# Patient Record
Sex: MALE | Race: WHITE | Employment: OTHER | URBAN - METROPOLITAN AREA
[De-identification: names, ages, dates, MRNs, and addresses within clinical notes are randomized per-mention and may not be internally consistent; named-entity substitution may affect disease eponyms.]

---

## 2017-01-26 ENCOUNTER — OFFICE VISIT (OUTPATIENT)
Dept: UROLOGY | Age: 77
End: 2017-01-26

## 2017-01-26 VITALS
SYSTOLIC BLOOD PRESSURE: 140 MMHG | DIASTOLIC BLOOD PRESSURE: 86 MMHG | WEIGHT: 230 LBS | HEIGHT: 72 IN | BODY MASS INDEX: 31.15 KG/M2 | HEART RATE: 67 BPM

## 2017-01-26 DIAGNOSIS — R97.20 ELEVATED PSA: Primary | ICD-10-CM

## 2017-01-26 PROCEDURE — 4040F PNEUMOC VAC/ADMIN/RCVD: CPT | Performed by: UROLOGY

## 2017-01-26 PROCEDURE — 1123F ACP DISCUSS/DSCN MKR DOCD: CPT | Performed by: UROLOGY

## 2017-01-26 PROCEDURE — 1036F TOBACCO NON-USER: CPT | Performed by: UROLOGY

## 2017-01-26 PROCEDURE — G8427 DOCREV CUR MEDS BY ELIG CLIN: HCPCS | Performed by: UROLOGY

## 2017-01-26 PROCEDURE — 99213 OFFICE O/P EST LOW 20 MIN: CPT | Performed by: UROLOGY

## 2017-01-26 PROCEDURE — G8484 FLU IMMUNIZE NO ADMIN: HCPCS | Performed by: UROLOGY

## 2017-01-26 PROCEDURE — 3017F COLORECTAL CA SCREEN DOC REV: CPT | Performed by: UROLOGY

## 2017-01-26 PROCEDURE — G8419 CALC BMI OUT NRM PARAM NOF/U: HCPCS | Performed by: UROLOGY

## 2017-04-02 DIAGNOSIS — C73 PAPILLARY THYROID CARCINOMA (HCC): ICD-10-CM

## 2017-04-03 RX ORDER — LEVOTHYROXINE SODIUM 137 UG/1
TABLET ORAL
Qty: 90 TABLET | Refills: 1 | Status: ON HOLD | OUTPATIENT
Start: 2017-04-03 | End: 2017-12-04 | Stop reason: HOSPADM

## 2017-06-09 ENCOUNTER — OFFICE VISIT (OUTPATIENT)
Dept: SURGERY | Age: 77
End: 2017-06-09

## 2017-06-09 VITALS
DIASTOLIC BLOOD PRESSURE: 76 MMHG | SYSTOLIC BLOOD PRESSURE: 138 MMHG | WEIGHT: 229 LBS | BODY MASS INDEX: 30.35 KG/M2 | TEMPERATURE: 98.1 F | HEIGHT: 73 IN

## 2017-06-09 DIAGNOSIS — L08.9 UNSPECIFIED LOCAL INFECTION OF SKIN AND SUBCUTANEOUS TISSUE: ICD-10-CM

## 2017-06-09 DIAGNOSIS — L72.9 SKIN CYST: Primary | ICD-10-CM

## 2017-06-09 PROCEDURE — 3017F COLORECTAL CA SCREEN DOC REV: CPT | Performed by: SURGERY

## 2017-06-09 PROCEDURE — G8417 CALC BMI ABV UP PARAM F/U: HCPCS | Performed by: SURGERY

## 2017-06-09 PROCEDURE — 1123F ACP DISCUSS/DSCN MKR DOCD: CPT | Performed by: SURGERY

## 2017-06-09 PROCEDURE — 4040F PNEUMOC VAC/ADMIN/RCVD: CPT | Performed by: SURGERY

## 2017-06-09 PROCEDURE — 99202 OFFICE O/P NEW SF 15 MIN: CPT | Performed by: SURGERY

## 2017-06-09 PROCEDURE — G8427 DOCREV CUR MEDS BY ELIG CLIN: HCPCS | Performed by: SURGERY

## 2017-06-09 PROCEDURE — 1036F TOBACCO NON-USER: CPT | Performed by: SURGERY

## 2017-06-09 RX ORDER — LORAZEPAM 0.5 MG/1
0.5 TABLET ORAL
Status: ON HOLD | COMMUNITY
End: 2017-12-04 | Stop reason: HOSPADM

## 2017-06-09 RX ORDER — ACETAMINOPHEN 160 MG
TABLET,DISINTEGRATING ORAL
Status: ON HOLD | COMMUNITY
End: 2017-11-21 | Stop reason: ALTCHOICE

## 2017-06-09 RX ORDER — SULFAMETHOXAZOLE AND TRIMETHOPRIM 800; 160 MG/1; MG/1
1 TABLET ORAL 2 TIMES DAILY
Qty: 28 TABLET | Refills: 0 | Status: SHIPPED | OUTPATIENT
Start: 2017-06-09 | End: 2017-06-23

## 2017-06-09 ASSESSMENT — ENCOUNTER SYMPTOMS
SHORTNESS OF BREATH: 0
BLOOD IN STOOL: 0
CHEST TIGHTNESS: 0
RESPIRATORY NEGATIVE: 1
RHINORRHEA: 0
ALLERGIC/IMMUNOLOGIC NEGATIVE: 1
EYES NEGATIVE: 1
COLOR CHANGE: 0
CONSTIPATION: 0
GASTROINTESTINAL NEGATIVE: 1
ABDOMINAL DISTENTION: 0
ABDOMINAL PAIN: 0

## 2017-06-15 ENCOUNTER — PROCEDURE VISIT (OUTPATIENT)
Dept: SURGERY | Age: 77
End: 2017-06-15

## 2017-06-15 VITALS
TEMPERATURE: 97.9 F | SYSTOLIC BLOOD PRESSURE: 130 MMHG | DIASTOLIC BLOOD PRESSURE: 82 MMHG | HEIGHT: 72 IN | BODY MASS INDEX: 30.75 KG/M2 | WEIGHT: 227 LBS

## 2017-06-15 DIAGNOSIS — L08.9 UNSPECIFIED LOCAL INFECTION OF SKIN AND SUBCUTANEOUS TISSUE: ICD-10-CM

## 2017-06-15 DIAGNOSIS — L72.9 SKIN CYST: Primary | ICD-10-CM

## 2017-06-15 PROCEDURE — 11402 EXC TR-EXT B9+MARG 1.1-2 CM: CPT | Performed by: SURGERY

## 2017-06-15 PROCEDURE — 99999 PR OFFICE/OUTPT VISIT,PROCEDURE ONLY: CPT | Performed by: SURGERY

## 2017-06-15 PROCEDURE — 12032 INTMD RPR S/A/T/EXT 2.6-7.5: CPT | Performed by: SURGERY

## 2017-06-15 PROCEDURE — 1036F TOBACCO NON-USER: CPT | Performed by: SURGERY

## 2017-06-29 ENCOUNTER — OFFICE VISIT (OUTPATIENT)
Dept: SURGERY | Age: 77
End: 2017-06-29

## 2017-06-29 VITALS
HEIGHT: 72 IN | WEIGHT: 232 LBS | BODY MASS INDEX: 31.42 KG/M2 | SYSTOLIC BLOOD PRESSURE: 110 MMHG | DIASTOLIC BLOOD PRESSURE: 80 MMHG | TEMPERATURE: 98.3 F

## 2017-06-29 DIAGNOSIS — L72.9 SKIN CYST: Primary | ICD-10-CM

## 2017-06-29 DIAGNOSIS — L08.9 UNSPECIFIED LOCAL INFECTION OF SKIN AND SUBCUTANEOUS TISSUE: ICD-10-CM

## 2017-06-29 PROCEDURE — G8417 CALC BMI ABV UP PARAM F/U: HCPCS | Performed by: SURGERY

## 2017-06-29 PROCEDURE — 1036F TOBACCO NON-USER: CPT | Performed by: SURGERY

## 2017-06-29 PROCEDURE — 1123F ACP DISCUSS/DSCN MKR DOCD: CPT | Performed by: SURGERY

## 2017-06-29 PROCEDURE — 3017F COLORECTAL CA SCREEN DOC REV: CPT | Performed by: SURGERY

## 2017-06-29 PROCEDURE — 4040F PNEUMOC VAC/ADMIN/RCVD: CPT | Performed by: SURGERY

## 2017-06-29 PROCEDURE — G8427 DOCREV CUR MEDS BY ELIG CLIN: HCPCS | Performed by: SURGERY

## 2017-06-29 PROCEDURE — 99212 OFFICE O/P EST SF 10 MIN: CPT | Performed by: SURGERY

## 2017-09-14 DIAGNOSIS — C73 PAPILLARY THYROID CARCINOMA (HCC): ICD-10-CM

## 2017-09-14 RX ORDER — LEVOTHYROXINE SODIUM 137 UG/1
TABLET ORAL
Qty: 90 TABLET | OUTPATIENT
Start: 2017-09-14

## 2017-09-29 ENCOUNTER — TELEPHONE (OUTPATIENT)
Dept: UROLOGY | Age: 77
End: 2017-09-29

## 2017-09-29 ENCOUNTER — NURSE ONLY (OUTPATIENT)
Dept: UROLOGY | Age: 77
End: 2017-09-29

## 2017-09-29 DIAGNOSIS — R30.0 BURNING WITH URINATION: Primary | ICD-10-CM

## 2017-09-29 DIAGNOSIS — R30.0 BURNING WITH URINATION: ICD-10-CM

## 2017-09-29 LAB
BILIRUBIN, POC: NORMAL
BLOOD URINE, POC: NORMAL
CLARITY, POC: CLEAR
COLOR, POC: YELLOW
GLUCOSE URINE, POC: NORMAL
KETONES, POC: NORMAL
LEUKOCYTE EST, POC: NORMAL
NITRITE, POC: NORMAL
PH, POC: 7
PROTEIN, POC: NORMAL
SPECIFIC GRAVITY, POC: 1.02
UROBILINOGEN, POC: 0.2

## 2017-09-29 PROCEDURE — 81003 URINALYSIS AUTO W/O SCOPE: CPT | Performed by: UROLOGY

## 2017-09-29 NOTE — TELEPHONE ENCOUNTER
Urine drop off for severe burning, hematuria [facility] UA showed:  Color, UA 09/29/2017 12:38 PM Unknown      yellow     Clarity, UA 09/29/2017 12:38 PM Unknown     clear     Glucose, UA POC 09/29/2017 12:38 PM Unknown     neg     Bilirubin, UA 09/29/2017 12:38 PM Unknown     neg     Ketones, UA 09/29/2017 12:38 PM Unknown     neg     Spec Grav, UA 09/29/2017 12:38 PM Unknown     1.020     Blood, UA POC 09/29/2017 12:38 PM Unknown     trace-intact     pH, UA 09/29/2017 12:38 PM Unknown     7.0     Protein, UA POC 09/29/2017 12:38 PM Unknown     30mg/dL     Urobilinogen, UA 09/29/2017 12:38 PM Unknown     0.2     Leukocytes, UA 09/29/2017 12:38 PM Unknown     neg     Nitrite, UA 09/29/2017 12:38 PM Unknown     neg     Culture sent as advised

## 2017-10-01 LAB — URINE CULTURE, ROUTINE: NORMAL

## 2017-11-10 ENCOUNTER — HOSPITAL ENCOUNTER (OUTPATIENT)
Age: 77
Discharge: HOME | End: 2017-11-10
Payer: MEDICARE

## 2017-11-10 ENCOUNTER — HOSPITAL ENCOUNTER (OUTPATIENT)
Dept: GENERAL RADIOLOGY | Age: 77
Discharge: HOME | End: 2017-11-10
Payer: MEDICARE

## 2017-11-10 ENCOUNTER — HOSPITAL ENCOUNTER (INPATIENT)
Age: 77
LOS: 11 days | Discharge: NURSING, REHAB OR LONG-TERM CARE | DRG: 163 | End: 2017-11-21
Attending: INTERNAL MEDICINE | Admitting: INTERNAL MEDICINE
Payer: MEDICARE

## 2017-11-10 DIAGNOSIS — J18.9 PNEUMONITIS: ICD-10-CM

## 2017-11-10 PROBLEM — T17.908A ASPIRATION INTO RESPIRATORY TRACT: Status: ACTIVE | Noted: 2017-11-10

## 2017-11-10 PROCEDURE — 6360000002 HC RX W HCPCS: Performed by: NURSE PRACTITIONER

## 2017-11-10 PROCEDURE — 6370000000 HC RX 637 (ALT 250 FOR IP): Performed by: PHYSICIAN ASSISTANT

## 2017-11-10 PROCEDURE — 2580000003 HC RX 258: Performed by: NURSE PRACTITIONER

## 2017-11-10 PROCEDURE — 94640 AIRWAY INHALATION TREATMENT: CPT

## 2017-11-10 PROCEDURE — 71020 XR CHEST STANDARD TWO VW: CPT

## 2017-11-10 PROCEDURE — 6370000000 HC RX 637 (ALT 250 FOR IP): Performed by: INTERNAL MEDICINE

## 2017-11-10 PROCEDURE — 1210000000 HC MED SURG R&B

## 2017-11-10 RX ORDER — FINASTERIDE 5 MG/1
5 TABLET, FILM COATED ORAL DAILY
Status: DISCONTINUED | OUTPATIENT
Start: 2017-11-10 | End: 2017-11-14

## 2017-11-10 RX ORDER — SODIUM CHLORIDE 0.9 % (FLUSH) 0.9 %
10 SYRINGE (ML) INJECTION EVERY 12 HOURS SCHEDULED
Status: DISCONTINUED | OUTPATIENT
Start: 2017-11-10 | End: 2017-11-14

## 2017-11-10 RX ORDER — MIRTAZAPINE 15 MG/1
15 TABLET, FILM COATED ORAL NIGHTLY
Status: ON HOLD | COMMUNITY
End: 2017-12-04 | Stop reason: HOSPADM

## 2017-11-10 RX ORDER — ALBUTEROL SULFATE 90 UG/1
2 AEROSOL, METERED RESPIRATORY (INHALATION) EVERY 4 HOURS PRN
Status: DISCONTINUED | OUTPATIENT
Start: 2017-11-10 | End: 2017-11-13

## 2017-11-10 RX ORDER — TRAZODONE HYDROCHLORIDE 150 MG/1
150 TABLET ORAL NIGHTLY
Status: ON HOLD | COMMUNITY
End: 2017-12-04 | Stop reason: HOSPADM

## 2017-11-10 RX ORDER — ONDANSETRON 2 MG/ML
4 INJECTION INTRAMUSCULAR; INTRAVENOUS EVERY 6 HOURS PRN
Status: DISCONTINUED | OUTPATIENT
Start: 2017-11-10 | End: 2017-11-14

## 2017-11-10 RX ORDER — IPRATROPIUM BROMIDE AND ALBUTEROL SULFATE 2.5; .5 MG/3ML; MG/3ML
1 SOLUTION RESPIRATORY (INHALATION) 3 TIMES DAILY
Status: DISCONTINUED | OUTPATIENT
Start: 2017-11-10 | End: 2017-11-13

## 2017-11-10 RX ORDER — FAMOTIDINE 20 MG/1
10 TABLET, FILM COATED ORAL DAILY
Status: DISCONTINUED | OUTPATIENT
Start: 2017-11-10 | End: 2017-11-14

## 2017-11-10 RX ORDER — MIRTAZAPINE 15 MG/1
15 TABLET, FILM COATED ORAL NIGHTLY
Status: DISCONTINUED | OUTPATIENT
Start: 2017-11-10 | End: 2017-11-14

## 2017-11-10 RX ORDER — BUDESONIDE AND FORMOTEROL FUMARATE DIHYDRATE 160; 4.5 UG/1; UG/1
2 AEROSOL RESPIRATORY (INHALATION) 2 TIMES DAILY
Status: DISCONTINUED | OUTPATIENT
Start: 2017-11-10 | End: 2017-11-10 | Stop reason: CLARIF

## 2017-11-10 RX ORDER — SODIUM CHLORIDE 0.9 % (FLUSH) 0.9 %
10 SYRINGE (ML) INJECTION PRN
Status: DISCONTINUED | OUTPATIENT
Start: 2017-11-10 | End: 2017-11-14

## 2017-11-10 RX ORDER — LEVOTHYROXINE SODIUM 0.12 MG/1
125 TABLET ORAL DAILY
Status: DISCONTINUED | OUTPATIENT
Start: 2017-11-11 | End: 2017-11-14

## 2017-11-10 RX ORDER — IPRATROPIUM BROMIDE AND ALBUTEROL SULFATE 2.5; .5 MG/3ML; MG/3ML
1 SOLUTION RESPIRATORY (INHALATION) EVERY 4 HOURS PRN
Status: DISCONTINUED | OUTPATIENT
Start: 2017-11-10 | End: 2017-11-10

## 2017-11-10 RX ORDER — IBUPROFEN 400 MG/1
400 TABLET ORAL EVERY 6 HOURS PRN
Status: DISCONTINUED | OUTPATIENT
Start: 2017-11-10 | End: 2017-11-10

## 2017-11-10 RX ORDER — ALBUTEROL SULFATE 2.5 MG/3ML
2.5 SOLUTION RESPIRATORY (INHALATION)
Status: DISCONTINUED | OUTPATIENT
Start: 2017-11-10 | End: 2017-11-13

## 2017-11-10 RX ORDER — TAMSULOSIN HYDROCHLORIDE 0.4 MG/1
0.4 CAPSULE ORAL DAILY
Status: DISCONTINUED | OUTPATIENT
Start: 2017-11-10 | End: 2017-11-14

## 2017-11-10 RX ORDER — LORAZEPAM 0.5 MG/1
0.5 TABLET ORAL EVERY 6 HOURS PRN
Status: DISCONTINUED | OUTPATIENT
Start: 2017-11-10 | End: 2017-11-14

## 2017-11-10 RX ORDER — ACETAMINOPHEN 325 MG/1
650 TABLET ORAL EVERY 4 HOURS PRN
Status: DISCONTINUED | OUTPATIENT
Start: 2017-11-10 | End: 2017-11-14

## 2017-11-10 RX ORDER — ACETAMINOPHEN 80 MG
TABLET,CHEWABLE ORAL ONCE
Status: DISCONTINUED | OUTPATIENT
Start: 2017-11-10 | End: 2017-11-14

## 2017-11-10 RX ORDER — ASPIRIN 81 MG/1
81 TABLET, CHEWABLE ORAL DAILY
Status: DISCONTINUED | OUTPATIENT
Start: 2017-11-10 | End: 2017-11-14

## 2017-11-10 RX ORDER — TRAZODONE HYDROCHLORIDE 50 MG/1
50 TABLET ORAL NIGHTLY
Status: DISCONTINUED | OUTPATIENT
Start: 2017-11-10 | End: 2017-11-10 | Stop reason: ALTCHOICE

## 2017-11-10 RX ORDER — ALBUTEROL SULFATE 2.5 MG/3ML
2.5 SOLUTION RESPIRATORY (INHALATION) EVERY 6 HOURS PRN
Status: DISCONTINUED | OUTPATIENT
Start: 2017-11-10 | End: 2017-11-10

## 2017-11-10 RX ORDER — PRAVASTATIN SODIUM 20 MG
20 TABLET ORAL NIGHTLY
Status: DISCONTINUED | OUTPATIENT
Start: 2017-11-10 | End: 2017-11-14

## 2017-11-10 RX ADMIN — Medication 2 PUFF: at 21:13

## 2017-11-10 RX ADMIN — TRAZODONE HYDROCHLORIDE 150 MG: 100 TABLET ORAL at 21:23

## 2017-11-10 RX ADMIN — AMPICILLIN SODIUM AND SULBACTAM SODIUM 1.5 G: 1; .5 INJECTION, POWDER, FOR SOLUTION INTRAMUSCULAR; INTRAVENOUS at 20:31

## 2017-11-10 RX ADMIN — MIRTAZAPINE 15 MG: 15 TABLET, FILM COATED ORAL at 21:23

## 2017-11-10 RX ADMIN — SODIUM CHLORIDE, PRESERVATIVE FREE 10 ML: 5 INJECTION INTRAVENOUS at 20:32

## 2017-11-10 RX ADMIN — PRAVASTATIN SODIUM 20 MG: 20 TABLET ORAL at 21:23

## 2017-11-10 ASSESSMENT — PAIN SCALES - GENERAL: PAINLEVEL_OUTOF10: 0

## 2017-11-10 NOTE — H&P
HFA) 108 (90 BASE) MCG/ACT inhaler Inhale 2 puffs into the lungs every 4 hours as needed for Wheezing   Yes Historical Provider, MD   pravastatin (PRAVACHOL) 20 MG tablet Take 1 tablet by mouth daily. 4/27/15  Yes Ishmael Laguna MD   budesonide-formoterol (SYMBICORT) 160-4.5 MCG/ACT AERO Inhale 2 puffs into the lungs 2 times daily. Yes Historical Provider, MD   finasteride (PROSCAR) 5 MG tablet Take 5 mg by mouth daily. Yes Historical Provider, MD   aspirin 81 MG tablet Take 81 mg by mouth daily. Yes Historical Provider, MD   tamsulosin (FLOMAX) 0.4 MG capsule Take 0.4 mg by mouth daily. Yes Historical Provider, MD   LORazepam (ATIVAN) 0.5 MG tablet Take 0.5 mg by mouth    Historical Provider, MD   Cholecalciferol (VITAMIN D3) 2000 UNITS CAPS Take by mouth    Historical Provider, MD   traZODone (DESYREL) 50 MG tablet Take 50 mg by mouth nightly  12/18/15   Historical Provider, MD       Allergies:  Review of patient's allergies indicates no known allergies. Social History:      The patient currently lives @ home    TOBACCO:   reports that he has quit smoking. His smoking use included Cigarettes. He started smoking about 17 years ago. He does not have any smokeless tobacco history on file. ETOH:   reports that he does not drink alcohol. Family History:      Reviewed in detail and negative for DM, CAD, Cancer, CVA. Positive as follows:        Problem Relation Age of Onset    Other Mother 80     Old Age    Stroke Father 45       REVIEW OF SYSTEMS:   Pertinent positives as noted in the HPI. All other systems reviewed and negative. PHYSICAL EXAM:    /76   Pulse 80   Temp 97.5 °F (36.4 °C) (Oral)   Resp 18   SpO2 97%     General appearance:  No apparent distress, appears stated age and cooperative. HEENT:  Normal cephalic, atraumatic without obvious deformity. Pupils equal, round, and reactive to light. Extra ocular muscles intact. Conjunctivae/corneas clear.   Neck: Supple, with full Prophylaxis:SCD    Code Status: Full Code        Dispo -inpatient        Jarvis Opitz, APRN    Thank you Jenae Sanford MD for the opportunity to be involved in this patient's care. If you have any questions or concerns please feel free to contact me.

## 2017-11-10 NOTE — FLOWSHEET NOTE
Direct admit per James. Room 292. Son at bedside. Lungs diminished in right base, wheezing. O2 sat 97% RA. Frequent cough.

## 2017-11-11 ENCOUNTER — APPOINTMENT (OUTPATIENT)
Dept: GENERAL RADIOLOGY | Age: 77
DRG: 163 | End: 2017-11-11
Attending: INTERNAL MEDICINE
Payer: MEDICARE

## 2017-11-11 ENCOUNTER — ANESTHESIA EVENT (OUTPATIENT)
Dept: OPERATING ROOM | Age: 77
DRG: 163 | End: 2017-11-11
Payer: MEDICARE

## 2017-11-11 ENCOUNTER — ANESTHESIA (OUTPATIENT)
Dept: OPERATING ROOM | Age: 77
DRG: 163 | End: 2017-11-11
Payer: MEDICARE

## 2017-11-11 ENCOUNTER — APPOINTMENT (OUTPATIENT)
Dept: CT IMAGING | Age: 77
DRG: 163 | End: 2017-11-11
Attending: INTERNAL MEDICINE
Payer: MEDICARE

## 2017-11-11 VITALS — OXYGEN SATURATION: 99 % | SYSTOLIC BLOOD PRESSURE: 115 MMHG | DIASTOLIC BLOOD PRESSURE: 75 MMHG

## 2017-11-11 LAB
ANION GAP SERPL CALCULATED.3IONS-SCNC: 13 MEQ/L (ref 7–13)
BASOPHILS ABSOLUTE: 0 K/UL (ref 0–0.2)
BASOPHILS RELATIVE PERCENT: 0.4 %
BUN BLDV-MCNC: 18 MG/DL (ref 8–23)
CALCIUM SERPL-MCNC: 9 MG/DL (ref 8.6–10.2)
CHLORIDE BLD-SCNC: 101 MEQ/L (ref 98–107)
CO2: 27 MEQ/L (ref 22–29)
CREAT SERPL-MCNC: 0.91 MG/DL (ref 0.7–1.2)
EOSINOPHILS ABSOLUTE: 0.3 K/UL (ref 0–0.7)
EOSINOPHILS RELATIVE PERCENT: 4.6 %
GFR AFRICAN AMERICAN: >60
GFR NON-AFRICAN AMERICAN: >60
GLUCOSE BLD-MCNC: 90 MG/DL (ref 74–109)
GLUCOSE BLD-MCNC: 98 MG/DL (ref 60–115)
HCT VFR BLD CALC: 47 % (ref 42–52)
HEMOGLOBIN: 15.8 G/DL (ref 14–18)
LACTIC ACID: 1.2 MMOL/L (ref 0.5–2.2)
LYMPHOCYTES ABSOLUTE: 1.3 K/UL (ref 1–4.8)
LYMPHOCYTES RELATIVE PERCENT: 20.1 %
MCH RBC QN AUTO: 31.9 PG (ref 27–31.3)
MCHC RBC AUTO-ENTMCNC: 33.6 % (ref 33–37)
MCV RBC AUTO: 95 FL (ref 80–100)
MONOCYTES ABSOLUTE: 0.5 K/UL (ref 0.2–0.8)
MONOCYTES RELATIVE PERCENT: 7.7 %
NEUTROPHILS ABSOLUTE: 4.4 K/UL (ref 1.4–6.5)
NEUTROPHILS RELATIVE PERCENT: 67.2 %
PDW BLD-RTO: 14.6 % (ref 11.5–14.5)
PERFORMED ON: NORMAL
PLATELET # BLD: 103 K/UL (ref 130–400)
POTASSIUM SERPL-SCNC: 4.3 MEQ/L (ref 3.5–5.1)
RBC # BLD: 4.95 M/UL (ref 4.7–6.1)
SODIUM BLD-SCNC: 141 MEQ/L (ref 132–144)
WBC # BLD: 6.6 K/UL (ref 4.8–10.8)

## 2017-11-11 PROCEDURE — 85025 COMPLETE CBC W/AUTO DIFF WBC: CPT

## 2017-11-11 PROCEDURE — 31635 BRONCHOSCOPY W/FB REMOVAL: CPT | Performed by: INTERNAL MEDICINE

## 2017-11-11 PROCEDURE — 36415 COLL VENOUS BLD VENIPUNCTURE: CPT

## 2017-11-11 PROCEDURE — 99222 1ST HOSP IP/OBS MODERATE 55: CPT | Performed by: INTERNAL MEDICINE

## 2017-11-11 PROCEDURE — 71260 CT THORAX DX C+: CPT

## 2017-11-11 PROCEDURE — 94640 AIRWAY INHALATION TREATMENT: CPT

## 2017-11-11 PROCEDURE — 7100000001 HC PACU RECOVERY - ADDTL 15 MIN: Performed by: INTERNAL MEDICINE

## 2017-11-11 PROCEDURE — 2580000003 HC RX 258: Performed by: ANESTHESIOLOGY

## 2017-11-11 PROCEDURE — 83605 ASSAY OF LACTIC ACID: CPT

## 2017-11-11 PROCEDURE — 6370000000 HC RX 637 (ALT 250 FOR IP): Performed by: INTERNAL MEDICINE

## 2017-11-11 PROCEDURE — 3700000001 HC ADD 15 MINUTES (ANESTHESIA): Performed by: INTERNAL MEDICINE

## 2017-11-11 PROCEDURE — 71010 XR CHEST PORTABLE: CPT

## 2017-11-11 PROCEDURE — 1210000000 HC MED SURG R&B

## 2017-11-11 PROCEDURE — 94760 N-INVAS EAR/PLS OXIMETRY 1: CPT

## 2017-11-11 PROCEDURE — A6252 ABSORPT DRG >16 <=48 W/O BDR: HCPCS | Performed by: INTERNAL MEDICINE

## 2017-11-11 PROCEDURE — 6360000004 HC RX CONTRAST MEDICATION: Performed by: INTERNAL MEDICINE

## 2017-11-11 PROCEDURE — 6360000002 HC RX W HCPCS: Performed by: ANESTHESIOLOGY

## 2017-11-11 PROCEDURE — 6360000002 HC RX W HCPCS: Performed by: NURSE PRACTITIONER

## 2017-11-11 PROCEDURE — 0W9930Z DRAINAGE OF RIGHT PLEURAL CAVITY WITH DRAINAGE DEVICE, PERCUTANEOUS APPROACH: ICD-10-PCS | Performed by: THORACIC SURGERY (CARDIOTHORACIC VASCULAR SURGERY)

## 2017-11-11 PROCEDURE — 3600000003 HC SURGERY LEVEL 3 BASE: Performed by: INTERNAL MEDICINE

## 2017-11-11 PROCEDURE — 3700000000 HC ANESTHESIA ATTENDED CARE: Performed by: INTERNAL MEDICINE

## 2017-11-11 PROCEDURE — 87040 BLOOD CULTURE FOR BACTERIA: CPT

## 2017-11-11 PROCEDURE — 3600000013 HC SURGERY LEVEL 3 ADDTL 15MIN: Performed by: INTERNAL MEDICINE

## 2017-11-11 PROCEDURE — 80048 BASIC METABOLIC PNL TOTAL CA: CPT

## 2017-11-11 PROCEDURE — 2580000003 HC RX 258: Performed by: INTERNAL MEDICINE

## 2017-11-11 PROCEDURE — 6370000000 HC RX 637 (ALT 250 FOR IP): Performed by: NURSE PRACTITIONER

## 2017-11-11 PROCEDURE — 2500000003 HC RX 250 WO HCPCS: Performed by: INTERNAL MEDICINE

## 2017-11-11 PROCEDURE — 0BC60ZZ EXTIRPATION OF MATTER FROM RIGHT LOWER LOBE BRONCHUS, OPEN APPROACH: ICD-10-PCS | Performed by: THORACIC SURGERY (CARDIOTHORACIC VASCULAR SURGERY)

## 2017-11-11 PROCEDURE — 6360000002 HC RX W HCPCS: Performed by: INTERNAL MEDICINE

## 2017-11-11 PROCEDURE — 2580000003 HC RX 258: Performed by: NURSE PRACTITIONER

## 2017-11-11 PROCEDURE — 7100000000 HC PACU RECOVERY - FIRST 15 MIN: Performed by: INTERNAL MEDICINE

## 2017-11-11 PROCEDURE — 6370000000 HC RX 637 (ALT 250 FOR IP): Performed by: PHYSICIAN ASSISTANT

## 2017-11-11 RX ORDER — ONDANSETRON 2 MG/ML
INJECTION INTRAMUSCULAR; INTRAVENOUS PRN
Status: DISCONTINUED | OUTPATIENT
Start: 2017-11-11 | End: 2017-11-11 | Stop reason: SDUPTHER

## 2017-11-11 RX ORDER — DIPHENHYDRAMINE HYDROCHLORIDE 50 MG/ML
12.5 INJECTION INTRAMUSCULAR; INTRAVENOUS
Status: DISCONTINUED | OUTPATIENT
Start: 2017-11-11 | End: 2017-11-11 | Stop reason: HOSPADM

## 2017-11-11 RX ORDER — HYDROCODONE BITARTRATE AND ACETAMINOPHEN 5; 325 MG/1; MG/1
2 TABLET ORAL PRN
Status: DISCONTINUED | OUTPATIENT
Start: 2017-11-11 | End: 2017-11-11 | Stop reason: HOSPADM

## 2017-11-11 RX ORDER — MAGNESIUM HYDROXIDE 1200 MG/15ML
LIQUID ORAL CONTINUOUS PRN
Status: DISCONTINUED | OUTPATIENT
Start: 2017-11-11 | End: 2017-11-11 | Stop reason: HOSPADM

## 2017-11-11 RX ORDER — FENTANYL CITRATE 50 UG/ML
50 INJECTION, SOLUTION INTRAMUSCULAR; INTRAVENOUS EVERY 10 MIN PRN
Status: DISCONTINUED | OUTPATIENT
Start: 2017-11-11 | End: 2017-11-11 | Stop reason: HOSPADM

## 2017-11-11 RX ORDER — ONDANSETRON 2 MG/ML
4 INJECTION INTRAMUSCULAR; INTRAVENOUS
Status: DISCONTINUED | OUTPATIENT
Start: 2017-11-11 | End: 2017-11-11 | Stop reason: HOSPADM

## 2017-11-11 RX ORDER — SODIUM CHLORIDE, SODIUM LACTATE, POTASSIUM CHLORIDE, CALCIUM CHLORIDE 600; 310; 30; 20 MG/100ML; MG/100ML; MG/100ML; MG/100ML
INJECTION, SOLUTION INTRAVENOUS CONTINUOUS PRN
Status: DISCONTINUED | OUTPATIENT
Start: 2017-11-11 | End: 2017-11-11 | Stop reason: SDUPTHER

## 2017-11-11 RX ORDER — METOCLOPRAMIDE HYDROCHLORIDE 5 MG/ML
10 INJECTION INTRAMUSCULAR; INTRAVENOUS
Status: DISCONTINUED | OUTPATIENT
Start: 2017-11-11 | End: 2017-11-11 | Stop reason: HOSPADM

## 2017-11-11 RX ORDER — LIDOCAINE HYDROCHLORIDE 20 MG/ML
INJECTION, SOLUTION EPIDURAL; INFILTRATION; INTRACAUDAL; PERINEURAL PRN
Status: DISCONTINUED | OUTPATIENT
Start: 2017-11-11 | End: 2017-11-11 | Stop reason: HOSPADM

## 2017-11-11 RX ORDER — MEPERIDINE HYDROCHLORIDE 25 MG/ML
12.5 INJECTION INTRAMUSCULAR; INTRAVENOUS; SUBCUTANEOUS EVERY 5 MIN PRN
Status: DISCONTINUED | OUTPATIENT
Start: 2017-11-11 | End: 2017-11-11 | Stop reason: HOSPADM

## 2017-11-11 RX ORDER — HYDROCODONE BITARTRATE AND ACETAMINOPHEN 5; 325 MG/1; MG/1
1 TABLET ORAL PRN
Status: DISCONTINUED | OUTPATIENT
Start: 2017-11-11 | End: 2017-11-11 | Stop reason: HOSPADM

## 2017-11-11 RX ORDER — PROPOFOL 10 MG/ML
INJECTION, EMULSION INTRAVENOUS PRN
Status: DISCONTINUED | OUTPATIENT
Start: 2017-11-11 | End: 2017-11-11 | Stop reason: SDUPTHER

## 2017-11-11 RX ORDER — DEXAMETHASONE SODIUM PHOSPHATE 10 MG/ML
INJECTION INTRAMUSCULAR; INTRAVENOUS PRN
Status: DISCONTINUED | OUTPATIENT
Start: 2017-11-11 | End: 2017-11-11 | Stop reason: SDUPTHER

## 2017-11-11 RX ORDER — SUCCINYLCHOLINE CHLORIDE 20 MG/ML
INJECTION INTRAMUSCULAR; INTRAVENOUS PRN
Status: DISCONTINUED | OUTPATIENT
Start: 2017-11-11 | End: 2017-11-11 | Stop reason: SDUPTHER

## 2017-11-11 RX ADMIN — AMPICILLIN SODIUM AND SULBACTAM SODIUM 1.5 G: 1; .5 INJECTION, POWDER, FOR SOLUTION INTRAMUSCULAR; INTRAVENOUS at 01:51

## 2017-11-11 RX ADMIN — AMPICILLIN SODIUM AND SULBACTAM SODIUM 1.5 G: 1; .5 INJECTION, POWDER, FOR SOLUTION INTRAMUSCULAR; INTRAVENOUS at 08:03

## 2017-11-11 RX ADMIN — ONDANSETRON 4 MG: 2 INJECTION INTRAMUSCULAR; INTRAVENOUS at 10:31

## 2017-11-11 RX ADMIN — IOPAMIDOL 75 ML: 755 INJECTION, SOLUTION INTRAVENOUS at 11:57

## 2017-11-11 RX ADMIN — TAMSULOSIN HYDROCHLORIDE 0.4 MG: 0.4 CAPSULE ORAL at 12:41

## 2017-11-11 RX ADMIN — AMPICILLIN SODIUM AND SULBACTAM SODIUM 1.5 G: 1; .5 INJECTION, POWDER, FOR SOLUTION INTRAMUSCULAR; INTRAVENOUS at 12:39

## 2017-11-11 RX ADMIN — TAZOBACTAM SODIUM AND PIPERACILLIN SODIUM 3.38 G: 375; 3 INJECTION, SOLUTION INTRAVENOUS at 23:55

## 2017-11-11 RX ADMIN — TAZOBACTAM SODIUM AND PIPERACILLIN SODIUM 3.38 G: 375; 3 INJECTION, SOLUTION INTRAVENOUS at 13:20

## 2017-11-11 RX ADMIN — MIRTAZAPINE 15 MG: 15 TABLET, FILM COATED ORAL at 20:32

## 2017-11-11 RX ADMIN — FINASTERIDE 5 MG: 5 TABLET, FILM COATED ORAL at 12:42

## 2017-11-11 RX ADMIN — PRAVASTATIN SODIUM 20 MG: 20 TABLET ORAL at 20:32

## 2017-11-11 RX ADMIN — SUCCINYLCHOLINE CHLORIDE 40 MG: 20 INJECTION, SOLUTION INTRAMUSCULAR; INTRAVENOUS at 10:01

## 2017-11-11 RX ADMIN — SUCCINYLCHOLINE CHLORIDE 100 MG: 20 INJECTION, SOLUTION INTRAMUSCULAR; INTRAVENOUS at 09:45

## 2017-11-11 RX ADMIN — PROPOFOL 40 MG: 10 INJECTION, EMULSION INTRAVENOUS at 09:26

## 2017-11-11 RX ADMIN — PROPOFOL 40 MG: 10 INJECTION, EMULSION INTRAVENOUS at 09:38

## 2017-11-11 RX ADMIN — AMPICILLIN SODIUM AND SULBACTAM SODIUM 1.5 G: 1; .5 INJECTION, POWDER, FOR SOLUTION INTRAMUSCULAR; INTRAVENOUS at 19:15

## 2017-11-11 RX ADMIN — PROPOFOL 40 MG: 10 INJECTION, EMULSION INTRAVENOUS at 09:23

## 2017-11-11 RX ADMIN — TRAZODONE HYDROCHLORIDE 150 MG: 100 TABLET ORAL at 20:32

## 2017-11-11 RX ADMIN — PROPOFOL 20 MG: 10 INJECTION, EMULSION INTRAVENOUS at 09:30

## 2017-11-11 RX ADMIN — SODIUM CHLORIDE, PRESERVATIVE FREE 10 ML: 5 INJECTION INTRAVENOUS at 20:32

## 2017-11-11 RX ADMIN — SUCCINYLCHOLINE CHLORIDE 40 MG: 20 INJECTION, SOLUTION INTRAMUSCULAR; INTRAVENOUS at 10:28

## 2017-11-11 RX ADMIN — Medication 2 PUFF: at 08:51

## 2017-11-11 RX ADMIN — ASPIRIN 81 MG 81 MG: 81 TABLET ORAL at 12:42

## 2017-11-11 RX ADMIN — PROPOFOL 60 MG: 10 INJECTION, EMULSION INTRAVENOUS at 09:45

## 2017-11-11 RX ADMIN — PROPOFOL 20 MG: 10 INJECTION, EMULSION INTRAVENOUS at 09:34

## 2017-11-11 RX ADMIN — ACETAMINOPHEN 650 MG: 325 TABLET ORAL at 20:47

## 2017-11-11 RX ADMIN — DEXAMETHASONE SODIUM PHOSPHATE 10 MG: 10 INJECTION INTRAMUSCULAR; INTRAVENOUS at 10:14

## 2017-11-11 RX ADMIN — SUCCINYLCHOLINE CHLORIDE 40 MG: 20 INJECTION, SOLUTION INTRAMUSCULAR; INTRAVENOUS at 09:49

## 2017-11-11 RX ADMIN — LEVOTHYROXINE SODIUM 125 MCG: 125 TABLET ORAL at 12:41

## 2017-11-11 RX ADMIN — TAZOBACTAM SODIUM AND PIPERACILLIN SODIUM 3.38 G: 375; 3 INJECTION, SOLUTION INTRAVENOUS at 18:34

## 2017-11-11 RX ADMIN — SODIUM CHLORIDE, PRESERVATIVE FREE 10 ML: 5 INJECTION INTRAVENOUS at 08:03

## 2017-11-11 RX ADMIN — SODIUM CHLORIDE, POTASSIUM CHLORIDE, SODIUM LACTATE AND CALCIUM CHLORIDE: 600; 310; 30; 20 INJECTION, SOLUTION INTRAVENOUS at 09:20

## 2017-11-11 RX ADMIN — FAMOTIDINE 10 MG: 20 TABLET, FILM COATED ORAL at 12:42

## 2017-11-11 RX ADMIN — SUCCINYLCHOLINE CHLORIDE 20 MG: 20 INJECTION, SOLUTION INTRAMUSCULAR; INTRAVENOUS at 09:54

## 2017-11-11 ASSESSMENT — PAIN SCALES - GENERAL
PAINLEVEL_OUTOF10: 4
PAINLEVEL_OUTOF10: 0

## 2017-11-11 ASSESSMENT — COPD QUESTIONNAIRES: CAT_SEVERITY: MODERATE

## 2017-11-11 NOTE — CONSULTS
mcg Oral Daily    mirtazapine  15 mg Oral Nightly    tamsulosin  0.4 mg Oral Daily    famotidine  10 mg Oral Daily    pravastatin  20 mg Oral Nightly    traZODone  150 mg Oral Nightly    pill splitter   Does not apply Once    mometasone-formoterol  2 puff Inhalation BID       PRN Meds:sodium chloride flush, acetaminophen, magnesium hydroxide, ondansetron, albuterol, albuterol sulfate HFA, LORazepam        REVIEW OF SYSTEMS:  As in history of present illness  Other 14 point review of system is negative. PHYSICAL EXAM:    Vitals:  /78   Pulse 64   Temp 97.2 °F (36.2 °C)   Resp 18   SpO2 98%   General: he is  Alert, awake . comfortable in bed, No distress. Head: Atraumatic , Normocephalic   Eyes: PERRL. No sclera icterus. No conjunctival injection. No discharge   ENT: No nasal  discharge. Pharynx clear. Neck:  Trachea midline. No thyromegaly, no JVD, No cervical adenopathy. Chest : Bilaterally symmetrical ,Normal effort,  No accessory muscle use  Lung :localized right lower wheezes   Heart[de-identified] Normal  rate. Regular rhythm. No mumur ,  Rub or gallop  ABD: Non-tender. Non-distended. No masses. No organmegaly. Normal bowel sounds. No hernia. EXT: No Pitting both leg , No Cyanosis No clubbing  Neuro: no focal weakness  Skin: Warm and dry. No erythema rash on exposed extremities. Data Review  No results for input(s): WBC, HGB, HCT, PLT in the last 72 hours. No results for input(s): NA, K, CL, CO2, BUN, CREATININE, GLUCOSE in the last 72 hours. Invalid input(s): CA    MV Settings: ABGs: No results for input(s): PHART, ZOK4EAO, PO2ART, UDB2SUQ, BEART, L1QQLSBJ, ZZA2BAA in the last 72 hours. O2 Device: None (Room air)  No results found for: [address]    Radiology  Xr Chest Standard (2 Vw)    Result Date: 11/10/2017  CLINICAL HISTORY: Pneumonitis COMPARISON: December 3, 2015 CHEST, TWO VIEWS FINDINGS: Cardiac and mediastinal silhouettes are normal in size and contour.  Projecting over the right hilar region is a 1 cm radiopaque density. The right costophrenic angle is slightly blunted. No focal areas of consolidations are noted. No pneumothorax is seen. The osseous structures are grossly unremarkable. IMPRESSION A radiopaque density is now seen in the region of the right lower lobe bronchus. Assessment, plan:   Aspiration of foreign body.     Will proceed with Brtonchoscopy        Electronically signed by Zahra Samuels MD, FCCP on 11/11/2017 at 9:17 AM

## 2017-11-11 NOTE — PROGRESS NOTES
[facility] EMERGENCY [facility] AT [place] Respiratory Therapy Evaluation   Current Order: PRN duoneb     Home Regimen: prn      Ordering Physician: Joesph Panda  Re-evaluation Date: 11/13    Diagnosis:Bronchoscopy foreign object     Patient Status: Stable / Unstable + Physician notified    The following MDI Criteria must be met in order to convert aerosol to MDI with spacer. If unable to meet, MDI will be converted to aerosol:  []  Patient able to demonstrate the ability to use MDI effectively  []  Patient alert and cooperative  []  Patient able to take deep breath with 5-10 second hold  []  Medication(s) available in this delivery method   []  Peak flow greater than or equal to 200 ml/min            Current Order Substituted To  (same drug, same frequency)   Aerosol to MDI [] Albuterol Sulfate 0.083% unit dose by aerosol Albuterol Sulfate MDI 2 puffs by inhalation with spacer    [] Levalbuterol 1.25 mg unit dose by aerosol Levalbuterol MDI 2 puffs by inhalation with spacer    [] Levalbuterol 0.63 mg unit dose by aerosol Levalbuterol MDI 2 puffs by inhalation with spacer    [] Ipratropium Bromide 0.02% unit dose by aerosol Ipratropium Bromide MDI 2 puffs by inhalation with spacer    [] Duoneb (Ipratropium + Albuterol) unit dose by aerosol Ipratropium MDI + Albuterol MDI 2 puffs by inhalation w/spacer   MDI to Aerosol [] Albuterol Sulfate MDI Albuterol Sulfate 0.083% unit dose by aerosol    [] Levalbuterol MDI 2 puffs by inhalation Levalbuterol 1.25 mg unit dose by aerosol    [] Ipratropium Bromide MDI by inhalation Ipratropium Bromide 0.02% unit dose by aerosol    [] Combivent (Ipratropium + Albuterol) MDI by inhalation Duoneb (Ipratropium + Albuterol) unit dose by aerosol   Treatment Assessment [Frequency/Schedule]:  Change frequency to: ____________TID duoneb______________________________________per Protocol, P&T, MEC      Points 0 1 2 3 4   Pulmonary Status  Non-Smoker  []   Smoking history   < 20 pack years  []   Smoking history  ?  20 pack years  []   Pulmonary Disorder  (acute or chronic)  [x]   Severe or Chronic w/ Exacerbation  []     Surgical Status No [x]   Surgeries     General []   Surgery Lower []   Abdominal Thoracic or []   Upper Abdominal Thoracic with  PulmonaryDisorder  []     Chest X-ray Clear/Not  Ordered     []  Chronic Changes  Results Pending  [x]  Infiltrates, atelectasis, pleural effusion, or edema  []  Infiltrates in more than one lobe []  Infiltrate + Atelectasis, &/or pleural effusion  []    Respiratory Pattern Regular,  RR = 12-20 [x]  Increased,  RR = 21-25 []  STEWART, irregular,  or RR = 26-30 []  Decreased FEV1  or RR = 31-35 []  Severe SOB, use  of accessory muscles, or RR ? 35  []    Mental Status Alert, oriented,  Cooperative [x]  Confused but Follows commands []  Lethargic or unable to follow commands []  Obtunded  []  Comatose  []    Breath Sounds Clear to  auscultation  []  Decreased unilaterally or  in bases only []  Decreased  bilaterally  []  Crackles or intermittent wheezes [x]  Wheezes []    Cough Strong, Spontan., & nonproductive [x]  Strong,  spontaneous, &  productive []  Weak,  Nonproductive []  Weak, productive or  with wheezes []  No spontaneous  cough or may require suctioning []    Level of Activity Ambulatory [x]  Ambulatory w/ Assist  []  Non-ambulatory []  Paraplegic []  Quadriplegic []    Total    Score:__6_____     Triage Score:__4______      Tri       Triage:     1. (>20) Freq: Q3    2. (16-20) Freq: Q4   3. (11-15) Freq: QID & Albuterol Q2 PRN    4. (6-10) Freq: TID & Albuterol Q2 PRN    5. (0-5) Freq Q4prn

## 2017-11-11 NOTE — PROGRESS NOTES
Patient ID:  Rafaela Nolan  [id number]  77 y.o.  1940  BOVIE PAD SITE CLEAR AND INTACT PRE AND POST OP. TAKEN TO PACU,   ATTACHED TO MONITOR AND REPORT GIVEN TO RN.   VSS DRSG DRY AND INTACT  FOREIGN BODY UNABLE TO BE RETRIEVED DUE TO POOR VISUALIZATION        Electronically signed by Katharina Schultz RN on 11/11/2017 at 10:41 AM

## 2017-11-11 NOTE — ANESTHESIA PRE PROCEDURE
Department of Anesthesiology  Preprocedure Note       Name:  Suzan May   Age:  77 y.o.  :  1940                                          MRN:  [id number]         Date:  2017      Surgeon: Lakshmi Almaraz):  Vani Oliveira MD    Procedure: Procedure(s):  BRONCHOSCOPY FIBEROPTIC    Medications prior to admission:   Prior to Admission medications    Medication Sig Start Date End Date Taking? Authorizing Provider   mirtazapine (REMERON) 15 MG tablet Take 15 mg by mouth nightly   Yes Historical Provider, MD   traZODone (DESYREL) 150 MG tablet Take 150 mg by mouth nightly   Yes Historical Provider, MD   Famotidine (PEPCID AC PO) Take by mouth   Yes Historical Provider, MD   levothyroxine (SYNTHROID) 137 MCG tablet Take 1 tablet by mouth  daily 4/3/17  Yes Vinnie Leonardo MD   albuterol (PROVENTIL HFA;VENTOLIN HFA) 108 (90 BASE) MCG/ACT inhaler Inhale 2 puffs into the lungs every 4 hours as needed for Wheezing   Yes Historical Provider, MD   pravastatin (PRAVACHOL) 20 MG tablet Take 1 tablet by mouth daily. 4/27/15  Yes Cedrick Amezcua MD   budesonide-formoterol (SYMBICORT) 160-4.5 MCG/ACT AERO Inhale 2 puffs into the lungs 2 times daily. Yes Historical Provider, MD   finasteride (PROSCAR) 5 MG tablet Take 5 mg by mouth daily. Yes Historical Provider, MD   aspirin 81 MG tablet Take 81 mg by mouth daily. Yes Historical Provider, MD   tamsulosin (FLOMAX) 0.4 MG capsule Take 0.4 mg by mouth daily.      Yes Historical Provider, MD   LORazepam (ATIVAN) 0.5 MG tablet Take 0.5 mg by mouth    Historical Provider, MD   Cholecalciferol (VITAMIN D3) 2000 UNITS CAPS Take by mouth    Historical Provider, MD   traZODone (DESYREL) 50 MG tablet Take 50 mg by mouth nightly  12/18/15   Historical Provider, MD       Current medications:    Current Facility-Administered Medications   Medication Dose Route Frequency Provider Last Rate Last Dose    sodium chloride flush 0.9 % injection 10 mL  10 mL Intravenous 2 times per day 7395       Allergies:  No Known Allergies    Problem List:    Patient Active Problem List   Diagnosis Code    COPD (chronic obstructive pulmonary disease) ([facility] J44.9    HTN (hypertension) I10    Hyperlipidemia E78.5    GERD (gastroesophageal reflux disease) K21.9    Hypothyroidism E03.9    Type 2 diabetes mellitus ([facility] E11.9    BPH (benign prostatic hyperplasia) N40.0    Anxiety F41.9    Insomnia G47.00    Acute sinusitis J01.90    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    Elevated PSA R97.20    Osteoarthritis of spine with radiculopathy, 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    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    Skin cyst L72.9    Local infection of skin and subcutaneous tissue L08.9    Aspiration into respiratory tract T17.908A       Past Medical History:        Diagnosis Date    Acute sinusitis     Anxiety     BPH (benign prostatic hyperplasia)     COPD (chronic obstructive pulmonary disease) ([facility]) 12/5/2013    Elevated CK 12/30/2013    GERD (gastroesophageal reflux disease) 12/11/2014    History of asthma 1/13/2014    HTN (hypertension) 1/13/2014    Hyperlipidemia     Hypothyroidism     Insomnia     Lower extremity weakness 1/24/2014    On home oxygen therapy     2L at night    Positive D dimer 12/5/2013    Sleep apnea 1/24/2014    Type 2 diabetes mellitus ([facility]     Vitamin D deficiency        Past Surgical History:        Procedure Laterality Date    KNEE SURGERY Left 1970    THYROIDECTOMY  2006       Social History:    Social History   Substance Use Topics    Smoking status: Former Smoker     Types: Cigarettes     Start date: 6/1/2000    Smokeless tobacco: Not on file      Comment: quit 2000    Alcohol use No                                Counseling given: Not Answered      Vital Signs (Current):   Vitals:    11/10/17 2041 11/11/17 0200 11/11/17 0809 11/11/17 0854   BP:   126/78    Pulse:   66 64   Resp: 18 16 16 18   Temp:   97.2 °F (36.2 °C)    TempSrc: Oral Oral oxygen   Cardiovascular:  Exercise tolerance: good (>4 METS),   (+) hypertension:,         Rhythm: regular             Beta Blocker:  Not on Beta Blocker         Neuro/Psych:   (+) neuromuscular disease:,             GI/Hepatic/Renal:   (+) GERD: well controlled,           Endo/Other: negative ROS   (+) Type II DM, , hypothyroidism::.                 Abdominal:           Vascular:                                      Anesthesia Plan      MAC     ASA 3       Induction: intravenous. MIPS: Prophylactic antiemetics administered. Anesthetic plan and risks discussed with patient. Use of blood products discussed with patient whom consented to blood products.    Plan discussed with surgical team.                  Kathi Blood MD   11/11/2017

## 2017-11-11 NOTE — FLOWSHEET NOTE
Assessment compete. Patient denies chest pain, shortness of breath, nausea, and vomiting. Patient alert and oriented times 4 and follows commands. Lung sounds diminished on left and with expiratory wheezes on the right. Patient with nonproductive cough at times. Bowel sounds times 4 quad and patient denies pain upon light abdominal palpation. Skin intact. Patient denies any needs at this time. Call light in reach.

## 2017-11-11 NOTE — PROGRESS NOTES
Hospitalist Progress Note      PCP: Jennifer Morillo MD    Date of Admission: 11/10/2017    Subjective: no complaints    Medications:  Reviewed    Infusion Medications    sodium chloride       Scheduled Medications    piperacillin-tazobactam  3.375 g Intravenous Q6H    sodium chloride flush  10 mL Intravenous 2 times per day    ampicillin-sulbactam  1.5 g Intravenous Q6H    ipratropium-albuterol  1 ampule Inhalation TID    aspirin  81 mg Oral Daily    finasteride  5 mg Oral Daily    levothyroxine  125 mcg Oral Daily    mirtazapine  15 mg Oral Nightly    tamsulosin  0.4 mg Oral Daily    famotidine  10 mg Oral Daily    pravastatin  20 mg Oral Nightly    traZODone  150 mg Oral Nightly    pill splitter   Does not apply Once    mometasone-formoterol  2 puff Inhalation BID     PRN Meds: sodium chloride, lidocaine, lidocaine PF, fentanNYL, HYDROmorphone, HYDROcodone 5 mg - acetaminophen **OR** HYDROcodone 5 mg - acetaminophen, diphenhydrAMINE, ondansetron, metoclopramide, meperidine, iopamidol, sodium chloride flush, acetaminophen, magnesium hydroxide, ondansetron, albuterol, albuterol sulfate HFA, LORazepam      Intake/Output Summary (Last 24 hours) at 17 1157  Last data filed at 17 1035   Gross per 24 hour   Intake              650 ml   Output                0 ml   Net              650 ml       Exam:  BP (!) 149/88   Pulse 68   Temp 97.9 °F (36.6 °C)   Resp (!) 7   SpO2 96%     Average, Min, and Max for last 24 hours Vitals:  TEMPERATURE:  Temp  Av.4 °F (36.3 °C)  Min: 97 °F (36.1 °C)  Max: 97.9 °F (36.6 °C)    RESPIRATIONS RANGE: Resp  Av.1  Min: 0  Max: 38    PULSE RANGE: Pulse  Av.5  Min: 64  Max: 80    BLOOD PRESSURE RANGE:  Systolic (44MLU), AXP:374 , Min:76 , EUE:164   ; Diastolic (77JFX), GPQ:70, Min:48, Max:103      PULSE OXIMETRY RANGE: SpO2  Av.7 %  Min: 76 %  Max: 100 %    No intake/output data recorded.     General appearance: No apparent distress, appears stated age and cooperative. HEENT: Pupils equal, round, and reactive to light. Conjunctivae/corneas clear. Neck: Supple, with full range of motion. No jugular venous distention. Trachea midline. Respiratory:  Normal respiratory effort. Clear to auscultation, bilaterally without Rales/Wheezes/Rhonchi. Cardiovascular: Regular rate and rhythm with normal S1/S2 without murmurs, rubs or gallops. Abdomen: Soft, non-tender, non-distended with normal bowel sounds. Musculoskeletal: No clubbing, cyanosis or edema bilaterally. Full range of motion without deformity. Skin: Skin color, texture, turgor normal.  No rashes or lesions.   Neurologic:  grossly non-focal.  Ext no c/c/e    Labs:     Recent Results (from the past 24 hour(s))   Basic metabolic panel    Collection Time: 11/11/17  8:39 AM   Result Value Ref Range    Sodium 141 132 - 144 mEq/L    Potassium 4.3 3.5 - 5.1 mEq/L    Chloride 101 98 - 107 mEq/L    CO2 27 22 - 29 mEq/L    Anion Gap 13 7 - 13 mEq/L    Glucose 90 74 - 109 mg/dL    BUN 18 8 - 23 mg/dL    CREATININE 0.91 0.70 - 1.20 mg/dL    GFR Non-African American >60.0 >60    GFR  >60.0 >60    Calcium 9.0 8.6 - 10.2 mg/dL   CBC auto differential    Collection Time: 11/11/17  8:39 AM   Result Value Ref Range    WBC 6.6 4.8 - 10.8 K/uL    RBC 4.95 4.70 - 6.10 M/uL    Hemoglobin 15.8 14.0 - 18.0 g/dL    Hematocrit 47.0 42.0 - 52.0 %    MCV 95.0 80.0 - 100.0 fL    MCH 31.9 (H) 27.0 - 31.3 pg    MCHC 33.6 33.0 - 37.0 %    RDW 14.6 (H) 11.5 - 14.5 %    Platelets 683 (L) 602 - 400 K/uL    Neutrophils % 67.2 %    Lymphocytes % 20.1 %    Monocytes % 7.7 %    Eosinophils % 4.6 %    Basophils % 0.4 %    Neutrophils # 4.4 1.4 - 6.5 K/uL    Lymphocytes # 1.3 1.0 - 4.8 K/uL    Monocytes # 0.5 0.2 - 0.8 K/uL    Eosinophils # 0.3 0.0 - 0.7 K/uL    Basophils # 0.0 0.0 - 0.2 K/uL   Lactic Acid, Plasma    Collection Time: 11/11/17  8:39 AM   Result Value Ref Range    Lactic Acid 1.2 0.5 - 2.2 mmol/L   POCT Glucose    Collection Time: 11/11/17 10:51 AM   Result Value Ref Range    POC Glucose 98 60 - 115 mg/dl    Performed on ACCU-CHEK            Assessment/Plan:      1. Aspiration into respiratory tract : s/p bronchoscopy during which the patient's filling was unable to be retrieved from the posterior basilar segment of his right bronchus. The patient will likely warrant a surgical intervention per thoracic surgery. 2. Chronic stable COPD: bronchodilators and oxygen  as needed, keep  oxygen saturation above 92%   3. Hypothyroidism-continue Synthroid  4.  Cad-aspirin and prvastatin          Active Hospital Problems    Diagnosis Date Noted    Aspiration into respiratory tract [T17.908A] 11/10/2017         DVT Prophylaxis: via lovenox if pt remains hospitalized  Diet:    Code Status: Full Code        Electronically signed by Christ Sharma MD on 11/11/2017 at 11:57 AM

## 2017-11-12 PROCEDURE — 6360000002 HC RX W HCPCS: Performed by: INTERNAL MEDICINE

## 2017-11-12 PROCEDURE — 2580000003 HC RX 258: Performed by: NURSE PRACTITIONER

## 2017-11-12 PROCEDURE — 6360000002 HC RX W HCPCS: Performed by: NURSE PRACTITIONER

## 2017-11-12 PROCEDURE — 6370000000 HC RX 637 (ALT 250 FOR IP): Performed by: INTERNAL MEDICINE

## 2017-11-12 PROCEDURE — 6370000000 HC RX 637 (ALT 250 FOR IP): Performed by: NURSE PRACTITIONER

## 2017-11-12 PROCEDURE — 6370000000 HC RX 637 (ALT 250 FOR IP): Performed by: PHYSICIAN ASSISTANT

## 2017-11-12 PROCEDURE — 1210000000 HC MED SURG R&B

## 2017-11-12 PROCEDURE — 94640 AIRWAY INHALATION TREATMENT: CPT

## 2017-11-12 PROCEDURE — 99232 SBSQ HOSP IP/OBS MODERATE 35: CPT | Performed by: INTERNAL MEDICINE

## 2017-11-12 RX ORDER — SODIUM CHLORIDE 9 MG/ML
INJECTION, SOLUTION INTRAVENOUS
Status: DISCONTINUED
Start: 2017-11-12 | End: 2017-11-12 | Stop reason: WASHOUT

## 2017-11-12 RX ADMIN — TAZOBACTAM SODIUM AND PIPERACILLIN SODIUM 3.38 G: 375; 3 INJECTION, SOLUTION INTRAVENOUS at 18:24

## 2017-11-12 RX ADMIN — PRAVASTATIN SODIUM 20 MG: 20 TABLET ORAL at 20:35

## 2017-11-12 RX ADMIN — AMPICILLIN SODIUM AND SULBACTAM SODIUM 1.5 G: 1; .5 INJECTION, POWDER, FOR SOLUTION INTRAMUSCULAR; INTRAVENOUS at 15:41

## 2017-11-12 RX ADMIN — LEVOTHYROXINE SODIUM 125 MCG: 125 TABLET ORAL at 06:15

## 2017-11-12 RX ADMIN — AMPICILLIN SODIUM AND SULBACTAM SODIUM 1.5 G: 1; .5 INJECTION, POWDER, FOR SOLUTION INTRAMUSCULAR; INTRAVENOUS at 20:35

## 2017-11-12 RX ADMIN — ACETAMINOPHEN 650 MG: 325 TABLET ORAL at 10:43

## 2017-11-12 RX ADMIN — SODIUM CHLORIDE, PRESERVATIVE FREE 10 ML: 5 INJECTION INTRAVENOUS at 20:35

## 2017-11-12 RX ADMIN — AMPICILLIN SODIUM AND SULBACTAM SODIUM 1.5 G: 1; .5 INJECTION, POWDER, FOR SOLUTION INTRAMUSCULAR; INTRAVENOUS at 09:47

## 2017-11-12 RX ADMIN — FINASTERIDE 5 MG: 5 TABLET, FILM COATED ORAL at 09:47

## 2017-11-12 RX ADMIN — TAZOBACTAM SODIUM AND PIPERACILLIN SODIUM 3.38 G: 375; 3 INJECTION, SOLUTION INTRAVENOUS at 13:37

## 2017-11-12 RX ADMIN — ASPIRIN 81 MG 81 MG: 81 TABLET ORAL at 09:48

## 2017-11-12 RX ADMIN — TRAZODONE HYDROCHLORIDE 150 MG: 100 TABLET ORAL at 20:35

## 2017-11-12 RX ADMIN — Medication 2 PUFF: at 20:50

## 2017-11-12 RX ADMIN — SODIUM CHLORIDE, PRESERVATIVE FREE 10 ML: 5 INJECTION INTRAVENOUS at 09:47

## 2017-11-12 RX ADMIN — ACETAMINOPHEN 650 MG: 325 TABLET ORAL at 15:17

## 2017-11-12 RX ADMIN — ACETAMINOPHEN 650 MG: 325 TABLET ORAL at 21:53

## 2017-11-12 RX ADMIN — MIRTAZAPINE 15 MG: 15 TABLET, FILM COATED ORAL at 20:35

## 2017-11-12 RX ADMIN — TAMSULOSIN HYDROCHLORIDE 0.4 MG: 0.4 CAPSULE ORAL at 09:47

## 2017-11-12 RX ADMIN — AMPICILLIN SODIUM AND SULBACTAM SODIUM 1.5 G: 1; .5 INJECTION, POWDER, FOR SOLUTION INTRAMUSCULAR; INTRAVENOUS at 01:51

## 2017-11-12 RX ADMIN — ALBUTEROL SULFATE 2.5 MG: 2.5 SOLUTION RESPIRATORY (INHALATION) at 22:39

## 2017-11-12 RX ADMIN — Medication 2 PUFF: at 08:32

## 2017-11-12 RX ADMIN — FAMOTIDINE 10 MG: 20 TABLET, FILM COATED ORAL at 09:48

## 2017-11-12 RX ADMIN — TAZOBACTAM SODIUM AND PIPERACILLIN SODIUM 3.38 G: 375; 3 INJECTION, SOLUTION INTRAVENOUS at 06:15

## 2017-11-12 ASSESSMENT — PAIN SCALES - GENERAL
PAINLEVEL_OUTOF10: 0
PAINLEVEL_OUTOF10: 4
PAINLEVEL_OUTOF10: 4
PAINLEVEL_OUTOF10: 2

## 2017-11-12 NOTE — PROGRESS NOTES
Hospitalist Progress Note      PCP: Lisandro Pat MD    Date of Admission: 11/10/2017    Subjective: no complaints    Medications:  Reviewed    Infusion Medications      Scheduled Medications    piperacillin-tazobactam  3.375 g Intravenous Q6H    sodium chloride flush  10 mL Intravenous 2 times per day    ampicillin-sulbactam  1.5 g Intravenous Q6H    ipratropium-albuterol  1 ampule Inhalation TID    aspirin  81 mg Oral Daily    finasteride  5 mg Oral Daily    levothyroxine  125 mcg Oral Daily    mirtazapine  15 mg Oral Nightly    tamsulosin  0.4 mg Oral Daily    famotidine  10 mg Oral Daily    pravastatin  20 mg Oral Nightly    traZODone  150 mg Oral Nightly    pill splitter   Does not apply Once    mometasone-formoterol  2 puff Inhalation BID     PRN Meds: sodium chloride flush, acetaminophen, magnesium hydroxide, ondansetron, albuterol, albuterol sulfate HFA, LORazepam      Intake/Output Summary (Last 24 hours) at 17 1039  Last data filed at 17 1914   Gross per 24 hour   Intake              150 ml   Output                0 ml   Net              150 ml       Exam:  BP (!) 147/93   Pulse 78   Temp 97.9 °F (36.6 °C)   Resp 16   SpO2 96%     Average, Min, and Max for last 24 hours Vitals:  TEMPERATURE:  Temp  Av.5 °F (36.4 °C)  Min: 97 °F (36.1 °C)  Max: 97.9 °F (36.6 °C)    RESPIRATIONS RANGE: Resp  Av.4  Min: 7  Max: 17    PULSE RANGE: Pulse  Av.5  Min: 67  Max: 78    BLOOD PRESSURE RANGE:  Systolic (16ZNK), QNT:436 , Min:135 , SKM:265   ; Diastolic (63UOT), CCB:67, Min:84, Max:103      PULSE OXIMETRY RANGE: SpO2  Av.6 %  Min: 89 %  Max: 98 %    I/O last 3 completed shifts: In: 800 [I.V.:600; IV Piggyback:200]  Out: -     General appearance: No apparent distress, appears stated age and cooperative. HEENT: Pupils equal, round, and reactive to light. Conjunctivae/corneas clear. Neck: Supple, with full range of motion. No jugular venous distention.  Trachea midline. Respiratory:  Normal respiratory effort. Clear to auscultation, bilaterally without Rales/Wheezes/Rhonchi. Cardiovascular: Regular rate and rhythm with normal S1/S2 without murmurs, rubs or gallops. Abdomen: Soft, non-tender, non-distended with normal bowel sounds. Musculoskeletal: No clubbing, cyanosis or edema bilaterally. Full range of motion without deformity. Skin: Skin color, texture, turgor normal.  No rashes or lesions. Neurologic:  grossly non-focal.  Ext no c/c/e    Labs:     Recent Results (from the past 24 hour(s))   POCT Glucose    Collection Time: 11/11/17 10:51 AM   Result Value Ref Range    POC Glucose 98 60 - 115 mg/dl    Performed on ACCU-CHEK            Assessment/Plan:      1. Aspiration into respiratory tract : s/p bronchoscopy during which the patient's filling was unable to be retrieved from the posterior basilar segment of his right bronchus. Continue Zosyn and Unasyn per pulmonary medicine recommendations. Cardiothoracic surgery consultant. Appreciate pulmonary medicine input  2. Chronic stable COPD: bronchodilators and oxygen  as needed, keep  oxygen saturation above 92%   3. Hypothyroidism-continue Synthroid  4.  Cad-aspirin and prvastatin          Active Hospital Problems    Diagnosis Date Noted    Aspiration into respiratory tract [T17.908A] 11/10/2017         DVT Prophylaxis: via lovenox if pt remains hospitalized  Diet: DIET GENERAL;  Code Status: Full Code        Electronically signed by Cherelle Carvajal MD on 11/12/2017 at 10:39 AM

## 2017-11-12 NOTE — PROGRESS NOTES
INPATIENT PROGRESS NOTES    PATIENT NAME: Ania Pedro  MRN: [id number]  SERVICE DATE:  November 12, 2017   SERVICE TIME:  2:59 PM      PRIMARY SERVICE: Pulmonary Disease    CHIEF COMPLAINTS: Cough and wheezing    INTERVAL HPI: Patient seen and examined at bedside, Interval Notes, orders reviewed. Nursing notes noted  Patient is doing better with less cough and less wheezing today. He still triggers cough and wheezing with deep breathing when asked to do that. He has had no significant fevers. He remains on empiric antibiotic therapy. OBJECTIVE    There is no height or weight on file to calculate BMI. PHYSICAL EXAM:  Vitals:  BP (!) 147/93   Pulse 78   Temp 97.9 °F (36.6 °C)   Resp 16   SpO2 96%   General: Patient is  Alert, awake . comfortable in bed, No distress. Head: Atraumatic , Normocephalic   Eyes: PERRL. No sclera icterus. No conjunctival injection. No discharge   ENT: No nasal  discharge. Pharynx clear. Neck:  Trachea midline. No thyromegaly, no JVD, No cervical adenopathy. Chest : Bilaterally symmetrical ,Normal effort,  No accessory muscle use  Lung : Localized wheezing in the right lung posteriorly mainly in the lower half  Heart[de-identified] Normal  rate. Regular rhythm. No mumur ,  Rub or gallop  ABD: Non-tender. Non-distended. No masses. No organmegaly. Normal bowel sounds. No hernia. EXT: No Pitting both leg , No Cyanosis No clubbing  Neuro: no focal weakness  Skin: Warm and dry. No erythema rash on exposed extremities. DATA:   Recent Labs      11/11/17   0839   WBC  6.6   HGB  15.8   HCT  47.0   MCV  95.0   PLT  103*     Recent Labs      11/11/17   0839   NA  141   K  4.3   CL  101   CO2  27   BUN  18   CREATININE  0.91   GLUCOSE  90   CALCIUM  9.0   LABGLOM  >60.0   GFRAA  >60.0       MV Settings:          No results for input(s): PHART, GKZ0AXB, PO2ART, NFX2DQU, BEART, X6QZLTGQ in the last 72 hours.     O2 Device: None (Room air)  O2 Flow Rate (L/min): 2 L/min    DIET GENERAL; MEDICATIONS during current hospitalization:    Continuous Infusions:     Scheduled Meds:   piperacillin-tazobactam  3.375 g Intravenous Q6H    sodium chloride flush  10 mL Intravenous 2 times per day    ampicillin-sulbactam  1.5 g Intravenous Q6H    ipratropium-albuterol  1 ampule Inhalation TID    aspirin  81 mg Oral Daily    finasteride  5 mg Oral Daily    levothyroxine  125 mcg Oral Daily    mirtazapine  15 mg Oral Nightly    tamsulosin  0.4 mg Oral Daily    famotidine  10 mg Oral Daily    pravastatin  20 mg Oral Nightly    traZODone  150 mg Oral Nightly    pill splitter   Does not apply Once    mometasone-formoterol  2 puff Inhalation BID       PRN Meds:sodium chloride flush, acetaminophen, magnesium hydroxide, ondansetron, albuterol, albuterol sulfate HFA, LORazepam    Radiology  Xr Chest Standard (2 Vw)    Result Date: 11/10/2017  CLINICAL HISTORY: Pneumonitis COMPARISON: December 3, 2015 CHEST, TWO VIEWS FINDINGS: Cardiac and mediastinal silhouettes are normal in size and contour. Projecting over the right hilar region is a 1 cm radiopaque density. The right costophrenic angle is slightly blunted. No focal areas of consolidations are noted. No pneumothorax is seen. The osseous structures are grossly unremarkable. IMPRESSION A radiopaque density is now seen in the region of the right lower lobe bronchus. Ct Chest W Contrast    Result Date: 11/11/2017  EXAMINATION:  CT CHEST W CONTRAST CLINICAL HISTORY:  ACUTE RESP ILLNESS, >37YEARS OLD  status post bronchoscopy to retrieve aspirated dental amalgam, unsuccessful. COMPARISONS:  12/2/2013 TECHNIQUE:  Spiral scans with 75 mL Isovue-370 IV. Multiplanar 2-D reconstructions. Axial 3-D 10 x 3 mm MIP reconstructions were performed. All CT scans at this facility use dose modulation, iterative reconstruction, and/or weight based dosing when appropriate to reduce radiation dose to as low as reasonably achievable.  FINDINGS:  The known aspirated filling is identified within the distal segmental bronchus of the right lower lobe posterior basilar segment. Filling measures approximately 9 mm. There are mild emphysematous changes. There is mild scattered peripheral reticularity without definite honeycombing. There are no consolidations or effusions. There is right upper lung perifissural nodularity consistent with small intrapulmonary lymph nodes. There is a left upper lobe 4 mm nodule (series 2 image 21), unchanged from prior examination of 12/2/2013, and therefore benign. There are no suspicious lung nodules. There is mild bilateral lower lobe cylindrical bronchiectasis. Cardiac size and pulmonary vascularity are normal. There is mild mediastinal lipomatosis. There is mild thickening or trace fluid in the anterior pericardium, decreased compared to prior examination 12/2/2013. There are coronary artery calcifications. There are aortic calcifications. There is no evidence of aneurysm or dissection. There are borderline in size lymph nodes in the mediastinum anterior to the left mainstem bronchus and in the right hilum, unchanged from prior examination of 12/2/2013. There is no thoracic adenopathy. The esophagus is unremarkable. There is spondylosis. There are no acute or suspicious bone lesions. Scans of the subdiaphragmatic regions demonstrate a 6 mm oval metallic density in the gastric fundus, presumably representing a swallowed filling. There are stable small cysts in the liver. There are partially visualized renal cysts. ASPIRATED FILLING IN RIGHT LOWER LOBE POSTERIOR BASILAR SEGMENTAL BRONCHUS. MILD EMPHYSEMA AND LOWER LOBE CYLINDRICAL BRONCHIECTASIS. ADDITIONAL FINDINGS AS ABOVE. Xr Chest Portable    Result Date: 11/12/2017  EXAMINATION: XR CHEST PORTABLE, 1 VIEW: DATE AND TIME: 11/11/2017 at 11:30 AM . CLINICAL HISTORY: SHORTNESS OF BREATH. POST BRONCHOSCOPY.   COMPARISONS: 11/11/2017 FINDINGS: The heart, mediastinum and pulmonary vasculature are within normal limits. Lungs show scattered areas of increased reticular markings consistent with areas of parenchymal scarring. Bones unremarkable. NO ACTIVE LUNG DISEASE. Xr Chest Portable    Result Date: 11/12/2017  EXAMINATION: XR CHEST, PORTABLE: DATE AND TIME: 11/11/2017 at 9:45 AM. CLINICAL HISTORY: SWALLOWED TOOTH. POST BRONCHOSCOPY. COMPARISONS: None. FINDINGS: Portable films acquired at bedside dated during bronchoscopy with attempted retrieval of a swallowed tooth. Please refer to operative report for further details. Bronchoscopy tube seen passing into a right lower lobe bronchus. Polygonal shaped density in the right lower lung zone consistent with an ingested tooth. BRONCHOSCOPY RETRIEVAL PROCEDURE. PLEASE REFER TO OPERATIVE REPORT. IMPRESSION AND SUGGESTION:  1. Aspiration of foreign object in the posterior basilar segment of the right lower lobe wedged unable to retrieve endoscopically  2. Secondary lower respiratory tract infection due to aspirated foreign object  3.  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

## 2017-11-12 NOTE — CARE COORDINATION
I MET WITH PATIENT TO DISCUSS D/C NEEDS. HE IS FROM HOME WITH HIS WIFE AND PLANS TO RETURN. HE IS INDEPENDENT IN HIS CARE.  Electronically signed by Suraj Buchanan RN on 11/12/2017 at 2:54 PM

## 2017-11-13 ENCOUNTER — APPOINTMENT (OUTPATIENT)
Dept: GENERAL RADIOLOGY | Age: 77
DRG: 163 | End: 2017-11-13
Attending: INTERNAL MEDICINE
Payer: MEDICARE

## 2017-11-13 ENCOUNTER — ANESTHESIA EVENT (OUTPATIENT)
Dept: OPERATING ROOM | Age: 77
DRG: 163 | End: 2017-11-13
Payer: MEDICARE

## 2017-11-13 LAB
ANION GAP SERPL CALCULATED.3IONS-SCNC: 11 MEQ/L (ref 7–13)
BASOPHILS ABSOLUTE: 0.1 K/UL (ref 0–0.2)
BASOPHILS RELATIVE PERCENT: 1 %
BUN BLDV-MCNC: 19 MG/DL (ref 8–23)
CALCIUM SERPL-MCNC: 8.5 MG/DL (ref 8.6–10.2)
CHLORIDE BLD-SCNC: 103 MEQ/L (ref 98–107)
CO2: 28 MEQ/L (ref 22–29)
CREAT SERPL-MCNC: 1.17 MG/DL (ref 0.7–1.2)
EOSINOPHILS ABSOLUTE: 0.1 K/UL (ref 0–0.7)
EOSINOPHILS RELATIVE PERCENT: 2.1 %
GFR AFRICAN AMERICAN: >60
GFR NON-AFRICAN AMERICAN: >60
GLUCOSE BLD-MCNC: 122 MG/DL (ref 74–109)
HCT VFR BLD CALC: 42.4 % (ref 42–52)
HEMOGLOBIN: 14 G/DL (ref 14–18)
LYMPHOCYTES ABSOLUTE: 1.4 K/UL (ref 1–4.8)
LYMPHOCYTES RELATIVE PERCENT: 24 %
MCH RBC QN AUTO: 31.3 PG (ref 27–31.3)
MCHC RBC AUTO-ENTMCNC: 33.1 % (ref 33–37)
MCV RBC AUTO: 94.6 FL (ref 80–100)
MONOCYTES ABSOLUTE: 0.4 K/UL (ref 0.2–0.8)
MONOCYTES RELATIVE PERCENT: 5.9 %
NEUTROPHILS ABSOLUTE: 4 K/UL (ref 1.4–6.5)
NEUTROPHILS RELATIVE PERCENT: 67 %
PDW BLD-RTO: 14.8 % (ref 11.5–14.5)
PLATELET # BLD: 105 K/UL (ref 130–400)
POTASSIUM SERPL-SCNC: 4.2 MEQ/L (ref 3.5–5.1)
RBC # BLD: 4.48 M/UL (ref 4.7–6.1)
SODIUM BLD-SCNC: 142 MEQ/L (ref 132–144)
WBC # BLD: 6 K/UL (ref 4.8–10.8)

## 2017-11-13 PROCEDURE — 6360000002 HC RX W HCPCS: Performed by: INTERNAL MEDICINE

## 2017-11-13 PROCEDURE — 94640 AIRWAY INHALATION TREATMENT: CPT

## 2017-11-13 PROCEDURE — 2580000003 HC RX 258: Performed by: NURSE PRACTITIONER

## 2017-11-13 PROCEDURE — G8988 SELF CARE GOAL STATUS: HCPCS

## 2017-11-13 PROCEDURE — G8978 MOBILITY CURRENT STATUS: HCPCS

## 2017-11-13 PROCEDURE — G8989 SELF CARE D/C STATUS: HCPCS

## 2017-11-13 PROCEDURE — 99232 SBSQ HOSP IP/OBS MODERATE 35: CPT | Performed by: INTERNAL MEDICINE

## 2017-11-13 PROCEDURE — 6360000002 HC RX W HCPCS: Performed by: NURSE PRACTITIONER

## 2017-11-13 PROCEDURE — 97165 OT EVAL LOW COMPLEX 30 MIN: CPT

## 2017-11-13 PROCEDURE — G8987 SELF CARE CURRENT STATUS: HCPCS

## 2017-11-13 PROCEDURE — 6370000000 HC RX 637 (ALT 250 FOR IP): Performed by: INTERNAL MEDICINE

## 2017-11-13 PROCEDURE — 97161 PT EVAL LOW COMPLEX 20 MIN: CPT

## 2017-11-13 PROCEDURE — 2700000000 HC OXYGEN THERAPY PER DAY

## 2017-11-13 PROCEDURE — 85025 COMPLETE CBC W/AUTO DIFF WBC: CPT

## 2017-11-13 PROCEDURE — G8980 MOBILITY D/C STATUS: HCPCS

## 2017-11-13 PROCEDURE — G8979 MOBILITY GOAL STATUS: HCPCS

## 2017-11-13 PROCEDURE — 94664 DEMO&/EVAL PT USE INHALER: CPT

## 2017-11-13 PROCEDURE — 1210000000 HC MED SURG R&B

## 2017-11-13 PROCEDURE — 71020 XR CHEST STANDARD TWO VW: CPT

## 2017-11-13 PROCEDURE — 80048 BASIC METABOLIC PNL TOTAL CA: CPT

## 2017-11-13 PROCEDURE — 6370000000 HC RX 637 (ALT 250 FOR IP): Performed by: PHYSICIAN ASSISTANT

## 2017-11-13 PROCEDURE — 36415 COLL VENOUS BLD VENIPUNCTURE: CPT

## 2017-11-13 RX ORDER — DEXTROSE MONOHYDRATE 25 G/50ML
12.5 INJECTION, SOLUTION INTRAVENOUS PRN
Status: DISCONTINUED | OUTPATIENT
Start: 2017-11-13 | End: 2017-11-14

## 2017-11-13 RX ORDER — IPRATROPIUM BROMIDE AND ALBUTEROL SULFATE 2.5; .5 MG/3ML; MG/3ML
1 SOLUTION RESPIRATORY (INHALATION) EVERY 4 HOURS PRN
Status: DISCONTINUED | OUTPATIENT
Start: 2017-11-13 | End: 2017-11-14

## 2017-11-13 RX ORDER — NICOTINE POLACRILEX 4 MG
15 LOZENGE BUCCAL PRN
Status: DISCONTINUED | OUTPATIENT
Start: 2017-11-13 | End: 2017-11-14

## 2017-11-13 RX ORDER — HYDRALAZINE HYDROCHLORIDE 20 MG/ML
10 INJECTION INTRAMUSCULAR; INTRAVENOUS EVERY 6 HOURS PRN
Status: DISCONTINUED | OUTPATIENT
Start: 2017-11-13 | End: 2017-11-14

## 2017-11-13 RX ORDER — DEXTROSE MONOHYDRATE 50 MG/ML
100 INJECTION, SOLUTION INTRAVENOUS PRN
Status: DISCONTINUED | OUTPATIENT
Start: 2017-11-13 | End: 2017-11-14

## 2017-11-13 RX ADMIN — TRAZODONE HYDROCHLORIDE 150 MG: 100 TABLET ORAL at 21:51

## 2017-11-13 RX ADMIN — SODIUM CHLORIDE, PRESERVATIVE FREE 10 ML: 5 INJECTION INTRAVENOUS at 21:51

## 2017-11-13 RX ADMIN — AMPICILLIN SODIUM AND SULBACTAM SODIUM 1.5 G: 1; .5 INJECTION, POWDER, FOR SOLUTION INTRAMUSCULAR; INTRAVENOUS at 08:53

## 2017-11-13 RX ADMIN — TAZOBACTAM SODIUM AND PIPERACILLIN SODIUM 3.38 G: 375; 3 INJECTION, SOLUTION INTRAVENOUS at 06:44

## 2017-11-13 RX ADMIN — FINASTERIDE 5 MG: 5 TABLET, FILM COATED ORAL at 08:52

## 2017-11-13 RX ADMIN — LEVOTHYROXINE SODIUM 125 MCG: 125 TABLET ORAL at 06:44

## 2017-11-13 RX ADMIN — Medication 2 PUFF: at 09:22

## 2017-11-13 RX ADMIN — MIRTAZAPINE 15 MG: 15 TABLET, FILM COATED ORAL at 21:51

## 2017-11-13 RX ADMIN — TAZOBACTAM SODIUM AND PIPERACILLIN SODIUM 3.38 G: 375; 3 INJECTION, SOLUTION INTRAVENOUS at 14:50

## 2017-11-13 RX ADMIN — AMPICILLIN SODIUM AND SULBACTAM SODIUM 1.5 G: 1; .5 INJECTION, POWDER, FOR SOLUTION INTRAMUSCULAR; INTRAVENOUS at 03:22

## 2017-11-13 RX ADMIN — TAZOBACTAM SODIUM AND PIPERACILLIN SODIUM 3.38 G: 375; 3 INJECTION, SOLUTION INTRAVENOUS at 21:51

## 2017-11-13 RX ADMIN — PRAVASTATIN SODIUM 20 MG: 20 TABLET ORAL at 21:55

## 2017-11-13 RX ADMIN — Medication 2 PUFF: at 20:40

## 2017-11-13 RX ADMIN — TAMSULOSIN HYDROCHLORIDE 0.4 MG: 0.4 CAPSULE ORAL at 08:52

## 2017-11-13 RX ADMIN — FAMOTIDINE 10 MG: 20 TABLET, FILM COATED ORAL at 08:52

## 2017-11-13 RX ADMIN — ASPIRIN 81 MG 81 MG: 81 TABLET ORAL at 14:50

## 2017-11-13 RX ADMIN — TAZOBACTAM SODIUM AND PIPERACILLIN SODIUM 3.38 G: 375; 3 INJECTION, SOLUTION INTRAVENOUS at 00:48

## 2017-11-13 ASSESSMENT — ENCOUNTER SYMPTOMS
EYES NEGATIVE: 1
COUGH: 1
WHEEZING: 1
ALLERGIC/IMMUNOLOGIC NEGATIVE: 1
GASTROINTESTINAL NEGATIVE: 1

## 2017-11-13 NOTE — PROGRESS NOTES
[facility] OCCUPATIONAL THERAPY EVALUATION - ACUTE     Date: 2017  Patient Name: Chandana Sanders        MRN: [id number]  Account: [de-identified]   : 1940  (77 y.o.)  [facility]    Chart Review:  Diagnosis:  There were no encounter diagnoses. Past Medical History:   Diagnosis Date    Acute sinusitis     Anxiety     BPH (benign prostatic hyperplasia)     COPD (chronic obstructive pulmonary disease) (HCC) 2013    Elevated CK 2013    GERD (gastroesophageal reflux disease) 2014    History of asthma 2014    HTN (hypertension) 2014    Hyperlipidemia     Hypothyroidism     Insomnia     Lower extremity weakness 2014    On home oxygen therapy     2L at night    Positive D dimer 2013    Sleep apnea 2014    Type 2 diabetes mellitus (HCC)     Vitamin D deficiency      Past Surgical History:   Procedure Laterality Date    KNEE SURGERY Left     THYROIDECTOMY       Precautions:  Falls       Evaluation and Pt. rights have been reviewed: [x]Yes   [] No   If no why not:   Falls safety interventions in place  [x]Yes   [] No    Comments:     Subjective: \"I feel okay\"    Prior living arrangement:      Support contact: Lives alone, spouse passed away this March, has family nearby    Pt lives: [x] Alone   [] With spouse   [] Other   Comment:    Home: [x] Single level   []  Two level   []  Split level     []  Apartment:     Entrance:  Stairs: 1 Hand rails 0,    Inside: Stairs: 0 Hand rails: 0  Bathroom: [] Bath tub   [x] Tub/Shower combo   []  Shower stall    Location: 1st floor    DME: [x] W/W   [x] Cane   [] Rollator   []  W/C   [] Grab Bars   [] Shower Chair   [] [facility]  [] Dressing  AE  [] Other:      Previous Functional Status:  Independent, pt. states \"I'm going to [employer], I'm just worried lying in this bed will take away what I have. \"    Pain:   Start of session: 2-3/10  Description: Ache  Location: \"my legs from those exercises I tried

## 2017-11-13 NOTE — CONSULTS
[address] [address]                       [address], [address] [facility]                                   CONSULTATION    PATIENT NAME: Fred Martinez                    :        1940  MED REC NO:   [id number]                            ROOM:       W292  ACCOUNT NO:   [de-identified]                           ADMIT DATE: 11/10/2017  PROVIDER:     Claudeen Drilling, MD    CONSULT DATE:  2017    HISTORY OF PRESENT ILLNESS:  A 77year-old gentleman seen per the request  of Dr. Becky Hernandez who alerted me to this consultation yesterday, 2017. The patient was admitted to the hospital on Friday night and yesterday  underwent an unsuccessful foreign object retrieval of the right lower  bronchus by Dr. Becky Hernandez. He had inadvertently aspirated a partial tooth  filling on Friday morning and was seen at his family [de-identified] office in  [place], Dr. Shanika Gaines. Subsequently, went to the ER in [facility] where an x-ray  was performed and then found his way to [facility]  yesterday morning. Dr. Becky Hernandez was unable to retrieve the object which now  by gravity and by cough reflex has incorporated itself into the deepest  small airway of the right lower lobe posterior segment. I have reviewed  the x-rays and the CT scan and I have discussed this with Dr. Becky Hernandez and  obviously, because of our inability to retrieve this with standard  bronchoscopic manipulation and instrumentation, we do not have the ability  to ultimately extract this from an endobronchial aspect because of the  location within the patient of this object and the size and ability of our  instrumentation to remedy that.   The option and the only other source we  have is to perform an open thoracotomy vis-a-vis bronchotomy type  extraction or possible just small wedge resection of the involved lung area  to eliminate extra dissection and forgo parenchymal lung destruction and  for that purpose, he is being seen on

## 2017-11-13 NOTE — PLAN OF CARE
Problem: IP BALANCE  Goal: LTG - Patient will maintain balance to allow for safe/functional mobility  Outcome: Met This Shift  Pt. demonstrated no difficulty with ambulating in his room for self-care tasks.

## 2017-11-13 NOTE — PROGRESS NOTES
MEDICATIONS during current hospitalization:    Continuous Infusions:     Scheduled Meds:   piperacillin-tazobactam  3.375 g Intravenous Q6H    sodium chloride flush  10 mL Intravenous 2 times per day    ampicillin-sulbactam  1.5 g Intravenous Q6H    aspirin  81 mg Oral Daily    finasteride  5 mg Oral Daily    levothyroxine  125 mcg Oral Daily    mirtazapine  15 mg Oral Nightly    tamsulosin  0.4 mg Oral Daily    famotidine  10 mg Oral Daily    pravastatin  20 mg Oral Nightly    traZODone  150 mg Oral Nightly    pill splitter   Does not apply Once    mometasone-formoterol  2 puff Inhalation BID       PRN Meds:ipratropium-albuterol, hydrALAZINE, sodium chloride flush, acetaminophen, magnesium hydroxide, ondansetron, LORazepam    Radiology  Xr Chest Standard (2 Vw)    Result Date: 11/10/2017  CLINICAL HISTORY: Pneumonitis COMPARISON: December 3, 2015 CHEST, TWO VIEWS FINDINGS: Cardiac and mediastinal silhouettes are normal in size and contour. Projecting over the right hilar region is a 1 cm radiopaque density. The right costophrenic angle is slightly blunted. No focal areas of consolidations are noted. No pneumothorax is seen. The osseous structures are grossly unremarkable. IMPRESSION A radiopaque density is now seen in the region of the right lower lobe bronchus. Ct Chest W Contrast    Result Date: 11/11/2017  EXAMINATION:  CT CHEST W CONTRAST CLINICAL HISTORY:  ACUTE RESP ILLNESS, >37YEARS OLD  status post bronchoscopy to retrieve aspirated dental amalgam, unsuccessful. COMPARISONS:  12/2/2013 TECHNIQUE:  Spiral scans with 75 mL Isovue-370 IV. Multiplanar 2-D reconstructions. Axial 3-D 10 x 3 mm MIP reconstructions were performed. All CT scans at this facility use dose modulation, iterative reconstruction, and/or weight based dosing when appropriate to reduce radiation dose to as low as reasonably achievable.  FINDINGS:  The known aspirated filling is identified within the distal segmental areas of increased reticular markings consistent with areas of parenchymal scarring. Bones unremarkable. NO ACTIVE LUNG DISEASE. Xr Chest Portable    Result Date: 11/12/2017  EXAMINATION: XR CHEST, PORTABLE: DATE AND TIME: 11/11/2017 at 9:45 AM. CLINICAL HISTORY: SWALLOWED TOOTH. POST BRONCHOSCOPY. COMPARISONS: None. FINDINGS: Portable films acquired at bedside dated during bronchoscopy with attempted retrieval of a swallowed tooth. Please refer to operative report for further details. Bronchoscopy tube seen passing into a right lower lobe bronchus. Polygonal shaped density in the right lower lung zone consistent with an ingested tooth. BRONCHOSCOPY RETRIEVAL PROCEDURE. PLEASE REFER TO OPERATIVE REPORT. IMPRESSION AND SUGGESTION:  1. Aspiration of foreign object in the posterior basilar segment of the right lower lobe wedged unable to retrieve endoscopically  2. Secondary lower respiratory tract infection due to aspirated foreign object  3. 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 Myra Negrete, going for bronchoscopy and if unable retreive foreign object then going to have thoracotomy.       Electronically signed by Trae Connolly MD, FCCP on 11/13/2017 at 3:32 PM

## 2017-11-13 NOTE — PROGRESS NOTES
Subjectively, no complaints. HR is rosana in the high 40s upon auscultation, but patient was asymptomatic of distress. LS are clear. No tracheal deviation. Symmetrical chest rising observed. No respiratory distress observed; oxygen saturation normal.     Patient updated on surgical plans for tomorrow. Will cont to monitor.

## 2017-11-13 NOTE — PROGRESS NOTES
[facility] Respiratory Therapy Evaluation   Current Order:  Duoneb TID      Home Regimen: Albuterol MDI PRN      Ordering Physician: Travon Harris  Re-evaluation Date:  N/A     Diagnosis: Aspirated foreign object      Patient Status: Stable / Unstable + Physician notified    The following MDI Criteria must be met in order to convert aerosol to MDI with spacer.  If unable to meet, MDI will be converted to aerosol:  []  Patient able to demonstrate the ability to use MDI effectively  []  Patient alert and cooperative  []  Patient able to take deep breath with 5-10 second hold  []  Medication(s) available in this delivery method   []  Peak flow greater than or equal to 200 ml/min            Current Order Substituted To  (same drug, same frequency)   Aerosol to MDI [] Albuterol Sulfate 0.083% unit dose by aerosol Albuterol Sulfate MDI 2 puffs by inhalation with spacer    [] Levalbuterol 1.25 mg unit dose by aerosol Levalbuterol MDI 2 puffs by inhalation with spacer    [] Levalbuterol 0.63 mg unit dose by aerosol Levalbuterol MDI 2 puffs by inhalation with spacer    [] Ipratropium Bromide 0.02% unit dose by aerosol Ipratropium Bromide MDI 2 puffs by inhalation with spacer    [] Duoneb (Ipratropium + Albuterol) unit dose by aerosol Ipratropium MDI + Albuterol MDI 2 puffs by inhalation w/spacer   MDI to Aerosol [] Albuterol Sulfate MDI Albuterol Sulfate 0.083% unit dose by aerosol    [] Levalbuterol MDI 2 puffs by inhalation Levalbuterol 1.25 mg unit dose by aerosol    [] Ipratropium Bromide MDI by inhalation Ipratropium Bromide 0.02% unit dose by aerosol    [] Combivent (Ipratropium + Albuterol) MDI by inhalation Duoneb (Ipratropium + Albuterol) unit dose by aerosol   Treatment Assessment [Frequency/Schedule]:  Change frequency to: ___Duoneb Q4 PRN_______________________________________________per Protocol, P&T, MEC      Points 0 1 2 3 4   Pulmonary Status  Non-Smoker  []   Smoking history   < 20 pack years  []   Smoking

## 2017-11-13 NOTE — ANESTHESIA PRE PROCEDURE
Department of Anesthesiology  Preprocedure Note       Name:  Chandana Host   Age:  77 y.o.  :  1940                                          MRN:  [id number]         Date:  2017      Surgeon: Pierre Bolanos):  Thelma Kirkland MD    Procedure: Procedure(s):  RIGHT THORACOTOMY WEDGE RESECTION FOR FOREIGN BODY AND FOB DOUBLE LUMEN (1ST CASE)    Medications prior to admission:   Prior to Admission medications    Medication Sig Start Date End Date Taking? Authorizing Provider   mirtazapine (REMERON) 15 MG tablet Take 15 mg by mouth nightly   Yes Historical Provider, MD   traZODone (DESYREL) 150 MG tablet Take 150 mg by mouth nightly   Yes Historical Provider, MD   Famotidine (PEPCID AC PO) Take by mouth   Yes Historical Provider, MD   levothyroxine (SYNTHROID) 137 MCG tablet Take 1 tablet by mouth  daily 4/3/17  Yes Clarisse Lowry MD   albuterol (PROVENTIL HFA;VENTOLIN HFA) 108 (90 BASE) MCG/ACT inhaler Inhale 2 puffs into the lungs every 4 hours as needed for Wheezing   Yes Historical Provider, MD   pravastatin (PRAVACHOL) 20 MG tablet Take 1 tablet by mouth daily. 4/27/15  Yes Ishmael Laguna MD   budesonide-formoterol (SYMBICORT) 160-4.5 MCG/ACT AERO Inhale 2 puffs into the lungs 2 times daily. Yes Historical Provider, MD   finasteride (PROSCAR) 5 MG tablet Take 5 mg by mouth daily. Yes Historical Provider, MD   aspirin 81 MG tablet Take 81 mg by mouth daily. Yes Historical Provider, MD   tamsulosin (FLOMAX) 0.4 MG capsule Take 0.4 mg by mouth daily.      Yes Historical Provider, MD   LORazepam (ATIVAN) 0.5 MG tablet Take 0.5 mg by mouth    Historical Provider, MD   Cholecalciferol (VITAMIN D3) 2000 UNITS CAPS Take by mouth    Historical Provider, MD   traZODone (DESYREL) 50 MG tablet Take 50 mg by mouth nightly  12/18/15   Historical Provider, MD       Current medications:    Current Facility-Administered Medications   Medication Dose Route Frequency Provider Last Rate Last Dose    lactated ringers Irvin, DO   5 mg at 11/13/17 7549    levothyroxine (SYNTHROID) tablet 125 mcg  125 mcg Oral Daily Lillian Ahuja DO   Stopped at 11/14/17 0602    mirtazapine (REMERON) tablet 15 mg  15 mg Oral Nightly Lillian Ahuja DO   15 mg at 11/13/17 2151    tamsulosin (FLOMAX) capsule 0.4 mg  0.4 mg Oral Daily Lillian Ahuja DO   0.4 mg at 11/13/17 3283    famotidine (PEPCID) tablet 10 mg  10 mg Oral Daily Herbie Belcher [place]   10 mg at 11/13/17 2062    LORazepam (ATIVAN) tablet 0.5 mg  0.5 mg Oral Q6H PRN [facility]        pravastatin (PRAVACHOL) tablet 20 mg  20 mg Oral Nightly Herbie Belcher [place]   20 mg at 11/13/17 2155    traZODone (DESYREL) tablet 150 mg  150 mg Oral Nightly Herbie Harvey [place]   150 mg at 11/13/17 2151    pill splitter   Does not apply Once INGRID [place]        mometasone-formoterol [facility]) 200-5 MCG/ACT inhaler 2 puff  2 puff Inhalation BID Lillian Ahuja DO   2 puff at 11/13/17 2040       Allergies:  No Known Allergies    Problem List:    Patient Active Problem List   Diagnosis Code    [facility] J44.9    HTN (hypertension) I10    Hyperlipidemia E78.5    GERD (gastroesophageal reflux disease) K21.9    Hypothyroidism E03.9    Type 2 diabetes mellitus [facility] E11.9    BPH (benign prostatic hyperplasia) N40.0    Anxiety F41.9    Insomnia G47.00    Acute sinusitis J01.90    Papillary thyroid carcinoma (HCC) C73    Elevated PSA R97.20    Osteoarthritis of spine with radiculopathy, 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    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    Skin cyst L72.9    Local infection of skin and subcutaneous tissue L08.9    Aspiration into respiratory tract T17.908A       Past Medical History:        Diagnosis Date    Acute sinusitis     Anxiety     BPH (benign prostatic hyperplasia)     COPD (chronic obstructive pulmonary disease) [facility] 12/5/2013    Elevated CK 12/30/2013    Lab Results   Component Value Date     11/14/2017    K 4.3 11/14/2017     11/14/2017    CO2 25 11/14/2017    BUN 21 11/14/2017    CREATININE 1.06 11/14/2017    GFRAA >60.0 11/14/2017    LABGLOM >60.0 11/14/2017    GLUCOSE 103 11/14/2017    GLUCOSE 94 02/20/2012    PROT 6.7 12/03/2015    CALCIUM 8.3 11/14/2017    BILITOT 0.4 12/03/2015    ALKPHOS 55 12/03/2015    AST 41 12/03/2015    ALT 29 12/03/2015       POC Tests:   Recent Labs      11/11/17   1051   POCGLU  98       Coags:   Lab Results   Component Value Date    PROTIME 10.6 12/03/2015    INR 1.0 12/03/2015    APTT 30.6 12/03/2015       HCG (If Applicable): No results found for: PREGTESTUR, PREGSERUM, HCG, HCGQUANT     ABGs: No results found for: PHART, PO2ART, NNW9CVF, ZCY8AZK, BEART, T7NDFQFY     Type & Screen (If Applicable):  No results found for: LABABO, [address]    Anesthesia Evaluation  Patient summary reviewed and Nursing notes reviewed no history of anesthetic complications:   Airway: Mallampati: II  TM distance: >3 FB   Neck ROM: full  Mouth opening: > = 3 FB Dental: normal exam         Pulmonary:normal exam  breath sounds clear to auscultation  (+) COPD:  sleep apnea:                          ROS comment:   CONCLUSION: ENDOBRONCHIAL TOOTHLIKE STRUCTURE PROJECTING OVER THE AIRWAY OF THE POSTERIOR BASAL SEGMENT OF THE RIGHT LOWER LOBE.          Cardiovascular:  Exercise tolerance: good (>4 METS),   (+) hypertension:, hyperlipidemia      ECG reviewed  Rhythm: regular  Rate: normal           Beta Blocker:  Not on Beta Blocker         Neuro/Psych:   (+) neuromuscular disease:,             GI/Hepatic/Renal:   (+) GERD:,           Endo/Other:    (+) Type II DM, , hypothyroidism::., .    (-) patient has not had pre-anesthesia visit        Pt had no PAT visit       Abdominal:           Vascular:                                        Anesthesia Plan      general     ASA 3     (Double lumen ETT)  Induction: intravenous.   arterial line  MIPS: Postoperative opioids intended, Prophylactic antiemetics administered and One-lung ventilation. Anesthetic plan and risks discussed with patient. Plan discussed with CRNA.     Attending anesthesiologist reviewed and agrees with James Christopher DO   11/14/2017

## 2017-11-13 NOTE — PROGRESS NOTES
Education: PT POC; DC recs; Role of PT in the hosp    DISCHARGE RECOMMENDATIONS:  Discharge Recommendations: Home independently  No Skilled PT: Safe to return home    Assessment: No inpatient PT needs identified. Pt independent in basic mobility. REQUIRES PT FOLLOW UP: Yes      PLAN OF CARE:  Plan  Times per week: eval only  Safety Devices  Type of devices: All fall risk precautions in place    G-Code:  PT G-Codes  Functional Assessment Tool Used: Clinical Judgement  Functional Limitation: Mobility: Walking and moving around  Mobility: Walking and Moving Around Current Status (): At least 1 percent but less than 20 percent impaired, limited or restricted  Mobility: Walking and Moving Around Goal Status (): At least 1 percent but less than 20 percent impaired, limited or restricted  Mobility: Walking and Moving Around Discharge Status ():  At least 1 percent but less than 20 percent impaired, limited or restricted    Goals:  Short term goals  Short term goal 1: NA    Therapy Time:   Individual   Time In 1545   Time Out 1600   Minutes [address], [address] [facility], 11/13/17 at 4:34 PM

## 2017-11-13 NOTE — PROGRESS NOTES
Hospitalist Progress Note      PCP: Adelina Nelson MD    Date of Admission: 11/10/2017    Chief Complaint:    Persistent cough    Subjective:  10 point ROS negative     Medications:  Reviewed    Infusion Medications    Scheduled Medications    piperacillin-tazobactam  3.375 g Intravenous Q6H    sodium chloride flush  10 mL Intravenous 2 times per day    aspirin  81 mg Oral Daily    finasteride  5 mg Oral Daily    levothyroxine  125 mcg Oral Daily    mirtazapine  15 mg Oral Nightly    tamsulosin  0.4 mg Oral Daily    famotidine  10 mg Oral Daily    pravastatin  20 mg Oral Nightly    traZODone  150 mg Oral Nightly    pill splitter   Does not apply Once    mometasone-formoterol  2 puff Inhalation BID     PRN Meds: ipratropium-albuterol, hydrALAZINE, sodium chloride flush, acetaminophen, magnesium hydroxide, ondansetron, LORazepam      Intake/Output Summary (Last 24 hours) at 11/13/17 1700  Last data filed at 11/13/17 1134   Gross per 24 hour   Intake             1960 ml   Output                0 ml   Net             1960 ml     Exam:    BP (!) 166/93   Pulse 57   Temp 97.2 °F (36.2 °C)   Resp 18   SpO2 98%     General appearance: No apparent distress, appears stated age and cooperative. HEENT: Pupils equal, round, and reactive to light. Conjunctivae/corneas clear. Neck: Supple, with full range of motion. No jugular venous distention. Trachea midline. Respiratory:  Normal respiratory effort. Clear to auscultation, bilaterally without Rales/Wheezes/Rhonchi. Cardiovascular: Regular rate and rhythm with normal S1/S2 without murmurs, rubs or gallops. Abdomen: Soft, non-tender, non-distended with normal bowel sounds. Musculoskeletal: No clubbing, cyanosis or edema bilaterally. Skin: Skin color, texture, turgor normal.  No rashes or lesions.   Neuro: Non Focal. Intact and symmetric  Psychiatric: Alert and oriented, thought content appropriate, normal insight  Capillary Refill: Brisk,< 3 seconds Peripheral Pulses: +2 palpable, equal bilaterally       Labs:   Recent Labs      11/11/17   0839  11/13/17   0947   WBC  6.6  6.0   HGB  15.8  14.0   HCT  47.0  42.4   PLT  103*  105*     Recent Labs      11/11/17   0839  11/13/17   0947   NA  141  142   K  4.3  4.2   CL  101  103   CO2  27  28   BUN  18  19   CREATININE  0.91  1.17   CALCIUM  9.0  8.5*     No results for input(s): AST, ALT, BILIDIR, BILITOT, ALKPHOS in the last 72 hours. No results for input(s): INR in the last 72 hours. No results for input(s): Saumya Brazen in the last 72 hours. Urinalysis:    Lab Results   Component Value Date    NITRU Negative 12/03/2015    WBCUA 6-10 12/03/2015    BACTERIA Few 12/03/2015    RBCUA None seen 12/03/2015    BLOODU trace-intact 09/29/2017    BLOODU Negative 12/03/2015    SPECGRAV 1.020 09/29/2017    SPECGRAV 1.019 12/03/2015    GLUCOSEU neg 09/29/2017    GLUCOSEU Negative 12/03/2015     Radiology:  XR CHEST STANDARD (2 VW)   Final Result      XR Chest Portable   Final Result   NO ACTIVE LUNG DISEASE. XR Chest Portable   Final Result   BRONCHOSCOPY RETRIEVAL PROCEDURE. PLEASE REFER TO OPERATIVE REPORT. CT Chest W Contrast   Final Result   ASPIRATED FILLING IN RIGHT LOWER LOBE POSTERIOR BASILAR SEGMENTAL BRONCHUS. MILD EMPHYSEMA AND LOWER LOBE CYLINDRICAL BRONCHIECTASIS. ADDITIONAL FINDINGS AS ABOVE.            Assessment/Plan:    76 y/o M who presented with a persistent cough; found to have a foreign object in his posterior basilar segment of the RLL         #Foreign object in posterior basilar segment of the RLL     - Pt aspirated a filling; continue Zosyn for aspiration PNA per pulmonary     - Will be removed by cardiothoracic surgery    #COPD     - Management per pulmonary    #Hypothyroidism     - Continue synthroid    #DMII     SSI       Active Hospital Problems    Diagnosis Date Noted    Aspiration into respiratory tract [T17.908A] 11/10/2017     Additional work up or/and

## 2017-11-13 NOTE — CONSULTS
Consults                                                                         [facility] HEART CARDIOLOGY CONSULTATION NOTE    PATIENT NAME: Rafaela Nolan  PATIENT MRN: [id number]  SERVICE DATE:  11/13/2017  SERVICE TIME: 11:25 AM    SHARED VISIT CNP: iYng Fay NP   PRIMARY CARE PHYSICIAN: Jessica Palomino MD  CONSULTING CARDIOLOGIST : Dr. Carolyn Hernandes: none  ================================================================    REASON FOR CONSULTATION:    Pre operative cardiac evaluation    IMPRESSIONS:  1. Foreign body aspiration  2. Multiple cardiac risk factors of age, gender, HLD and prior tobacco with no prior history of ASCVD, no current anginal symptoms, no prior or current HF, no arrhythmias. Acceptable risk from CV standpoint to proceed. 3. COPD  4. HTN, BP intermittent elevation, no chronic antihypertensive therapy prior to admisssion    RECOMMENDATIONS:  1. Obtain pre and post operative EKG, CK and troponin  2. Add prn hydralazine    CARDIAC HISTORY:  1. Normal perfusion scan 11/2013  2. Normal LV systolic function by perfusion scan 11/2013  3. HLD    NON CARDIAC HISTORY:  1. COPD  2. Remote tobacco  3. BPH  4. Thyroid Ca s/p remote thyroidectomy  5. Type 2 DM    HPI:   Rafaela Nolan is a 77 y.o. male with no prior history of ASCVD, arrhythmia, or CHF who was admitted following aspiration of a partial filling. Attempted to have retrieval via bronchoscopy, which was unsuccessful. Thoracic surgery consulted, with plan to perform repeat bronchoscopy and thoracotomy with wedge resection 11/14. Cardiology asked to see for preoperative clearance. Patient presently is in no distress, no reports of anginal symptoms, in NSR.     PROBLEM LIST:  Patient Active Problem List   Diagnosis    [facility] obstructive pulmonary disease) [facility]    HTN (hypertension)    Hyperlipidemia    GERD (gastroesophageal reflux disease)    Hypothyroidism    Type 2 diabetes mellitus ([facility]    BPH (benign prostatic hyperplasia)    Anxiety    Insomnia    Acute sinusitis    Papillary thyroid carcinoma (HCC)    Elevated PSA    Osteoarthritis of spine with radiculopathy, lumbar region    Foraminal stenosis of lumbosacral region    Skin cyst    Local infection of skin and subcutaneous tissue    Aspiration into respiratory tract       PAST MEDICAL HISTORY:    Past Medical History:   Diagnosis Date    Acute sinusitis     Anxiety     BPH (benign prostatic hyperplasia)     COPD (chronic obstructive pulmonary disease) (HCC) 12/5/2013    Elevated CK 12/30/2013    GERD (gastroesophageal reflux disease) 12/11/2014    History of asthma 1/13/2014    HTN (hypertension) 1/13/2014    Hyperlipidemia     Hypothyroidism     Insomnia     Lower extremity weakness 1/24/2014    On home oxygen therapy     2L at night    Positive D dimer 12/5/2013    Sleep apnea 1/24/2014    Type 2 diabetes mellitus (HCC)     Vitamin D deficiency        PAST SURGICAL HISTORY:  Past Surgical History:   Procedure Laterality Date    KNEE SURGERY Left 1970    THYROIDECTOMY  2006       MEDICATIONS:  Current Facility-Administered Medications   Medication Dose Route Frequency Provider Last Rate Last Dose    ipratropium-albuterol (DUONEB) nebulizer solution 1 ampule  1 ampule Inhalation Q4H PRN Zachary Warner MD        piperacillin-tazobactam (ZOSYN) 3.375 g in dextrose 50 mL IVPB (premix)  3.375 g Intravenous Q6H Zachary Warner MD   Stopped at 11/13/17 0716    sodium chloride flush 0.9 % injection 10 mL  10 mL Intravenous 2 times per day Buel Right, APRN   10 mL at 11/12/17 2035    sodium chloride flush 0.9 % injection 10 mL  10 mL Intravenous PRN Buel Right, APRN        acetaminophen (TYLENOL) tablet 650 mg  650 mg Oral Q4H PRN Buel Right, APRN   650 mg at 11/12/17 2153    magnesium hydroxide (MILK OF MAGNESIA) 400 MG/5ML suspension 30 mL  30 mL Oral Daily PRN Buel Right, APRN        ondansetron (Deborha Barley) injection 4 mg  4 mg Intravenous Q6H PRN Kandee Rank, APRN        ampicillin-sulbactam (UNASYN) 1.5 g IVPB minibag  1.5 g Intravenous Q6H Kandee Rank, APRN   Stopped at 11/13/17 1010    aspirin chewable tablet 81 mg  81 mg Oral Daily Franky Sic, DO   81 mg at 11/12/17 7471    finasteride (PROSCAR) tablet 5 mg  5 mg Oral Daily Franky Sic, DO   5 mg at 11/13/17 0711    levothyroxine (SYNTHROID) tablet 125 mcg  125 mcg Oral Daily Franky Sic, DO   125 mcg at 11/13/17 9331    mirtazapine (REMERON) tablet 15 mg  15 mg Oral Nightly Franky Sic, DO   15 mg at 11/12/17 2035    tamsulosin (FLOMAX) capsule 0.4 mg  0.4 mg Oral Daily Franky Sic, DO   0.4 mg at 11/13/17 9988    famotidine (PEPCID) tablet 10 mg  10 mg Oral Daily Jesus Alberto [place]   10 mg at 11/13/17 5390    LORazepam (ATIVAN) tablet 0.5 mg  0.5 mg Oral Q6H PRN [facility] [place], [place]        pravastatin (PRAVACHOL) tablet 20 mg  20 mg Oral Nightly Jesus Alberto Novoa [place], [place]   20 mg at 11/12/17 2035    traZODone (DESYREL) tablet 150 mg  150 mg Oral Nightly [facility]   150 mg at 11/12/17 2035    pill splitter   Does not apply Once INGRID Daigle        mometasone-formoterol [facility]) 200-5 MCG/ACT inhaler 2 puff  2 puff Inhalation BID Franky Sic, DO   2 puff at 11/13/17 7536         ALLERGIES AND DRUG INTOLERANCES: No Known Allergies    FAMILY HISTORY:    Family History   Problem Relation Age of Onset    Other Mother 80     Old Age    Stroke Father 45       SOCIAL HISTORY:    Social History     Social History    Marital status:      Spouse name: N/A    Number of children: 2    Years of education: N/A     Occupational History    retired      Social History Main Topics    Smoking status: Former Smoker     Types: Cigarettes     Start date: 6/1/2000    Smokeless tobacco: Not on file      Comment: quit 2000    Alcohol use No    Drug use: No    Sexual activity: Not on file 1720 ml   Output                0 ml   Net             1720 ml     CBC:   Recent Labs      11/11/17   0839  11/13/17   0947   WBC  6.6  6.0   HGB  15.8  14.0   HCT  47.0  42.4   PLT  103*  105*     BMP:  Recent Labs      11/11/17   0839  11/13/17   0947   NA  141  142   K  4.3  4.2   CL  101  103   CO2  27  28   BUN  18  19   CREATININE  0.91  1.17   LABGLOM  >60.0  >60.0     ABGs: No results found for: PH, PO2, PCO2  INR: No results for input(s): INR in the last 72 hours. PRO-BNP:   Lab Results   Component Value Date    PROBNP 111 12/03/2015    PROBNP 72 04/21/2015      TSH:   Lab Results   Component Value Date    TSH 1.620 06/19/2017      Cardiac Injury Profile: No results for input(s): CKTOTAL, CKMB, TROPONINI in the last 72 hours. Lipid Profile: Lab Results   Component Value Date    TRIG 275 12/03/2015    HDL 54 12/03/2015    LDLCALC 153 12/03/2015    CHOL 262 12/03/2015      Hemoglobin A1C: No components found for: HGBA1C       ================================================================      Thank you for your consideration for this consultation. This consultation was completed after chart review and bedside examination done by myself in conjunction with Dr. Daugherty Breeding: Ying Fay RN, MSN, CNP      ++++++++++=      Attending Supervising Physician's Attestation Statement    I performed a face to face diagnostic evaluation including history and physical on the patient. I have discussed the management with Norma Yanes RN   I have personally and independently reviewed labs and diagnostic testing. Rafaela Nolan examined at bedside in in no apparent distress and alert. Focused exam reveals:     Cardiac: Heart sounds are normal.  Regular rate and rhythm without murmur, gallop or rub. Lungs:  clear to auscultation bilaterally- no wheezes, rales or rhonchi, normal air movement, no respiratory distress    Extremities:   negative     Impression/ Plan:         I

## 2017-11-14 ENCOUNTER — APPOINTMENT (OUTPATIENT)
Dept: GENERAL RADIOLOGY | Age: 77
DRG: 163 | End: 2017-11-14
Attending: INTERNAL MEDICINE
Payer: MEDICARE

## 2017-11-14 ENCOUNTER — ANESTHESIA (OUTPATIENT)
Dept: OPERATING ROOM | Age: 77
DRG: 163 | End: 2017-11-14
Payer: MEDICARE

## 2017-11-14 VITALS — RESPIRATION RATE: 14 BRPM | OXYGEN SATURATION: 98 % | TEMPERATURE: 96.1 F

## 2017-11-14 LAB
ABO/RH: NORMAL
ANION GAP SERPL CALCULATED.3IONS-SCNC: 13 MEQ/L (ref 7–13)
ANTIBODY SCREEN: NORMAL
BASE EXCESS ARTERIAL: -2 (ref -3–3)
BASE EXCESS ARTERIAL: 1 (ref -3–3)
BASOPHILS ABSOLUTE: 0.1 K/UL (ref 0–0.2)
BASOPHILS RELATIVE PERCENT: 0.8 %
BUN BLDV-MCNC: 21 MG/DL (ref 8–23)
CALCIUM SERPL-MCNC: 8.3 MG/DL (ref 8.6–10.2)
CHLORIDE BLD-SCNC: 104 MEQ/L (ref 98–107)
CO2: 25 MEQ/L (ref 22–29)
CREAT SERPL-MCNC: 1.06 MG/DL (ref 0.7–1.2)
EOSINOPHILS ABSOLUTE: 0.3 K/UL (ref 0–0.7)
EOSINOPHILS RELATIVE PERCENT: 4.4 %
GFR AFRICAN AMERICAN: >60
GFR NON-AFRICAN AMERICAN: >60
GLUCOSE BLD-MCNC: 103 MG/DL (ref 74–109)
GLUCOSE BLD-MCNC: 128 MG/DL (ref 60–115)
GLUCOSE BLD-MCNC: 144 MG/DL (ref 60–115)
GLUCOSE BLD-MCNC: 144 MG/DL (ref 60–115)
GLUCOSE BLD-MCNC: 94 MG/DL (ref 60–115)
HCO3 ARTERIAL: 23.7 MMOL/L (ref 21–29)
HCO3 ARTERIAL: 26.5 MMOL/L (ref 21–29)
HCT VFR BLD CALC: 42.6 % (ref 42–52)
HEMOGLOBIN: 14.1 G/DL (ref 14–18)
LACTATE: 1.68 MMOL/L (ref 0.4–2)
LACTATE: 2.5 MMOL/L (ref 0.4–2)
LYMPHOCYTES ABSOLUTE: 1.6 K/UL (ref 1–4.8)
LYMPHOCYTES RELATIVE PERCENT: 23.2 %
MCH RBC QN AUTO: 31.6 PG (ref 27–31.3)
MCHC RBC AUTO-ENTMCNC: 33.1 % (ref 33–37)
MCV RBC AUTO: 95.4 FL (ref 80–100)
MONOCYTES ABSOLUTE: 0.5 K/UL (ref 0.2–0.8)
MONOCYTES RELATIVE PERCENT: 7.8 %
NEUTROPHILS ABSOLUTE: 4.3 K/UL (ref 1.4–6.5)
NEUTROPHILS RELATIVE PERCENT: 63.8 %
O2 SAT, ARTERIAL: 100 % (ref 93–100)
O2 SAT, ARTERIAL: 98 % (ref 93–100)
PCO2 ARTERIAL: 41 MM HG (ref 35–45)
PCO2 ARTERIAL: 49 MM HG (ref 35–45)
PDW BLD-RTO: 14.6 % (ref 11.5–14.5)
PERFORMED ON: ABNORMAL
PERFORMED ON: NORMAL
PH ARTERIAL: 7.34 (ref 7.35–7.45)
PH ARTERIAL: 7.37 (ref 7.35–7.45)
PLATELET # BLD: 108 K/UL (ref 130–400)
PO2 ARTERIAL: 395 MM HG (ref 75–108)
PO2 ARTERIAL: 99 MM HG (ref 75–108)
POC FIO2: 100
POC FIO2: 40
POC SAMPLE TYPE: ABNORMAL
POC SAMPLE TYPE: ABNORMAL
POTASSIUM SERPL-SCNC: 4.3 MEQ/L (ref 3.5–5.1)
RBC # BLD: 4.46 M/UL (ref 4.7–6.1)
SODIUM BLD-SCNC: 142 MEQ/L (ref 132–144)
TCO2 ARTERIAL: 25 (ref 22–29)
TCO2 ARTERIAL: 28 (ref 22–29)
WBC # BLD: 6.7 K/UL (ref 4.8–10.8)

## 2017-11-14 PROCEDURE — 86901 BLOOD TYPING SEROLOGIC RH(D): CPT

## 2017-11-14 PROCEDURE — 2720000003 HC MISC SUTURE/STAPLES/RELOADS/ETC: Performed by: THORACIC SURGERY (CARDIOTHORACIC VASCULAR SURGERY)

## 2017-11-14 PROCEDURE — 36415 COLL VENOUS BLD VENIPUNCTURE: CPT

## 2017-11-14 PROCEDURE — 93010 ELECTROCARDIOGRAM REPORT: CPT | Performed by: INTERNAL MEDICINE

## 2017-11-14 PROCEDURE — 94002 VENT MGMT INPAT INIT DAY: CPT

## 2017-11-14 PROCEDURE — 6370000000 HC RX 637 (ALT 250 FOR IP): Performed by: INTERNAL MEDICINE

## 2017-11-14 PROCEDURE — 2580000003 HC RX 258

## 2017-11-14 PROCEDURE — 3700000001 HC ADD 15 MINUTES (ANESTHESIA): Performed by: THORACIC SURGERY (CARDIOTHORACIC VASCULAR SURGERY)

## 2017-11-14 PROCEDURE — 86900 BLOOD TYPING SEROLOGIC ABO: CPT

## 2017-11-14 PROCEDURE — 71010 XR CHEST PORTABLE: CPT

## 2017-11-14 PROCEDURE — 3209999900 FLUORO FOR SURGICAL PROCEDURES

## 2017-11-14 PROCEDURE — 2580000003 HC RX 258: Performed by: NURSE ANESTHETIST, CERTIFIED REGISTERED

## 2017-11-14 PROCEDURE — 2500000003 HC RX 250 WO HCPCS: Performed by: INTERNAL MEDICINE

## 2017-11-14 PROCEDURE — 82803 BLOOD GASES ANY COMBINATION: CPT

## 2017-11-14 PROCEDURE — 6360000002 HC RX W HCPCS: Performed by: INTERNAL MEDICINE

## 2017-11-14 PROCEDURE — 3600000005 HC SURGERY LEVEL 5 BASE: Performed by: THORACIC SURGERY (CARDIOTHORACIC VASCULAR SURGERY)

## 2017-11-14 PROCEDURE — 36600 WITHDRAWAL OF ARTERIAL BLOOD: CPT

## 2017-11-14 PROCEDURE — 0BJ08ZZ INSPECTION OF TRACHEOBRONCHIAL TREE, VIA NATURAL OR ARTIFICIAL OPENING ENDOSCOPIC: ICD-10-PCS | Performed by: INTERNAL MEDICINE

## 2017-11-14 PROCEDURE — 2000000000 HC ICU R&B

## 2017-11-14 PROCEDURE — 2500000003 HC RX 250 WO HCPCS: Performed by: NURSE ANESTHETIST, CERTIFIED REGISTERED

## 2017-11-14 PROCEDURE — 94640 AIRWAY INHALATION TREATMENT: CPT

## 2017-11-14 PROCEDURE — 86850 RBC ANTIBODY SCREEN: CPT

## 2017-11-14 PROCEDURE — 83605 ASSAY OF LACTIC ACID: CPT

## 2017-11-14 PROCEDURE — 3700000000 HC ANESTHESIA ATTENDED CARE: Performed by: THORACIC SURGERY (CARDIOTHORACIC VASCULAR SURGERY)

## 2017-11-14 PROCEDURE — 2580000003 HC RX 258: Performed by: STUDENT IN AN ORGANIZED HEALTH CARE EDUCATION/TRAINING PROGRAM

## 2017-11-14 PROCEDURE — 6370000000 HC RX 637 (ALT 250 FOR IP): Performed by: THORACIC SURGERY (CARDIOTHORACIC VASCULAR SURGERY)

## 2017-11-14 PROCEDURE — 6360000002 HC RX W HCPCS: Performed by: NURSE ANESTHETIST, CERTIFIED REGISTERED

## 2017-11-14 PROCEDURE — 2720000010 HC SURG SUPPLY STERILE: Performed by: THORACIC SURGERY (CARDIOTHORACIC VASCULAR SURGERY)

## 2017-11-14 PROCEDURE — 6360000002 HC RX W HCPCS: Performed by: STUDENT IN AN ORGANIZED HEALTH CARE EDUCATION/TRAINING PROGRAM

## 2017-11-14 PROCEDURE — 2580000003 HC RX 258: Performed by: THORACIC SURGERY (CARDIOTHORACIC VASCULAR SURGERY)

## 2017-11-14 PROCEDURE — 93005 ELECTROCARDIOGRAM TRACING: CPT

## 2017-11-14 PROCEDURE — 7100000001 HC PACU RECOVERY - ADDTL 15 MIN

## 2017-11-14 PROCEDURE — 99233 SBSQ HOSP IP/OBS HIGH 50: CPT | Performed by: INTERNAL MEDICINE

## 2017-11-14 PROCEDURE — 82948 REAGENT STRIP/BLOOD GLUCOSE: CPT

## 2017-11-14 PROCEDURE — 2700000000 HC OXYGEN THERAPY PER DAY

## 2017-11-14 PROCEDURE — 6360000002 HC RX W HCPCS: Performed by: NURSE PRACTITIONER

## 2017-11-14 PROCEDURE — 85025 COMPLETE CBC W/AUTO DIFF WBC: CPT

## 2017-11-14 PROCEDURE — 3600000015 HC SURGERY LEVEL 5 ADDTL 15MIN: Performed by: THORACIC SURGERY (CARDIOTHORACIC VASCULAR SURGERY)

## 2017-11-14 PROCEDURE — C2615 SEALANT, PULMONARY, LIQUID: HCPCS | Performed by: THORACIC SURGERY (CARDIOTHORACIC VASCULAR SURGERY)

## 2017-11-14 PROCEDURE — 88300 SURGICAL PATH GROSS: CPT

## 2017-11-14 PROCEDURE — 88307 TISSUE EXAM BY PATHOLOGIST: CPT

## 2017-11-14 PROCEDURE — A6252 ABSORPT DRG >16 <=48 W/O BDR: HCPCS | Performed by: THORACIC SURGERY (CARDIOTHORACIC VASCULAR SURGERY)

## 2017-11-14 PROCEDURE — 80048 BASIC METABOLIC PNL TOTAL CA: CPT

## 2017-11-14 PROCEDURE — 6360000002 HC RX W HCPCS

## 2017-11-14 PROCEDURE — 7100000000 HC PACU RECOVERY - FIRST 15 MIN

## 2017-11-14 PROCEDURE — 86920 COMPATIBILITY TEST SPIN: CPT

## 2017-11-14 DEVICE — SEALANT TISS GLUE 4ML PLEUR AIR LEAK PROGEL: Type: IMPLANTABLE DEVICE | Site: LUNG | Status: FUNCTIONAL

## 2017-11-14 RX ORDER — SODIUM CHLORIDE, SODIUM LACTATE, POTASSIUM CHLORIDE, CALCIUM CHLORIDE 600; 310; 30; 20 MG/100ML; MG/100ML; MG/100ML; MG/100ML
INJECTION, SOLUTION INTRAVENOUS CONTINUOUS PRN
Status: DISCONTINUED | OUTPATIENT
Start: 2017-11-14 | End: 2017-11-14 | Stop reason: SDUPTHER

## 2017-11-14 RX ORDER — FENTANYL CITRATE 50 UG/ML
INJECTION, SOLUTION INTRAMUSCULAR; INTRAVENOUS PRN
Status: DISCONTINUED | OUTPATIENT
Start: 2017-11-14 | End: 2017-11-14 | Stop reason: SDUPTHER

## 2017-11-14 RX ORDER — DEXTROSE MONOHYDRATE 50 MG/ML
100 INJECTION, SOLUTION INTRAVENOUS PRN
Status: DISCONTINUED | OUTPATIENT
Start: 2017-11-14 | End: 2017-11-21 | Stop reason: HOSPADM

## 2017-11-14 RX ORDER — SODIUM CHLORIDE 0.9 % (FLUSH) 0.9 %
10 SYRINGE (ML) INJECTION EVERY 12 HOURS SCHEDULED
Status: DISCONTINUED | OUTPATIENT
Start: 2017-11-14 | End: 2017-11-14 | Stop reason: HOSPADM

## 2017-11-14 RX ORDER — NICOTINE POLACRILEX 4 MG
15 LOZENGE BUCCAL PRN
Status: DISCONTINUED | OUTPATIENT
Start: 2017-11-14 | End: 2017-11-21 | Stop reason: HOSPADM

## 2017-11-14 RX ORDER — LIDOCAINE HYDROCHLORIDE 20 MG/ML
INJECTION, SOLUTION INFILTRATION; PERINEURAL PRN
Status: DISCONTINUED | OUTPATIENT
Start: 2017-11-14 | End: 2017-11-14 | Stop reason: SDUPTHER

## 2017-11-14 RX ORDER — METOPROLOL TARTRATE 5 MG/5ML
5 INJECTION INTRAVENOUS EVERY 6 HOURS
Status: DISCONTINUED | OUTPATIENT
Start: 2017-11-14 | End: 2017-11-15

## 2017-11-14 RX ORDER — SODIUM CHLORIDE 0.9 % (FLUSH) 0.9 %
10 SYRINGE (ML) INJECTION PRN
Status: DISCONTINUED | OUTPATIENT
Start: 2017-11-14 | End: 2017-11-14 | Stop reason: HOSPADM

## 2017-11-14 RX ORDER — DOCUSATE SODIUM 100 MG/1
100 CAPSULE, LIQUID FILLED ORAL 2 TIMES DAILY
Status: DISCONTINUED | OUTPATIENT
Start: 2017-11-14 | End: 2017-11-21 | Stop reason: HOSPADM

## 2017-11-14 RX ORDER — ONDANSETRON 2 MG/ML
4 INJECTION INTRAMUSCULAR; INTRAVENOUS EVERY 6 HOURS PRN
Status: DISCONTINUED | OUTPATIENT
Start: 2017-11-14 | End: 2017-11-21 | Stop reason: HOSPADM

## 2017-11-14 RX ORDER — HYDROCODONE BITARTRATE AND ACETAMINOPHEN 5; 325 MG/1; MG/1
2 TABLET ORAL PRN
Status: DISCONTINUED | OUTPATIENT
Start: 2017-11-14 | End: 2017-11-14 | Stop reason: HOSPADM

## 2017-11-14 RX ORDER — ONDANSETRON 2 MG/ML
INJECTION INTRAMUSCULAR; INTRAVENOUS PRN
Status: DISCONTINUED | OUTPATIENT
Start: 2017-11-14 | End: 2017-11-14 | Stop reason: SDUPTHER

## 2017-11-14 RX ORDER — DEXAMETHASONE SODIUM PHOSPHATE 10 MG/ML
INJECTION INTRAMUSCULAR; INTRAVENOUS PRN
Status: DISCONTINUED | OUTPATIENT
Start: 2017-11-14 | End: 2017-11-14 | Stop reason: SDUPTHER

## 2017-11-14 RX ORDER — SODIUM CHLORIDE 9 MG/ML
INJECTION, SOLUTION INTRAVENOUS
Status: COMPLETED
Start: 2017-11-14 | End: 2017-11-14

## 2017-11-14 RX ORDER — LEVOTHYROXINE SODIUM 0.12 MG/1
125 TABLET ORAL DAILY
Status: DISCONTINUED | OUTPATIENT
Start: 2017-11-14 | End: 2017-11-21 | Stop reason: HOSPADM

## 2017-11-14 RX ORDER — LIDOCAINE HYDROCHLORIDE 10 MG/ML
1 INJECTION, SOLUTION EPIDURAL; INFILTRATION; INTRACAUDAL; PERINEURAL
Status: DISCONTINUED | OUTPATIENT
Start: 2017-11-14 | End: 2017-11-14 | Stop reason: HOSPADM

## 2017-11-14 RX ORDER — HYDRALAZINE HYDROCHLORIDE 20 MG/ML
10 INJECTION INTRAMUSCULAR; INTRAVENOUS EVERY 6 HOURS PRN
Status: DISCONTINUED | OUTPATIENT
Start: 2017-11-14 | End: 2017-11-14

## 2017-11-14 RX ORDER — HYDROCODONE BITARTRATE AND ACETAMINOPHEN 5; 325 MG/1; MG/1
1 TABLET ORAL PRN
Status: DISCONTINUED | OUTPATIENT
Start: 2017-11-14 | End: 2017-11-14 | Stop reason: HOSPADM

## 2017-11-14 RX ORDER — SODIUM CHLORIDE 9 MG/ML
INJECTION, SOLUTION INTRAVENOUS
Status: COMPLETED
Start: 2017-11-14 | End: 2017-11-15

## 2017-11-14 RX ORDER — PIPERACILLIN SODIUM, TAZOBACTAM SODIUM 3; .375 G/15ML; G/15ML
INJECTION, POWDER, LYOPHILIZED, FOR SOLUTION INTRAVENOUS PRN
Status: DISCONTINUED | OUTPATIENT
Start: 2017-11-14 | End: 2017-11-14 | Stop reason: SDUPTHER

## 2017-11-14 RX ORDER — DIPHENHYDRAMINE HYDROCHLORIDE 50 MG/ML
12.5 INJECTION INTRAMUSCULAR; INTRAVENOUS
Status: DISCONTINUED | OUTPATIENT
Start: 2017-11-14 | End: 2017-11-14 | Stop reason: HOSPADM

## 2017-11-14 RX ORDER — DEXTROSE AND SODIUM CHLORIDE 5; .45 G/100ML; G/100ML
INJECTION, SOLUTION INTRAVENOUS
Status: COMPLETED
Start: 2017-11-14 | End: 2017-11-14

## 2017-11-14 RX ORDER — PROPOFOL 10 MG/ML
INJECTION, EMULSION INTRAVENOUS PRN
Status: DISCONTINUED | OUTPATIENT
Start: 2017-11-14 | End: 2017-11-14 | Stop reason: SDUPTHER

## 2017-11-14 RX ORDER — ONDANSETRON 2 MG/ML
4 INJECTION INTRAMUSCULAR; INTRAVENOUS
Status: DISCONTINUED | OUTPATIENT
Start: 2017-11-14 | End: 2017-11-14 | Stop reason: HOSPADM

## 2017-11-14 RX ORDER — HYDRALAZINE HYDROCHLORIDE 20 MG/ML
10 INJECTION INTRAMUSCULAR; INTRAVENOUS
Status: DISCONTINUED | OUTPATIENT
Start: 2017-11-14 | End: 2017-11-21 | Stop reason: HOSPADM

## 2017-11-14 RX ORDER — SODIUM CHLORIDE, SODIUM LACTATE, POTASSIUM CHLORIDE, CALCIUM CHLORIDE 600; 310; 30; 20 MG/100ML; MG/100ML; MG/100ML; MG/100ML
INJECTION, SOLUTION INTRAVENOUS CONTINUOUS
Status: DISCONTINUED | OUTPATIENT
Start: 2017-11-14 | End: 2017-11-14

## 2017-11-14 RX ORDER — FENTANYL CITRATE 50 UG/ML
50 INJECTION, SOLUTION INTRAMUSCULAR; INTRAVENOUS EVERY 10 MIN PRN
Status: DISCONTINUED | OUTPATIENT
Start: 2017-11-14 | End: 2017-11-14 | Stop reason: HOSPADM

## 2017-11-14 RX ORDER — NALOXONE HYDROCHLORIDE 0.4 MG/ML
0.4 INJECTION, SOLUTION INTRAMUSCULAR; INTRAVENOUS; SUBCUTANEOUS PRN
Status: DISCONTINUED | OUTPATIENT
Start: 2017-11-14 | End: 2017-11-16

## 2017-11-14 RX ORDER — ROCURONIUM BROMIDE 10 MG/ML
INJECTION, SOLUTION INTRAVENOUS PRN
Status: DISCONTINUED | OUTPATIENT
Start: 2017-11-14 | End: 2017-11-14 | Stop reason: SDUPTHER

## 2017-11-14 RX ORDER — MEPERIDINE HYDROCHLORIDE 25 MG/ML
12.5 INJECTION INTRAMUSCULAR; INTRAVENOUS; SUBCUTANEOUS EVERY 5 MIN PRN
Status: DISCONTINUED | OUTPATIENT
Start: 2017-11-14 | End: 2017-11-14 | Stop reason: HOSPADM

## 2017-11-14 RX ORDER — MIDAZOLAM HYDROCHLORIDE 1 MG/ML
INJECTION INTRAMUSCULAR; INTRAVENOUS PRN
Status: DISCONTINUED | OUTPATIENT
Start: 2017-11-14 | End: 2017-11-14 | Stop reason: SDUPTHER

## 2017-11-14 RX ORDER — DEXTROSE MONOHYDRATE 25 G/50ML
12.5 INJECTION, SOLUTION INTRAVENOUS PRN
Status: DISCONTINUED | OUTPATIENT
Start: 2017-11-14 | End: 2017-11-21 | Stop reason: HOSPADM

## 2017-11-14 RX ORDER — IPRATROPIUM BROMIDE AND ALBUTEROL SULFATE 2.5; .5 MG/3ML; MG/3ML
1 SOLUTION RESPIRATORY (INHALATION)
Status: DISCONTINUED | OUTPATIENT
Start: 2017-11-14 | End: 2017-11-18

## 2017-11-14 RX ORDER — METOCLOPRAMIDE HYDROCHLORIDE 5 MG/ML
10 INJECTION INTRAMUSCULAR; INTRAVENOUS
Status: DISCONTINUED | OUTPATIENT
Start: 2017-11-14 | End: 2017-11-14 | Stop reason: HOSPADM

## 2017-11-14 RX ORDER — MAGNESIUM HYDROXIDE 1200 MG/15ML
LIQUID ORAL CONTINUOUS PRN
Status: DISCONTINUED | OUTPATIENT
Start: 2017-11-14 | End: 2017-11-14 | Stop reason: HOSPADM

## 2017-11-14 RX ORDER — GLYCOPYRROLATE 0.2 MG/ML
INJECTION INTRAMUSCULAR; INTRAVENOUS PRN
Status: DISCONTINUED | OUTPATIENT
Start: 2017-11-14 | End: 2017-11-14 | Stop reason: SDUPTHER

## 2017-11-14 RX ORDER — DEXTROSE AND SODIUM CHLORIDE 5; .45 G/100ML; G/100ML
INJECTION, SOLUTION INTRAVENOUS CONTINUOUS
Status: DISCONTINUED | OUTPATIENT
Start: 2017-11-14 | End: 2017-11-15

## 2017-11-14 RX ADMIN — DEXAMETHASONE SODIUM PHOSPHATE 12 MG: 10 INJECTION INTRAMUSCULAR; INTRAVENOUS at 08:25

## 2017-11-14 RX ADMIN — SODIUM CHLORIDE, POTASSIUM CHLORIDE, SODIUM LACTATE AND CALCIUM CHLORIDE: 600; 310; 30; 20 INJECTION, SOLUTION INTRAVENOUS at 07:35

## 2017-11-14 RX ADMIN — ROCURONIUM BROMIDE 20 MG: 10 INJECTION, SOLUTION INTRAVENOUS at 10:51

## 2017-11-14 RX ADMIN — DEXTROSE AND SODIUM CHLORIDE: 5; 450 INJECTION, SOLUTION INTRAVENOUS at 13:27

## 2017-11-14 RX ADMIN — ROCURONIUM BROMIDE 40 MG: 10 INJECTION, SOLUTION INTRAVENOUS at 08:21

## 2017-11-14 RX ADMIN — Medication 0.5 MG: at 14:28

## 2017-11-14 RX ADMIN — Medication 0.2 MG: at 08:59

## 2017-11-14 RX ADMIN — FENTANYL CITRATE 100 MCG: 50 INJECTION, SOLUTION INTRAMUSCULAR; INTRAVENOUS at 08:21

## 2017-11-14 RX ADMIN — PROPOFOL 150 MG: 10 INJECTION, EMULSION INTRAVENOUS at 08:21

## 2017-11-14 RX ADMIN — GLYCOPYRROLATE 0.2 MG: 0.2 INJECTION INTRAMUSCULAR; INTRAVENOUS at 08:15

## 2017-11-14 RX ADMIN — IPRATROPIUM BROMIDE AND ALBUTEROL SULFATE 1 AMPULE: 2.5; .5 SOLUTION RESPIRATORY (INHALATION) at 20:17

## 2017-11-14 RX ADMIN — SODIUM CHLORIDE, POTASSIUM CHLORIDE, SODIUM LACTATE AND CALCIUM CHLORIDE: 600; 310; 30; 20 INJECTION, SOLUTION INTRAVENOUS at 09:55

## 2017-11-14 RX ADMIN — HYDROMORPHONE HYDROCHLORIDE 1 MG: 1 INJECTION, SOLUTION INTRAMUSCULAR; INTRAVENOUS; SUBCUTANEOUS at 11:01

## 2017-11-14 RX ADMIN — ONDANSETRON 4 MG: 2 INJECTION INTRAMUSCULAR; INTRAVENOUS at 12:32

## 2017-11-14 RX ADMIN — Medication 0.1 MG: at 08:29

## 2017-11-14 RX ADMIN — Medication: at 14:46

## 2017-11-14 RX ADMIN — PIPERACILLIN SODIUM,TAZOBACTAM SODIUM 3.38 G: 3; .375 INJECTION, POWDER, FOR SOLUTION INTRAVENOUS at 09:00

## 2017-11-14 RX ADMIN — ROCURONIUM BROMIDE 20 MG: 10 INJECTION, SOLUTION INTRAVENOUS at 09:51

## 2017-11-14 RX ADMIN — MIDAZOLAM HYDROCHLORIDE 2 MG: 1 INJECTION, SOLUTION INTRAMUSCULAR; INTRAVENOUS at 08:15

## 2017-11-14 RX ADMIN — METOPROLOL TARTRATE 5 MG: 5 INJECTION INTRAVENOUS at 19:01

## 2017-11-14 RX ADMIN — TAZOBACTAM SODIUM AND PIPERACILLIN SODIUM 3.38 G: 375; 3 INJECTION, SOLUTION INTRAVENOUS at 02:55

## 2017-11-14 RX ADMIN — Medication 0.2 MG: at 08:50

## 2017-11-14 RX ADMIN — ROCURONIUM BROMIDE 10 MG: 10 INJECTION, SOLUTION INTRAVENOUS at 11:40

## 2017-11-14 RX ADMIN — Medication 0.1 MG: at 08:30

## 2017-11-14 RX ADMIN — HYDROMORPHONE HYDROCHLORIDE 0.5 MG: 1 INJECTION, SOLUTION INTRAMUSCULAR; INTRAVENOUS; SUBCUTANEOUS at 14:28

## 2017-11-14 RX ADMIN — ROCURONIUM BROMIDE 10 MG: 10 INJECTION, SOLUTION INTRAVENOUS at 10:21

## 2017-11-14 RX ADMIN — ROCURONIUM BROMIDE 30 MG: 10 INJECTION, SOLUTION INTRAVENOUS at 09:20

## 2017-11-14 RX ADMIN — SODIUM CHLORIDE, POTASSIUM CHLORIDE, SODIUM LACTATE AND CALCIUM CHLORIDE: 600; 310; 30; 20 INJECTION, SOLUTION INTRAVENOUS at 07:30

## 2017-11-14 RX ADMIN — HYDRALAZINE HYDROCHLORIDE 10 MG: 20 INJECTION, SOLUTION INTRAMUSCULAR; INTRAVENOUS at 15:25

## 2017-11-14 RX ADMIN — DEXTROSE AND SODIUM CHLORIDE: 5; 450 INJECTION, SOLUTION INTRAVENOUS at 21:31

## 2017-11-14 RX ADMIN — LIDOCAINE HYDROCHLORIDE 80 MG: 20 INJECTION, SOLUTION INFILTRATION; PERINEURAL at 08:21

## 2017-11-14 RX ADMIN — SODIUM CHLORIDE 250 ML: 9 INJECTION, SOLUTION INTRAVENOUS at 14:29

## 2017-11-14 RX ADMIN — FENTANYL CITRATE 150 MCG: 50 INJECTION, SOLUTION INTRAMUSCULAR; INTRAVENOUS at 09:27

## 2017-11-14 RX ADMIN — HYDROMORPHONE HYDROCHLORIDE 1 MG: 1 INJECTION, SOLUTION INTRAMUSCULAR; INTRAVENOUS; SUBCUTANEOUS at 12:27

## 2017-11-14 ASSESSMENT — PULMONARY FUNCTION TESTS
PIF_VALUE: 20
PIF_VALUE: 33
PIF_VALUE: 30
PIF_VALUE: 32
PIF_VALUE: 31
PIF_VALUE: 21
PIF_VALUE: 30
PIF_VALUE: 24
PIF_VALUE: 22
PIF_VALUE: 19
PIF_VALUE: 32
PIF_VALUE: 20
PIF_VALUE: 15
PIF_VALUE: 20
PIF_VALUE: 18
PIF_VALUE: 22
PIF_VALUE: 29
PIF_VALUE: 19
PIF_VALUE: 30
PIF_VALUE: 30
PIF_VALUE: 23
PIF_VALUE: 35
PIF_VALUE: 20
PIF_VALUE: 24
PIF_VALUE: 32
PIF_VALUE: 36
PIF_VALUE: 31
PIF_VALUE: 32
PIF_VALUE: 1
PIF_VALUE: 35
PIF_VALUE: 21
PIF_VALUE: 32
PIF_VALUE: 21
PIF_VALUE: 29
PIF_VALUE: 31
PIF_VALUE: 31
PIF_VALUE: 30
PIF_VALUE: 31
PIF_VALUE: 21
PIF_VALUE: 35
PIF_VALUE: 30
PIF_VALUE: 33
PIF_VALUE: 27
PIF_VALUE: 14
PIF_VALUE: 21
PIF_VALUE: 35
PIF_VALUE: 19
PIF_VALUE: 30
PIF_VALUE: 33
PIF_VALUE: 21
PIF_VALUE: 15
PIF_VALUE: 31
PIF_VALUE: 33
PIF_VALUE: 2
PIF_VALUE: 31
PIF_VALUE: 34
PIF_VALUE: 33
PIF_VALUE: 20
PIF_VALUE: 30
PIF_VALUE: 31
PIF_VALUE: 21
PIF_VALUE: 30
PIF_VALUE: 30
PIF_VALUE: 34
PIF_VALUE: 29
PIF_VALUE: 34
PIF_VALUE: 35
PIF_VALUE: 30
PIF_VALUE: 34
PIF_VALUE: 21
PIF_VALUE: 18
PIF_VALUE: 24
PIF_VALUE: 30
PIF_VALUE: 17
PIF_VALUE: 35
PIF_VALUE: 19
PIF_VALUE: 23
PIF_VALUE: 19
PIF_VALUE: 32
PIF_VALUE: 15
PIF_VALUE: 25
PIF_VALUE: 30
PIF_VALUE: 33
PIF_VALUE: 33
PIF_VALUE: 19
PIF_VALUE: 28
PIF_VALUE: 0
PIF_VALUE: 28
PIF_VALUE: 15
PIF_VALUE: 20
PIF_VALUE: 20
PIF_VALUE: 19
PIF_VALUE: 35
PIF_VALUE: 29
PIF_VALUE: 30
PIF_VALUE: 15
PIF_VALUE: 28
PIF_VALUE: 15
PIF_VALUE: 36
PIF_VALUE: 28
PIF_VALUE: 18
PIF_VALUE: 33
PIF_VALUE: 11
PIF_VALUE: 31
PIF_VALUE: 18
PIF_VALUE: 31
PIF_VALUE: 30
PIF_VALUE: 34
PIF_VALUE: 31
PIF_VALUE: 4
PIF_VALUE: 20
PIF_VALUE: 20
PIF_VALUE: 24
PIF_VALUE: 32
PIF_VALUE: 30
PIF_VALUE: 22
PIF_VALUE: 29
PIF_VALUE: 23
PIF_VALUE: 30
PIF_VALUE: 22
PIF_VALUE: 30
PIF_VALUE: 21
PIF_VALUE: 30
PIF_VALUE: 31
PIF_VALUE: 22
PIF_VALUE: 35
PIF_VALUE: 32
PIF_VALUE: 1
PIF_VALUE: 31
PIF_VALUE: 12
PIF_VALUE: 17
PIF_VALUE: 12
PIF_VALUE: 30
PIF_VALUE: 33
PIF_VALUE: 28
PIF_VALUE: 31
PIF_VALUE: 31
PIF_VALUE: 22
PIF_VALUE: 24
PIF_VALUE: 20
PIF_VALUE: 17
PIF_VALUE: 35
PIF_VALUE: 29
PIF_VALUE: 34
PIF_VALUE: 30
PIF_VALUE: 12
PIF_VALUE: 12
PIF_VALUE: 32
PIF_VALUE: 33
PIF_VALUE: 31
PIF_VALUE: 28
PIF_VALUE: 24
PIF_VALUE: 32
PIF_VALUE: 29
PIF_VALUE: 21
PIF_VALUE: 31
PIF_VALUE: 21
PIF_VALUE: 31
PIF_VALUE: 30
PIF_VALUE: 27
PIF_VALUE: 28
PIF_VALUE: 14
PIF_VALUE: 33
PIF_VALUE: 15
PIF_VALUE: 25
PIF_VALUE: 34
PIF_VALUE: 39
PIF_VALUE: 21
PIF_VALUE: 30
PIF_VALUE: 14
PIF_VALUE: 30
PIF_VALUE: 15
PIF_VALUE: 18
PIF_VALUE: 30
PIF_VALUE: 22
PIF_VALUE: 3
PIF_VALUE: 23
PIF_VALUE: 0
PIF_VALUE: 22
PIF_VALUE: 37
PIF_VALUE: 19
PIF_VALUE: 36
PIF_VALUE: 17
PIF_VALUE: 32
PIF_VALUE: 12
PIF_VALUE: 24
PIF_VALUE: 23
PIF_VALUE: 15
PIF_VALUE: 35
PIF_VALUE: 21
PIF_VALUE: 23
PIF_VALUE: 32
PIF_VALUE: 34
PIF_VALUE: 21
PIF_VALUE: 33
PIF_VALUE: 32
PIF_VALUE: 28
PIF_VALUE: 31
PIF_VALUE: 30
PIF_VALUE: 0
PIF_VALUE: 32
PIF_VALUE: 30
PIF_VALUE: 31
PIF_VALUE: 33
PIF_VALUE: 31
PIF_VALUE: 29
PIF_VALUE: 19
PIF_VALUE: 14
PIF_VALUE: 33
PIF_VALUE: 32
PIF_VALUE: 31
PIF_VALUE: 30
PIF_VALUE: 28
PIF_VALUE: 20
PIF_VALUE: 29
PIF_VALUE: 30
PIF_VALUE: 35
PIF_VALUE: 22
PIF_VALUE: 23
PIF_VALUE: 21
PIF_VALUE: 28
PIF_VALUE: 24
PIF_VALUE: 1
PIF_VALUE: 42
PIF_VALUE: 0
PIF_VALUE: 6
PIF_VALUE: 19
PIF_VALUE: 21
PIF_VALUE: 33
PIF_VALUE: 32
PIF_VALUE: 31
PIF_VALUE: 30
PIF_VALUE: 21
PIF_VALUE: 32
PIF_VALUE: 23
PIF_VALUE: 31
PIF_VALUE: 35
PIF_VALUE: 15
PIF_VALUE: 30
PIF_VALUE: 24
PIF_VALUE: 22
PIF_VALUE: 30
PIF_VALUE: 36
PIF_VALUE: 15
PIF_VALUE: 24
PIF_VALUE: 24
PIF_VALUE: 35
PIF_VALUE: 21
PIF_VALUE: 28
PIF_VALUE: 9
PIF_VALUE: 32
PIF_VALUE: 31
PIF_VALUE: 18
PIF_VALUE: 3
PIF_VALUE: 10
PIF_VALUE: 32
PIF_VALUE: 15
PIF_VALUE: 33
PIF_VALUE: 22
PIF_VALUE: 30
PIF_VALUE: 21
PIF_VALUE: 0
PIF_VALUE: 20
PIF_VALUE: 25
PIF_VALUE: 32
PIF_VALUE: 30
PIF_VALUE: 27
PIF_VALUE: 16
PIF_VALUE: 31
PIF_VALUE: 21
PIF_VALUE: 23
PIF_VALUE: 32
PIF_VALUE: 22
PIF_VALUE: 36
PIF_VALUE: 34
PIF_VALUE: 29
PIF_VALUE: 21
PIF_VALUE: 23
PIF_VALUE: 33
PIF_VALUE: 30
PIF_VALUE: 30
PIF_VALUE: 34
PIF_VALUE: 22
PIF_VALUE: 30
PIF_VALUE: 16
PIF_VALUE: 34
PIF_VALUE: 22
PIF_VALUE: 19
PIF_VALUE: 24
PIF_VALUE: 31
PIF_VALUE: 15
PIF_VALUE: 30
PIF_VALUE: 21
PIF_VALUE: 24
PIF_VALUE: 30
PIF_VALUE: 32
PIF_VALUE: 24
PIF_VALUE: 15
PIF_VALUE: 19
PIF_VALUE: 29
PIF_VALUE: 34
PIF_VALUE: 28
PIF_VALUE: 15
PIF_VALUE: 30
PIF_VALUE: 20
PIF_VALUE: 30
PIF_VALUE: 14
PIF_VALUE: 31
PIF_VALUE: 21
PIF_VALUE: 30
PIF_VALUE: 33
PIF_VALUE: 33

## 2017-11-14 ASSESSMENT — PAIN DESCRIPTION - ONSET: ONSET: GRADUAL

## 2017-11-14 ASSESSMENT — PAIN DESCRIPTION - LOCATION: LOCATION: CHEST

## 2017-11-14 ASSESSMENT — PAIN SCALES - GENERAL
PAINLEVEL_OUTOF10: 4
PAINLEVEL_OUTOF10: 4
PAINLEVEL_OUTOF10: 0
PAINLEVEL_OUTOF10: 0

## 2017-11-14 ASSESSMENT — PAIN DESCRIPTION - FREQUENCY: FREQUENCY: INTERMITTENT

## 2017-11-14 ASSESSMENT — PAIN DESCRIPTION - PROGRESSION
CLINICAL_PROGRESSION: NOT CHANGED

## 2017-11-14 ASSESSMENT — PAIN DESCRIPTION - PAIN TYPE: TYPE: SURGICAL PAIN

## 2017-11-14 ASSESSMENT — PAIN DESCRIPTION - DESCRIPTORS: DESCRIPTORS: SHARP

## 2017-11-14 ASSESSMENT — PAIN DESCRIPTION - ORIENTATION: ORIENTATION: RIGHT

## 2017-11-14 NOTE — PROGRESS NOTES
GFRAA  >60.0  >60.0       MV Settings:     Vent Mode: AC/VC+  Vt Ordered: 600 mL  Rate Set: 14 bmp  FiO2 : 100 %  PEEP/CPAP: 1  Peak Inspiratory Pressure: 14 cmH2O  Mean Airway Pressure: 4.6 cmH20  I:E Ratio: 1:3.00    Recent Labs      11/14/17   1420   PHART  7.344*   [id number]  49*   PO2ART  395*   [id number]  26.5   BEART  1   [id number]  100*       O2 Device: Nasal cannula  O2 Flow Rate (L/min): 2 L/min    Diet NPO Effective Now     MEDICATIONS during current hospitalization:    Continuous Infusions:   dextrose 5 % and 0.45 % NaCl 125 mL/hr at 11/14/17 1327    HYDROmorphone      dextrose         Scheduled Meds:   docusate sodium  100 mg Oral BID    levothyroxine  125 mcg Oral Daily    insulin lispro  0-6 Units Subcutaneous TID WC    insulin lispro  0-3 Units Subcutaneous Nightly    ipratropium-albuterol  1 ampule Inhalation Q4H WA       PRN Meds:ondansetron, naloxone, hydrALAZINE, glucose, dextrose, glucagon (rDNA), dextrose    Radiology  Xr Chest Standard (2 Vw)    Result Date: 11/10/2017  CLINICAL HISTORY: Pneumonitis COMPARISON: December 3, 2015 CHEST, TWO VIEWS FINDINGS: Cardiac and mediastinal silhouettes are normal in size and contour. Projecting over the right hilar region is a 1 cm radiopaque density. The right costophrenic angle is slightly blunted. No focal areas of consolidations are noted. No pneumothorax is seen. The osseous structures are grossly unremarkable. IMPRESSION A radiopaque density is now seen in the region of the right lower lobe bronchus. Ct Chest W Contrast    Result Date: 11/11/2017  EXAMINATION:  CT CHEST W CONTRAST CLINICAL HISTORY:  ACUTE RESP ILLNESS, >37YEARS OLD  status post bronchoscopy to retrieve aspirated dental amalgam, unsuccessful. COMPARISONS:  12/2/2013 TECHNIQUE:  Spiral scans with 75 mL Isovue-370 IV. Multiplanar 2-D reconstructions. Axial 3-D 10 x 3 mm MIP reconstructions were performed.  All CT scans at this facility use dose modulation, iterative

## 2017-11-14 NOTE — ANESTHESIA POSTPROCEDURE EVALUATION
Department of Anesthesiology  Postprocedure Note    Patient: Елена Palomino  MRN: [id number]  YOB: 1940  Date of evaluation: 11/14/2017  Time:  1:33 PM     Procedure Summary     Date:  11/14/17 Room / Location:  [place] [address] / [facility] [place] OR    Anesthesia Start:  0815 Anesthesia Stop:  0972    Procedure:  RIGHT THORACOTOMY WEDGE RESECTION FOR FOREIGN BODY AND FOB DOUBLE LUMEN (1ST CASE) (Right Chest) Diagnosis:  (INPATIENT)    Surgeon:  Reba Mcclelland MD Responsible Provider:  Willie Colon DO    Anesthesia Type:  general ASA Status:  3          Anesthesia Type: general    Ahmet Phase I: Ahmet Score: 9    Ahmet Phase II:      Last vitals: Reviewed and per EMR flowsheets.        Anesthesia Post Evaluation    Patient location during evaluation: ICU  Patient participation: waiting for patient participation  Level of consciousness: sedated and ventilated  Airway patency: patent  Nausea & Vomiting: no nausea and no vomiting  Complications: no  Cardiovascular status: blood pressure returned to baseline and hemodynamically stable  Respiratory status: acceptable  Hydration status: euvolemic

## 2017-11-14 NOTE — PROGRESS NOTES
Cardiology Progress Note      Patient:  Suzan May  YOB: 1940  MRN: [id number]   Acct: [de-identified]  Admit Date:  11/10/2017      SHARED VISIT CNP: Taran Pemberton NP  PRIMARY CARE PHYSICIAN: Sacha Harley MD  CARDIOLOGIST:Dr. Donna Keith   Pt personally examined and care discussed with NP and RN  Cv: RRR   L: coarse breath sounds   Ext: no edema  Assessment: CV stable post thoracotomy for aspirated object  Plan:  Supportive care with iv betablockers and hydralazine as needed  Check labs and ekg  See orders  Idolina Krabbe, MD        Impression/Plan:  1. Foreign body aspiration s/p RLL wedge resection. Obtain post op EKG and troponin. 2. COPD, postoperatively on vent, may tentatively be weaned later pending ABG results  3. Post operative HTN, will add IV hydralzaine     Chief Complaint/Active Symptoms: Intubated    Objective:   /75   Pulse 60   Temp 97.9 °F (36.6 °C) (Oral)   Resp 12   Ht 6' (1.829 m)   Wt 238 lb (108 kg)   SpO2 100%   BMI 32.28 kg/m²    Wt Readings from Last 3 Encounters:   11/14/17 238 lb (108 kg)   06/29/17 232 lb (105.2 kg)   06/15/17 227 lb (103 kg)       TELEMETRY: normal sinus                             Rhythm Strip: NSR      Physical Exam:  Physical Exam   Constitutional: He appears well-developed and well-nourished. No distress. HENT:   Head: Normocephalic. Eyes: Pupils are equal, round, and reactive to light. Neck: No JVD present. Cardiovascular: Normal rate, regular rhythm and normal heart sounds. Pulmonary/Chest: Breath sounds normal.   Ventilator assisted breathing  Rt pleural chest tube   Abdominal: Soft. He exhibits no distension. Hypoactive bowel sounds   Musculoskeletal: He exhibits no edema. Neurological:   Alert, JIMENEZ=   Skin: Skin is warm and dry.         Medications:    docusate sodium  100 mg Oral BID      dextrose 5 % and 0.45 % NaCl 125 mL/hr at 11/14/17 1327    HYDROmorphone         ondansetron 4 mg Q6H PRN naloxone 0.4 mg PRN       Diagnostics:    Xr Chest Standard (2 Vw)    Result Date: 2017  EXAMINATION: XR CHEST STANDARD TWO VW CLINICAL HISTORY:  Aspirated foreign object right lower lobe COMPARISONS: December 3, 2015. November 10, 2017. 2017. 2017. FINDINGS: Frontal and lateral views of the chest were obtained showing a persisting metallic tooth like opacity projecting of the posterior basal segment of the right lower lobe. It is located more peripherally now than it was on November 10, 2017. It has changed little since 2017. No signs of obstructive pneumonitis have developed. Remainder both lungs is unremarkable. The mediastinal silhouette is unremarkable. The calcified aorta is not dilated. The heart is not enlarged. The chest wall is unremarkable. CONCLUSION: ENDOBRONCHIAL TOOTHLIKE STRUCTURE PROJECTING OVER THE AIRWAY OF THE POSTERIOR BASAL SEGMENT OF THE RIGHT LOWER LOBE.        EK17 NSR no acute ischemia        Lab Data:    Cardiac Enzymes:  No results for input(s): CKTOTAL, CKMB, CKMBINDEX, TROPONINI in the last 72 hours.   ProBNP:   Lab Results   Component Value Date    PROBNP 111 2015       CBC:   Lab Results   Component Value Date    WBC 6.7 2017    RBC 4.46 2017    HGB 14.1 2017    HCT 42.6 2017     2017       BMP: @(bmp:3)@    CMP:  Lab Results   Component Value Date     2017    K 4.3 2017     2017    CO2 25 2017    BUN 21 2017    CREATININE 1.06 2017    GFRAA >60.0 2017    LABGLOM >60.0 2017    GLUCOSE 103 2017    GLUCOSE 94 2012    CALCIUM 8.3 2017       Hepatic Function Panel:  Lab Results   Component Value Date    ALKPHOS 55 2015    ALT 29 2015    AST 41 2015    PROT 6.7 2015    BILITOT 0.4 2015    LABALBU 4.0 2015       Magnesium:    Lab Results   Component Value Date    MG 2.1 2015

## 2017-11-14 NOTE — PROGRESS NOTES
Rec'd pt from OR mnitor leads applied a-line zero'd & leveled. Remains orally intubated with bilat breath sounds. Will monitor.

## 2017-11-14 NOTE — PROGRESS NOTES
Hospitalist Progress Note      PCP: Inge Guerra MD    Date of Admission: 11/10/2017    Chief Complaint:    Persistent cough    Subjective:  Patient is currently intubated; unable to complete full 10 point ROS     Medications:  Reviewed    Infusion Medications    dextrose 5 % and 0.45 % NaCl 125 mL/hr at 11/14/17 1327    HYDROmorphone       Scheduled Medications    docusate sodium  100 mg Oral BID     PRN Meds: ondansetron, naloxone, hydrALAZINE      Intake/Output Summary (Last 24 hours) at 11/14/17 1637  Last data filed at 11/14/17 1240   Gross per 24 hour   Intake             2000 ml   Output              325 ml   Net             1675 ml     Exam:    BP (!) 177/110   Pulse 82   Temp 97.9 °F (36.6 °C) (Oral)   Resp 13   Ht 6' (1.829 m)   Wt 238 lb (108 kg)   SpO2 100%   BMI 32.28 kg/m²     General appearance: No apparent distress, appears stated age and cooperative; intubated but awake and cooperative  HEENT: Pupils equal, round, and reactive to light. Conjunctivae/corneas clear. Neck: Supple, with full range of motion. No jugular venous distention. Trachea midline. Respiratory:  Normal respiratory effort. Clear to auscultation, bilaterally without Rales/Wheezes/Rhonchi. Cardiovascular: Regular rate and rhythm with normal S1/S2 without murmurs, rubs or gallops. Abdomen: Soft, non-tender, non-distended with normal bowel sounds. Musculoskeletal: No clubbing, cyanosis or edema bilaterally.     Neuro: Non Focal. Intact and symmetric; moving all extremities to command  Psychiatric: Alert and oriented, thought content appropriate, normal insight  Capillary Refill: Brisk,< 3 seconds   Peripheral Pulses: +2 palpable, equal bilaterally     Labs:   Recent Labs      11/13/17   0947  11/14/17   0323   WBC  6.0  6.7   HGB  14.0  14.1   HCT  42.4  42.6   PLT  105*  108*     Recent Labs      11/13/17   0947  11/14/17   0323   NA  142  142   K  4.2  4.3   CL  103  104   CO2  28  25   BUN  19  21   CREATININE  1.17 1.06   CALCIUM  8.5*  8.3*     No results for input(s): AST, ALT, BILIDIR, BILITOT, ALKPHOS in the last 72 hours. No results for input(s): INR in the last 72 hours. No results for input(s): Roslyn Su in the last 72 hours. Urinalysis:      Lab Results   Component Value Date    NITRU Negative 12/03/2015    WBCUA 6-10 12/03/2015    BACTERIA Few 12/03/2015    RBCUA None seen 12/03/2015    BLOODU trace-intact 09/29/2017    BLOODU Negative 12/03/2015    SPECGRAV 1.020 09/29/2017    SPECGRAV 1.019 12/03/2015    GLUCOSEU neg 09/29/2017    GLUCOSEU Negative 12/03/2015     Radiology:  [facility]         XR CHEST STANDARD (2 VW)   Final Result      XR Chest Portable   Final Result   NO ACTIVE LUNG DISEASE. XR Chest Portable   Final Result   BRONCHOSCOPY RETRIEVAL PROCEDURE. PLEASE REFER TO OPERATIVE REPORT. CT Chest W Contrast   Final Result   ASPIRATED FILLING IN RIGHT LOWER LOBE POSTERIOR BASILAR SEGMENTAL BRONCHUS. MILD EMPHYSEMA AND LOWER LOBE CYLINDRICAL BRONCHIECTASIS. ADDITIONAL FINDINGS AS ABOVE. XR Chest Portable    (Results Pending)     Assessment/Plan:    78 y/o M who presented with a persistent cough; found to have a foreign object in his posterior basilar segment of the RLL         #Foreign object in posterior basilar segment of the RLL     - Pt aspirated a filling     - Removed by cardiothoracic surgery today     - Immediate post op care by cardiothoracic surgery; I will resume his chronic home medications; most can be resumed tomorrow    #COPD     - Management per pulmonary; will resume breathing treatments    #Hypothyroidism     - Resume synthroid in the AM    #DMII     SSI       Active Hospital Problems    Diagnosis Date Noted    Aspiration into respiratory tract [T17.908A] 11/10/2017     Additional work up or/and treatment plan may be added today or then after based on clinical progression. I am managing a portion of pt care.  Some medical issues are handled by other specialists. Additional work up and treatment should be done in out pt setting by pt PCP and other out pt providers. In addition to examining and evaluating pt, I spent additional time explaining care, normal and abnormal findings, and treatment plan. All of pt questions were answered. Counseling, diet and education were  provided. Case will be discussed with nursing staff when appropriate. Family will be updated if and when appropriate.       Diet: Diet NPO Effective Now    Code Status: Full Code    PT/OT Eval     Electronically signed by Basilio Stephenson MD on 11/14/2017 at 4:37 PM

## 2017-11-15 LAB
ANION GAP SERPL CALCULATED.3IONS-SCNC: 12 MEQ/L (ref 7–13)
BLOOD BANK DISPENSE STATUS: NORMAL
BLOOD BANK DISPENSE STATUS: NORMAL
BLOOD BANK PRODUCT CODE: NORMAL
BLOOD BANK PRODUCT CODE: NORMAL
BPU ID: NORMAL
BPU ID: NORMAL
BUN BLDV-MCNC: 22 MG/DL (ref 8–23)
CALCIUM SERPL-MCNC: 7.5 MG/DL (ref 8.6–10.2)
CHLORIDE BLD-SCNC: 102 MEQ/L (ref 98–107)
CHOLESTEROL, TOTAL: 179 MG/DL (ref 0–199)
CK MB: 10.5 NG/ML (ref 0–6.7)
CO2: 24 MEQ/L (ref 22–29)
CREAT SERPL-MCNC: 0.94 MG/DL (ref 0.7–1.2)
CREATINE KINASE-MB INDEX: 1.1 % (ref 0–3.5)
DESCRIPTION BLOOD BANK: NORMAL
DESCRIPTION BLOOD BANK: NORMAL
EKG ATRIAL RATE: 61 BPM
EKG ATRIAL RATE: 76 BPM
EKG P AXIS: 18 DEGREES
EKG P AXIS: 34 DEGREES
EKG P-R INTERVAL: 160 MS
EKG P-R INTERVAL: 186 MS
EKG Q-T INTERVAL: 352 MS
EKG Q-T INTERVAL: 432 MS
EKG QRS DURATION: 106 MS
EKG QRS DURATION: 96 MS
EKG QTC CALCULATION (BAZETT): 396 MS
EKG QTC CALCULATION (BAZETT): 434 MS
EKG R AXIS: -17 DEGREES
EKG R AXIS: -29 DEGREES
EKG T AXIS: 43 DEGREES
EKG T AXIS: 55 DEGREES
EKG VENTRICULAR RATE: 61 BPM
EKG VENTRICULAR RATE: 76 BPM
GFR AFRICAN AMERICAN: >60
GFR NON-AFRICAN AMERICAN: >60
GLUCOSE BLD-MCNC: 102 MG/DL (ref 60–115)
GLUCOSE BLD-MCNC: 110 MG/DL (ref 60–115)
GLUCOSE BLD-MCNC: 110 MG/DL (ref 74–109)
GLUCOSE BLD-MCNC: 122 MG/DL (ref 60–115)
GLUCOSE BLD-MCNC: 82 MG/DL (ref 60–115)
HCT VFR BLD CALC: 39.6 % (ref 42–52)
HDLC SERPL-MCNC: 55 MG/DL (ref 40–59)
HEMOGLOBIN: 13.2 G/DL (ref 14–18)
LDL CHOLESTEROL CALCULATED: 95 MG/DL (ref 0–129)
MAGNESIUM: 2.1 MG/DL (ref 1.7–2.3)
MCH RBC QN AUTO: 31.6 PG (ref 27–31.3)
MCHC RBC AUTO-ENTMCNC: 33.3 % (ref 33–37)
MCV RBC AUTO: 94.8 FL (ref 80–100)
PDW BLD-RTO: 14.9 % (ref 11.5–14.5)
PERFORMED ON: ABNORMAL
PERFORMED ON: NORMAL
PLATELET # BLD: 113 K/UL (ref 130–400)
POTASSIUM SERPL-SCNC: 4.3 MEQ/L (ref 3.5–5.1)
RBC # BLD: 4.18 M/UL (ref 4.7–6.1)
SODIUM BLD-SCNC: 138 MEQ/L (ref 132–144)
TOTAL CK: 920 U/L (ref 0–190)
TRIGL SERPL-MCNC: 145 MG/DL (ref 0–200)
TROPONIN: <0.01 NG/ML (ref 0–0.01)
WBC # BLD: 8.3 K/UL (ref 4.8–10.8)

## 2017-11-15 PROCEDURE — 84484 ASSAY OF TROPONIN QUANT: CPT

## 2017-11-15 PROCEDURE — 36592 COLLECT BLOOD FROM PICC: CPT

## 2017-11-15 PROCEDURE — 6370000000 HC RX 637 (ALT 250 FOR IP): Performed by: THORACIC SURGERY (CARDIOTHORACIC VASCULAR SURGERY)

## 2017-11-15 PROCEDURE — 2700000000 HC OXYGEN THERAPY PER DAY

## 2017-11-15 PROCEDURE — 6370000000 HC RX 637 (ALT 250 FOR IP): Performed by: INTERNAL MEDICINE

## 2017-11-15 PROCEDURE — 2000000000 HC ICU R&B

## 2017-11-15 PROCEDURE — 6360000002 HC RX W HCPCS: Performed by: STUDENT IN AN ORGANIZED HEALTH CARE EDUCATION/TRAINING PROGRAM

## 2017-11-15 PROCEDURE — 82553 CREATINE MB FRACTION: CPT

## 2017-11-15 PROCEDURE — 99233 SBSQ HOSP IP/OBS HIGH 50: CPT | Performed by: INTERNAL MEDICINE

## 2017-11-15 PROCEDURE — 2500000003 HC RX 250 WO HCPCS: Performed by: INTERNAL MEDICINE

## 2017-11-15 PROCEDURE — 2580000003 HC RX 258

## 2017-11-15 PROCEDURE — 85027 COMPLETE CBC AUTOMATED: CPT

## 2017-11-15 PROCEDURE — 94640 AIRWAY INHALATION TREATMENT: CPT

## 2017-11-15 PROCEDURE — 80061 LIPID PANEL: CPT

## 2017-11-15 PROCEDURE — 80048 BASIC METABOLIC PNL TOTAL CA: CPT

## 2017-11-15 PROCEDURE — 83735 ASSAY OF MAGNESIUM: CPT

## 2017-11-15 PROCEDURE — 93005 ELECTROCARDIOGRAM TRACING: CPT

## 2017-11-15 PROCEDURE — 82550 ASSAY OF CK (CPK): CPT

## 2017-11-15 RX ORDER — TAMSULOSIN HYDROCHLORIDE 0.4 MG/1
0.4 CAPSULE ORAL DAILY
Status: DISCONTINUED | OUTPATIENT
Start: 2017-11-15 | End: 2017-11-21 | Stop reason: HOSPADM

## 2017-11-15 RX ORDER — MIRTAZAPINE 15 MG/1
15 TABLET, FILM COATED ORAL NIGHTLY
Status: DISCONTINUED | OUTPATIENT
Start: 2017-11-15 | End: 2017-11-15

## 2017-11-15 RX ORDER — SENNA AND DOCUSATE SODIUM 50; 8.6 MG/1; MG/1
2 TABLET, FILM COATED ORAL NIGHTLY
Status: DISCONTINUED | OUTPATIENT
Start: 2017-11-15 | End: 2017-11-21 | Stop reason: HOSPADM

## 2017-11-15 RX ORDER — LORAZEPAM 0.5 MG/1
0.5 TABLET ORAL EVERY 6 HOURS PRN
Status: DISPENSED | OUTPATIENT
Start: 2017-11-15 | End: 2017-11-19

## 2017-11-15 RX ORDER — POLYETHYLENE GLYCOL 3350 17 G/17G
17 POWDER, FOR SOLUTION ORAL DAILY
Status: DISCONTINUED | OUTPATIENT
Start: 2017-11-15 | End: 2017-11-21 | Stop reason: HOSPADM

## 2017-11-15 RX ORDER — MIRTAZAPINE 15 MG/1
15 TABLET, FILM COATED ORAL NIGHTLY
Status: COMPLETED | OUTPATIENT
Start: 2017-11-15 | End: 2017-11-18

## 2017-11-15 RX ORDER — SODIUM CHLORIDE 9 MG/ML
INJECTION, SOLUTION INTRAVENOUS
Status: COMPLETED
Start: 2017-11-15 | End: 2017-11-15

## 2017-11-15 RX ORDER — METOPROLOL SUCCINATE 25 MG/1
25 TABLET, EXTENDED RELEASE ORAL DAILY
Status: DISCONTINUED | OUTPATIENT
Start: 2017-11-15 | End: 2017-11-21 | Stop reason: HOSPADM

## 2017-11-15 RX ORDER — LORAZEPAM 0.5 MG/1
0.5 TABLET ORAL EVERY 6 HOURS PRN
Status: DISCONTINUED | OUTPATIENT
Start: 2017-11-15 | End: 2017-11-21 | Stop reason: HOSPADM

## 2017-11-15 RX ORDER — FINASTERIDE 5 MG/1
5 TABLET, FILM COATED ORAL DAILY
Status: DISCONTINUED | OUTPATIENT
Start: 2017-11-15 | End: 2017-11-21 | Stop reason: HOSPADM

## 2017-11-15 RX ADMIN — SENNOSIDES AND DOCUSATE SODIUM 2 TABLET: 8.6; 5 TABLET ORAL at 20:40

## 2017-11-15 RX ADMIN — METOPROLOL TARTRATE 5 MG: 5 INJECTION INTRAVENOUS at 00:35

## 2017-11-15 RX ADMIN — DOCUSATE SODIUM 100 MG: 100 CAPSULE, LIQUID FILLED ORAL at 09:03

## 2017-11-15 RX ADMIN — IPRATROPIUM BROMIDE AND ALBUTEROL SULFATE 1 AMPULE: 2.5; .5 SOLUTION RESPIRATORY (INHALATION) at 20:07

## 2017-11-15 RX ADMIN — LEVOTHYROXINE SODIUM 125 MCG: 125 TABLET ORAL at 06:05

## 2017-11-15 RX ADMIN — DOCUSATE SODIUM 100 MG: 100 CAPSULE, LIQUID FILLED ORAL at 20:40

## 2017-11-15 RX ADMIN — IPRATROPIUM BROMIDE AND ALBUTEROL SULFATE 1 AMPULE: 2.5; .5 SOLUTION RESPIRATORY (INHALATION) at 03:46

## 2017-11-15 RX ADMIN — FINASTERIDE 5 MG: 5 TABLET, FILM COATED ORAL at 17:23

## 2017-11-15 RX ADMIN — IPRATROPIUM BROMIDE AND ALBUTEROL SULFATE 1 AMPULE: 2.5; .5 SOLUTION RESPIRATORY (INHALATION) at 14:06

## 2017-11-15 RX ADMIN — Medication: at 17:38

## 2017-11-15 RX ADMIN — TRAZODONE HYDROCHLORIDE 150 MG: 100 TABLET ORAL at 20:40

## 2017-11-15 RX ADMIN — MIRTAZAPINE 15 MG: 15 TABLET, FILM COATED ORAL at 20:40

## 2017-11-15 RX ADMIN — SODIUM CHLORIDE: 9 INJECTION, SOLUTION INTRAVENOUS at 00:00

## 2017-11-15 RX ADMIN — METOPROLOL TARTRATE 5 MG: 5 INJECTION INTRAVENOUS at 06:06

## 2017-11-15 RX ADMIN — SODIUM CHLORIDE 500 ML: 9 INJECTION, SOLUTION INTRAVENOUS at 17:00

## 2017-11-15 RX ADMIN — IPRATROPIUM BROMIDE AND ALBUTEROL SULFATE 1 AMPULE: 2.5; .5 SOLUTION RESPIRATORY (INHALATION) at 07:49

## 2017-11-15 RX ADMIN — TAMSULOSIN HYDROCHLORIDE 0.4 MG: 0.4 CAPSULE ORAL at 17:23

## 2017-11-15 ASSESSMENT — PAIN DESCRIPTION - FREQUENCY
FREQUENCY: INTERMITTENT
FREQUENCY: INTERMITTENT

## 2017-11-15 ASSESSMENT — PAIN DESCRIPTION - ORIENTATION
ORIENTATION: RIGHT
ORIENTATION: RIGHT

## 2017-11-15 ASSESSMENT — PAIN SCALES - GENERAL
PAINLEVEL_OUTOF10: 3
PAINLEVEL_OUTOF10: 0
PAINLEVEL_OUTOF10: 2
PAINLEVEL_OUTOF10: 0
PAINLEVEL_OUTOF10: 0

## 2017-11-15 ASSESSMENT — PAIN DESCRIPTION - PROGRESSION
CLINICAL_PROGRESSION: NOT CHANGED
CLINICAL_PROGRESSION: NOT CHANGED

## 2017-11-15 ASSESSMENT — PAIN DESCRIPTION - DESCRIPTORS
DESCRIPTORS: SHARP
DESCRIPTORS: ACHING

## 2017-11-15 ASSESSMENT — PAIN DESCRIPTION - LOCATION
LOCATION: CHEST
LOCATION: CHEST

## 2017-11-15 ASSESSMENT — PAIN DESCRIPTION - PAIN TYPE
TYPE: SURGICAL PAIN
TYPE: SURGICAL PAIN

## 2017-11-15 ASSESSMENT — PAIN DESCRIPTION - ONSET: ONSET: GRADUAL

## 2017-11-15 NOTE — PROGRESS NOTES
appropriate, normal insight  Capillary Refill: Brisk,< 3 seconds   Peripheral Pulses: +2 palpable, equal bilaterally     Labs:   Recent Labs      11/13/17   0947  11/14/17   0323  11/15/17   0600   WBC  6.0  6.7  8.3   HGB  14.0  14.1  13.2*   HCT  42.4  42.6  39.6*   PLT  105*  108*  113*     Recent Labs      11/13/17   0947  11/14/17   0323  11/15/17   0600   NA  142  142  138   K  4.2  4.3  4.3   CL  103  104  102   CO2  28  25  24   BUN  19  21  22   CREATININE  1.17  1.06  0.94   CALCIUM  8.5*  8.3*  7.5*     No results for input(s): AST, ALT, BILIDIR, BILITOT, ALKPHOS in the last 72 hours. No results for input(s): INR in the last 72 hours. Recent Labs      11/15/17   0600   CKTOTAL  920*   TROPONINI  <0.010       Urinalysis:      Lab Results   Component Value Date    NITRU Negative 12/03/2015    WBCUA 6-10 12/03/2015    BACTERIA Few 12/03/2015    RBCUA None seen 12/03/2015    BLOODU trace-intact 09/29/2017    BLOODU Negative 12/03/2015    SPECGRAV 1.020 09/29/2017    SPECGRAV 1.019 12/03/2015    GLUCOSEU neg 09/29/2017    GLUCOSEU Negative 12/03/2015     Radiology:  [facility]   Final Result      XR Chest Portable   Final Result      XR CHEST STANDARD (2 VW)   Final Result      XR Chest Portable   Final Result   NO ACTIVE LUNG DISEASE. XR Chest Portable   Final Result   BRONCHOSCOPY RETRIEVAL PROCEDURE. PLEASE REFER TO OPERATIVE REPORT. CT Chest W Contrast   Final Result   ASPIRATED FILLING IN RIGHT LOWER LOBE POSTERIOR BASILAR SEGMENTAL BRONCHUS. MILD EMPHYSEMA AND LOWER LOBE CYLINDRICAL BRONCHIECTASIS. ADDITIONAL FINDINGS AS ABOVE.            Assessment/Plan:    76 y/o M who presented with a persistent cough; found to have a foreign object in his posterior basilar segment of the RLL         #Foreign object in posterior basilar segment of the RLL     - Pt aspirated a filling which was successfully removed by cardiothoracic surgery today     - Immediate post op care

## 2017-11-15 NOTE — PROGRESS NOTES
The known aspirated filling is identified within the distal segmental bronchus of the right lower lobe posterior basilar segment. Filling measures approximately 9 mm. There are mild emphysematous changes. There is mild scattered peripheral reticularity without definite honeycombing. There are no consolidations or effusions. There is right upper lung perifissural nodularity consistent with small intrapulmonary lymph nodes. There is a left upper lobe 4 mm nodule (series 2 image 21), unchanged from prior examination of 12/2/2013, and therefore benign. There are no suspicious lung nodules. There is mild bilateral lower lobe cylindrical bronchiectasis. Cardiac size and pulmonary vascularity are normal. There is mild mediastinal lipomatosis. There is mild thickening or trace fluid in the anterior pericardium, decreased compared to prior examination 12/2/2013. There are coronary artery calcifications. There are aortic calcifications. There is no evidence of aneurysm or dissection. There are borderline in size lymph nodes in the mediastinum anterior to the left mainstem bronchus and in the right hilum, unchanged from prior examination of 12/2/2013. There is no thoracic adenopathy. The esophagus is unremarkable. There is spondylosis. There are no acute or suspicious bone lesions. Scans of the subdiaphragmatic regions demonstrate a 6 mm oval metallic density in the gastric fundus, presumably representing a swallowed filling. There are stable small cysts in the liver. There are partially visualized renal cysts. ASPIRATED FILLING IN RIGHT LOWER LOBE POSTERIOR BASILAR SEGMENTAL BRONCHUS. MILD EMPHYSEMA AND LOWER LOBE CYLINDRICAL BRONCHIECTASIS. ADDITIONAL FINDINGS AS ABOVE. Xr Chest Portable    Result Date: 11/12/2017  EXAMINATION: XR CHEST PORTABLE, 1 VIEW: DATE AND TIME: 11/11/2017 at 11:30 AM . CLINICAL HISTORY: SHORTNESS OF BREATH. POST BRONCHOSCOPY.   COMPARISONS: 11/11/2017 FINDINGS: The heart, mediastinum and

## 2017-11-15 NOTE — PROGRESS NOTES
Collection Time: 11/15/17  6:00 AM   Result Value Ref Range    Total  (H) 0 - 190 U/L    CK-MB 10.5 (H) 0.0 - 6.7 ng/mL    CK-MB Index 1.1 0.0 - 3.5 %   Troponin    Collection Time: 11/15/17  6:00 AM   Result Value Ref Range    Troponin <0.010 0.000 - 0.010 ng/mL   Lipid Panel    Collection Time: 11/15/17  6:00 AM   Result Value Ref Range    Cholesterol, Total 179 0 - 199 mg/dL    Triglycerides 145 0 - 200 mg/dL    HDL 55 40 - 59 mg/dL    LDL Calculated 95 0 - 129 mg/dL   CBC    Collection Time: 11/15/17  6:00 AM   Result Value Ref Range    WBC 8.3 4.8 - 10.8 K/uL    RBC 4.18 (L) 4.70 - 6.10 M/uL    Hemoglobin 13.2 (L) 14.0 - 18.0 g/dL    Hematocrit 39.6 (L) 42.0 - 52.0 %    MCV 94.8 80.0 - 100.0 fL    MCH 31.6 (H) 27.0 - 31.3 pg    MCHC 33.3 33.0 - 37.0 %    RDW 14.9 (H) 11.5 - 14.5 %    Platelets 467 (L) 098 - 400 K/uL   POCT Glucose    Collection Time: 11/15/17  8:33 AM   Result Value Ref Range    POC Glucose 82 60 - 115 mg/dl    Performed on ACCU-CHEK    POCT Glucose    Collection Time: 11/15/17 11:24 AM   Result Value Ref Range    POC Glucose 102 60 - 115 mg/dl    Performed on ACCU-CHEK      Telemetry: Franklin Fraga MD ,[address] [place] [address] Cardiology

## 2017-11-15 NOTE — OP NOTE
[address]                       [address], [address] [facility]                                 OPERATIVE REPORT    PATIENT NAME: Dottie Cooks                    :        1940  MED REC NO:   [id number]                            ROOM:       [facility]  ACCOUNT NO:   [de-identified]                           ADMIT DATE: 11/10/2017  PROVIDER:     Yee Reddy MD      DATE OF PROCEDURE:  2017    PREOPERATIVE DIAGNOSIS:  Retained foreign body, right lower lobe bronchus. POSTOPERATIVE DIAGNOSIS:  Retained foreign body, right lower lobe bronchus. PROCEDURES PERFORMED:  1. Right thoracotomy with segmental resection, right lower lobe medial  basilar segment. 2.  Chest tube insertion. 3.  Fiberoptic bronchoscopy, diagnostic preop and therapeutic postop. SURGEON:  Dr. Maryan Villegas. ANESTHESIA:  General endotracheal.    INDICATIONS FOR PROCEDURE:  This is a 73-year-old gentleman, admitted to  the hospital 4 days ago, and had aspirated a dental foreign object which  was unsuccessfully retrieved by Dr. Betty Blanco over the weekend. Then because  of this issue with airway and embedding it down into the deep portion of  the lower segment of the airway, we prepared him for surgery to remove it  in an open procedure. The procedure was done without difficulty. The  method is outlined below. DESCRIPTION OF PROCEDURE:  With the patient properly identified and  prepared, we used the fiberoptic bronchoscope to look down through the  endobronchial catheter into the airway and found some irritation and thick  mucus in both right and left sides, but we were not able to visualize the  object as far down into the lower lobe as we could go. We used C-arm  fluoroscopy also to assist us in this attempt, but we were unable to see it  and certainly could not retrieve it.   Therefore, we positioned the patient  on his left side, prepped and draped, and then opened the right chest  through a lower-level thoracotomy incision. We controlled the edge  bleeding with electrocautery for hemostasis. We used a retractor for  exposure and then we anatomically dissected down into the segment of the  right lower lobe. The first segment we had removed and opened at surgery  was not demonstrating the object within it. Therefore, we went a little  higher into the takeoff of the lower lobe medial segment area and resected  the bronchus removing it and closed with a TA 34.8-mm stapler. The artery  and vein to the segment were also ligated as per usual and then, we worked  our way on the soft tissue substance of the lung dissecting it and then  dividing it with PeriStrip SUNITA staplers. As we were doing this, we  actually were able to, after the excision of the bronchus what appeared to  be the crown extruded from the lower portion of the bronchus as we removed  the specimen, sent that to the Laboratory for determination. The segment  that was resected along with the first segment were sent to the Lab also. We used ProGEL to spray right at the parenchymal surface of the lung for  hemostasis. We waited 2 minutes and then instilled the chest with warm  saline and reexpanded the lung to check for hemostasis which was for the  majority complete. We used a straight 28-Icelandic chest tube to drain the  thorax bringing it out through a separate stab incision below the  thoracotomy site anchoring it to the skin with standard silk suture. We  then reapproximated the ribs with 3 double-stranded heavy pericostal suture  closures and then anatomically, we closed the layers above this. Sponge  and instrument counts were correct x2. The estimated blood loss was  minimal approximating 200 mL.  The tube was connected to the atrium suction  and the patient was positioned again in supine manner and was extubated  from the double lumen tube and re-intubated with a single-lumen tube, and  we removed thick secretions both bloody and natural mucus secretions from  both right and left endobronchial trees in the therapeutic bronchoscopic  fashion. When this was completed, the patient was transferred directly  from OR #2 to bed #7 of CV ICU in a stable condition. We met in  consultation area with his sister, brother-in-law, son, daughter, and  family friend, and informed them of the surgery, the results, and answered  their questions.         Lynda Ruiz MD    D: 11/14/2017 13:45:47       T: 11/14/2017 14:06:28     [id number]  Job#: [id number]     Doc#: [id number]

## 2017-11-15 NOTE — PROGRESS NOTES
Progress Note    POD # 1    Patient is Elray Po     The chest x-ray: postop : no Px and no foreign body    Chest tubes: No air leak detected. 550 cc total output. Pain management: On PCA    Plan: Resume diet and all meds.     Electronically signed by Luisa Carrera MD on 11/15/2017 at 10:21 AM

## 2017-11-16 LAB
ANION GAP SERPL CALCULATED.3IONS-SCNC: 12 MEQ/L (ref 7–13)
BASOPHILS ABSOLUTE: 0 K/UL (ref 0–0.2)
BASOPHILS RELATIVE PERCENT: 0.4 %
BLOOD CULTURE, ROUTINE: NORMAL
BUN BLDV-MCNC: 18 MG/DL (ref 8–23)
CALCIUM SERPL-MCNC: 7.7 MG/DL (ref 8.6–10.2)
CHLORIDE BLD-SCNC: 101 MEQ/L (ref 98–107)
CO2: 25 MEQ/L (ref 22–29)
CREAT SERPL-MCNC: 0.85 MG/DL (ref 0.7–1.2)
CULTURE, BLOOD 2: NORMAL
EOSINOPHILS ABSOLUTE: 0.1 K/UL (ref 0–0.7)
EOSINOPHILS RELATIVE PERCENT: 0.5 %
GFR AFRICAN AMERICAN: >60
GFR NON-AFRICAN AMERICAN: >60
GLUCOSE BLD-MCNC: 106 MG/DL (ref 74–109)
GLUCOSE BLD-MCNC: 177 MG/DL (ref 60–115)
GLUCOSE BLD-MCNC: 220 MG/DL (ref 60–115)
HCT VFR BLD CALC: 40.7 % (ref 42–52)
HEMOGLOBIN: 13.5 G/DL (ref 14–18)
LYMPHOCYTES ABSOLUTE: 1.1 K/UL (ref 1–4.8)
LYMPHOCYTES RELATIVE PERCENT: 10.9 %
MCH RBC QN AUTO: 32 PG (ref 27–31.3)
MCHC RBC AUTO-ENTMCNC: 33.3 % (ref 33–37)
MCV RBC AUTO: 96.1 FL (ref 80–100)
MONOCYTES ABSOLUTE: 0.9 K/UL (ref 0.2–0.8)
MONOCYTES RELATIVE PERCENT: 8.7 %
NEUTROPHILS ABSOLUTE: 7.8 K/UL (ref 1.4–6.5)
NEUTROPHILS RELATIVE PERCENT: 79.5 %
PDW BLD-RTO: 14.7 % (ref 11.5–14.5)
PERFORMED ON: ABNORMAL
PERFORMED ON: ABNORMAL
PLATELET # BLD: 102 K/UL (ref 130–400)
POTASSIUM SERPL-SCNC: 4.4 MEQ/L (ref 3.5–5.1)
RBC # BLD: 4.23 M/UL (ref 4.7–6.1)
SODIUM BLD-SCNC: 138 MEQ/L (ref 132–144)
WBC # BLD: 9.8 K/UL (ref 4.8–10.8)

## 2017-11-16 PROCEDURE — 85025 COMPLETE CBC W/AUTO DIFF WBC: CPT

## 2017-11-16 PROCEDURE — 6370000000 HC RX 637 (ALT 250 FOR IP): Performed by: THORACIC SURGERY (CARDIOTHORACIC VASCULAR SURGERY)

## 2017-11-16 PROCEDURE — 6370000000 HC RX 637 (ALT 250 FOR IP): Performed by: INTERNAL MEDICINE

## 2017-11-16 PROCEDURE — 2000000000 HC ICU R&B

## 2017-11-16 PROCEDURE — 80048 BASIC METABOLIC PNL TOTAL CA: CPT

## 2017-11-16 PROCEDURE — 36415 COLL VENOUS BLD VENIPUNCTURE: CPT

## 2017-11-16 PROCEDURE — 94640 AIRWAY INHALATION TREATMENT: CPT

## 2017-11-16 PROCEDURE — 2700000000 HC OXYGEN THERAPY PER DAY

## 2017-11-16 RX ORDER — OXYCODONE AND ACETAMINOPHEN 10; 325 MG/1; MG/1
1 TABLET ORAL EVERY 4 HOURS PRN
Status: DISPENSED | OUTPATIENT
Start: 2017-11-16 | End: 2017-11-19

## 2017-11-16 RX ADMIN — IPRATROPIUM BROMIDE AND ALBUTEROL SULFATE 1 AMPULE: 2.5; .5 SOLUTION RESPIRATORY (INHALATION) at 15:54

## 2017-11-16 RX ADMIN — SENNOSIDES AND DOCUSATE SODIUM 2 TABLET: 8.6; 5 TABLET ORAL at 19:55

## 2017-11-16 RX ADMIN — TRAZODONE HYDROCHLORIDE 150 MG: 100 TABLET ORAL at 19:55

## 2017-11-16 RX ADMIN — METOPROLOL SUCCINATE 25 MG: 25 TABLET, FILM COATED, EXTENDED RELEASE ORAL at 09:32

## 2017-11-16 RX ADMIN — MIRTAZAPINE 15 MG: 15 TABLET, FILM COATED ORAL at 19:55

## 2017-11-16 RX ADMIN — DOCUSATE SODIUM 100 MG: 100 CAPSULE, LIQUID FILLED ORAL at 09:33

## 2017-11-16 RX ADMIN — INSULIN LISPRO 1 UNITS: 100 INJECTION, SOLUTION INTRAVENOUS; SUBCUTANEOUS at 17:09

## 2017-11-16 RX ADMIN — OXYCODONE AND ACETAMINOPHEN 1 TABLET: 10; 325 TABLET ORAL at 20:24

## 2017-11-16 RX ADMIN — IPRATROPIUM BROMIDE AND ALBUTEROL SULFATE 1 AMPULE: 2.5; .5 SOLUTION RESPIRATORY (INHALATION) at 20:45

## 2017-11-16 RX ADMIN — IPRATROPIUM BROMIDE AND ALBUTEROL SULFATE 1 AMPULE: 2.5; .5 SOLUTION RESPIRATORY (INHALATION) at 10:27

## 2017-11-16 RX ADMIN — LEVOTHYROXINE SODIUM 125 MCG: 125 TABLET ORAL at 06:13

## 2017-11-16 RX ADMIN — FINASTERIDE 5 MG: 5 TABLET, FILM COATED ORAL at 09:32

## 2017-11-16 RX ADMIN — IPRATROPIUM BROMIDE AND ALBUTEROL SULFATE 1 AMPULE: 2.5; .5 SOLUTION RESPIRATORY (INHALATION) at 05:28

## 2017-11-16 RX ADMIN — DOCUSATE SODIUM 100 MG: 100 CAPSULE, LIQUID FILLED ORAL at 19:55

## 2017-11-16 RX ADMIN — OXYCODONE AND ACETAMINOPHEN 1 TABLET: 10; 325 TABLET ORAL at 14:56

## 2017-11-16 RX ADMIN — POLYETHYLENE GLYCOL 3350 17 G: 17 POWDER, FOR SOLUTION ORAL at 09:32

## 2017-11-16 RX ADMIN — TAMSULOSIN HYDROCHLORIDE 0.4 MG: 0.4 CAPSULE ORAL at 09:33

## 2017-11-16 ASSESSMENT — PAIN SCALES - GENERAL
PAINLEVEL_OUTOF10: 5
PAINLEVEL_OUTOF10: 5
PAINLEVEL_OUTOF10: 0
PAINLEVEL_OUTOF10: 0
PAINLEVEL_OUTOF10: 2
PAINLEVEL_OUTOF10: 0
PAINLEVEL_OUTOF10: 5
PAINLEVEL_OUTOF10: 0

## 2017-11-16 ASSESSMENT — PAIN DESCRIPTION - ONSET
ONSET: OTHER (COMMENT)
ONSET: OTHER (COMMENT)

## 2017-11-16 ASSESSMENT — PAIN DESCRIPTION - PAIN TYPE
TYPE: SURGICAL PAIN
TYPE: SURGICAL PAIN

## 2017-11-16 ASSESSMENT — PAIN DESCRIPTION - DESCRIPTORS: DESCRIPTORS: DISCOMFORT

## 2017-11-16 ASSESSMENT — PAIN DESCRIPTION - LOCATION
LOCATION: CHEST
LOCATION: CHEST

## 2017-11-16 ASSESSMENT — PAIN DESCRIPTION - ORIENTATION
ORIENTATION: RIGHT
ORIENTATION: RIGHT

## 2017-11-16 ASSESSMENT — PAIN DESCRIPTION - PROGRESSION: CLINICAL_PROGRESSION: NOT CHANGED

## 2017-11-16 ASSESSMENT — PAIN DESCRIPTION - FREQUENCY: FREQUENCY: INTERMITTENT

## 2017-11-16 NOTE — CARE COORDINATION
Patient seen during rounds. Stable up in chair, POD #2. Still has chest tube to suction. No needs @ dsicharge.

## 2017-11-16 NOTE — CARE COORDINATION
LSW spoke with Pt and his girlfriend, still not sure of plan. [facility]. Pt needs PT/OT eval. Pt said he feels very anxious today. LSW to follow. .Electronically signed by ANYA Staples on 11/16/2017 at 12:06 PM

## 2017-11-16 NOTE — PROGRESS NOTES
PCA pump on PRN mode until d/c,d earlier @ 200 pm.Pt. Has ambulated x2 today approx. 100 ft. X 2 today, pt. Visibly sob with ambulation and tired during this. O2   On during this. Up in the chair for mostof the day.

## 2017-11-16 NOTE — PLAN OF CARE
Problem: Infection:  Goal: Will remain free from infection  Will remain free from infection   Outcome: Met This Shift      Problem: Safety:  Goal: Free from accidental physical injury  Free from accidental physical injury   Outcome: Met This Shift    Goal: Free from intentional harm  Free from intentional harm   Outcome: Met This Shift      Problem: Discharge Planning:  Goal: Patients continuum of care needs are met  Patients continuum of care needs are met   Outcome: Met This Shift      Problem: Respiratory:  Goal: Ability to maintain a clear airway will improve  Ability to maintain a clear airway will improve   Outcome: Met This Shift    Goal: Absence of aspiration  Absence of aspiration   Outcome: Met This Shift      Problem: Daily Care:  Goal: Daily care needs are met  Daily care needs are met   Outcome: Met This Shift      Problem: Pain:  Goal: Patient's pain/discomfort is manageable  Patient's pain/discomfort is manageable   Outcome: Met This Shift    Goal: Pain level will decrease  Pain level will decrease   Outcome: Met This Shift    Goal: Control of acute pain  Control of acute pain   Outcome: Met This Shift    Goal: Control of chronic pain  Control of chronic pain   Outcome: Met This Shift      Problem: Skin Integrity:  Goal: Skin integrity will stabilize  Skin integrity will stabilize   Outcome: Met This Shift

## 2017-11-16 NOTE — PROGRESS NOTES
Hospitalist Progress Note      PCP: Merrick Pimentel MD    Date of Admission: 11/10/2017    Chief Complaint:    Persistent cough    Subjective:  Patient is doing well; no complaints at this time other than some constipation; 10 point ROS negative otherwise    Medications:  Reviewed    Infusion Medications    HYDROmorphone      dextrose       Scheduled Medications    metoprolol succinate  25 mg Oral Daily    mirtazapine  15 mg Oral Nightly    traZODone  150 mg Oral Nightly    polyethylene glycol  17 g Oral Daily    sennosides-docusate sodium  2 tablet Oral Nightly    finasteride  5 mg Oral Daily    tamsulosin  0.4 mg Oral Daily    docusate sodium  100 mg Oral BID    levothyroxine  125 mcg Oral Daily    insulin lispro  0-6 Units Subcutaneous TID WC    insulin lispro  0-3 Units Subcutaneous Nightly    ipratropium-albuterol  1 ampule Inhalation Q4H WA     PRN Meds: LORazepam, LORazepam, ondansetron, naloxone, glucose, dextrose, glucagon (rDNA), dextrose, hydrALAZINE      Intake/Output Summary (Last 24 hours) at 11/16/17 1107  Last data filed at 11/16/17 0546   Gross per 24 hour   Intake          1851.55 ml   Output              935 ml   Net           916.55 ml     Exam:    /68   Pulse 92   Temp 97.9 °F (36.6 °C) (Oral)   Resp 16   Ht 6' (1.829 m)   Wt 238 lb (108 kg)   SpO2 96%   BMI 32.28 kg/m²     General appearance: No apparent distress, appears stated age and cooperative  HEENT: Pupils equal, round, and reactive to light. Conjunctivae/corneas clear. Neck: Supple, with full range of motion. No jugular venous distention. Trachea midline. Respiratory:  Normal respiratory effort. Clear to auscultation, bilaterally without Rales/Wheezes/Rhonchi; chest tube without erythema or exudate  Cardiovascular: Regular rate and rhythm with normal S1/S2 without murmurs, rubs or gallops. Abdomen: Soft, non-tender, non-distended with normal bowel sounds.   Musculoskeletal: No clubbing, cyanosis or edema bilaterally. Neuro: Non Focal. Intact and symmetric  Psychiatric: Alert and oriented, thought content appropriate, normal insight  Capillary Refill: Brisk,< 3 seconds   Peripheral Pulses: +2 palpable, equal bilaterally     Labs:   Recent Labs      11/14/17   0323  11/15/17   0600  11/16/17   0530   WBC  6.7  8.3  9.8   HGB  14.1  13.2*  13.5*   HCT  42.6  39.6*  40.7*   PLT  108*  113*  102*     Recent Labs      11/14/17   0323  11/15/17   0600  11/16/17   0530   NA  142  138  138   K  4.3  4.3  4.4   CL  104  102  101   CO2  25  24  25   BUN  21  22  18   CREATININE  1.06  0.94  0.85   CALCIUM  8.3*  7.5*  7.7*     No results for input(s): AST, ALT, BILIDIR, BILITOT, ALKPHOS in the last 72 hours. No results for input(s): INR in the last 72 hours. Recent Labs      11/15/17   0600   CKTOTAL  920*   TROPONINI  <0.010       Urinalysis:      Lab Results   Component Value Date    NITRU Negative 12/03/2015    WBCUA 6-10 12/03/2015    BACTERIA Few 12/03/2015    RBCUA None seen 12/03/2015    BLOODU trace-intact 09/29/2017    BLOODU Negative 12/03/2015    SPECGRAV 1.020 09/29/2017    SPECGRAV 1.019 12/03/2015    GLUCOSEU neg 09/29/2017    GLUCOSEU Negative 12/03/2015     Radiology:  [facility]   Final Result      XR Chest Portable   Final Result      XR CHEST STANDARD (2 VW)   Final Result      XR Chest Portable   Final Result   NO ACTIVE LUNG DISEASE. XR Chest Portable   Final Result   BRONCHOSCOPY RETRIEVAL PROCEDURE. PLEASE REFER TO OPERATIVE REPORT. CT Chest W Contrast   Final Result   ASPIRATED FILLING IN RIGHT LOWER LOBE POSTERIOR BASILAR SEGMENTAL BRONCHUS. MILD EMPHYSEMA AND LOWER LOBE CYLINDRICAL BRONCHIECTASIS. ADDITIONAL FINDINGS AS ABOVE.            Assessment/Plan:    78 y/o M who presented with a persistent cough; found to have a foreign object in his posterior basilar segment of the RLL         #Foreign object in posterior basilar segment of the RLL     - Pt aspirated a filling which was successfully removed by cardiothoracic surgery      - Post op care and Chest tube management by cardiothoracic surgery    #Anxiety     - Back on his home remeron and trazodone; states he feels better in regards to this    #COPD     - Management per pulmonary; will resume breathing treatments    #Hypothyroidism     - Continue synthroid     #DMII     SSI       Active Hospital Problems    Diagnosis Date Noted    Aspiration into respiratory tract [T17.908A] 11/10/2017     Additional work up or/and treatment plan may be added today or then after based on clinical progression. I am managing a portion of pt care. Some medical issues are handled by other specialists. Additional work up and treatment should be done in out pt setting by pt PCP and other out pt providers. In addition to examining and evaluating pt, I spent additional time explaining care, normal and abnormal findings, and treatment plan. All of pt questions were answered. Counseling, diet and education were  provided. Case will be discussed with nursing staff when appropriate. Family will be updated if and when appropriate.       Diet: DIET GENERAL;    Code Status: Full Code    PT/OT Eval; likely home once active issues have resolved    Electronically signed by Jose Luna MD on 11/16/2017 at 11:07 AM

## 2017-11-16 NOTE — PROGRESS NOTES
Patient seen on multidisciplinary rounds.   Issues discussed as RN is to f/u with primary MD     Patience Blandon  12:19 PM  11/16/17

## 2017-11-17 ENCOUNTER — APPOINTMENT (OUTPATIENT)
Dept: GENERAL RADIOLOGY | Age: 77
DRG: 163 | End: 2017-11-17
Attending: INTERNAL MEDICINE
Payer: MEDICARE

## 2017-11-17 LAB
ANION GAP SERPL CALCULATED.3IONS-SCNC: 12 MEQ/L (ref 7–13)
BASOPHILS ABSOLUTE: 0 K/UL (ref 0–0.2)
BASOPHILS RELATIVE PERCENT: 0.2 %
BUN BLDV-MCNC: 14 MG/DL (ref 8–23)
CALCIUM SERPL-MCNC: 7.8 MG/DL (ref 8.6–10.2)
CHLORIDE BLD-SCNC: 99 MEQ/L (ref 98–107)
CO2: 27 MEQ/L (ref 22–29)
CREAT SERPL-MCNC: 1.01 MG/DL (ref 0.7–1.2)
EOSINOPHILS ABSOLUTE: 0.3 K/UL (ref 0–0.7)
EOSINOPHILS RELATIVE PERCENT: 2.7 %
GFR AFRICAN AMERICAN: >60
GFR NON-AFRICAN AMERICAN: >60
GLUCOSE BLD-MCNC: 113 MG/DL (ref 74–109)
GLUCOSE BLD-MCNC: 121 MG/DL (ref 60–115)
GLUCOSE BLD-MCNC: 136 MG/DL (ref 60–115)
GLUCOSE BLD-MCNC: 141 MG/DL (ref 60–115)
HCT VFR BLD CALC: 38.1 % (ref 42–52)
HEMOGLOBIN: 12.9 G/DL (ref 14–18)
LYMPHOCYTES ABSOLUTE: 1.3 K/UL (ref 1–4.8)
LYMPHOCYTES RELATIVE PERCENT: 12.8 %
MCH RBC QN AUTO: 32.3 PG (ref 27–31.3)
MCHC RBC AUTO-ENTMCNC: 33.9 % (ref 33–37)
MCV RBC AUTO: 95.2 FL (ref 80–100)
MONOCYTES ABSOLUTE: 0.9 K/UL (ref 0.2–0.8)
MONOCYTES RELATIVE PERCENT: 8.9 %
NEUTROPHILS ABSOLUTE: 7.4 K/UL (ref 1.4–6.5)
NEUTROPHILS RELATIVE PERCENT: 75.4 %
PDW BLD-RTO: 14.5 % (ref 11.5–14.5)
PERFORMED ON: ABNORMAL
PLATELET # BLD: 102 K/UL (ref 130–400)
POTASSIUM SERPL-SCNC: 4.3 MEQ/L (ref 3.5–5.1)
RBC # BLD: 4 M/UL (ref 4.7–6.1)
SODIUM BLD-SCNC: 138 MEQ/L (ref 132–144)
WBC # BLD: 9.8 K/UL (ref 4.8–10.8)

## 2017-11-17 PROCEDURE — 80048 BASIC METABOLIC PNL TOTAL CA: CPT

## 2017-11-17 PROCEDURE — G8979 MOBILITY GOAL STATUS: HCPCS

## 2017-11-17 PROCEDURE — 94640 AIRWAY INHALATION TREATMENT: CPT

## 2017-11-17 PROCEDURE — 97162 PT EVAL MOD COMPLEX 30 MIN: CPT

## 2017-11-17 PROCEDURE — 6370000000 HC RX 637 (ALT 250 FOR IP): Performed by: INTERNAL MEDICINE

## 2017-11-17 PROCEDURE — 2700000000 HC OXYGEN THERAPY PER DAY

## 2017-11-17 PROCEDURE — G8987 SELF CARE CURRENT STATUS: HCPCS

## 2017-11-17 PROCEDURE — G8988 SELF CARE GOAL STATUS: HCPCS

## 2017-11-17 PROCEDURE — 99232 SBSQ HOSP IP/OBS MODERATE 35: CPT | Performed by: INTERNAL MEDICINE

## 2017-11-17 PROCEDURE — 36415 COLL VENOUS BLD VENIPUNCTURE: CPT

## 2017-11-17 PROCEDURE — 97167 OT EVAL HIGH COMPLEX 60 MIN: CPT

## 2017-11-17 PROCEDURE — 85025 COMPLETE CBC W/AUTO DIFF WBC: CPT

## 2017-11-17 PROCEDURE — 6370000000 HC RX 637 (ALT 250 FOR IP): Performed by: THORACIC SURGERY (CARDIOTHORACIC VASCULAR SURGERY)

## 2017-11-17 PROCEDURE — 2000000000 HC ICU R&B

## 2017-11-17 PROCEDURE — G8978 MOBILITY CURRENT STATUS: HCPCS

## 2017-11-17 PROCEDURE — 71020 XR CHEST STANDARD TWO VW: CPT

## 2017-11-17 RX ADMIN — OXYCODONE AND ACETAMINOPHEN 1 TABLET: 10; 325 TABLET ORAL at 17:46

## 2017-11-17 RX ADMIN — SENNOSIDES AND DOCUSATE SODIUM 2 TABLET: 8.6; 5 TABLET ORAL at 21:15

## 2017-11-17 RX ADMIN — IPRATROPIUM BROMIDE AND ALBUTEROL SULFATE 1 AMPULE: 2.5; .5 SOLUTION RESPIRATORY (INHALATION) at 20:42

## 2017-11-17 RX ADMIN — DOCUSATE SODIUM 100 MG: 100 CAPSULE, LIQUID FILLED ORAL at 11:54

## 2017-11-17 RX ADMIN — IPRATROPIUM BROMIDE AND ALBUTEROL SULFATE 1 AMPULE: 2.5; .5 SOLUTION RESPIRATORY (INHALATION) at 04:46

## 2017-11-17 RX ADMIN — TAMSULOSIN HYDROCHLORIDE 0.4 MG: 0.4 CAPSULE ORAL at 11:56

## 2017-11-17 RX ADMIN — IPRATROPIUM BROMIDE AND ALBUTEROL SULFATE 1 AMPULE: 2.5; .5 SOLUTION RESPIRATORY (INHALATION) at 15:57

## 2017-11-17 RX ADMIN — INSULIN LISPRO 1 UNITS: 100 INJECTION, SOLUTION INTRAVENOUS; SUBCUTANEOUS at 12:08

## 2017-11-17 RX ADMIN — LEVOTHYROXINE SODIUM 125 MCG: 125 TABLET ORAL at 04:58

## 2017-11-17 RX ADMIN — TRAZODONE HYDROCHLORIDE 150 MG: 100 TABLET ORAL at 21:15

## 2017-11-17 RX ADMIN — DOCUSATE SODIUM 100 MG: 100 CAPSULE, LIQUID FILLED ORAL at 21:15

## 2017-11-17 RX ADMIN — METOPROLOL SUCCINATE 25 MG: 25 TABLET, FILM COATED, EXTENDED RELEASE ORAL at 12:07

## 2017-11-17 RX ADMIN — POLYETHYLENE GLYCOL 3350 17 G: 17 POWDER, FOR SOLUTION ORAL at 12:07

## 2017-11-17 RX ADMIN — OXYCODONE AND ACETAMINOPHEN 1 TABLET: 10; 325 TABLET ORAL at 11:55

## 2017-11-17 RX ADMIN — OXYCODONE AND ACETAMINOPHEN 1 TABLET: 10; 325 TABLET ORAL at 22:49

## 2017-11-17 RX ADMIN — MIRTAZAPINE 15 MG: 15 TABLET, FILM COATED ORAL at 21:15

## 2017-11-17 RX ADMIN — FINASTERIDE 5 MG: 5 TABLET, FILM COATED ORAL at 11:56

## 2017-11-17 RX ADMIN — IPRATROPIUM BROMIDE AND ALBUTEROL SULFATE 1 AMPULE: 2.5; .5 SOLUTION RESPIRATORY (INHALATION) at 08:49

## 2017-11-17 RX ADMIN — IPRATROPIUM BROMIDE AND ALBUTEROL SULFATE 1 AMPULE: 2.5; .5 SOLUTION RESPIRATORY (INHALATION) at 11:27

## 2017-11-17 ASSESSMENT — PAIN SCALES - GENERAL
PAINLEVEL_OUTOF10: 0
PAINLEVEL_OUTOF10: 7
PAINLEVEL_OUTOF10: 0
PAINLEVEL_OUTOF10: 5
PAINLEVEL_OUTOF10: 0
PAINLEVEL_OUTOF10: 0
PAINLEVEL_OUTOF10: 5
PAINLEVEL_OUTOF10: 0
PAINLEVEL_OUTOF10: 0

## 2017-11-17 ASSESSMENT — PAIN DESCRIPTION - PAIN TYPE: TYPE: SURGICAL PAIN

## 2017-11-17 ASSESSMENT — PAIN DESCRIPTION - ORIENTATION: ORIENTATION: RIGHT

## 2017-11-17 ASSESSMENT — PAIN DESCRIPTION - LOCATION: LOCATION: CHEST

## 2017-11-17 NOTE — PROGRESS NOTES
Physical Therapy Med Surg Initial Assessment  Facility/Department: [facility] ICU  Room: [place] [address]       NAME: Josué Melendez  :  (69 y.o.)  MRN: [id number]  CODE STATUS: Prior    Date of Service: 2017    Patient Diagnosis(es): Aspiration pneumonia of right lower lobe, unspecified aspiration pneumonia type ([facility] [J69.0]   No chief complaint on file.     Patient Active Problem List    Diagnosis Date Noted    Aspiration into respiratory tract 11/10/2017    Skin cyst 2017    Local infection of skin and subcutaneous tissue 2017    Osteoarthritis of spine with radiculopathy, lumbar region 2016    Foraminal stenosis of lumbosacral region 2016    Elevated PSA 2016    Papillary thyroid carcinoma ([address] 2015    Acute sinusitis     Anxiety     Insomnia     Hypothyroidism     Type 2 diabetes mellitus (HCC)     BPH (benign prostatic hyperplasia)     GERD (gastroesophageal reflux disease) 2014    HTN (hypertension) 2014    Hyperlipidemia 2014    COPD (chronic obstructive pulmonary disease) ([address] 2013        Past Medical History:   Diagnosis Date    Acute sinusitis     Anxiety     BPH (benign prostatic hyperplasia)     COPD (chronic obstructive pulmonary disease) ([address] 2013    Elevated CK 2013    GERD (gastroesophageal reflux disease) 2014    History of asthma 2014    HTN (hypertension) 2014    Hyperlipidemia     Hypothyroidism     Insomnia     Lower extremity weakness 2014    On home oxygen therapy     2L at night    Positive D dimer 2013    Sleep apnea 2014    Type 2 diabetes mellitus ([facility]     Vitamin D deficiency      Past Surgical History:   Procedure Laterality Date    BRONCHOSCOPY N/A 2017    BRONCHOSCOPY FIBEROPTIC performed by Slim Muñoz MD at [address] [facility] Right 2017    RIGHT THORACOTOMY WEDGE RESECTION FOR FOREIGN BODY AND FOB DOUBLE LUMEN (1ST CASE) performed by Kiran Perdomo MD at [address] [place] [address]  2006       Chart Reviewed: Yes  Patient assessed for rehabilitation services?: Yes  General Comment  Comments: Pt agreeable to PT evaluation. Restrictions:  Restrictions/Precautions: Fall Risk  Body mass index is 32.28 kg/m². SUBJECTIVE: Subjective: \"I'm ok. \"     Post Treatment Pain Screening:   Pain Screening  Patient Currently in Pain: No  Pain Assessment  Pain Assessment: 0-10  Pain Level: 0    Prior Level of Function:  Social/Functional History  Lives With: Alone  Type of Home: House  Home Layout: One level  Home Access: Stairs to enter without rails  Entrance Stairs - Number of Steps: 1  Home Equipment: Rolling walker, 4 wheeled walker  ADL Assistance: Independent  Ambulation Assistance: Independent  Transfer Assistance: Independent    OBJECTIVE:   Vision/Hearing:  Vision: Within Functional Limits  Hearing: Within functional limits    Cognition:  Overall Orientation Status: Within Normal Limits  Follows Commands: Within Functional Limits    Observation/Palpation  Posture: Fair (rounded shoulders)  Observation: 2L of O2 via NC, chest tube    ROM:  RLE PROM: WFL  LLE PROM: WFL  RUE PROM: WFL  LUE PROM: WFL    Strength:  Strength Other  Other: Decreased bilateral LE and core strength based off functional mobility.     Neuro:  Balance  Sitting - Static: Good  Sitting - Dynamic: Fair  Standing - Static: Fair  Standing - Dynamic: Fair        Sensation  Overall Sensation Status: WNL    Bed mobility  Supine to Sit:  (NT due to patient sitting up in bedside chair)    Transfers  Sit to Stand: Contact guard assistance  Stand to sit: Contact guard assistance    Ambulation  Ambulation?: Yes  Ambulation 1  Surface: level tile  Device: Rollator  Other Apparatus: O2  Assistance: Contact guard assistance  Quality of Gait: decreased bilateral step length, unsteady, SOB  Distance: 50 ft     Activity Tolerance  Activity Tolerance: Patient limited by fatigue;Patient limited by endurance      ASSESSMENT:   Body structures, Functions, Activity limitations: Decreased functional mobility ; Decreased endurance;Decreased coordination;Decreased balance;Decreased strength;Decreased safe awareness  Decision Making: Medium Complexity  History: medium  Exam: medium  Clinical Presentation: evolving    Prognosis: Good  Patient Education: goals of PT    DISCHARGE RECOMMENDATIONS:  Discharge Recommendations: Continue to assess pending progress  No Skilled PT: Safe to return home    Assessment: Patient demonstrates decline in functional mobility since hospitalization. Further inpatient PT recommended to improve mobility, strength, balance and endurance prior to discharge home. REQUIRES PT FOLLOW UP: Yes      PLAN OF CARE:  Plan  Times per week: 3-6  Current Treatment Recommendations: Strengthening, Balance Training, Functional Mobility Training, Transfer Training, Endurance Training, Gait Training, Neuromuscular Re-education, Home Exercise Program, Safety Education & Training, Patient/Caregiver Education & Training, Equipment Evaluation, Education, & procurement, Pain Management  Safety Devices  Type of devices: All fall risk precautions in place    G-Code:  PT G-Codes  Functional Assessment Tool Used: clinical judgement  Functional Limitation: Mobility: Walking and moving around  Mobility: Walking and Moving Around Current Status (): At least 40 percent but less than 60 percent impaired, limited or restricted  Mobility: Walking and Moving Around Goal Status (): At least 1 percent but less than 20 percent impaired, limited or restricted    Goals:  Short term goals  Short term goal 1: Patient will be independent with bed mobility. Short term goal 2: Patient will be independent with transfers. Short term goal 3: Patient will be independent with 150ft of gait using LRD. Short term goal 4: Patient will demonstrate good balance using LRD.     Therapy

## 2017-11-17 NOTE — CARE COORDINATION
Chest tube still present. LSW discussed potential needs with patient yesterday. PT/OT order noted from 11/15 - patient not seen as of yet. Paged this morning for eval needs. C3 will discuss any needs with patient pending PT/OT evals.

## 2017-11-17 NOTE — PROGRESS NOTES
[facility] OCCUPATIONAL THERAPY EVALUATION - ACUTE   Room: IC07/IC07-01  Date: 2017  Patient Name: Ania Pedro        MRN: [id number]  Account: [de-identified]   : 1940  (69 y.o.)    Chart Review:  Diagnosis:  pneumothhorax with chest tube placement  Past Medical History:   Diagnosis Date    Acute sinusitis     Anxiety     BPH (benign prostatic hyperplasia)     COPD (chronic obstructive pulmonary disease) ([facility] 2013    Elevated CK 2013    GERD (gastroesophageal reflux disease) 2014    History of asthma 2014    HTN (hypertension) 2014    Hyperlipidemia     Hypothyroidism     Insomnia     Lower extremity weakness 2014    On home oxygen therapy     2L at night    Positive D dimer 2013    Sleep apnea 2014    Type 2 diabetes mellitus ([facility]     Vitamin D deficiency      Past Surgical History:   Procedure Laterality Date    BRONCHOSCOPY N/A 2017    BRONCHOSCOPY FIBEROPTIC performed by Reyes Arista, MD at [address] [facility] Right 2017    RIGHT THORACOTOMY WEDGE RESECTION FOR FOREIGN BODY AND FOB DOUBLE LUMEN (1ST CASE) performed by Jessica Santiago MD at [address] [place] [address]         Restrictions/Precautions: Fall Risk Chest tube drain, O2 per NC, cardiac monitor   Evaluation and Pt. rights have been reviewed: [x]Yes   [] No   If no why not:   Falls safety interventions in place  [x]Yes   [] No    Comments:     Subjective: \"My wife  after being in step down here I leary about it\"    Prior living arrangement:    Support contact: Dtr  Pt lives: [x] Alone   [] With spouse   [] Other   Comment:    Home: [x] Single level   []  Two level   []  Split level     []  Apartment:     Entrance:  Stairs: 1 Hand rails 0,    Inside: Stairs: 0 Hand rails: 0  Bathroom: [] Bath tub   [x] Tub/Shower combo   []  Shower stall    Location: main level    DME: [] W/W   [] Cane   [] Rollator   []  W/C   [] [facility] Discharge Status ():  At least 1 percent but less than 20 percent impaired, limited or restricted    Time in:  10:30  Time out:  11:00  Total minutes:  30  Timed treatment minutes:  30      Electronically signed by:    CASSIE Rodas  11/17/2017, 12:13 PM

## 2017-11-17 NOTE — PROGRESS NOTES
INPATIENT PROGRESS NOTES    PATIENT NAME: Best Medrano  MRN: [id number]  SERVICE DATE:  November 17, 2017   SERVICE TIME:  2:19 PM      PRIMARY SERVICE: Pulmonary Disease    INTERVAL HPI: Patient seen and examined at bedside, Interval Notes, orders reviewed. Nursing notes noted  Patient is feeling weak. ,Comfortable in bed. No chest pain or pleuritic pain. Chest tube in place and in place on right side. no fever or chills. No complaint of shortness of breath. No nausea vomiting diarrhea or abdominal pain. OBJECTIVE    Body mass index is 32.28 kg/m². PHYSICAL EXAM:  Vitals:  BP (!) 95/56   Pulse 84   Temp 98.7 °F (37.1 °C) (Oral)   Resp 14   Ht 6' (1.829 m)   Wt 238 lb (108 kg)   SpO2 93%   BMI 32.28 kg/m²   General:  Alert, awake . comfortable in bed, No distress. Head: Atraumatic , Normocephalic   Eyes: PERRL. No sclera icterus. No conjunctival injection. No discharge   ENT: No nasal  discharge. Pharynx clear. Neck:  Trachea midline. No thyromegaly, no JVD, No cervical adenopathy. Chest : Bilaterally symmetrical ,Normal effort,  No accessory muscle use, chest tube on right side  Lung : . Fair BS bilateral, decreased BS at bases. No Rales. No wheezing. No rhonchi. No dullness on percussion. Heart[de-identified] Normal  rate. Regular rhythm. No mumur ,  Rub or gallop  ABD: Non-tender. Non-distended. No masses. No organmegaly. Normal bowel sounds. No hernia.   EXT: No Pitting both leg , No Cyanosis No clubbing  Neuro: no focal weakness        DATA:   Recent Labs      11/16/17 0530 11/17/17 0516   WBC  9.8  9.8   HGB  13.5*  12.9*   HCT  40.7*  38.1*   MCV  96.1  95.2   PLT  102*  102*     Recent Labs      11/16/17 0530 11/17/17 0516   NA  138  138   K  4.4  4.3   CL  101  99   CO2  25  27   BUN  18  14   CREATININE  0.85  1.01   GLUCOSE  106  113*   CALCIUM  7.7*  7.8*   LABGLOM  >60.0  >60.0   GFRAA  >60.0  >60.0       Recent Labs      11/14/17   1904   PHART  7.372   [id number]  41   PO2ART  99* examination of 12/2/2013. There is no thoracic adenopathy. The esophagus is unremarkable. There is spondylosis. There are no acute or suspicious bone lesions. Scans of the subdiaphragmatic regions demonstrate a 6 mm oval metallic density in the gastric fundus, presumably representing a swallowed filling. There are stable small cysts in the liver. There are partially visualized renal cysts. ASPIRATED FILLING IN RIGHT LOWER LOBE POSTERIOR BASILAR SEGMENTAL BRONCHUS. MILD EMPHYSEMA AND LOWER LOBE CYLINDRICAL BRONCHIECTASIS. ADDITIONAL FINDINGS AS ABOVE. Xr Chest Portable    Result Date: 11/14/2017  EXAMINATION: XR CHEST PORTABLE CLINICAL HISTORY:  S/P RLL segment resection COMPARISONS: November 13, 2017 FINDINGS: Single AP portable view the chest obtained on November 14, 2017 at 1357 hours. The patient has undergone surgery since yesterday, with right lower lobe segmentectomy. The previously noted metallic foreign body has been removed. There are surgical staples at the operative site. There is no unremarkable chest tube. There is no pneumothorax. There is no large effusion. There is bibasilar atelectasis. The mediastinal silhouette is unremarkable. There is an endotracheal tube in place, with its tip near the orifice of the right mainstem bronchus. It should be pulled back 3 cm. The chest wall is unremarkable. CONCLUSION: NO PNEUMOTHORAX FOLLOWING LUNG SURGERY. THERE IS NO UNREMARKABLE CHEST TUBE. ENDOTRACHEAL TUBE IS LOW-LYING. Xr Chest Portable    Result Date: 11/12/2017  EXAMINATION: XR CHEST PORTABLE, 1 VIEW: DATE AND TIME: 11/11/2017 at 11:30 AM . CLINICAL HISTORY: SHORTNESS OF BREATH. POST BRONCHOSCOPY. COMPARISONS: 11/11/2017 FINDINGS: The heart, mediastinum and pulmonary vasculature are within normal limits. Lungs show scattered areas of increased reticular markings consistent with areas of parenchymal scarring. Bones unremarkable. NO ACTIVE LUNG DISEASE.      Xr Chest Portable    Result

## 2017-11-17 NOTE — PROGRESS NOTES
Hospitalist Progress Note      PCP: Raymundo Olvera MD    Date of Admission: 11/10/2017    Chief Complaint:    Persistent cough    Subjective:  Patient is doing well; 10 point ROS negative     Medications:  Reviewed    Infusion Medications    dextrose       Scheduled Medications    metoprolol succinate  25 mg Oral Daily    mirtazapine  15 mg Oral Nightly    traZODone  150 mg Oral Nightly    polyethylene glycol  17 g Oral Daily    sennosides-docusate sodium  2 tablet Oral Nightly    finasteride  5 mg Oral Daily    tamsulosin  0.4 mg Oral Daily    docusate sodium  100 mg Oral BID    levothyroxine  125 mcg Oral Daily    insulin lispro  0-6 Units Subcutaneous TID WC    insulin lispro  0-3 Units Subcutaneous Nightly    ipratropium-albuterol  1 ampule Inhalation Q4H WA     PRN Meds: oxyCODONE-acetaminophen, LORazepam, LORazepam, ondansetron, glucose, dextrose, glucagon (rDNA), dextrose, hydrALAZINE    Intake/Output Summary (Last 24 hours) at 11/17/17 1504  Last data filed at 11/17/17 0921   Gross per 24 hour   Intake             1380 ml   Output             1495 ml   Net             -115 ml     Exam:    BP (!) 95/56   Pulse 84   Temp 98.7 °F (37.1 °C) (Oral)   Resp 14   Ht 6' (1.829 m)   Wt 238 lb (108 kg)   SpO2 93%   BMI 32.28 kg/m²     General appearance: No apparent distress, appears stated age and cooperative  HEENT: Pupils equal, round, and reactive to light. Conjunctivae/corneas clear. Neck: Supple, with full range of motion. No jugular venous distention. Trachea midline. Respiratory:  Normal respiratory effort. Clear to auscultation, bilaterally without Rales/Wheezes/Rhonchi; chest tube without erythema or exudate  Cardiovascular: Regular rate and rhythm with normal S1/S2 without murmurs, rubs or gallops. Abdomen: Soft, non-tender, non-distended with normal bowel sounds. Musculoskeletal: No clubbing, cyanosis or edema bilaterally.     Neuro: Non Focal. Intact and symmetric  Psychiatric:

## 2017-11-17 NOTE — PLAN OF CARE
Problem: IP DRESSINGS LOWER EXTREMITIES  Goal: LTG - Patient will safely utilize adaptive techniques/equipment to dress lower body  Outcome: Ongoing  Max A

## 2017-11-17 NOTE — PROGRESS NOTES
Progress Note    POD # 3    Patient is Columba Vizcarra     The chest x-ray: I/E CXRs show no Px. Chest tubes: Removed. Pain management: Analgesics p.o.     Plan: Transfer either to MED /Surg or Rehab    Electronically signed by Jhon Dowling MD on 11/17/2017 at 3:37 PM

## 2017-11-18 ENCOUNTER — APPOINTMENT (OUTPATIENT)
Dept: ULTRASOUND IMAGING | Age: 77
DRG: 163 | End: 2017-11-18
Attending: INTERNAL MEDICINE
Payer: MEDICARE

## 2017-11-18 ENCOUNTER — APPOINTMENT (OUTPATIENT)
Dept: GENERAL RADIOLOGY | Age: 77
DRG: 163 | End: 2017-11-18
Attending: INTERNAL MEDICINE
Payer: MEDICARE

## 2017-11-18 LAB
BACTERIA: ABNORMAL /HPF
BASOPHILS ABSOLUTE: 0 K/UL (ref 0–0.2)
BASOPHILS RELATIVE PERCENT: 0.2 %
BILIRUBIN URINE: ABNORMAL
BLOOD, URINE: ABNORMAL
CLARITY: CLEAR
COLOR: ABNORMAL
EOSINOPHILS ABSOLUTE: 0.3 K/UL (ref 0–0.7)
EOSINOPHILS RELATIVE PERCENT: 2.9 %
GLUCOSE BLD-MCNC: 115 MG/DL (ref 60–115)
GLUCOSE BLD-MCNC: 120 MG/DL (ref 60–115)
GLUCOSE BLD-MCNC: 121 MG/DL (ref 60–115)
GLUCOSE BLD-MCNC: 132 MG/DL (ref 60–115)
GLUCOSE URINE: NEGATIVE MG/DL
HCT VFR BLD CALC: 41 % (ref 42–52)
HEMOGLOBIN: 13.7 G/DL (ref 14–18)
KETONES, URINE: NEGATIVE MG/DL
LEUKOCYTE ESTERASE, URINE: ABNORMAL
LYMPHOCYTES ABSOLUTE: 0.8 K/UL (ref 1–4.8)
LYMPHOCYTES RELATIVE PERCENT: 8.1 %
MCH RBC QN AUTO: 31.7 PG (ref 27–31.3)
MCHC RBC AUTO-ENTMCNC: 33.4 % (ref 33–37)
MCV RBC AUTO: 94.9 FL (ref 80–100)
MONOCYTES ABSOLUTE: 1 K/UL (ref 0.2–0.8)
MONOCYTES RELATIVE PERCENT: 9.2 %
NEUTROPHILS ABSOLUTE: 8.2 K/UL (ref 1.4–6.5)
NEUTROPHILS RELATIVE PERCENT: 79.6 %
NITRITE, URINE: NEGATIVE
PDW BLD-RTO: 14.5 % (ref 11.5–14.5)
PERFORMED ON: ABNORMAL
PERFORMED ON: NORMAL
PH UA: 6 (ref 5–9)
PLATELET # BLD: 121 K/UL (ref 130–400)
PROTEIN UA: >=300 MG/DL
RBC # BLD: 4.32 M/UL (ref 4.7–6.1)
RBC UA: ABNORMAL /HPF (ref 0–2)
SPECIFIC GRAVITY UA: 1.03 (ref 1–1.03)
URINE REFLEX TO CULTURE: YES
UROBILINOGEN, URINE: 1 E.U./DL
WBC # BLD: 10.3 K/UL (ref 4.8–10.8)
WBC UA: ABNORMAL /HPF (ref 0–5)

## 2017-11-18 PROCEDURE — 99232 SBSQ HOSP IP/OBS MODERATE 35: CPT | Performed by: INTERNAL MEDICINE

## 2017-11-18 PROCEDURE — 71020 XR CHEST STANDARD TWO VW: CPT

## 2017-11-18 PROCEDURE — 6370000000 HC RX 637 (ALT 250 FOR IP): Performed by: INTERNAL MEDICINE

## 2017-11-18 PROCEDURE — 6370000000 HC RX 637 (ALT 250 FOR IP): Performed by: THORACIC SURGERY (CARDIOTHORACIC VASCULAR SURGERY)

## 2017-11-18 PROCEDURE — 81001 URINALYSIS AUTO W/SCOPE: CPT

## 2017-11-18 PROCEDURE — 36415 COLL VENOUS BLD VENIPUNCTURE: CPT

## 2017-11-18 PROCEDURE — 1210000000 HC MED SURG R&B

## 2017-11-18 PROCEDURE — 85025 COMPLETE CBC W/AUTO DIFF WBC: CPT

## 2017-11-18 PROCEDURE — 2700000000 HC OXYGEN THERAPY PER DAY

## 2017-11-18 PROCEDURE — 76705 ECHO EXAM OF ABDOMEN: CPT

## 2017-11-18 PROCEDURE — 87205 SMEAR GRAM STAIN: CPT

## 2017-11-18 PROCEDURE — 87086 URINE CULTURE/COLONY COUNT: CPT

## 2017-11-18 PROCEDURE — 87070 CULTURE OTHR SPECIMN AEROBIC: CPT

## 2017-11-18 PROCEDURE — 94640 AIRWAY INHALATION TREATMENT: CPT

## 2017-11-18 RX ORDER — ALBUTEROL SULFATE 2.5 MG/3ML
2.5 SOLUTION RESPIRATORY (INHALATION) EVERY 6 HOURS PRN
Status: DISCONTINUED | OUTPATIENT
Start: 2017-11-18 | End: 2017-11-18

## 2017-11-18 RX ORDER — LACTULOSE 10 G/15ML
30 SOLUTION ORAL ONCE
Status: COMPLETED | OUTPATIENT
Start: 2017-11-18 | End: 2017-11-18

## 2017-11-18 RX ORDER — BISACODYL 10 MG
10 SUPPOSITORY, RECTAL RECTAL DAILY PRN
Status: DISCONTINUED | OUTPATIENT
Start: 2017-11-18 | End: 2017-11-21 | Stop reason: HOSPADM

## 2017-11-18 RX ORDER — IPRATROPIUM BROMIDE AND ALBUTEROL SULFATE 2.5; .5 MG/3ML; MG/3ML
1 SOLUTION RESPIRATORY (INHALATION) 3 TIMES DAILY
Status: DISCONTINUED | OUTPATIENT
Start: 2017-11-18 | End: 2017-11-21 | Stop reason: HOSPADM

## 2017-11-18 RX ORDER — ALBUTEROL SULFATE 2.5 MG/3ML
2.5 SOLUTION RESPIRATORY (INHALATION)
Status: DISCONTINUED | OUTPATIENT
Start: 2017-11-18 | End: 2017-11-21 | Stop reason: HOSPADM

## 2017-11-18 RX ADMIN — IPRATROPIUM BROMIDE AND ALBUTEROL SULFATE 1 AMPULE: .5; 3 SOLUTION RESPIRATORY (INHALATION) at 19:11

## 2017-11-18 RX ADMIN — LORAZEPAM 0.5 MG: 0.5 TABLET ORAL at 02:49

## 2017-11-18 RX ADMIN — FINASTERIDE 5 MG: 5 TABLET, FILM COATED ORAL at 08:11

## 2017-11-18 RX ADMIN — MIRTAZAPINE 15 MG: 15 TABLET, FILM COATED ORAL at 21:28

## 2017-11-18 RX ADMIN — LACTULOSE 30 G: 20 SOLUTION ORAL at 15:08

## 2017-11-18 RX ADMIN — MAGNESIUM HYDROXIDE 30 ML: 400 SUSPENSION ORAL at 10:50

## 2017-11-18 RX ADMIN — DOCUSATE SODIUM 100 MG: 100 CAPSULE, LIQUID FILLED ORAL at 08:11

## 2017-11-18 RX ADMIN — OXYCODONE AND ACETAMINOPHEN 1 TABLET: 10; 325 TABLET ORAL at 21:28

## 2017-11-18 RX ADMIN — SENNOSIDES AND DOCUSATE SODIUM 2 TABLET: 8.6; 5 TABLET ORAL at 21:27

## 2017-11-18 RX ADMIN — METOPROLOL SUCCINATE 25 MG: 25 TABLET, FILM COATED, EXTENDED RELEASE ORAL at 08:11

## 2017-11-18 RX ADMIN — IPRATROPIUM BROMIDE AND ALBUTEROL SULFATE 1 AMPULE: .5; 3 SOLUTION RESPIRATORY (INHALATION) at 07:51

## 2017-11-18 RX ADMIN — OXYCODONE AND ACETAMINOPHEN 1 TABLET: 10; 325 TABLET ORAL at 17:34

## 2017-11-18 RX ADMIN — IPRATROPIUM BROMIDE AND ALBUTEROL SULFATE 1 AMPULE: 2.5; .5 SOLUTION RESPIRATORY (INHALATION) at 02:36

## 2017-11-18 RX ADMIN — TRAZODONE HYDROCHLORIDE 150 MG: 100 TABLET ORAL at 21:27

## 2017-11-18 RX ADMIN — POLYETHYLENE GLYCOL 3350 17 G: 17 POWDER, FOR SOLUTION ORAL at 08:12

## 2017-11-18 RX ADMIN — DOCUSATE SODIUM 100 MG: 100 CAPSULE, LIQUID FILLED ORAL at 21:27

## 2017-11-18 RX ADMIN — LEVOTHYROXINE SODIUM 125 MCG: 125 TABLET ORAL at 06:19

## 2017-11-18 RX ADMIN — TAMSULOSIN HYDROCHLORIDE 0.4 MG: 0.4 CAPSULE ORAL at 08:11

## 2017-11-18 ASSESSMENT — PAIN SCALES - GENERAL
PAINLEVEL_OUTOF10: 4
PAINLEVEL_OUTOF10: 2
PAINLEVEL_OUTOF10: 8

## 2017-11-18 ASSESSMENT — PAIN DESCRIPTION - LOCATION: LOCATION: CHEST

## 2017-11-18 ASSESSMENT — PAIN DESCRIPTION - ORIENTATION: ORIENTATION: RIGHT

## 2017-11-18 ASSESSMENT — PAIN DESCRIPTION - PAIN TYPE: TYPE: ACUTE PAIN;SURGICAL PAIN

## 2017-11-18 NOTE — PROGRESS NOTES
GENERAL;     MEDICATIONS during current hospitalization:    Continuous Infusions:   dextrose         Scheduled Meds:   ipratropium-albuterol  1 ampule Inhalation TID    lactulose  30 g Oral Once    metoprolol succinate  25 mg Oral Daily    mirtazapine  15 mg Oral Nightly    traZODone  150 mg Oral Nightly    polyethylene glycol  17 g Oral Daily    sennosides-docusate sodium  2 tablet Oral Nightly    finasteride  5 mg Oral Daily    tamsulosin  0.4 mg Oral Daily    docusate sodium  100 mg Oral BID    levothyroxine  125 mcg Oral Daily    insulin lispro  0-6 Units Subcutaneous TID WC    insulin lispro  0-3 Units Subcutaneous Nightly       PRN Meds:bisacodyl, albuterol, magnesium hydroxide, oxyCODONE-acetaminophen, LORazepam, LORazepam, ondansetron, glucose, dextrose, glucagon (rDNA), dextrose, hydrALAZINE    Radiology  Xr Chest Standard (2 Vw)    Result Date: 11/17/2017  EXAMINATION: XR CHEST STANDARD TWO VW CLINICAL HISTORY:  Possible CT removal . Postop observation. Right lung surgery. COMPARISONS: November 14, 2017 FINDINGS: AP portable inspiration and expiration views the chest were obtained on November 17, 2017 at 0525 hours. The right-sided chest drainage tube remains unremarkable. No pneumothorax has developed. There is a small amount bibasilar atelectasis. Again noted are surgical staples at the site of right lower lobe segmental lung resection. The foreign body is no longer present. The mediastinal silhouette is normal. The chest wall is unremarkable. CONCLUSION: UNREMARKABLE POSTOPERATIVE FINDINGS. NO PNEUMOTHORAX. NO LARGE EFFUSION. SMALL ATELECTASIS. Xr Chest Standard (2 Vw)    Result Date: 11/10/2017  CLINICAL HISTORY: Pneumonitis COMPARISON: December 3, 2015 CHEST, TWO VIEWS FINDINGS: Cardiac and mediastinal silhouettes are normal in size and contour. Projecting over the right hilar region is a 1 cm radiopaque density. The right costophrenic angle is slightly blunted.  No focal areas of consolidations are noted. No pneumothorax is seen. The osseous structures are grossly unremarkable. IMPRESSION A radiopaque density is now seen in the region of the right lower lobe bronchus. Ct Chest W Contrast    Result Date: 11/11/2017  EXAMINATION:  CT CHEST W CONTRAST CLINICAL HISTORY:  ACUTE RESP ILLNESS, >37YEARS OLD  status post bronchoscopy to retrieve aspirated dental amalgam, unsuccessful. COMPARISONS:  12/2/2013 TECHNIQUE:  Spiral scans with 75 mL Isovue-370 IV. Multiplanar 2-D reconstructions. Axial 3-D 10 x 3 mm MIP reconstructions were performed. All CT scans at this facility use dose modulation, iterative reconstruction, and/or weight based dosing when appropriate to reduce radiation dose to as low as reasonably achievable. FINDINGS:  The known aspirated filling is identified within the distal segmental bronchus of the right lower lobe posterior basilar segment. Filling measures approximately 9 mm. There are mild emphysematous changes. There is mild scattered peripheral reticularity without definite honeycombing. There are no consolidations or effusions. There is right upper lung perifissural nodularity consistent with small intrapulmonary lymph nodes. There is a left upper lobe 4 mm nodule (series 2 image 21), unchanged from prior examination of 12/2/2013, and therefore benign. There are no suspicious lung nodules. There is mild bilateral lower lobe cylindrical bronchiectasis. Cardiac size and pulmonary vascularity are normal. There is mild mediastinal lipomatosis. There is mild thickening or trace fluid in the anterior pericardium, decreased compared to prior examination 12/2/2013. There are coronary artery calcifications. There are aortic calcifications. There is no evidence of aneurysm or dissection. There are borderline in size lymph nodes in the mediastinum anterior to the left mainstem bronchus and in the right hilum, unchanged from prior examination of 12/2/2013.  There is no thoracic

## 2017-11-18 NOTE — PROGRESS NOTES
Pt's temp back down to 99.0. Milk of mag given to help him have a BM, he is up in the chair now. He is concerned with new onset hematuria, will let Dr be aware of this. Chair alarm on, call light in reach.

## 2017-11-18 NOTE — PROGRESS NOTES
Hospitalist Progress Note      PCP: Chris Palma MD    Date of Admission: 11/10/2017    Chief Complaint:    Persistent cough    Subjective:  Patient is doing well when I saw him this morning; now reporting new onset gross hematuria. Did have a low grade fever by denied any symptoms at that time. 10 point ROS negative     Medications:  Reviewed    Infusion Medications    dextrose       Scheduled Medications    ipratropium-albuterol  1 ampule Inhalation TID    metoprolol succinate  25 mg Oral Daily    mirtazapine  15 mg Oral Nightly    traZODone  150 mg Oral Nightly    polyethylene glycol  17 g Oral Daily    sennosides-docusate sodium  2 tablet Oral Nightly    finasteride  5 mg Oral Daily    tamsulosin  0.4 mg Oral Daily    docusate sodium  100 mg Oral BID    levothyroxine  125 mcg Oral Daily    insulin lispro  0-6 Units Subcutaneous TID WC    insulin lispro  0-3 Units Subcutaneous Nightly     PRN Meds: bisacodyl, albuterol, magnesium hydroxide, oxyCODONE-acetaminophen, LORazepam, LORazepam, ondansetron, glucose, dextrose, glucagon (rDNA), dextrose, hydrALAZINE    Intake/Output Summary (Last 24 hours) at 11/18/17 1140  Last data filed at 11/18/17 0601   Gross per 24 hour   Intake              590 ml   Output              675 ml   Net              -85 ml     Exam:    /78   Pulse 102   Temp 100.9 °F (38.3 °C)   Resp 16   Ht 6' (1.829 m)   Wt 238 lb (108 kg)   SpO2 97%   BMI 32.28 kg/m²     General appearance: No apparent distress, appears stated age and cooperative  HEENT: Pupils equal, round, and reactive to light. Conjunctivae/corneas clear. Neck: Supple, with full range of motion. No jugular venous distention. Trachea midline. Respiratory:  Normal respiratory effort.  Clear to auscultation, bilaterally without Rales/Wheezes/Rhonchi; chest tube site now with stitches, wound site and chest tube site both look healthy   Cardiovascular: Regular rate and rhythm with normal S1/S2 without foreign object in his posterior basilar segment of the RLL         #Gross hematuria     - Will check a UA with reflex culture and consult urology; pt was on Zosyn for his second issue per pulmonary which would make a UTI unlikely    #Foreign object in posterior basilar segment of the RLL     - Pt aspirated a filling which was successfully removed by cardiothoracic surgery      - Chest tube is now out; patient is doing well    #Anxiety     - Back on his home remeron and trazodone    #COPD     - Management per pulmonary; will resume breathing treatments    #Hypothyroidism     - Continue synthroid     #DMII     SSI       Active Hospital Problems    Diagnosis Date Noted    Aspiration into respiratory tract [T17.908A] 11/10/2017     Additional work up or/and treatment plan may be added today or then after based on clinical progression. I am managing a portion of pt care. Some medical issues are handled by other specialists. Additional work up and treatment should be done in out pt setting by pt PCP and other out pt providers. In addition to examining and evaluating pt, I spent additional time explaining care, normal and abnormal findings, and treatment plan. All of pt questions were answered. Counseling, diet and education were  provided. Case will be discussed with nursing staff when appropriate. Family will be updated if and when appropriate. Diet: DIET GENERAL;    Code Status: Prior    PT/OT and rehab evals    Electronically signed by Ellen Vang MD on 11/18/2017 at 11:40 AM    Addendum:  Called to pts room in the afternoon for complaints of RUQ numbness. Pt appears to be in some respiratory distress, complaining of a fullness in his abdomen that feels like its pressing against his lungs. While there he had an expectorative cough with hemoptysis. Lung exam relatively benign; abdominal exam indeterminate for dullness in flanks, negative for fluid thrill.   Will order CXR, sputum culture, abdominal survey for ascites and RUQ; requested RN to inform pulmonary as well.

## 2017-11-18 NOTE — PLAN OF CARE
Problem: Pain:  Goal: Control of acute pain  Control of acute pain   Pain well controlled by oxycodone.

## 2017-11-18 NOTE — PROGRESS NOTES
Pt has not had a BM in one week, therefore his abd is distended. Pt called me saying he needs to see a hospitalist, I went in to assess him and see what was going on- he claims that the RUQ of his abd is \"numb\" and that he wants to see the DrDesirae- message sent out to Dr. Monica Peoples to make him aware. Pt has active bowel sounds, warm to touch, bruised from the procedure.

## 2017-11-18 NOTE — PROGRESS NOTES
Pt's temp at 100.9, Dr. Angi Alford notified, no new orders. Pt rates his pain 2/10, no pain meds needed at this time. He tells me he hasn't had a BM in one week, scheduled meds given him for this, offered prune juice, took him for a walk in the hallway. He used a walker, minimal assist, SOB with a short walk, 95% on 2 liters. Call light in reach.

## 2017-11-18 NOTE — PROGRESS NOTES
Patient brought up to room 477 from ICU by Floresita Peralta RN. Assumed care of patient at this time. Patient ambulated to bed with assistance and tolerated well. Vital signs stable. Patient oriented to room and call light. Bed is in lowest position with alarm on and call light within reach. Patient denies any needs. Patient is resting in bed with his eyes closed at this time. Will continue to monitor.  Electronically signed by Cain Aleman RN on 11/18/2017 at 1:05 AM

## 2017-11-18 NOTE — PROGRESS NOTES
[facility] EMERGENCY [facility] AT [place] Respiratory Therapy Evaluation   Current Order:  Lela Kohler w/a      Home Regimen:alb hfa q4hrs prn     Ordering Physician: brandyn  Re-evaluation Date:  11-21     Diagnosis: aspiration. ,      Patient Status: Stable / Unstable + Physician notified    The following MDI Criteria must be met in order to convert aerosol to MDI with spacer.  If unable to meet, MDI will be converted to aerosol:  []  Patient able to demonstrate the ability to use MDI effectively  []  Patient alert and cooperative  []  Patient able to take deep breath with 5-10 second hold  []  Medication(s) available in this delivery method   []  Peak flow greater than or equal to 200 ml/min            Current Order Substituted To  (same drug, same frequency)   Aerosol to MDI [] Albuterol Sulfate 0.083% unit dose by aerosol Albuterol Sulfate MDI 2 puffs by inhalation with spacer    [] Levalbuterol 1.25 mg unit dose by aerosol Levalbuterol MDI 2 puffs by inhalation with spacer    [] Levalbuterol 0.63 mg unit dose by aerosol Levalbuterol MDI 2 puffs by inhalation with spacer    [] Ipratropium Bromide 0.02% unit dose by aerosol Ipratropium Bromide MDI 2 puffs by inhalation with spacer    [] Duoneb (Ipratropium + Albuterol) unit dose by aerosol Ipratropium MDI + Albuterol MDI 2 puffs by inhalation w/spacer   MDI to Aerosol [] Albuterol Sulfate MDI Albuterol Sulfate 0.083% unit dose by aerosol    [] Levalbuterol MDI 2 puffs by inhalation Levalbuterol 1.25 mg unit dose by aerosol    [] Ipratropium Bromide MDI by inhalation Ipratropium Bromide 0.02% unit dose by aerosol    [] Combivent (Ipratropium + Albuterol) MDI by inhalation Duoneb (Ipratropium + Albuterol) unit dose by aerosol   Treatment Assessment [Frequency/Schedule]:  Change frequency to: ______tid & alb q2hrs prn____________________________________________per Protocol, P&T, MEC      Points 0 1 2 3 4   Pulmonary Status  Non-Smoker  []   Smoking history   < 20 pack years  []   Smoking history  ?  20 pack years  []   Pulmonary Disorder  (acute or chronic)  [x]   Severe or Chronic w/ Exacerbation  []     Surgical Status No [x]   Surgeries     General []   Surgery Lower []   Abdominal Thoracic or []   Upper Abdominal Thoracic with  PulmonaryDisorder  []     Chest X-ray Clear/Not  Ordered     []  Chronic Changes  Results Pending  []  Infiltrates, atelectasis, pleural effusion, or edema  [x]  Infiltrates in more than one lobe []  Infiltrate + Atelectasis, &/or pleural effusion  []    Respiratory Pattern Regular,  RR = 12-20 [x]  Increased,  RR = 21-25 []  STEWART, irregular,  or RR = 26-30 []  Decreased FEV1  or RR = 31-35 []  Severe SOB, use  of accessory muscles, or RR ? 35  []    Mental Status Alert, oriented,  Cooperative [x]  Confused but Follows commands []  Lethargic or unable to follow commands []  Obtunded  []  Comatose  []    Breath Sounds Clear to  auscultation  []  Decreased unilaterally or  in bases only []  Decreased  bilaterally  [x]  Crackles or intermittent wheezes []  Wheezes []    Cough Strong, Spontan., & nonproductive [x]  Strong,  spontaneous, &  productive []  Weak,  Nonproductive []  Weak, productive or  with wheezes []  No spontaneous  cough or may require suctioning []    Level of Activity Ambulatory [x]  Ambulatory w/ Assist  []  Non-ambulatory []  Paraplegic []  Quadriplegic []    Total    Score:____7___     Triage Score:_____4___      Tri       Triage:     1. (>20) Freq: Q3    2. (16-20) Freq: Q4   3. (11-15) Freq: QID & Albuterol Q2 PRN    4. (6-10) Freq: TID & Albuterol Q2 PRN    5. (0-5) Freq Q4prn

## 2017-11-19 ENCOUNTER — APPOINTMENT (OUTPATIENT)
Dept: GENERAL RADIOLOGY | Age: 77
DRG: 163 | End: 2017-11-19
Attending: INTERNAL MEDICINE
Payer: MEDICARE

## 2017-11-19 LAB
BASOPHILS ABSOLUTE: 0 K/UL (ref 0–0.2)
BASOPHILS RELATIVE PERCENT: 0.3 %
EOSINOPHILS ABSOLUTE: 0.4 K/UL (ref 0–0.7)
EOSINOPHILS RELATIVE PERCENT: 4.6 %
GLUCOSE BLD-MCNC: 125 MG/DL (ref 60–115)
GLUCOSE BLD-MCNC: 139 MG/DL (ref 60–115)
GLUCOSE BLD-MCNC: 158 MG/DL (ref 60–115)
GLUCOSE BLD-MCNC: 98 MG/DL (ref 60–115)
HCT VFR BLD CALC: 36.7 % (ref 42–52)
HEMOGLOBIN: 12.4 G/DL (ref 14–18)
LYMPHOCYTES ABSOLUTE: 1 K/UL (ref 1–4.8)
LYMPHOCYTES RELATIVE PERCENT: 11.9 %
MCH RBC QN AUTO: 32.1 PG (ref 27–31.3)
MCHC RBC AUTO-ENTMCNC: 33.8 % (ref 33–37)
MCV RBC AUTO: 94.8 FL (ref 80–100)
MONOCYTES ABSOLUTE: 0.9 K/UL (ref 0.2–0.8)
MONOCYTES RELATIVE PERCENT: 10.7 %
NEUTROPHILS ABSOLUTE: 5.8 K/UL (ref 1.4–6.5)
NEUTROPHILS RELATIVE PERCENT: 72.5 %
PDW BLD-RTO: 14.4 % (ref 11.5–14.5)
PERFORMED ON: ABNORMAL
PERFORMED ON: NORMAL
PLATELET # BLD: 123 K/UL (ref 130–400)
RBC # BLD: 3.87 M/UL (ref 4.7–6.1)
SLIDE REVIEW: ABNORMAL
WBC # BLD: 8 K/UL (ref 4.8–10.8)

## 2017-11-19 PROCEDURE — 6370000000 HC RX 637 (ALT 250 FOR IP): Performed by: INTERNAL MEDICINE

## 2017-11-19 PROCEDURE — 85025 COMPLETE CBC W/AUTO DIFF WBC: CPT

## 2017-11-19 PROCEDURE — 1210000000 HC MED SURG R&B

## 2017-11-19 PROCEDURE — 2700000000 HC OXYGEN THERAPY PER DAY

## 2017-11-19 PROCEDURE — 6370000000 HC RX 637 (ALT 250 FOR IP): Performed by: THORACIC SURGERY (CARDIOTHORACIC VASCULAR SURGERY)

## 2017-11-19 PROCEDURE — 36415 COLL VENOUS BLD VENIPUNCTURE: CPT

## 2017-11-19 PROCEDURE — 94640 AIRWAY INHALATION TREATMENT: CPT

## 2017-11-19 PROCEDURE — 6360000002 HC RX W HCPCS: Performed by: INTERNAL MEDICINE

## 2017-11-19 PROCEDURE — 71010 XR CHEST PORTABLE: CPT

## 2017-11-19 PROCEDURE — 99232 SBSQ HOSP IP/OBS MODERATE 35: CPT | Performed by: INTERNAL MEDICINE

## 2017-11-19 RX ORDER — MIRTAZAPINE 15 MG/1
15 TABLET, FILM COATED ORAL NIGHTLY
Status: DISCONTINUED | OUTPATIENT
Start: 2017-11-19 | End: 2017-11-21 | Stop reason: HOSPADM

## 2017-11-19 RX ORDER — OXYCODONE AND ACETAMINOPHEN 10; 325 MG/1; MG/1
1 TABLET ORAL ONCE
Status: COMPLETED | OUTPATIENT
Start: 2017-11-19 | End: 2017-11-19

## 2017-11-19 RX ADMIN — MIRTAZAPINE 15 MG: 15 TABLET, FILM COATED ORAL at 20:56

## 2017-11-19 RX ADMIN — TAMSULOSIN HYDROCHLORIDE 0.4 MG: 0.4 CAPSULE ORAL at 11:43

## 2017-11-19 RX ADMIN — IPRATROPIUM BROMIDE AND ALBUTEROL SULFATE 1 AMPULE: .5; 3 SOLUTION RESPIRATORY (INHALATION) at 19:26

## 2017-11-19 RX ADMIN — IPRATROPIUM BROMIDE AND ALBUTEROL SULFATE 1 AMPULE: .5; 3 SOLUTION RESPIRATORY (INHALATION) at 14:11

## 2017-11-19 RX ADMIN — OXYCODONE AND ACETAMINOPHEN 1 TABLET: 10; 325 TABLET ORAL at 18:18

## 2017-11-19 RX ADMIN — FINASTERIDE 5 MG: 5 TABLET, FILM COATED ORAL at 11:44

## 2017-11-19 RX ADMIN — DOCUSATE SODIUM 100 MG: 100 CAPSULE, LIQUID FILLED ORAL at 20:56

## 2017-11-19 RX ADMIN — DOCUSATE SODIUM 100 MG: 100 CAPSULE, LIQUID FILLED ORAL at 11:43

## 2017-11-19 RX ADMIN — TRAZODONE HYDROCHLORIDE 150 MG: 100 TABLET ORAL at 20:56

## 2017-11-19 RX ADMIN — METOPROLOL SUCCINATE 25 MG: 25 TABLET, FILM COATED, EXTENDED RELEASE ORAL at 11:43

## 2017-11-19 RX ADMIN — SENNOSIDES AND DOCUSATE SODIUM 2 TABLET: 8.6; 5 TABLET ORAL at 20:56

## 2017-11-19 RX ADMIN — ALBUTEROL SULFATE 2.5 MG: 2.5 SOLUTION RESPIRATORY (INHALATION) at 09:55

## 2017-11-19 RX ADMIN — LEVOTHYROXINE SODIUM 125 MCG: 125 TABLET ORAL at 07:24

## 2017-11-19 RX ADMIN — IPRATROPIUM BROMIDE AND ALBUTEROL SULFATE 1 AMPULE: .5; 3 SOLUTION RESPIRATORY (INHALATION) at 07:54

## 2017-11-19 RX ADMIN — POLYETHYLENE GLYCOL 3350 17 G: 17 POWDER, FOR SOLUTION ORAL at 11:45

## 2017-11-19 RX ADMIN — LORAZEPAM 0.5 MG: 0.5 TABLET ORAL at 21:07

## 2017-11-19 ASSESSMENT — PAIN SCALES - GENERAL
PAINLEVEL_OUTOF10: 7
PAINLEVEL_OUTOF10: 0
PAINLEVEL_OUTOF10: 0

## 2017-11-19 NOTE — PROGRESS NOTES
INPATIENT PROGRESS NOTES    PATIENT NAME: Kiersten Reynolds  MRN: [id number]  SERVICE DATE:  November 19, 2017   SERVICE TIME:  2:23 PM      PRIMARY SERVICE: Pulmonary Disease    INTERVAL HPI: Patient seen and examined at bedside, Interval Notes, orders reviewed. Nursing notes noted  Patient is not feeling good. Still does not have a bowel movement. No hematuria today . c/o SOB with exertion using 2 L O2 via nasal cannula.,  No chest pain or pleuritic pain. no fever or chills. No nausea vomiting diarrhea or abdominal pain. OBJECTIVE    Body mass index is 32.28 kg/m². PHYSICAL EXAM:  Vitals:  BP (!) 149/74   Pulse 78   Temp 98.6 °F (37 °C) (Oral)   Resp 19   Ht 6' (1.829 m)   Wt 238 lb (108 kg)   SpO2 95%   BMI 32.28 kg/m²   General:  Alert, awake . comfortable in bed, No distress. Head: Atraumatic , Normocephalic   Eyes: PERRL. No sclera icterus. No conjunctival injection. No discharge   ENT: No nasal  discharge. Pharynx clear. Neck:  Trachea midline. No thyromegaly, no JVD, No cervical adenopathy. Chest : Bilaterally symmetrical ,Normal effort,  No accessory muscle use  Lung : . Fair BS bilateral, decreased BS at bases. No Rales. No wheezing. No rhonchi. No dullness on percussion. Heart[de-identified] Normal  rate. Regular rhythm. No mumur ,  Rub or gallop  ABD: Non-tender. Non-distended. No masses. No organmegaly. Normal bowel sounds. No hernia. EXT: No Pitting both leg , No Cyanosis No clubbing  Neuro: no focal weakness        DATA:   Recent Labs      11/18/17   0616  11/19/17   0533   WBC  10.3  8.0   HGB  13.7*  12.4*   HCT  41.0*  36.7*   MCV  94.9  94.8   PLT  121*  123*     Recent Labs      11/17/17   0516   NA  138   K  4.3   CL  99   CO2  27   BUN  14   CREATININE  1.01   GLUCOSE  113*   CALCIUM  7.8*   LABGLOM  >60.0   GFRAA  >60.0       No results for input(s): PHART, WIK4SLK, PO2ART, VTD1XGW, BEART, C1DUKQGG in the last 72 hours.     O2 Device: Nasal cannula  O2 Flow Rate (L/min): 3 L/min    DIET COMPARISONS: None. FINDINGS: Portable films acquired at bedside dated during bronchoscopy with attempted retrieval of a swallowed tooth. Please refer to operative report for further details. Bronchoscopy tube seen passing into a right lower lobe bronchus. Polygonal shaped density in the right lower lung zone consistent with an ingested tooth. BRONCHOSCOPY RETRIEVAL PROCEDURE. PLEASE REFER TO OPERATIVE REPORT. Fluoro For Surgical Procedures    Result Date: 11/15/2017  NO FILMS NO DICTATION      IMPRESSION AND SUGGESTION:    1. Aspiration of foreign object in the posterior basilar segment of the right lower lobe s/p rt. Thoracotomy and segmental wedge resection. 2. Secondary lower respiratory tract infection due to aspirated foreign object  3. 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 Hematuria  5. constipation    patient is on 2 L O2 via nasal cannula. He has hematuria, urology consulted. Constipation as per attending . continue Nebulizer with duoneb as before. Chest x-ray shows a right pleural effusion. Atelectasis. He also has a chronically elevated elevated right diaphragm. Advise incentive spirometer.     signed by Micaela Cool MD, FCCP on 11/19/2017 at 2:23 PM

## 2017-11-19 NOTE — PROGRESS NOTES
Pt went for a walk without his 02, tolerated well. He now has 7/10 pain- no pain meds avail, msg out to Dr. Jamal Castano.

## 2017-11-19 NOTE — CONSULTS
Urology  Currently no evidence of gross hematuria  Will wait for urine culture results  No further recommendations at this time  Will follow with you.   Tosin Quesada MD

## 2017-11-19 NOTE — PROGRESS NOTES
Hospitalist Progress Note      PCP: Sacha Harley MD    Date of Admission: 11/10/2017    Chief Complaint:    Persistent cough    Subjective:  Patient is doing well when I saw him this morning; now reporting new onset gross hematuria. Did have a low grade fever by denied any symptoms at that time. 10 point ROS negative     Medications:  Reviewed    Infusion Medications    dextrose       Scheduled Medications    ipratropium-albuterol  1 ampule Inhalation TID    metoprolol succinate  25 mg Oral Daily    traZODone  150 mg Oral Nightly    polyethylene glycol  17 g Oral Daily    sennosides-docusate sodium  2 tablet Oral Nightly    finasteride  5 mg Oral Daily    tamsulosin  0.4 mg Oral Daily    docusate sodium  100 mg Oral BID    levothyroxine  125 mcg Oral Daily    insulin lispro  0-6 Units Subcutaneous TID WC    insulin lispro  0-3 Units Subcutaneous Nightly     PRN Meds: bisacodyl, albuterol, magnesium hydroxide, oxyCODONE-acetaminophen, LORazepam, LORazepam, ondansetron, glucose, dextrose, glucagon (rDNA), dextrose, hydrALAZINE    Intake/Output Summary (Last 24 hours) at 11/19/17 0990  Last data filed at 11/19/17 0725   Gross per 24 hour   Intake              100 ml   Output              500 ml   Net             -400 ml     Exam:    BP (!) 149/74   Pulse 78   Temp 98.6 °F (37 °C) (Oral)   Resp 19   Ht 6' (1.829 m)   Wt 238 lb (108 kg)   SpO2 96%   BMI 32.28 kg/m²     General appearance: No apparent distress, appears stated age and cooperative  HEENT: Pupils equal, round, and reactive to light. Conjunctivae/corneas clear. Neck: Supple, with full range of motion. No jugular venous distention. Trachea midline. Respiratory:  Normal respiratory effort. Some right lower lobe crackles; chest tube site and wound site both look healthy   Cardiovascular: Regular rate and rhythm with normal S1/S2 without murmurs, rubs or gallops. Abdomen: Soft, non-tender, non-distended with normal bowel sounds. Indeterminate for dullness in flanks  Musculoskeletal: No clubbing, cyanosis or edema bilaterally. Neuro: Non Focal. Intact and symmetric  Psychiatric: Alert and oriented, thought content appropriate, normal insight  Capillary Refill: Brisk,< 3 seconds   Peripheral Pulses: +2 palpable, equal bilaterally     Labs:   Recent Labs      11/17/17   0516  11/18/17   0616  11/19/17   0533   WBC  9.8  10.3  8.0   HGB  12.9*  13.7*  12.4*   HCT  38.1*  41.0*  36.7*   PLT  102*  121*  123*     Recent Labs      11/17/17   0516   NA  138   K  4.3   CL  99   CO2  27   BUN  14   CREATININE  1.01   CALCIUM  7.8*     No results for input(s): AST, ALT, BILIDIR, BILITOT, ALKPHOS in the last 72 hours. No results for input(s): INR in the last 72 hours. No results for input(s): Lodema  in the last 72 hours. Urinalysis:      Lab Results   Component Value Date    NITRU Negative 11/18/2017    WBCUA 0-2 11/18/2017    BACTERIA Moderate 11/18/2017    RBCUA 5-10 11/18/2017    BLOODU LARGE 11/18/2017    SPECGRAV 1.027 11/18/2017    GLUCOSEU Negative 11/18/2017     Radiology:  XR CHEST STANDARD (2 VW)   Final Result      Removal of chest tube. Interval increase right lower lung atelectasis. Possible right pleural effusion. Interval development small right pneumothorax. XR CHEST STANDARD (2 VW)   Final Result      Fluoro For Surgical Procedures   Final Result      XR Chest Portable   Final Result      XR CHEST STANDARD (2 VW)   Final Result      XR Chest Portable   Final Result   NO ACTIVE LUNG DISEASE. XR Chest Portable   Final Result   BRONCHOSCOPY RETRIEVAL PROCEDURE. PLEASE REFER TO OPERATIVE REPORT. CT Chest W Contrast   Final Result   ASPIRATED FILLING IN RIGHT LOWER LOBE POSTERIOR BASILAR SEGMENTAL BRONCHUS. MILD EMPHYSEMA AND LOWER LOBE CYLINDRICAL BRONCHIECTASIS.  ADDITIONAL FINDINGS AS ABOVE.         US ABDOMINAL LIMITED    (Results Pending)   XR Chest Portable    (Results Pending) appropriate. Family will be updated if and when appropriate.       Diet: DIET GENERAL;    Code Status: Prior    PT/OT and rehab evals    Electronically signed by Basilio Stephenson MD on 11/19/2017 at 9:35 AM

## 2017-11-19 NOTE — FLOWSHEET NOTE
SSE given per orders. Pt tolerated well. Pt had large results. Stool hard and soft; formed; and brown. BS present and active. Abd soft and firm in upper quads and distended. Pt denies pain at present time. Pt sitting up in chair. Call light in place and bedside table in reach.

## 2017-11-20 ENCOUNTER — APPOINTMENT (OUTPATIENT)
Dept: GENERAL RADIOLOGY | Age: 77
DRG: 163 | End: 2017-11-20
Attending: INTERNAL MEDICINE
Payer: MEDICARE

## 2017-11-20 LAB
ANION GAP SERPL CALCULATED.3IONS-SCNC: 11 MEQ/L (ref 7–13)
ANISOCYTOSIS: ABNORMAL
APTT: 29.7 SEC (ref 21.6–35.4)
ATYPICAL LYMPHOCYTE RELATIVE PERCENT: 6 %
BANDED NEUTROPHILS RELATIVE PERCENT: 1 %
BASOPHILS ABSOLUTE: 0 K/UL (ref 0–0.2)
BASOPHILS RELATIVE PERCENT: 0.3 %
BUN BLDV-MCNC: 16 MG/DL (ref 8–23)
CALCIUM SERPL-MCNC: 8.3 MG/DL (ref 8.6–10.2)
CHLORIDE BLD-SCNC: 97 MEQ/L (ref 98–107)
CO2: 27 MEQ/L (ref 22–29)
CREAT SERPL-MCNC: 1 MG/DL (ref 0.7–1.2)
EOSINOPHILS ABSOLUTE: 0.2 K/UL (ref 0–0.7)
EOSINOPHILS RELATIVE PERCENT: 2 %
GFR AFRICAN AMERICAN: >60
GFR NON-AFRICAN AMERICAN: >60
GLUCOSE BLD-MCNC: 102 MG/DL (ref 74–109)
GLUCOSE BLD-MCNC: 103 MG/DL (ref 60–115)
GLUCOSE BLD-MCNC: 127 MG/DL (ref 60–115)
GLUCOSE BLD-MCNC: 136 MG/DL (ref 60–115)
GLUCOSE BLD-MCNC: 165 MG/DL (ref 60–115)
HCT VFR BLD CALC: 36.5 % (ref 42–52)
HEMOGLOBIN: 12.1 G/DL (ref 14–18)
HYPOCHROMIA: 0
INR BLD: 1
LYMPHOCYTES ABSOLUTE: 0.9 K/UL (ref 1–4.8)
LYMPHOCYTES RELATIVE PERCENT: 4 %
MACROCYTES: 0
MCH RBC QN AUTO: 31.1 PG (ref 27–31.3)
MCHC RBC AUTO-ENTMCNC: 33.1 % (ref 33–37)
MCV RBC AUTO: 94 FL (ref 80–100)
METAMYELOCYTES RELATIVE PERCENT: 1 %
MICROCYTES: 0
MONOCYTES ABSOLUTE: 0.2 K/UL (ref 0.2–0.8)
MONOCYTES RELATIVE PERCENT: 1.9 %
MYELOCYTE PERCENT: 3 %
NEUTROPHILS ABSOLUTE: 7.4 K/UL (ref 1.4–6.5)
NEUTROPHILS RELATIVE PERCENT: 82 %
PDW BLD-RTO: 14.7 % (ref 11.5–14.5)
PERFORMED ON: ABNORMAL
PERFORMED ON: NORMAL
PLATELET # BLD: 153 K/UL (ref 130–400)
PLATELET SLIDE REVIEW: ADEQUATE
POIKILOCYTES: 0
POLYCHROMASIA: 0
POTASSIUM SERPL-SCNC: 4.3 MEQ/L (ref 3.5–5.1)
PROTHROMBIN TIME: 10.8 SEC (ref 8.1–13.7)
RBC # BLD: 3.88 M/UL (ref 4.7–6.1)
SLIDE REVIEW: ABNORMAL
SMUDGE CELLS: 1
SODIUM BLD-SCNC: 135 MEQ/L (ref 132–144)
URINE CULTURE, ROUTINE: NORMAL
WBC # BLD: 8.5 K/UL (ref 4.8–10.8)

## 2017-11-20 PROCEDURE — 71035 XR CHEST DECUBITUS LEFT: CPT

## 2017-11-20 PROCEDURE — 85025 COMPLETE CBC W/AUTO DIFF WBC: CPT

## 2017-11-20 PROCEDURE — 6370000000 HC RX 637 (ALT 250 FOR IP): Performed by: INTERNAL MEDICINE

## 2017-11-20 PROCEDURE — 94640 AIRWAY INHALATION TREATMENT: CPT

## 2017-11-20 PROCEDURE — 94668 MNPJ CHEST WALL SBSQ: CPT

## 2017-11-20 PROCEDURE — 97110 THERAPEUTIC EXERCISES: CPT

## 2017-11-20 PROCEDURE — 6360000002 HC RX W HCPCS: Performed by: INTERNAL MEDICINE

## 2017-11-20 PROCEDURE — 1210000000 HC MED SURG R&B

## 2017-11-20 PROCEDURE — 36415 COLL VENOUS BLD VENIPUNCTURE: CPT

## 2017-11-20 PROCEDURE — 6370000000 HC RX 637 (ALT 250 FOR IP): Performed by: THORACIC SURGERY (CARDIOTHORACIC VASCULAR SURGERY)

## 2017-11-20 PROCEDURE — 85730 THROMBOPLASTIN TIME PARTIAL: CPT

## 2017-11-20 PROCEDURE — 94667 MNPJ CHEST WALL 1ST: CPT

## 2017-11-20 PROCEDURE — 80048 BASIC METABOLIC PNL TOTAL CA: CPT

## 2017-11-20 PROCEDURE — 71035 XR CHEST DECUBITUS RIGHT: CPT

## 2017-11-20 PROCEDURE — 85610 PROTHROMBIN TIME: CPT

## 2017-11-20 PROCEDURE — 94760 N-INVAS EAR/PLS OXIMETRY 1: CPT

## 2017-11-20 PROCEDURE — 97116 GAIT TRAINING THERAPY: CPT

## 2017-11-20 PROCEDURE — 2700000000 HC OXYGEN THERAPY PER DAY

## 2017-11-20 RX ORDER — SODIUM CHLORIDE FOR INHALATION 0.9 %
3 VIAL, NEBULIZER (ML) INHALATION EVERY 4 HOURS
Status: DISCONTINUED | OUTPATIENT
Start: 2017-11-20 | End: 2017-11-21

## 2017-11-20 RX ORDER — OXYCODONE HYDROCHLORIDE AND ACETAMINOPHEN 5; 325 MG/1; MG/1
1 TABLET ORAL EVERY 6 HOURS PRN
Status: DISCONTINUED | OUTPATIENT
Start: 2017-11-20 | End: 2017-11-21 | Stop reason: HOSPADM

## 2017-11-20 RX ORDER — GUAIFENESIN 600 MG/1
600 TABLET, EXTENDED RELEASE ORAL 2 TIMES DAILY
Status: DISCONTINUED | OUTPATIENT
Start: 2017-11-20 | End: 2017-11-21 | Stop reason: HOSPADM

## 2017-11-20 RX ADMIN — DOCUSATE SODIUM 100 MG: 100 CAPSULE, LIQUID FILLED ORAL at 21:58

## 2017-11-20 RX ADMIN — TRAZODONE HYDROCHLORIDE 150 MG: 100 TABLET ORAL at 21:57

## 2017-11-20 RX ADMIN — TAMSULOSIN HYDROCHLORIDE 0.4 MG: 0.4 CAPSULE ORAL at 10:23

## 2017-11-20 RX ADMIN — LEVOTHYROXINE SODIUM 125 MCG: 125 TABLET ORAL at 05:47

## 2017-11-20 RX ADMIN — ALBUTEROL SULFATE 2.5 MG: 2.5 SOLUTION RESPIRATORY (INHALATION) at 22:05

## 2017-11-20 RX ADMIN — IPRATROPIUM BROMIDE AND ALBUTEROL SULFATE 1 AMPULE: .5; 3 SOLUTION RESPIRATORY (INHALATION) at 15:40

## 2017-11-20 RX ADMIN — DOCUSATE SODIUM 100 MG: 100 CAPSULE, LIQUID FILLED ORAL at 10:23

## 2017-11-20 RX ADMIN — GUAIFENESIN 600 MG: 600 TABLET, EXTENDED RELEASE ORAL at 12:43

## 2017-11-20 RX ADMIN — FINASTERIDE 5 MG: 5 TABLET, FILM COATED ORAL at 10:23

## 2017-11-20 RX ADMIN — GUAIFENESIN 600 MG: 600 TABLET, EXTENDED RELEASE ORAL at 21:57

## 2017-11-20 RX ADMIN — METOPROLOL SUCCINATE 25 MG: 25 TABLET, FILM COATED, EXTENDED RELEASE ORAL at 10:23

## 2017-11-20 RX ADMIN — OXYCODONE HYDROCHLORIDE AND ACETAMINOPHEN 1 TABLET: 5; 325 TABLET ORAL at 10:23

## 2017-11-20 RX ADMIN — OXYCODONE HYDROCHLORIDE AND ACETAMINOPHEN 1 TABLET: 5; 325 TABLET ORAL at 16:51

## 2017-11-20 RX ADMIN — MIRTAZAPINE 15 MG: 15 TABLET, FILM COATED ORAL at 21:59

## 2017-11-20 RX ADMIN — POLYETHYLENE GLYCOL 3350 17 G: 17 POWDER, FOR SOLUTION ORAL at 10:24

## 2017-11-20 RX ADMIN — ISODIUM CHLORIDE 3 ML: 0.03 SOLUTION RESPIRATORY (INHALATION) at 15:40

## 2017-11-20 RX ADMIN — IPRATROPIUM BROMIDE AND ALBUTEROL SULFATE 1 AMPULE: .5; 3 SOLUTION RESPIRATORY (INHALATION) at 06:07

## 2017-11-20 RX ADMIN — ISODIUM CHLORIDE 3 ML: 0.03 SOLUTION RESPIRATORY (INHALATION) at 19:19

## 2017-11-20 RX ADMIN — SENNOSIDES AND DOCUSATE SODIUM 2 TABLET: 8.6; 5 TABLET ORAL at 21:58

## 2017-11-20 RX ADMIN — IPRATROPIUM BROMIDE AND ALBUTEROL SULFATE 1 AMPULE: .5; 3 SOLUTION RESPIRATORY (INHALATION) at 19:19

## 2017-11-20 ASSESSMENT — PAIN SCALES - GENERAL
PAINLEVEL_OUTOF10: 2
PAINLEVEL_OUTOF10: 5
PAINLEVEL_OUTOF10: 5

## 2017-11-20 NOTE — PROGRESS NOTES
INPATIENT PROGRESS NOTES    PATIENT NAME: Declan Neal  MRN: [id number]  SERVICE DATE:  November 20, 2017   SERVICE TIME:  10:21 AM      PRIMARY SERVICE:  Pulmonary Medicine     INTERVAL HPI: Patient seen and examined at bedside, Interval Notes, orders reviewed. Nursing notes noted: Patient reports overall he is not feeling very well. He is status post thoracotomy with segmental wedge resection by Dr. Abdon Figueroa after he aspirated a foreign body. Patient reports the chest tube was removed 2 days ago. He saw reports some residual pain in this area from the chest tube. Patient is also being seen by urology due to some concern with hematuria. Patient is still having some shortness of breath and he is currently on 2 L of oxygen via nasal cannula. He denies any chest pain or pleuritic pain. He denies any fever, chills, nausea, vomiting, diarrhea and abdominal pain. Review of Systems  Please see HPI above. OBJECTIVE    Body mass index is 32.28 kg/m². PHYSICAL EXAM:  Vitals:  /77   Pulse 73   Temp 98.6 °F (37 °C)   Resp 18   Ht 6' (1.829 m)   Wt 238 lb (108 kg)   SpO2 95%   BMI 32.28 kg/m²   General: Patient is comfortable in bed, No distress. Head: Atraumatic , Normocephalic   Eyes: PERRL. No sclera icterus. No conjunctival injection. No discharge   ENT: No nasal  discharge. Pharynx clear. Neck:  Trachea midline. No thyromegaly, no JVD, No cervical adenopathy. Resp : Fair breath sounds bilaterally with decreased breath sounds at the bases. No rales no wheezing no rhonchi no dullness on percussion. Normal effort with no accessory muscle use. CV: Normal  rate. Regular rhythm. No mumur ,  Rub or gallop  ABD: Non-tender. Non-distended. No masses. No organmegaly. Normal bowel sounds. No hernia. EXT: No Pitting, No Cyanosis No clubbing  Neuro: no focal weakness  Skin: Warm and dry. No erythema rash on exposed extremities.     DATA:   Recent Labs      11/19/17   0533  11/20/17   0553   WBC  8.0 grossly unremarkable. IMPRESSION A radiopaque density is now seen in the region of the right lower lobe bronchus. Ct Chest W Contrast    Result Date: 11/11/2017  EXAMINATION:  CT CHEST W CONTRAST CLINICAL HISTORY:  ACUTE RESP ILLNESS, >37YEARS OLD  status post bronchoscopy to retrieve aspirated dental amalgam, unsuccessful. COMPARISONS:  12/2/2013 TECHNIQUE:  Spiral scans with 75 mL Isovue-370 IV. Multiplanar 2-D reconstructions. Axial 3-D 10 x 3 mm MIP reconstructions were performed. All CT scans at this facility use dose modulation, iterative reconstruction, and/or weight based dosing when appropriate to reduce radiation dose to as low as reasonably achievable. FINDINGS:  The known aspirated filling is identified within the distal segmental bronchus of the right lower lobe posterior basilar segment. Filling measures approximately 9 mm. There are mild emphysematous changes. There is mild scattered peripheral reticularity without definite honeycombing. There are no consolidations or effusions. There is right upper lung perifissural nodularity consistent with small intrapulmonary lymph nodes. There is a left upper lobe 4 mm nodule (series 2 image 21), unchanged from prior examination of 12/2/2013, and therefore benign. There are no suspicious lung nodules. There is mild bilateral lower lobe cylindrical bronchiectasis. Cardiac size and pulmonary vascularity are normal. There is mild mediastinal lipomatosis. There is mild thickening or trace fluid in the anterior pericardium, decreased compared to prior examination 12/2/2013. There are coronary artery calcifications. There are aortic calcifications. There is no evidence of aneurysm or dissection. There are borderline in size lymph nodes in the mediastinum anterior to the left mainstem bronchus and in the right hilum, unchanged from prior examination of 12/2/2013. There is no thoracic adenopathy. The esophagus is unremarkable. There is spondylosis.  There are no the right lung base. A possible trace right pneumothorax and trace right pleural effusion appear unchanged. Minimal probable atelectasis of the left lung base is again noted. There is no other significant changes identified. STABLE POSTOPERATIVE CHEST COMPARED TO 11/18/2017. Xr Chest Portable    Result Date: 11/14/2017  EXAMINATION: XR CHEST PORTABLE CLINICAL HISTORY:  S/P RLL segment resection COMPARISONS: November 13, 2017 FINDINGS: Single AP portable view the chest obtained on November 14, 2017 at 1357 hours. The patient has undergone surgery since yesterday, with right lower lobe segmentectomy. The previously noted metallic foreign body has been removed. There are surgical staples at the operative site. There is no unremarkable chest tube. There is no pneumothorax. There is no large effusion. There is bibasilar atelectasis. The mediastinal silhouette is unremarkable. There is an endotracheal tube in place, with its tip near the orifice of the right mainstem bronchus. It should be pulled back 3 cm. The chest wall is unremarkable. CONCLUSION: NO PNEUMOTHORAX FOLLOWING LUNG SURGERY. THERE IS NO UNREMARKABLE CHEST TUBE. ENDOTRACHEAL TUBE IS LOW-LYING. Xr Chest Portable    Result Date: 11/12/2017  EXAMINATION: XR CHEST PORTABLE, 1 VIEW: DATE AND TIME: 11/11/2017 at 11:30 AM . CLINICAL HISTORY: SHORTNESS OF BREATH. POST BRONCHOSCOPY. COMPARISONS: 11/11/2017 FINDINGS: The heart, mediastinum and pulmonary vasculature are within normal limits. Lungs show scattered areas of increased reticular markings consistent with areas of parenchymal scarring. Bones unremarkable. NO ACTIVE LUNG DISEASE. Xr Chest Portable    Result Date: 11/12/2017  EXAMINATION: XR CHEST, PORTABLE: DATE AND TIME: 11/11/2017 at 9:45 AM. CLINICAL HISTORY: SWALLOWED TOOTH. POST BRONCHOSCOPY. COMPARISONS: None. FINDINGS: Portable films acquired at bedside dated during bronchoscopy with attempted retrieval of a swallowed tooth.

## 2017-11-20 NOTE — PROGRESS NOTES
non-distended with normal bowel sounds. Musculoskeletal: No clubbing, cyanosis or edema bilaterally. Neuro: Non Focal. Intact and symmetric  Psychiatric: Alert and oriented, thought content appropriate, normal insight  Capillary Refill: Brisk,< 3 seconds   Peripheral Pulses: +2 palpable, equal bilaterally     Labs:   Recent Labs      11/18/17   0616  11/19/17   0533  11/20/17   0553   WBC  10.3  8.0  8.5   HGB  13.7*  12.4*  12.1*   HCT  41.0*  36.7*  36.5*   PLT  121*  123*  153     Recent Labs      11/20/17   0553   NA  135   K  4.3   CL  97*   CO2  27   BUN  16   CREATININE  1.00   CALCIUM  8.3*     No results for input(s): AST, ALT, BILIDIR, BILITOT, ALKPHOS in the last 72 hours. Recent Labs      11/20/17   1018   INR  1.0     No results for input(s): Deja Sexton in the last 72 hours. Urinalysis:      Lab Results   Component Value Date    NITRU Negative 11/18/2017    WBCUA 0-2 11/18/2017    BACTERIA Moderate 11/18/2017    RBCUA 5-10 11/18/2017    BLOODU LARGE 11/18/2017    SPECGRAV 1.027 11/18/2017    GLUCOSEU Negative 11/18/2017     Radiology:  XR Chest Decubitus Right   Final Result      US ABDOMINAL LIMITED   Final Result      XR Chest Portable   Final Result      STABLE POSTOPERATIVE CHEST COMPARED TO 11/18/2017. XR Chest Decubitus Left   Final Result      XR CHEST STANDARD (2 VW)   Final Result      Removal of chest tube. Interval increase right lower lung atelectasis. Possible right pleural effusion. Interval development small right pneumothorax. XR CHEST STANDARD (2 VW)   Final Result      Fluoro For Surgical Procedures   Final Result      XR Chest Portable   Final Result      XR CHEST STANDARD (2 VW)   Final Result      XR Chest Portable   Final Result   NO ACTIVE LUNG DISEASE. XR Chest Portable   Final Result   BRONCHOSCOPY RETRIEVAL PROCEDURE. PLEASE REFER TO OPERATIVE REPORT.             CT Chest W Contrast   Final Result   ASPIRATED FILLING IN RIGHT LOWER LOBE POSTERIOR BASILAR SEGMENTAL BRONCHUS. MILD EMPHYSEMA AND LOWER LOBE CYLINDRICAL BRONCHIECTASIS. ADDITIONAL FINDINGS AS ABOVE. Assessment/Plan:    76 y/o M who presented with a persistent cough; found to have a foreign object in his posterior basilar segment of the RLL           #Foreign object in posterior basilar segment of the RLL now with SOB, hemoptysis     - Pt aspirated a filling which was successfully removed by cardiothoracic surgery      - Pt doing better today but still has hemoptysis; seen by pulm who plan on adding acapella, mucinex and repeat CXR tomorrow    #Gross hematuria     - Seen by Dr. Dawn Ly; likely truama from [facility]; f/u in 4 weeks    #Constipation     - Enema relieved his constipation; continue aggressive regimen of colace, miralaax and senna for now    #Anxiety     - Back on his home remeron and trazodone    #COPD     - Continue to monitor; does not appear to be in an acute exacerbation    #Hypothyroidism     - Continue synthroid     #DMII     SSI       Active Hospital Problems    Diagnosis Date Noted    Aspiration into respiratory tract [T17.908A] 11/10/2017     Additional work up or/and treatment plan may be added today or then after based on clinical progression. I am managing a portion of pt care. Some medical issues are handled by other specialists. Additional work up and treatment should be done in out pt setting by pt PCP and other out pt providers. In addition to examining and evaluating pt, I spent additional time explaining care, normal and abnormal findings, and treatment plan. All of pt questions were answered. Counseling, diet and education were  provided. Case will be discussed with nursing staff when appropriate. Family will be updated if and when appropriate.       Diet: DIET GENERAL;    Code Status: Prior    PT/OT and rehab evals    Electronically signed by Meredith Nunez MD on 11/20/2017 at 1:05 PM

## 2017-11-20 NOTE — PROGRESS NOTES
Urine is clear  Hematuria secondary to catheter trauma  Okay to discharge  Follow-up in the office in 4 weeks  Dr Seymour Plain

## 2017-11-20 NOTE — PROGRESS NOTES
Physical Therapy  Facility/Department: [facility] O266/[id number]  Daily Progress Note    NAME: Michelle Riggins    :  (52 y.o.)  MRN: [id number]    Account: [de-identified]  Gender: male    Date of Service: 17    Patient Diagnosis(es):   Patient Active Problem List    Diagnosis Date Noted    Aspiration into respiratory tract 11/10/2017    Skin cyst 2017    Local infection of skin and subcutaneous tissue 2017    Osteoarthritis of spine with radiculopathy, lumbar region 2016    Foraminal stenosis of lumbosacral region 2016    Elevated PSA 2016    Papillary thyroid carcinoma ([facility] 2015    Acute sinusitis     Anxiety     Insomnia     Hypothyroidism     Type 2 diabetes mellitus ([facility]     BPH (benign prostatic hyperplasia)     GERD (gastroesophageal reflux disease) 2014    HTN (hypertension) 2014    Hyperlipidemia 2014    COPD (chronic obstructive pulmonary disease) ([facility] 2013       Precautions/Weight Bearing Restrictions: Full weight bearing,   Restrictions/Precautions: Fall Risk  Falls Safety Armband Present:  [x] Yes  [] No    SUBJECTIVE: Pt sitting up in recliner prior to tx session, agrreable to tx    Pain:   Initial Pain level: none \"just SOB\"   Location: NA   Description: N/A  Action:  [x]  Pt declined intervention  [] Nursing notified     [x] Pain acceptable level for treatment  [] Other:      Reassessment of Pain: none \"just SOB\"   Location: NA   Description: N/A  Action:  [x]  Pt declined intervention  [] Nursing notified     [x] Pain acceptable level for treatment  [] Other:    OBJECTIVE:     Bed Mobility:   NT - pt up in recliner before and after tx session    Transfers:   Sit to Stand: - Stand by Assist   Stand to Sit: - Stand by Assist     Gait/Ambulation:   Assistive Device: Rolling Walker  Assist Level: Contact Guard Assist  Distance: 50 feet x 2  Surface: linoleum/hardwood  Gait Deviations: crouched gait, 0 LOB, decreased speed and lucy, VC's for upright posture w/ fait follow through, VC' for improved breathing techniques w/ fair follow through, reciprocal, 0 LOB. Exercise:  Administered and reviewed seated and supine HEP. Seated:  AP x20  LAQ x20  Marches x20  Hip add w/ pillow 5''x20  Hip abd (side kicks) x20    Plan   Plan  Times per week: 3-6  Current Treatment Recommendations: Strengthening, Balance Training, Functional Mobility Training, Transfer Training, Endurance Training, Gait Training, Neuromuscular Re-education, Home Exercise Program, Safety Education & Training, Patient/Caregiver Education & Training, Equipment Evaluation, Education, & procurement, Pain Management  Safety Devices  Type of devices: All fall risk precautions in place    Goals  Short term goals  Short term goal 1: Patient will be independent with bed mobility. Short term goal 2: Patient will be independent with transfers. Short term goal 3: Patient will be independent with 150ft of gait using LRD. Short term goal 4: Patient will demonstrate good balance using LRD. Comments: Pt indep w/ HEP. Pt in recliner post tx session w/ call light in reach and alarm activated.     Therapy Time   Individual   Time In 1045   Time Out 1108   Minutes 23     Gait: 10  Transfers: 3  Therex: 10    Electronically signed by Gloria Dean PTA on 11/20/2017 at 11:06 AM

## 2017-11-20 NOTE — PROGRESS NOTES
Evening medications passed. Pt had some complaints of anxiety. Administered 0.5 ativan po. Pt is now resting in recliner watching tv. Cont to monitor.

## 2017-11-20 NOTE — CARE COORDINATION
Patient seen during morning rounds and discussed patient with Dr. Domenic Winn. Upon entering room for rounds patient had blown nose and had a large bright red clot in paper towel. Dr. Domenic Winn states he will discuss with Dr. Candelario Mendoza. Labs ordered, CXR noted by physician. Patient states he still \"feels like he's gonna die every day. \" Need PT/OT for follow-up treatment to assess. Patient would still like [facility].

## 2017-11-21 ENCOUNTER — APPOINTMENT (OUTPATIENT)
Dept: GENERAL RADIOLOGY | Age: 77
DRG: 163 | End: 2017-11-21
Attending: INTERNAL MEDICINE
Payer: MEDICARE

## 2017-11-21 ENCOUNTER — HOSPITAL ENCOUNTER (INPATIENT)
Age: 77
LOS: 14 days | Discharge: HOME HEALTH | DRG: 178 | End: 2017-12-05
Attending: PHYSICAL MEDICINE & REHABILITATION | Admitting: PHYSICAL MEDICINE & REHABILITATION
Payer: MEDICARE

## 2017-11-21 VITALS
WEIGHT: 238 LBS | SYSTOLIC BLOOD PRESSURE: 119 MMHG | BODY MASS INDEX: 32.23 KG/M2 | DIASTOLIC BLOOD PRESSURE: 56 MMHG | TEMPERATURE: 98.2 F | RESPIRATION RATE: 18 BRPM | OXYGEN SATURATION: 95 % | HEIGHT: 72 IN | HEART RATE: 80 BPM

## 2017-11-21 LAB
ANION GAP SERPL CALCULATED.3IONS-SCNC: 14 MEQ/L (ref 7–13)
BASOPHILS ABSOLUTE: 0 K/UL (ref 0–0.2)
BASOPHILS RELATIVE PERCENT: 0.2 %
BUN BLDV-MCNC: 16 MG/DL (ref 8–23)
CALCIUM SERPL-MCNC: 7.8 MG/DL (ref 8.6–10.2)
CHLORIDE BLD-SCNC: 97 MEQ/L (ref 98–107)
CO2: 26 MEQ/L (ref 22–29)
CREAT SERPL-MCNC: 0.9 MG/DL (ref 0.7–1.2)
CULTURE, RESPIRATORY: ABNORMAL
CULTURE, RESPIRATORY: ABNORMAL
EOSINOPHILS ABSOLUTE: 0.4 K/UL (ref 0–0.7)
EOSINOPHILS RELATIVE PERCENT: 4.3 %
GFR AFRICAN AMERICAN: >60
GFR NON-AFRICAN AMERICAN: >60
GLUCOSE BLD-MCNC: 100 MG/DL (ref 60–115)
GLUCOSE BLD-MCNC: 103 MG/DL (ref 60–115)
GLUCOSE BLD-MCNC: 106 MG/DL (ref 60–115)
GLUCOSE BLD-MCNC: 108 MG/DL (ref 74–109)
GLUCOSE BLD-MCNC: 119 MG/DL (ref 60–115)
GRAM STAIN RESULT: ABNORMAL
HCT VFR BLD CALC: 35.3 % (ref 42–52)
HEMOGLOBIN: 11.7 G/DL (ref 14–18)
LYMPHOCYTES ABSOLUTE: 0.8 K/UL (ref 1–4.8)
LYMPHOCYTES RELATIVE PERCENT: 9.5 %
MCH RBC QN AUTO: 31.2 PG (ref 27–31.3)
MCHC RBC AUTO-ENTMCNC: 33.1 % (ref 33–37)
MCV RBC AUTO: 94.2 FL (ref 80–100)
MONOCYTES ABSOLUTE: 0.7 K/UL (ref 0.2–0.8)
MONOCYTES RELATIVE PERCENT: 8.3 %
NEUTROPHILS ABSOLUTE: 6.6 K/UL (ref 1.4–6.5)
NEUTROPHILS RELATIVE PERCENT: 77.7 %
ORGANISM: ABNORMAL
PDW BLD-RTO: 14.6 % (ref 11.5–14.5)
PERFORMED ON: ABNORMAL
PERFORMED ON: NORMAL
PLATELET # BLD: 164 K/UL (ref 130–400)
POTASSIUM SERPL-SCNC: 4.5 MEQ/L (ref 3.5–5.1)
RBC # BLD: 3.74 M/UL (ref 4.7–6.1)
SODIUM BLD-SCNC: 137 MEQ/L (ref 132–144)
WBC # BLD: 8.5 K/UL (ref 4.8–10.8)

## 2017-11-21 PROCEDURE — 85025 COMPLETE CBC W/AUTO DIFF WBC: CPT

## 2017-11-21 PROCEDURE — 94664 DEMO&/EVAL PT USE INHALER: CPT

## 2017-11-21 PROCEDURE — 94640 AIRWAY INHALATION TREATMENT: CPT

## 2017-11-21 PROCEDURE — 99232 SBSQ HOSP IP/OBS MODERATE 35: CPT | Performed by: INTERNAL MEDICINE

## 2017-11-21 PROCEDURE — 6370000000 HC RX 637 (ALT 250 FOR IP): Performed by: INTERNAL MEDICINE

## 2017-11-21 PROCEDURE — 97535 SELF CARE MNGMENT TRAINING: CPT

## 2017-11-21 PROCEDURE — 80048 BASIC METABOLIC PNL TOTAL CA: CPT

## 2017-11-21 PROCEDURE — 1180000000 HC REHAB R&B

## 2017-11-21 PROCEDURE — 97116 GAIT TRAINING THERAPY: CPT

## 2017-11-21 PROCEDURE — 6370000000 HC RX 637 (ALT 250 FOR IP): Performed by: THORACIC SURGERY (CARDIOTHORACIC VASCULAR SURGERY)

## 2017-11-21 PROCEDURE — 94760 N-INVAS EAR/PLS OXIMETRY 1: CPT

## 2017-11-21 PROCEDURE — 71020 XR CHEST STANDARD TWO VW: CPT

## 2017-11-21 PROCEDURE — 2700000000 HC OXYGEN THERAPY PER DAY

## 2017-11-21 PROCEDURE — 36415 COLL VENOUS BLD VENIPUNCTURE: CPT

## 2017-11-21 RX ORDER — DOCUSATE SODIUM 100 MG/1
100 CAPSULE, LIQUID FILLED ORAL 2 TIMES DAILY
Status: CANCELLED | OUTPATIENT
Start: 2017-11-21

## 2017-11-21 RX ORDER — SODIUM CHLORIDE FOR INHALATION 0.9 %
3 VIAL, NEBULIZER (ML) INHALATION
Status: DISCONTINUED | OUTPATIENT
Start: 2017-11-21 | End: 2017-11-23

## 2017-11-21 RX ORDER — ONDANSETRON 2 MG/ML
4 INJECTION INTRAMUSCULAR; INTRAVENOUS EVERY 6 HOURS PRN
Status: CANCELLED | OUTPATIENT
Start: 2017-11-21

## 2017-11-21 RX ORDER — BISACODYL 10 MG
10 SUPPOSITORY, RECTAL RECTAL DAILY PRN
Status: CANCELLED | OUTPATIENT
Start: 2017-11-21

## 2017-11-21 RX ORDER — DEXTROSE MONOHYDRATE 25 G/50ML
12.5 INJECTION, SOLUTION INTRAVENOUS PRN
Status: CANCELLED | OUTPATIENT
Start: 2017-11-21

## 2017-11-21 RX ORDER — HYDRALAZINE HYDROCHLORIDE 20 MG/ML
10 INJECTION INTRAMUSCULAR; INTRAVENOUS
Status: DISCONTINUED | OUTPATIENT
Start: 2017-11-21 | End: 2017-12-05 | Stop reason: HOSPADM

## 2017-11-21 RX ORDER — DEXTROSE MONOHYDRATE 50 MG/ML
100 INJECTION, SOLUTION INTRAVENOUS PRN
Status: CANCELLED | OUTPATIENT
Start: 2017-11-21

## 2017-11-21 RX ORDER — IPRATROPIUM BROMIDE AND ALBUTEROL SULFATE 2.5; .5 MG/3ML; MG/3ML
1 SOLUTION RESPIRATORY (INHALATION) 3 TIMES DAILY
Status: DISCONTINUED | OUTPATIENT
Start: 2017-11-21 | End: 2017-12-05 | Stop reason: HOSPADM

## 2017-11-21 RX ORDER — SENNA AND DOCUSATE SODIUM 50; 8.6 MG/1; MG/1
2 TABLET, FILM COATED ORAL NIGHTLY
Status: CANCELLED | OUTPATIENT
Start: 2017-11-21

## 2017-11-21 RX ORDER — IPRATROPIUM BROMIDE AND ALBUTEROL SULFATE 2.5; .5 MG/3ML; MG/3ML
1 SOLUTION RESPIRATORY (INHALATION) 3 TIMES DAILY
Status: CANCELLED | OUTPATIENT
Start: 2017-11-21

## 2017-11-21 RX ORDER — OXYCODONE HYDROCHLORIDE AND ACETAMINOPHEN 5; 325 MG/1; MG/1
1 TABLET ORAL EVERY 6 HOURS PRN
Status: CANCELLED | OUTPATIENT
Start: 2017-11-21

## 2017-11-21 RX ORDER — SODIUM CHLORIDE FOR INHALATION 0.9 %
3 VIAL, NEBULIZER (ML) INHALATION
Status: DISCONTINUED | OUTPATIENT
Start: 2017-11-21 | End: 2017-11-21 | Stop reason: HOSPADM

## 2017-11-21 RX ORDER — DEXTROSE MONOHYDRATE 25 G/50ML
12.5 INJECTION, SOLUTION INTRAVENOUS PRN
Status: DISCONTINUED | OUTPATIENT
Start: 2017-11-21 | End: 2017-12-05 | Stop reason: HOSPADM

## 2017-11-21 RX ORDER — NICOTINE POLACRILEX 4 MG
15 LOZENGE BUCCAL PRN
Status: CANCELLED | OUTPATIENT
Start: 2017-11-21

## 2017-11-21 RX ORDER — LEVOTHYROXINE SODIUM 0.12 MG/1
125 TABLET ORAL DAILY
Status: CANCELLED | OUTPATIENT
Start: 2017-11-22

## 2017-11-21 RX ORDER — BISACODYL 10 MG
10 SUPPOSITORY, RECTAL RECTAL DAILY PRN
Status: DISCONTINUED | OUTPATIENT
Start: 2017-11-21 | End: 2017-12-05 | Stop reason: HOSPADM

## 2017-11-21 RX ORDER — TAMSULOSIN HYDROCHLORIDE 0.4 MG/1
0.4 CAPSULE ORAL DAILY
Status: DISCONTINUED | OUTPATIENT
Start: 2017-11-22 | End: 2017-11-22

## 2017-11-21 RX ORDER — ALBUTEROL SULFATE 2.5 MG/3ML
2.5 SOLUTION RESPIRATORY (INHALATION)
Status: CANCELLED | OUTPATIENT
Start: 2017-11-21

## 2017-11-21 RX ORDER — LORAZEPAM 0.5 MG/1
0.5 TABLET ORAL EVERY 6 HOURS PRN
Status: DISCONTINUED | OUTPATIENT
Start: 2017-11-21 | End: 2017-12-05 | Stop reason: HOSPADM

## 2017-11-21 RX ORDER — GUAIFENESIN 600 MG/1
600 TABLET, EXTENDED RELEASE ORAL 2 TIMES DAILY
Status: CANCELLED | OUTPATIENT
Start: 2017-11-21

## 2017-11-21 RX ORDER — METOPROLOL SUCCINATE 25 MG/1
25 TABLET, EXTENDED RELEASE ORAL DAILY
Status: DISCONTINUED | OUTPATIENT
Start: 2017-11-22 | End: 2017-12-05 | Stop reason: HOSPADM

## 2017-11-21 RX ORDER — MIRTAZAPINE 15 MG/1
15 TABLET, FILM COATED ORAL NIGHTLY
Status: DISCONTINUED | OUTPATIENT
Start: 2017-11-21 | End: 2017-12-05 | Stop reason: HOSPADM

## 2017-11-21 RX ORDER — TAMSULOSIN HYDROCHLORIDE 0.4 MG/1
0.4 CAPSULE ORAL DAILY
Status: CANCELLED | OUTPATIENT
Start: 2017-11-22

## 2017-11-21 RX ORDER — OXYCODONE HYDROCHLORIDE AND ACETAMINOPHEN 5; 325 MG/1; MG/1
1 TABLET ORAL EVERY 6 HOURS PRN
Status: DISCONTINUED | OUTPATIENT
Start: 2017-11-21 | End: 2017-12-05 | Stop reason: HOSPADM

## 2017-11-21 RX ORDER — DOCUSATE SODIUM 100 MG/1
100 CAPSULE, LIQUID FILLED ORAL 2 TIMES DAILY
Status: DISCONTINUED | OUTPATIENT
Start: 2017-11-21 | End: 2017-11-26

## 2017-11-21 RX ORDER — LORAZEPAM 0.5 MG/1
0.5 TABLET ORAL EVERY 6 HOURS PRN
Status: CANCELLED | OUTPATIENT
Start: 2017-11-21

## 2017-11-21 RX ORDER — TRAZODONE HYDROCHLORIDE 150 MG/1
150 TABLET ORAL NIGHTLY
Status: CANCELLED | OUTPATIENT
Start: 2017-11-21

## 2017-11-21 RX ORDER — POLYETHYLENE GLYCOL 3350 17 G/17G
17 POWDER, FOR SOLUTION ORAL DAILY
Status: CANCELLED | OUTPATIENT
Start: 2017-11-22

## 2017-11-21 RX ORDER — ONDANSETRON 2 MG/ML
4 INJECTION INTRAMUSCULAR; INTRAVENOUS EVERY 6 HOURS PRN
Status: DISCONTINUED | OUTPATIENT
Start: 2017-11-21 | End: 2017-12-05 | Stop reason: HOSPADM

## 2017-11-21 RX ORDER — MIRTAZAPINE 15 MG/1
15 TABLET, FILM COATED ORAL NIGHTLY
Status: CANCELLED | OUTPATIENT
Start: 2017-11-21

## 2017-11-21 RX ORDER — FINASTERIDE 5 MG/1
5 TABLET, FILM COATED ORAL DAILY
Status: DISCONTINUED | OUTPATIENT
Start: 2017-11-22 | End: 2017-12-05 | Stop reason: HOSPADM

## 2017-11-21 RX ORDER — GUAIFENESIN 600 MG/1
600 TABLET, EXTENDED RELEASE ORAL 2 TIMES DAILY
Status: DISCONTINUED | OUTPATIENT
Start: 2017-11-21 | End: 2017-12-05 | Stop reason: HOSPADM

## 2017-11-21 RX ORDER — POLYETHYLENE GLYCOL 3350 17 G/17G
17 POWDER, FOR SOLUTION ORAL DAILY
Status: DISCONTINUED | OUTPATIENT
Start: 2017-11-22 | End: 2017-11-24

## 2017-11-21 RX ORDER — FINASTERIDE 5 MG/1
5 TABLET, FILM COATED ORAL DAILY
Status: CANCELLED | OUTPATIENT
Start: 2017-11-22

## 2017-11-21 RX ORDER — NICOTINE POLACRILEX 4 MG
15 LOZENGE BUCCAL PRN
Status: DISCONTINUED | OUTPATIENT
Start: 2017-11-21 | End: 2017-11-22

## 2017-11-21 RX ORDER — SENNA AND DOCUSATE SODIUM 50; 8.6 MG/1; MG/1
2 TABLET, FILM COATED ORAL NIGHTLY
Status: DISCONTINUED | OUTPATIENT
Start: 2017-11-21 | End: 2017-12-05 | Stop reason: HOSPADM

## 2017-11-21 RX ORDER — HYDRALAZINE HYDROCHLORIDE 20 MG/ML
10 INJECTION INTRAMUSCULAR; INTRAVENOUS
Status: CANCELLED | OUTPATIENT
Start: 2017-11-21

## 2017-11-21 RX ORDER — TRAZODONE HYDROCHLORIDE 150 MG/1
150 TABLET ORAL NIGHTLY
Status: DISCONTINUED | OUTPATIENT
Start: 2017-11-21 | End: 2017-12-05 | Stop reason: HOSPADM

## 2017-11-21 RX ORDER — DEXTROSE MONOHYDRATE 50 MG/ML
100 INJECTION, SOLUTION INTRAVENOUS PRN
Status: DISCONTINUED | OUTPATIENT
Start: 2017-11-21 | End: 2017-12-05 | Stop reason: HOSPADM

## 2017-11-21 RX ORDER — SODIUM CHLORIDE FOR INHALATION 0.9 %
3 VIAL, NEBULIZER (ML) INHALATION
Status: CANCELLED | OUTPATIENT
Start: 2017-11-21

## 2017-11-21 RX ORDER — METOPROLOL SUCCINATE 25 MG/1
25 TABLET, EXTENDED RELEASE ORAL DAILY
Status: CANCELLED | OUTPATIENT
Start: 2017-11-22

## 2017-11-21 RX ORDER — ALBUTEROL SULFATE 2.5 MG/3ML
2.5 SOLUTION RESPIRATORY (INHALATION)
Status: DISCONTINUED | OUTPATIENT
Start: 2017-11-21 | End: 2017-12-05 | Stop reason: HOSPADM

## 2017-11-21 RX ADMIN — IPRATROPIUM BROMIDE AND ALBUTEROL SULFATE 1 AMPULE: .5; 3 SOLUTION RESPIRATORY (INHALATION) at 14:48

## 2017-11-21 RX ADMIN — IPRATROPIUM BROMIDE AND ALBUTEROL SULFATE 1 AMPULE: .5; 3 SOLUTION RESPIRATORY (INHALATION) at 19:30

## 2017-11-21 RX ADMIN — TAMSULOSIN HYDROCHLORIDE 0.4 MG: 0.4 CAPSULE ORAL at 08:14

## 2017-11-21 RX ADMIN — IPRATROPIUM BROMIDE AND ALBUTEROL SULFATE 1 AMPULE: .5; 3 SOLUTION RESPIRATORY (INHALATION) at 09:19

## 2017-11-21 RX ADMIN — GUAIFENESIN 600 MG: 600 TABLET, EXTENDED RELEASE ORAL at 08:14

## 2017-11-21 RX ADMIN — MAGESIUM CITRATE 296 ML: 1.75 LIQUID ORAL at 12:24

## 2017-11-21 RX ADMIN — DOCUSATE SODIUM 100 MG: 100 CAPSULE, LIQUID FILLED ORAL at 21:39

## 2017-11-21 RX ADMIN — SENNOSIDES AND DOCUSATE SODIUM 2 TABLET: 8.6; 5 TABLET ORAL at 21:39

## 2017-11-21 RX ADMIN — POLYETHYLENE GLYCOL 3350 17 G: 17 POWDER, FOR SOLUTION ORAL at 08:14

## 2017-11-21 RX ADMIN — FINASTERIDE 5 MG: 5 TABLET, FILM COATED ORAL at 08:14

## 2017-11-21 RX ADMIN — ISODIUM CHLORIDE 3 ML: 0.03 SOLUTION RESPIRATORY (INHALATION) at 19:30

## 2017-11-21 RX ADMIN — TRAZODONE HYDROCHLORIDE 150 MG: 150 TABLET ORAL at 21:39

## 2017-11-21 RX ADMIN — ISODIUM CHLORIDE 3 ML: 0.03 SOLUTION RESPIRATORY (INHALATION) at 12:06

## 2017-11-21 RX ADMIN — GUAIFENESIN 600 MG: 600 TABLET, EXTENDED RELEASE ORAL at 21:39

## 2017-11-21 RX ADMIN — MIRTAZAPINE 15 MG: 15 TABLET, FILM COATED ORAL at 21:39

## 2017-11-21 RX ADMIN — DOCUSATE SODIUM 100 MG: 100 CAPSULE, LIQUID FILLED ORAL at 08:14

## 2017-11-21 RX ADMIN — METOPROLOL SUCCINATE 25 MG: 25 TABLET, FILM COATED, EXTENDED RELEASE ORAL at 08:14

## 2017-11-21 RX ADMIN — LEVOTHYROXINE SODIUM 125 MCG: 125 TABLET ORAL at 05:50

## 2017-11-21 ASSESSMENT — PAIN SCALES - GENERAL: PAINLEVEL_OUTOF10: 2

## 2017-11-21 NOTE — PROGRESS NOTES
[facility] EMERGENCY [facility] AT [place] Respiratory Therapy Evaluation   Current Order:  Duoneb TID & Alb Q2PRN     Home Regimen: Q4PRN MDI      Ordering Physician: Syed Butterfield  Re-evaluation Date:  11/24     Diagnosis: Aspiration Pn      Patient Status: Stable     The following MDI Criteria must be met in order to convert aerosol to MDI with spacer. If unable to meet, MDI will be converted to aerosol:  []  Patient able to demonstrate the ability to use MDI effectively  []  Patient alert and cooperative  []  Patient able to take deep breath with 5-10 second hold  []  Medication(s) available in this delivery method   []  Peak flow greater than or equal to 200 ml/min            Current Order Substituted To  (same drug, same frequency)   Aerosol to MDI [] Albuterol Sulfate 0.083% unit dose by aerosol Albuterol Sulfate MDI 2 puffs by inhalation with spacer    [] Levalbuterol 1.25 mg unit dose by aerosol Levalbuterol MDI 2 puffs by inhalation with spacer    [] Levalbuterol 0.63 mg unit dose by aerosol Levalbuterol MDI 2 puffs by inhalation with spacer    [] Ipratropium Bromide 0.02% unit dose by aerosol Ipratropium Bromide MDI 2 puffs by inhalation with spacer    [] Duoneb (Ipratropium + Albuterol) unit dose by aerosol Ipratropium MDI + Albuterol MDI 2 puffs by inhalation w/spacer   MDI to Aerosol [] Albuterol Sulfate MDI Albuterol Sulfate 0.083% unit dose by aerosol    [] Levalbuterol MDI 2 puffs by inhalation Levalbuterol 1.25 mg unit dose by aerosol    [] Ipratropium Bromide MDI by inhalation Ipratropium Bromide 0.02% unit dose by aerosol    [] Combivent (Ipratropium + Albuterol) MDI by inhalation Duoneb (Ipratropium + Albuterol) unit dose by aerosol   Treatment Assessment [Frequency/Schedule]:  Change frequency to: ________no changes________________________________per Protocol, P&T, MEC      Points 0 1 2 3 4   Pulmonary Status  Non-Smoker  []   Smoking history   < 20 pack years  []   Smoking history  ?  20 pack years  []   Pulmonary Disorder  (acute or chronic)  [x]   Severe or Chronic w/ Exacerbation  []     Surgical Status No [x]   Surgeries     General []   Surgery Lower []   Abdominal Thoracic or []   Upper Abdominal Thoracic with  PulmonaryDisorder  []     Chest X-ray Clear/Not  Ordered     []  Chronic Changes  Results Pending  []  Infiltrates, atelectasis, pleural effusion, or edema  [x]  Infiltrates in more than one lobe []  Infiltrate + Atelectasis, &/or pleural effusion  []    Respiratory Pattern Regular,  RR = 12-20 [x]  Increased,  RR = 21-25 []  STEWART, irregular,  or RR = 26-30 []  Decreased FEV1  or RR = 31-35 []  Severe SOB, use  of accessory muscles, or RR ? 35  []    Mental Status Alert, oriented,  Cooperative [x]  Confused but Follows commands []  Lethargic or unable to follow commands []  Obtunded  []  Comatose  []    Breath Sounds Clear to  auscultation  []  Decreased unilaterally or  in bases only [x]  Decreased  bilaterally  []  Crackles or intermittent wheezes []  Wheezes []    Cough Strong, Spontan., & nonproductive [x]  Strong,  spontaneous, &  productive []  Weak,  Nonproductive []  Weak, productive or  with wheezes []  No spontaneous  cough or may require suctioning []    Level of Activity Ambulatory [x]  Ambulatory w/ Assist  []  Non-ambulatory []  Paraplegic []  Quadriplegic []    Total    Score:____6___     Triage Score:_____4___      Tri       Triage:     1. (>20) Freq: Q3    2. (16-20) Freq: Q4   3. (11-15) Freq: QID & Albuterol Q2 PRN    4. (6-10) Freq: TID & Albuterol Q2 PRN    5. (0-5) Freq Q4prn

## 2017-11-21 NOTE — PROGRESS NOTES
Hospitalist Progress Note      PCP: Moreen Galeazzi, MD    Date of Admission: 11/10/2017    Chief Complaint:    Persistent cough    Subjective:  Patient is doing well today; feels like his breathing is better. 12 point ROS negative otherwise. Medications:  Reviewed    Infusion Medications    dextrose       Scheduled Medications    sodium chloride nebulizer  3 mL Nebulization Q4H While awake    guaiFENesin  600 mg Oral BID    mirtazapine  15 mg Oral Nightly    ipratropium-albuterol  1 ampule Inhalation TID    metoprolol succinate  25 mg Oral Daily    traZODone  150 mg Oral Nightly    polyethylene glycol  17 g Oral Daily    sennosides-docusate sodium  2 tablet Oral Nightly    finasteride  5 mg Oral Daily    tamsulosin  0.4 mg Oral Daily    docusate sodium  100 mg Oral BID    levothyroxine  125 mcg Oral Daily    insulin lispro  0-6 Units Subcutaneous TID WC    insulin lispro  0-3 Units Subcutaneous Nightly     PRN Meds: oxyCODONE-acetaminophen, bisacodyl, albuterol, magnesium hydroxide, LORazepam, ondansetron, glucose, dextrose, glucagon (rDNA), dextrose, hydrALAZINE    Intake/Output Summary (Last 24 hours) at 11/21/17 1555  Last data filed at 11/21/17 1158   Gross per 24 hour   Intake              480 ml   Output              925 ml   Net             -445 ml     Exam:    BP (!) 119/56   Pulse 80   Temp 98.2 °F (36.8 °C) (Oral)   Resp 18   Ht 6' (1.829 m)   Wt 238 lb (108 kg)   SpO2 95%   BMI 32.28 kg/m²     General appearance: No apparent distress, appears stated age and cooperative  HEENT: Pupils equal, round, and reactive to light. Conjunctivae/corneas clear. Neck: Supple, with full range of motion. No jugular venous distention. Trachea midline. Respiratory:  Normal respiratory effort. Improved breath sounds; minimal crackles today; chest tube site and wound site both look healthy   Cardiovascular: Regular rate and rhythm with normal S1/S2 without murmurs, rubs or gallops.   Abdomen:

## 2017-11-21 NOTE — PROGRESS NOTES
INPATIENT PROGRESS NOTES    PATIENT NAME: Pema Brown  MRN: [id number]  SERVICE DATE:  November 21, 2017   SERVICE TIME:  10:34 AM      PRIMARY SERVICE:  Pulmonary Medicine     INTERVAL HPI: Patient seen and examined at bedside, Interval Notes, orders reviewed. Nursing notes noted: Patient reports he is feeling a little bit better today. He is going down for his chest x-ray shortly. He is still having some issues with a bloody nose likely from the irritation from constant oxygen therapy. Patient reports some mild blood streaked sputum production. He is status post thoracotomy for aspirated foreign body removal.  Patient denies any chest pain, fever, chills, pleuritic pain, diarrhea and abdominal pain. Patient is still having some exertional shortness of breath he is on 2 L of oxygen via nasal cannula. Review of Systems  Please see HPI above. OBJECTIVE    Body mass index is 32.28 kg/m². PHYSICAL EXAM:  Vitals:  /70   Pulse 81   Temp 98.1 °F (36.7 °C) (Oral)   Resp 18   Ht 6' (1.829 m)   Wt 238 lb (108 kg)   SpO2 96%   BMI 32.28 kg/m²   General: Patient is comfortable in bed, No distress. Head: Atraumatic , Normocephalic   Eyes: PERRL. No sclera icterus. No conjunctival injection. No discharge   ENT: No nasal  discharge. Pharynx clear. Neck:  Trachea midline. No thyromegaly, no JVD, No cervical adenopathy. Resp : Diminished breath sounds especially at the right base but otherwise clear to auscultation without wheeze rhonchi or rails. Normal effort with no accessory muscle use  CV: Normal  rate. Regular rhythm. No mumur ,  Rub or gallop  ABD: Non-tender. Non-distended. No masses. No organmegaly. Normal bowel sounds. No hernia. EXT: Trace bilateral lower extremity Pitting, No Cyanosis No clubbing  Neuro: no focal weakness  Skin: Warm and dry. No erythema rash on exposed extremities.         DATA:   Recent Labs      11/20/17   0553  11/21/17   0549   WBC  8.5  8.5   HGB  12.1*  11.7* costophrenic angle is slightly blunted. No focal areas of consolidations are noted. No pneumothorax is seen. The osseous structures are grossly unremarkable. IMPRESSION A radiopaque density is now seen in the region of the right lower lobe bronchus. Ct Chest W Contrast    Result Date: 11/11/2017  EXAMINATION:  CT CHEST W CONTRAST CLINICAL HISTORY:  ACUTE RESP ILLNESS, >37YEARS OLD  status post bronchoscopy to retrieve aspirated dental amalgam, unsuccessful. COMPARISONS:  12/2/2013 TECHNIQUE:  Spiral scans with 75 mL Isovue-370 IV. Multiplanar 2-D reconstructions. Axial 3-D 10 x 3 mm MIP reconstructions were performed. All CT scans at this facility use dose modulation, iterative reconstruction, and/or weight based dosing when appropriate to reduce radiation dose to as low as reasonably achievable. FINDINGS:  The known aspirated filling is identified within the distal segmental bronchus of the right lower lobe posterior basilar segment. Filling measures approximately 9 mm. There are mild emphysematous changes. There is mild scattered peripheral reticularity without definite honeycombing. There are no consolidations or effusions. There is right upper lung perifissural nodularity consistent with small intrapulmonary lymph nodes. There is a left upper lobe 4 mm nodule (series 2 image 21), unchanged from prior examination of 12/2/2013, and therefore benign. There are no suspicious lung nodules. There is mild bilateral lower lobe cylindrical bronchiectasis. Cardiac size and pulmonary vascularity are normal. There is mild mediastinal lipomatosis. There is mild thickening or trace fluid in the anterior pericardium, decreased compared to prior examination 12/2/2013. There are coronary artery calcifications. There are aortic calcifications. There is no evidence of aneurysm or dissection.  There are borderline in size lymph nodes in the mediastinum anterior to the left mainstem bronchus and in the right hilum, unchanged FINDINGS: Postoperative change from previous subtotal right pneumonectomy appears unchanged with mild probable scarring and/or atelectasis of the right lung base. A possible trace right pneumothorax and trace right pleural effusion appear unchanged. Minimal probable atelectasis of the left lung base is again noted. There is no other significant changes identified. STABLE POSTOPERATIVE CHEST COMPARED TO 11/18/2017. ASSESSMENT /Plan:  1. Aspiration of foreign object in the posterior basilar segment of the right lower lobe s/p rt. Thoracotomy and segmental wedge resection. 2. Secondary lower respiratory tract infection due to aspirated foreign object  3. 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 Hematuria    Patient is going down for repeat chest x-ray shortly.   Continue current treatment plan with pulmonary toileting, Mucinex, and DuoNeb    Electronically signed by Vanesa Santillan PA-C on 11/21/2017 at 10:34 AM

## 2017-11-21 NOTE — PROGRESS NOTES
Physical Therapy  Facility/Department: [facility] MED SURG [id number]  Daily Progress Note    NAME: Shena Lindo    :  (79 y.o.)  MRN: [id number]    Account: [de-identified]  Gender: male    Date of Service: 17    Patient Diagnosis(es):   Patient Active Problem List    Diagnosis Date Noted    Abnormality of gait and mobility w/debility secondary to aspiration pneumonia due to dental filling/crown, [facility] admit 17     Vitamin D deficiency     Asp[facility] pneumonia ([facility]     Debility     On home oxygen therapy     Aspiration into respiratory tract 11/10/2017    Skin cyst 2017    Local infection of skin and subcutaneous tissue 2017    Osteoarthritis of spine with radiculopathy, lumbar region 2016    Foraminal stenosis of lumbosacral region 2016    Elevated PSA 2016    Papillary thyroid carcinoma ([facility] 2015    Acute sinusitis     Anxiety     Insomnia     Hypothyroidism     Type 2 diabetes mellitus ([facility])     BPH (benign prostatic hyperplasia)     GERD (gastroesophageal reflux disease) 2014    HTN (hypertension) 2014    Hyperlipidemia 2014    COPD (chronic obstructive pulmonary disease) ([facility] 2013       Precautions/Weight Bearing Restrictions:   Restrictions/Precautions: Fall Risk  Falls Safety Armband Present:  [x] Yes  [] No    SUBJECTIVE: I'm going to rehab today.   Pain:   Initial Pain level: none     Reassessment of Pain: none     OBJECTIVE:     Bed Mobility:   Rolling: - Stand by Assist  Supine to Sit: -  Stand by Assist   Sit to Supine: -  Stand by Assist    vc's for technique   Quick paced   vc's for breathing technique    Transfers:   Sit to Stand: - Stand by Assist   Stand to Sit: - Stand by Assist   vc's for technique    Gait/Ambulation:   Assistive Device: Rolling Walker  Assist Level: Stand by Assist  Distance: 50'x2  Surface: linoleum/hardwood  Gait Deviations: Reciprocal gait, increased bracing on WW, steady lucy, increased SOB; vc's for breathing technique and for improved posture. SAO2 - 98% before amb and 86-93% after amb on RA. Donned nasal cannula to recover. Exercise:  Pt doing HEP in room Indep. Plan   Plan  Times per week: 3-6  Current Treatment Recommendations: Strengthening, Balance Training, Functional Mobility Training, Transfer Training, Endurance Training, Gait Training, Neuromuscular Re-education, Home Exercise Program, Safety Education & Training, Patient/Caregiver Education & Training, Equipment Evaluation, Education, & procurement, Pain Management  e    Goals  Short term goals  Short term goal 1: Patient will be independent with bed mobility. Short term goal 2: Patient will be independent with transfers. Short term goal 3: Patient will be independent with 150ft of gait using LRD. Short term goal 4: Patient will demonstrate good balance using LRD.      Comments: Pt up in chair with call button within reach and chair alarm activated      Therapy Time   Individual   Time In 1445   Time Out 1510   Minutes 25     Gait - 15 mins  Bm/trsf - 10  mins    Electronically signed by Artemio Malcolm PTA on 11/21/2017 at 3:16 PM

## 2017-11-21 NOTE — DISCHARGE SUMMARY
Hospital Medicine Discharge Summary    Josué Melendez  :    MRN:  [id number]    Admit date:  11/10/2017  Discharge date:  2017    Admitting Physician:  No admitting provider for patient encounter. Primary Care Physician:  Mellisa Davila MD      Discharge Diagnoses: Active Problems:    Aspiration into respiratory tract    No chief complaint on file. Hospital Course:   Josué Melendez is a 69 y.o. male that was admitted and treated at [facility] for the following medical issues: Active Problems:    Aspiration into respiratory tract    78 y/o M who presented with a persistent cough; found to have a foreign object in his posterior basilar segment of the RLL. The patient was found to have a filling that he had aspirated requiring removal by cardiothoracic surgery. After appropriate pulmonary management afterwards he was discharged to rehab in a stable condition. Exam on discharge:   BP (!) 119/56   Pulse 80   Temp 98.2 °F (36.8 °C) (Oral)   Resp 18   Ht 6' (1.829 m)   Wt 238 lb (108 kg)   SpO2 95%   BMI 32.28 kg/m²   General appearance: No apparent distress, appears stated age and cooperative  HEENT: Pupils equal, round, and reactive to light. Conjunctivae/corneas clear. Neck: Supple, with full range of motion. No jugular venous distention. Trachea midline. Respiratory:  Normal respiratory effort. Improved breath sounds; minimal crackles today; chest tube site and wound site both look healthy   Cardiovascular: Regular rate and rhythm with normal S1/S2 without murmurs, rubs or gallops. Abdomen: Soft, non-tender, non-distended with normal bowel sounds. Musculoskeletal: No clubbing, cyanosis or edema bilaterally.     Neuro: Non Focal. Intact and symmetric  Psychiatric: Alert and oriented, thought content appropriate, normal insight  Capillary Refill: Brisk,< 3 seconds   Peripheral Pulses: +2 palpable, equal bilaterally     Patient was seen by the following

## 2017-11-21 NOTE — PROGRESS NOTES
Pulmonary Disorder  (acute or chronic)  [x]   Severe or Chronic w/ Exacerbation  []     Surgical Status No []   Surgeries     General [x]   Surgery Lower []   Abdominal Thoracic or []   Upper Abdominal Thoracic with  PulmonaryDisorder  []     Chest X-ray Clear/Not  Ordered     []  Chronic Changes  Results Pending  []  Infiltrates, atelectasis, pleural effusion, or edema  [x]  Infiltrates in more than one lobe []  Infiltrate + Atelectasis, &/or pleural effusion  []    Respiratory Pattern Regular,  RR = 12-20 [x]  Increased,  RR = 21-25 []  STEWART, irregular,  or RR = 26-30 []  Decreased FEV1  or RR = 31-35 []  Severe SOB, use  of accessory muscles, or RR ? 35  []    Mental Status Alert, oriented,  Cooperative [x]  Confused but Follows commands []  Lethargic or unable to follow commands []  Obtunded  []  Comatose  []    Breath Sounds Clear to  auscultation  []  Decreased unilaterally or  in bases only [x]  Decreased  bilaterally  []  Crackles or intermittent wheezes []  Wheezes []    Cough Strong, Spontan., & nonproductive [x]  Strong,  spontaneous, &  productive []  Weak,  Nonproductive []  Weak, productive or  with wheezes []  No spontaneous  cough or may require suctioning []    Level of Activity Ambulatory [x]  Ambulatory w/ Assist  []  Non-ambulatory []  Paraplegic []  Quadriplegic []    Total    Score:__6_____     Triage Score:__4______      Tri       Triage:     1. (>20) Freq: Q3    2. (16-20) Freq: Q4   3. (11-15) Freq: QID & Albuterol Q2 PRN    4. (6-10) Freq: TID & Albuterol Q2 PRN    5. (0-5) Freq Q4prn

## 2017-11-22 ENCOUNTER — APPOINTMENT (OUTPATIENT)
Dept: GENERAL RADIOLOGY | Age: 77
DRG: 178 | End: 2017-11-22
Attending: PHYSICAL MEDICINE & REHABILITATION
Payer: MEDICARE

## 2017-11-22 LAB
ANION GAP SERPL CALCULATED.3IONS-SCNC: 11 MEQ/L (ref 7–13)
BASOPHILS ABSOLUTE: 0 K/UL (ref 0–0.2)
BASOPHILS RELATIVE PERCENT: 0.4 %
BUN BLDV-MCNC: 17 MG/DL (ref 8–23)
CALCIUM SERPL-MCNC: 7.9 MG/DL (ref 8.6–10.2)
CHLORIDE BLD-SCNC: 100 MEQ/L (ref 98–107)
CO2: 28 MEQ/L (ref 22–29)
CREAT SERPL-MCNC: 0.88 MG/DL (ref 0.7–1.2)
EOSINOPHILS ABSOLUTE: 0.4 K/UL (ref 0–0.7)
EOSINOPHILS RELATIVE PERCENT: 4 %
GFR AFRICAN AMERICAN: >60
GFR NON-AFRICAN AMERICAN: >60
GLUCOSE BLD-MCNC: 118 MG/DL (ref 60–115)
GLUCOSE BLD-MCNC: 98 MG/DL (ref 74–109)
HCT VFR BLD CALC: 35 % (ref 42–52)
HEMOGLOBIN: 11.7 G/DL (ref 14–18)
LYMPHOCYTES ABSOLUTE: 1 K/UL (ref 1–4.8)
LYMPHOCYTES RELATIVE PERCENT: 10.6 %
MCH RBC QN AUTO: 31.8 PG (ref 27–31.3)
MCHC RBC AUTO-ENTMCNC: 33.5 % (ref 33–37)
MCV RBC AUTO: 95 FL (ref 80–100)
MONOCYTES ABSOLUTE: 0.7 K/UL (ref 0.2–0.8)
MONOCYTES RELATIVE PERCENT: 7.8 %
NEUTROPHILS ABSOLUTE: 7.1 K/UL (ref 1.4–6.5)
NEUTROPHILS RELATIVE PERCENT: 77.2 %
PDW BLD-RTO: 15.1 % (ref 11.5–14.5)
PERFORMED ON: ABNORMAL
PLATELET # BLD: 187 K/UL (ref 130–400)
POTASSIUM SERPL-SCNC: 4.4 MEQ/L (ref 3.5–5.1)
RBC # BLD: 3.68 M/UL (ref 4.7–6.1)
SODIUM BLD-SCNC: 139 MEQ/L (ref 132–144)
WBC # BLD: 9.2 K/UL (ref 4.8–10.8)

## 2017-11-22 PROCEDURE — 97161 PT EVAL LOW COMPLEX 20 MIN: CPT

## 2017-11-22 PROCEDURE — 97530 THERAPEUTIC ACTIVITIES: CPT

## 2017-11-22 PROCEDURE — 97110 THERAPEUTIC EXERCISES: CPT | Performed by: INTERNAL MEDICINE

## 2017-11-22 PROCEDURE — 80048 BASIC METABOLIC PNL TOTAL CA: CPT

## 2017-11-22 PROCEDURE — 6360000002 HC RX W HCPCS: Performed by: INTERNAL MEDICINE

## 2017-11-22 PROCEDURE — 74230 X-RAY XM SWLNG FUNCJ C+: CPT

## 2017-11-22 PROCEDURE — 85025 COMPLETE CBC W/AUTO DIFF WBC: CPT

## 2017-11-22 PROCEDURE — 94760 N-INVAS EAR/PLS OXIMETRY 1: CPT

## 2017-11-22 PROCEDURE — 94640 AIRWAY INHALATION TREATMENT: CPT

## 2017-11-22 PROCEDURE — 6370000000 HC RX 637 (ALT 250 FOR IP): Performed by: INTERNAL MEDICINE

## 2017-11-22 PROCEDURE — 92611 MOTION FLUOROSCOPY/SWALLOW: CPT

## 2017-11-22 PROCEDURE — 99223 1ST HOSP IP/OBS HIGH 75: CPT | Performed by: PHYSICAL MEDICINE & REHABILITATION

## 2017-11-22 PROCEDURE — 36415 COLL VENOUS BLD VENIPUNCTURE: CPT

## 2017-11-22 PROCEDURE — 92523 SPEECH SOUND LANG COMPREHEN: CPT

## 2017-11-22 PROCEDURE — 97535 SELF CARE MNGMENT TRAINING: CPT

## 2017-11-22 PROCEDURE — 1180000000 HC REHAB R&B

## 2017-11-22 PROCEDURE — 2500000003 HC RX 250 WO HCPCS: Performed by: PHYSICAL MEDICINE & REHABILITATION

## 2017-11-22 PROCEDURE — 97166 OT EVAL MOD COMPLEX 45 MIN: CPT

## 2017-11-22 PROCEDURE — 96125 COGNITIVE TEST BY HC PRO: CPT

## 2017-11-22 PROCEDURE — 6370000000 HC RX 637 (ALT 250 FOR IP): Performed by: PHYSICAL MEDICINE & REHABILITATION

## 2017-11-22 PROCEDURE — 2700000000 HC OXYGEN THERAPY PER DAY

## 2017-11-22 PROCEDURE — 97116 GAIT TRAINING THERAPY: CPT | Performed by: INTERNAL MEDICINE

## 2017-11-22 RX ORDER — OXYMETAZOLINE HYDROCHLORIDE 0.05 G/100ML
2 SPRAY NASAL 2 TIMES DAILY
Status: DISCONTINUED | OUTPATIENT
Start: 2017-11-22 | End: 2017-12-05 | Stop reason: HOSPADM

## 2017-11-22 RX ORDER — TAMSULOSIN HYDROCHLORIDE 0.4 MG/1
0.4 CAPSULE ORAL NIGHTLY
Status: DISCONTINUED | OUTPATIENT
Start: 2017-11-22 | End: 2017-12-05 | Stop reason: HOSPADM

## 2017-11-22 RX ORDER — SODIUM PHOSPHATE, DIBASIC AND SODIUM PHOSPHATE, MONOBASIC 7; 19 G/133ML; G/133ML
1 ENEMA RECTAL
Status: DISPENSED | OUTPATIENT
Start: 2017-11-22 | End: 2017-11-22

## 2017-11-22 RX ORDER — LIDOCAINE 50 MG/G
3 PATCH TOPICAL DAILY
Status: DISCONTINUED | OUTPATIENT
Start: 2017-11-22 | End: 2017-12-05 | Stop reason: HOSPADM

## 2017-11-22 RX ORDER — ACETAMINOPHEN 325 MG/1
650 TABLET ORAL EVERY 4 HOURS PRN
Status: DISCONTINUED | OUTPATIENT
Start: 2017-11-22 | End: 2017-12-05 | Stop reason: HOSPADM

## 2017-11-22 RX ADMIN — OXYMETAZOLINE HYDROCHLORIDE 2 SPRAY: 5 SPRAY NASAL at 22:25

## 2017-11-22 RX ADMIN — MIRTAZAPINE 15 MG: 15 TABLET, FILM COATED ORAL at 22:22

## 2017-11-22 RX ADMIN — GUAIFENESIN 600 MG: 600 TABLET, EXTENDED RELEASE ORAL at 08:40

## 2017-11-22 RX ADMIN — TAMSULOSIN HYDROCHLORIDE 0.4 MG: 0.4 CAPSULE ORAL at 22:22

## 2017-11-22 RX ADMIN — LEVOTHYROXINE SODIUM 125 MCG: 25 TABLET ORAL at 06:48

## 2017-11-22 RX ADMIN — GUAIFENESIN 600 MG: 600 TABLET, EXTENDED RELEASE ORAL at 22:23

## 2017-11-22 RX ADMIN — IPRATROPIUM BROMIDE AND ALBUTEROL SULFATE 1 AMPULE: .5; 3 SOLUTION RESPIRATORY (INHALATION) at 05:17

## 2017-11-22 RX ADMIN — SENNOSIDES AND DOCUSATE SODIUM 2 TABLET: 8.6; 5 TABLET ORAL at 22:22

## 2017-11-22 RX ADMIN — ISODIUM CHLORIDE 3 ML: 0.03 SOLUTION RESPIRATORY (INHALATION) at 21:36

## 2017-11-22 RX ADMIN — POLYETHYLENE GLYCOL 3350 17 G: 17 POWDER, FOR SOLUTION ORAL at 08:39

## 2017-11-22 RX ADMIN — METOPROLOL SUCCINATE 25 MG: 25 TABLET, FILM COATED, EXTENDED RELEASE ORAL at 08:40

## 2017-11-22 RX ADMIN — ISODIUM CHLORIDE 3 ML: 0.03 SOLUTION RESPIRATORY (INHALATION) at 05:17

## 2017-11-22 RX ADMIN — BARIUM SULFATE 80 ML: 400 SUSPENSION ORAL at 13:36

## 2017-11-22 RX ADMIN — IPRATROPIUM BROMIDE AND ALBUTEROL SULFATE 1 AMPULE: .5; 3 SOLUTION RESPIRATORY (INHALATION) at 11:08

## 2017-11-22 RX ADMIN — DOCUSATE SODIUM 100 MG: 100 CAPSULE, LIQUID FILLED ORAL at 22:22

## 2017-11-22 RX ADMIN — ISODIUM CHLORIDE 3 ML: 0.03 SOLUTION RESPIRATORY (INHALATION) at 00:07

## 2017-11-22 RX ADMIN — TRAZODONE HYDROCHLORIDE 150 MG: 150 TABLET ORAL at 22:22

## 2017-11-22 RX ADMIN — IPRATROPIUM BROMIDE AND ALBUTEROL SULFATE 1 AMPULE: .5; 3 SOLUTION RESPIRATORY (INHALATION) at 17:09

## 2017-11-22 RX ADMIN — BARIUM SULFATE 50 ML: 0.81 POWDER, FOR SUSPENSION ORAL at 13:35

## 2017-11-22 RX ADMIN — DOCUSATE SODIUM 100 MG: 100 CAPSULE, LIQUID FILLED ORAL at 08:40

## 2017-11-22 RX ADMIN — FINASTERIDE 5 MG: 5 TABLET, FILM COATED ORAL at 08:40

## 2017-11-22 RX ADMIN — ALBUTEROL SULFATE 2.5 MG: 2.5 SOLUTION RESPIRATORY (INHALATION) at 00:11

## 2017-11-22 RX ADMIN — MAGNESIUM CITRATE 150 ML: 1.75 LIQUID ORAL at 11:13

## 2017-11-22 RX ADMIN — OXYCODONE HYDROCHLORIDE AND ACETAMINOPHEN 1 TABLET: 5; 325 TABLET ORAL at 08:40

## 2017-11-22 ASSESSMENT — ENCOUNTER SYMPTOMS
ABDOMINAL PAIN: 0
TROUBLE SWALLOWING: 0
CONSTIPATION: 0
COUGH: 0
COLOR CHANGE: 0
BLOOD IN STOOL: 0
CHOKING: 0
VOMITING: 0
SHORTNESS OF BREATH: 1
EYE PAIN: 0
FACIAL SWELLING: 0
EYE REDNESS: 0
CHEST TIGHTNESS: 0
PHOTOPHOBIA: 0
NAUSEA: 0
ABDOMINAL DISTENTION: 0
WHEEZING: 0
ANAL BLEEDING: 0

## 2017-11-22 ASSESSMENT — PAIN SCALES - GENERAL
PAINLEVEL_OUTOF10: 0
PAINLEVEL_OUTOF10: 6
PAINLEVEL_OUTOF10: 0
PAINLEVEL_OUTOF10: 0
PAINLEVEL_OUTOF10: 6
PAINLEVEL_OUTOF10: 0

## 2017-11-22 ASSESSMENT — PAIN DESCRIPTION - PAIN TYPE: TYPE: SURGICAL PAIN;ACUTE PAIN

## 2017-11-22 NOTE — PROGRESS NOTES
Physical Therapy  Physical Therapy Rehab Treatment Note  Facility/Department: [facility]  [id number]       NAME: Kiersten Reynolds  :  (46 y.o.)  MRN: [id number]  CODE STATUS: Full Code    Date of Service: 2017  Chart Reviewed: Yes  Patient assessed for rehabilitation services?: Yes  Additional Pertinent Hx: Per rehab admission form: 78yo [facility] c/o [facility]. Pt aspirated tooth filing RLL bronchus. Pt has COPD and uses nocturnal O2 2L NC at home. S/p R thorocotomy wiht wedge resection   Family / Caregiver Present: No  Diagnosis: Debility secondray to Aspiration Pneumonia R/T Dental Filling/War  General Comment  Comments: Pt on 3L O2. Exchanged WC for better fitting chair. Restrictions: Body mass index is 33.4 kg/m². SUBJECTIVE: Subjective: Pt reports that he wants to get stronger. Pain Screening  Patient Currently in Pain: No  Pre Treatment Pain Screening  Pain at present: 0  Scale Used: Numeric Score  Intervention List: Patient able to continue with treatment    Post Treatment Pain Screening:  Pain Assessment  Pain Assessment: 0-10  Pain Level: 0  Pain Intervention(s): Medication (see eMar)    OBJECTIVE:   Overall Orientation Status: Within Normal Limits  Follows Commands: Within Functional Limits          Transfers  Sit to Stand: Stand by assistance  Stand to sit: Stand by assistance    Ambulation  Ambulation?: Yes  More Ambulation?: Yes  Ambulation 1  Surface: carpet  Device: No Device  Other Apparatus: O2 (3L)  Assistance: Contact guard assistance  Quality of Gait: Increased lateral excursion with shortened step length. Pt with increased time to cover distance. Trunk and UEs held rigidly for balance compensation. No LOB noted, however pattern inefficient.  Lacks Lat WS  Distance: 20, 30  Comments: Increased time to complete; emphasis on improved endurance  Stairs/Curb  Stairs?: No      EXERCISES:  The below exercises were completed to facilitate strength, motor control   and muscular endurance and reduce the risk for falls. Standing:  Heel Raise: x20  March: x15        Seated RB (SPO2 95%)  Mini Squat: x15  Abduction: x15        Seated RB  (SPO2 94%)  HS Curl: x20      Activity Tolerance  Activity Tolerance: Patient Tolerated treatment well;Patient limited by endurance  Activity Tolerance: SPO2 fluctuated between 94-96% after activity; frequent RBs provided. ASSESSMENT/COMMENTS:  Body structures, Functions, Activity limitations: Decreased functional mobility ; Decreased endurance;Decreased balance;Decreased strength;Decreased ADL status  Assessment: Completed standing exercises; 2 activities then seated RB to recover O2 stats (fluctuated between 94-96% on 3L). Pt SOB. Pt reports preference of walking without AD   Safety:   Safety Devices  Type of devices:  All fall risk precautions in place        Therapy Time:   Individual   Time In 1030   Time Out 1100   Minutes 30     Minutes: 30      Transfer/Bed mobility training:      Gait training: 15      Neuro re education:     Therapeutic ex: [address], [facility], 11/22/17 at 1:42 PM

## 2017-11-22 NOTE — PROGRESS NOTES
Speech Language Pathology   ASSESSMENT OF SPEECH, LANGUAGE, & COGNITION    R262/R262-01  Pancho Norris  1940    [x] The admitting diagnosis and active problem list as listed below have been reviewed prior to the initiation of this evaluation.     Abnormality of gait and mobility [R26.9]  Patient Active Problem List   Diagnosis    COPD (chronic obstructive pulmonary disease) ([facility] [address] [facility]    HTN (hypertension)    Hyperlipidemia    GERD (gastroesophageal reflux disease)    Hypothyroidism    Type 2 diabetes mellitus (HCC)    BPH (benign prostatic hyperplasia)    Anxiety    Insomnia    Acute sinusitis    Papillary thyroid carcinoma (HCC)    Elevated PSA    Osteoarthritis of spine with radiculopathy, lumbar region    Foraminal stenosis of lumbosacral region    Skin cyst    Local infection of skin and subcutaneous tissue    Aspiration into respiratory tract    Abnormality of gait and mobility w/debility secondary to aspiration pneumonia due to dental filling/crown, [facility] admit 11/21/17    Vitamin D deficiency    Aspiration pneumonia ([facility] [address]    Debility    On home oxygen therapy       Speech/Language Evaluation:  Time in: 1541  Time out: 1550  Cognitive Evaluation:  Time in: 1650  Time out: 1605    OBJECTIVE ASSESSMENT:    Mental status:   [x] Alert           [x]Responsive            [x]Cooperative            [] Uncooperative                 []Aphasic                    []  Confused              []Impulsive              []Unresponsive            [x] Reading Glasses  [x] Right handed [] Left Handed       [x]Hearing Aides    Cognition:    Cog/Linguistic Skills  [facility] Impaired  Not Tested    Orientation: Person x        Place/Time/Date x Pt did not recall the updated name of [facility] but was able to recall [place] [facility] [place]           Attention Sustained x      Divided  x           Problem Solving Simple x      Complex  x       Sequencing  Extra time allotted to complete task to pt's final score, as he does not report any additional schooling beyond a high school education level. Results:  Visuospatial/Executive Functioning 4/5   Naming 3/3   Attention 6/6   Language 2/3   Abstraction 2/2   Delayed Recall 3/5   Orientation 5/6   Total 26/30         ORAL MECHANISM EXAMINATION  [x] Adequate lingual/labial strength  [] Generalized oral weakness  [] Left labiobuccal weakness   [] Right labiobuccal weakness  [] Left lingual deviation   [] Right lingual deviation  [] Oral apraxia    [] CNT  [] DNT      Parameters of Speech Production  Respiration:  [x]WFL []SOB []Inadequate for speech production  Articulation:  [x]WFL []Distortions []Anticipatory struggle []CNT  Resonance:  [x]WFL []Hypernasal  []Hyponasal  []Nasal emission []CNT  Quality:   [x]WFL []Hoarse []Harsh []Strained [] Breathiness []CNT  Pitch:    [x]WFL []High []Low []CNT  Intensity: [x]WFL []Loud []Quiet []CNT  Fluency:  [x]Intact []Dysfluent []CNT  Prosody [x]Intact []Monotone []Irregular fluctuation      SALIENT CLINICAL OBSERVATIONS  [x] Latent processing  [x] Latent responses  [] Inconsistent responses   [] Perseveration   [] Cueing necessary for accurate completion of task        DISCHARGE PLANNING:  Given current cognitive/linguistic status, SLP recommends:   []24 Hour Supervision   [] Close Supervision     [x]Intermittent Supervision   [] No Supervision    [x]  Patient stated goals: \"get stronger\"          Education was completed:  Speech Pathologist (SLP) completed education with the patient and/or family regarding identified cognitive linguistic deficits and subsequent need for speech pathology intervention. Discussed deficit areas to be targeted by formal intervention and established short/long term goals. Reviewed compensatory strategies to improve functional outcome (as appropriate). Encouraged patient and/or family to engage SLP in structured Q&A session relative to identified deficit areas.  Patient and/or family

## 2017-11-22 NOTE — PROGRESS NOTES
Speech Language Pathology    NAME:  Chandana Sanders  ROOM: [id number]/[id number]  :  1940  DATE: 2017      Speech Therapy attempted to see Chandana Sanders on this date at 11:35 am for a/an:    [] Bedside Swallow Evaluation   [x] Speech/Language Evaluation   [] Modified Barium Swallow   [] Treatment    Pt was unable to be seen due to patient:   [] Currently off unit   [] Refused   [] Too lethargic to participate    [] NPO for testing   [] On Bipap   [] Nursing Deferred:   [x] Other: Pt receiving breathing tx.       Electronically signed by ZEFERINO Barnes on 17 at 1:31 PM

## 2017-11-22 NOTE — PROGRESS NOTES
Dr. Jase Huynh informed of patient admission to rehab and his consult regarding suture removal. He stated keep the sutures in for now.

## 2017-11-22 NOTE — PLAN OF CARE
Problem: IP COMMUNICATION/DYSARTHRIA  Goal: LTG - patient will improve expressive language skills to allow for communication of wants and needs in daily activities  Outcome: Ongoing  SLE complete

## 2017-11-22 NOTE — PLAN OF CARE
Problem: IP SWALLOWING  Goal: LTG - patient will tolerate the least restrictive diet consistency to allow for safe consumption of daily meals  Outcome: Ongoing  MBS completed

## 2017-11-22 NOTE — PROGRESS NOTES
[x] Benefits of the patient's leisure and recreation pursuits being utilized as stress relievers    Recommendations to utilize Recreation Therapy services and its supported services and groups include:             [] Art Therapy group            [x] [facility]             []Creative arts opportunities            [x]Pet Therapy            [x]Leisure Education             [x] Individual Music Therapy session            [] Individual Art Therapy session            [x] Independent leisure/ recreational opportunities and activities for in room use            [x] Coping skills             [x]Stress management tools             []Recreation and leisure resources in the community            [] Education on local community resources to improve an active lifestyle. Comment(s): Worked with patient so he can learn to use his new iPhone to listen to his favorite music. Provided a handout with step by step directions on this process. ASSESSMENT  Patient tolerated todays session:      [x] Good          [] Fair          [] Poor      Prior Activity Level:  [] Inactive          [x] Moderately Active          [] Very Active  Leisure Awareness:    [] Good          [x] Fair          [] Poor        Lack of interest in the following:    []Patient refuses/declines Recreation Therapy services. Recreation Therapy  will continue to provide 5x/week orientation/leisure handout to patient. []Patient declines MUSIC THERAPY SERVICES services. []Patient declines ART THERAPY SERVICES services. []Patient declines Pet Therapy. []Independent leisure activities for in room use.     Barriers to Leisure Participation:  []Visual   [x][place]  []Cognition  []Motivation  []Financial  []Transportation  []Time Constraints  []Mobility  []Leisure Education  []Drug/Alcohol  []Pain                            []Social issues  []Attitude                            []Medically complex [x]Endurance   []Communication               [x]General Weakness      []Paralysis     Comment(s):    Plan    Prior to discharge from rehab program:    [x]COMPLETE []ONGOING   Patient will express interest in participating in a Pet Therapy program during his hospitalization. [x]COMPLETE []ONGOING Patient will indicate current and prior leisure/recreational interests and state those he will follow through with post discharge. [x]COMPLETE []ONGOING  Patient will be provided with an independent leisure activity/coping skills tool for in room use. [x]COMPLETE []ONGOING Patient encouraged to use his/her own personal independent leisure activity for in room use. He/she brought    [x]COMPLETE []ONGOING  Patient will be provided with a cognitive/orientation/puzzle handout 5x/week.   []COMPLETE []ONGOING  Patient will be provided with local community resources to maintain a healthy  active lifestyle after discharge.   []COMPLETE []ONGOING  Patient will be educated on adaptive leisure/recreation equipment.   []COMPLETE []ONGOING Patient will be educated on various alternative pain management techniques which  can include both techniques done independently and/or in group programs.    [x]COMPLETE []ONGOING Patient will express interest in participating in Music Therapy during his hospitalization.  []COMPLETE []ONGOING Patient will identify the recreation therapy supported groups he has interest in attending.  Gianfranco Mehta []ONGOING Patient will be    []COMPLETE []ONGOING Patient will     Patient provided with the following accessible and independently used recreation/leisure resources when in hospital room:   [x] 5 days week large print orientation/puzzle activity handout  [] Word searches, crossword puzzles  [] Creative art independent use kit  [] Journaling kit  [] Creative writing kit  [] Deck of Cards  [x] How to handout to utilize his iPhone to access the music he likes    EDUCATION   Was new education provided:  [x]

## 2017-11-22 NOTE — H&P
HISTORY & PHYSICAL       DATE OF ADMISSION:  11/21/2017    DATE OF SERVICE:  11/22/17    Subjective:    Lorie Marcelo, 69 y.o. male presents today with:     CHIEF COMPLAINT:       This is a 69year old who presented to [facility] with complaints of cough. He stated the cough started earlier in the day after he placed his dental bridge. X-ray showed radioplaque density in right lower lobe bronchus. The patient stated he was missing a tooth filling and he possibly aspirated it. The patient has COPD and uses nocturnal nasal cannula oxygen 2 liters at home. On 11/14/17 he underwent right thoracotomy wedge resection for foreign body (filling from tooth) and FOB double lumen. CXR on 11/14/17 showed no pneumothorax following lung surgery. Pathology report on 11/15/17 showed right lower lobe wedge atelectasis with severe bronchopneumonia, no malignancy; foreign body right lower lobe - dental filling/crown. EKG on 11/15/17 showed NSR, nonspecific T wave abnormality. CXR on 11/18/17 showed increased opacity right lower lobe with thickening of minor fissure, small pneumothorax at apex and along right chest wall, left lung clear. CXR on 11/20/17 showed stable chest compared to 11/18/17. Ultrasound of the abdomen on 11/20/17 showed no ascites. Repeat CXR on 11/21/17 showed no clearing at right lung base since 11/19/17. Dr. Danial Handy saw the patient his impression =- hematuria secondary to Crawford trauma, to follow. The patient has been found to have severe abnormality of gait and mobility with debility and impaired self care due to aspiration pneumonia due to dental filling/crown and is admitted to the acute inpatient rehab program.           Shortness of Breath   This is a recurrent problem. The current episode started in the past 7 days. The problem occurs constantly. The problem has been gradually improving.  Pertinent negatives include no abdominal pain, chest pain, ear pain, fever, headaches, leg swelling, neck pain, rash, vomiting or wheezing. The symptoms are aggravated by exercise. Risk factors include prolonged immobilization. He has tried rest, OTC inhalers and beta agonist inhalers for the symptoms. The treatment provided moderate relief. His past medical history is significant for CAD, chronic lung disease and COPD. He complains of severe constiptation. He has frequent bloody nose. The patient has stabilized medically and is able to participate at acute level rehab but is too medically complex for SNF due to numerous active complex medical issues including ongoing hemoptysis hematuria and recent foreign body causing respiratory failure and infection with need for daily medical monitoring         Imaging:    Imaging and other studies reviewed and discussed with patient and staff    Xr Chest Standard (2 Vw)  Result Date: 11/21/2017  EXAMINATION: CHEST X-RAY, AP AND LATERAL CLINICAL HISTORY: FOLLOW-UP ATELECTASIS AND/OR INFILTRATE IN RIGHT LUNG BASE COMPARISONS: 11/19/2017 FINDINGS: Once again, there are surgical clips superimposed on the right lung base. There is persistent atelectasis and/or infiltrate in the right lung base which appears unchanged since the 11/19/2017 chest x-ray. There is a small pleural effusion trapped in the right major and minor pulmonary fissures. Also, there are subsegmental atelectasis in the left CP angle. The heart is not enlarged. There is a tortuous aorta. There is degenerative bone spurring throughout the spine. NO SIGNIFICANT CLEARING OF THE RIGHT LUNG BASE SINCE THE 11/19/2017 CHEST X-RAY. Xr Chest Standard (2 Vw)  Result Date: 11/18/2017  Chest 2 views. HISTORY: New onset shortness of breath. Removal chest tube. FINDINGS: Comparison, November 17, 2017. Interval removal of right thoracostomy tube. Surgical clips right hilum. Increased opacity right lower lung with thickening of minor fissure. Small pneumothorax at apex, and along right chest wall. Left lung clear. Removal of chest tube. Interval increase right lower lung atelectasis. Possible right pleural effusion. Interval development small right pneumothorax. Xr Chest Standard (2 Vw)  Result Date: 11/17/2017  EXAMINATION: XR CHEST STANDARD TWO VW CLINICAL HISTORY:  Possible CT removal . Postop observation. Right lung surgery. COMPARISONS: November 14, 2017 FINDINGS: AP portable inspiration and expiration views the chest were obtained on November 17, 2017 at 0525 hours. The right-sided chest drainage tube remains unremarkable. No pneumothorax has developed. There is a small amount bibasilar atelectasis. Again noted are surgical staples at the site of right lower lobe segmental lung resection. The foreign body is no longer present. The mediastinal silhouette is normal. The chest wall is unremarkable. CONCLUSION: UNREMARKABLE POSTOPERATIVE FINDINGS. NO PNEUMOTHORAX. NO LARGE EFFUSION. SMALL ATELECTASIS. Xr Chest Standard (2 Vw)  Result Date: 11/13/2017  EXAMINATION: XR CHEST STANDARD TWO VW CLINICAL HISTORY:  Aspirated foreign object right lower lobe COMPARISONS: December 3, 2015. November 10, 2017. November 11, 2017. November 13, 2017. FINDINGS: Frontal and lateral views of the chest were obtained showing a persisting metallic tooth like opacity projecting of the posterior basal segment of the right lower lobe. It is located more peripherally now than it was on November 10, 2017. It has changed little since November 11, 2017. No signs of obstructive pneumonitis have developed. Remainder both lungs is unremarkable. The mediastinal silhouette is unremarkable. The calcified aorta is not dilated. The heart is not enlarged. The chest wall is unremarkable. CONCLUSION: ENDOBRONCHIAL TOOTHLIKE STRUCTURE PROJECTING OVER THE AIRWAY OF THE POSTERIOR BASAL SEGMENT OF THE RIGHT LOWER LOBE.     Xr Chest Standard (2 Vw)  Result Date: 11/10/2017  CLINICAL HISTORY: Pneumonitis COMPARISON: December 3, 2015 CHEST, TWO VIEWS FINDINGS: Cardiac and mediastinal silhouettes are normal in size and contour. Projecting over the right hilar region is a 1 cm radiopaque density. The right costophrenic angle is slightly blunted. No focal areas of consolidations are noted. No pneumothorax is seen. The osseous structures are grossly unremarkable. IMPRESSION A radiopaque density is now seen in the region of the right lower lobe bronchus. Ct Chest W Contrast  Result Date: 11/11/2017  EXAMINATION:  CT CHEST W CONTRAST CLINICAL HISTORY:  ACUTE RESP ILLNESS, >37YEARS OLD  status post bronchoscopy to retrieve aspirated dental amalgam, unsuccessful. COMPARISONS:  12/2/2013 TECHNIQUE:  Spiral scans with 75 mL Isovue-370 IV. Multiplanar 2-D reconstructions. Axial 3-D 10 x 3 mm MIP reconstructions were performed. All CT scans at this facility use dose modulation, iterative reconstruction, and/or weight based dosing when appropriate to reduce radiation dose to as low as reasonably achievable. FINDINGS:  The known aspirated filling is identified within the distal segmental bronchus of the right lower lobe posterior basilar segment. Filling measures approximately 9 mm. There are mild emphysematous changes. There is mild scattered peripheral reticularity without definite honeycombing. There are no consolidations or effusions. There is right upper lung perifissural nodularity consistent with small intrapulmonary lymph nodes. There is a left upper lobe 4 mm nodule (series 2 image 21), unchanged from prior examination of 12/2/2013, and therefore benign. There are no suspicious lung nodules. There is mild bilateral lower lobe cylindrical bronchiectasis. Cardiac size and pulmonary vascularity are normal. There is mild mediastinal lipomatosis. There is mild thickening or trace fluid in the anterior pericardium, decreased compared to prior examination 12/2/2013. There are coronary artery calcifications. There are aortic calcifications.  There is no evidence of aneurysm or 2017    MCHC 33.5 2017    RDW 15.1 2017     2017    MPV 10.7 2015     Lab Results   Component Value Date    VITD25 40.8 03/10/2015     Lab Results   Component Value Date    COLORU ORANGE 2017    NITRU Negative 2017    GLUCOSEU Negative 2017    KETUA Negative 2017    UROBILINOGEN 1.0 2017    BILIRUBINUR SMALL 2017    BILIRUBINUR neg 2017     Lab Results   Component Value Date    PROTIME 10.8 2017     Lab Results   Component Value Date    INR 1.0 2017         I discussed results with patient. The patient remains highly medically complex and continues to have severe problems with activities of daily living and mobility. The patient was assessed to be able to tolerate intensive rehabilitation and therefore was admitted to Rehabilitation to address these needs.             In depth analysis of complex functional data; the patient has been:    Current Rehabilitation Assessments:    Physical therapy: FIMS:  Bed Mobility:      Transfers:  ,  ,      FIMS: Bed, Chair, Wheel Chair: 2 - Requires 50-74% assistance to transfer  Walk: 2 - Maximal Assistance Requires up to Maximal Assistance AND requires assistance of one person to walk/operate wheelchair between  feet (Patient performs 25-49% of locomotion effort or goes between  feet)  Distance Walked: 8  Wheel Chair: 0 - Activity Not Assessed/Does Not Occur  Stairs: 0 - Activity Does not Occur ( 0 only for the admission assessment),  ,      Occupational therapy: FIMS:  Eatin - Feeds self with adaptive equipment/dentures and/or feeds self with modified diet and/or performs own tube feeding  Groomin - Requires setup/cues to do all tasks  Bathin - Did not occur (Pt only bathed UB seated at sink.)  Dressing-Upper: 0 - Did not occur (Pt wore hospital gown)  Dressing-Lower: 2 - Requires assist with 4-5 parts of dressing  Toiletin - Requires OR    KNEE SURGERY Left 1970    THORACOTOMY Right 11/14/2017    RIGHT THORACOTOMY WEDGE RESECTION FOR FOREIGN BODY AND FOB DOUBLE LUMEN (1ST CASE) performed by Patricia Jackson MD at [facility]  2006       Current Facility-Administered Medications   Medication Dose Route Frequency Provider Last Rate Last Dose    tamsulosin (FLOMAX) capsule 0.4 mg  0.4 mg Oral Nightly Catrachita Scullin, DO        acetaminophen (TYLENOL) tablet 650 mg  650 mg Oral Q4H PRN Lianet Sermon, DO        fleet rectal enema 1 enema  1 enema Rectal Once PRN Catrachita Scullin, DO        lidocaine (LIDODERM) 5 % 3 patch  3 patch Transdermal Daily Catrachita Scullin, DO        oxymetazoline (AFRIN) 0.05 % nasal spray 2 spray  2 spray Each Nare BID Catrachita Scullin, DO        sodium chloride (OCEAN, BABY AYR) 0.65 % nasal spray 1 spray  1 spray Each Nare Q2H PRN Catrachita Scullin, DO        magnesium citrate solution 150 mL  150 mL Oral Daily before lunch Catrachita Scjackin, DO        docusate sodium (COLACE) capsule 100 mg  100 mg Oral BID Rowan Rodriguez MD   100 mg at 11/22/17 0840    ondansetron (ZOFRAN) injection 4 mg  4 mg Intravenous Q6H PRN Rowan Rodriguez MD        dextrose 5 % solution  100 mL/hr Intravenous PRN Rowan Rodriguez MD        dextrose 50 % solution 12.5 g  12.5 g Intravenous PRN Rowan Rodriguez MD        glucagon (rDNA) injection 1 mg  1 mg Intramuscular PRN Rowan Rodriguez MD        albuterol (PROVENTIL) nebulizer solution 2.5 mg  2.5 mg Nebulization Q2H PRN Rowan Rodriguez MD   2.5 mg at 11/22/17 0011    bisacodyl (DULCOLAX) suppository 10 mg  10 mg Rectal Daily PRN Rowan Rodriguez MD        finasteride (PROSCAR) tablet 5 mg  5 mg Oral Daily Rowan Rodriguez MD   5 mg at 11/22/17 0840    guaiFENesin (MUCINEX) extended release tablet 600 mg  600 mg Oral BID Rowan Rodriguez MD   600 mg at 11/22/17 0840    hydrALAZINE (APRESOLINE) injection 10 mg  10 mg Intravenous Q2H PRN MD Amrita Garcia his children. He has a wheeled walker and a cane at home. He was not receiving community services prior to admission. It is not indicated that he has history of falls prior to admission. He was independent with mobility and self care prior to admission. His goal is to get stronger and return home. FAMILY HISTORY:  Does not pertain to chief complaint. Review of Systems   Constitutional: Positive for activity change and fatigue. Negative for appetite change, chills, fever and unexpected weight change. HENT: Negative for ear discharge, ear pain, facial swelling, hearing loss and trouble swallowing. Eyes: Negative for photophobia, pain and redness. Respiratory: Positive for shortness of breath. Negative for cough, choking, chest tightness and wheezing. Cardiovascular: Negative for chest pain, palpitations and leg swelling. Gastrointestinal: Negative for abdominal distention, abdominal pain, anal bleeding, blood in stool, constipation, nausea and vomiting. Endocrine: Positive for cold intolerance. Genitourinary: Negative for difficulty urinating, dysuria, flank pain, frequency and urgency. Musculoskeletal: Positive for arthralgias, gait problem and myalgias. Negative for neck pain and neck stiffness. Skin: Negative for color change, pallor, rash and wound. Neurological: Positive for weakness. Negative for dizziness, tremors, syncope, facial asymmetry, speech difficulty, light-headedness, numbness and headaches. Hematological: Negative for adenopathy. Does not bruise/bleed easily. Psychiatric/Behavioral: Positive for sleep disturbance. Negative for agitation, behavioral problems, confusion, decreased concentration, dysphoric mood, hallucinations, self-injury and suicidal ideas. The patient is not nervous/anxious and is not hyperactive. All other systems reviewed and are negative.          Objective  /75   Pulse 73   Temp 99 °F (37.2 °C) (Oral)   Resp 18   Ht 6' (1.829 m)   Wt 246 lb 4.1 oz (111.7 kg)   SpO2 94%   BMI 33.40 kg/m² *    Physical Exam   Constitutional: He is oriented to person, place, and time. Vital signs are normal. He appears well-developed and well-nourished. Non-toxic appearance. He does not have a sickly appearance. He does not appear ill. No distress. HENT:   Head: Normocephalic and atraumatic. Right Ear: Hearing normal.   Left Ear: Hearing normal.   Nose: Nose normal.   Mouth/Throat: Oropharynx is clear and moist and mucous membranes are normal. No oral lesions. Normal dentition. No oropharyngeal exudate. No lesions   Eyes: Conjunctivae and EOM are normal. Pupils are equal, round, and reactive to light. Right eye exhibits no chemosis, no discharge and no exudate. Left eye exhibits no chemosis, no discharge and no exudate. No scleral icterus. Neck: Normal range of motion. Neck supple. No JVD present. No neck rigidity. No tracheal deviation and no edema present. No thyromegaly present. Cardiovascular: Intact distal pulses. Exam reveals no decreased pulses. Pulmonary/Chest: Effort normal. No accessory muscle usage. No apnea, no tachypnea and no bradypnea. No respiratory distress. He has decreased breath sounds. He has no wheezes. He has no rales. He exhibits no tenderness. End expiratory wheezing noted. Complains of shortness of breath with exertion. oxygen remains on at 3 liters nasal canula. Abdominal: Soft. Bowel sounds are normal. He exhibits no distension and no mass. There is no tenderness. There is no rebound and no guarding. Musculoskeletal: He exhibits tenderness. He exhibits no edema.         Right shoulder: Normal.        Left shoulder: Normal.        Right elbow: Normal.       Left elbow: Normal.        Right wrist: Normal.        Left wrist: Normal.        Right hip: Normal.        Left hip: Normal.        Right knee: Normal.        Left knee: Normal.        Right ankle: Normal. Achilles tendon normal.        Left extension: 4/5  Left wrist extension: 4/5  Right interossei: 4/5  Left interossei: 4/5  Right abdominals: 4/5  Left abdominals: 4/5  Right iliopsoas: 4/5  Left iliopsoas: 4/5  Right quadriceps: 4/5  Left quadriceps: 4/5  Right hamstrin/5  Left hamstrin/5  Right glutei: 4/5  Left glutei: 4/5  Right anterior tibial: 4/5  Left anterior tibial: 4/5  Right posterior tibial: 4/5  Left posterior tibial: 4/5  Right peroneal: 4/5  Left peroneal: 4/5  Right gastroc: 4/5  Left gastroc: 4/5    Gait, Coordination, and Reflexes     Gait  Gait: shuffling    Coordination   Romberg: positive  Finger to nose coordination: abnormal  Heel to shin coordination: abnormal  Tandem walking coordination: abnormal    Tremor   Resting tremor: present  Intention tremor: present    Reflexes   Right brachioradialis: 1+  Left brachioradialis: 1+  Right biceps: 1+  Left biceps: 1+  Right triceps: 1+  Left triceps: 1+  Right patellar: 1+  Left patellar: 1+  Right achilles: 0  Left achilles: 0      After extensive review of the records and above physical exam, I have formulated the following diagnoses and plan:      DIAGNOSES:      1. The patient was admitted to the acute rehabilitation unit with the primary rehab diagnoses being severe abnormality of gait and mobility and impaired self care and ADL's due to aspiration pneumonia due to dental filling/crown. Compared to Pre-Admission Assessment, patients medical and functional status remain challengingly complex and patient continues to require intensive therapeutic intervention from multiple therapies, therefore, initiate acute intensive comprehensive inpatient rehabilitation program including PT/OT to improve balance, ambulation, ADLs, and to improve the P/AROM.   Functional and medical status reassessed regarding patients ability to participate in therapies and patient found to be able to participate in acute intensive comprehensive inpatient rehabilitation program.  Therapeutic modifications regarding activities in therapies, place, amount of time per day and intensity of therapy made daily. Enroll in acute course of therapy program to include 1 1/2 hours per day of PT 5 to 7 days per week and 1 1/2 hours per day of OT 5 to 7 days per week, and  ST 1/2 hour per day 3-5 days per week. The patient is stable medically and physically on previous exam.       This patient present with significant new onset decreased mobility and inability to perform activities of daily living skills independently and is at significant risk for prolonged disability  For this reason they have been admitted to [facility]. The patient's current functional and medical status are highly complex but the patient is able to participate in intensive rehabilitation. A comprehensive inpatient rehabilitation program is appropriate. The patient will undergo initial evaluation by the rehabilitation team and be discussed at regular treatment team meetings to assess progress, mobility, self care, mood and discharge issues. Physical therapy will be consulted for mobility and endurance issues and should be performed 1 to 2 times per day, 7 days per week for the length of stay. Occupational therapy will be consulted for activities of daily living and should be performed 1 to 2 times per day, 7 days per week for the length of stay. Their capacity to participate at an acute level, decision to be treated in the gym, room or on the unit, their activity goals for the day and the number of minutes of active participation will be reassessed and re-prescribed daily. Because this patient is medically complex, I will check a CBC, BMP, UA and orthostatic blood pressures. They will be reassessed daily regarding their ability to participate in an acute level rehabilitation program.  Recreational Therapy will be consulted for community re-entry and adjustment to disability.   Communication, cognitive and emotional issues will also be addressed during this rehabilitation stay by rehabilitation psychologist or speech therapist as appropriate. I reviewed the patient's old and current charts and discussed other rehabilitation options with the rehabilitation team including the rehab RN and the admission team as well as the patient. I feel that the patient's functional recovery would be best served at an acute inpatient rehabilitation program because the patient needs intense therapy three hours per day, direct RN supervision and daily monitoring by a physician for medical status. This cannot be sufficiently provided by home health care, a skilled nursing facility or in an outpatient setting. I further feel that the patient has the potential to improve functional abilities in an acute intensive rehabilitation program.    Old records were reviewed and summarized. 2.  Other diagnoses which complicate rehabilitation stay include:     Principal Problem:    Abnormality of gait and mobility w/debility secondary to aspiration pneumonia due to dental filling/crown, [facility] admit 11/21/17  Active Problems:  1. Aspiration pneumonia,  COPD (chronic obstructive pulmonary disease), on home oxygen therapy - Pulse oximeter checks, monitor vital signs, oxygen prn. Proventil, Duoneb,   2. HTN (hypertension),  Hyperlipidemia - continue blood pressure checks, adjust/add medications (Apresoline, Toprol). 3.   GERD (gastroesophageal reflux disease) - elevate head of bed. 4.   Hypothyroidism - Synthroid. 5.   Type 2 diabetes mellitus - Continue blood sugar checks, diet, adjust/add medications prn. Recent Labs      11/21/17   0744  11/21/17   1109  11/21/17   1637  11/21/17   2120  11/22/17   0629   POCGLU  106  100  103  119*  118*   6. BPH (benign prostatic hyperplasia) - Flomax-modified dosing to daily at bedtime to limit orthostatic, Proscar. Check postvoid residual-check stat UA on admission  7.

## 2017-11-22 NOTE — PROGRESS NOTES
chair. Pt completed 11 sit <> stands with mild SOB noted. Pt completed marching with BUE support on FWW for 60 seconds with significant fatigue noted and reported by pt. Assessment   Performance deficits / Impairments: Decreased functional mobility ; Decreased ADL status; Decreased endurance;Decreased high-level IADLs  Assessment: Pt continues to require verbal cues to initiate deep breathing techniques to decrease SOB and increase activity tolerance. REQUIRES OT FOLLOW UP: Yes  Activity Tolerance  Activity Tolerance: Patient Tolerated treatment well  Safety Devices  Safety Devices in place: Yes  Type of devices: All fall risk precautions in place        Plan   Plan  Times per week: 5-7x/week,  minutes per day  Plan weeks: 10-14 days  Current Treatment Recommendations: Strengthening, Endurance Training, Patient/Caregiver Education & Training, Self-Care / ADL, Home Management Training, Safety Education & Training  Plan Comment: Continue per OT POC    Goals  Patient Goals   Patient goals : \"Be able to do things without being winded. \" \"I want to get back into the community. \"       Therapy Time   Individual Concurrent Group Co-treatment   Time In 4484         Time Out 1622         Minutes Ricky Smith OT Electronically signed by Feliberto Stevens OT on 11/22/2017 at 4:29 PM

## 2017-11-22 NOTE — PROGRESS NOTES
Patient/Caregiver Education & Training, Self-Care / ADL, Home Management Training, Safety Education & Training  Plan Comment: Continue per OT POC    Goals  Patient Goals   Patient goals : \"Be able to do things without being winded. \" \"I want to get back into the community. \"       Therapy Time   Individual Concurrent Group Co-treatment   Time In 7595         Time Out 1400         Minutes [address], OT    Electronically signed by Mary Ann Alanis OT on 11/22/2017 at 2:06 PM

## 2017-11-22 NOTE — PROGRESS NOTES
Facility/Department: [facility]  Rehabilitation Initial Assessment: Physical Therapy  Room: R262/R262-01    NAME: Suzan May  :   MRN: [id number]    Date of Service: 2017    Rehab Diagnosis(es): Debility secondray to Aspiration Pneumonia R/T Dental Filling/[place]  Patient Active Problem List    Diagnosis Date Noted    Abnormality of gait and mobility w/debility secondary to aspiration pneumonia due to dental filling/crown, [facility] admit 17     Vitamin D deficiency     Aspiration pneumonia ([address]     Debility     On home oxygen therapy     Aspiration into respiratory tract 11/10/2017    Skin cyst 2017    Local infection of skin and subcutaneous tissue 2017    Osteoarthritis of spine with radiculopathy, lumbar region 2016    Foraminal stenosis of lumbosacral region 2016    Elevated PSA 2016    Papillary thyroid carcinoma ([address] 2015    Acute sinusitis     Anxiety     Insomnia     Hypothyroidism     Type 2 diabetes mellitus (HCC)     BPH (benign prostatic hyperplasia)     GERD (gastroesophageal reflux disease) 2014    HTN (hypertension) 2014    Hyperlipidemia 2014    COPD (chronic obstructive pulmonary disease) ([address] 2013       Past Medical History:   Diagnosis Date    Abnormality of gait and mobility     Acute sinusitis     Anxiety     Aspiration into respiratory tract 11/10/2017    Aspiration pneumonia (HCC)     BPH (benign prostatic hyperplasia)     COPD (chronic obstructive pulmonary disease) ([address] 2013    Debility     Elevated CK 2013    Foraminal stenosis of lumbosacral region 2016    GERD (gastroesophageal reflux disease) 2014    History of asthma 2014    HTN (hypertension) 2014    Hyperlipidemia     Hypothyroidism     Insomnia     Lower extremity weakness 2014    On home oxygen therapy     2L at night    Osteoarthritis of spine with radiculopathy, lumbar region 6/7/2016    Papillary thyroid carcinoma ([facility] 12/9/2015    Positive D dimer 12/5/2013    Sleep apnea 1/24/2014    Type 2 diabetes mellitus ([facility]     Vitamin D deficiency      Past Surgical History:   Procedure Laterality Date    BRONCHOSCOPY N/A 11/11/2017    BRONCHOSCOPY FIBEROPTIC performed by Della Garcia MD at [address] Right 11/14/2017    RIGHT THORACOTOMY WEDGE RESECTION FOR FOREIGN BODY AND FOB DOUBLE LUMEN (1ST CASE) performed by Thelma Kirkland MD at [address] [place] [address]  2006       Chart Reviewed: Yes  Patient assessed for rehabilitation services?: Yes  Additional Pertinent Hx: Per rehab admission form: 76yo male presented to [facility] with c/o couth. Pt aspirated tooth filing RLL bronchus. Pt has COPD and uses nocturnal O2 2L NC at home. S/p R thorocotomy wiht wedge resection 11/14  Family / Caregiver Present: No  Diagnosis: Debility secondray to Aspiration Pneumonia R/T Dental Filling/[place]  General Comment  Comments: Pt up in chair, finishing breakfast - agreeable to PT evaluation    Restrictions:  Restrictions/Precautions: Fall Risk  Body mass index is 33.4 kg/m². SUBJECTIVE: Subjective: \"I'm on the other side. \"    Pre Treatment Pain Screening  Comments / Details: denies    Post Treatment Pain Screening:  Pain Assessment  Pain Assessment:  (denies)    Prior Level of Function:  Social/Functional History  Lives With: Alone  Type of Home: House  Home Layout: One level  Home Access: Stairs to enter with rails  Entrance Stairs - Number of Steps: 1 *Pt states that he has no steps, however per previous PT records, pt has 1 step/threshold step into home*  Home Equipment: Rolling walker, 4 wheeled walker  ADL Assistance: Independent  Ambulation Assistance: Independent (no device)  Transfer Assistance: Independent  Active : Yes  Additional Comments: Pt reports having recently joined [employer]" group.      OBJECTIVE: Vision/Hearing:  Vision:  (denies double or blurred vision; visual field intact)  Vision Exceptions: Wears glasses for reading  Hearing Exceptions: Bilateral hearing aid    Cognition:  Overall Orientation Status: Within Normal Limits  Follows Commands: Within Functional Limits  Observation/Palpation  Observation: 2L O2 via NC satting at 96% at rest. No acute distress noted. Pt alert and pleasant. ROM:  RLE PROM: WFL  LLE PROM: WFL  RUE PROM: WFL  LUE PROM: WFL    Strength:  Strength Other  Other: Formal MMT deferred for safety concerns, however strength assessed during functional mobility as follows. Demonstrates generalized weakness throughout trunk and hips during bed mobility tasks. Quick onset muscle fatigue noted. Neuro:  Sensation  Overall Sensation Status:  (Denies numbness/tingling B LEs, however states that there is numbness near surgical incision site )     Balance  Comments: Formal balance assessement deferred at this time d/t pt's fatigue. Noted delyaed reactive righting responses to external perturbation. Compensates with rigid posturing and counterbalance with UEs. Unable to reach OBOS >6\" confidently. Motor Control  Comments: no tremors or tone abnormalities noted. reflexes NT    Bed mobility  Rolling to Left: Modified independent  Rolling to Right: Modified independent  Supine to Sit: Supervision  Sit to Supine: Supervision  Comment: Increased effort and time to complete tasks. (Bed flat/bedrails down). VC for minimizing valsalva with exertion - minimal carry over. Transfers  Sit to Stand: Stand by assistance  Stand to sit: Stand by assistance  Bed to Chair: Stand by assistance  Comment: Increased time to complete. Must utilize UEs to assist STS. WBOS with increased time required to stabilize once weight transferred to B LEs. Utilization of hands to guide sit>stand and reaches out for seat when approaching bed<>chair.      Ambulation 1  Surface: level tile  Device: No Device  Other Apparatus: O2  Assistance: Stand by assistance  Quality of Gait: Increased lateral excursion with shortened step length. Pt with increased time to cover distance. Trunk and UEs held rigidly for balance compensation. No LOB noted, however pattern inefficient. Distance: 20ft  Stairs/Curb  Stairs?: No (fatigued)    Activity Tolerance  Activity Tolerance: Patient limited by endurance  Activity Tolerance: SaO2 following bed mobility tasks = 94%; SaO2 following ambulatory tasks as above = 92%. No outward s/s of fatigue. Pt maintained on 2L O2 throughout assessment. Car transfers: Not tested  Walk 10 ft: Stand by Assist  Walk 50 ft with 2 turns: Not tested  Walk 150 ft in corridor: Not tested  Walking 10 ft on unlevel surface: Not tested  Picking up objects: Not tested  Wheelchair Mobility: No     ASSESSMENT:  Body structures, Functions, Activity limitations: Decreased functional mobility ; Decreased endurance;Decreased balance;Decreased strength;Decreased ADL status  Decision Making: Low Complexity  History: High  Exam: Low  Clinical Presentation: Med    Prognosis: Good  Patient Education: PT POC; DC recs; role of PT in the hosp  Barriers to Learning: None identified    CLINICAL IMPRESSION: Pt presenting s/p aspiration pneumonia treated with thoracotomy & wedge excision of foreign body which has negatively impacted his functional mobility status, increased his risk for falls, and limited his ability to complete ADLs safely. PTA, pt reported that he was an active individual indep in all MRADLs, living alone. Following complicated hospital stay and medical course, pt currently demonstrates impaired activity tolerance inadequate for safe mobility at [facility]. Initiated inpatient PT program in order to address activity tolerance, strength and mobility in order to faciliate safe DC at highest level of indep and restoration of physical function for improved QOL.  Pt is motivated and aware of his current deficits - anticipate DC at ambulatory level without AD. PLAN OF CARE:  Frequency: 1-2 treatment sessions per day, 5-7 days per week     Current Treatment Recommendations: Strengthening, Balance Training, Functional Mobility Training, Transfer Training, Endurance Training, Gait Training, Neuromuscular Re-education, Home Exercise Program, Safety Education & Training, Patient/Caregiver Education & Training, Equipment Evaluation, Education, & procurement, Pain Management, Stair training    Patient's Goal:  Get stronger. Get back to silver sneakers.      GOALS:  Short term goals  Short term goal 1: Pt to complete HEP with indep  Short term goal 2: Pt to complete functional outcomes measure when safe and appropriate  Long term goals  Long term goal 1: Pt to complete all bed mobility with indep  Long term goal 2: Pt to complete all transfers with indep  Long term goal 3: Pt to ambulate 150ft without AD indep  Long term goal 4: Pt to negotiate 12 steps with HR indep    ELOS:   Plan weeks: 1.5-2    Therapy Time:    Individual   Time In 1005   Time Out 1030   Minutes 25      delayed 5 min d/t breakfast       Joe Rocha, PT, 11/22/17 at 1:16 PM

## 2017-11-22 NOTE — PROGRESS NOTES
Occupational Therapy   Occupational Therapy Initial Assessment  Date: 2017   Patient Name: Katelin Andrade  MRN: [id number]     : 1940    Patient Diagnosis(es): There were no encounter diagnoses. Right thoracotomy wedge resection for foreign body     has a past medical history of Abnormality of gait and mobility; Acute sinusitis; Anxiety; Aspiration into respiratory tract; Aspiration pneumonia (HCC); BPH (benign prostatic hyperplasia); COPD (chronic obstructive pulmonary disease) ([facility] Debility; Elevated CK; Foraminal stenosis of lumbosacral region; GERD (gastroesophageal reflux disease); History of asthma; HTN (hypertension); Hyperlipidemia; Hypothyroidism; Insomnia; Lower extremity weakness; On home oxygen therapy; Osteoarthritis of spine with radiculopathy, lumbar region; Papillary thyroid carcinoma ([facility] Positive D dimer; Sleep apnea; Type 2 diabetes mellitus ([facility] and Vitamin D deficiency. has a past surgical history that includes knee surgery (Left, ); Thyroidectomy (); bronchoscopy (N/A, 2017); and thoracotomy (Right, 2017). Restrictions  Restrictions/Precautions  Restrictions/Precautions: Fall Risk  Required Braces or Orthoses?: No    Subjective  Pt seated in high-back chair at start and end of session. Call light in reach; all needs from OT met. Pt presented with frequent coughing with blood in sputum. Pt had one nose bleed during session. RN and physician informed.    General  Chart Reviewed: Yes  Patient assessed for rehabilitation services?: Yes  Family / Caregiver Present: No  Diagnosis: Right thoracotomy wedge resection for foreign body  Pain Assessment  Patient Currently in Pain: Yes  Pain Assessment: 0-10  Pain Level: 6  Pain Type: Surgical pain, Acute pain  Pain Location:  (R surgical site)  Pain Intervention(s): Medication (see eMar), Therapeutic presence  Response to Pain Intervention: Patient Satisfied Pt reported pain at 5/10 at end of session. Oxygen Therapy  SpO2: 94 %  Pulse Oximeter Device Mode: Intermittent  Pulse Oximeter Device Location: Right;Finger  O2 Device: Nasal cannula  O2 Flow Rate (L/min):  (2L)  Social/Functional History  Social/Functional History  Lives With: Alone - Pt's daughter able to assist PRN  Type of Home: House  Home Layout: One level  Home Access: Level entry  Bathroom Shower/Tub: Tub/Shower unit  Bathroom Toilet: Handicap height  Home Equipment: Rolling walker, 4 wheeled walker  Receives Help From: Family (Pt reported family able to assist with grocery shopping if needed upon discharge)  ADL Assistance: Independent  Homemaking Assistance: Independent (Independent in cooking, cleaning, driving)  Homemaking Responsibilities: Yes  Ambulation Assistance: Independent (No device)  Transfer Assistance: Independent (No device or DME)  Active : Yes  Mode of Transportation: [employer]  Occupation: Retired  Type of occupation:   Leisure & Hobbies: \"Working in my workshop\". Pt reports being very active PTA  IADL Comments: Pt reported, \"I was very active and independent. \"       Objective   Vision: Impaired  Vision Exceptions: Wears glasses for reading (Pt reported, \"I had cataracts removed. \" Pt reported no recent changes in his vision.)  Hearing: Exceptions to [facility]  Hearing Exceptions: Bilateral hearing aid    Orientation  Overall Orientation Status: Within Functional Limits  Observation/Palpation  Posture: Fair  Observation: 2L of O2 via NC, sutures present at chest tube incision site  Balance  Sitting Balance: Modified independent   Standing Balance: Stand by assistance (supported balance)  Standing Balance  Time: Pt tolerated standing for 3 minutes to brush teeth. Pt demonstrated significant fatigue after task completed.    Stand to sit: Supervision  Functional Mobility  Activity: To/from bathroom  Assist Level: Stand by assistance  Toilet Transfers  Toilet - Technique: Ambulating  Equipment Used: Grab bars (FWW)  Toilet Transfer: Stand by assistance  Toilet Transfers Comments: OT managed oxygen line for safety during transfer; pt requried cues for sequencing safe use of FWW  Tub Transfers  Tub Transfers: Not tested  Shower Transfers  Shower Transfers: Not tested   [facility] Transfers Comments: Pt agreeable to bath during future OT session; pt unable to bathe in shower this session d/t physician not seeing pt prior to OT eval per physician orders. ADL  Feeding: Modified independent  (Pt has partial denture)  Grooming: Supervision (Pt completed teeth brushing in standing; face washing and hair combing seated d/t significant fatigue)  UE Bathing: Setup (Seated; completed bathing at sink)  LE Bathing: Other (Comment) (Pt declined to complete LE Bathing d/t significant fatigue. Bathing unable to be completed in shower d/t pt not seeing physician prior to OT eval)  UE Dressing: Other (Comment) (Pt wore hospital gown; required min A to don. Pt's daughter to bring clothing this date)  LE Dressing: Maximum assistance (Pt doffed 1/2 socks. Pt attempted to put on 1 sock; able to don over toes but unable to pull up d/t fatigue)  Toileting: Supervision (Pt used grab bar/FWW to steady self during clothing management)  Additional Comments: Pt required frequent rest breaks throughout ADL task completion d/t significant fatigue. OT managed oxygen tubing throughout tasks.   Tone RUE  RUE Tone: Normotonic  Tone LUE  LUE Tone: Normotonic        Transfers  Stand to sit: Supervision     Cognition  Cognition Comment: Comprehension: 6, Expression: 6, Social interaction: 7, Problem Solvin, Memory: 5        Sensation  Overall Sensation Status: Impaired (Pt reported numbness at surigical incision site)        LUE AROM (degrees)  LUE AROM : WFL  Left Hand AROM (degrees)  Left Hand AROM: WFL  RUE AROM (degrees)  RUE AROM : WFL  Right Hand AROM (degrees)  Right Hand AROM: WFL  LUE Strength  Gross LUE Strength: WFL  LUE Strength Comment: 4/5 throughout   RUE Strength  Gross RUE Strength: WFL  RUE Strength Comment: 4/5 throughout                   Assessment   Performance deficits / Impairments: Decreased functional mobility ; Decreased ADL status; Decreased endurance;Decreased high-level IADLs  Assessment: Pt's independence and safety with ADLs, IADLs, and functional mobility/transfers limited by pt experiencing moderate SOB and significant fatigue after 2-3 minutes of mobility/ADL task completion in both sitting and standing. Prognosis: Good  Decision Making: Medium Complexity  History: multi comorb  Exam: 4 deficits  Assistance / Modification: Minimal assistance  Patient Education: Educated pt on role and purpose of OT, safety education, rehab program, deep breathing techniques, and use of pillow to brace chest when coughing. Pt verbalized/demo'd good understanding. Discharge Recommendations: Continue to assess pending progress (OT anticipates pt will be able to discharge home with [facility] therapy support)  REQUIRES OT FOLLOW UP: Yes  Activity Tolerance  Activity Tolerance: Patient limited by fatigue;Patient limited by pain  Safety Devices  Safety Devices in place: Yes  Type of devices: All fall risk precautions in place  OT Equipment Recommendations  Equipment Needed:  (To be determined)        Discharge Recommendations:  Continue to assess pending progress (OT anticipates pt will be able to discharge home with [facility] therapy support)     Plan   Plan  Times per week: 5-7x/week  Plan weeks: 10-14 days  Current Treatment Recommendations: Strengthening, Endurance Training, Patient/Caregiver Education & Training, Self-Care / ADL, Home Management Training, Safety Education & Training    Goals  Patient Goals   Patient goals : \"Be able to do things without being winded. \" \"I want to get back into the community. \"      [x]  Patient will complete self care as followed using the recommended adaptive equipment and/or adaptive techniques as instructed:  Feeding Mod

## 2017-11-22 NOTE — PROCEDURES
SPEECH/LANGUAGE PATHOLOGY  VIDEOFLUOROSCOPIC STUDY OF SWALLOWING (MBS)      PATIENT NAME:  Luis Juan      :      Room: [facility]/R262-01   TODAY'S DATE:  2017    Time in: 1320  Time out: 1330    [x]The admitting diagnosis and active problem list, as listed below have been reviewed prior to initiation of this evaluation.      ADMITTING DIAGNOSIS: Abnormality of gait and mobility [R26.9]     ACTIVE PROBLEM LIST:   Patient Active Problem List   Diagnosis    COPD (chronic obstructive pulmonary disease) (HCC)    HTN (hypertension)    Hyperlipidemia    GERD (gastroesophageal reflux disease)    Hypothyroidism    Type 2 diabetes mellitus (HCC)    BPH (benign prostatic hyperplasia)    Anxiety    Insomnia    Acute sinusitis    Papillary thyroid carcinoma (HCC)    Elevated PSA    Osteoarthritis of spine with radiculopathy, lumbar region    Foraminal stenosis of lumbosacral region    Skin cyst    Local infection of skin and subcutaneous tissue    Aspiration into respiratory tract    Abnormality of gait and mobility w/debility secondary to aspiration pneumonia due to dental filling/crown, [facility] admit 17    Vitamin D deficiency    Aspiration pneumonia ([facility]    Debility    On home oxygen therapy       No Known Allergies        SUMMARY OF EVALUATION  DYSPHAGIA DIAGNOSIS:  Functional oropharyngeal swallow      DIET RECOMMENDATIONS:  Regular with thin liquids         FEEDING RECOMMENDATIONS:   [] No assistance required   [] Occasional checks by nursing  [x] Set up required   [] Frequent checks by nursing  [] Full assistance required  [] Supervision for all PO intake      [] Double swallow    [] Chin tuck  [] Multiple swallow     [x] Feed in upright position   [] Small bites/sips   [] Effortful swallow   [] Alternate solids / liquids  [] Check for oral pocketing  [] No straw    [] Throat clear       [] Place food on left side  [] Place food on right side  [] Spoon sip liquids   [] [x]WFL  []  Exceptions        Oral Stage Impression: Functional oral stage of swallow. PHARYNGEAL STAGE:     [] WNL  [x]WFL  []  Exceptions    ONSET TIME:  [x] Onset time of the pharyngeal swallow was adequate    [] Delayed initiation of the pharyngeal swallow:    []mild []moderate []marked []severe []absent     [x] Swallow reflex was triggered at:   [x]tongue base [x]valleculae []pyriform sinus     PHARYNGEAL RESIDUALS      Vallecula/Pharyngeal Wall  [x]No significant residuals were noted in the vallecula    []Reduced tongue base retraction resulting in:    []residuals in vallecula []residual on posterior pharyngeal wall    Pyriform Sinuses  [x]No significant residuals were noted in the pyriform sinuses    [] Residuals in the pyriform sinuses were noted due to:    []pharyngeal weakness []cricopharyngeal dysfunction      LARYNGEAL PENETRATION/ASPIRATION  [x]Laryngeal penetration was not present during this evaluation    [x]Aspiration was not present during this evaluation            Aspiration Scale  Puree  Solid  Thin 1 Material does not enter the airway    2 Material enters the airway, remains above the vocal folds, and is ejected from the airway    3 Material enters the airway, remains above the vocal folds, and is not ejected from the airway    4 Material enters the airway, contacts the vocal folds, an is ejected from the airway    5 Material enters the airway, contacts the vocal folds, and is not ejected from the airway    6 Material enters the airway, passes below the vocal folds and is ejected into the larynx or out of the airway    7 Material enters the airway, passes below the vocal folds, and is not ejected from the trachea despite effort    8 Material enters the airway, passes below the vocal folds, and no effort is made to eject. Pharyngeal Stage Impression:  Functional pharyngeal stage of swallow.   Mild residue on base of tongue and pyriforms with thin; cleared

## 2017-11-23 LAB
ANION GAP SERPL CALCULATED.3IONS-SCNC: 13 MEQ/L (ref 7–13)
BUN BLDV-MCNC: 16 MG/DL (ref 8–23)
CALCIUM SERPL-MCNC: 7.9 MG/DL (ref 8.6–10.2)
CHLORIDE BLD-SCNC: 100 MEQ/L (ref 98–107)
CO2: 26 MEQ/L (ref 22–29)
CREAT SERPL-MCNC: 0.84 MG/DL (ref 0.7–1.2)
GFR AFRICAN AMERICAN: >60
GFR NON-AFRICAN AMERICAN: >60
GLUCOSE BLD-MCNC: 84 MG/DL (ref 74–109)
HCT VFR BLD CALC: 35.7 % (ref 42–52)
HEMOGLOBIN: 11.6 G/DL (ref 14–18)
MCH RBC QN AUTO: 31 PG (ref 27–31.3)
MCHC RBC AUTO-ENTMCNC: 32.6 % (ref 33–37)
MCV RBC AUTO: 94.9 FL (ref 80–100)
PDW BLD-RTO: 14.8 % (ref 11.5–14.5)
PLATELET # BLD: 186 K/UL (ref 130–400)
POTASSIUM SERPL-SCNC: 4.3 MEQ/L (ref 3.5–5.1)
RBC # BLD: 3.76 M/UL (ref 4.7–6.1)
SODIUM BLD-SCNC: 139 MEQ/L (ref 132–144)
WBC # BLD: 10.7 K/UL (ref 4.8–10.8)

## 2017-11-23 PROCEDURE — 99233 SBSQ HOSP IP/OBS HIGH 50: CPT | Performed by: PHYSICAL MEDICINE & REHABILITATION

## 2017-11-23 PROCEDURE — 94640 AIRWAY INHALATION TREATMENT: CPT

## 2017-11-23 PROCEDURE — 1180000000 HC REHAB R&B

## 2017-11-23 PROCEDURE — 94664 DEMO&/EVAL PT USE INHALER: CPT

## 2017-11-23 PROCEDURE — 6370000000 HC RX 637 (ALT 250 FOR IP): Performed by: INTERNAL MEDICINE

## 2017-11-23 PROCEDURE — 85027 COMPLETE CBC AUTOMATED: CPT

## 2017-11-23 PROCEDURE — 80048 BASIC METABOLIC PNL TOTAL CA: CPT

## 2017-11-23 PROCEDURE — 2700000000 HC OXYGEN THERAPY PER DAY

## 2017-11-23 PROCEDURE — 6370000000 HC RX 637 (ALT 250 FOR IP): Performed by: PHYSICAL MEDICINE & REHABILITATION

## 2017-11-23 PROCEDURE — 6360000002 HC RX W HCPCS: Performed by: INTERNAL MEDICINE

## 2017-11-23 PROCEDURE — 36415 COLL VENOUS BLD VENIPUNCTURE: CPT

## 2017-11-23 RX ORDER — SODIUM CHLORIDE FOR INHALATION 0.9 %
3 VIAL, NEBULIZER (ML) INHALATION PRN
Status: DISCONTINUED | OUTPATIENT
Start: 2017-11-23 | End: 2017-12-05 | Stop reason: HOSPADM

## 2017-11-23 RX ADMIN — DOCUSATE SODIUM 100 MG: 100 CAPSULE, LIQUID FILLED ORAL at 21:24

## 2017-11-23 RX ADMIN — MAGNESIUM CITRATE 150 ML: 1.75 LIQUID ORAL at 14:23

## 2017-11-23 RX ADMIN — OXYMETAZOLINE HYDROCHLORIDE 2 SPRAY: 5 SPRAY NASAL at 08:09

## 2017-11-23 RX ADMIN — TRAZODONE HYDROCHLORIDE 150 MG: 150 TABLET ORAL at 21:25

## 2017-11-23 RX ADMIN — GUAIFENESIN 600 MG: 600 TABLET, EXTENDED RELEASE ORAL at 08:03

## 2017-11-23 RX ADMIN — LEVOTHYROXINE SODIUM 125 MCG: 25 TABLET ORAL at 07:05

## 2017-11-23 RX ADMIN — IPRATROPIUM BROMIDE AND ALBUTEROL SULFATE 1 AMPULE: .5; 3 SOLUTION RESPIRATORY (INHALATION) at 10:42

## 2017-11-23 RX ADMIN — TAMSULOSIN HYDROCHLORIDE 0.4 MG: 0.4 CAPSULE ORAL at 21:25

## 2017-11-23 RX ADMIN — IPRATROPIUM BROMIDE AND ALBUTEROL SULFATE 1 AMPULE: .5; 3 SOLUTION RESPIRATORY (INHALATION) at 16:16

## 2017-11-23 RX ADMIN — ISODIUM CHLORIDE 3 ML: 0.03 SOLUTION RESPIRATORY (INHALATION) at 13:10

## 2017-11-23 RX ADMIN — GUAIFENESIN 600 MG: 600 TABLET, EXTENDED RELEASE ORAL at 21:24

## 2017-11-23 RX ADMIN — SENNOSIDES AND DOCUSATE SODIUM 2 TABLET: 8.6; 5 TABLET ORAL at 21:25

## 2017-11-23 RX ADMIN — METOPROLOL SUCCINATE 25 MG: 25 TABLET, FILM COATED, EXTENDED RELEASE ORAL at 08:03

## 2017-11-23 RX ADMIN — POLYETHYLENE GLYCOL 3350 17 G: 17 POWDER, FOR SOLUTION ORAL at 08:03

## 2017-11-23 RX ADMIN — FINASTERIDE 5 MG: 5 TABLET, FILM COATED ORAL at 08:08

## 2017-11-23 RX ADMIN — IPRATROPIUM BROMIDE AND ALBUTEROL SULFATE 1 AMPULE: .5; 3 SOLUTION RESPIRATORY (INHALATION) at 05:16

## 2017-11-23 RX ADMIN — ALBUTEROL SULFATE 2.5 MG: 2.5 SOLUTION RESPIRATORY (INHALATION) at 23:32

## 2017-11-23 RX ADMIN — DOCUSATE SODIUM 100 MG: 100 CAPSULE, LIQUID FILLED ORAL at 08:08

## 2017-11-23 RX ADMIN — MIRTAZAPINE 15 MG: 15 TABLET, FILM COATED ORAL at 21:24

## 2017-11-23 RX ADMIN — OXYMETAZOLINE HYDROCHLORIDE 2 SPRAY: 5 SPRAY NASAL at 21:25

## 2017-11-23 NOTE — PROGRESS NOTES
[facility] Respiratory Therapy Evaluation   Current Order:  Q4 [place] Sodium Chloride & Tid Duoneb     Home Regimen: none     Ordering Physician: Swapnil Loyola  Re-evaluation Date:11/27       Diagnosis: Gait Abnormality   Patient Status: Stable / Unstable + Physician notified    The following MDI Criteria must be met in order to convert aerosol to MDI with spacer.  If unable to meet, MDI will be converted to aerosol:  []  Patient able to demonstrate the ability to use MDI effectively  []  Patient alert and cooperative  []  Patient able to take deep breath with 5-10 second hold  []  Medication(s) available in this delivery method   []  Peak flow greater than or equal to 200 ml/min            Current Order Substituted To  (same drug, same frequency)   Aerosol to MDI [] Albuterol Sulfate 0.083% unit dose by aerosol Albuterol Sulfate MDI 2 puffs by inhalation with spacer    [] Levalbuterol 1.25 mg unit dose by aerosol Levalbuterol MDI 2 puffs by inhalation with spacer    [] Levalbuterol 0.63 mg unit dose by aerosol Levalbuterol MDI 2 puffs by inhalation with spacer    [] Ipratropium Bromide 0.02% unit dose by aerosol Ipratropium Bromide MDI 2 puffs by inhalation with spacer    [] Duoneb (Ipratropium + Albuterol) unit dose by aerosol Ipratropium MDI + Albuterol MDI 2 puffs by inhalation w/spacer   MDI to Aerosol [] Albuterol Sulfate MDI Albuterol Sulfate 0.083% unit dose by aerosol    [] Levalbuterol MDI 2 puffs by inhalation Levalbuterol 1.25 mg unit dose by aerosol    [] Ipratropium Bromide MDI by inhalation Ipratropium Bromide 0.02% unit dose by aerosol    [] Combivent (Ipratropium + Albuterol) MDI by inhalation Duoneb (Ipratropium + Albuterol) unit dose by aerosol   Treatment Assessment [Frequency/Schedule]:  Change frequency to: ________Tid Duoneb & Qprn Sodium Chloride__________________________________________per Protocol, P&T, MEC      Points 0 1 2 3 4   Pulmonary Status  Non-Smoker  []   Smoking history   < 20 pack years  []   Smoking history  ?  20 pack years  []   Pulmonary Disorder  (acute or chronic)  [x]   Severe or Chronic w/ Exacerbation  []     Surgical Status No []   Surgeries     General [x]   Surgery Lower []   Abdominal Thoracic or []   Upper Abdominal Thoracic with  PulmonaryDisorder  []     Chest X-ray Clear/Not  Ordered     []  Chronic Changes  Results Pending  []  Infiltrates, atelectasis, pleural effusion, or edema  [x]  Infiltrates in more than one lobe []  Infiltrate + Atelectasis, &/or pleural effusion  []    Respiratory Pattern Regular,  RR = 12-20 [x]  Increased,  RR = 21-25 []  STEWART, irregular,  or RR = 26-30 []  Decreased FEV1  or RR = 31-35 []  Severe SOB, use  of accessory muscles, or RR ? 35  []    Mental Status Alert, oriented,  Cooperative [x]  Confused but Follows commands []  Lethargic or unable to follow commands []  Obtunded  []  Comatose  []    Breath Sounds Clear to  auscultation  []  Decreased unilaterally or  in bases only [x]  Decreased  bilaterally  []  Crackles or intermittent wheezes []  Wheezes []    Cough Strong, Spontan., & nonproductive [x]  Strong,  spontaneous, &  productive []  Weak,  Nonproductive []  Weak, productive or  with wheezes []  No spontaneous  cough or may require suctioning []    Level of Activity Ambulatory []  Ambulatory w/ Assist  [x]  Non-ambulatory []  Paraplegic []  Quadriplegic []    Total    Score:__8_____     Triage Score:__4______      Tri       Triage:     1. (>20) Freq: Q3    2. (16-20) Freq: Q4   3. (11-15) Freq: QID & Albuterol Q2 PRN    4. (6-10) Freq: TID & Albuterol Q2 PRN    5. (0-5) Freq Q4prn

## 2017-11-23 NOTE — CONSULTS
Consult Note    Reason for Consult:  Management of general medical needs    Requesting Physician:  Terrence Oswald DO    HISTORY OF PRESENT ILLNESS:    The patient is a 69 y.o. male who presents to inpatient rehab after aspiration of tooth filling into posterior basilar segment of the RLL and developed SOB and hemoptysis. On 11/14/17 he underwent a right thoracotomy wedge resection to retrieve the tooth filling with CT Surgery. He was treated for aspiration pneumonia and discharged to rehab for abnormality of gait and mobility with decline in self care. Patient has a complaint today of SOB and is on continuous oxygen at 2 liters. He also states being extremely uncomfortable d/t feeling constipated and bloated. He has been having small bowel movements with daily aggressive bowel regimen. Patient denies CP/HA/fever/chills/N/V/D.       Past Medical History:   Diagnosis Date    Abnormality of gait and mobility     Acute sinusitis     Anxiety     Aspiration into respiratory tract 11/10/2017    Aspiration pneumonia (HCC)     BPH (benign prostatic hyperplasia)     COPD (chronic obstructive pulmonary disease) ([facility] 12/5/2013    Debility     Elevated CK 12/30/2013    Foraminal stenosis of lumbosacral region 6/7/2016    GERD (gastroesophageal reflux disease) 12/11/2014    History of asthma 1/13/2014    HTN (hypertension) 1/13/2014    Hyperlipidemia     Hypothyroidism     Insomnia     Lower extremity weakness 1/24/2014    On home oxygen therapy     2L at night    Osteoarthritis of spine with radiculopathy, lumbar region 6/7/2016    Papillary thyroid carcinoma ([address] 12/9/2015    Positive D dimer 12/5/2013    Sleep apnea 1/24/2014    Type 2 diabetes mellitus ([address]     Vitamin D deficiency        Past Surgical History:   Procedure Laterality Date    BRONCHOSCOPY N/A 11/11/2017    BRONCHOSCOPY FIBEROPTIC performed by Briana Tomas MD at [address] [facility] Right 11/14/2017    RIGHT THORACOTOMY WEDGE RESECTION FOR FOREIGN BODY AND FOB DOUBLE LUMEN (1ST CASE) performed by Royer Pagan MD at [address] [place] [address]  2006       Prior to Admission medications    Medication Sig Start Date End Date Taking? Authorizing Provider   mirtazapine (REMERON) 15 MG tablet Take 15 mg by mouth nightly   Yes Historical Provider, MD   finasteride (PROSCAR) 5 MG tablet Take 5 mg by mouth daily. Yes Historical Provider, MD   tamsulosin (FLOMAX) 0.4 MG capsule Take 0.4 mg by mouth daily. Yes Historical Provider, MD   traZODone (DESYREL) 150 MG tablet Take 150 mg by mouth nightly    Historical Provider, MD   LORazepam (ATIVAN) 0.5 MG tablet Take 0.5 mg by mouth    Historical Provider, MD   Famotidine (PEPCID AC PO) Take by mouth daily     Historical Provider, MD   levothyroxine (SYNTHROID) 137 MCG tablet Take 1 tablet by mouth  daily 4/3/17   Leigh Delarosa MD   pravastatin (PRAVACHOL) 20 MG tablet Take 1 tablet by mouth daily. 4/27/15   Sammie Whitehead MD   budesonide-formoterol (SYMBICORT) 160-4.5 MCG/ACT AERO Inhale 2 puffs into the lungs 2 times daily. Historical Provider, MD   aspirin 81 MG tablet Take 81 mg by mouth daily.     Historical Provider, MD       Scheduled Meds:   tamsulosin  0.4 mg Oral Nightly    lidocaine  3 patch Transdermal Daily    oxymetazoline  2 spray Each Nare BID    magnesium citrate  150 mL Oral Daily before lunch    docusate sodium  100 mg Oral BID    finasteride  5 mg Oral Daily    guaiFENesin  600 mg Oral BID    ipratropium-albuterol  1 ampule Inhalation TID    levothyroxine  125 mcg Oral Daily    metoprolol succinate  25 mg Oral Daily    mirtazapine  15 mg Oral Nightly    polyethylene glycol  17 g Oral Daily    sennosides-docusate sodium  2 tablet Oral Nightly    sodium chloride nebulizer  3 mL Nebulization Q4H While awake    traZODone  150 mg Oral Nightly     Continuous Infusions:   dextrose       PRN Meds:acetaminophen, sodium chloride, stated age  EYES:  Lids and lashes normal, pupils equal, round and reactive to light, extra ocular muscles intact, sclera clear, conjunctiva normal  NECK:  Supple, symmetrical, trachea midline, no adenopathy, thyroid symmetric, not enlarged and no tenderness, skin normal  LUNGS:  No increased work of breathing, good air exchange, clear to auscultation bilaterally, no crackles or wheezing  CARDIOVASCULAR:  Normal apical impulse, regular rate and rhythm, normal S1 and S2, no S3 or S4, and no murmur noted  ABDOMEN:  hyperactive bowel sounds, distended and tenderness noted diffusely  MUSCULOSKELETAL:  There is no redness, warmth, or swelling of the joints. Full range of motion noted. Motor strength is 5 out of 5 all extremities bilaterally. Tone is normal.  NEUROLOGIC:  Awake, alert, oriented to name, place and time. Cranial nerves II-XII are grossly intact. Motor is 5 out of 5 bilaterally. Cerebellar finger to nose, heel to shin intact. Sensory is intact. Babinski down going, Romberg negative, and gait is normal.  SKIN:  normal skin color, texture, turgor    Labs:  Recent Results (from the past 24 hour(s))   Basic metabolic panel    Collection Time: 11/23/17  5:07 AM   Result Value Ref Range    Sodium 139 132 - 144 mEq/L    Potassium 4.3 3.5 - 5.1 mEq/L    Chloride 100 98 - 107 mEq/L    CO2 26 22 - 29 mEq/L    Anion Gap 13 7 - 13 mEq/L    Glucose 84 74 - 109 mg/dL    BUN 16 8 - 23 mg/dL    CREATININE 0.84 0.70 - 1.20 mg/dL    GFR Non-African American >60.0 >60    GFR  >60.0 >60    Calcium 7.9 (L) 8.6 - 10.2 mg/dL   CBC    Collection Time: 11/23/17  5:07 AM   Result Value Ref Range    WBC 10.7 4.8 - 10.8 K/uL    RBC 3.76 (L) 4.70 - 6.10 M/uL    Hemoglobin 11.6 (L) 14.0 - 18.0 g/dL    Hematocrit 35.7 (L) 42.0 - 52.0 %    MCV 94.9 80.0 - 100.0 fL    MCH 31.0 27.0 - 31.3 pg    MCHC 32.6 (L) 33.0 - 37.0 %    RDW 14.8 (H) 11.5 - 14.5 %    Platelets 674 235 - 523 K/uL     Assessment/Plan:    1.  Foreign object in posterior basilar segment of the RLL now with SOB, hemoptysis- foreign removed by CT Surgery, continue mucinex, cough and deep breathing, respiratory treatments as needed. 2. Constipation- Metamucil started and to be taken daily. having daily bowel movements, continue aggressive bowel regimen of daily and as needed medications    3. Gross Hematuria resolved- sharp removed prior to rehab admission, per patient urinating clear yellow, follow up with Dr. Mandeep Bacon in 4 weeks. 4.COPD- continue to monitor, continue respiratory treatments as needed, continuous oxygen 2 liters, does not apear to be in acute exacerbation    5. Diabetes type 2- continue ac/hs and as needed POCT glucose.     6. Hypothyroidism - continue daily synthroid      Electronically signed by CINDY Khan on 11/23/17 at 12:34 PM

## 2017-11-23 NOTE — PROGRESS NOTES
Subjective: The patient complains of moderate to severe  acute  dyspnea on exertion, hemoptysis, nose bleeding partially relieved by  recent antibiotic treatments, PT, OT, high-dose oxygen therapy and exacerbated by  exertion and aspiration of foreign object. I am concerned about patients  risk of oral pharyngeal dysphagia though his recent modified barium swallow K him for regular diet and thin liquids he is also healing a right thoracotomy and wedge resection. His CO hemoptysis is improving with Afrin. ROS x10: The patient also complains of severely impaired mobility and activities of daily living. Otherwise no new problems with vision, hearing, nose, mouth, throat, dermal, cardiovascular, GI, , pulmonary, musculoskeletal, psychiatric or neurological. See Rehab H&P on Rehab chart dated . Vital signs:  BP (!) 113/57   Pulse 70   Temp 99 °F (37.2 °C) (Oral)   Resp 14   Ht 6' (1.829 m)   Wt 246 lb 4.1 oz (111.7 kg)   SpO2 96%   BMI 33.40 kg/m²   I/O:   PO/Intake:  fair PO intake, no problems observed or reported. Bowel/Bladder:  continent, no problems noted. General:  Patient is well developed, adequately nourished, non-obese and     well kempt. HEENT:    Hemoptysis, PERRLA, hearing intact to loud voice, external inspection of ear     and nose benign. Inspection of lips, tongue and gums benign  Musculoskeletal: No significant change in strength or tone. All joints stable. Inspection and palpation of digits and nails show no clubbing,       cyanosis or inflammatory conditions. Neuro/Psychiatric: Affect: flat but pleasant. Alert and oriented to person, place and     situation. No significant change in deep tendon reflexes or     sensation  Lungs:  Diminished CTA-B. Respiration effort is normal at rest.     Heart:   S1 = S2, RRR. No loud murmurs. Abdomen:  Soft, non-tender, no enlargement of liver or spleen.   Extremities:  No significant lower extremity edema or tenderness. Skin:   Intact right thoracotomy incision,  the small boil on the posterior aspect of his right thigh which was drained cultured and prescribed Betadine to dry sterile dressing  Rehabilitation:  Physical therapy: FIMS:  Bed Mobility:      Transfers: Sit to Stand: Stand by assistance  Stand to sit: Stand by assistance  Bed to Chair: Stand by assistance, Ambulation 1  Surface: level tile  Device: No Device  Other Apparatus: O2  Assistance: Stand by assistance  Quality of Gait: Increased lateral excursion with shortened step length. Pt with increased time to cover distance. Trunk and UEs held rigidly for balance compensation. No LOB noted, however pattern inefficient. Distance: 20ft  Comments: Increased time to complete,      FIMS: Bed, Chair, Wheel Chair: 5 - Requires setup/supervision/cues  Walk: 1 - Total Assistance Walks/operates wheelchair < 50 feet OR requires two or more people OR patient performs < 25% of locomotion effort  Distance Walked: 20  Wheel Chair: 0 - Activity Not Assessed/Does Not Occur  Stairs: 0 - Activity Does not Occur ( 0 only for the admission assessment),  , Assessment: Completed standing exercises; 2 activities then seated RB to recover O2 stats (fluctuated between 94-96% on 3L). Pt SOB. Pt reports preference of walking without AD     Occupational therapy: FIMS:  Eatin - Feeds self with setup/supervision/cues and/or requires only setup/supervision/cues to perform tube feedings  Groomin - Did not occur  Bathin - Did not occur  Dressing-Upper: 0 - Did not occur  Dressing-Lower: 0 - Did not occur  Toiletin - Requires setup/supervision/cues  Toilet Transfer: 5 - Requires setup/supervision/cues  Tub Transfer: 0 - Activity does not occur  Shower Transfer: 0 - Activity does not occur,  , Assessment: Pt continues to require verbal cues to initiate deep breathing techniques to decrease SOB and increase activity tolerance.      Speech therapy: FIMS: Comprehension: 4 - Patient understands basic needs 75-90%+ of the time  Expression: 4 - Expresses basic ideas/needs 75-90%+ of the time  Social Interaction: 5 - Patient is appropriate with supervision/cues  Problem Solvin - Patient solves simple/routine tasks 75-90%+   Memory: 4 - Patient remembers 75-90%+ of the time      Lab/X-ray studies reviewed, analyzed and discussed with patient and staff:   Recent Results (from the past 24 hour(s))   Basic metabolic panel    Collection Time: 17  5:07 AM   Result Value Ref Range    Sodium 139 132 - 144 mEq/L    Potassium 4.3 3.5 - 5.1 mEq/L    Chloride 100 98 - 107 mEq/L    CO2 26 22 - 29 mEq/L    Anion Gap 13 7 - 13 mEq/L    Glucose 84 74 - 109 mg/dL    BUN 16 8 - 23 mg/dL    CREATININE 0.84 0.70 - 1.20 mg/dL    GFR Non-African American >60.0 >60    GFR  >60.0 >60    Calcium 7.9 (L) 8.6 - 10.2 mg/dL   CBC    Collection Time: 17  5:07 AM   Result Value Ref Range    WBC 10.7 4.8 - 10.8 K/uL    RBC 3.76 (L) 4.70 - 6.10 M/uL    Hemoglobin 11.6 (L) 14.0 - 18.0 g/dL    Hematocrit 35.7 (L) 42.0 - 52.0 %    MCV 94.9 80.0 - 100.0 fL    MCH 31.0 27.0 - 31.3 pg    MCHC 32.6 (L) 33.0 - 37.0 %    RDW 14.8 (H) 11.5 - 14.5 %    Platelets 220 857 - 514 K/uL       Xr Chest Standard (2 Vw)    Result Date: 2017  EXAMINATION: CHEST X-RAY, AP AND LATERAL CLINICAL HISTORY: FOLLOW-UP ATELECTASIS AND/OR INFILTRATE IN RIGHT LUNG BASE COMPARISONS: 2017 FINDINGS: Once again, there are surgical clips superimposed on the right lung base. There is persistent atelectasis and/or infiltrate in the right lung base which appears unchanged since the 2017 chest x-ray. There is a small pleural effusion trapped in the right major and minor pulmonary fissures. Also, there are subsegmental atelectasis in the left CP angle. The heart is not enlarged. There is a tortuous aorta. There is degenerative bone spurring throughout the spine.      NO SIGNIFICANT CLEARING OF THE RIGHT LUNG BASE SINCE THE 11/19/2017 CHEST X-RAY. Xr Chest Standard (2 Vw)    Result Date: 11/18/2017  Chest 2 views. HISTORY: New onset shortness of breath. Removal chest tube. FINDINGS: Comparison, November 17, 2017. Interval removal of right thoracostomy tube. Surgical clips right hilum. Increased opacity right lower lung with thickening of minor fissure. Small pneumothorax at apex, and along right chest wall. Left lung clear. Removal of chest tube. Interval increase right lower lung atelectasis. Possible right pleural effusion. Interval development small right pneumothorax. Xr Chest Standard (2 Vw)    Result Date: 11/17/2017  EXAMINATION: XR CHEST STANDARD TWO VW CLINICAL HISTORY:  Possible CT removal . Postop observation. Right lung surgery. COMPARISONS: November 14, 2017 FINDINGS: AP portable inspiration and expiration views the chest were obtained on November 17, 2017 at 0525 hours. The right-sided chest drainage tube remains unremarkable. No pneumothorax has developed. There is a small amount bibasilar atelectasis. Again noted are surgical staples at the site of right lower lobe segmental lung resection. The foreign body is no longer present. The mediastinal silhouette is normal. The chest wall is unremarkable. CONCLUSION: UNREMARKABLE POSTOPERATIVE FINDINGS. NO PNEUMOTHORAX. NO LARGE EFFUSION. SMALL ATELECTASIS. Xr Chest Standard Result Date: 11/13/2017  EXAMINATION: XR CHEST STANDARD TWO VW CLINICAL HISTORY:  Aspirated foreign object right lower lobe COMPARISONS: December 3, 2015. November 10, 2017. November 11, 2017. November 13, 2017. FINDINGS: Frontal and lateral views of the chest were obtained showing a persisting metallic tooth like opacity projecting of the posterior basal segment of the right lower lobe. It is located more peripherally now than it was on November 10, 2017. It has changed little since November 11, 2017. No signs of obstructive pneumonitis have developed.  Remainder both lungs mainstem bronchus and in the right hilum, unchanged from prior examination of 12/2/2013. There is no thoracic adenopathy. The esophagus is unremarkable. There is spondylosis. There are no acute or suspicious bone lesions. Scans of the subdiaphragmatic regions demonstrate a 6 mm oval metallic density in the gastric fundus, presumably representing a swallowed filling. There are stable small cysts in the liver. There are partially visualized renal cysts. ASPIRATED FILLING IN RIGHT LOWER LOBE POSTERIOR BASILAR SEGMENTAL BRONCHUS. MILD EMPHYSEMA AND LOWER LOBE CYLINDRICAL BRONCHIECTASIS. ADDITIONAL FINDINGS AS ABOVE. Xr Chest Decubitus Right    Result Date: 11/20/2017  EXAMINATION: XR CHEST DECUBITUS RIGHT CLINICAL HISTORY:  Evaluate for pleural effusion COMPARISONS: November 19, 2017 portable chest x-ray FINDINGS: Bilateral decubitus views the chest were obtained. There is atelectasis at the right base, at the operative site. There is no evidence of mobile effusion on the right. There is no pneumothorax. There is no mobile effusion on the left. The chest  wall is unremarkable. CONCLUSION: ATELECTASIS RELATED TO RECENT SURGERY WITHOUT DEMONSTRABLE MOBILE EFFUSION. THERE IS NO PNEUMOTHORAX. Xr Chest Decubitus Left      Result Date: 11/20/2017  EXAMINATION: XR CHEST DECUBITUS LEFT CLINICAL HISTORY:  sob COMPARISONS: November 19, 2017 portable chest x-ray FINDINGS: Bilateral decubitus views of the chest were obtained. Again noted is atelectasis in the right base, the operative site. There is no evidence of mobile effusion. There is no pneumothorax. There is no mobile effusion on the left. The chest wall is unremarkable. CONCLUSION: ATELECTASIS RELATED TO RECENT SURGERY, WITHOUT APPRECIABLE MOBILE EFFUSION. THERE IS NO PNEUMOTHORAX. Xr Chest Result Date: 11/20/2017  XR CHEST PORTABLE : 11/19/2017 CLINICAL HISTORY:  monitor pneumothorax . COMPARISON: Two-view chest 11/18/2017.  TECHNIQUE: A portable hepatic cysts. The largest is approximately 1.5 cm. CONCLUSION: NO ASCITES. INCIDENTAL FINDINGS DESCRIBED IN THE BODY OF THE REPORT. Fl Modified Barium Swallow W Video Result Date: 11/22/2017  Modified barium swallow. HISTORY: Aspiration. FINDINGS: Imaging was performed with patient in lateral projection seating upright. Nonstandardized barium viscosities consisting of thin liquid, pudding, and cracker were administered sequentially to the patient by a member of the Department of speech pathology in attendance. Functional oral stage of swallowing demonstrated. Laryngeal penetration and aspiration was not present during the evaluation. Please see speech pathologist report for further information. No aspiration or penetration identified. 9 cine loops. One static image. 1.5 minutes fluoroscopy time. Previous extensive, complex labs, notes and diagnostics reviewed and analyzed. ALLERGIES:    Allergies as of 11/21/2017    (No Known Allergies)      (please also verify by checking [facility])     Complex Physical Medicine & Rehab Issues Assess & Plan:   1. Severe abnormality of gait and mobility and impaired self-care and ADL's secondary to progressive Debility secondary to aspiration pneumonia secondary to aspirating a dental female filling . Functional and medical status reassessed regarding patients ability to participate in therapies and patient found to be able to participate in acute intensive comprehensive inpatient rehabilitation program including PT/OT to improve balance, ambulation, ADLs, and to improve the P/AROM. Therapeutic modifications regarding activities in therapies, place, amount of time per day and intensity of therapy made daily. In bed therapies or bedside therapies prn.   2. Bowel severe constipation secondary to opiates and Bladder dysfunction:  frequent toileting, ambulate to bathroom with assistance, check post void residuals.   Check for C.difficile x1 if >2 loose stools in 24 (chronic obstructive pulmonary disease), on home oxygen therapy - Pulse oximeter checks, monitor vital signs, oxygen prn. Proventil, Duoneb,   2. HTN (hypertension),  Hyperlipidemia - continue blood pressure checks, adjust/add medications (Apresoline, Toprol). 3.   GERD (gastroesophageal reflux disease) - elevate head of bed. 4.   Hypothyroidism - Synthroid. 5.   Type 2 diabetes mellitus - Continue blood sugar checks, diet, adjust/add medications prn.             1. Recent Labs   1.   1.  11/21/17  2.  0744 1.  11/21/17  2.  1109 1.  11/21/17  2.  1637 1.  11/21/17  2.  2120 1.  11/22/17  2.  9591   1. POCGLU 1.  106 1.  100 1.  103 1.  119* 1.  118*   6. BPH (benign prostatic hyperplasia) - Flomax-modified dosing to daily at bedtime to limit orthostatic, Proscar. Check postvoid residual-check stat UA on admission  7. Anxiety and   Insomnia. - emotional support, adjust/add medications (Desyrel, Remeron, Ativan). 8.   Osteoarthritis of spine with radiculopathy, lumbar region,Foraminal stenosis of lumbosacral region.-Add vitamin D and Lidoderm as well as K pad Tylenol  9. Aspiration into respiratory tract - recheck modified barium swallow. Verbal results review patient okayed to be in regular incisions  10. Vitamin D deficiency - supplement and recheck as outpatient. 11. Severe constipation - I prescribed Mag Citrate. 12. Frequent bloody nose - I prescribed Afrin nasal spray and saline nasal spray. I have held the Lovenox   13. Boil draining of the right posterior thigh-culture and sensitivity percent after drainage on 11/23/17 and I prescribed Betadine in a crate mattress and dry sterile dressing  1.               Eriberto Cuadra D.O., PM&R     Attending    [address]

## 2017-11-24 LAB
ANION GAP SERPL CALCULATED.3IONS-SCNC: 10 MEQ/L (ref 7–13)
BUN BLDV-MCNC: 16 MG/DL (ref 8–23)
CALCIUM SERPL-MCNC: 8 MG/DL (ref 8.6–10.2)
CHLORIDE BLD-SCNC: 100 MEQ/L (ref 98–107)
CO2: 29 MEQ/L (ref 22–29)
CREAT SERPL-MCNC: 0.9 MG/DL (ref 0.7–1.2)
GFR AFRICAN AMERICAN: >60
GFR NON-AFRICAN AMERICAN: >60
GLUCOSE BLD-MCNC: 100 MG/DL (ref 74–109)
HCT VFR BLD CALC: 34.5 % (ref 42–52)
HEMOGLOBIN: 11.3 G/DL (ref 14–18)
MCH RBC QN AUTO: 30.9 PG (ref 27–31.3)
MCHC RBC AUTO-ENTMCNC: 32.7 % (ref 33–37)
MCV RBC AUTO: 94.3 FL (ref 80–100)
PDW BLD-RTO: 14.7 % (ref 11.5–14.5)
PLATELET # BLD: 198 K/UL (ref 130–400)
POTASSIUM SERPL-SCNC: 4.2 MEQ/L (ref 3.5–5.1)
PRO-BNP: 165 PG/ML
RBC # BLD: 3.66 M/UL (ref 4.7–6.1)
SODIUM BLD-SCNC: 139 MEQ/L (ref 132–144)
WBC # BLD: 8.6 K/UL (ref 4.8–10.8)

## 2017-11-24 PROCEDURE — 97110 THERAPEUTIC EXERCISES: CPT | Performed by: INTERNAL MEDICINE

## 2017-11-24 PROCEDURE — 97535 SELF CARE MNGMENT TRAINING: CPT

## 2017-11-24 PROCEDURE — 97530 THERAPEUTIC ACTIVITIES: CPT

## 2017-11-24 PROCEDURE — 97116 GAIT TRAINING THERAPY: CPT | Performed by: INTERNAL MEDICINE

## 2017-11-24 PROCEDURE — 36415 COLL VENOUS BLD VENIPUNCTURE: CPT

## 2017-11-24 PROCEDURE — 83880 ASSAY OF NATRIURETIC PEPTIDE: CPT

## 2017-11-24 PROCEDURE — 6370000000 HC RX 637 (ALT 250 FOR IP): Performed by: INTERNAL MEDICINE

## 2017-11-24 PROCEDURE — 80048 BASIC METABOLIC PNL TOTAL CA: CPT

## 2017-11-24 PROCEDURE — 94640 AIRWAY INHALATION TREATMENT: CPT

## 2017-11-24 PROCEDURE — 1180000000 HC REHAB R&B

## 2017-11-24 PROCEDURE — 85027 COMPLETE CBC AUTOMATED: CPT

## 2017-11-24 PROCEDURE — 97112 NEUROMUSCULAR REEDUCATION: CPT | Performed by: INTERNAL MEDICINE

## 2017-11-24 PROCEDURE — 2700000000 HC OXYGEN THERAPY PER DAY

## 2017-11-24 PROCEDURE — 97535 SELF CARE MNGMENT TRAINING: CPT | Performed by: INTERNAL MEDICINE

## 2017-11-24 PROCEDURE — 6370000000 HC RX 637 (ALT 250 FOR IP): Performed by: PHYSICAL MEDICINE & REHABILITATION

## 2017-11-24 RX ORDER — SULFAMETHOXAZOLE AND TRIMETHOPRIM 800; 160 MG/1; MG/1
1 TABLET ORAL EVERY 12 HOURS SCHEDULED
Status: DISCONTINUED | OUTPATIENT
Start: 2017-11-24 | End: 2017-11-30

## 2017-11-24 RX ORDER — POLYETHYLENE GLYCOL 3350 17 G/17G
17 POWDER, FOR SOLUTION ORAL DAILY PRN
Status: DISCONTINUED | OUTPATIENT
Start: 2017-11-24 | End: 2017-12-05 | Stop reason: HOSPADM

## 2017-11-24 RX ADMIN — TAMSULOSIN HYDROCHLORIDE 0.4 MG: 0.4 CAPSULE ORAL at 20:25

## 2017-11-24 RX ADMIN — OXYMETAZOLINE HYDROCHLORIDE 2 SPRAY: 5 SPRAY NASAL at 07:49

## 2017-11-24 RX ADMIN — IPRATROPIUM BROMIDE AND ALBUTEROL SULFATE 1 AMPULE: .5; 3 SOLUTION RESPIRATORY (INHALATION) at 17:54

## 2017-11-24 RX ADMIN — GUAIFENESIN 600 MG: 600 TABLET, EXTENDED RELEASE ORAL at 07:49

## 2017-11-24 RX ADMIN — OXYMETAZOLINE HYDROCHLORIDE 2 SPRAY: 5 SPRAY NASAL at 20:25

## 2017-11-24 RX ADMIN — DOCUSATE SODIUM 100 MG: 100 CAPSULE, LIQUID FILLED ORAL at 07:49

## 2017-11-24 RX ADMIN — SULFAMETHOXAZOLE AND TRIMETHOPRIM 1 TABLET: 800; 160 TABLET ORAL at 20:25

## 2017-11-24 RX ADMIN — FINASTERIDE 5 MG: 5 TABLET, FILM COATED ORAL at 07:51

## 2017-11-24 RX ADMIN — LEVOTHYROXINE SODIUM 125 MCG: 25 TABLET ORAL at 06:09

## 2017-11-24 RX ADMIN — TRAZODONE HYDROCHLORIDE 150 MG: 150 TABLET ORAL at 20:25

## 2017-11-24 RX ADMIN — GUAIFENESIN 600 MG: 600 TABLET, EXTENDED RELEASE ORAL at 20:25

## 2017-11-24 RX ADMIN — METOPROLOL SUCCINATE 25 MG: 25 TABLET, FILM COATED, EXTENDED RELEASE ORAL at 07:51

## 2017-11-24 RX ADMIN — IPRATROPIUM BROMIDE AND ALBUTEROL SULFATE 1 AMPULE: .5; 3 SOLUTION RESPIRATORY (INHALATION) at 05:27

## 2017-11-24 RX ADMIN — MIRTAZAPINE 15 MG: 15 TABLET, FILM COATED ORAL at 20:25

## 2017-11-24 ASSESSMENT — PAIN SCALES - GENERAL
PAINLEVEL_OUTOF10: 0

## 2017-11-24 NOTE — PROGRESS NOTES
Occupational Therapy  Facility/Department: [facility]  Daily Treatment Note  NAME: Chandana Sanders  :   MRN: [id number]    Date of Service: 2017    Patient Diagnosis(es): There were no encounter diagnoses. has a past medical history of Abnormality of gait and mobility; Acute sinusitis; Anxiety; Aspiration into respiratory tract; Aspiration pneumonia (HCC); BPH (benign prostatic hyperplasia); COPD (chronic obstructive pulmonary disease) ([facility] Debility; Elevated CK; Foraminal stenosis of lumbosacral region; GERD (gastroesophageal reflux disease); History of asthma; HTN (hypertension); Hyperlipidemia; Hypothyroidism; Insomnia; Lower extremity weakness; On home oxygen therapy; Osteoarthritis of spine with radiculopathy, lumbar region; Papillary thyroid carcinoma ([facility] Positive D dimer; Sleep apnea; Type 2 diabetes mellitus ([facility] and Vitamin D deficiency. has a past surgical history that includes knee surgery (Left, ); Thyroidectomy (); bronchoscopy (N/A, 2017); and thoracotomy (Right, 2017). Restrictions  Restrictions/Precautions  Restrictions/Precautions: Fall Risk  Required Braces or Orthoses?: No  Subjective   General  Family / Caregiver Present:  (Pt's friends present during therapy session)  Referring Practitioner: Dr. Lizzie Hui  Diagnosis: Right thoracotomy wedge resection for foreign body  Pre Treatment Pain Screening  Pain at present: 0  Pain Assessment  Patient Currently in Pain: No  Pain Level: 0    Objective     Pt. complete sit/stand transfers with supr. Pt. standing position pt. engaged in graded ue activity utilizing the rom arc x4 levels to increase his adl safety and independence x 10 min. with supr. Pt. folded laundry from standing position x 12 min. w/o lob with supr. W/C level pt. completed hand strengthening exercises with use of theraputty.         Assessment      Activity Tolerance  Activity Tolerance: Patient Tolerated treatment well  Safety

## 2017-11-24 NOTE — PROGRESS NOTES
Occupational Therapy  Facility/Department: [facility]  Daily Treatment Note  NAME: Florin Whatley  :   MRN: [id number]    Date of Service: 2017    Patient Diagnosis(es): There were no encounter diagnoses. has a past medical history of Abnormality of gait and mobility; Acute sinusitis; Anxiety; Aspiration into respiratory tract; Aspiration pneumonia (HCC); BPH (benign prostatic hyperplasia); COPD (chronic obstructive pulmonary disease) ([facility] Debility; Elevated CK; Foraminal stenosis of lumbosacral region; GERD (gastroesophageal reflux disease); History of asthma; HTN (hypertension); Hyperlipidemia; Hypothyroidism; Insomnia; Lower extremity weakness; On home oxygen therapy; Osteoarthritis of spine with radiculopathy, lumbar region; Papillary thyroid carcinoma ([facility]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 has a past surgical history that includes knee surgery (Left, ); Thyroidectomy (); bronchoscopy (N/A, 2017); and thoracotomy (Right, 2017). Restrictions  Restrictions/Precautions: Fall Risk, General Precautions    Subjective   General  Referring Practitioner: Dr. Matt Mahmood  Diagnosis: Right thoracotomy wedge resection for foreign body  Pre Treatment Pain Screening  Pain at present: 0  Pain Assessment  Patient Currently in Pain: No     Objective  Pt. completed AM ADL sink side as noted below. ADL  Feeding: Modified independent   Grooming: Independent  UE Bathing: Setup  LE Bathing: Minimal assistance  UE Dressing: Setup  LE Dressing:  Moderate assistance  Toileting: Stand by assistance        Balance  Sitting Balance: Independent  Standing Balance: Supervision  Standing Balance  Sit to stand: Supervision  Stand to sit: Supervision  Functional Mobility  Functional - Mobility Device: Rolling Walker  Activity: To/from bathroom  Assist Level: Stand by assistance  Toilet Transfers  Toilet - Technique: Ambulating  Equipment Used:

## 2017-11-24 NOTE — PROGRESS NOTES
now with SOB, hemoptysis- foreign removed by CT Surgery, continue mucinex, cough and deep breathing, respiratory treatments as needed. #Gross hematuria     - Resolved; Seen by Dr. Dawn Ly; likely truama from sharp; f/u in 4 weeks    #Constipation     - Now with diarrhea; started on metamucil yesterday    #Anxiety     - Continue his home remeron and trazodone    #COPD     - Continue to monitor; does not appear to be in an acute exacerbation    #Hypothyroidism     - Continue synthroid     #DMII     SSI       Active Hospital Problems    Diagnosis Date Noted    Abnormality of gait and mobility w/debility secondary to aspiration pneumonia due to dental filling/crown, [facility] admit 11/21/17 [R26.9]     Vitamin D deficiency [E55.9]     On home oxygen therapy [Z99.81]     [facility] [J69.0]     Aspiration into respiratory tract [T17.908A] 11/10/2017    Osteoarthritis of spine with radiculopathy, lumbar region [M47.26] 06/07/2016    Foraminal stenosis of lumbosacral region [M99.83] 06/07/2016    Insomnia [G47.00]     Anxiety [F41.9]     Type 2 diabetes mellitus ([facility] [E11.9]     Hypothyroidism [E03.9]     BPH (benign prostatic hyperplasia) [N40.0]     GERD (gastroesophageal reflux disease) [K21.9] 12/11/2014    Hyperlipidemia [E78.5] 01/13/2014    HTN (hypertension) [I10] 01/13/2014    COPD (chronic obstructive pulmonary disease) ([facility] [J44.9] 12/05/2013     Additional work up or/and treatment plan may be added today or then after based on clinical progression. I am managing a portion of pt care. Some medical issues are handled by other specialists. Additional work up and treatment should be done in out pt setting by pt PCP and other out pt providers. In addition to examining and evaluating pt, I spent additional time explaining care, normal and abnormal findings, and treatment plan. All of pt questions were answered. Counseling, diet and education were  provided.  Case will be discussed with nursing staff when appropriate. Family will be updated if and when appropriate.       Diet: DIET DYSPHAGIA II MECHANICALLY ALTERED; Carb Control: 4 carbs/meal (approximate 1800 kcals/day)    Code Status: Full Code    Rehab today    Electronically signed by Adarsh Fernandez MD on 11/24/2017 at 12:07 PM

## 2017-11-25 LAB
GLUCOSE BLD-MCNC: 102 MG/DL (ref 60–115)
PERFORMED ON: NORMAL

## 2017-11-25 PROCEDURE — 94640 AIRWAY INHALATION TREATMENT: CPT

## 2017-11-25 PROCEDURE — 97112 NEUROMUSCULAR REEDUCATION: CPT

## 2017-11-25 PROCEDURE — 6370000000 HC RX 637 (ALT 250 FOR IP): Performed by: INTERNAL MEDICINE

## 2017-11-25 PROCEDURE — 6370000000 HC RX 637 (ALT 250 FOR IP): Performed by: PHYSICAL MEDICINE & REHABILITATION

## 2017-11-25 PROCEDURE — 97116 GAIT TRAINING THERAPY: CPT

## 2017-11-25 PROCEDURE — 6370000000 HC RX 637 (ALT 250 FOR IP): Performed by: NURSE PRACTITIONER

## 2017-11-25 PROCEDURE — 94667 MNPJ CHEST WALL 1ST: CPT

## 2017-11-25 PROCEDURE — 1180000000 HC REHAB R&B

## 2017-11-25 PROCEDURE — 97535 SELF CARE MNGMENT TRAINING: CPT

## 2017-11-25 PROCEDURE — 97530 THERAPEUTIC ACTIVITIES: CPT

## 2017-11-25 PROCEDURE — 97110 THERAPEUTIC EXERCISES: CPT

## 2017-11-25 PROCEDURE — 99232 SBSQ HOSP IP/OBS MODERATE 35: CPT | Performed by: PHYSICAL MEDICINE & REHABILITATION

## 2017-11-25 PROCEDURE — 94668 MNPJ CHEST WALL SBSQ: CPT

## 2017-11-25 RX ORDER — ZOLPIDEM TARTRATE 5 MG/1
5 TABLET ORAL NIGHTLY
Status: DISCONTINUED | OUTPATIENT
Start: 2017-11-25 | End: 2017-12-05 | Stop reason: HOSPADM

## 2017-11-25 RX ADMIN — ZOLPIDEM TARTRATE 5 MG: 5 TABLET ORAL at 22:12

## 2017-11-25 RX ADMIN — PSYLLIUM HUSK 1 PACKET: 3.4 POWDER ORAL at 07:44

## 2017-11-25 RX ADMIN — OXYMETAZOLINE HYDROCHLORIDE 2 SPRAY: 5 SPRAY NASAL at 07:47

## 2017-11-25 RX ADMIN — SULFAMETHOXAZOLE AND TRIMETHOPRIM 1 TABLET: 800; 160 TABLET ORAL at 07:45

## 2017-11-25 RX ADMIN — SENNOSIDES AND DOCUSATE SODIUM 2 TABLET: 8.6; 5 TABLET ORAL at 20:10

## 2017-11-25 RX ADMIN — TAMSULOSIN HYDROCHLORIDE 0.4 MG: 0.4 CAPSULE ORAL at 20:10

## 2017-11-25 RX ADMIN — DOCUSATE SODIUM 100 MG: 100 CAPSULE, LIQUID FILLED ORAL at 20:10

## 2017-11-25 RX ADMIN — FINASTERIDE 5 MG: 5 TABLET, FILM COATED ORAL at 07:45

## 2017-11-25 RX ADMIN — LEVOTHYROXINE SODIUM 125 MCG: 25 TABLET ORAL at 06:25

## 2017-11-25 RX ADMIN — OXYMETAZOLINE HYDROCHLORIDE 2 SPRAY: 5 SPRAY NASAL at 20:15

## 2017-11-25 RX ADMIN — MIRTAZAPINE 15 MG: 15 TABLET, FILM COATED ORAL at 20:10

## 2017-11-25 RX ADMIN — TRAZODONE HYDROCHLORIDE 150 MG: 150 TABLET ORAL at 20:10

## 2017-11-25 RX ADMIN — IPRATROPIUM BROMIDE AND ALBUTEROL SULFATE 1 AMPULE: .5; 3 SOLUTION RESPIRATORY (INHALATION) at 05:34

## 2017-11-25 RX ADMIN — IPRATROPIUM BROMIDE AND ALBUTEROL SULFATE 1 AMPULE: .5; 3 SOLUTION RESPIRATORY (INHALATION) at 16:06

## 2017-11-25 RX ADMIN — GUAIFENESIN 600 MG: 600 TABLET, EXTENDED RELEASE ORAL at 07:44

## 2017-11-25 RX ADMIN — METOPROLOL SUCCINATE 25 MG: 25 TABLET, FILM COATED, EXTENDED RELEASE ORAL at 07:45

## 2017-11-25 RX ADMIN — SULFAMETHOXAZOLE AND TRIMETHOPRIM 1 TABLET: 800; 160 TABLET ORAL at 20:10

## 2017-11-25 RX ADMIN — GUAIFENESIN 600 MG: 600 TABLET, EXTENDED RELEASE ORAL at 20:10

## 2017-11-25 RX ADMIN — DOCUSATE SODIUM 100 MG: 100 CAPSULE, LIQUID FILLED ORAL at 07:45

## 2017-11-25 RX ADMIN — IPRATROPIUM BROMIDE AND ALBUTEROL SULFATE 1 AMPULE: .5; 3 SOLUTION RESPIRATORY (INHALATION) at 09:17

## 2017-11-25 ASSESSMENT — PAIN SCALES - GENERAL
PAINLEVEL_OUTOF10: 0

## 2017-11-25 NOTE — PROGRESS NOTES
Subjective: The patient complains of moderate to severe  acute  dyspnea on exertion, hemoptysis, nose bleeding partially relieved by  recent antibiotic treatments, PT, OT, high-dose oxygen therapy and exacerbated by  exertion and aspiration of foreign object. He is complaining of significant anxiety and frequent insomnia-have ordered Ambien the low dose schedule of 5 mg at night and an egg crate mattress. BNP was recently checked and was within normal limits    ROS x10: The patient also complains of severely impaired mobility and activities of daily living. Otherwise no new problems with vision, hearing, nose, mouth, throat, dermal, cardiovascular, GI, , pulmonary, musculoskeletal, psychiatric or neurological. See Rehab H&P on Rehab chart dated . Vital signs:  BP (!) 133/58   Pulse 72   Temp 98 °F (36.7 °C) (Oral)   Resp 18   Ht 6' (1.829 m)   Wt 246 lb 4.1 oz (111.7 kg)   SpO2 93%   BMI 33.40 kg/m²   I/O:   PO/Intake:  fair PO intake, no problems observed or reported. Bowel/Bladder:  continent, no problems noted. General:  Patient is well developed, adequately nourished, non-obese and     well kempt. HEENT:    Hemoptysis, PERRLA, hearing intact to loud voice, external inspection of ear     and nose benign. Inspection of lips, tongue and gums benign  Musculoskeletal: No significant change in strength or tone. All joints stable. Inspection and palpation of digits and nails show no clubbing,       cyanosis or inflammatory conditions. Neuro/Psychiatric: Affect: flat but pleasant. Alert and oriented to person, place and     situation. No significant change in deep tendon reflexes or     sensation  Lungs:  Diminished CTA-B. Respiration effort is normal at rest.     Heart:   S1 = S2, RRR. No loud murmurs. Abdomen:  Soft, non-tender, no enlargement of liver or spleen. Extremities:  No significant lower extremity edema or tenderness.   Skin:   Intact right thoracotomy viscosities consisting of thin liquid, pudding, and cracker were administered sequentially to the patient by a member of the Department of speech pathology in attendance. Functional oral stage of swallowing demonstrated. Laryngeal penetration and aspiration was not present during the evaluation. Please see speech pathologist report for further information. No aspiration or penetration identified. 9 cine loops. One static image. 1.5 minutes fluoroscopy time. Previous extensive, complex labs, notes and diagnostics reviewed and analyzed. ALLERGIES:    Allergies as of 11/21/2017    (No Known Allergies)      (please also verify by checking [facility])     Complex Physical Medicine & Rehab Issues Assess & Plan:   1. Severe abnormality of gait and mobility and impaired self-care and ADL's secondary to progressive Debility secondary to aspiration pneumonia secondary to aspirating a dental female filling . Functional and medical status reassessed regarding patients ability to participate in therapies and patient found to be able to participate in acute intensive comprehensive inpatient rehabilitation program including PT/OT to improve balance, ambulation, ADLs, and to improve the P/AROM. Therapeutic modifications regarding activities in therapies, place, amount of time per day and intensity of therapy made daily. In bed therapies or bedside therapies prn.   2. Bowel severe constipation secondary to opiates and Bladder dysfunction:  frequent toileting, ambulate to bathroom with assistance, check post void residuals. Check for C.difficile x1 if >2 loose stools in 24 hours, continue bowel & bladder program.  Monitor bowel and bladder function. Lactinex 2 PO every AC.   MOM prn, Brown Bomb prn, Glycerin suppository prn, enema prn.  3. Severe postop right thoracotomy incision pain generalized OA pain: reassess pain every shift and prior to and after each therapy session, give prn Tylenol and Percocet, modalities prn of bed. 4.   Hypothyroidism - Synthroid. 5.   Type 2 diabetes mellitus - Continue blood sugar checks, diet, adjust/add medications prn.             1. Recent Labs   1.   1.  11/21/17  2.  0744 1.  11/21/17  2.  1109 1.  11/21/17  2.  1637 1.  11/21/17  2.  2120 1.  11/22/17  2.  0619   1. POCGLU 1.  106 1.  100 1.  103 1.  119* 1.  118*   6. BPH (benign prostatic hyperplasia) - Flomax-modified dosing to daily at bedtime to limit orthostatic, Proscar. Check postvoid residual-check stat UA on admission  7. Anxiety and   Insomnia. - emotional support, adjust/add medications (Desyrel, Remeron, Ativan). 8.   Osteoarthritis of spine with radiculopathy, lumbar region,Foraminal stenosis of lumbosacral region.-Add vitamin D and Lidoderm as well as K pad Tylenol  9. Aspiration into respiratory tract - recheck modified barium swallow. Verbal results review patient okayed to be in regular incisions  10. Vitamin D deficiency - supplement and recheck as outpatient. 11. Severe constipation - I prescribed Mag Citrate. 12. Frequent bloody nose - I prescribed Afrin nasal spray and saline nasal spray. I have held the Lovenox   13.  Boil draining of the right posterior thigh-culture and sensitivity percent after drainage on 11/23/17 and I prescribed Betadine in a crate mattress and dry sterile dressing               Karin Galloway D.O., PM&R     Attending    [address]

## 2017-11-25 NOTE — PROGRESS NOTES
Facility/Department: [facility]  Rehabilitation Daily Treatment Note  NAME: Suzan May  : 1940  MRN: [id number]    Date of Service: 2017      Restrictions:  Restrictions/Precautions  Restrictions/Precautions: Fall Risk, General Precautions  Required Braces or Orthoses?: No  Position Activity Restriction  Other position/activity restrictions: no straws    Subjective:  Pain:   Start of tx pain 0/10  Location:   Description:   Response:     End of tx pain 0/10  Location:   Description:   Response:       Orientation  Orientation  Overall Orientation Status: Within Normal Limits    Objective     Bed Mobility  Scooting: Modified independent    Transfers  Sit to Stand: Modified independent  Stand to sit: Supervision    Ambulation  Ambulation?: Yes  Ambulation 1  Surface: level tile;carpet  Device: No Device  Other Apparatus: O2  Assistance: Contact guard assistance  Quality of Gait: NBOS, occasional lateral step for minor LOB, decreased step length, SOB after amb (SPO2 97%)  Distance: 100'  Stairs/Curb  Stairs?: Yes  Stairs  # Steps : 8  Stairs Height: 6\"  Rails: Bilateral  Comment: non-reciprocal    Neuromuscular Education  PNF: 4\" step ups x10 B F/L    Balance  Posture: Good  Sitting - Static: Good  Sitting - Dynamic: Good  Standing - Static: Fair;+  Standing - Dynamic: Fair  Comments: decreased stability with LLE in stance. Exercises  Hip Flexion: seated x 20  Knee Long Arc Quad: seated x 20  Ankle Pumps: seated x 20  Physio/Swiss Ball: seated lumbar ext/scap retraction against ball 10s x 10  Other exercises  Other exercises?: Yes  Other exercises 1: sink ex x 10-20 reps  Other exercises 2: scap retraction 5s x 10        Comments/Assessment   Improved gait tolerance this date although slightly SOB after 100' amb. No supine ex due to SOB in that position. Challenged with 4\" step ups; frequent rest breaks required             Safety/Judgment:            Safety Devices  Type of devices:  All fall risk

## 2017-11-25 NOTE — PROGRESS NOTES
Mobility  Functional - Mobility Device: Rolling Walker  Activity: To/from bathroom; Retrieve items;Transport items  Assist Level: Supervision  Toilet Transfers  Toilet - Technique: Ambulating  Equipment Used: Grab bars  Toilet Transfer: Modified Independent  Toilet Transfers Comments: w/w  Tub Transfers  Tub Transfers: Not tested  Shower Transfers  Shower - Transfer From: Darshana Cam - Transfer Type: To and From  Shower - Transfer To: Shower seat with back  Shower Transfers: Supervision     Transfers  Sit to stand: Modified independent  Stand to sit: Modified independent   Assessment      Activity Tolerance  Activity Tolerance: Patient Tolerated treatment well  Safety Devices  Safety Devices in place: Yes  Type of devices: All fall risk precautions in place          Plan   Plan  Times per week: 5-7x/week,  minutes per day  Plan weeks: 10-14 days  Current Treatment Recommendations: Strengthening, Endurance Training, Patient/Caregiver Education & Training, Self-Care / ADL, Home Management Training, Safety Education & Training  Plan Comment: Continue per OT POC  Goals  Patient Goals   Patient goals : \"Be able to do things without being winded. \" \"I want to get back into the community. \"       Therapy Time   Individual Concurrent Group Co-treatment   Time In 0830         Time Out 0915         Minutes 45                         -15 min. d/t late breakfast                 CHERIE Guardado    Electronically signed by CHERIE Guardado on 11/25/2017 at 10:20 AM

## 2017-11-25 NOTE — PROGRESS NOTES
Therapeutic ex:15          Therapy Time   Individual Concurrent Group Co-treatment   Time In 1430         Time Out 1500         Minutes 30         Timed Code Treatment Minutes: 30 Minutes       [employer], PTA

## 2017-11-26 LAB
GLUCOSE BLD-MCNC: 98 MG/DL (ref 60–115)
PERFORMED ON: NORMAL

## 2017-11-26 PROCEDURE — 97116 GAIT TRAINING THERAPY: CPT

## 2017-11-26 PROCEDURE — 97116 GAIT TRAINING THERAPY: CPT | Performed by: INTERNAL MEDICINE

## 2017-11-26 PROCEDURE — 6370000000 HC RX 637 (ALT 250 FOR IP): Performed by: INTERNAL MEDICINE

## 2017-11-26 PROCEDURE — 2700000000 HC OXYGEN THERAPY PER DAY

## 2017-11-26 PROCEDURE — 6370000000 HC RX 637 (ALT 250 FOR IP): Performed by: PHYSICAL MEDICINE & REHABILITATION

## 2017-11-26 PROCEDURE — 97112 NEUROMUSCULAR REEDUCATION: CPT | Performed by: INTERNAL MEDICINE

## 2017-11-26 PROCEDURE — 99232 SBSQ HOSP IP/OBS MODERATE 35: CPT | Performed by: PHYSICAL MEDICINE & REHABILITATION

## 2017-11-26 PROCEDURE — 97530 THERAPEUTIC ACTIVITIES: CPT

## 2017-11-26 PROCEDURE — 1180000000 HC REHAB R&B

## 2017-11-26 PROCEDURE — 97110 THERAPEUTIC EXERCISES: CPT

## 2017-11-26 PROCEDURE — 6370000000 HC RX 637 (ALT 250 FOR IP): Performed by: NURSE PRACTITIONER

## 2017-11-26 PROCEDURE — 97535 SELF CARE MNGMENT TRAINING: CPT

## 2017-11-26 PROCEDURE — 97150 GROUP THERAPEUTIC PROCEDURES: CPT

## 2017-11-26 PROCEDURE — 94640 AIRWAY INHALATION TREATMENT: CPT

## 2017-11-26 RX ORDER — DOCUSATE SODIUM 100 MG/1
100 CAPSULE, LIQUID FILLED ORAL EVERY OTHER DAY
Status: DISCONTINUED | OUTPATIENT
Start: 2017-11-27 | End: 2017-12-05 | Stop reason: HOSPADM

## 2017-11-26 RX ADMIN — FINASTERIDE 5 MG: 5 TABLET, FILM COATED ORAL at 08:06

## 2017-11-26 RX ADMIN — TAMSULOSIN HYDROCHLORIDE 0.4 MG: 0.4 CAPSULE ORAL at 22:11

## 2017-11-26 RX ADMIN — GUAIFENESIN 600 MG: 600 TABLET, EXTENDED RELEASE ORAL at 08:06

## 2017-11-26 RX ADMIN — OXYMETAZOLINE HYDROCHLORIDE 2 SPRAY: 5 SPRAY NASAL at 08:06

## 2017-11-26 RX ADMIN — GUAIFENESIN 600 MG: 600 TABLET, EXTENDED RELEASE ORAL at 22:11

## 2017-11-26 RX ADMIN — PSYLLIUM HUSK 1 PACKET: 3.4 POWDER ORAL at 08:06

## 2017-11-26 RX ADMIN — LORAZEPAM 0.5 MG: 0.5 TABLET ORAL at 23:48

## 2017-11-26 RX ADMIN — SULFAMETHOXAZOLE AND TRIMETHOPRIM 1 TABLET: 800; 160 TABLET ORAL at 08:06

## 2017-11-26 RX ADMIN — LEVOTHYROXINE SODIUM 125 MCG: 25 TABLET ORAL at 06:00

## 2017-11-26 RX ADMIN — OXYMETAZOLINE HYDROCHLORIDE 2 SPRAY: 5 SPRAY NASAL at 22:12

## 2017-11-26 RX ADMIN — IPRATROPIUM BROMIDE AND ALBUTEROL SULFATE 1 AMPULE: .5; 3 SOLUTION RESPIRATORY (INHALATION) at 16:26

## 2017-11-26 RX ADMIN — TRAZODONE HYDROCHLORIDE 150 MG: 150 TABLET ORAL at 22:11

## 2017-11-26 RX ADMIN — SULFAMETHOXAZOLE AND TRIMETHOPRIM 1 TABLET: 800; 160 TABLET ORAL at 22:11

## 2017-11-26 RX ADMIN — ZOLPIDEM TARTRATE 5 MG: 5 TABLET ORAL at 22:11

## 2017-11-26 RX ADMIN — MIRTAZAPINE 15 MG: 15 TABLET, FILM COATED ORAL at 22:11

## 2017-11-26 RX ADMIN — IPRATROPIUM BROMIDE AND ALBUTEROL SULFATE 1 AMPULE: .5; 3 SOLUTION RESPIRATORY (INHALATION) at 05:41

## 2017-11-26 RX ADMIN — SENNOSIDES AND DOCUSATE SODIUM 2 TABLET: 8.6; 5 TABLET ORAL at 22:11

## 2017-11-26 RX ADMIN — IPRATROPIUM BROMIDE AND ALBUTEROL SULFATE 1 AMPULE: .5; 3 SOLUTION RESPIRATORY (INHALATION) at 10:39

## 2017-11-26 RX ADMIN — METOPROLOL SUCCINATE 25 MG: 25 TABLET, FILM COATED, EXTENDED RELEASE ORAL at 08:06

## 2017-11-26 ASSESSMENT — PAIN SCALES - GENERAL
PAINLEVEL_OUTOF10: 0

## 2017-11-26 NOTE — PROGRESS NOTES
mirtazapine  15 mg Oral Nightly    sennosides-docusate sodium  2 tablet Oral Nightly    traZODone  150 mg Oral Nightly     PRN Meds: polyethylene glycol, sodium chloride nebulizer, acetaminophen, sodium chloride, ondansetron, dextrose, dextrose, glucagon (rDNA), albuterol, bisacodyl, hydrALAZINE, LORazepam, magnesium hydroxide, oxyCODONE-acetaminophen    No intake or output data in the 24 hours ending 11/26/17 1354    Exam:    /72   Pulse 73   Temp 99 °F (37.2 °C)   Resp 17   Ht 6' (1.829 m)   Wt 246 lb 4.1 oz (111.7 kg)   SpO2 94%   BMI 33.40 kg/m²     General appearance: No apparent distress, appears stated age and cooperative. HEENT: Pupils equal, round, and reactive to light. Conjunctivae/corneas clear. Neck: Supple, with full range of motion. No jugular venous distention. Trachea midline. Respiratory:  Normal respiratory effort. Clear to auscultation  Cardiovascular: Regular rate and rhythm with normal S1/S2   Abdomen: Soft, non-tender, non-distended with normal bowel sounds. Musculoskeletal: No clubbing, cyanosis , +1 edema BLE  Skin: slight erythema to posterior right thigh, no drainage/swelling noted. Neuro: Non Focal. Symetrical motor and tone. Nl Comprehension, Alert,awake and oriented. NL CN. Symetrical tone and reflexes. Psychiatric: Alert and oriented, thought content appropriate, normal insight  Capillary Refill: Brisk,< 3 seconds   Peripheral Pulses: +2 palpable, equal bilaterally       Labs:   Recent Labs      11/24/17   0515   WBC  8.6   HGB  11.3*   HCT  34.5*   PLT  198     Recent Labs      11/24/17   0515   NA  139   K  4.2   CL  100   CO2  29   BUN  16   CREATININE  0.90   CALCIUM  8.0*     No results for input(s): AST, ALT, BILIDIR, BILITOT, ALKPHOS in the last 72 hours. No results for input(s): INR in the last 72 hours. No results for input(s): Subhash Pate in the last 72 hours.     Urinalysis:      Lab Results   Component Value Date    NITRU Negative 11/18/2017 WBCUA 0-2 11/18/2017    BACTERIA Moderate 11/18/2017    RBCUA 5-10 11/18/2017    BLOODU LARGE 11/18/2017    SPECGRAV 1.027 11/18/2017    GLUCOSEU Negative 11/18/2017       Radiology:  FL Modified Barium Swallow W Video   Final Result      No aspiration or penetration identified. 9 cine loops. One static image. 1.5 minutes fluoroscopy time. Assessment/Plan:    Active Hospital Problems    Diagnosis Date Noted    Abnormality of gait and mobility w/debility secondary to aspiration pneumonia due to dental filling/crown, [facility] admit 11/21/17 [R26.9]     Vitamin D deficiency [E55.9]     On home oxygen therapy [Z99.81]     Aspiration pneumonia ([facility] [J69.0]     Aspiration into respiratory tract [T17.908A] 11/10/2017    Osteoarthritis of spine with radiculopathy, lumbar region [M47.26] 06/07/2016    Foraminal stenosis of lumbosacral region [M99.83] 06/07/2016    Insomnia [G47.00]     Anxiety [F41.9]     Type 2 diabetes mellitus (HCC) [E11.9]     Hypothyroidism [E03.9]     BPH (benign prostatic hyperplasia) [N40.0]     GERD (gastroesophageal reflux disease) [K21.9] 12/11/2014    Hyperlipidemia [E78.5] 01/13/2014    HTN (hypertension) [I10] 01/13/2014    COPD (chronic obstructive pulmonary disease) ([facility] [J44.9] 12/05/2013     1. Abnormality of gait/mobility secondary to aspiration pneumonia- continue with rehab treatment  2. Orthopnea with lower extremity edema- BNP was 165, no lasix given. 3. Right posterior thigh cellulitis- improving, continue antibiotics  4. FB in posterior basilar segment of the RLL- continue respiratory treatments, encourage cough and deep breathing, continue mucinex  5. Constipation- continue aggressive bowel regimen, pt states better since starting Metamucil   6. COPD- continuous oxygen, respiratory treatments as needed, encourage cough and deep breathing.   7. Diabetes type 2- continue POCT glucose with SSI coverage    Additional work up or/and treatment plan may be added today or then after based on clinical progression. I am managing a portion of pt care. Some medical issues are handled by other specialists. Additional work up and treatment should be done in out pt setting by pt PCP and other out pt providers. In addition to examining and evaluating pt, I spent additional time explaining care, normal and abnormal findings, and treatment plan. All of pt questions were answered. Counseling, diet and education were  provided. Case will be discussed with nursing staff when appropriate. Family will be updated if and when appropriate.       Diet: DIET DYSPHAGIA II MECHANICALLY ALTERED; Carb Control: 4 carbs/meal (approximate 1800 kcals/day)    Code Status: Full Code    PT/OT Juliana           Electronically signed by CINDY Khan on 11/26/2017 at 1:54 PM

## 2017-11-26 NOTE — PROGRESS NOTES
POC      Goals  Patient Goals   Patient goals : \"Be able to do things without being winded. \" \"I want to get back into the community. \"       Therapy Time   Individual Concurrent Group Co-treatment   Time In 8654   4818     Time Out 1551   8711     Minutes Nissa Vital 22, PERALES/L  Electronically signed by CHERIE Arreola on 11/26/17 at 4:22 PM  Electronically signed by CHERIE Arreola on 11/26/17 at 4:26 PM

## 2017-11-26 NOTE — PROGRESS NOTES
fatigue  Activity Tolerance: Improved today vs previous although pt reported increased fatigue. ASSESSMENT/COMMENTS:  Body structures, Functions, Activity limitations: Decreased functional mobility ; Decreased endurance;Decreased balance;Decreased strength;Decreased ADL status; Decreased ROM  Assessment: Pt asleep up in chair upon arrival to room; Difficulty awaking. Improved gait tolerance, strength and endurance noted while managing SPO2 >93% on 2L. Safety:   Safety Devices  Type of devices:  All fall risk precautions in place       Therapy Time:   Individual   Time In 1300   Time Out 1330   Minutes 30     Minutes:  30      Transfer/Bed mobility training:      Gait trainin      Neuro re education: 8     Therapeutic ex: [address], [facility], 17 at 4:54 PM

## 2017-11-26 NOTE — PROGRESS NOTES
Occupational Therapy  Facility/Department: [facility]  Daily Treatment Note  NAME: Clary Nurse  :   MRN: [id number]    Date of Service: 2017    Patient Diagnosis(es): There were no encounter diagnoses. has a past medical history of Abnormality of gait and mobility; Acute sinusitis; Anxiety; Aspiration into respiratory tract; Aspiration pneumonia (HCC); BPH (benign prostatic hyperplasia); COPD (chronic obstructive pulmonary disease) ([facility] [address] Debility; Elevated CK; Foraminal stenosis of lumbosacral region; GERD (gastroesophageal reflux disease); History of asthma; HTN (hypertension); Hyperlipidemia; Hypothyroidism; Insomnia; Lower extremity weakness; On home oxygen therapy; Osteoarthritis of spine with radiculopathy, lumbar region; Papillary thyroid carcinoma ([facility] [address] Positive D dimer; Sleep apnea; Type 2 diabetes mellitus ([facility] [address] and Vitamin D deficiency. has a past surgical history that includes knee surgery (Left, ); Thyroidectomy (); bronchoscopy (N/A, 2017); and thoracotomy (Right, 2017). Restrictions  Restrictions/Precautions  Restrictions/Precautions: Fall Risk, General Precautions  Required Braces or Orthoses?: No  Position Activity Restriction  Other position/activity restrictions: no straws     Subjective   General  Chart Reviewed: Yes  Patient assessed for rehabilitation services?: Yes  Family / Caregiver Present:  (Pt's friends present during therapy session)  Referring Practitioner: Dr. Dennis Rodriguez  Diagnosis: Right thoracotomy wedge resection for foreign body  Subjective  Subjective: Patient seen following physical therapy PT seen in group for 30 minutes of OT session. Objective  Completed card sorting and sequencing activity. 1 VC to scan for extra card. Nuts bolts x 12 alternating hands to place and remove to increased fine motor skills. Pt then seen alone. Sit to stand SBA. Alligator clip standing 4:27 Minutes, completed w/o rest break.  CGA sit to stand, pt with slight LOB but self corrected. Open and closed pill containers 2 min. CGA sit to stand, pt with slight LOB but self corrected again. Peg board x 70 pegs stand 6 min before rest break then stood, SBA, to complete 2 min. Assessment Pt with 2 episodes of LOB while sit to stand required CGA ,but pt self corrected. Pt denied dizziness or lightheadedness. Pt progressing toward goals. Discharge Recommendations:  Continue to assess pending progress (OT anticipates pt will be able to discharge home with [place] [id number] therapy support)     Plan   Plan  Times per week: 5-7x/week,  minutes per day  Plan weeks: 10-14 days  Current Treatment Recommendations: Strengthening, Endurance Training, Patient/Caregiver Education & Training, Self-Care / ADL, Home Management Training, Safety Education & Training  Plan Comment: Continue per OT POC    Goals  Patient Goals   Patient goals : \"Be able to do things without being winded. \" \"I want to get back into the community. \"       Therapy Time   Individual Concurrent Group Co-treatment   Time In 1130   1100     Time Out 1200   1130     Minutes 30   30           Electronically signed by Norris Homans, OTR/L on 11/26/2017 at 12:21 PM  Norris Homans, OTR/F[place]ON

## 2017-11-26 NOTE — PROGRESS NOTES
continues to require verbal cues to initiate deep breathing techniques to decrease SOB and increase activity tolerance. Speech therapy: FIMS: Comprehension: 6 - Complex ideas 90% or device (hearing aid/glasses)  Expression: 6 - Device used to express complex ideas/needs  Social Interaction: 7 - Patient has appropriate behavior/relations 100% of the time  Problem Solvin - Patient able to solve simple/routine tasks  Memory: 5 - Patient requires prompting with stress/unfamiliar situations      Lab/X-ray studies reviewed, analyzed and discussed with patient and staff:   Recent Results (from the past 24 hour(s))   POCT Glucose    Collection Time: 17  6:11 AM   Result Value Ref Range    POC Glucose 98 60 - 115 mg/dl    Performed on ACCU-CHEK        Xr Chest Standard (2 Vw)  Result Date: 2017  EXAMINATION:      NO SIGNIFICANT CLEARING OF THE RIGHT LUNG BASE SINCE THE 2017 CHEST X-RAY. Xr Chest Standard (2 Vw)        Result Date: 2017  Chest 2 views. HISTORY: New onset shortness of breath. Removal chest tube. FINDINGS: Comparison, 2017. Interval removal of right thoracostomy tube. Surgical clips right hilum. Increased opacity right lower lung with thickening of minor fissure. Small pneumothorax at apex, and along right chest wall. Left lung clear. Removal of chest tube. Interval increase right lower lung atelectasis. Possible right pleural effusion. Interval development small right pneumothorax. Ct Chest W Contrast         Result Date: 2017  EXAMINATION:  CT CHEST W CONTRAST CLINICAL HISTORY:  ACUTE RESP ILLNESS, >37YEARS OLD  status post bronchoscopy to retrieve aspirated dental amalgam, unsuccessful. COMPARISONS:  2013 TECHNIQUE:  Spiral scans with 75 mL Isovue-370 IV. Multiplanar 2-D reconstructions. Axial 3-D 10 x 3 mm MIP reconstructions were performed.  All CT scans at this facility use dose modulation, iterative reconstruction, and/or weight of 11/21/2017    (No Known Allergies)      (please also verify by checking [facility])     Complex Physical Medicine & Rehab Issues Assess & Plan:   1. Severe abnormality of gait and mobility and impaired self-care and ADL's secondary to progressive Debility secondary to aspiration pneumonia secondary to aspirating a dental female filling . Functional and medical status reassessed regarding patients ability to participate in therapies and patient found to be able to participate in acute intensive comprehensive inpatient rehabilitation program including PT/OT to improve balance, ambulation, ADLs, and to improve the P/AROM. Therapeutic modifications regarding activities in therapies, place, amount of time per day and intensity of therapy made daily. In bed therapies or bedside therapies prn.   2. Bowel severe constipation secondary to opiates and Bladder dysfunction:  frequent toileting, ambulate to bathroom with assistance, check post void residuals. Check for C.difficile x1 if >2 loose stools in 24 hours, continue bowel & bladder program.  Monitor bowel and bladder function. Lactinex 2 PO every AC. MOM prn, Brown Bomb prn, Glycerin suppository prn, enema prn.  3. Severe postop right thoracotomy incision pain generalized OA pain: reassess pain every shift and prior to and after each therapy session, give prn Tylenol and Percocet, modalities prn in therapy, Lidoderm, K-pad prn.   4. Skin healing thoracotomy incision as well as boil draining of the right posterior thigh and breakdown risk:  continue pressure relief program.  Daily skin exams and reports from nursing. Patient was instructed not to use the urinal unless he washes his hands thoroughly and we will do dry sterile dressing after Betadine to the boil to the make sure it does not feed to his thoracotomy incision  5. Severe Fatigue due to nutritional and hydration deficiency:  continue to monitor I&Os, calorie counts prn, dietary consult prn.    Continue adjust/add medications (Desyrel, Remeron, Ativan). 8.   Osteoarthritis of spine with radiculopathy, lumbar region,Foraminal stenosis of lumbosacral region.-Add vitamin D and Lidoderm as well as K pad Tylenol  9. Aspiration into respiratory tract - recheck modified barium swallow. Verbal results review patient okayed to be in regular incisions  10. Vitamin D deficiency - supplement and recheck as outpatient. 11. Severe constipation - I prescribed Mag Citrate. 12. Frequent bloody nose - I prescribed Afrin nasal spray and saline nasal spray. I have held the Lovenox   13.  Boil draining of the right posterior thigh-culture and sensitivity percent after drainage on 11/23/17 and I prescribed Betadine in a crate mattress and dry sterile dressing               Que Hui D.O., PM&R     Attending    [facility] Maria Isabel Bautista

## 2017-11-27 PROCEDURE — 1180000000 HC REHAB R&B

## 2017-11-27 PROCEDURE — 97116 GAIT TRAINING THERAPY: CPT | Performed by: INTERNAL MEDICINE

## 2017-11-27 PROCEDURE — 97530 THERAPEUTIC ACTIVITIES: CPT

## 2017-11-27 PROCEDURE — 6370000000 HC RX 637 (ALT 250 FOR IP): Performed by: PHYSICAL MEDICINE & REHABILITATION

## 2017-11-27 PROCEDURE — 6370000000 HC RX 637 (ALT 250 FOR IP): Performed by: INTERNAL MEDICINE

## 2017-11-27 PROCEDURE — 6370000000 HC RX 637 (ALT 250 FOR IP): Performed by: NURSE PRACTITIONER

## 2017-11-27 PROCEDURE — 99233 SBSQ HOSP IP/OBS HIGH 50: CPT | Performed by: PHYSICAL MEDICINE & REHABILITATION

## 2017-11-27 PROCEDURE — 94640 AIRWAY INHALATION TREATMENT: CPT

## 2017-11-27 PROCEDURE — 94664 DEMO&/EVAL PT USE INHALER: CPT

## 2017-11-27 PROCEDURE — 6360000002 HC RX W HCPCS: Performed by: INTERNAL MEDICINE

## 2017-11-27 PROCEDURE — 2700000000 HC OXYGEN THERAPY PER DAY

## 2017-11-27 PROCEDURE — 97112 NEUROMUSCULAR REEDUCATION: CPT | Performed by: INTERNAL MEDICINE

## 2017-11-27 PROCEDURE — 97532 HC OT DEVELOP COGNITIVE SKILLS 15MIN: CPT

## 2017-11-27 PROCEDURE — 94760 N-INVAS EAR/PLS OXIMETRY 1: CPT

## 2017-11-27 RX ADMIN — MIRTAZAPINE 15 MG: 15 TABLET, FILM COATED ORAL at 21:03

## 2017-11-27 RX ADMIN — OXYMETAZOLINE HYDROCHLORIDE 2 SPRAY: 5 SPRAY NASAL at 21:06

## 2017-11-27 RX ADMIN — IPRATROPIUM BROMIDE AND ALBUTEROL SULFATE 1 AMPULE: .5; 3 SOLUTION RESPIRATORY (INHALATION) at 16:28

## 2017-11-27 RX ADMIN — FINASTERIDE 5 MG: 5 TABLET, FILM COATED ORAL at 09:46

## 2017-11-27 RX ADMIN — SULFAMETHOXAZOLE AND TRIMETHOPRIM 1 TABLET: 800; 160 TABLET ORAL at 09:45

## 2017-11-27 RX ADMIN — GUAIFENESIN 600 MG: 600 TABLET, EXTENDED RELEASE ORAL at 21:03

## 2017-11-27 RX ADMIN — ZOLPIDEM TARTRATE 5 MG: 5 TABLET ORAL at 21:03

## 2017-11-27 RX ADMIN — OXYMETAZOLINE HYDROCHLORIDE 2 SPRAY: 5 SPRAY NASAL at 09:47

## 2017-11-27 RX ADMIN — DOCUSATE SODIUM 100 MG: 100 CAPSULE, LIQUID FILLED ORAL at 09:46

## 2017-11-27 RX ADMIN — GUAIFENESIN 600 MG: 600 TABLET, EXTENDED RELEASE ORAL at 09:46

## 2017-11-27 RX ADMIN — ALBUTEROL SULFATE 2.5 MG: 2.5 SOLUTION RESPIRATORY (INHALATION) at 00:37

## 2017-11-27 RX ADMIN — METOPROLOL SUCCINATE 25 MG: 25 TABLET, FILM COATED, EXTENDED RELEASE ORAL at 09:46

## 2017-11-27 RX ADMIN — LEVOTHYROXINE SODIUM 125 MCG: 25 TABLET ORAL at 06:13

## 2017-11-27 RX ADMIN — SULFAMETHOXAZOLE AND TRIMETHOPRIM 1 TABLET: 800; 160 TABLET ORAL at 21:03

## 2017-11-27 RX ADMIN — IPRATROPIUM BROMIDE AND ALBUTEROL SULFATE 1 AMPULE: .5; 3 SOLUTION RESPIRATORY (INHALATION) at 05:33

## 2017-11-27 RX ADMIN — PSYLLIUM HUSK 1 PACKET: 3.4 POWDER ORAL at 09:46

## 2017-11-27 RX ADMIN — SENNOSIDES AND DOCUSATE SODIUM 2 TABLET: 8.6; 5 TABLET ORAL at 21:03

## 2017-11-27 RX ADMIN — TRAZODONE HYDROCHLORIDE 150 MG: 150 TABLET ORAL at 21:03

## 2017-11-27 RX ADMIN — TAMSULOSIN HYDROCHLORIDE 0.4 MG: 0.4 CAPSULE ORAL at 21:03

## 2017-11-27 ASSESSMENT — PAIN SCALES - GENERAL
PAINLEVEL_OUTOF10: 0

## 2017-11-27 NOTE — PROGRESS NOTES
Physical Therapy  Physical Therapy Rehab Treatment Note  Facility/Department: [facility]  [facility]       NAME: Tyron Hendrickson  :  (70 y.o.)  MRN: [id number]  CODE STATUS: Full Code    Date of Service: 2017  Chart Reviewed: Yes  Family / Caregiver Present: Yes  General Comment  Comments: Pt on 2L O2    Restrictions:  Restrictions/Precautions: Fall Risk  Body mass index is 33.4 kg/m². SUBJECTIVE: Subjective: No new complaints; pt reports that he does not sleep very well at times; may get a sleeping aid after speaking to physician  Response To Previous Treatment: Patient with no complaints from previous session. Pain Screening  Patient Currently in Pain: No  Pre Treatment Pain Screening  Pain at present: 0  Scale Used: Numeric Score    Post Treatment Pain Screening:   Pain Assessment  Pain Assessment: 0-10  Pain Level: 0  Patient's Stated Pain Goal: No pain    OBJECTIVE:        Neuromuscular Re-Education/Balance:  the following techniques and tasks were performed in therapy session to facilitate normalized motor control, balance reactions and movement patterns       Bed mobility  Bridging: Modified independent   Rolling to Left: Modified independent  Rolling to Right: Modified independent  Supine to Sit: Modified independent  Sit to Supine: Modified independent  Scooting: Modified independent    Transfers  Sit to Stand: Supervision  Stand to sit: Supervision  Bed to Chair: Supervision  Stand Pivot Transfers: Supervision  Comment: improved safety awareness today    Ambulation  Ambulation?: Yes  Ambulation 1  Device: No Device  Other Apparatus: O2  Assistance: Contact guard assistance  Quality of Gait: Ataxic, inconsistent foot placement & SL (Harshad), NBOS, occasional Lat step strategy, Decreased Lat Displacement and rigid BUEs. Distance: 80 feet x1, 120 feet x1  Comments: Increased time to complete.  SPO2 93%(120'), 94% (80') on 2L O2, HR: 82-84 BPM   Stairs  # Steps: 4  Stairs Height: 6\"  Rails: Bilateral  Comment: non-reciprocal; VCs for BUE advancement to maintain COG>KIMMY. Neuromuscular re-education - the following techniques and tasks were performed in therapy session to facilitate normalized motor control, balance reactions and movement patterns          PNF:   · MRE BLE's: SUPINE            NDT:  ·  Sit to stand  ·  Motor control        Weight shift:  · Ant/Post  · Lateral:   · Rotation         Balance:  · Reaching  · Dynamic  · Static       *Completed REYES (increased time to complete), Side stepping to L/R, and supine MRE to BLEs. Outcomes Measures:  Reyes Balance Score: 45      Balance  Posture: Good  Sitting - Static: Good  Sitting - Dynamic: Good  Standing - Static: Good  Standing - Dynamic: Fair  Comments: decreased stability with LLE in stance and NBOS      Activity Tolerance  Activity Tolerance: Patient Tolerated treatment well;Patient limited by fatigue  Activity Tolerance: Improved; distances limited by destination. ASSESSMENT/COMMENTS:  Body structures, Functions, Activity limitations: Decreased functional mobility ; Decreased endurance;Decreased balance;Decreased strength;Decreased ADL status; Decreased ROM  Assessment: Increased SOB noted after gait and stairs but SPO2 maintained >93% (2L). Frequent RBs provided between activities. Safety:   Safety Devices  Type of devices:  All fall risk precautions in place        Therapy Time:   Individual   Time In 1000   Time Out 1100   Minutes 60     Minutes: 60      Transfer/Bed mobility trainin      Gait trainin      Neuro re education: 29     Therapeutic ex:      MAGDA ESCALONA PTA, 17 at 11:42 AM

## 2017-11-27 NOTE — PROGRESS NOTES
treatment well  Activity Tolerance: Patient on 2L O2 during treatment. Safety Devices  Safety Devices in place: Yes  Type of devices: All fall risk precautions in place       Discharge Recommendations:  Continue to assess pending progress     Plan   Plan  Times per week: 5-7x/week,  minutes per day  Plan weeks: 10-14 days  Current Treatment Recommendations: Strengthening, Endurance Training, Patient/Caregiver Education & Training, Self-Care / ADL, Home Management Training, Safety Education & Training  Plan Comment: Continue per OT POC  G-Code     OutComes Score                                             Goals  Patient Goals   Patient goals : \"Be able to do things without being winded. \" \"I want to get back into the community. \"       Therapy Time   Individual Concurrent Group Co-treatment   Time In 1130         Time Out 1200         Minutes 30               Electronically signed by Jp Olmedo on 11/27/2017 at 2:24 PM  CHERIE Suazo

## 2017-11-27 NOTE — PROGRESS NOTES
Severe or Chronic w/ Exacerbation  []     Surgical Status No [x]   Surgeries     General []   Surgery Lower []   Abdominal Thoracic or []   Upper Abdominal Thoracic with  PulmonaryDisorder  []     Chest X-ray Clear/Not  Ordered     [x]  Chronic Changes  Results Pending  []  Infiltrates, atelectasis, pleural effusion, or edema  []  Infiltrates in more than one lobe []  Infiltrate + Atelectasis, &/or pleural effusion  []    Respiratory Pattern Regular,  RR = 12-20 [x]  Increased,  RR = 21-25 []  STEWART, irregular,  or RR = 26-30 []  Decreased FEV1  or RR = 31-35 []  Severe SOB, use  of accessory muscles, or RR ? 35  []    Mental Status Alert, oriented,  Cooperative [x]  Confused but Follows commands []  Lethargic or unable to follow commands []  Obtunded  []  Comatose  []    Breath Sounds Clear to  auscultation  []  Decreased unilaterally or  in bases only []  Decreased  bilaterally  [x]  Crackles or intermittent wheezes []  Wheezes []    Cough Strong, Spontan., & nonproductive [x]  Strong,  spontaneous, &  productive []  Weak,  Nonproductive []  Weak, productive or  with wheezes []  No spontaneous  cough or may require suctioning []    Level of Activity Ambulatory []  Ambulatory w/ Assist  [x]  Non-ambulatory []  Paraplegic []  Quadriplegic []    Total    Score:__6_____     Triage Score:____4____      Tri       Triage:     1. (>20) Freq: Q3    2. (16-20) Freq: Q4   3. (11-15) Freq: QID & Albuterol Q2 PRN    4. (6-10) Freq: TID & Albuterol Q2 PRN    5. (0-5) Freq Q4prn

## 2017-11-27 NOTE — PROGRESS NOTES
Occupational Therapy    Facility/Department: [facility]  Daily Treatment Note  NAME: Jo Lange  :   MRN: [id number]    Date of Service: 2017    Patient Diagnosis(es): There were no encounter diagnoses. has a past medical history of Abnormality of gait and mobility; Acute sinusitis; Anxiety; Aspiration into respiratory tract; Aspiration pneumonia (HCC); BPH (benign prostatic hyperplasia); COPD (chronic obstructive pulmonary disease) ([facility] [address] Debility; Elevated CK; Foraminal stenosis of lumbosacral region; GERD (gastroesophageal reflux disease); History of asthma; HTN (hypertension); Hyperlipidemia; Hypothyroidism; Insomnia; Lower extremity weakness; On home oxygen therapy; Osteoarthritis of spine with radiculopathy, lumbar region; Papillary thyroid carcinoma ([facility] [address] Positive D dimer; Sleep apnea; Type 2 diabetes mellitus ([facility] [address] and Vitamin D deficiency. has a past surgical history that includes knee surgery (Left, ); Thyroidectomy (); bronchoscopy (N/A, 2017); and thoracotomy (Right, 2017). Restrictions  Restrictions/Precautions  Restrictions/Precautions: Fall Risk, General Precautions  Other position/activity restrictions: no straws  Subjective   General  Referring Practitioner: Dr. Karley Hurd  Diagnosis: Right thoracotomy wedge resection for foreign body  Pre Treatment Pain Screening  Pain at present: 0  Pain Assessment  Patient Currently in Pain: No  Vital Signs  Patient Currently in Pain: No     Objective     Pt. utilized the arm bike to increase his ADL endurance x 15 min. with x 1 rest break. Pt. completed sit/stand transfers with supr. Pt. demonstrated good standing balance during UE reaching/placing/crossing midline activity x 8 min. duration. W/C level pt. completed theraputty hand strengthening exercises.     Assessment      Discharge Recommendations: Continue to assess pending progress  Activity Tolerance  Activity Tolerance: Patient Tolerated treatment well  Activity Tolerance: Patient on 2L O2 during treatment. Safety Devices  Safety Devices in place: Yes  Type of devices: All fall risk precautions in place        Plan   Plan  Times per week: 5-7x/week,  minutes per day  Plan weeks: 10-14 days  Current Treatment Recommendations: Strengthening, Endurance Training, Patient/Caregiver Education & Training, Self-Care / ADL, Home Management Training, Safety Education & Training  Plan Comment: Continue per OT POC  Goals  Patient Goals   Patient goals : \"Be able to do things without being winded. \" \"I want to get back into the community. \"       Therapy Time   Individual Concurrent Group Co-treatment   Time In 1330         Time Out 1400         Minutes [address] [place] CHERIE Wallace    Electronically signed by CHERIE Johnston on 11/27/2017 at 4:39 PM

## 2017-11-27 NOTE — PROGRESS NOTES
increased coordination for ADLs. Pt. was able to place/remove 50 golf tees and marbles from board with min difficulty and increased time. Pt. had more difficulty with L hand than R hand. Pt. also placed/removed 5/5 grades of graded clothespins on various thickness dowels without cues, with min difficulty with L hand. Pt. tolerated well. Pt. also was able to use B hands with G- endurance to place/remove wooden puzzle pieces on puzzle douglas, rotating pieces with no difficulty. Pt. engaged well throughout OT tx, with good attention to task. Assessment      Discharge Recommendations: Continue to assess pending progress  Activity Tolerance  Activity Tolerance: Patient Tolerated treatment well  Activity Tolerance: Patient on 2L O2 during treatment. Safety Devices  Safety Devices in place: Yes  Type of devices: All fall risk precautions in place       Discharge Recommendations:  Continue to assess pending progress     Plan   Plan  Times per week: 5-7x/week,  minutes per day  Plan weeks: 10-14 days  Current Treatment Recommendations: Strengthening, Endurance Training, Patient/Caregiver Education & Training, Self-Care / ADL, Home Management Training, Safety Education & Training  Plan Comment: Continue per OT POC  G-Code  OutComes Score  Goals  Patient Goals   Patient goals : \"Be able to do things without being winded. \" \"I want to get back into the community. \"       Therapy Time   Individual Concurrent Group Co-treatment   Time In 1100         Time Out 1130         Minutes STEVE Calabrese/L Electronically signed by STEVE Brown/L on 11/27/2017 at 1:48 PM

## 2017-11-27 NOTE — PROGRESS NOTES
Transfer: 0 - Activity does not occur  Shower Transfer: 5 - Supervision, set-up, cues,  , Assessment: Pt continues to require verbal cues to initiate deep breathing techniques to decrease SOB and increase activity tolerance. Speech therapy: FIMS: Comprehension: 6 - Complex ideas 90% or device (hearing aid/glasses)  Expression: 7 - Patient expresses complex ideas/needs  Social Interaction: 7 - Patient has appropriate behavior/relations 100% of the time  Problem Solvin - Patient able to solve simple/routine tasks  Memory: 6 - Patient requires device to recall (e.g. memory book)      Lab/X-ray studies reviewed, analyzed and discussed with patient and staff:   No results found for this or any previous visit (from the past 24 hour(s)). Xr Chest Standard (2 Vw)  Result Date: 2017  EXAMINATION:      NO SIGNIFICANT CLEARING OF THE RIGHT LUNG BASE SINCE THE 2017 CHEST X-RAY. Xr Chest Standard (2 Vw)        Result Date: 2017  Chest 2 views. HISTORY: New onset shortness of breath. Removal chest tube. FINDINGS: Comparison, 2017. Interval removal of right thoracostomy tube. Surgical clips right hilum. Increased opacity right lower lung with thickening of minor fissure. Small pneumothorax at apex, and along right chest wall. Left lung clear. Removal of chest tube. Interval increase right lower lung atelectasis. Possible right pleural effusion. Interval development small right pneumothorax. Ct Chest W Contrast         Result Date: 2017  EXAMINATION:  CT CHEST W CONTRAST CLINICAL HISTORY:  ACUTE RESP ILLNESS, >37YEARS OLD  status post bronchoscopy to retrieve aspirated dental amalgam, unsuccessful. COMPARISONS:  2013 TECHNIQUE:  Spiral scans with 75 mL Isovue-370 IV. Multiplanar 2-D reconstructions. Axial 3-D 10 x 3 mm MIP reconstructions were performed.  All CT scans at this facility use dose modulation, iterative reconstruction, and/or weight based dosing when (No Known Allergies)      (please also verify by checking MAR)    Today I evaluated this patient for periodic reassessment of medical and functional status. The patient was discussed in detail at the treatment team meeting focusing on current medical issues, progress in therapies, social issues, psychological issues, barriers to progress and strategies to address these barriers, and discharge planning. See the hand written addendum to rehab progress note. The patient continues to be high risk for future disability and their medical and rehabilitation prognosis continue to be good and therefore, we will continue the patient's rehabilitation course as planned. The patient's tentative discharge date was set. Patient and family education was discussed. The patient was made aware of the team discussion regarding their progress. Complex Physical Medicine & Rehab Issues Assess & Plan:   1. Severe abnormality of gait and mobility and impaired self-care and ADL's secondary to progressive Debility secondary to aspiration pneumonia secondary to aspirating a dental female filling . Functional and medical status reassessed regarding patients ability to participate in therapies and patient found to be able to participate in acute intensive comprehensive inpatient rehabilitation program including PT/OT to improve balance, ambulation, ADLs, and to improve the P/AROM. Therapeutic modifications regarding activities in therapies, place, amount of time per day and intensity of therapy made daily. In bed therapies or bedside therapies prn.   2. Bowel severe constipation secondary to opiates and Bladder dysfunction:  frequent toileting, ambulate to bathroom with assistance, check post void residuals. Check for C.difficile x1 if >2 loose stools in 24 hours, continue bowel & bladder program.  Monitor bowel and bladder function. Lactinex 2 PO every AC. MOM prn, Brown Bomb prn, Glycerin suppository prn, enema prn. Severe constipation -  every other day Colace Mag Citrate. 3. Severe postop right thoracotomy incision pain generalized OA pain: reassess pain every shift and prior to and after each therapy session, give prn Tylenol and Percocet, modalities prn in therapy, Lidoderm, K-pad prn.   4. Skin healing thoracotomy incision as well as boil draining of the right posterior thigh and breakdown risk:  continue pressure relief program.  Daily skin exams and reports from nursing. Patient was instructed not to use the urinal unless he washes his hands thoroughly and we will do dry sterile dressing after Betadine to the boil to the make sure it does not feed to his thoracotomy incision  5. Severe Fatigue due to nutritional and hydration deficiency:   Vitamin D deficiency - supplement and recheck as outpatient. continue to monitor I&Os, calorie counts prn, dietary consult prn. Continue vitamin B vitamin D and CoQ10-lactose intolerance advised lactose free diet  6. Acute episodic insomnia with situational adjustment disorder:   Add scheduled Ambien, monitor for day time sedation -continue egg crate mattress  7. Falls risk elevated:  patient to use call light to get nursing assistance to get up, bed and chair alarm. 8. Elevated DVT risk: progressive activities in PT, continue prophylaxis SATURNINO hose, elevation and  Lovenox which is now on hold because of ongoing hemoptysis . 9. Complex discharge planning:  Discharge 12/1/17 home with home health care. Patient and family education is in progress. The patient is to follow-up with their family physician after discharge. We will continue to work on endurance and symptom control    Complex Active General Medical Issues that complicate care Assess & Plan:    1. Continued hemoptysis status post Aspiration pneumonia,  COPD (chronic obstructive pulmonary disease), on home oxygen therapy - Pulse oximeter checks, monitor vital signs, oxygen prn. Proventil, Duoneb,   2.    HTN (hypertension),  Hyperlipidemia - continue blood pressure checks, adjust/add medications (Apresoline, Toprol). 3.   GERD (gastroesophageal reflux disease) - elevate head of bed. 4.   Hypothyroidism - Synthroid. 5.   Type 2 diabetes mellitus - Continue blood sugar checks, diet, adjust/add medications prn.             1. Recent Labs   1.   1.  11/21/17  2.  0744 1.  11/21/17  2.  1109 1.  11/21/17  2.  1637 1.  11/21/17  2.  2120 1.  11/22/17  2.  6436   1. POCGLU 1.  106 1.  100 1.  103 1.  119* 1.  118*   6. BPH (benign prostatic hyperplasia) - Flomax- bedtime to limit orthostatic, Proscar. Check postvoid residual   7. Anxiety and   Insomnia. - emotional support, adjust/add medications (Desyrel, Remeron, Ativan). 8.   Aspiration into respiratory tract - recheck modified barium swallow-okay'd patient for thin liquid. Verbal results review patient okayed to be in regular incisions  9. Frequent bloody nose - I prescribed Afrin nasal spray and saline nasal spray. I have held the Lovenox   10. Boil draining of the right posterior thigh-culture and sensitivity percent after drainage on 11/23/17 and continue Betadine in a egg crate mattress and dry sterile dressing.                Ree Concepcion D.O., PM&R     Attending    [address]

## 2017-11-27 NOTE — PLAN OF CARE
Problem: Mobility - Impaired:  Intervention: Assess assistive devices  Per plan of care. Intervention: Promote activities of daily living  Per plan of care. Intervention: Provide ambulation aids  Per plan of care. Intervention: Manage a safe environment  Per plan of care. Intervention: Appropriate assistance to ensure safe transfer  Per plan of care. Goal: Mobility will improve  Mobility will improve  Outcome: Ongoing  Per plan of care. Problem: Discharge Planning:  Intervention: Assess knowledge level of healthcare  Per plan of care. Intervention: Assess readiness for discharge  Per plan of care. Goal: Discharged to appropriate level of care  Discharged to appropriate level of care  Outcome: Ongoing  Per plan of care.

## 2017-11-27 NOTE — PROGRESS NOTES
Physical Therapy  Physical Therapy Rehab Treatment Note  [facility]  [id number]       NAME: Ania Pedro  :  (54 y.o.)  MRN: [id number]  CODE STATUS: Full Code    Date of Service: 2017  Chart Reviewed: Yes  Family / Caregiver Present: Yes  General Comment  Comments: Pt on 2L O2    Restrictions:  Restrictions/Precautions: Fall Risk  Body mass index is 33.4 kg/m². SUBJECTIVE: Subjective: No new complaints; pt reports that he does not sleep very well at times; may get a sleeping aid after speaking to physician  Response To Previous Treatment: Patient with no complaints from previous session. Pain Screening  Patient Currently in Pain: No  Pre Treatment Pain Screening  Pain at present: 0  Scale Used: Numeric Score    Post Treatment Pain Screening:  Pain Assessment  Pain Assessment: 0-10  Pain Level: 0  Patient's Stated Pain Goal: No pain    OBJECTIVE:        Transfers  Sit to Stand: Supervision  Stand to sit: Supervision  Bed to Chair: Supervision  Stand Pivot Transfers: Supervision  Comment: improved safety awareness today    Ambulation  Ambulation?: Yes  Ambulation 1  Surface: level tile;carpet  Device: No Device  Other Apparatus: O2  Assistance: Contact guard assistance  Quality of Gait: Ataxic, inconsistent foot placement & SL (Harshad), NBOS, occasional Lat step strategy, Decreased Lat Displacement and rigid BUEs. Distance: 100 feet x2  Comments: Increased time to complete. SPO2 93-94%. L Knee Varus noted in stance   Stairs  # Steps : 4  Stairs Height: 6\"  Rails: Bilateral  Comment: non-reciprocal; VCs for BUE advancement to maintain COG>KIMMY.      Neuromuscular Re-Education/Balance:    the following techniques and tasks were performed in therapy session to facilitate normalized motor control, balance reactions and movement patterns     Balance  Posture: Good  Sitting - Static: Good  Sitting - Dynamic: Good  Standing - Static: Good  Standing - Dynamic: Fair  Comments: decreased stability with LLE in stance and NBOS    Neuromuscular Education  PNF: Alt stepping for improved balance in SLS after Ant WS. x15 ea (95% SPO2 on 2L)  NDT Treatment: Gait ;Standing  Neuromuscular Comments: Decreased stability in L stance      Activity Tolerance  Activity Tolerance: Patient Tolerated treatment well;Patient limited by endurance  Activity Tolerance: SOB after all activities. ASSESSMENT/COMMENTS:  Body structures, Functions, Activity limitations: Decreased functional mobility ; Decreased endurance;Decreased balance;Decreased strength;Decreased ADL status; Decreased ROM  Assessment: Increased SOB noted after gait and stairs but SPO2 maintained >93% (2L). Safety:    Safety Devices  Type of devices:  All fall risk precautions in place        Therapy Time:   Individual   Time In 1300   Time Out 1330   Minutes 30     Minutes: 30      Transfer/Bed mobility training:      Gait trainin      Neuro re education: 10     Therapeutic ex:      MAGDA ESCALONA PTA, 17 at 4:55 PM

## 2017-11-27 NOTE — PROGRESS NOTES
still present   Cardiovascular: Regular rate and rhythm with normal S1/S2 without murmurs, rubs or gallops. Abdomen: Soft, non-tender, non-distended with normal bowel sounds. Musculoskeletal: No clubbing, cyanosis; +1 pitting edema bilaterally   Neuro: Non Focal. Intact and symmetric  Psychiatric: Alert and oriented, thought content appropriate, normal insight  Capillary Refill: Brisk,< 3 seconds   Peripheral Pulses: +2 palpable, equal bilaterally   Skin:  Dusky discoloration surrounding a swelling on posterior of patients right thigh; non tender, no fluctuation    Labs:   No results for input(s): WBC, HGB, HCT, PLT in the last 72 hours. No results for input(s): NA, K, CL, CO2, BUN, CREATININE, CALCIUM, PHOS in the last 72 hours. Invalid input(s): MAGNES  No results for input(s): AST, ALT, BILIDIR, BILITOT, ALKPHOS in the last 72 hours. No results for input(s): INR in the last 72 hours. No results for input(s): Kolleen Mazzoni in the last 72 hours. Urinalysis:      Lab Results   Component Value Date    NITRU Negative 11/18/2017    WBCUA 0-2 11/18/2017    BACTERIA Moderate 11/18/2017    RBCUA 5-10 11/18/2017    BLOODU LARGE 11/18/2017    SPECGRAV 1.027 11/18/2017    GLUCOSEU Negative 11/18/2017     Radiology:  FL Modified Barium Swallow W Video   Final Result      No aspiration or penetration identified. 9 cine loops. One static image. 1.5 minutes fluoroscopy time.         Assessment/Plan:    78 y/o M who presented with a persistent cough; found to have a foreign object in his posterior basilar segment of the RLL        #Right posterior thigh purulent cellulitis     - No longer draining; responding well to PO bactrim; today is day 4; will aim for 7 days    #Foreign object in posterior basilar segment of the RLL now with SOB, hemoptysis     - Foreign object in posterior basilar segment of the RLL now with SOB, hemoptysis- foreign removed by CT Surgery, continue mucinex, cough and deep breathing, respiratory treatments as needed. #Gross hematuria     - Resolved; Seen by Dr. Ashley Wood; likely truama from [place]; f/u in 3 weeks as outpateint    #Constipation     - Now with diarrhea; started on metamucil yesterday    #Anxiety     - Continue his home remeron and trazodone    #COPD     - Continue to monitor; does not appear to be in an acute exacerbation    #Hypothyroidism     - Continue synthroid     #DMII     SSI       Active Hospital Problems    Diagnosis Date Noted    Abnormality of gait and mobility w/debility secondary to aspiration pneumonia due to dental filling/crown, [facility] admit 11/21/17 [R26.9]     Vitamin D deficiency [E55.9]     On home oxygen therapy [Z99.81]     [facility] [J69.0]     Aspiration into respiratory tract [T17.908A] 11/10/2017    Osteoarthritis of spine with radiculopathy, lumbar region [M47.26] 06/07/2016    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 I am managing a portion of pt care. Some medical issues are handled by other specialists. Additional work up and treatment should be done in out pt setting by pt PCP and other out pt providers. In addition to examining and evaluating pt, I spent additional time explaining care, normal and abnormal findings, and treatment plan. All of pt questions were answered. Counseling, diet and education were  provided. Case will be discussed with nursing staff when appropriate.  Family will be updated if and

## 2017-11-28 PROCEDURE — 6370000000 HC RX 637 (ALT 250 FOR IP): Performed by: PHYSICAL MEDICINE & REHABILITATION

## 2017-11-28 PROCEDURE — 99232 SBSQ HOSP IP/OBS MODERATE 35: CPT | Performed by: PHYSICAL MEDICINE & REHABILITATION

## 2017-11-28 PROCEDURE — 97530 THERAPEUTIC ACTIVITIES: CPT

## 2017-11-28 PROCEDURE — 6370000000 HC RX 637 (ALT 250 FOR IP): Performed by: NURSE PRACTITIONER

## 2017-11-28 PROCEDURE — 1180000000 HC REHAB R&B

## 2017-11-28 PROCEDURE — 94640 AIRWAY INHALATION TREATMENT: CPT

## 2017-11-28 PROCEDURE — 97116 GAIT TRAINING THERAPY: CPT | Performed by: INTERNAL MEDICINE

## 2017-11-28 PROCEDURE — 6370000000 HC RX 637 (ALT 250 FOR IP): Performed by: INTERNAL MEDICINE

## 2017-11-28 PROCEDURE — 97535 SELF CARE MNGMENT TRAINING: CPT

## 2017-11-28 PROCEDURE — 97532 HC COGNITIVE THERAPY 15 MIN: CPT

## 2017-11-28 PROCEDURE — 2700000000 HC OXYGEN THERAPY PER DAY

## 2017-11-28 PROCEDURE — 97112 NEUROMUSCULAR REEDUCATION: CPT | Performed by: INTERNAL MEDICINE

## 2017-11-28 RX ADMIN — OXYMETAZOLINE HYDROCHLORIDE 2 SPRAY: 5 SPRAY NASAL at 09:39

## 2017-11-28 RX ADMIN — GUAIFENESIN 600 MG: 600 TABLET, EXTENDED RELEASE ORAL at 09:37

## 2017-11-28 RX ADMIN — ZOLPIDEM TARTRATE 5 MG: 5 TABLET ORAL at 22:44

## 2017-11-28 RX ADMIN — LEVOTHYROXINE SODIUM 125 MCG: 25 TABLET ORAL at 06:25

## 2017-11-28 RX ADMIN — SULFAMETHOXAZOLE AND TRIMETHOPRIM 1 TABLET: 800; 160 TABLET ORAL at 09:37

## 2017-11-28 RX ADMIN — OXYMETAZOLINE HYDROCHLORIDE 2 SPRAY: 5 SPRAY NASAL at 22:48

## 2017-11-28 RX ADMIN — METOPROLOL SUCCINATE 25 MG: 25 TABLET, FILM COATED, EXTENDED RELEASE ORAL at 09:39

## 2017-11-28 RX ADMIN — MIRTAZAPINE 15 MG: 15 TABLET, FILM COATED ORAL at 22:44

## 2017-11-28 RX ADMIN — PSYLLIUM HUSK 1 PACKET: 3.4 POWDER ORAL at 09:37

## 2017-11-28 RX ADMIN — TAMSULOSIN HYDROCHLORIDE 0.4 MG: 0.4 CAPSULE ORAL at 22:45

## 2017-11-28 RX ADMIN — IPRATROPIUM BROMIDE AND ALBUTEROL SULFATE 1 AMPULE: .5; 3 SOLUTION RESPIRATORY (INHALATION) at 04:47

## 2017-11-28 RX ADMIN — IPRATROPIUM BROMIDE AND ALBUTEROL SULFATE 1 AMPULE: .5; 3 SOLUTION RESPIRATORY (INHALATION) at 16:20

## 2017-11-28 RX ADMIN — FINASTERIDE 5 MG: 5 TABLET, FILM COATED ORAL at 09:37

## 2017-11-28 RX ADMIN — GUAIFENESIN 600 MG: 600 TABLET, EXTENDED RELEASE ORAL at 22:44

## 2017-11-28 RX ADMIN — SULFAMETHOXAZOLE AND TRIMETHOPRIM 1 TABLET: 800; 160 TABLET ORAL at 22:44

## 2017-11-28 RX ADMIN — TRAZODONE HYDROCHLORIDE 150 MG: 150 TABLET ORAL at 22:45

## 2017-11-28 RX ADMIN — SENNOSIDES AND DOCUSATE SODIUM 2 TABLET: 8.6; 5 TABLET ORAL at 22:44

## 2017-11-28 ASSESSMENT — PAIN SCALES - GENERAL
PAINLEVEL_OUTOF10: 0

## 2017-11-28 ASSESSMENT — PAIN DESCRIPTION - DESCRIPTORS: DESCRIPTORS: NAGGING

## 2017-11-28 ASSESSMENT — PAIN DESCRIPTION - FREQUENCY: FREQUENCY: CONTINUOUS

## 2017-11-28 ASSESSMENT — PAIN DESCRIPTION - PAIN TYPE: TYPE: ACUTE PAIN

## 2017-11-28 ASSESSMENT — PAIN DESCRIPTION - LOCATION: LOCATION: ABDOMEN

## 2017-11-28 NOTE — PROGRESS NOTES
2 mins unsupported, unsupported stand with purturbances for 30 sec (eyes closed). NDT Treatment: Gait ;Standing  Neuromuscular Comments: No LOB with all standing activities except retro gait; Pt able to recover with Min A and VCs for decreased SL & increased KIMMY. Activity Tolerance  Activity Tolerance: Patient Tolerated treatment well;Patient limited by endurance  Activity Tolerance: Pt SOB but exhibited improved activity tolerance. ASSESSMENT/COMMENTS:  Body structures, Functions, Activity limitations: Decreased functional mobility ; Decreased endurance;Decreased balance;Decreased strength;Decreased ADL status; Decreased ROM  Assessment: Increased SOB noted after gait and stairs but SPO2 maintained 93% (2L). Dynamic bal continues to exhibit deficits in SLS. Safety:   Safety Devices  Type of devices:  All fall risk precautions in place        Therapy Time:   Individual   Time In 1300   Time Out 1330   Minutes 30     Minutes: 30      Transfer/Bed mobility trainin      Gait training: 15      Neuro re education: 10     Therapeutic ex:      MAGDA ESCALONA PTA, 17 at 5:34 PM

## 2017-11-28 NOTE — PROGRESS NOTES
Hospitalist Progress Note      PCP: Christine Estevez MD    Date of Admission: 11/21/2017    Chief Complaint:    Persistent cough    Subjective:  Patient complaining of waking up SOB everynight; states last night he woke up at 4am SOB and could not go back to sleep. Per RN this is a nightly event. 12 point ROS negative otherwise. Medications:  Reviewed    Infusion Medications    dextrose       Scheduled Medications    docusate sodium  100 mg Oral Every Other Day    zolpidem  5 mg Oral Nightly    sulfamethoxazole-trimethoprim  1 tablet Oral 2 times per day    psyllium  1 packet Oral Daily    tamsulosin  0.4 mg Oral Nightly    lidocaine  3 patch Transdermal Daily    oxymetazoline  2 spray Each Nare BID    magnesium citrate  150 mL Oral Daily before lunch    finasteride  5 mg Oral Daily    guaiFENesin  600 mg Oral BID    ipratropium-albuterol  1 ampule Inhalation TID    levothyroxine  125 mcg Oral Daily    metoprolol succinate  25 mg Oral Daily    mirtazapine  15 mg Oral Nightly    sennosides-docusate sodium  2 tablet Oral Nightly    traZODone  150 mg Oral Nightly     PRN Meds: polyethylene glycol, sodium chloride nebulizer, acetaminophen, sodium chloride, ondansetron, dextrose, dextrose, glucagon (rDNA), albuterol, bisacodyl, hydrALAZINE, LORazepam, magnesium hydroxide, oxyCODONE-acetaminophen  No intake or output data in the 24 hours ending 11/28/17 1431  Exam:    BP (!) 160/75   Pulse 65   Temp 97 °F (36.1 °C)   Resp 20   Ht 6' (1.829 m)   Wt 246 lb 4.1 oz (111.7 kg)   SpO2 94%   BMI 33.40 kg/m²     General appearance: No apparent distress, appears stated age and cooperative  HEENT: Pupils equal, round, and reactive to light. Conjunctivae/corneas clear. Neck: Supple, with full range of motion. No jugular venous distention. Trachea midline. Respiratory:  Normal respiratory effort.  Improved breath sounds; minimal crackles still present   Cardiovascular: Regular rate and rhythm with normal S1/S2 without murmurs, rubs or gallops. Abdomen: Soft, non-tender, non-distended with normal bowel sounds. Musculoskeletal: No clubbing, cyanosis; +1 pitting edema bilaterally   Neuro: Non Focal. Intact and symmetric  Psychiatric: Alert and oriented, thought content appropriate, normal insight  Capillary Refill: Brisk,< 3 seconds   Peripheral Pulses: +2 palpable, equal bilaterally   Skin:  Not examined today    Labs:   No results for input(s): WBC, HGB, HCT, PLT in the last 72 hours. No results for input(s): NA, K, CL, CO2, BUN, CREATININE, CALCIUM, PHOS in the last 72 hours. Invalid input(s): MAGNES  No results for input(s): AST, ALT, BILIDIR, BILITOT, ALKPHOS in the last 72 hours. No results for input(s): INR in the last 72 hours. No results for input(s): Lady Dyan in the last 72 hours. Urinalysis:      Lab Results   Component Value Date    NITRU Negative 11/18/2017    WBCUA 0-2 11/18/2017    BACTERIA Moderate 11/18/2017    RBCUA 5-10 11/18/2017    BLOODU LARGE 11/18/2017    SPECGRAV 1.027 11/18/2017    GLUCOSEU Negative 11/18/2017     Radiology:  FL Modified Barium Swallow W Video   Final Result      No aspiration or penetration identified. 9 cine loops. One static image. 1.5 minutes fluoroscopy time. Assessment/Plan:    76 y/o M who presented with a persistent cough; found to have a foreign object in his posterior basilar segment of the RLL        #Nocturnal Dyspnea     - Possible undiagnosed sleep apnea?   Denies waking up choking, no witnessed snoring or apneic episodes     - Discussed with RN; they will touch base with pulmonary to see if theres anything further we can do    #Right posterior thigh purulent cellulitis     - No longer draining; responding well to PO bactrim; today is day 5; will aim for 7 days    #Foreign object in posterior basilar segment of the RLL now with SOB, hemoptysis     - Foreign object in posterior basilar segment of the RLL now with SOB, hemoptysis- foreign removed by CT Surgery, continue mucinex, cough and deep breathing, respiratory treatments as needed. #Gross hematuria     - Resolved; Seen by Dr. Lalo Eden; likely truama from [place]; f/u in 3 weeks as outpateint    #Constipation     - Now with diarrhea; started on metamucil yesterday    #Anxiety     - Continue his home remeron and trazodone    #COPD     - Continue to monitor; does not appear to be in an acute exacerbation    #Hypothyroidism     - Continue synthroid     #DMII     SSI       Active Hospital Problems    Diagnosis Date Noted    Abnormality of gait and mobility w/debility secondary to aspiration pneumonia due to dental filling/crown, 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 I am managing a portion of pt care. Some medical issues are handled by other specialists. Additional work up and treatment should be done in out pt setting by pt PCP and other out pt providers. In addition to examining and evaluating pt, I spent additional time explaining care, normal and abnormal findings, and treatment plan. All of pt questions were answered. Counseling, diet and education were  provided.  Case will be discussed with nursing staff when appropriate. Family will be updated if and when appropriate.       Diet: DIET GENERAL; Carb Control: 4 carbs/meal (approximate 1800 kcals/day)    Code Status: Full Code    Electronically signed by Milad Ordaz MD on 11/28/2017 at 2:31 PM

## 2017-11-28 NOTE — HOME CARE
Met with patient regarding home care skilled services , freedom of choice given agrees to [facility] to follow. Has family that will be checking on him and assisting, patient was independent prior to this hospitalization. Has oxygen from [facility] [employer] that he only wore at night may now need continously.

## 2017-11-28 NOTE — PROGRESS NOTES
Occupational Therapy    Facility/Department: [facility]  Daily Treatment Note  NAME: Emely Friedman  :   MRN: [id number]    Date of Service: 2017    Patient Diagnosis(es): There were no encounter diagnoses. 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 has a past surgical history that includes knee surgery (Left, ); Thyroidectomy (); bronchoscopy (N/A, 2017); and thoracotomy (Right, 2017). Restrictions  Restrictions/Precautions: Fall Risk, General Precautions  Other position/activity restrictions: no straws  2 liters O2  Subjective   General  Family / Caregiver Present: No  Referring Practitioner: Dr. Willie Ferguson  Diagnosis: Right thoracotomy wedge resection for foreign body  Pre Treatment Pain Screening  Pain at present: 0   Pain Assessment  Patient Currently in Pain: No  Pain Level: 0    Objective    Pt. was instructed on, completed and provided with UE HEP. Pt. completed exercises with 2# weight demonstrating understanding and increased tolerance. Pt. completed sit/stand transfers safely under supr. Pt. engaged in B/UE activity from standing position to increase his standing safety/balance and independence. Pt. maintained his balance during construction of pvc pipe designs with standing tolerances from 5.5 minutes to 8 minutes.    Assessment   Activity Tolerance:

## 2017-11-28 NOTE — PROGRESS NOTES
will utilize compensatory strategies as trained for memory and word finding deficits in 4/5 given opportunities during structured tasks with SLP in order to promote independence in the presence of cognitive deficits. Pt independently referenced his therapy schedule for daily events. Pt stated that he lost the directions for accessing music through [employer] on his phone. ST will contact rec therapy to obtain this list again. Pt was unable to recall the directions from the list independently. Pt did not recall the initial speech therapy evaluation. Treatment/Activity Tolerance:     [x]  Patient tolerated treatment well []  Patient limited by fatique    []  Patient limited by pain  []  Patient limited by other medical complications   []  Other:     Plan: [x]  Continue per plan of care []  Alter current plan (see comments)    []  Plan of care initiated []  Hold pending MD visit []  Discharge    Pain:  [x] Pt demonstrated no s/s of pain during this visit. none  N/A      Patient/ Caregiver Education:  [x] Patient/Caregiver Educated on session and progression towards goals. [] Patient/Caregiver stated verbal understanding of directions.    [x] Reinforcement needed;  [x] patient   [] family    Safety Devices:  [] Call light within reach  [x] Chair alarm activated  [] Bed alarm activated  [] Other:    Comprehension          Expression   []7 - Independent   []7 - Indpendent   []6 - Modified Independent  []6 - Modified Independent   [x]5 - Supervision   []5 - Supervision   []4 - Min Assist   [x]4 - Min Assist   []3 - Mod Assist   []3 - Mod Assist   []2 - Max Assist   []2 - Max Assist   []1 - Dependent   []1 - Dependent    Problem Solving        Memory   []7 - Independent   []7 - Independent   []6 - Modified Independent  []6 - Modified Independent   []5 - Supervision   []5 - Supervision   [x]4 - Min Assist   [x]4 - Min Assist   []3 - Mod Assist   []3 - Mod Assist   []2 - Max Assist   []2 - Max Assist   []1 - Dependent   [] 1 -Dependent    Margarita BALES, CCC-SLP 11/28/17

## 2017-11-28 NOTE — PROGRESS NOTES
[facility]   Acute  Rehabilitation  MUSIC THERAPY      Date:  11/28/2017        Patient Name: Sarah Beth Cox       MRN: [id number]        YOB: 1940 (78 y.o.)       Gender: male  Diagnosis: Right thoracotomy wedge resection for foreign body  Referring Practitioner: Dr. Dev Santiago    RESTRICTIONS/PRECAUTIONS:  Restrictions/Precautions: Fall Risk, General Precautions  Vision:  (denies double or blurred vision; visual field intact)  Hearing: Exceptions to [facility]  Hearing Exceptions: Bilateral hearing aid    Time of Session: 2:50pm - 3:30pm     PAIN RATING BEFORE MUSIC THERAPY SESSION:   [x] No  [] Yes        Location: n/a    Pain Rating: n/a    Comment(s): n/a    ANXIETY RATING BEFORE MUSIC THERAPY SESSION:  [x] No  [] Yes         Anxiety Rating: n/a    Comment(s): n/a    SUBJECTIVE: \"Oh I'm not too shabby today. \"     OBJECTIVE:        [x] To Improve Mood     [x] To Increase Social Well-Being  [] To Increase Focus   [x] To Increase Emotional Well-Being  [] To Increase Eye Contact    [] To Increase Spiritual Well-Being   [] To Decrease Anxiety   [x] To Increase Relaxation   [] To Decrease Pain    [] To Increase Communication  [] To Increase Movement to Music       MUSIC INTERVENTION PROVIDED:     [x] Live Music on Voice  [x] Recorded Music   [x] Live Music on Guitar  [x] Discussion Related to Music   [] Live Music on Q-chord  [x] Discussion Related to Pt Experience   [] Live Music on Percussion      PARTICIPATION LEVEL OF PATIENT:     [x] Active with discussion   [] Passive with discussion   [x] Active with singing    [] Passive with singing   [] Active with instrument playing  [] Passive with instrument playing   [x] Actively listening to music   [] Passively listening to music.          OUTCOMES OBSERVED:      [x] Improved Mood   [x] Increased Social Well-Being  [] Increased Focus   [x] Increased Emotional Well-Being  [] Increased Eye Contact    [] Increased Spiritual Well-Being   [] Decreased

## 2017-11-28 NOTE — HOME CARE
Transfer To Home Care    R262/R262-01  Patient Name: Columba Vizcarra         Address: [address] [address]       . MRN: [id number]                          : 1940  (78 y.o.)       Phone:     [phone] (home)   Gender: male   Demographics Verified:  [x]Yes   []No                                                       SSN:        1. Continued hemoptysis status post Aspiration pneumonia,  COPD (chronic obstructive pulmonary disease), on home oxygen therapy - Pulse oximeter checks, monitor vital signs, oxygen prn.  Proventil, Duoneb,   2.   HTN (hypertension),  Hyperlipidemia - continue blood pressure checks, adjust/add medications (Apresoline, Toprol). 3.   GERD (gastroesophageal reflux disease) - elevate head of bed. 4.   Hypothyroidism - Synthroid. 5.   Type 2 diabetes mellitus - Continue blood sugar checks, diet, adjust/add medications prn.                  No results for input(s): POCGLU in the last 72 hours.    6.    BPH (benign prostatic hyperplasia) - Flomax- bedtime to limit orthostatic, Proscar.  Check postvoid residual.  7.    Anxiety and   Insomnia. - emotional support, adjust/add medications (Desyrel, Remeron, Ativan). 8.    Aspiration into respiratory tract - recheck modified barium swallow-okay'd patient for thin liquid.  Verbal results review patient okayed to be in regular incisions. 9.  Frequent bloody nose - I prescribed Afrin nasal spray and saline nasal spray.  I have held the Lovenox. 10.  Boil draining of the right posterior thigh-culture and sensitivity percent after drainage on 17 and continue Betadine, egg crate mattress and dry sterile dressing.   11.  Right upper abdominal pain due to gas - I prescribed Lidoderm.        Code Status: Resuscitation/Code Status Note on Columba Vizcarra  The code status was made Full Code     High Risk For Readmission:  []Yes   [x]No             [x]  F2F Completed   Ordering Physician: (Dr. Eri Fontenot)  PCP: Raymundo Olvera MD   Anticipated Discharge Date: (12/1)    Olive Brar 20 Coordinatior     []Yes   [x]No  Signed up for Palliative Care Services   []Yes   [x]No  (Ask for Palliative Care)     []  Flu Vaccine     Date:    ()   []  PNEUMONIA   Date:    ()                               HOME CARE SERVICES       [x]   Skilled Nursing       [x]  Med/Surg       [] Mental Health   [x]   Physical Therapy    [x]  Home Safety Eval    []  Fast track  DME NEEDS:   ()   [x]   Occupational Therapy     []  Cognition and Memory  DME NEEDS:   ()   []  Speech therapy        []  Swallow Eval & Treat    []  Cognition   [x]  Home Care Aide  Medications that need SN teaching: (instruct in medication regime, changes, compliance)         [x] Community Resource Linkage       [] Supportive Counseling : ()   [] Short/Long Term Placement           []  Application F/U:  [] Medicaid  [] Passport      OXYGEN                                 [x] Home O2     Liters per NC  (previously wore at hs will have to be evaluated for need continously)   [] Intermittent/Continuous   [] Bipap            [] CPap   [] Ventilator     [] Nebulizer  May need nebulizer did not have at home    Supplier/Contact # :    (Medical services)        [address] / Marce Arenas / Laya Akers / Justine Jolly / Arnaldo Gillespie / Brendan Constantino  Right chest wall incision open to air, (glued and sutures)    Right posterior thigh cellulitis improving decreased drainage  Since on bactrim  LAB WORK                                    Hgb A1c (5.9)                       Date: (8/8/16)     OUTPATIENT TESTING                                   [] Coumadin Clinic         [] Other Tests ()                                   Home Care Coordinator: RN Signature Electronically signed by Carmen Nichole RN on 11/28/17 at 3:15 PM   11/28/2017

## 2017-11-28 NOTE — PROGRESS NOTES
Occupational Therapy  Facility/Department: [facility]  Daily Treatment Note  NAME: Daniel Wallace  :   MRN: [id number]    Date of Service: 2017    Patient Diagnosis(es): There were no encounter diagnoses. 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 has a past surgical history that includes knee surgery (Left, ); Thyroidectomy (); bronchoscopy (N/A, 2017); and thoracotomy (Right, 2017). Restrictions  Restrictions/Precautions  Restrictions/Precautions: Fall Risk, General Precautions  Other position/activity restrictions: no straws  2 liters O2  Subjective   General  Referring Practitioner: Dr. Nick Pedraza  Diagnosis: Right thoracotomy wedge resection for foreign body  Pre Treatment Pain Screening  Pain at present: 3  Scale Used: Numeric Score  Intervention List: Patient able to continue with treatment  Comments / Details: Pt. had a BM during AM ADL relieving stomach discomfort. Pain Assessment  Patient Currently in Pain: No  Pain Level: 0  Pain Type: Acute pain  Pain Location: Abdomen  Pain Descriptors: Nagging  Pain Frequency: Continuous    Overall Orientation Status: Within Functional Limits  Objective  Pt. completed AM ADL including shower as noted below. This date pt. did not gather his clothing d/t toileting needs.   ADL  Feeding: Independent  Grooming:

## 2017-11-29 ENCOUNTER — APPOINTMENT (OUTPATIENT)
Dept: GENERAL RADIOLOGY | Age: 77
DRG: 178 | End: 2017-11-29
Attending: PHYSICAL MEDICINE & REHABILITATION
Payer: MEDICARE

## 2017-11-29 PROCEDURE — 6370000000 HC RX 637 (ALT 250 FOR IP): Performed by: INTERNAL MEDICINE

## 2017-11-29 PROCEDURE — 6370000000 HC RX 637 (ALT 250 FOR IP): Performed by: PHYSICAL MEDICINE & REHABILITATION

## 2017-11-29 PROCEDURE — 94664 DEMO&/EVAL PT USE INHALER: CPT

## 2017-11-29 PROCEDURE — 97532 HC COGNITIVE THERAPY 15 MIN: CPT

## 2017-11-29 PROCEDURE — 97530 THERAPEUTIC ACTIVITIES: CPT

## 2017-11-29 PROCEDURE — 94640 AIRWAY INHALATION TREATMENT: CPT

## 2017-11-29 PROCEDURE — 97535 SELF CARE MNGMENT TRAINING: CPT | Performed by: INTERNAL MEDICINE

## 2017-11-29 PROCEDURE — 71020 XR CHEST STANDARD TWO VW: CPT

## 2017-11-29 PROCEDURE — 1180000000 HC REHAB R&B

## 2017-11-29 PROCEDURE — 97116 GAIT TRAINING THERAPY: CPT | Performed by: INTERNAL MEDICINE

## 2017-11-29 PROCEDURE — 99232 SBSQ HOSP IP/OBS MODERATE 35: CPT | Performed by: PHYSICAL MEDICINE & REHABILITATION

## 2017-11-29 PROCEDURE — 99222 1ST HOSP IP/OBS MODERATE 55: CPT | Performed by: INTERNAL MEDICINE

## 2017-11-29 RX ADMIN — TAMSULOSIN HYDROCHLORIDE 0.4 MG: 0.4 CAPSULE ORAL at 23:48

## 2017-11-29 RX ADMIN — IPRATROPIUM BROMIDE AND ALBUTEROL SULFATE 1 AMPULE: .5; 3 SOLUTION RESPIRATORY (INHALATION) at 12:53

## 2017-11-29 RX ADMIN — GUAIFENESIN 600 MG: 600 TABLET, EXTENDED RELEASE ORAL at 09:51

## 2017-11-29 RX ADMIN — SULFAMETHOXAZOLE AND TRIMETHOPRIM 1 TABLET: 800; 160 TABLET ORAL at 09:47

## 2017-11-29 RX ADMIN — ISODIUM CHLORIDE 3 ML: 0.03 SOLUTION RESPIRATORY (INHALATION) at 00:35

## 2017-11-29 RX ADMIN — FINASTERIDE 5 MG: 5 TABLET, FILM COATED ORAL at 09:47

## 2017-11-29 RX ADMIN — IPRATROPIUM BROMIDE AND ALBUTEROL SULFATE 1 AMPULE: .5; 3 SOLUTION RESPIRATORY (INHALATION) at 05:27

## 2017-11-29 RX ADMIN — ZOLPIDEM TARTRATE 5 MG: 5 TABLET ORAL at 23:46

## 2017-11-29 RX ADMIN — LEVOTHYROXINE SODIUM 125 MCG: 25 TABLET ORAL at 06:42

## 2017-11-29 RX ADMIN — GUAIFENESIN 600 MG: 600 TABLET, EXTENDED RELEASE ORAL at 23:45

## 2017-11-29 RX ADMIN — IPRATROPIUM BROMIDE AND ALBUTEROL SULFATE 1 AMPULE: .5; 3 SOLUTION RESPIRATORY (INHALATION) at 18:08

## 2017-11-29 RX ADMIN — SULFAMETHOXAZOLE AND TRIMETHOPRIM 1 TABLET: 800; 160 TABLET ORAL at 23:46

## 2017-11-29 RX ADMIN — MIRTAZAPINE 15 MG: 15 TABLET, FILM COATED ORAL at 23:46

## 2017-11-29 RX ADMIN — OXYMETAZOLINE HYDROCHLORIDE 2 SPRAY: 5 SPRAY NASAL at 23:48

## 2017-11-29 RX ADMIN — DOCUSATE SODIUM 100 MG: 100 CAPSULE, LIQUID FILLED ORAL at 09:46

## 2017-11-29 RX ADMIN — METOPROLOL SUCCINATE 25 MG: 25 TABLET, FILM COATED, EXTENDED RELEASE ORAL at 09:46

## 2017-11-29 RX ADMIN — SENNOSIDES AND DOCUSATE SODIUM 2 TABLET: 8.6; 5 TABLET ORAL at 23:46

## 2017-11-29 RX ADMIN — TRAZODONE HYDROCHLORIDE 150 MG: 150 TABLET ORAL at 23:46

## 2017-11-29 ASSESSMENT — PAIN SCALES - GENERAL
PAINLEVEL_OUTOF10: 0
PAINLEVEL_OUTOF10: 0

## 2017-11-29 NOTE — CONSULTS
angle. The heart is not enlarged. There is a tortuous aorta. There is degenerative bone spurring throughout the spine. NO SIGNIFICANT CLEARING OF THE RIGHT LUNG BASE SINCE THE 11/19/2017 CHEST X-RAY. Assessment and  plan:     1. Aspiration of foreign object in the posterior basilar segment of the right lower lobe s/p rt. Thoracotomy and segmental wedge resection. 2. Secondary lower respiratory tract infection due to aspirated foreign object  3. 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 Hypoxemia on 2 L O2.  5. GEORGES unable to tolerate CPAP therapy    Patient is having exertional shortness of breath as well as waking up with short of breath. On 2 L at rest saturation 98%. Check chest x-ray PA and lateral for further evaluation. He has a history of sleep apnea but unable to tolerate CPAP therapy in the past and was using O2 at home. For his underlying COPD continue nebulizer with DuoNeb 3 times a day. Mucinex 600 mg twice a day.   Continue present treatment plan, will follow          Electronically signed by Yeimi Louise MD, FCCP on 11/29/2017 at 12:26 PM

## 2017-11-29 NOTE — PROGRESS NOTES
hemoptysis- foreign removed by CT Surgery, continue mucinex, cough and deep breathing, respiratory treatments as needed. #Gross hematuria     - Resolved; Seen by Dr. Miguel Laird; likely truama from sharp; f/u in 3 weeks as outpateint    #Constipation     - Now with diarrhea; started on metamucil yesterday    #Anxiety     - Continue his home remeron and trazodone    #COPD     - Continue to monitor; does not appear to be in an acute exacerbation    #Hypothyroidism     - Continue synthroid     #DMII     SSI       Active Hospital Problems    Diagnosis Date Noted    Hypoxemia [R09.02]     Abnormality of gait and mobility w/debility secondary to aspiration pneumonia due to dental filling/crown, [facility] admit 11/21/17 [R26.9]     Vitamin D deficiency [E55.9]     On home oxygen therapy [Z99.81]     Aspiration pneumonia ([facility] [J69.0]     Aspiration into respiratory tract [T17.908A] 11/10/2017    Osteoarthritis of spine with radiculopathy, lumbar region [M47.26] 06/07/2016    Foraminal stenosis of lumbosacral region [M99.83] 06/07/2016    Insomnia [G47.00]     Anxiety [F41.9]     Type 2 diabetes mellitus ([facility] [E11.9]     Hypothyroidism [E03.9]     BPH (benign prostatic hyperplasia) [N40.0]     GERD (gastroesophageal reflux disease) [K21.9] 12/11/2014    Hyperlipidemia [E78.5] 01/13/2014    HTN (hypertension) [I10] 01/13/2014    COPD (chronic obstructive pulmonary disease) ([facility] [J44.9] 12/05/2013     Additional work up or/and treatment plan may be added today or then after based on clinical progression. I am managing a portion of pt care. Some medical issues are handled by other specialists. Additional work up and treatment should be done in out pt setting by pt PCP and other out pt providers. In addition to examining and evaluating pt, I spent additional time explaining care, normal and abnormal findings, and treatment plan. All of pt questions were answered.  Counseling, diet and education were provided. Case will be discussed with nursing staff when appropriate. Family will be updated if and when appropriate.       Diet: DIET GENERAL; Carb Control: 4 carbs/meal (approximate 1800 kcals/day)    Code Status: Full Code    Electronically signed by Kathleen Paulino MD on 11/29/2017 at 5:06 PM

## 2017-11-29 NOTE — PROGRESS NOTES
Physical Therapy  Physical Therapy Rehab Treatment Note  Facility/Department: [facility]  [facility]       NAME: Shena Lindo  :  (33 y.o.)  MRN: [id number]  CODE STATUS: Full Code    Date of Service: 2017  Chart Reviewed: Yes  Family / Caregiver Present: Yes  General Comment  Comments: Pt on 2L O2. Pt occasionally has productive cough. SPO2 95% on 2L O2; HR: 72 BPM upon arrival.    Restrictions:  Restrictions/Precautions: Fall Risk  Body mass index is 33.4 kg/m². SUBJECTIVE: Subjective: Pt reports that he is feeling more confident about returning home. Response To Previous Treatment: Patient with no complaints from previous session. Pain Screening  Patient Currently in Pain: No  Pre Treatment Pain Screening  Pain at present: 0  Scale Used: Numeric Score  Intervention List: Patient able to continue with treatment  Pre Treatment Pain Screening  Pain at present: 0  Scale Used: Numeric Score    Post Treatment Pain Screening:   Pain Assessment  Pain Assessment: 0-10  Pain Level: 0  Patient's Stated Pain Goal: No pain    OBJECTIVE:        Bed mobility  Rolling to Left: Modified independent  Rolling to Right: Modified independent  Supine to Sit: Modified independent  Sit to Supine: Modified independent  Scooting: Modified independent    Transfers  Sit to Stand: Supervision  Stand to sit: Supervision  Bed to Chair: Supervision  Stand Pivot Transfers: Supervision  Comment: Increased assistance for stand>sit after prolonged standing/gait (CGA) d/t SOB decreased endurance    Ambulation  Ambulation?: Yes  Ambulation 1  Surface: level tile;carpet;ramp  Device: No Device  Other Apparatus: O2  Assistance: Contact guard assistance  Quality of Gait: Ataxic, inconsistent foot placement & SL (Harshad), NBOS, occasional Lat step strategy, Decreased Lat Displacement and rigid BUEs. Distance: 175 feet  Comments: SPO2 90%. L Knee Varus noted in stance. 3X Standing RBs required.    Stairs/Curb  Stairs?: Yes Stairs  # Steps : 8  Stairs Height: 6\"  Rails: Bilateral  Comment: non-reciprocal; improved posture today    Activity Tolerance  Activity Tolerance: Patient limited by endurance; Patient limited by fatigue  Activity Tolerance: decreased tolerance this AM; extended RB provided today after gait to recover. Pt reports limited by congestion. ASSESSMENT/COMMENTS:  Body structures, Functions, Activity limitations: Decreased functional mobility ; Decreased endurance;Decreased balance;Decreased strength;Decreased ADL status; Decreased ROM  Assessment: notified RN and OT (60 min OT scheduled next) re: pt O2 stats to 89% on 2L after stairs (8) and 15' walk to [facility]. Pt scheduled to D/C to home 17. Safety:   Safety Devices  Type of devices:  All fall risk precautions in place        Therapy Time:   Individual   Time In 1030   Time Out 1100   Minutes 30     Minutes: 30      Transfer/Bed mobility training: 10      Gait trainin      Neuro re education:     Therapeutic ex:      MAGDA ESCALONA PTA, 17 at 12:59 PM

## 2017-11-29 NOTE — PROGRESS NOTES
Physical Therapy  Physical Therapy Rehab Treatment Note  Facility/Department: [facility]  Room: Z169/E168-06       NAME: Florin Whatley  :  (18 y.o.)  MRN: [id number]  CODE STATUS: Full Code    Date of Service: 2017  Chart Reviewed: Yes  Family / Caregiver Present: Yes  General Comment  Comments: Pt SOB after transfer to toilet. Pt friend (x2) present for Tx; Friend is Physician Asst.     Restrictions:  Restrictions/Precautions: Fall Risk  Body mass index is 33.4 kg/m². SUBJECTIVE: Subjective: Pt reports that he feels congested. Requested assistance to toilet for BM upon arrival to pt room. Response To Previous Treatment: Patient with no complaints from previous session. ;Patient reporting fatigue but able to participate. Pain Screening  Patient Currently in Pain: No  Pre Treatment Pain Screening  Pain at present: 0  Scale Used: Numeric Score  Intervention List: Patient able to continue with treatment  Pre Treatment Pain Screening  Pain at present: 0  Scale Used: Numeric Score    Post Treatment Pain Screening:   Pain Assessment  Pain Assessment: 0-10  Pain Level: 0  Patient's Stated Pain Goal: No pain    OBJECTIVE:           Transfers  Sit to Stand: Supervision  Stand to sit: Supervision  Bed to Chair: Supervision  Stand Pivot Transfers: Supervision  Comment: Increased effort; Increased assistance after gait >50 feet d/t SOB    Ambulation  Ambulation?: Yes  Ambulation 1  Surface: level tile;carpet  Device: No Device  Other Apparatus: O2  Assistance: Contact guard assistance  Quality of Gait: Ataxic, inconsistent foot placement & SL (Harshad), NBOS, occasional Lat step strategy, Decreased Lat Displacement and rigid BUEs. Distance: 90  Comments: SPO2 90%. L Knee Varus noted in stance. multiple Standing RBs required. Ambulation 2  Surface - 2: carpet;level tile  Device 2: Rollator  Assistance 2: Contact guard assistance  Quality of Gait 2: Ataxic, NBOS, Decreased Lat WS and rigid BUEs.   Distance:

## 2017-11-29 NOTE — PROGRESS NOTES
[facility]  Speech Language/Pathology  Rehab Daily Note                  Ania Pedro  11/29/2017    Rehab Dx/Hx: Abnormality of gait and mobility [R26.9]    Precautions: falls and aspirations    ST Dx: [] Aphasia  [] Dysarthria  [] Apraxia   [x] Oropharyngeal dysphagia              [x] Cognitive Impairment  [] Other:     Date of Admit: 11/21/2017  Room #: S427/C628-57    Time in: 0930  Time out: 1000    Subjective:  Behavior: [x] Alert  [x] Cooperative  [x]  Pleasant  [] Confused  [] Agitated                                          [] Uncooperative  [] Distractible [] Motivated  [] Self-Limiting [] Anxious          [] Other:    Endurance:  [x] Adequate for participation in SLP sessions  [] Reduced overall              [] Lethargic  [] Other:              Interventions used this date:  [] Speech Treatment       [] Expressive Language  [] Receptive Language   [] Dysphagia Treatment   [x] Cognitive Skill Sunil  [] Oral Motor  [] Voice Treatment       []  AAC     [] ESTIM  [] Speech production       [] Therapeutic Meal Monitor               [] Instruction in compensatory strategies       Objective/Assessment:  Financial management- Check writing task. Pt was able to accurately fill in checks with: 100% acc independently  Pt was able to alternate attention with 100% acc independently    Upon SLP entering room, pt was independently listening to his [employer] music on his smart phone. He had independently recalled how to access the music through his phone. Pt recalled this SLP's name and details from previous session.   Medication management:        Treatment/Activity Tolerance:     [x]  Patient tolerated treatment well []  Patient limited by fatique    []  Patient limited by pain  []  Patient limited by other medical complications   []  Other:     Plan: [x]  Continue per plan of care []  Alter current plan (see comments)    []  Plan of care initiated []  Hold pending MD visit []

## 2017-11-30 PROCEDURE — 1180000000 HC REHAB R&B

## 2017-11-30 PROCEDURE — 2700000000 HC OXYGEN THERAPY PER DAY

## 2017-11-30 PROCEDURE — 97112 NEUROMUSCULAR REEDUCATION: CPT

## 2017-11-30 PROCEDURE — 97110 THERAPEUTIC EXERCISES: CPT

## 2017-11-30 PROCEDURE — 97116 GAIT TRAINING THERAPY: CPT

## 2017-11-30 PROCEDURE — 6360000002 HC RX W HCPCS: Performed by: INTERNAL MEDICINE

## 2017-11-30 PROCEDURE — 6370000000 HC RX 637 (ALT 250 FOR IP): Performed by: INTERNAL MEDICINE

## 2017-11-30 PROCEDURE — 6370000000 HC RX 637 (ALT 250 FOR IP): Performed by: PHYSICAL MEDICINE & REHABILITATION

## 2017-11-30 PROCEDURE — 97530 THERAPEUTIC ACTIVITIES: CPT

## 2017-11-30 PROCEDURE — 6370000000 HC RX 637 (ALT 250 FOR IP): Performed by: NURSE PRACTITIONER

## 2017-11-30 PROCEDURE — 94640 AIRWAY INHALATION TREATMENT: CPT

## 2017-11-30 PROCEDURE — 97532 HC OT DEVELOP COGNITIVE SKILLS 15MIN: CPT

## 2017-11-30 PROCEDURE — 97535 SELF CARE MNGMENT TRAINING: CPT

## 2017-11-30 PROCEDURE — 97532 HC COGNITIVE THERAPY 15 MIN: CPT

## 2017-11-30 RX ADMIN — PSYLLIUM HUSK 1 PACKET: 3.4 POWDER ORAL at 08:02

## 2017-11-30 RX ADMIN — ALBUTEROL SULFATE 2.5 MG: 2.5 SOLUTION RESPIRATORY (INHALATION) at 01:24

## 2017-11-30 RX ADMIN — TAMSULOSIN HYDROCHLORIDE 0.4 MG: 0.4 CAPSULE ORAL at 22:13

## 2017-11-30 RX ADMIN — METOPROLOL SUCCINATE 25 MG: 25 TABLET, FILM COATED, EXTENDED RELEASE ORAL at 08:02

## 2017-11-30 RX ADMIN — Medication 2 PUFF: at 22:29

## 2017-11-30 RX ADMIN — IPRATROPIUM BROMIDE AND ALBUTEROL SULFATE 1 AMPULE: .5; 3 SOLUTION RESPIRATORY (INHALATION) at 11:19

## 2017-11-30 RX ADMIN — LEVOTHYROXINE SODIUM 125 MCG: 25 TABLET ORAL at 06:13

## 2017-11-30 RX ADMIN — IPRATROPIUM BROMIDE AND ALBUTEROL SULFATE 1 AMPULE: .5; 3 SOLUTION RESPIRATORY (INHALATION) at 16:18

## 2017-11-30 RX ADMIN — ZOLPIDEM TARTRATE 5 MG: 5 TABLET ORAL at 22:14

## 2017-11-30 RX ADMIN — OXYMETAZOLINE HYDROCHLORIDE 2 SPRAY: 5 SPRAY NASAL at 08:04

## 2017-11-30 RX ADMIN — MIRTAZAPINE 15 MG: 15 TABLET, FILM COATED ORAL at 22:13

## 2017-11-30 RX ADMIN — IPRATROPIUM BROMIDE AND ALBUTEROL SULFATE 1 AMPULE: .5; 3 SOLUTION RESPIRATORY (INHALATION) at 06:10

## 2017-11-30 RX ADMIN — GUAIFENESIN 600 MG: 600 TABLET, EXTENDED RELEASE ORAL at 08:02

## 2017-11-30 RX ADMIN — TRAZODONE HYDROCHLORIDE 150 MG: 150 TABLET ORAL at 22:13

## 2017-11-30 RX ADMIN — SENNOSIDES AND DOCUSATE SODIUM 2 TABLET: 8.6; 5 TABLET ORAL at 22:14

## 2017-11-30 RX ADMIN — SULFAMETHOXAZOLE AND TRIMETHOPRIM 1 TABLET: 800; 160 TABLET ORAL at 08:02

## 2017-11-30 RX ADMIN — OXYMETAZOLINE HYDROCHLORIDE 2 SPRAY: 5 SPRAY NASAL at 22:17

## 2017-11-30 RX ADMIN — FINASTERIDE 5 MG: 5 TABLET, FILM COATED ORAL at 08:02

## 2017-11-30 RX ADMIN — GUAIFENESIN 600 MG: 600 TABLET, EXTENDED RELEASE ORAL at 22:13

## 2017-11-30 ASSESSMENT — PAIN SCALES - GENERAL
PAINLEVEL_OUTOF10: 0

## 2017-11-30 NOTE — PROGRESS NOTES
[facility] AT [place] Respiratory Therapy Evaluation   Current Order:  DUONEB TID AND ALBUTEROL Q2PRN      Home Regimen: NONE      Ordering Physician: Nico Chris Dr  Re-evaluation Date:  12/3     Diagnosis: GAIT AB/REHAB      Patient Status: Stable / Unstable + Physician notified    The following MDI Criteria must be met in order to convert aerosol to MDI with spacer. If unable to meet, MDI will be converted to aerosol:  []  Patient able to demonstrate the ability to use MDI effectively  []  Patient alert and cooperative  []  Patient able to take deep breath with 5-10 second hold  []  Medication(s) available in this delivery method   []  Peak flow greater than or equal to 200 ml/min            Current Order Substituted To  (same drug, same frequency)   Aerosol to MDI [] Albuterol Sulfate 0.083% unit dose by aerosol Albuterol Sulfate MDI 2 puffs by inhalation with spacer    [] Levalbuterol 1.25 mg unit dose by aerosol Levalbuterol MDI 2 puffs by inhalation with spacer    [] Levalbuterol 0.63 mg unit dose by aerosol Levalbuterol MDI 2 puffs by inhalation with spacer    [] Ipratropium Bromide 0.02% unit dose by aerosol Ipratropium Bromide MDI 2 puffs by inhalation with spacer    [] Duoneb (Ipratropium + Albuterol) unit dose by aerosol Ipratropium MDI + Albuterol MDI 2 puffs by inhalation w/spacer   MDI to Aerosol [] Albuterol Sulfate MDI Albuterol Sulfate 0.083% unit dose by aerosol    [] Levalbuterol MDI 2 puffs by inhalation Levalbuterol 1.25 mg unit dose by aerosol    [] Ipratropium Bromide MDI by inhalation Ipratropium Bromide 0.02% unit dose by aerosol    [] Combivent (Ipratropium + Albuterol) MDI by inhalation Duoneb (Ipratropium + Albuterol) unit dose by aerosol   Treatment Assessment [Frequency/Schedule]:  Change frequency to: ___DUONEB TID AND ALBUTEROL Q2PRN____per Protocol, P&T, MEC      Points 0 1 2 3 4   Pulmonary Status  Non-Smoker  []   Smoking history   < 20 pack years  []   Smoking history  ?  20 pack years  []

## 2017-11-30 NOTE — PROGRESS NOTES
Physical Therapy      Facility/Department: [facility]  Rehabilitation Daily Treatment Note    NAME: Kasia Beaulieu    :  (69 y.o.)  MRN: [id number]    Account: [de-identified]  Gender: male    Date of Service: 17      SUBJECTIVE:     Start of tx pain 0/10  Location:   Description:   Response:     End of tx pain 0/10  Location:   Description:   Response:     Falls Safety Armband Present: [x] Yes  [] No      The below exercises were completed to facilitate strength, motor control   and muscular endurance. Seated:   AP: x20   LAQ: x20   Hip Flex: x20   Abduction: x20   Adduction: x20    The below exercises were completed to facilitate strength, motor control   and muscular endurance.     Supine:   QS: x20   SAQ: x20   Glut set: x20   Abduction: x20   Heel slides: x20   AP: x20      Safety/Judgment:     Safety Devices  Type of devices: Chair alarm in place;Gait belt    Minutes:      Transfer/Bed mobility training:      Gait training:      Neuro re education:      Therapeutic ex: 30      Therapy Time:    Individual   Time In 1300   Time Out 1330   Minutes 30

## 2017-11-30 NOTE — PROGRESS NOTES
Minutes:30      Transfer/Bed mobility trainin mins      Gait training: 10 mins      Neuro re education: 15 mins     Therapeutic ex:      Eboni Zeng PTA, 17 at 12:33 PM

## 2017-11-30 NOTE — PROGRESS NOTES
Physical Therapy Rehab Treatment Note  Facility/Department: [facility]  Room: T003/P078-01       NAME: Chasity Saunders  :  (78 y.o.)  MRN: [id number]  CODE STATUS: Full Code    Date of Service: 2017       Restrictions:  Restrictions/Precautions: Fall Risk, General Precautions  Position Activity Restriction  Other position/activity restrictions: no straws  2 liters O2  Body mass index is 33.4 kg/m². SUBJECTIVE: Subjective: I keep coughing stuff up. I'm going to have a good talk with my doctor. Pain Screening  Patient Currently in Pain: No  Pre Treatment Pain Screening  Pain at present: 0  Intervention List: Patient able to continue with treatment    Post Treatment Pain Screenin  Pain Assessment  Pain Assessment: 0-10  Pain Level: 0    OBJECTIVE:   Overall Orientation Status: Within Normal Limits           Neuromuscular Education  Neuromuscular Comments: Blue foam standing arms in different positions and head/trunk rotation; Blue foam static standing w/o UE support 3 mins; STS x 5 1st attempt 14 sec, 2nd attempt 11 sec.       Bed mobility - NT d/t not the focus of this session    Transfers  Sit to Stand: Supervision  Stand to sit: Supervision  Bed to Chair: Supervision  Stand Pivot Transfers: Supervision  Comment: Steady with or w/o [employer]    Ambulation  Ambulation?: Yes  More Ambulation?: Yes  Ambulation 1  Surface: level tile;carpet  Device: Rolling Walker  Other Apparatus: O2  Assistance: Stand by assistance  Quality of Gait: Reciprocal, slow lucy, increased SOB; vc's for breathing technique  Distance: 100'x2  Comments: SAO2 - 97% before amb and 90% after amb; vc's for breathing technique during gait; several minutes to recover  Ambulation 2  Surface - 2: carpet  Device 2: No device  Other Apparatus 2: O2  Assistance 2: Contact guard assistance  Quality of Gait 2: reciprocal gait, 0 LOB but guarded, downward gaze; vc's to increase environment scanning  Distance: 25'x2        Exercises  Hip Extension/Leg Presses: Standing x 10  Hip Abduction: Standing ABD x 10  Other exercises  Other exercises 1: Mini Squats x10     ASSESSMENT/COMMENTS:       Safety: Good  Safety Devices  Type of devices:  All fall risk precautions in place          Therapy Time:   Individual   Time In 1330   Time Out 1400   Minutes 30     Minutes:30      Transfer/Bed mobility trainin      Gait training: 15 mins      Neuro re education: 10 mins     Therapeutic ex:      Gee Allison PTA, 17 at 5:20 PM

## 2017-11-30 NOTE — PROGRESS NOTES
[facility]  Speech Language/Pathology  Rehab Daily Note                  Marquise Bellamy  11/30/2017    Rehab Dx/Hx: Abnormality of gait and mobility [R26.9]    Precautions: falls    ST Dx: [] Aphasia  [] Dysarthria  [] Apraxia   [] Oropharyngeal dysphagia              [x] Cognitive Impairment  [] Other:     Date of Admit: 11/21/2017  Room #: B264/M264-87    Time in: 0935    Time out: 1000    Subjective:  Behavior: [x] Alert  [x] Cooperative  []  Pleasant  [] Confused  [] Agitated                                          [] Uncooperative  [] Distractible [] Motivated  [] Self-Limiting [] Anxious          [] Other:    Endurance:  [x] Adequate for participation in SLP sessions  [] Reduced overall              [] Lethargic  [] Other:              Interventions used this date:  [] Speech Treatment       [] Expressive Language  [] Receptive Language   [] Dysphagia Treatment   [x] Cognitive Skill Sunil  [] Oral Motor  [] Voice Treatment       []  AAC     [] ESTIM  [] Speech production       [] Therapeutic Meal Monitor               [] Instruction in compensatory strategies       Objective/Assessment:  Pt recalled 4 pictures with min cues to use memory strategy, after 5 minute delay 4/4x; after 15 minute delay 4/4x. Named 5 items in abstract category with extra time needed, 60%x without assist.  Answered temporal orientation questions 5/6x. Performed medication management task independently using pill box for 4 medications with no errors. Treatment/Activity Tolerance:     [x]  Patient tolerated treatment well []  Patient limited by fatique    []  Patient limited by pain  []  Patient limited by other medical complications   []  Other:     Plan: [x]  Continue per plan of care []  Alter current plan (see comments)    []  Plan of care initiated []  Hold pending MD visit []  Discharge    Pain:  [x] Pt demonstrated no s/s of pain during this visit.       Patient/ Caregiver Education:  [x]

## 2017-11-30 NOTE — PROGRESS NOTES
pitting edema bilaterally   Neuro: Non Focal. Intact and symmetric  Psychiatric: Alert and oriented, thought content appropriate, normal insight  Capillary Refill: Brisk,< 3 seconds   Peripheral Pulses: +2 palpable, equal bilaterally   Skin:  Not examined today    Labs:   No results for input(s): WBC, HGB, HCT, PLT in the last 72 hours. No results for input(s): NA, K, CL, CO2, BUN, CREATININE, CALCIUM, PHOS in the last 72 hours. Invalid input(s): MAGNES  No results for input(s): AST, ALT, BILIDIR, BILITOT, ALKPHOS in the last 72 hours. No results for input(s): INR in the last 72 hours. No results for input(s): Robby P[place] in the last 72 hours. Urinalysis:      Lab Results   Component Value Date    NITRU Negative 11/18/2017    WBCUA 0-2 11/18/2017    BACTERIA Moderate 11/18/2017    RBCUA 5-10 11/18/2017    BLOODU LARGE 11/18/2017    SPECGRAV 1.027 11/18/2017    GLUCOSEU Negative 11/18/2017     Radiology:  XR CHEST STANDARD (2 VW)   Final Result      FL Modified Barium Swallow W Video   Final Result      No aspiration or penetration identified. 9 cine loops. One static image. 1.5 minutes fluoroscopy time. Assessment/Plan:    78 y/o M who presented with a persistent cough; found to have a foreign object in his posterior basilar segment of the RLL          #STEWART     - CXR ordered by pulmonary; likely due to atelectasis; I encouraged him to use his incentive spirometer    #Nocturnal Dyspnea 2/2 GEORGES     - Pt has a diagnosis of GEORGES; non complaint with CPAP/BIPAP due to not being able to tolerate it    #Right posterior thigh purulent cellulitis     - Stop bactrim today    #Foreign object in posterior basilar segment of the RLL now with SOB, hemoptysis     - Foreign object in posterior basilar segment of the RLL now with SOB, hemoptysis- foreign removed by CT Surgery, continue mucinex, cough and deep breathing, respiratory treatments as needed.     #Gross hematuria     - Resolved; Seen by Dr. De Leon Bachelor; likely truama from shrap; f/u in 3 weeks as outpateint    #Constipation     - Doing well; 2 BM in 3 days    #Anxiety     - Continue his home remeron and trazodone    #COPD     - Continue to monitor; does not appear to be in an acute exacerbation    #Hypothyroidism     - Continue synthroid     #DMII     SSI       Active Hospital Problems    Diagnosis Date Noted    Hypoxemia [R09.02]     Abnormality of gait and mobility w/debility secondary to aspiration pneumonia due to dental filling/crown, [facility] Rehab admit 11/21/17 [R26.9]     Vitamin D deficiency [E55.9]     On home oxygen therapy [Z99.81]     Aspiration pneumonia ([facility] [address] [J69.0]     Aspiration into respiratory tract [T17.908A] 11/10/2017    Osteoarthritis of spine with radiculopathy, lumbar region [M47.26] 06/07/2016    Foraminal stenosis of lumbosacral region [M99.83] 06/07/2016    Insomnia [G47.00]     Anxiety [F41.9]     Type 2 diabetes mellitus ([facility] [address] [E11.9]     Hypothyroidism [E03.9]     BPH (benign prostatic hyperplasia) [N40.0]     GERD (gastroesophageal reflux disease) [K21.9] 12/11/2014    Hyperlipidemia [E78.5] 01/13/2014    HTN (hypertension) [I10] 01/13/2014    COPD (chronic obstructive pulmonary disease) ([facility] [address] [J44.9] 12/05/2013     Additional work up or/and treatment plan may be added today or then after based on clinical progression. I am managing a portion of pt care. Some medical issues are handled by other specialists. Additional work up and treatment should be done in out pt setting by pt PCP and other out pt providers. In addition to examining and evaluating pt, I spent additional time explaining care, normal and abnormal findings, and treatment plan. All of pt questions were answered. Counseling, diet and education were  provided. Case will be discussed with nursing staff when appropriate. Family will be updated if and when appropriate.       Diet: DIET GENERAL; Carb Control: 4 carbs/meal (approximate 1800 kcals/day)    Code Status: Full Code    Electronically signed by Levester Bloch, MD on 11/30/2017 at 6:04 PM

## 2017-12-01 PROCEDURE — 6370000000 HC RX 637 (ALT 250 FOR IP): Performed by: INTERNAL MEDICINE

## 2017-12-01 PROCEDURE — 97530 THERAPEUTIC ACTIVITIES: CPT

## 2017-12-01 PROCEDURE — 1180000000 HC REHAB R&B

## 2017-12-01 PROCEDURE — 97532 HC OT DEVELOP COGNITIVE SKILLS 15MIN: CPT

## 2017-12-01 PROCEDURE — 2700000000 HC OXYGEN THERAPY PER DAY

## 2017-12-01 PROCEDURE — 97110 THERAPEUTIC EXERCISES: CPT

## 2017-12-01 PROCEDURE — 99232 SBSQ HOSP IP/OBS MODERATE 35: CPT | Performed by: INTERNAL MEDICINE

## 2017-12-01 PROCEDURE — 97116 GAIT TRAINING THERAPY: CPT | Performed by: INTERNAL MEDICINE

## 2017-12-01 PROCEDURE — 94760 N-INVAS EAR/PLS OXIMETRY 1: CPT

## 2017-12-01 PROCEDURE — 6370000000 HC RX 637 (ALT 250 FOR IP): Performed by: PHYSICAL MEDICINE & REHABILITATION

## 2017-12-01 PROCEDURE — 6370000000 HC RX 637 (ALT 250 FOR IP): Performed by: NURSE PRACTITIONER

## 2017-12-01 PROCEDURE — 94640 AIRWAY INHALATION TREATMENT: CPT

## 2017-12-01 PROCEDURE — 97532 HC COGNITIVE THERAPY 15 MIN: CPT

## 2017-12-01 RX ADMIN — ZOLPIDEM TARTRATE 5 MG: 5 TABLET ORAL at 21:53

## 2017-12-01 RX ADMIN — Medication 2 PUFF: at 05:55

## 2017-12-01 RX ADMIN — IPRATROPIUM BROMIDE AND ALBUTEROL SULFATE 1 AMPULE: .5; 3 SOLUTION RESPIRATORY (INHALATION) at 05:55

## 2017-12-01 RX ADMIN — OXYMETAZOLINE HYDROCHLORIDE 2 SPRAY: 5 SPRAY NASAL at 08:38

## 2017-12-01 RX ADMIN — GUAIFENESIN 600 MG: 600 TABLET, EXTENDED RELEASE ORAL at 21:50

## 2017-12-01 RX ADMIN — TAMSULOSIN HYDROCHLORIDE 0.4 MG: 0.4 CAPSULE ORAL at 21:52

## 2017-12-01 RX ADMIN — GUAIFENESIN 600 MG: 600 TABLET, EXTENDED RELEASE ORAL at 08:36

## 2017-12-01 RX ADMIN — FINASTERIDE 5 MG: 5 TABLET, FILM COATED ORAL at 08:36

## 2017-12-01 RX ADMIN — METOPROLOL SUCCINATE 25 MG: 25 TABLET, FILM COATED, EXTENDED RELEASE ORAL at 08:36

## 2017-12-01 RX ADMIN — MAGNESIUM CITRATE 150 ML: 1.75 LIQUID ORAL at 12:00

## 2017-12-01 RX ADMIN — SENNOSIDES AND DOCUSATE SODIUM 2 TABLET: 8.6; 5 TABLET ORAL at 21:52

## 2017-12-01 RX ADMIN — PSYLLIUM HUSK 1 PACKET: 3.4 POWDER ORAL at 08:36

## 2017-12-01 RX ADMIN — IPRATROPIUM BROMIDE AND ALBUTEROL SULFATE 1 AMPULE: .5; 3 SOLUTION RESPIRATORY (INHALATION) at 16:01

## 2017-12-01 RX ADMIN — TRAZODONE HYDROCHLORIDE 150 MG: 150 TABLET ORAL at 21:53

## 2017-12-01 RX ADMIN — Medication 2 PUFF: at 16:01

## 2017-12-01 RX ADMIN — DOCUSATE SODIUM 100 MG: 100 CAPSULE, LIQUID FILLED ORAL at 08:36

## 2017-12-01 RX ADMIN — MIRTAZAPINE 15 MG: 15 TABLET, FILM COATED ORAL at 21:51

## 2017-12-01 RX ADMIN — LEVOTHYROXINE SODIUM 125 MCG: 25 TABLET ORAL at 06:15

## 2017-12-01 RX ADMIN — OXYMETAZOLINE HYDROCHLORIDE 2 SPRAY: 5 SPRAY NASAL at 21:52

## 2017-12-01 ASSESSMENT — PAIN SCALES - GENERAL
PAINLEVEL_OUTOF10: 0

## 2017-12-01 NOTE — PROGRESS NOTES
clip box was placed on 4 in stool during activity. Following activity, pt able to place each foot on 4 inch stool and tie shoes I'ly. Upper Extremity Function  UE Strengthing: Pt donned B 2 # wrist wts, able to place 30 golf tees on wooden pegboard 1 by 1 and place 1 michelle on each jessica 1 at a time. Pt then removed 50 golf tees/pennies from pegboard from 1 up to 5 at a time. RB's as needed         Assessment      Activity Tolerance  Activity Tolerance: Patient Tolerated treatment well  Activity Tolerance: Patient on 2L O2 during treatment. Safety Devices  Safety Devices in place: Yes  Type of devices: All fall risk precautions in place       Discharge Recommendations:  Continue to assess pending progress     Plan   Plan  Times per week: 5-7x/week,  minutes per day  Plan weeks: 10-14 days  Current Treatment Recommendations: Strengthening, Endurance Training, Patient/Caregiver Education & Training, Self-Care / ADL, Home Management Training, Safety Education & Training  Plan Comment: Continue per OT POC  G-Code     OutComes Score                                             Goals  Patient Goals   Patient goals : \"Be able to do things without being winded. \" \"I want to get back into the community. \"       Therapy Time   Individual Concurrent Group Co-treatment   Time In 1400         Time Out 1430         Minutes 30               Electronically signed by Tati Newton on 12/1/2017 at 2:56 PM  CHERIE Bundy

## 2017-12-01 NOTE — PLAN OF CARE
Problem: Mobility - Impaired:  Intervention: Assess assistive devices  Continue plan of care. Intervention: Promote activities of daily living  Continue plan of care. Intervention: Provide ambulation aids  Continue plan of care. Intervention: Manage a safe environment  Continue plan of care. Intervention: Appropriate assistance to ensure safe transfer  Continue plan of care. Goal: Mobility will improve  Mobility will improve   Outcome: Ongoing  Continue plan of care.

## 2017-12-01 NOTE — PROGRESS NOTES
Subjective: The patient complains of moderate to severe acute  dyspnea on exertion, hemoptysis, nose bleeding partially relieved by  recent antibiotic treatments, PT, OT, high-dose oxygen therapy and exacerbated by  exertion and aspiration of foreign object. He complains of STEWART in a.m.. He complains of severe GI distress relieved with BM. He is doing well bu complains of right upper abdominal pain due to gas, I prescribed Lidoderm. He also complains of rash from SATURNINO hose, discontinue SATURNINO hose. ROS x10: The patient also complains of severely impaired mobility and activities of daily living. Otherwise no new problems with vision, hearing, nose, mouth, throat, dermal, cardiovascular, GI, , pulmonary, musculoskeletal, psychiatric or neurological. See Rehab H&P on Rehab chart dated 11/22/17. Vital signs:  /72   Pulse 64   Temp 98 °F (36.7 °C)   Resp 18   Ht 6' (1.829 m)   Wt 246 lb 4.1 oz (111.7 kg)   SpO2 96%   BMI 33.40 kg/m²   I/O:   PO/Intake:  fair PO intake, no problems observed or reported. Bowel/Bladder:  continent, no problems noted. General:  Patient is well developed, adequately nourished, non-obese and     well kempt. HEENT:    Hemoptysis, PERRLA, hearing intact to loud voice, external inspection of ear     and nose benign. Inspection of lips, tongue and gums benign  Musculoskeletal: No significant change in strength or tone. All joints stable. Inspection and palpation of digits and nails show no clubbing,       cyanosis or inflammatory conditions. Neuro/Psychiatric: Affect: flat but pleasant. Alert and oriented to person, place and     situation. No significant change in deep tendon reflexes or     sensation  Lungs:  Diminished CTA-B. Respiration effort is normal at rest.   STEWART in a.m. Heart:   S1 = S2, RRR. No loud murmurs. Abdomen:  Distended with gas Soft, non-tender, no enlargement of liver or spleen.   Extremities:  No significant CYLINDRICAL BRONCHIECTASIS. ADDITIONAL FINDINGS AS ABOVE. Xr Chest Decubitus Right     Result Date: 11/20/2017  CONCLUSION: ATELECTASIS RELATED TO RECENT SURGERY WITHOUT DEMONSTRABLE MOBILE EFFUSION. THERE IS NO PNEUMOTHORAX. Xr Chest Decubitus Left      Result Date: 11/20/2017  CONCLUSION: ATELECTASIS RELATED TO RECENT SURGERY, WITHOUT APPRECIABLE MOBILE EFFUSION. THERE IS NO PNEUMOTHORAX. Fl Modified Barium Swallow W Video Result Date: 11/22/2017  No aspiration or penetration identified. 9 cine loops. One static image. 1.5 minutes fluoroscopy time. Previous extensive, complex labs, notes and diagnostics reviewed and analyzed. ALLERGIES:    Allergies as of 11/21/2017    (No Known Allergies)      (please also verify by checking [facility])    Complex Physical Medicine & Rehab Issues Assess & Plan:   1. Severe abnormality of gait and mobility and impaired self-care and ADL's secondary to progressive Debility secondary to aspiration pneumonia secondary to aspirating a dental female filling . Functional and medical status reassessed regarding patients ability to participate in therapies and patient found to be able to participate in acute intensive comprehensive inpatient rehabilitation program including PT/OT to improve balance, ambulation, ADLs, and to improve the P/AROM. Therapeutic modifications regarding activities in therapies, place, amount of time per day and intensity of therapy made daily. In bed therapies or bedside therapies prn.   2. Bowel severe constipation secondary to opiates and Bladder dysfunction:  frequent toileting, ambulate to bathroom with assistance, check post void residuals. Check for C.difficile x1 if >2 loose stools in 24 hours, continue bowel & bladder program.  Monitor bowel and bladder function. Lactinex 2 PO every AC. MOM prn, Brown Bomb prn, Glycerin suppository prn, enema prn. Severe constipation -  every other day Colace Mag Citrate.    3. Severe postop right thoracotomy incision pain generalized OA pain: reassess pain every shift and prior to and after each therapy session, give prn Tylenol and Percocet, modalities prn in therapy, Lidoderm, K-pad prn. Discontinue suture right flank. 4. Rash due to SATURNINO hose, skin healing thoracotomy incision as well as boil draining of the right posterior thigh and breakdown risk:  continue pressure relief program.  Daily skin exams and reports from nursing. Patient was instructed not to use the urinal unless he washes his hands thoroughly and we will do dry sterile dressing after Betadine to the boil to the make sure it does not feed to his thoracotomy incision. Discontinue SATURNINO hose. 5. Severe Fatigue due to nutritional and hydration deficiency:   Vitamin D deficiency - supplement and recheck as outpatient. continue to monitor I&Os, calorie counts prn, dietary consult prn. Continue vitamin B vitamin D and CoQ10-lactose intolerance advised lactose free diet  6. Acute episodic insomnia with situational adjustment disorder:   Add scheduled Ambien, monitor for day time sedation -continue egg crate mattress  7. Falls risk elevated:  patient to use call light to get nursing assistance to get up, bed and chair alarm. 8. Elevated DVT risk: progressive activities in PT, discontinue SATURNINO hose due to rash, elevation and  Lovenox which is now on hold because of ongoing hemoptysis . 9. Complex discharge planning:  Discharge 12/1/17 home with home health care. Patient and family education is in progress. The patient is to follow-up with their family physician after discharge. We will continue to work on endurance and symptom control    Complex Active General Medical Issues that complicate care Assess & Plan:    1. Continued hemoptysis status post Aspiration pneumonia,  COPD (chronic obstructive pulmonary disease), on home oxygen therapy - Pulse oximeter checks, monitor vital signs, oxygen prn. Proventil, Duoneb,   2.    HTN (hypertension),  Hyperlipidemia - continue blood pressure checks, adjust/add medications (Apresoline, Toprol). 3.   GERD (gastroesophageal reflux disease) - elevate head of bed. 4.   Hypothyroidism - Synthroid. 5.   Type 2 diabetes mellitus - Continue blood sugar checks, diet, adjust/add medications prn. No results for input(s): POCGLU in the last 72 hours. 6.    BPH (benign prostatic hyperplasia) - Flomax- bedtime to limit orthostatic, Proscar. Check postvoid residual.  7.    Anxiety and   Insomnia. - emotional support, adjust/add medications (Desyrel, Remeron, Ativan). 8.    Aspiration into respiratory tract - recheck modified barium swallow-okay'd patient for thin liquid. Verbal results review patient okayed to be in regular incisions. 9.  Frequent bloody nose - I prescribed Afrin nasal spray and saline nasal spray. I have held the Lovenox. 10.  Boil draining of the right posterior thigh-culture and sensitivity percent after drainage on 11/23/17 and continue Betadine, egg crate mattress and dry sterile dressing. 11.  Right upper abdominal pain due to gas - I prescribed Lidoderm.         Myles Course MD Owen Nick D.O., PM&R     Attending    [address]

## 2017-12-01 NOTE — PROGRESS NOTES
INPATIENT PROGRESS NOTES    PATIENT NAME: Declan Neal  MRN: [id number]  SERVICE DATE:  December 1, 2017   SERVICE TIME:  1:16 PM      PRIMARY SERVICE: Pulmonary Disease    CHIEF COMPLAINTS: Shortness of breath and weakness     INTERVAL HPI: Patient seen and examined at bedside, Interval Notes, orders reviewed. Nursing notes noted  Patient is doing okay but with continued desaturations on exertion and shortness breath. He had multiple questions about his slow progress in terms of his respiratory status. OBJECTIVE    Body mass index is 33.4 kg/m². PHYSICAL EXAM:  Vitals:  /72   Pulse 64   Temp 98 °F (36.7 °C)   Resp 18   Ht 6' (1.829 m)   Wt 246 lb 4.1 oz (111.7 kg)   SpO2 96%   BMI 33.40 kg/m²   General: Patient is  Alert, awake . comfortable in bed, No distress. Head: Atraumatic , Normocephalic   Eyes: PERRL. No sclera icterus. No conjunctival injection. No discharge   ENT: No nasal  discharge. Pharynx clear. Neck:  Trachea midline. No thyromegaly, no JVD, No cervical adenopathy. Chest : Bilaterally symmetrical ,Normal effort,  No accessory muscle use  Lung : Diminished breath sounds bilaterally but mostly in the right base posteriorly  Heart[de-identified] Normal  rate. Regular rhythm. No mumur ,  Rub or gallop  ABD: Non-tender. Non-distended. No masses. No organmegaly. Normal bowel sounds. No hernia. EXT: No Pitting both leg , No Cyanosis No clubbing  Neuro: no focal weakness  Skin: Warm and dry. No erythema rash on exposed extremities. DATA:   No results for input(s): WBC, HGB, HCT, MCV, PLT in the last 72 hours. No results for input(s): NA, K, CL, CO2, BUN, CREATININE, GLUCOSE, CALCIUM, PROT, LABALBU, BILITOT, ALKPHOS, AST, ALT, LABGLOM, GFRAA, AGRATIO, GLOB in the last 72 hours. MV Settings:          No results for input(s): PHART, JTN8HHA, PO2ART, CGX7BXZ, BEART, Y3GVBRYL in the last 72 hours.     O2 Device: Nasal cannula  O2 Flow Rate (L/min): 2 L/min    DIET GENERAL; Carb Control: 4 carbs/meal (approximate 1800 kcals/day)     MEDICATIONS during current hospitalization:    Continuous Infusions:   dextrose         Scheduled Meds:   mometasone-formoterol  2 puff Inhalation BID    docusate sodium  100 mg Oral Every Other Day    zolpidem  5 mg Oral Nightly    psyllium  1 packet Oral Daily    tamsulosin  0.4 mg Oral Nightly    lidocaine  3 patch Transdermal Daily    oxymetazoline  2 spray Each Nare BID    magnesium citrate  150 mL Oral Daily before lunch    finasteride  5 mg Oral Daily    guaiFENesin  600 mg Oral BID    ipratropium-albuterol  1 ampule Inhalation TID    levothyroxine  125 mcg Oral Daily    metoprolol succinate  25 mg Oral Daily    mirtazapine  15 mg Oral Nightly    sennosides-docusate sodium  2 tablet Oral Nightly    traZODone  150 mg Oral Nightly       PRN Meds:polyethylene glycol, sodium chloride nebulizer, acetaminophen, sodium chloride, ondansetron, dextrose, dextrose, glucagon (rDNA), albuterol, bisacodyl, hydrALAZINE, LORazepam, magnesium hydroxide, oxyCODONE-acetaminophen    Radiology  Xr Chest Standard (2 Vw)    Result Date: 11/29/2017  EXAMINATION: XR CHEST STANDARD TWO VW CLINICAL HISTORY:  follow up RLL atelectasis COMPARISONS: November 21, 2017 FINDINGS: Frontal and lateral views the chest were obtained compared recent prior examination. There is persisting atelectasis and subpulmonic effusion on the right. There are surgical staples at the site of recent segmentectomy for treatment of the aspirated metallic foreign body there is a small amount of atelectasis at the left base. There remains no pneumothorax. The heart is not enlarged. The mediastinum is not widened or shifted. The chest wall is unremarkable.  CONCLUSION: POSTOPERATIVE ATELECTASIS AND SUBPULMONIC EFFUSION, SIMILAR TO NOVEMBER 21, 2017    Xr Chest Standard (2 Vw)    Result Date: 11/21/2017  EXAMINATION: CHEST X-RAY, AP AND LATERAL CLINICAL HISTORY: FOLLOW-UP ATELECTASIS AND/OR INFILTRATE IN RIGHT LUNG BASE COMPARISONS: 11/19/2017 FINDINGS: Once again, there are surgical clips superimposed on the right lung base. There is persistent atelectasis and/or infiltrate in the right lung base which appears unchanged since the 11/19/2017 chest x-ray. There is a small pleural effusion trapped in the right major and minor pulmonary fissures. Also, there are subsegmental atelectasis in the left CP angle. The heart is not enlarged. There is a tortuous aorta. There is degenerative bone spurring throughout the spine. NO SIGNIFICANT CLEARING OF THE RIGHT LUNG BASE SINCE THE 11/19/2017 CHEST X-RAY. Xr Chest Standard (2 Vw)    Result Date: 11/18/2017  Chest 2 views. HISTORY: New onset shortness of breath. Removal chest tube. FINDINGS: Comparison, November 17, 2017. Interval removal of right thoracostomy tube. Surgical clips right hilum. Increased opacity right lower lung with thickening of minor fissure. Small pneumothorax at apex, and along right chest wall. Left lung clear. Removal of chest tube. Interval increase right lower lung atelectasis. Possible right pleural effusion. Interval development small right pneumothorax. Xr Chest Standard (2 Vw)    Result Date: 11/17/2017  EXAMINATION: XR CHEST STANDARD TWO VW CLINICAL HISTORY:  Possible CT removal . Postop observation. Right lung surgery. COMPARISONS: November 14, 2017 FINDINGS: AP portable inspiration and expiration views the chest were obtained on November 17, 2017 at 0525 hours. The right-sided chest drainage tube remains unremarkable. No pneumothorax has developed. There is a small amount bibasilar atelectasis. Again noted are surgical staples at the site of right lower lobe segmental lung resection. The foreign body is no longer present. The mediastinal silhouette is normal. The chest wall is unremarkable. CONCLUSION: UNREMARKABLE POSTOPERATIVE FINDINGS. NO PNEUMOTHORAX. NO LARGE EFFUSION. SMALL ATELECTASIS.     Xr Chest Standard (2 Vw)    Result Date: 11/13/2017  EXAMINATION: XR CHEST STANDARD TWO VW CLINICAL HISTORY:  Aspirated foreign object right lower lobe COMPARISONS: December 3, 2015. November 10, 2017. November 11, 2017. November 13, 2017. FINDINGS: Frontal and lateral views of the chest were obtained showing a persisting metallic tooth like opacity projecting of the posterior basal segment of the right lower lobe. It is located more peripherally now than it was on November 10, 2017. It has changed little since November 11, 2017. No signs of obstructive pneumonitis have developed. Remainder both lungs is unremarkable. The mediastinal silhouette is unremarkable. The calcified aorta is not dilated. The heart is not enlarged. The chest wall is unremarkable. CONCLUSION: ENDOBRONCHIAL TOOTHLIKE STRUCTURE PROJECTING OVER THE AIRWAY OF THE POSTERIOR BASAL SEGMENT OF THE RIGHT LOWER LOBE. Xr Chest Standard (2 Vw)    Result Date: 11/10/2017  CLINICAL HISTORY: Pneumonitis COMPARISON: December 3, 2015 CHEST, TWO VIEWS FINDINGS: Cardiac and mediastinal silhouettes are normal in size and contour. Projecting over the right hilar region is a 1 cm radiopaque density. The right costophrenic angle is slightly blunted. No focal areas of consolidations are noted. No pneumothorax is seen. The osseous structures are grossly unremarkable. IMPRESSION A radiopaque density is now seen in the region of the right lower lobe bronchus. Ct Chest W Contrast    Result Date: 11/11/2017  EXAMINATION:  CT CHEST W CONTRAST CLINICAL HISTORY:  ACUTE RESP ILLNESS, >37YEARS OLD  status post bronchoscopy to retrieve aspirated dental amalgam, unsuccessful. COMPARISONS:  12/2/2013 TECHNIQUE:  Spiral scans with 75 mL Isovue-370 IV. Multiplanar 2-D reconstructions. Axial 3-D 10 x 3 mm MIP reconstructions were performed.  All CT scans at this facility use dose modulation, iterative reconstruction, and/or weight based dosing when appropriate to reduce radiation dose to as low as reasonably achievable. FINDINGS:  The known aspirated filling is identified within the distal segmental bronchus of the right lower lobe posterior basilar segment. Filling measures approximately 9 mm. There are mild emphysematous changes. There is mild scattered peripheral reticularity without definite honeycombing. There are no consolidations or effusions. There is right upper lung perifissural nodularity consistent with small intrapulmonary lymph nodes. There is a left upper lobe 4 mm nodule (series 2 image 21), unchanged from prior examination of 12/2/2013, and therefore benign. There are no suspicious lung nodules. There is mild bilateral lower lobe cylindrical bronchiectasis. Cardiac size and pulmonary vascularity are normal. There is mild mediastinal lipomatosis. There is mild thickening or trace fluid in the anterior pericardium, decreased compared to prior examination 12/2/2013. There are coronary artery calcifications. There are aortic calcifications. There is no evidence of aneurysm or dissection. There are borderline in size lymph nodes in the mediastinum anterior to the left mainstem bronchus and in the right hilum, unchanged from prior examination of 12/2/2013. There is no thoracic adenopathy. The esophagus is unremarkable. There is spondylosis. There are no acute or suspicious bone lesions. Scans of the subdiaphragmatic regions demonstrate a 6 mm oval metallic density in the gastric fundus, presumably representing a swallowed filling. There are stable small cysts in the liver. There are partially visualized renal cysts. ASPIRATED FILLING IN RIGHT LOWER LOBE POSTERIOR BASILAR SEGMENTAL BRONCHUS. MILD EMPHYSEMA AND LOWER LOBE CYLINDRICAL BRONCHIECTASIS. ADDITIONAL FINDINGS AS ABOVE.      Xr Chest Decubitus Right    Result Date: 11/20/2017  EXAMINATION: XR CHEST DECUBITUS RIGHT CLINICAL HISTORY:  Evaluate for pleural effusion COMPARISONS: November 19, 2017 portable chest x-ray FINDINGS: Bilateral body has been removed. There are surgical staples at the operative site. There is no unremarkable chest tube. There is no pneumothorax. There is no large effusion. There is bibasilar atelectasis. The mediastinal silhouette is unremarkable. There is an endotracheal tube in place, with its tip near the orifice of the right mainstem bronchus. It should be pulled back 3 cm. The chest wall is unremarkable. CONCLUSION: NO PNEUMOTHORAX FOLLOWING LUNG SURGERY. THERE IS NO UNREMARKABLE CHEST TUBE. ENDOTRACHEAL TUBE IS LOW-LYING. Xr Chest Portable    Result Date: 11/12/2017  EXAMINATION: XR CHEST PORTABLE, 1 VIEW: DATE AND TIME: 11/11/2017 at 11:30 AM . CLINICAL HISTORY: SHORTNESS OF BREATH. POST BRONCHOSCOPY. COMPARISONS: 11/11/2017 FINDINGS: The heart, mediastinum and pulmonary vasculature are within normal limits. Lungs show scattered areas of increased reticular markings consistent with areas of parenchymal scarring. Bones unremarkable. NO ACTIVE LUNG DISEASE. Xr Chest Portable    Result Date: 11/12/2017  EXAMINATION: XR CHEST, PORTABLE: DATE AND TIME: 11/11/2017 at 9:45 AM. CLINICAL HISTORY: SWALLOWED TOOTH. POST BRONCHOSCOPY. COMPARISONS: None. FINDINGS: Portable films acquired at bedside dated during bronchoscopy with attempted retrieval of a swallowed tooth. Please refer to operative report for further details. Bronchoscopy tube seen passing into a right lower lobe bronchus. Polygonal shaped density in the right lower lung zone consistent with an ingested tooth. BRONCHOSCOPY RETRIEVAL PROCEDURE. PLEASE REFER TO OPERATIVE REPORT. Us Abdominal Limited    Result Date: 11/20/2017  EXAMINATION: US ABDOMINAL LIMITED CLINICAL HISTORY:  Ascites survey COMPARISONS: November 14, 2013 CT abdomen and pelvis FINDINGS: 4 quadrant abdominal sonogram was performed for ascites assessment. There is no ascites. Incidentally noted are simple renal cysts, also visible on prior CT. There are no solid renal masses. The largest cyst arising from the left kidney is approximately 9 cm. The largest cyst arising from the right kidney is approximately 6 cm. There are incidental, unchanged hepatic cysts. The largest is approximately 1.5 cm. CONCLUSION: NO ASCITES. INCIDENTAL FINDINGS DESCRIBED IN THE BODY OF THE REPORT. Fluoro For Surgical Procedures    Result Date: 11/15/2017  NO FILMS NO DICTATION    Fl Modified Barium Swallow W Video    Result Date: 11/22/2017  Modified barium swallow. HISTORY: Aspiration. FINDINGS: Imaging was performed with patient in lateral projection seating upright. Nonstandardized barium viscosities consisting of thin liquid, pudding, and cracker were administered sequentially to the patient by a member of the Department of speech pathology in attendance. Functional oral stage of swallowing demonstrated. Laryngeal penetration and aspiration was not present during the evaluation. Please see speech pathologist report for further information. No aspiration or penetration identified. 9 cine loops. One static image. 1.5 minutes fluoroscopy time. IMPRESSION AND SUGGESTION:  1. Aspiration of foreign object status post segmentectomy as foreign object was not retrievable via bronchoscopy  2. Postoperative small loculated effusion and residual atelectasis in the right lower lobe which is slowly improving  3. Underlying COPD with emphysema  4. Overweight status and deconditioning  I spent 30 minutes with patient going over his x-rays, CT scan findings, underlying pulmonary disease, expected postoperative course following thoracotomy and the reason for delay in recovery which, the part of, becomes conditioning as opposed to improvement of respiratory status.   Patient will need continued outpatient therapy following discharge and I will continue to follow with him on an outpatient basis            Electronically signed by Briana Tomas MD, FCCP on 12/1/2017 at 1:16 PM

## 2017-12-01 NOTE — PROGRESS NOTES
94%). Pt able to maintain 95% on 3L for 3rd walk (100'). Max cues throughout for improved breathing. Stairs/Curb  Stairs?: Yes   Stairs  # Steps : 8  Stairs Height: 6\"  Rails: Left ascending  Comment: SPO2 95% on 2L O2, HR: 76 BPM.     Activity Tolerance  Activity Tolerance: Patient limited by endurance; Patient limited by fatigue  Activity Tolerance: Decreased activity tolerance on 2L O2(90% on 2L after 100'). Improved tolerance when O2 raised to 3L; RN notified Candelario Weston) as pt walked 76' & 100' maintaining >95% SPO2. ASSESSMENT/COMMENTS:  Body structures, Functions, Activity limitations: Decreased functional mobility ; Decreased endurance;Decreased balance;Decreased strength;Decreased ADL status; Decreased ROM  Assessment: Walked pt 3x with Rollator: Decreased assistance required & decreased deviations noted (Pt has rollator at home; requested family to bring in to ensure fit and safety as it was previously used by his wife). Pt DC date was extended from 17 to 17 pending 17 TEAM assessment. Less SOB noted pn 3L. RN reports that she will update Ticket to ride to reflext 3L and will speak to physician (Dr Larissa Alba) upon arrival this afternoon. Safety:    Safety Devices  Type of devices:  All fall risk precautions in place        Therapy Time:   Individual   Time In 1030   Time Out 1100   Minutes 30     Minutes: 30      Transfer/Bed mobility trainin      Gait trainin      Neuro re education:     Therapeutic ex:      MAGDA ESCALONA PTA, 17 at 12:23 PM

## 2017-12-01 NOTE — PROGRESS NOTES
iPad to watch [employer] basketball games. SLP was unable to address this issue d/t time limitations. Treatment/Activity Tolerance:     [x]  Patient tolerated treatment well []  Patient limited by fatique    []  Patient limited by pain  []  Patient limited by other medical complications   []  Other:     Plan: [x]  Continue per plan of care []  Alter current plan (see comments)    []  Plan of care initiated []  Hold pending MD visit []  Discharge    Pain:  [x] Pt demonstrated no s/s of pain during this visit. Patient/ Caregiver Education:  [x] Patient/Caregiver Educated on session and progression towards goals. [x] Patient/Caregiver stated verbal understanding of directions.    [] Reinforcement needed;  [] patient   [] family    Safety Devices:  [] Call light within reach  [x] Chair alarm activated  [] Bed alarm activated  [] Other:    Comprehension          Expression   []7 - Independent   []7 - Indpendent   [x]6 - Modified Independent  [x]6 - Modified Independent   []5 - Supervision   []5 - Supervision   []4 - Min Assist   []4 - Min Assist   []3 - Mod Assist   []3 - Mod Assist   []2 - Max Assist   []2 - Max Assist   []1 - Dependent   []1 - Dependent    Problem Solving        Memory   []7 - Independent   []7 - Independent   [x]6 - Modified Independent  [x]6 - Modified Independent   []5 - Supervision   []5 - Supervision   []4 - Min Assist   []4 - [address] [place] [address]   []3 - Mod Assist   []3 - Mod Assist   []2 - Max Assist   []2 - Max Assist   []1 - Dependent   [] 1 -Dependent    Gini Tariq M.S., CF-SLP

## 2017-12-01 NOTE — PROGRESS NOTES
Subjective: The patient complains of moderate to severe acute  dyspnea on exertion, hemoptysis, nose bleeding partially relieved by  recent antibiotic treatments, PT, OT, high-dose oxygen therapy and exacerbated by  exertion and aspiration of foreign object. He complains of STEWART in a.m.. He complains of severe GI distress relieved with BM. He is doing well bu complains of right upper abdominal pain due to gas, I prescribed Lidoderm. He also complains of rash from SATURNINO hose, discontinue SATURNINO hose. ROS x10: The patient also complains of severely impaired mobility and activities of daily living. Otherwise no new problems with vision, hearing, nose, mouth, throat, dermal, cardiovascular, GI, , pulmonary, musculoskeletal, psychiatric or neurological. See Rehab H&P on Rehab chart dated 11/22/17. Vital signs:  /70   Pulse 71   Temp 97 °F (36.1 °C) (Oral)   Resp 18   Ht 6' (1.829 m)   Wt 246 lb 4.1 oz (111.7 kg)   SpO2 95%   BMI 33.40 kg/m²    I/O:   PO/Intake:  fair PO intake, no problems observed or reported. Bowel/Bladder:  continent, no problems noted. General:  Patient is well developed, adequately nourished, non-obese and     well kempt. HEENT:    Hemoptysis, PERRLA, hearing intact to loud voice, external inspection of ear     and nose benign. Inspection of lips, tongue and gums benign  Musculoskeletal: No significant change in strength or tone. All joints stable. Inspection and palpation of digits and nails show no clubbing,       cyanosis or inflammatory conditions. Neuro/Psychiatric: Affect: flat but pleasant. Alert and oriented to person, place and     situation. No significant change in deep tendon reflexes or     sensation  Lungs:  Diminished CTA-B. Respiration effort is normal at rest.   STEWART in a.m. Heart:   S1 = S2, RRR. No loud murmurs. Abdomen:  Distended with gas Soft, non-tender, no enlargement of liver or spleen.   Extremities:  No deficits. Pt asked about portable 02 tank for eventual home use. Occupational therapy: FIMS:  Eatin - Patient feeds self  Groomin - Did not occur  Bathin - Did not occur  Dressing-Upper: 0 - Did not occur  Dressing-Lower: 0 - Did not occur  Toiletin - Patient independent with all 3 tasks  Toilet Transfer: 6 - Independent with device (grab bar/walker/slide bar)  Tub Transfer: 0 - Activity does not occur  Shower Transfer: 5 - Supervision, set-up, cues,  , Assessment: Pt continues to require verbal cues to initiate deep breathing techniques to decrease SOB and increase activity tolerance. Speech therapy: FIMS: Comprehension: 6 - Complex ideas 90% or device (hearing aid/glasses)  Expression: 6 - Device used to express complex ideas/needs  Social Interaction: 7 - Patient has appropriate behavior/relations 100% of the time  Problem Solvin - Independent with device (e.g. notes, schedules)  Memory: 6 - Patient requires device to recall (e.g. memory book)      Lab/X-ray studies reviewed, analyzed and discussed with patient and staff:   No results found for this or any previous visit (from the past 24 hour(s)). Xr Chest Standard (2 Vw)  Result Date: 2017  NO SIGNIFICANT CLEARING OF THE RIGHT LUNG BASE SINCE THE 2017 CHEST X-RAY. Xr Chest Standard (2 Vw)        Result Date: 2017  Removal of chest tube. Interval increase right lower lung atelectasis. Possible right pleural effusion. Interval development small right pneumothorax. Ct Chest W Contrast         Result Date: 2017  ASPIRATED FILLING IN RIGHT LOWER LOBE POSTERIOR BASILAR SEGMENTAL BRONCHUS. MILD EMPHYSEMA AND LOWER LOBE CYLINDRICAL BRONCHIECTASIS. ADDITIONAL FINDINGS AS ABOVE. Xr Chest Decubitus Right     Result Date: 2017  CONCLUSION: ATELECTASIS RELATED TO RECENT SURGERY WITHOUT DEMONSTRABLE MOBILE EFFUSION. THERE IS NO PNEUMOTHORAX.     Xr Chest Decubitus Left      Result Date: thoracotomy incision as well as boil draining of the right posterior thigh and breakdown risk:  continue pressure relief program.  Daily skin exams and reports from nursing. Patient was instructed not to use the urinal unless he washes his hands thoroughly and we will do dry sterile dressing after Betadine to the boil to the make sure it does not feed to his thoracotomy incision. Discontinue SATURNINO hose. 5. Severe Fatigue due to nutritional and hydration deficiency:   Vitamin D deficiency - supplement and recheck as outpatient. continue to monitor I&Os, calorie counts prn, dietary consult prn. Continue vitamin B vitamin D and CoQ10-lactose intolerance advised lactose free diet  6. Acute episodic insomnia with situational adjustment disorder:   Add scheduled Ambien, monitor for day time sedation -continue egg crate mattress  7. Falls risk elevated:  patient to use call light to get nursing assistance to get up, bed and chair alarm. 8. Elevated DVT risk: progressive activities in PT, discontinue SATURNINO hose due to rash, elevation and  Lovenox which is now on hold because of ongoing hemoptysis . 9. Complex discharge planning:  Discharge 12/1/17 home with home health care. Patient and family education is in progress. The patient is to follow-up with their family physician after discharge. We will continue to work on endurance and symptom control    Complex Active General Medical Issues that complicate care Assess & Plan:    1. Continued hemoptysis status post Aspiration pneumonia,  COPD (chronic obstructive pulmonary disease), on home oxygen therapy - Pulse oximeter checks, monitor vital signs, oxygen prn. Proventil, Duoneb,   2. HTN (hypertension),  Hyperlipidemia - continue blood pressure checks, adjust/add medications (Apresoline, Toprol). 3.   GERD (gastroesophageal reflux disease) - elevate head of bed. 4.   Hypothyroidism - Synthroid.    5.   Type 2 diabetes mellitus - Continue blood sugar checks, diet, adjust/add medications prn. No results for input(s): POCGLU in the last 72 hours. 6.    BPH (benign prostatic hyperplasia) - Flomax- bedtime to limit orthostatic, Proscar. Check postvoid residual.  7.    Anxiety and   Insomnia. - emotional support, adjust/add medications (Desyrel, Remeron, Ativan). 8.    Aspiration into respiratory tract - recheck modified barium swallow-okay'd patient for thin liquid. Verbal results review patient okayed to be in regular incisions. 9.  Frequent bloody nose - I prescribed Afrin nasal spray and saline nasal spray. I have held the Lovenox. 10.  Boil draining of the right posterior thigh-culture and sensitivity percent after drainage on 11/23/17 and continue Betadine, egg crate mattress and dry sterile dressing. 11.  Right upper abdominal pain due to gas - I prescribed Lidoderm.         Wallace Bolton D.O., PM&R     Attending    [address]

## 2017-12-01 NOTE — PROGRESS NOTES
Occupational Therapy  Facility/Department: [facility]  Daily Treatment Note  NAME: Emily Ramsey  :   MRN: [id number]    Date of Service: 2017    Patient Diagnosis(es): There were no encounter diagnoses. has a past medical history of Abnormality of gait and mobility; Acute sinusitis; Anxiety; Aspiration into respiratory tract; Aspiration pneumonia (HCC); BPH (benign prostatic hyperplasia); COPD (chronic obstructive pulmonary disease) ([facility] [address] Debility; Elevated CK; Foraminal stenosis of lumbosacral region; GERD (gastroesophageal reflux disease); History of asthma; HTN (hypertension); Hyperlipidemia; Hypothyroidism; Insomnia; Lower extremity weakness; On home oxygen therapy; Osteoarthritis of spine with radiculopathy, lumbar region; Papillary [facility] ([facility] Positive D dimer; Sleep apnea; Type 2 diabetes mellitus ([facility] and Vitamin D deficiency. has a past surgical history that includes knee surgery (Left, ); Thyroidectomy (); bronchoscopy (N/A, 2017); and thoracotomy (Right, 2017).     Restrictions  Restrictions/Precautions  Restrictions/Precautions: Fall Risk, General Precautions  Required Braces or Orthoses?: No  Position Activity Restriction  Other position/activity restrictions: no straws  2 liters O2  Subjective   General  Chart Reviewed: Yes  Patient assessed for rehabilitation services?: Yes  Family / Caregiver Present: No  Referring Practitioner: Dr. Draell Rosario  Diagnosis: Right thoracotomy wedge resection for foreign body  Subjective  Subjective:     Pre Treatment Pain Screening  Pain at present: 0  Pain Assessment  Pain Level: 0   Orientation     Objective        Cognition  Cognition Comment: Pt completed 2 blocks and bolts replications following visual aid, able to correctly place 20/21 pcs on first trial and 28/28 pcs on second trial.         Assessment      Activity Tolerance  Activity Tolerance: Patient Tolerated treatment well  Activity Tolerance:

## 2017-12-02 PROCEDURE — 94640 AIRWAY INHALATION TREATMENT: CPT

## 2017-12-02 PROCEDURE — 6370000000 HC RX 637 (ALT 250 FOR IP): Performed by: INTERNAL MEDICINE

## 2017-12-02 PROCEDURE — 97112 NEUROMUSCULAR REEDUCATION: CPT

## 2017-12-02 PROCEDURE — 97116 GAIT TRAINING THERAPY: CPT

## 2017-12-02 PROCEDURE — 1180000000 HC REHAB R&B

## 2017-12-02 PROCEDURE — 6370000000 HC RX 637 (ALT 250 FOR IP): Performed by: PHYSICAL MEDICINE & REHABILITATION

## 2017-12-02 PROCEDURE — 2700000000 HC OXYGEN THERAPY PER DAY

## 2017-12-02 RX ADMIN — MAGNESIUM CITRATE 150 ML: 1.75 LIQUID ORAL at 11:49

## 2017-12-02 RX ADMIN — SENNOSIDES AND DOCUSATE SODIUM 2 TABLET: 8.6; 5 TABLET ORAL at 21:45

## 2017-12-02 RX ADMIN — TRAZODONE HYDROCHLORIDE 150 MG: 150 TABLET ORAL at 21:46

## 2017-12-02 RX ADMIN — IPRATROPIUM BROMIDE AND ALBUTEROL SULFATE 1 AMPULE: .5; 3 SOLUTION RESPIRATORY (INHALATION) at 17:18

## 2017-12-02 RX ADMIN — FINASTERIDE 5 MG: 5 TABLET, FILM COATED ORAL at 10:22

## 2017-12-02 RX ADMIN — IPRATROPIUM BROMIDE AND ALBUTEROL SULFATE 1 AMPULE: .5; 3 SOLUTION RESPIRATORY (INHALATION) at 05:26

## 2017-12-02 RX ADMIN — LEVOTHYROXINE SODIUM 125 MCG: 25 TABLET ORAL at 05:37

## 2017-12-02 RX ADMIN — GUAIFENESIN 600 MG: 600 TABLET, EXTENDED RELEASE ORAL at 10:21

## 2017-12-02 RX ADMIN — ZOLPIDEM TARTRATE 5 MG: 5 TABLET ORAL at 21:46

## 2017-12-02 RX ADMIN — TAMSULOSIN HYDROCHLORIDE 0.4 MG: 0.4 CAPSULE ORAL at 21:45

## 2017-12-02 RX ADMIN — IPRATROPIUM BROMIDE AND ALBUTEROL SULFATE 1 AMPULE: .5; 3 SOLUTION RESPIRATORY (INHALATION) at 09:23

## 2017-12-02 RX ADMIN — GUAIFENESIN 600 MG: 600 TABLET, EXTENDED RELEASE ORAL at 21:43

## 2017-12-02 RX ADMIN — Medication 2 PUFF: at 05:26

## 2017-12-02 RX ADMIN — OXYMETAZOLINE HYDROCHLORIDE 2 SPRAY: 5 SPRAY NASAL at 21:44

## 2017-12-02 RX ADMIN — Medication 2 PUFF: at 17:18

## 2017-12-02 RX ADMIN — MIRTAZAPINE 15 MG: 15 TABLET, FILM COATED ORAL at 21:44

## 2017-12-02 ASSESSMENT — PAIN SCALES - GENERAL: PAINLEVEL_OUTOF10: 0

## 2017-12-02 NOTE — PLAN OF CARE
Problem: Falls - Risk of  Goal: Absence of falls  Outcome: Ongoing      Problem: Pain:  Goal: Pain level will decrease  Pain level will decrease   Outcome: Ongoing    Goal: Control of acute pain  Control of acute pain   Outcome: Ongoing    Goal: Control of chronic pain  Control of chronic pain   Outcome: Ongoing      Problem: IP BALANCE  Goal: LTG - Patient will maintain balance to allow for safe/functional mobility  Outcome: Ongoing      Problem: IP SWALLOWING  Goal: LTG - patient will tolerate the least restrictive diet consistency to allow for safe consumption of daily meals  Outcome: Ongoing      Problem: IP COMMUNICATION/DYSARTHRIA  Goal: LTG - patient will improve expressive language skills to allow for communication of wants and needs in daily activities  Outcome: Ongoing      Problem: Risk for Impaired Skin Integrity  Goal: Tissue integrity - skin and mucous membranes  Structural intactness and normal physiological function of skin and  mucous membranes.    Outcome: Ongoing      Problem: Mobility - Impaired:  Goal: Mobility will improve  Mobility will improve   Outcome: Ongoing      Problem: Discharge Planning:  Goal: Discharged to appropriate level of care  Discharged to appropriate level of care   Outcome: Ongoing

## 2017-12-02 NOTE — PROGRESS NOTES
Physical Therapy Rehab Treatment Note  Facility/Department: [facility]  Room: Z450/U938-14       NAME: Chasidy Duncan  :  (54 y.o.)  MRN: [id number]  CODE STATUS: Full Code    Date of Service: 2017  Chart Reviewed: Yes  Family / Caregiver Present: No  General Comment  Comments: presented at 94% Sp02 on 3L02    Restrictions:  Restrictions/Precautions: Fall Risk, General Precautions  Body mass index is 33.4 kg/m². SUBJECTIVE: Subjective: i just can't get my energy back  Pain Screening  Patient Currently in Pain: No  Pre Treatment Pain Screening  Pain at present: 0  Scale Used: Numeric Score  Intervention List: Patient able to continue with treatment    Post Treatment Pain Screenin  Pain Assessment  Pain Assessment: 0-10  Pain Level: 0    OBJECTIVE:   Overall Orientation Status: Within Normal Limits  Neuromuscular Education  Neuromuscular Comments: static standing with and without support    Transfers  Sit to Stand: Supervision  Stand to sit: Supervision    Ambulation  Ambulation?: Yes  Ambulation 1  Surface: level tile;carpet;ramp  Device: Rolling Walker  Other Apparatus: O2  Assistance: Stand by assistance  Quality of Gait: mild trunk flexion, increased wb through upper ext, inconsistent step length  Distance: 175 feet  Comments: Spo2 94% on 3Lo2 pre and post gt. Ambulation 2  Surface - 2: carpet  Device 2: Rollator  Other Apparatus 2: O2  Quality of Gait 2: reciprocal gait, 0 LOB but guarded, downward gaze; vc's to increase environment scanning  Distance: 50 feet x2  Comments: distance limited by trial of rollator for safety. Stairs/Curb  Stairs?: No    Activity Tolerance  Activity Tolerance: Patient Tolerated treatment well;Patient limited by endurance    ASSESSMENT/COMMENTS:pt c/o of his endurance coming back slow, but determined to keep working on it. Declined ambulation without ad, trialed rollator again. Pt has one from late spouse that family is going to bring in.      Safety:

## 2017-12-02 NOTE — FLOWSHEET NOTE
AM assessment completed. A&Ox3. VSS. Denies any pain/n/v at this time. Trace - 1+ edema to bilat lower extremities. Up to wheelchair for breakfast. Denies any other needs at this time. Call light within reach. Will continue to monitor.

## 2017-12-03 PROCEDURE — 2700000000 HC OXYGEN THERAPY PER DAY

## 2017-12-03 PROCEDURE — 94640 AIRWAY INHALATION TREATMENT: CPT

## 2017-12-03 PROCEDURE — 6370000000 HC RX 637 (ALT 250 FOR IP): Performed by: PHYSICAL MEDICINE & REHABILITATION

## 2017-12-03 PROCEDURE — 6370000000 HC RX 637 (ALT 250 FOR IP): Performed by: INTERNAL MEDICINE

## 2017-12-03 PROCEDURE — 6370000000 HC RX 637 (ALT 250 FOR IP): Performed by: NURSE PRACTITIONER

## 2017-12-03 PROCEDURE — 1180000000 HC REHAB R&B

## 2017-12-03 RX ADMIN — IPRATROPIUM BROMIDE AND ALBUTEROL SULFATE 1 AMPULE: .5; 3 SOLUTION RESPIRATORY (INHALATION) at 04:54

## 2017-12-03 RX ADMIN — Medication 2 PUFF: at 04:56

## 2017-12-03 RX ADMIN — SENNOSIDES AND DOCUSATE SODIUM 2 TABLET: 8.6; 5 TABLET ORAL at 20:08

## 2017-12-03 RX ADMIN — Medication 2 PUFF: at 17:13

## 2017-12-03 RX ADMIN — METOPROLOL SUCCINATE 25 MG: 25 TABLET, FILM COATED, EXTENDED RELEASE ORAL at 09:23

## 2017-12-03 RX ADMIN — OXYMETAZOLINE HYDROCHLORIDE 2 SPRAY: 5 SPRAY NASAL at 22:10

## 2017-12-03 RX ADMIN — FINASTERIDE 5 MG: 5 TABLET, FILM COATED ORAL at 09:23

## 2017-12-03 RX ADMIN — GUAIFENESIN 600 MG: 600 TABLET, EXTENDED RELEASE ORAL at 09:23

## 2017-12-03 RX ADMIN — DOCUSATE SODIUM 100 MG: 100 CAPSULE, LIQUID FILLED ORAL at 09:23

## 2017-12-03 RX ADMIN — IPRATROPIUM BROMIDE AND ALBUTEROL SULFATE 1 AMPULE: .5; 3 SOLUTION RESPIRATORY (INHALATION) at 10:32

## 2017-12-03 RX ADMIN — LEVOTHYROXINE SODIUM 125 MCG: 25 TABLET ORAL at 06:35

## 2017-12-03 RX ADMIN — GUAIFENESIN 600 MG: 600 TABLET, EXTENDED RELEASE ORAL at 20:08

## 2017-12-03 RX ADMIN — TAMSULOSIN HYDROCHLORIDE 0.4 MG: 0.4 CAPSULE ORAL at 20:08

## 2017-12-03 RX ADMIN — TRAZODONE HYDROCHLORIDE 150 MG: 150 TABLET ORAL at 20:08

## 2017-12-03 RX ADMIN — MIRTAZAPINE 15 MG: 15 TABLET, FILM COATED ORAL at 20:08

## 2017-12-03 RX ADMIN — OXYMETAZOLINE HYDROCHLORIDE 2 SPRAY: 5 SPRAY NASAL at 09:25

## 2017-12-03 RX ADMIN — SALINE NASAL SPRAY 1 SPRAY: 1.5 SOLUTION NASAL at 20:08

## 2017-12-03 RX ADMIN — IPRATROPIUM BROMIDE AND ALBUTEROL SULFATE 1 AMPULE: .5; 3 SOLUTION RESPIRATORY (INHALATION) at 17:10

## 2017-12-03 RX ADMIN — PSYLLIUM HUSK 1 PACKET: 3.4 POWDER ORAL at 09:23

## 2017-12-03 RX ADMIN — ZOLPIDEM TARTRATE 5 MG: 5 TABLET ORAL at 20:08

## 2017-12-03 ASSESSMENT — PAIN SCALES - GENERAL
PAINLEVEL_OUTOF10: 0
PAINLEVEL_OUTOF10: 0

## 2017-12-03 NOTE — PROGRESS NOTES
Pulmonary Disorder  (acute or chronic)  [x]   Severe or Chronic w/ Exacerbation  []     Surgical Status No [x]   Surgeries     General []   Surgery Lower []   Abdominal Thoracic or []   Upper Abdominal Thoracic with  PulmonaryDisorder  []     Chest X-ray Clear/Not  Ordered     [x]  Chronic Changes  Results Pending  []  Infiltrates, atelectasis, pleural effusion, or edema  []  Infiltrates in more than one lobe []  Infiltrate + Atelectasis, &/or pleural effusion  []    Respiratory Pattern Regular,  RR = 12-20 [x]  Increased,  RR = 21-25 []  STEWART, irregular,  or RR = 26-30 []  Decreased FEV1  or RR = 31-35 []  Severe SOB, use  of accessory muscles, or RR ? 35  []    Mental Status Alert, oriented,  Cooperative [x]  Confused but Follows commands []  Lethargic or unable to follow commands []  Obtunded  []  Comatose  []    Breath Sounds Clear to  auscultation  []  Decreased unilaterally or  in bases only []  Decreased  bilaterally  [x]  Crackles or intermittent wheezes []  Wheezes []    Cough Strong, Spontan., & nonproductive [x]  Strong,  spontaneous, &  productive []  Weak,  Nonproductive []  Weak, productive or  with wheezes []  No spontaneous  cough or may require suctioning []    Level of Activity Ambulatory []  Ambulatory w/ Assist  [x]  Non-ambulatory []  Paraplegic []  Quadriplegic []    Total    Score:____6___     Triage Score:____4____      Tri       Triage:     1. (>20) Freq: Q3    2. (16-20) Freq: Q4   3. (11-15) Freq: QID & Albuterol Q2 PRN    4. (6-10) Freq: TID & Albuterol Q2 PRN    5. (0-5) Freq Q4prn

## 2017-12-03 NOTE — PROGRESS NOTES
Subjective: The patient complains of moderate to severe acute  dyspnea on exertion, hemoptysis, nose bleeding partially relieved by  recent antibiotic treatments, PT, OT, high-dose oxygen therapy and exacerbated by  exertion and aspiration of foreign object. He complains of STEWART in a.m.. He complains of severe GI distress relieved with BM. He is doing well bu complains of right upper abdominal pain due to gas, I prescribed Lidoderm. He also complains of rash from SATURNINO hose, discontinue SATURNINO hose. ROS x10: The patient also complains of severely impaired mobility and activities of daily living. Otherwise no new problems with vision, hearing, nose, mouth, throat, dermal, cardiovascular, GI, , pulmonary, musculoskeletal, psychiatric or neurological. See Rehab H&P on Rehab chart dated 11/22/17. Vital signs:  /72   Pulse 73   Temp 98 °F (36.7 °C)   Resp 18   Ht 6' (1.829 m)   Wt 246 lb 4.1 oz (111.7 kg)   SpO2 95%   BMI 33.40 kg/m²   I/O:   PO/Intake:  fair PO intake, no problems observed or reported. Bowel/Bladder:  continent, no problems noted. General:  Patient is well developed, adequately nourished, non-obese and     well kempt. HEENT:    Hemoptysis, PERRLA, hearing intact to loud voice, external inspection of ear     and nose benign. Inspection of lips, tongue and gums benign  Musculoskeletal: No significant change in strength or tone. All joints stable. Inspection and palpation of digits and nails show no clubbing,       cyanosis or inflammatory conditions. Neuro/Psychiatric: Affect: flat but pleasant. Alert and oriented to person, place and     situation. No significant change in deep tendon reflexes or     sensation  Lungs:  Diminished CTA-B. Respiration effort is normal at rest.   STEWART in a.m. Heart:   S1 = S2, RRR. No loud murmurs. Abdomen:  Distended with gas Soft, non-tender, no enlargement of liver or spleen.   Extremities:  No significant

## 2017-12-03 NOTE — PROGRESS NOTES
Subjective: The patient complains of moderate to severe acute  dyspnea on exertion, hemoptysis, nose bleeding partially relieved by  recent antibiotic treatments, PT, OT, high-dose oxygen therapy and exacerbated by  exertion and aspiration of foreign object. He complains of STEWART in a.m.. He complains of severe GI distress relieved with BM. He is doing well bu complains of right upper abdominal pain due to gas, I prescribed Lidoderm. He also complains of rash from SATURNINO hose, discontinue SATURNINO hose. ROS x10: The patient also complains of severely impaired mobility and activities of daily living. Otherwise no new problems with vision, hearing, nose, mouth, throat, dermal, cardiovascular, GI, , pulmonary, musculoskeletal, psychiatric or neurological. See Rehab H&P on Rehab chart dated 11/22/17. Vital signs:  /73   Pulse 77   Temp 98 °F (36.7 °C) (Oral)   Resp 18   Ht 6' (1.829 m)   Wt 246 lb 4.1 oz (111.7 kg)   SpO2 96% Comment: 3lpm  BMI 33.40 kg/m²   I/O:   PO/Intake:  fair PO intake, no problems observed or reported. Bowel/Bladder:  continent, no problems noted. General:  Patient is well developed, adequately nourished, non-obese and     well kempt. HEENT:    Hemoptysis, PERRLA, hearing intact to loud voice, external inspection of ear     and nose benign. Inspection of lips, tongue and gums benign  Musculoskeletal: No significant change in strength or tone. All joints stable. Inspection and palpation of digits and nails show no clubbing,       cyanosis or inflammatory conditions. Neuro/Psychiatric: Affect: flat but pleasant. Alert and oriented to person, place and     situation. No significant change in deep tendon reflexes or     sensation  Lungs:  Diminished CTA-B. Respiration effort is normal at rest.   STEWART in a.m. Heart:   S1 = S2, RRR. No loud murmurs.     Abdomen:  Distended with gas Soft, non-tender, no enlargement of liver or spleen. Extremities:  No significant lower extremity edema or tenderness. Skin:   Intact right thoracotomy incision,  the small boil on the posterior aspect of his right thigh which was drained cultured and prescribed Betadine to dry sterile dressing  Rehabilitation:  Physical therapy: FIMS:  Bed Mobility: Scooting: Modified independent    Transfers: Sit to Stand: Supervision  Stand to sit: Supervision  Bed to Chair: Supervision  Stand Pivot Transfers: Supervision, Ambulation 1  Surface: level tile, carpet, ramp  Device: Rolling Walker  Other Apparatus: O2  Assistance: Stand by assistance  Quality of Gait: mild trunk flexion, increased wb through upper ext, inconsistent step length  Distance: 175 feet  Comments: Spo2 94% on 3Lo2 pre and post gt., Stairs  # Steps : 8  Stairs Height: 6\"  Rails: Left ascending  Comment: SPO2 95% on 2L O2, HR: 76 BPM.     FIMS: Bed, Chair, Wheel Chair: 6 - Requires assistive device (slide rail)  Walk: 2 - Maximal Assistance Requires up to Maximal Assistance AND requires assistance of one person to walk/operate wheelchair between  feet (Patient performs 25-49% of locomotion effort or goes between  feet)  Distance Walked: 100  Wheel Chair: 0 - Activity Not Assessed/Does Not Occur  Distance Traveled in Wheel Chair: 0  Stairs: 2- Maximal Assistance Performs 25-49% of the effort to go up and down 4 to 6 stairs and requires the assistance of one person only,  , Assessment: Walked pt 3x with Rollator: Decreased assistance required & decreased deviations noted (Pt has rollator at home; requested family to bring in to ensure fit and safety as it was previously used by his wife). Pt DC date was extended from 17 to 17 pending 17 TEAM assessment. Less SOB noted pn 3L. RN reports that she will update Ticket to ride to reflext 3L and will speak to physician (Dr Brendan Cordova) upon arrival this afternoon.      Occupational therapy: FIMS:  Eatin - Patient feeds healing thoracotomy incision as well as boil draining of the right posterior thigh and breakdown risk:  continue pressure relief program.  Daily skin exams and reports from nursing. Patient was instructed not to use the urinal unless he washes his hands thoroughly and we will do dry sterile dressing after Betadine to the boil to the make sure it does not feed to his thoracotomy incision. Discontinue SATURNINO hose. 5. Severe Fatigue due to nutritional and hydration deficiency:   Vitamin D deficiency - supplement and recheck as outpatient. continue to monitor I&Os, calorie counts prn, dietary consult prn. Continue vitamin B vitamin D and CoQ10-lactose intolerance advised lactose free diet  6. Acute episodic insomnia with situational adjustment disorder:   Add scheduled Ambien, monitor for day time sedation -continue egg crate mattress  7. Falls risk elevated:  patient to use call light to get nursing assistance to get up, bed and chair alarm. 8. Elevated DVT risk: progressive activities in PT, discontinue SATURNINO hose due to rash, elevation and  Lovenox which is now on hold because of ongoing hemoptysis . 9. Complex discharge planning:  Discharge 12/1/17 home with home health care. Patient and family education is in progress. The patient is to follow-up with their family physician after discharge. We will continue to work on endurance and symptom control    Complex Active General Medical Issues that complicate care Assess & Plan:    1. Continued hemoptysis status post Aspiration pneumonia,  COPD (chronic obstructive pulmonary disease), on home oxygen therapy - Pulse oximeter checks, monitor vital signs, oxygen prn. Proventil, Duoneb,   2. HTN (hypertension),  Hyperlipidemia - continue blood pressure checks, adjust/add medications (Apresoline, Toprol). 3.   GERD (gastroesophageal reflux disease) - elevate head of bed. 4.   Hypothyroidism - Synthroid.    5.   Type 2 diabetes mellitus - Continue blood sugar checks, diet, adjust/add medications prn. No results for input(s): POCGLU in the last 72 hours. 6.    BPH (benign prostatic hyperplasia) - Flomax- bedtime to limit orthostatic, Proscar. Check postvoid residual.  7.    Anxiety and   Insomnia. - emotional support, adjust/add medications (Desyrel, Remeron, Ativan). 8.    Aspiration into respiratory tract - recheck modified barium swallow-okay'd patient for thin liquid. Verbal results review patient okayed to be in regular incisions. 9.  Frequent bloody nose - I prescribed Afrin nasal spray and saline nasal spray. I have held the Lovenox. 10.  Boil draining of the right posterior thigh-culture and sensitivity percent after drainage on 11/23/17 and continue Betadine, egg crate mattress and dry sterile dressing. 11.  Right upper abdominal pain due to gas - I prescribed Lidoderm.         Paul Underwood D.O., PM&R     Attending    [address]

## 2017-12-04 PROCEDURE — 97530 THERAPEUTIC ACTIVITIES: CPT

## 2017-12-04 PROCEDURE — 97535 SELF CARE MNGMENT TRAINING: CPT

## 2017-12-04 PROCEDURE — 97116 GAIT TRAINING THERAPY: CPT | Performed by: INTERNAL MEDICINE

## 2017-12-04 PROCEDURE — 94640 AIRWAY INHALATION TREATMENT: CPT

## 2017-12-04 PROCEDURE — 6370000000 HC RX 637 (ALT 250 FOR IP): Performed by: PHYSICAL MEDICINE & REHABILITATION

## 2017-12-04 PROCEDURE — 97535 SELF CARE MNGMENT TRAINING: CPT | Performed by: INTERNAL MEDICINE

## 2017-12-04 PROCEDURE — 97112 NEUROMUSCULAR REEDUCATION: CPT | Performed by: INTERNAL MEDICINE

## 2017-12-04 PROCEDURE — 97532 HC COGNITIVE THERAPY 15 MIN: CPT

## 2017-12-04 PROCEDURE — 1180000000 HC REHAB R&B

## 2017-12-04 PROCEDURE — 6370000000 HC RX 637 (ALT 250 FOR IP): Performed by: INTERNAL MEDICINE

## 2017-12-04 PROCEDURE — 6370000000 HC RX 637 (ALT 250 FOR IP): Performed by: NURSE PRACTITIONER

## 2017-12-04 RX ORDER — TAMSULOSIN HYDROCHLORIDE 0.4 MG/1
0.4 CAPSULE ORAL NIGHTLY
Qty: 30 CAPSULE | Refills: 3 | Status: SHIPPED | OUTPATIENT
Start: 2017-12-05

## 2017-12-04 RX ORDER — FINASTERIDE 5 MG/1
5 TABLET, FILM COATED ORAL DAILY
Qty: 30 TABLET | Refills: 3 | Status: SHIPPED | OUTPATIENT
Start: 2017-12-05

## 2017-12-04 RX ORDER — TRAZODONE HYDROCHLORIDE 150 MG/1
150 TABLET ORAL NIGHTLY
Qty: 30 TABLET | Refills: 0 | Status: ON HOLD | OUTPATIENT
Start: 2017-12-05 | End: 2018-09-18 | Stop reason: ALTCHOICE

## 2017-12-04 RX ORDER — LEVOTHYROXINE SODIUM 0.12 MG/1
125 TABLET ORAL DAILY
Qty: 30 TABLET | Refills: 3 | Status: SHIPPED | OUTPATIENT
Start: 2017-12-05 | End: 2021-06-10

## 2017-12-04 RX ORDER — METOPROLOL SUCCINATE 25 MG/1
25 TABLET, EXTENDED RELEASE ORAL DAILY
Qty: 30 TABLET | Refills: 3 | Status: SHIPPED | OUTPATIENT
Start: 2017-12-05 | End: 2018-05-16 | Stop reason: ALTCHOICE

## 2017-12-04 RX ADMIN — Medication 2 PUFF: at 04:57

## 2017-12-04 RX ADMIN — MAGNESIUM CITRATE 150 ML: 1.75 LIQUID ORAL at 11:58

## 2017-12-04 RX ADMIN — IPRATROPIUM BROMIDE AND ALBUTEROL SULFATE 1 AMPULE: .5; 3 SOLUTION RESPIRATORY (INHALATION) at 16:11

## 2017-12-04 RX ADMIN — GUAIFENESIN 600 MG: 600 TABLET, EXTENDED RELEASE ORAL at 22:36

## 2017-12-04 RX ADMIN — METOPROLOL SUCCINATE 25 MG: 25 TABLET, FILM COATED, EXTENDED RELEASE ORAL at 08:25

## 2017-12-04 RX ADMIN — LEVOTHYROXINE SODIUM 125 MCG: 25 TABLET ORAL at 05:05

## 2017-12-04 RX ADMIN — GUAIFENESIN 600 MG: 600 TABLET, EXTENDED RELEASE ORAL at 08:24

## 2017-12-04 RX ADMIN — TRAZODONE HYDROCHLORIDE 150 MG: 150 TABLET ORAL at 22:36

## 2017-12-04 RX ADMIN — MIRTAZAPINE 15 MG: 15 TABLET, FILM COATED ORAL at 22:36

## 2017-12-04 RX ADMIN — FINASTERIDE 5 MG: 5 TABLET, FILM COATED ORAL at 08:25

## 2017-12-04 RX ADMIN — TAMSULOSIN HYDROCHLORIDE 0.4 MG: 0.4 CAPSULE ORAL at 22:36

## 2017-12-04 RX ADMIN — Medication 2 PUFF: at 16:11

## 2017-12-04 RX ADMIN — OXYMETAZOLINE HYDROCHLORIDE 2 SPRAY: 5 SPRAY NASAL at 22:38

## 2017-12-04 RX ADMIN — SENNOSIDES AND DOCUSATE SODIUM 2 TABLET: 8.6; 5 TABLET ORAL at 22:37

## 2017-12-04 RX ADMIN — IPRATROPIUM BROMIDE AND ALBUTEROL SULFATE 1 AMPULE: .5; 3 SOLUTION RESPIRATORY (INHALATION) at 04:57

## 2017-12-04 RX ADMIN — OXYMETAZOLINE HYDROCHLORIDE 2 SPRAY: 5 SPRAY NASAL at 08:26

## 2017-12-04 RX ADMIN — PSYLLIUM HUSK 1 PACKET: 3.4 POWDER ORAL at 08:24

## 2017-12-04 RX ADMIN — ZOLPIDEM TARTRATE 5 MG: 5 TABLET ORAL at 22:38

## 2017-12-04 ASSESSMENT — PAIN SCALES - GENERAL
PAINLEVEL_OUTOF10: 0

## 2017-12-04 NOTE — PROGRESS NOTES
Score: 46  Timed Up and Go: 14.16     Activity Tolerance  Activity Tolerance: Patient limited by endurance; Patient Tolerated treatment well;Patient limited by fatigue  Activity Tolerance: Improved gait distance and overall activity tolerance today. ASSESSMENT/COMMENTS:  Body structures, Functions, Activity limitations: Decreased functional mobility ; Decreased endurance;Decreased balance;Decreased strength;Decreased ADL status; Decreased ROM  Assessment: Pt able to walk 180 feet before SPO2 dropped to 92-93% on 2L O2. Pt recovers fast (30-60 sec in seated). Nursing notified re: pt status on 2L O2. Pt scheduled to DC to home tomorrow 12/5/17    Safety:    Safety Devices  Type of devices:  All fall risk precautions in place        Therapy Time:   Individual   Time In 1300   Time Out 1330   Minutes 30     Minutes: 30      Transfer/Bed mobility training:      Gait training: 15      Neuro re education: 15     Therapeutic ex:      MAGDA ESCALONA PTA, 12/04/17 at 4:34 PM

## 2017-12-04 NOTE — PROGRESS NOTES
Occupational Therapy    Occupational Therapy Discharge Report  Date: 2017    Patient Name: Emily Ramsey  MRN: [id number]     : 1940       ADL  Feeding: Independent  Grooming: Independent  UE Bathing: Independent  LE Bathing: Modified independent   UE Dressing: Independent  LE Dressing: Independent (Pt. now has velcro closer shoes increasing his Ind.)  Toileting: Independent    Orientation  Overall Orientation Status: Within Functional Limits       RUE Tone: Normotonic  LUE Tone: Normotonic         Transfers  Sit to stand: Modified independent  Stand to sit: Modified independent         Balance  Sitting Balance: Independent  Standing Balance: Modified independent   Functional Mobility  Functional - Mobility Device: Rolling Walker  Activity: To/from bathroom  Assist Level: Modified independent   Toilet Transfers  Toilet - Technique: Ambulating  Equipment Used: Grab bars  Toilet Transfer: Modified independent  Toilet Transfers Comments: w/w  Tub Transfers  Tub - Transfer From: [employer] Park[employer]  Tub - Transfer Type: To and From  Tub - Transfer To: Shower seat with back  Tub - Technique: Ambulating  Tub Transfers: Modified independence  Tub Transfers Comments: w/w level  Shower Transfers  Shower Transfers: Modified independent    Vision  Vision: Impaired  Vision Exceptions: Wears glasses for reading  Hearing  Hearing: Exceptions to [facility]  Hearing Exceptions: Bilateral hearing aid         Perception  Overall Perceptual Status: WFL  Sensation  Overall Sensation Status: WFL    Observation/Palpation  Posture: Good  Observation: 2 L O2      LUE AROM : WFL  Left Hand AROM: WFL  RUE AROM : WFL  Right Hand AROM: WFL    Gross LUE Strength: WFL  Gross RUE Strength: WFL      Hand Dominance  Hand Dominance: Right    Assessment   OT D/C RECOMMENDATIONS  REQUIRES OT FOLLOW UP: Yes  Type: Home OT; [address] [place] [address]. with UE HEP'es  OT D/C Equipment  Equipment Needed: Yes  Equipment Recommended: Shower seat with back; Toilet safety frame;Grab bars  Social/Functional History  Lives With: Alone  Type of Home: House  Home Layout: One level  Home Access: Stairs to enter with rails  Bathroom Shower/Tub: Tub/Shower unit  Bathroom Toilet: Handicap height  Bathroom Accessibility: Accessible  Home Equipment: 4 wheeled walker;Rolling walker  Receives Help From: Family  ADL Assistance: [address]: Independent  Homemaking Responsibilities: Yes  Ambulation Assistance: Independent  Transfer Assistance: Independent  Active : Yes  Mode of Transportation: [employer]  Occupation: Retired  Type of occupation:   Leisure & Hobbies: \"Working in my workshop\"  IADL Comments: Pt reported, \"I was very active and independent. \"  Additional Comments: Pt reports having recently joined [employer]" group. Plan   D/C to home Tues. 12/5/2017 with H.H follow up.           Junior Nelson      Electronically signed by CHERIE Cruz on 12/4/2017 at 4:21 PM

## 2017-12-04 NOTE — PROGRESS NOTES
Occupational Therapy    Facility/Department: [facility]  Daily Treatment Note  NAME: Florin Whatley  :   MRN: [id number]    Date of Service: 2017    Patient Diagnosis(es): There were no encounter diagnoses. 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 has a past surgical history that includes knee surgery (Left, ); Thyroidectomy (); bronchoscopy (N/A, 2017); and thoracotomy (Right, 2017). Restrictions  Restrictions/Precautions  Restrictions/Precautions: Fall Risk, General Precautions  Other position/activity restrictions: no straws  2 liters O2  Subjective   General  Referring Practitioner: Dr. Matt Mahmood  Diagnosis: Right thoracotomy wedge resection for foreign body  Subjective  Subjective:     Pre Treatment Pain Screening  Pain at present: 0  Pain Assessment  Patient Currently in Pain: No  Orientation  Overall Orientation Status: Within Functional Limits  Objective  Pt. completed AM D/C ADL sink side as noted below.   ADL  Feeding: Independent  Grooming: Independent  UE Bathing: Independent  LE Bathing: Modified independent   UE Dressing: Independent  LE Dressing: Independent (Pt. now has velcro closer shoes increasing his Ind.)  Toileting: Independent        Balance  Sitting Balance: Independent  Standing Balance: Modified independent   Standing Balance  Sit to stand:

## 2017-12-04 NOTE — PROGRESS NOTES
Physical Therapy  Physical Therapy Rehab Treatment Note  Fac[facility]Department: [facility]  Room: B7/H420-99       NAME: Chandana Host  :  (13 y.o.)  MRN: [id number]  CODE STATUS: Full Code    Date of Service: 2017  Chart Reviewed: Yes  Family / Caregiver Present: No    Restrictions:  Restrictions/Precautions: Fall Risk  Body mass index is 33.4 kg/m². SUBJECTIVE: Subjective: Pt reports that he feels more safe to return to home but asked if he will need to be on 3L O2. Pt brought Rollator from home that was his wife's (adjusted handles to pt height but unable to adjust seat level). Response To Previous Treatment: Patient with no complaints from previous session. Pain Screening  Patient Currently in Pain: No  Pre Treatment Pain Screening  Pain at present: 0  Scale Used: Numeric Score    Post Treatment Pain Screening:   Pain Assessment  Pain Assessment: 0-10  Pain Level: 0  Patient's Stated Pain Goal: No pain    OBJECTIVE:   Vitals: 97% on 3L O2 (Nasal cannula), intermittent pulse ox: right finger. HR: 73 BPM       Bed mobility  Bridging: Modified independent   Rolling to Left: Independent  Rolling to Right: Independent  Supine to Sit: Modified independent  Sit to Supine: Modified independent  Scooting: Modified independent    Transfers  Sit to Stand: Modified independent  Stand to sit: Modified independent  Bed to Chair: Modified independent  Stand Pivot Transfers: Modified independent  Car Transfer: Modified independent  Comment: Improved safety awareness with sitting; no cues required this Tx. Ambulation  Ambulation?: Yes  Ambulation 1  Surface: level tile;carpet;ramp  Device: Rollator  Assistance: Modified Independent  Quality of Gait: Reciprcal,  Decreased Lat Displacement and rigid BUEs. VCs for improved breathing. Distance: 150  Comments: SPO2 96% on 3L O2 (HR: 82 BPM) post gt.   Ambulation 2  Surface - 2: carpet  Device 2: Rollator  Other Apparatus 2: O2  Assistance 2: Modified Independent  Quality of Gait 2: Reciprcal, Decreased Lat Displacement and rigid BUEs. VCs for improved breathing. Distance: 50  Comments: 2nd walkSPO2 95% on 2L O2 (HR: 85 BPM) post gt. (Pt vitals WNL with lowered O2 from 3L to 2L)  Ambulation 3  Surface - 3: carpet;level tile  Device 3: Rollator  Other Apparatus 3: O2  Assistance 3: Modified Independent  Quality of Gait 3: Reciprcal,  Decreased Lat Displacement and rigid BUEs. VCs for improved breathing. Distance: 150  Comments: 3rd walk: SPO2 93% on 2L O2 (HR: 80 BPM) post gt. (Pt vitals WNL with lowered O2 from 3L to 2L)  Stairs/Curb  Stairs?: Yes   Stairs  # Steps : 12  Stairs Height: 6\"  Rails: Bilateral    Activity Tolerance  Activity Tolerance: Patient limited by endurance; Patient Tolerated treatment well;Patient limited by fatigue  Activity Tolerance: Improved today vs last Tx on 17      ASSESSMENT/COMMENTS:  Body structures, Functions, Activity limitations: Decreased functional mobility ; Decreased endurance;Decreased balance;Decreased strength;Decreased ADL status; Decreased ROM  Assessment: Walked pt 3x with Rollator: Decreased assistance required & decreased deviations noted. Pt walked 150' on 3L with vitals WNL. Walked additional 150 on 2L after 50' trial and was able to manage > 93% safely. Pt brought personal Rollator in; adjusted to pt height. Rollator WFL. Pt scheduled to DC to home tomorrow. Safety:    Safety Devices  Type of devices:  All fall risk precautions in place        Therapy Time:   Individual   Time In 1000   Time Out 1100   Minutes 60     Minutes: 60      Transfer/Bed mobility training: 15      Gait trainin      Neuro re education: 15     Therapeutic ex:      MAGDA ESCALONA PTA, 17 at 12:32 PM      Short term goals  Short term goal 1: Pt to complete HEP with indep  Short term goal 2: Pt to complete functional outcomes measure when safe and appropriate  Long term goals  Long term goal 1: Pt to complete all bed

## 2017-12-05 VITALS
TEMPERATURE: 97.6 F | HEART RATE: 72 BPM | RESPIRATION RATE: 17 BRPM | HEIGHT: 72 IN | OXYGEN SATURATION: 95 % | DIASTOLIC BLOOD PRESSURE: 77 MMHG | BODY MASS INDEX: 33.35 KG/M2 | SYSTOLIC BLOOD PRESSURE: 124 MMHG | WEIGHT: 246.25 LBS

## 2017-12-05 PROCEDURE — 99232 SBSQ HOSP IP/OBS MODERATE 35: CPT | Performed by: INTERNAL MEDICINE

## 2017-12-05 PROCEDURE — 2700000000 HC OXYGEN THERAPY PER DAY

## 2017-12-05 PROCEDURE — 6360000002 HC RX W HCPCS: Performed by: INTERNAL MEDICINE

## 2017-12-05 PROCEDURE — 6370000000 HC RX 637 (ALT 250 FOR IP): Performed by: INTERNAL MEDICINE

## 2017-12-05 PROCEDURE — 94620 HC 6-MINUTE WALK TEST/PULM STRESS TEST SIMPLE: CPT

## 2017-12-05 PROCEDURE — 6370000000 HC RX 637 (ALT 250 FOR IP): Performed by: NURSE PRACTITIONER

## 2017-12-05 PROCEDURE — 6370000000 HC RX 637 (ALT 250 FOR IP): Performed by: PHYSICAL MEDICINE & REHABILITATION

## 2017-12-05 PROCEDURE — 94640 AIRWAY INHALATION TREATMENT: CPT

## 2017-12-05 RX ADMIN — OXYMETAZOLINE HYDROCHLORIDE 2 SPRAY: 5 SPRAY NASAL at 08:52

## 2017-12-05 RX ADMIN — ALBUTEROL SULFATE 2.5 MG: 2.5 SOLUTION RESPIRATORY (INHALATION) at 02:36

## 2017-12-05 RX ADMIN — GUAIFENESIN 600 MG: 600 TABLET, EXTENDED RELEASE ORAL at 08:51

## 2017-12-05 RX ADMIN — METOPROLOL SUCCINATE 25 MG: 25 TABLET, FILM COATED, EXTENDED RELEASE ORAL at 08:51

## 2017-12-05 RX ADMIN — PSYLLIUM HUSK 1 PACKET: 3.4 POWDER ORAL at 08:51

## 2017-12-05 RX ADMIN — DOCUSATE SODIUM 100 MG: 100 CAPSULE, LIQUID FILLED ORAL at 08:51

## 2017-12-05 RX ADMIN — Medication 2 PUFF: at 05:29

## 2017-12-05 RX ADMIN — LEVOTHYROXINE SODIUM 125 MCG: 25 TABLET ORAL at 06:27

## 2017-12-05 RX ADMIN — FINASTERIDE 5 MG: 5 TABLET, FILM COATED ORAL at 08:51

## 2017-12-05 RX ADMIN — IPRATROPIUM BROMIDE AND ALBUTEROL SULFATE 1 AMPULE: .5; 3 SOLUTION RESPIRATORY (INHALATION) at 10:50

## 2017-12-05 RX ADMIN — IPRATROPIUM BROMIDE AND ALBUTEROL SULFATE 1 AMPULE: .5; 3 SOLUTION RESPIRATORY (INHALATION) at 05:29

## 2017-12-05 NOTE — PROGRESS NOTES
LATERAL CLINICAL HISTORY: FOLLOW-UP ATELECTASIS AND/OR INFILTRATE IN RIGHT LUNG BASE COMPARISONS: 11/19/2017 FINDINGS: Once again, there are surgical clips superimposed on the right lung base. There is persistent atelectasis and/or infiltrate in the right lung base which appears unchanged since the 11/19/2017 chest x-ray. There is a small pleural effusion trapped in the right major and minor pulmonary fissures. Also, there are subsegmental atelectasis in the left CP angle. The heart is not enlarged. There is a tortuous aorta. There is degenerative bone spurring throughout the spine. NO SIGNIFICANT CLEARING OF THE RIGHT LUNG BASE SINCE THE 11/19/2017 CHEST X-RAY. Xr Chest Standard (2 Vw)    Result Date: 11/18/2017  Chest 2 views. HISTORY: New onset shortness of breath. Removal chest tube. FINDINGS: Comparison, November 17, 2017. Interval removal of right thoracostomy tube. Surgical clips right hilum. Increased opacity right lower lung with thickening of minor fissure. Small pneumothorax at apex, and along right chest wall. Left lung clear. Removal of chest tube. Interval increase right lower lung atelectasis. Possible right pleural effusion. Interval development small right pneumothorax. Xr Chest Standard (2 Vw)    Result Date: 11/17/2017  EXAMINATION: XR CHEST STANDARD TWO VW CLINICAL HISTORY:  Possible CT removal . Postop observation. Right lung surgery. COMPARISONS: November 14, 2017 FINDINGS: AP portable inspiration and expiration views the chest were obtained on November 17, 2017 at 0525 hours. The right-sided chest drainage tube remains unremarkable. No pneumothorax has developed. There is a small amount bibasilar atelectasis. Again noted are surgical staples at the site of right lower lobe segmental lung resection. The foreign body is no longer present. The mediastinal silhouette is normal. The chest wall is unremarkable. CONCLUSION: UNREMARKABLE POSTOPERATIVE FINDINGS. NO PNEUMOTHORAX.  NO LARGE EFFUSION. SMALL ATELECTASIS. Xr Chest Standard (2 Vw)    Result Date: 11/13/2017  EXAMINATION: XR CHEST STANDARD TWO VW CLINICAL HISTORY:  Aspirated foreign object right lower lobe COMPARISONS: December 3, 2015. November 10, 2017. November 11, 2017. November 13, 2017. FINDINGS: Frontal and lateral views of the chest were obtained showing a persisting metallic tooth like opacity projecting of the posterior basal segment of the right lower lobe. It is located more peripherally now than it was on November 10, 2017. It has changed little since November 11, 2017. No signs of obstructive pneumonitis have developed. Remainder both lungs is unremarkable. The mediastinal silhouette is unremarkable. The calcified aorta is not dilated. The heart is not enlarged. The chest wall is unremarkable. CONCLUSION: ENDOBRONCHIAL TOOTHLIKE STRUCTURE PROJECTING OVER THE AIRWAY OF THE POSTERIOR BASAL SEGMENT OF THE RIGHT LOWER LOBE. Xr Chest Standard (2 Vw)    Result Date: 11/10/2017  CLINICAL HISTORY: Pneumonitis COMPARISON: December 3, 2015 CHEST, TWO VIEWS FINDINGS: Cardiac and mediastinal silhouettes are normal in size and contour. Projecting over the right hilar region is a 1 cm radiopaque density. The right costophrenic angle is slightly blunted. No focal areas of consolidations are noted. No pneumothorax is seen. The osseous structures are grossly unremarkable. IMPRESSION A radiopaque density is now seen in the region of the right lower lobe bronchus. Ct Chest W Contrast    Result Date: 11/11/2017  EXAMINATION:  CT CHEST W CONTRAST CLINICAL HISTORY:  ACUTE RESP ILLNESS, >37YEARS OLD  status post bronchoscopy to retrieve aspirated dental amalgam, unsuccessful. COMPARISONS:  12/2/2013 TECHNIQUE:  Spiral scans with 75 mL Isovue-370 IV. Multiplanar 2-D reconstructions. Axial 3-D 10 x 3 mm MIP reconstructions were performed.  All CT scans at this facility use dose modulation, iterative reconstruction, and/or weight based dosing when appropriate to reduce radiation dose to as low as reasonably achievable. FINDINGS:  The known aspirated filling is identified within the distal segmental bronchus of the right lower lobe posterior basilar segment. Filling measures approximately 9 mm. There are mild emphysematous changes. There is mild scattered peripheral reticularity without definite honeycombing. There are no consolidations or effusions. There is right upper lung perifissural nodularity consistent with small intrapulmonary lymph nodes. There is a left upper lobe 4 mm nodule (series 2 image 21), unchanged from prior examination of 12/2/2013, and therefore benign. There are no suspicious lung nodules. There is mild bilateral lower lobe cylindrical bronchiectasis. Cardiac size and pulmonary vascularity are normal. There is mild mediastinal lipomatosis. There is mild thickening or trace fluid in the anterior pericardium, decreased compared to prior examination 12/2/2013. There are coronary artery calcifications. There are aortic calcifications. There is no evidence of aneurysm or dissection. There are borderline in size lymph nodes in the mediastinum anterior to the left mainstem bronchus and in the right hilum, unchanged from prior examination of 12/2/2013. There is no thoracic adenopathy. The esophagus is unremarkable. There is spondylosis. There are no acute or suspicious bone lesions. Scans of the subdiaphragmatic regions demonstrate a 6 mm oval metallic density in the gastric fundus, presumably representing a swallowed filling. There are stable small cysts in the liver. There are partially visualized renal cysts. ASPIRATED FILLING IN RIGHT LOWER LOBE POSTERIOR BASILAR SEGMENTAL BRONCHUS. MILD EMPHYSEMA AND LOWER LOBE CYLINDRICAL BRONCHIECTASIS. ADDITIONAL FINDINGS AS ABOVE.      Xr Chest Decubitus Right    Result Date: 11/20/2017  EXAMINATION: XR CHEST DECUBITUS RIGHT CLINICAL HISTORY:  Evaluate for pleural effusion COMPARISONS: lobe segmentectomy. The previously noted metallic foreign body has been removed. There are surgical staples at the operative site. There is no unremarkable chest tube. There is no pneumothorax. There is no large effusion. There is bibasilar atelectasis. The mediastinal silhouette is unremarkable. There is an endotracheal tube in place, with its tip near the orifice of the right mainstem bronchus. It should be pulled back 3 cm. The chest wall is unremarkable. CONCLUSION: NO PNEUMOTHORAX FOLLOWING LUNG SURGERY. THERE IS NO UNREMARKABLE CHEST TUBE. ENDOTRACHEAL TUBE IS LOW-LYING. Xr Chest Portable    Result Date: 11/12/2017  EXAMINATION: XR CHEST PORTABLE, 1 VIEW: DATE AND TIME: 11/11/2017 at 11:30 AM . CLINICAL HISTORY: SHORTNESS OF BREATH. POST BRONCHOSCOPY. COMPARISONS: 11/11/2017 FINDINGS: The heart, mediastinum and pulmonary vasculature are within normal limits. Lungs show scattered areas of increased reticular markings consistent with areas of parenchymal scarring. Bones unremarkable. NO ACTIVE LUNG DISEASE. Xr Chest Portable    Result Date: 11/12/2017  EXAMINATION: XR CHEST, PORTABLE: DATE AND TIME: 11/11/2017 at 9:45 AM. CLINICAL HISTORY: SWALLOWED TOOTH. POST BRONCHOSCOPY. COMPARISONS: None. FINDINGS: Portable films acquired at bedside dated during bronchoscopy with attempted retrieval of a swallowed tooth. Please refer to operative report for further details. Bronchoscopy tube seen passing into a right lower lobe bronchus. Polygonal shaped density in the right lower lung zone consistent with an ingested tooth. BRONCHOSCOPY RETRIEVAL PROCEDURE. PLEASE REFER TO OPERATIVE REPORT. Us Abdominal Limited    Result Date: 11/20/2017  EXAMINATION: US ABDOMINAL LIMITED CLINICAL HISTORY:  Ascites survey COMPARISONS: November 14, 2013 CT abdomen and pelvis FINDINGS: 4 quadrant abdominal sonogram was performed for ascites assessment. There is no ascites.  Incidentally noted are simple renal cysts,

## 2017-12-05 NOTE — DISCHARGE INSTR - DIET

## 2017-12-05 NOTE — PLAN OF CARE
Problem: Falls - Risk of  Goal: Absence of falls  Outcome: Met This Shift  Patient remained free of falls this shift.

## 2017-12-05 NOTE — FLOWSHEET NOTE
Dr. Weldon Elis here to see patient. Respiratory to do walking o2 evaluation befor discharge. Patients saturations on ambulation upper 90's. Patient on room air and offers no complaints. Son to transport patient home.   Electronically signed by Husam Perez RN on 12/5/17 at 5:37 PM

## 2017-12-05 NOTE — PROGRESS NOTES
significant lower extremity edema or tenderness. Skin:   Intact right thoracotomy incision,  the small boil on the posterior aspect of his right thigh which was drained cultured and prescribed Betadine to dry sterile dressing  Rehabilitation:  Physical therapy: FIMS:  Bed Mobility: Scooting: Modified independent    Transfers: Sit to Stand: Modified independent  Stand to sit: Modified independent  Bed to Chair: Modified independent  Stand Pivot Transfers: Modified independent, Ambulation 1  Surface: level tile, carpet  Device: Rollator  Other Apparatus: O2  Assistance: Modified Independent  Quality of Gait: Reciprcal,  Decreased Lat Displacement and rigid BUEs. VCs for improved breathing. Distance: 180  Comments: SPO2 92% on 2L O2 post gt. Increased distance tolerance noted. Posture much improved; no cues required for KIMMY nor posture. , Stairs  # Steps : 12  Stairs Height: 6\"  Rails: Bilateral  Comment: SPO2 95% on 2L O2, HR: 76 BPM.     FIMS: Bed, Chair, Wheel Chair: 6 - Requires assistive device (slide rail)  Walk: 6 - Modified Ellis  Walks/operates wheelchair at least 150 feet with an ambulatory device, orthosis or prosthesis OR requires extra amount of time OR there is concern for safety  Distance Walked: 150  Wheel Chair: 0 - Activity Not Assessed/Does Not Occur  Distance Traveled in Wheel Chair: 0  Stairs: 6 - Modified Ellis Safely goes up and down at least one flight of stairs but requries a side support, handrail, cane, or portable supports, or the activity takes more than a reasonable amount of times, or there are safety considerations,  , Assessment: Pt able to walk 180 feet before SPO2 dropped to 92-93% on 2L O2. Pt recovers fast (30-60 sec in seated). Nursing notified re: pt status on 2L O2.   Pt scheduled to DC to home tomorrow 17    Occupational therapy: FIMS:  Eatin - Patient feeds self  Groomin - Did not occur  Bathin - Did not occur  Dressing-Upper: 7 - Patient independently dresses upper body  Dressing-Lower: 0 - Did not occur  Toiletin - Patient independent with all 3 tasks  Toilet Transfer: 6 - Independent with device (grab bar/walker/slide bar)  Tub Transfer: 6 - Modified independence  Shower Transfer: 0 - Activity does not occur,  , Assessment: Pt continues to require verbal cues to initiate deep breathing techniques to decrease SOB and increase activity tolerance. Speech therapy: FIMS: Comprehension: 6 - Complex ideas 90% or device (hearing aid/glasses)  Expression: 6 - Device used to express complex ideas/needs  Social Interaction: 7 - Patient has appropriate behavior/relations 100% of the time  Problem Solvin - Independent with device (e.g. notes, schedules)  Memory: 6 - Patient requires device to recall (e.g. memory book)      Lab/X-ray studies reviewed, analyzed and discussed with patient and staff:   No results found for this or any previous visit (from the past 24 hour(s)). Xr Chest Standard (2 Vw)  Result Date: 2017  NO SIGNIFICANT CLEARING OF THE RIGHT LUNG BASE SINCE THE 2017 CHEST X-RAY. Xr Chest Standard (2 Vw)        Result Date: 2017  Removal of chest tube. Interval increase right lower lung atelectasis. Possible right pleural effusion. Interval development small right pneumothorax. Ct Chest W Contrast         Result Date: 2017  ASPIRATED FILLING IN RIGHT LOWER LOBE POSTERIOR BASILAR SEGMENTAL BRONCHUS. MILD EMPHYSEMA AND LOWER LOBE CYLINDRICAL BRONCHIECTASIS. ADDITIONAL FINDINGS AS ABOVE. Xr Chest Decubitus Right     Result Date: 2017  CONCLUSION: ATELECTASIS RELATED TO RECENT SURGERY WITHOUT DEMONSTRABLE MOBILE EFFUSION. THERE IS NO PNEUMOTHORAX. Xr Chest Decubitus Left      Result Date: 2017  CONCLUSION: ATELECTASIS RELATED TO RECENT SURGERY, WITHOUT APPRECIABLE MOBILE EFFUSION. THERE IS NO PNEUMOTHORAX.     Fl Modified Barium Swallow W Video Result Date: 2017  No aspiration or penetration identified. 9 cine loops. One static image. 1.5 minutes fluoroscopy time. Previous extensive, complex labs, notes and diagnostics reviewed and analyzed. ALLERGIES:    Allergies as of 11/21/2017    (No Known Allergies)      (please also verify by checking [facility])    Complex Physical Medicine & Rehab Issues Assess & Plan:   1. Severe abnormality of gait and mobility and impaired self-care and ADL's secondary to progressive Debility secondary to aspiration pneumonia secondary to aspirating a dental female filling . Functional and medical status reassessed regarding patients ability to participate in therapies and patient found to be able to participate in acute intensive comprehensive inpatient rehabilitation program including PT/OT to improve balance, ambulation, ADLs, and to improve the P/AROM. Therapeutic modifications regarding activities in therapies, place, amount of time per day and intensity of therapy made daily. In bed therapies or bedside therapies prn.   2. Bowel severe constipation secondary to opiates and Bladder dysfunction:  frequent toileting, ambulate to bathroom with assistance, check post void residuals. Check for C.difficile x1 if >2 loose stools in 24 hours, continue bowel & bladder program.  Monitor bowel and bladder function. Lactinex 2 PO every AC. MOM prn, Brown Bomb prn, Glycerin suppository prn, enema prn. Severe constipation -  every other day Colace Mag Citrate. 3. Severe postop right thoracotomy incision pain generalized OA pain: reassess pain every shift and prior to and after each therapy session, give prn Tylenol and Percocet, modalities prn in therapy, Lidoderm, K-pad prn. Discontinue suture right flank. 4. Rash due to SATURNINO hose, skin healing thoracotomy incision as well as boil draining of the right posterior thigh and breakdown risk:  continue pressure relief program.  Daily skin exams and reports from nursing.   Patient was instructed not to use the urinal unless he washes his hands thoroughly and we will do dry sterile dressing after Betadine to the boil to the make sure it does not feed to his thoracotomy incision. Discontinue SATURNINO hose. 5. Severe Fatigue due to nutritional and hydration deficiency:   Vitamin D deficiency - supplement and recheck as outpatient. continue to monitor I&Os, calorie counts prn, dietary consult prn. Continue vitamin B vitamin D and CoQ10-lactose intolerance advised lactose free diet  6. Acute episodic insomnia with situational adjustment disorder:   Add scheduled Ambien, monitor for day time sedation -continue egg crate mattress  7. Falls risk elevated:  patient to use call light to get nursing assistance to get up, bed and chair alarm. 8. Elevated DVT risk: progressive activities in PT, discontinue SATURNINO hose due to rash, elevation and  Lovenox which is now on hold because of ongoing hemoptysis . 9. Complex discharge planning:  Discharge 12/1/17 home with home health care. Patient and family education is in progress. The patient is to follow-up with their family physician after discharge. We will continue to work on endurance and symptom control    Complex Active General Medical Issues that complicate care Assess & Plan:    1. Continued hemoptysis status post Aspiration pneumonia,  COPD (chronic obstructive pulmonary disease), on home oxygen therapy - Pulse oximeter checks, monitor vital signs, oxygen prn. Proventil, Duoneb,   2. HTN (hypertension),  Hyperlipidemia - continue blood pressure checks, adjust/add medications (Apresoline, Toprol). 3.   GERD (gastroesophageal reflux disease) - elevate head of bed. 4.   Hypothyroidism - Synthroid. 5.   Type 2 diabetes mellitus - Continue blood sugar checks, diet, adjust/add medications prn. No results for input(s): POCGLU in the last 72 hours. 6.    BPH (benign prostatic hyperplasia) - Flomax- bedtime to limit orthostatic, Proscar.   Check postvoid residual.  7.    Anxiety and   Insomnia. - emotional support, adjust/add medications (Desyrel, Remeron, Ativan). 8.    Aspiration into respiratory tract - recheck modified barium swallow-okay'd patient for thin liquid. Verbal results review patient okayed to be in regular incisions. 9.  Frequent bloody nose - I prescribed Afrin nasal spray and saline nasal spray. I have held the Lovenox. 10.  Boil draining of the right posterior thigh-culture and sensitivity percent after drainage on 11/23/17 and continue Betadine, egg crate mattress and dry sterile dressing. 11.  Right upper abdominal pain due to gas - I prescribed Lidoderm.         Carlos Mares D.O., PM&R     Attending    [address]

## 2017-12-05 NOTE — PROGRESS NOTES
Home o2 eval  Resting room air  94% 02 sat.    HR77  Ambulating room air  93-95%  HR  80-81  Resting room air  95% 02 sat    HR 81

## 2017-12-05 NOTE — DISCHARGE SUMMARY
Subjective: The patient complains of moderate to severe acute  dyspnea on exertion, hemoptysis, nose bleeding partially relieved by  recent antibiotic treatments, PT, OT, high-dose oxygen therapy and exacerbated by  exertion and aspiration of foreign object. He complains of STEWART in a.m.. He complains of severe GI distress relieved with BM. He is doing well bu complains of right upper abdominal pain due to gas, I prescribed Lidoderm. He also complains of rash from SATURNINO hose, discontinue SATURNINO hose. ROS x10: The patient also complains of severely impaired mobility and activities of daily living. Otherwise no new problems with vision, hearing, nose, mouth, throat, dermal, cardiovascular, GI, , pulmonary, musculoskeletal, psychiatric or neurological. See Rehab H&P on Rehab chart dated 11/22/17. Vital signs:  /77   Pulse 72   Temp 97.6 °F (36.4 °C) (Oral)   Resp 17   Ht 6' (1.829 m)   Wt 246 lb 4.1 oz (111.7 kg)   SpO2 95%   BMI 33.40 kg/m²   I/O:   PO/Intake:  fair PO intake, no problems observed or reported. Bowel/Bladder:  continent, no problems noted. General:  Patient is well developed, adequately nourished, non-obese and     well kempt. HEENT:    Hemoptysis, PERRLA, hearing intact to loud voice, external inspection of ear     and nose benign. Inspection of lips, tongue and gums benign  Musculoskeletal: No significant change in strength or tone. All joints stable. Inspection and palpation of digits and nails show no clubbing,       cyanosis or inflammatory conditions. Neuro/Psychiatric: Affect: flat but pleasant. Alert and oriented to person, place and     situation. No significant change in deep tendon reflexes or     sensation  Lungs:  Diminished CTA-B. Respiration effort is normal at rest.   STEWART in a.m. Heart:   S1 = S2, RRR. No loud murmurs. Abdomen:  Distended with gas Soft, non-tender, no enlargement of liver or spleen.   Extremities:  No MOBILE EFFUSION. THERE IS NO PNEUMOTHORAX. Fl Modified Barium Swallow W Video Result Date: 11/22/2017  No aspiration or penetration identified. 9 cine loops. One static image. 1.5 minutes fluoroscopy time. Previous extensive, complex labs, notes and diagnostics reviewed and analyzed. ALLERGIES:    Allergies as of 11/21/2017    (No Known Allergies)      (please also verify by checking [facility])    Complex Physical Medicine & Rehab Issues Assess & Plan:   1. Severe abnormality of gait and mobility and impaired self-care and ADL's secondary to progressive Debility secondary to aspiration pneumonia secondary to aspirating a dental female filling . Functional and medical status reassessed regarding patients ability to participate in therapies and patient found to be able to participate in acute intensive comprehensive inpatient rehabilitation program including PT/OT to improve balance, ambulation, ADLs, and to improve the P/AROM. Therapeutic modifications regarding activities in therapies, place, amount of time per day and intensity of therapy made daily. In bed therapies or bedside therapies prn.   2. Bowel severe constipation secondary to opiates and Bladder dysfunction:  frequent toileting, ambulate to bathroom with assistance, check post void residuals. Check for C.difficile x1 if >2 loose stools in 24 hours, continue bowel & bladder program.  Monitor bowel and bladder function. Lactinex 2 PO every AC. MOM prn, Brown Bomb prn, Glycerin suppository prn, enema prn. Severe constipation -  every other day Colace Mag Citrate. 3. Severe postop right thoracotomy incision pain generalized OA pain: reassess pain every shift and prior to and after each therapy session, give prn Tylenol and Percocet, modalities prn in therapy, Lidoderm, K-pad prn. Discontinue suture right flank.    4. Rash due to SATURNINO hose, skin healing thoracotomy incision as well as boil draining of the right posterior thigh and breakdown risk:  continue pressure relief program.  Daily skin exams and reports from nursing. Patient was instructed not to use the urinal unless he washes his hands thoroughly and we will do dry sterile dressing after Betadine to the boil to the make sure it does not feed to his thoracotomy incision. Discontinue SATURNINO hose. 5. Severe Fatigue due to nutritional and hydration deficiency:   Vitamin D deficiency - supplement and recheck as outpatient. continue to monitor I&Os, calorie counts prn, dietary consult prn. Continue vitamin B vitamin D and CoQ10-lactose intolerance advised lactose free diet  6. Acute episodic insomnia with situational adjustment disorder:   Add scheduled Ambien, monitor for day time sedation -continue egg crate mattress  7. Falls risk elevated:  patient to use call light to get nursing assistance to get up, bed and chair alarm. 8. Elevated DVT risk: progressive activities in PT, discontinue SATURNINO hose due to rash, elevation and  Lovenox which is now on hold because of ongoing hemoptysis . 9. Complex discharge planning:  Discharge 12/1/17 home with home health care. Patient and family education is in progress. The patient is to follow-up with their family physician after discharge. We will continue to work on endurance and symptom control    Complex Active General Medical Issues that complicate care Assess & Plan:    1. Continued hemoptysis status post Aspiration pneumonia,  COPD (chronic obstructive pulmonary disease), on home oxygen therapy - Pulse oximeter checks, monitor vital signs, oxygen prn. Proventil, Duoneb,   2. HTN (hypertension),  Hyperlipidemia - continue blood pressure checks, adjust/add medications (Apresoline, Toprol). 3.   GERD (gastroesophageal reflux disease) - elevate head of bed. 4.   Hypothyroidism - Synthroid. 5.   Type 2 diabetes mellitus - Continue blood sugar checks, diet, adjust/add medications prn.        No results for input(s): POCGLU in the last 72 hours.    6.    BPH (benign prostatic hyperplasia) - Flomax- bedtime to limit orthostatic, Proscar. Check postvoid residual.  7.    Anxiety and   Insomnia. - emotional support, adjust/add medications (Desyrel, Remeron, Ativan). 8.    Aspiration into respiratory tract - recheck modified barium swallow-okay'd patient for thin liquid. Verbal results review patient okayed to be in regular incisions. 9.  Frequent bloody nose - I prescribed Afrin nasal spray and saline nasal spray. I have held the Lovenox. 10.  Boil draining of the right posterior thigh-culture and sensitivity percent after drainage on 11/23/17 and continue Betadine, egg crate mattress and dry sterile dressing. 11.  Right upper abdominal pain due to gas - I prescribed Lidoderm. [id number] Course: The patient was admitted to the Rehabilitation Unit to address ADL and mobility deficits. The patient was enrolled in acute PT, OT program.  Weekly team meetings were held to assess functional progress toward their goals. The patient's medical issues were addressed. The patient progressed in the rehab program and is now ready for discharge. Refer to FIM scores summary report for detailed functional status. Greater than 25 minutes was spent on coordinating patients discharge including follow-up care, medications and patient/family education. No current facility-administered medications on file prior to encounter. Current Outpatient Prescriptions on File Prior to Encounter   Medication Sig Dispense Refill    Famotidine (PEPCID AC PO) Take by mouth daily       budesonide-formoterol (SYMBICORT) 160-4.5 MCG/ACT AERO Inhale 2 puffs into the lungs 2 times daily.  aspirin 81 MG tablet Take 81 mg by mouth daily.               Sarika Hui D.O., PM&R     Attending    [address]

## 2017-12-06 ENCOUNTER — CARE COORDINATION (OUTPATIENT)
Dept: CASE MANAGEMENT | Age: 77
End: 2017-12-06

## 2017-12-06 NOTE — PROGRESS NOTES
at night    Osteoarthritis of spine with radiculopathy, lumbar region 6/7/2016    Papillary thyroid carcinoma ([facility] 12/9/2015    Positive D dimer 12/5/2013    Sleep apnea 1/24/2014    Type 2 diabetes mellitus ([facility]     Vitamin D deficiency      Past Surgical History:   Procedure Laterality Date    BRONCHOSCOPY N/A 11/11/2017    BRONCHOSCOPY FIBEROPTIC performed by Celsa Limon MD at [address] [facility] Right 11/14/2017    RIGHT THORACOTOMY WEDGE RESECTION FOR FOREIGN BODY AND FOB DOUBLE LUMEN (1ST CASE) performed by Samantha Lamb MD at [address] [facility]  2006       Indications for Skilled Intervention: Decreased functional mobility , Decreased endurance, Decreased balance, Decreased strength, Decreased ADL status, Decreased ROM    GOALS:  Short term goals  Short term goal 1: Pt to complete HEP with indep - MET  Short term goal 2: Pt to complete functional outcomes measure when safe and appropriate - MET (Perez; TUG)  Long term goals  Long term goal 1: Pt to complete all bed mobility with indep - MET  Long term goal 2: Pt to complete all transfers with indep - MET  Long term goal 3: Pt to ambulate 150ft without AD indep - MET  Long term goal 4: Pt to negotiate 12 steps with HR indep - MET    Summary of POC: Throughout rehab stay, pt received skilled physical therapy treatment 1.5 hours daily for 2 weeks. Skilled Services Provided: Strengthening, Balance Training, Functional Mobility Training, Transfer Training, Endurance Training, Gait Training, Neuromuscular Re-education, Home Exercise Program, Safety Education & Training, Patient/Caregiver Education & Training, Equipment Evaluation, Education, & procurement, Pain Management, Stair training (Please refer to daily notes for all treatment details)    ASSESSMENT: Pt progressing well through acute physical therapy POC.  Despite variability in activity tolerance, pt improved to Modified [place] in basic functional

## 2017-12-06 NOTE — PROGRESS NOTES
Treatment Recommendations: Strengthening, Endurance Training, Patient/Caregiver Education & Training, Self-Care / ADL, Home Management Training, Safety Education & Training  Plan Comment: Continue per OT POC    Goals  Patient Goals   Patient goals : \"Be able to do things without being winded. \" \"I want to get back into the community. \"       Therapy Time   Individual Concurrent Group Co-treatment   Time In 1330         Time Out 1400         Minutes   [address] [place] [address] [facility]    Electronically signed by CHERIE Guardado on 12/6/2017 at 3:38 PM

## 2017-12-06 NOTE — PROGRESS NOTES
Speech Language Pathology  Speech & Language Pathology Discharge Report  Date: 2017  Patient: Alison Perez  :     Date of Last Treatment Session: 2017  Past Medical History:   Diagnosis Date    Abnormality of gait and mobility     Acute sinusitis     Anxiety     Aspiration into respiratory tract 11/10/2017    Aspiration pneumonia (HCC)     BPH (benign prostatic hyperplasia)     COPD (chronic obstructive pulmonary disease) ([facility] 2013    Debility     Elevated CK 2013    Foraminal stenosis of lumbosacral region 2016    GERD (gastroesophageal reflux disease) 2014    History of asthma 2014    HTN (hypertension) 2014    Hyperlipidemia     Hypothyroidism     Insomnia     Lower extremity weakness 2014    On home oxygen therapy     2L at night    Osteoarthritis of spine with radiculopathy, lumbar region 2016    Papillary thyroid carcinoma ([facility] 2015    Positive D dimer 2013    Sleep apnea 2014    Type 2 diabetes mellitus ([facility]     Vitamin D deficiency        Status at initiation of therapy:   During a 2017 SLE, pt p/w mild cognitive deficits with a goal to improve his Cognitive-Linguistic abilities to a Modified Independent level in order to return to prior level of function and independently manage ADLs and IADLs.      Treatment Area(s):  Cognition     Participation in Treatment (at discharge): Cooperative; ST worked with pt to completed high-level cognitive linguistic tasks (e.g. Utilizing pt's iPhone to organize schedule and medication management). Pt typically completed tasks independently or with minimal cues. Functional Status at time of Discharge:    · Cognition: Patient demonstrates minimal cognitive deficits. · Communication: Patient demonstrates no communication deficits. · Language: Patient demonstrates no language deficits.     · Motor Speech: Patient demonstrates no motor speech

## 2017-12-11 ENCOUNTER — OFFICE VISIT (OUTPATIENT)
Dept: PULMONOLOGY | Age: 77
End: 2017-12-11

## 2017-12-11 VITALS
DIASTOLIC BLOOD PRESSURE: 58 MMHG | HEART RATE: 79 BPM | BODY MASS INDEX: 32.51 KG/M2 | SYSTOLIC BLOOD PRESSURE: 120 MMHG | WEIGHT: 240 LBS | TEMPERATURE: 99 F | HEIGHT: 72 IN | OXYGEN SATURATION: 94 %

## 2017-12-11 DIAGNOSIS — R09.02 HYPOXEMIA: ICD-10-CM

## 2017-12-11 DIAGNOSIS — J44.9 CHRONIC OBSTRUCTIVE PULMONARY DISEASE, UNSPECIFIED COPD TYPE (HCC): Primary | ICD-10-CM

## 2017-12-11 DIAGNOSIS — J69.0 ASPIRATION PNEUMONIA OF RIGHT LOWER LOBE, UNSPECIFIED ASPIRATION PNEUMONIA TYPE (HCC): ICD-10-CM

## 2017-12-11 PROCEDURE — 1111F DSCHRG MED/CURRENT MED MERGE: CPT | Performed by: INTERNAL MEDICINE

## 2017-12-11 PROCEDURE — 1123F ACP DISCUSS/DSCN MKR DOCD: CPT | Performed by: INTERNAL MEDICINE

## 2017-12-11 PROCEDURE — 3023F SPIROM DOC REV: CPT | Performed by: INTERNAL MEDICINE

## 2017-12-11 PROCEDURE — 99214 OFFICE O/P EST MOD 30 MIN: CPT | Performed by: INTERNAL MEDICINE

## 2017-12-11 PROCEDURE — G8417 CALC BMI ABV UP PARAM F/U: HCPCS | Performed by: INTERNAL MEDICINE

## 2017-12-11 PROCEDURE — 4040F PNEUMOC VAC/ADMIN/RCVD: CPT | Performed by: INTERNAL MEDICINE

## 2017-12-11 PROCEDURE — G8427 DOCREV CUR MEDS BY ELIG CLIN: HCPCS | Performed by: INTERNAL MEDICINE

## 2017-12-11 PROCEDURE — G8484 FLU IMMUNIZE NO ADMIN: HCPCS | Performed by: INTERNAL MEDICINE

## 2017-12-11 PROCEDURE — G8926 SPIRO NO PERF OR DOC: HCPCS | Performed by: INTERNAL MEDICINE

## 2017-12-11 PROCEDURE — 1036F TOBACCO NON-USER: CPT | Performed by: INTERNAL MEDICINE

## 2017-12-11 ASSESSMENT — ENCOUNTER SYMPTOMS
ABDOMINAL PAIN: 0
EYE ITCHING: 0
VOICE CHANGE: 0
RHINORRHEA: 0
VOMITING: 0
DIARRHEA: 0
CHEST TIGHTNESS: 0
WHEEZING: 0
SORE THROAT: 0
COUGH: 1
SHORTNESS OF BREATH: 1
NAUSEA: 0

## 2017-12-11 NOTE — PROGRESS NOTES
facility-administered medications for this visit. Results for orders placed during the hospital encounter of 11/21/17   XR CHEST STANDARD (2 VW)    Narrative EXAMINATION: XR CHEST STANDARD TWO VW    CLINICAL HISTORY:  follow up RLL atelectasis     COMPARISONS: November 21, 2017    FINDINGS: Frontal and lateral views the chest were obtained compared recent prior examination. There is persisting atelectasis and subpulmonic effusion on the right. There are surgical staples at the site of recent segmentectomy for treatment of the   aspirated metallic foreign body there is a small amount of atelectasis at the left base. There remains no pneumothorax. The heart is not enlarged. The mediastinum is not widened or shifted. The chest wall is unremarkable. CONCLUSION: POSTOPERATIVE ATELECTASIS AND SUBPULMONIC EFFUSION, SIMILAR TO NOVEMBER 21, 2017   ]  Results for orders placed during the hospital encounter of 11/10/17   CT Chest W Contrast    Narrative EXAMINATION:  CT CHEST W CONTRAST    CLINICAL HISTORY:  ACUTE RESP ILLNESS, >37YEARS OLD  status post bronchoscopy to retrieve aspirated dental amalgam, unsuccessful. COMPARISONS:  12/2/2013    TECHNIQUE:  Spiral scans with 75 mL Isovue-370 IV. Multiplanar 2-D reconstructions. Axial 3-D 10 x 3 mm MIP reconstructions were performed. All CT scans at this facility use dose modulation, iterative reconstruction, and/or weight based dosing when   appropriate to reduce radiation dose to as low as reasonably achievable. FINDINGS:  The known aspirated filling is identified within the distal segmental bronchus of the right lower lobe posterior basilar segment. Filling measures approximately 9 mm. There are mild emphysematous changes. There is mild scattered peripheral   reticularity without definite honeycombing. There are no consolidations or effusions. There is right upper lung perifissural nodularity consistent with small intrapulmonary lymph nodes.  There is a left pneumonia type [facility]), dental filling s/p  thoracotomy and resection   Patient underwent for bronchoscopy and eventually right thoracotomy and resection to remove dental fillings which was aspirated. Surgery was done by Dr. Kit Delacruz. Return in about 2 months (around 2/11/2018) for COPD, hypoxia on O2.       Erick Bush MD

## 2017-12-13 ENCOUNTER — HOSPITAL ENCOUNTER (EMERGENCY)
Age: 77
Discharge: HOME | End: 2017-12-14
Payer: MEDICARE

## 2017-12-13 VITALS
BODY MASS INDEX: 32.23 KG/M2 | TEMPERATURE: 98.1 F | DIASTOLIC BLOOD PRESSURE: 85 MMHG | OXYGEN SATURATION: 97 % | HEART RATE: 73 BPM | WEIGHT: 238 LBS | RESPIRATION RATE: 22 BRPM | HEIGHT: 72 IN | SYSTOLIC BLOOD PRESSURE: 144 MMHG

## 2017-12-13 DIAGNOSIS — J44.9 CHRONIC OBSTRUCTIVE PULMONARY DISEASE, UNSPECIFIED COPD TYPE (HCC): Primary | ICD-10-CM

## 2017-12-13 PROCEDURE — 99284 EMERGENCY DEPT VISIT MOD MDM: CPT

## 2017-12-13 RX ORDER — MIRTAZAPINE 15 MG/1
15 TABLET, FILM COATED ORAL NIGHTLY
COMMUNITY

## 2017-12-13 ASSESSMENT — ENCOUNTER SYMPTOMS
VOICE CHANGE: 0
APNEA: 0
SHORTNESS OF BREATH: 1
COUGH: 0
ANAL BLEEDING: 0
PHOTOPHOBIA: 0
ABDOMINAL DISTENTION: 0
EYE DISCHARGE: 0

## 2017-12-14 ENCOUNTER — TELEPHONE (OUTPATIENT)
Dept: PULMONOLOGY | Age: 77
End: 2017-12-14
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
## 2017-12-14 NOTE — ED PROVIDER NOTES
I have seen this patient and evaluated the physician assistant's written evaluation, after discussion, I agree with the assessment  and plan.        Patient is COPD, ready to go home, stable condition with no strict return for worsening or weakening especially associated with fever      Agustin Giordano MD  12/13/17 3585
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
## 2017-12-14 NOTE — TELEPHONE ENCOUNTER
Pt would like portable oxygen tanks. Needs an order sent to his DME company. Pt was seen on 12/11/2017.

## 2017-12-29 ENCOUNTER — CARE COORDINATION (OUTPATIENT)
Dept: CASE MANAGEMENT | Age: 77
End: 2017-12-29

## 2017-12-29 NOTE — CARE COORDINATION
[facility] Transitions Follow Up Call    2017    Patient: Suzan May  Patient : 1940   MRN: [id number]  Reason for Admission:11/10-17 [facility] Aspiration; S/P Right thoracotomy wedge resection for foreign body & FOB double lumen. -17 [facility] Rehab Abnormality of gait/mobility with debility secondary to aspiration pneumonia d/t dental filling/crown. Discharge Date: 17       RARS: [facility] Risk Score: 12.75   CMRS: 17  PHP Plan: MSSP  PCP: Mansoor Marquis MD     Spoke with: Anita Nguyen. Pt informs he is still working with [facility]. He states pain is well controlled and he feels he is getting stronger. Denies any difficulty in swallow or SOB. Tolerating diet. Following oxygen protocol. He does not feel he has any needs right now. Advised final CTC call. Enc to reach out for any future needs/concerns. Non-face-to-face services provided:  Final Post-op FU    Inpatient Assessment  Care Transitions Subsequent and Final Call    Subsequent and Final Calls  Do you have any ongoing symptoms?:  Yes  Patient-reported symptoms:  Fatigue  Have your medications changed?:  No  Do you have any questions related to your medications?:  No  Do you currently have any active services?:  Yes  Are you currently active with any services?:  Home Health  Do you have any needs or concerns that I can assist you with?:  No  Identified Barriers: Other  Care Transitions Interventions  Other Interventions:             Follow Up  Future Appointments  Date Time Provider Stefanie Garcia   2018 1:30 PM Masha Gutiérrez MD [facility]   2/15/2018 1:45 PM Chio Hernandez MD [address] [place] [address]

## 2018-01-06 ENCOUNTER — APPOINTMENT (OUTPATIENT)
Dept: GENERAL RADIOLOGY | Age: 78
DRG: 299 | End: 2018-01-06
Payer: MEDICARE

## 2018-01-06 ENCOUNTER — APPOINTMENT (OUTPATIENT)
Dept: ULTRASOUND IMAGING | Age: 78
DRG: 299 | End: 2018-01-06
Payer: MEDICARE

## 2018-01-06 ENCOUNTER — HOSPITAL ENCOUNTER (INPATIENT)
Age: 78
LOS: 6 days | Discharge: NURSING, REHAB OR LONG-TERM CARE | DRG: 299 | End: 2018-01-12
Attending: FAMILY MEDICINE | Admitting: INTERNAL MEDICINE
Payer: MEDICARE

## 2018-01-06 ENCOUNTER — APPOINTMENT (OUTPATIENT)
Dept: CT IMAGING | Age: 78
DRG: 299 | End: 2018-01-06
Payer: MEDICARE

## 2018-01-06 DIAGNOSIS — I26.99 BILATERAL PULMONARY EMBOLISM (HCC): Primary | ICD-10-CM

## 2018-01-06 DIAGNOSIS — R77.8 ELEVATED TROPONIN: ICD-10-CM

## 2018-01-06 DIAGNOSIS — I82.412 ACUTE DEEP VEIN THROMBOSIS (DVT) OF LEFT FEMORAL VEIN (HCC): ICD-10-CM

## 2018-01-06 DIAGNOSIS — J18.9 PNEUMONIA DUE TO ORGANISM: ICD-10-CM

## 2018-01-06 LAB
ALBUMIN SERPL-MCNC: 3.4 G/DL (ref 3.9–4.9)
ALP BLD-CCNC: 84 U/L (ref 35–104)
ALT SERPL-CCNC: 15 U/L (ref 0–41)
ANION GAP SERPL CALCULATED.3IONS-SCNC: 13 MEQ/L (ref 7–13)
APTT: 30.2 SEC (ref 21.6–35.4)
APTT: >124 SEC (ref 21.6–35.4)
AST SERPL-CCNC: 24 U/L (ref 0–40)
BASOPHILS ABSOLUTE: 0 K/UL (ref 0–0.2)
BASOPHILS RELATIVE PERCENT: 0.4 %
BILIRUB SERPL-MCNC: 0.3 MG/DL (ref 0–1.2)
BUN BLDV-MCNC: 17 MG/DL (ref 8–23)
CALCIUM SERPL-MCNC: 8.8 MG/DL (ref 8.6–10.2)
CHLORIDE BLD-SCNC: 98 MEQ/L (ref 98–107)
CO2: 28 MEQ/L (ref 22–29)
CREAT SERPL-MCNC: 1.11 MG/DL (ref 0.7–1.2)
EOSINOPHILS ABSOLUTE: 0.1 K/UL (ref 0–0.7)
EOSINOPHILS RELATIVE PERCENT: 1.4 %
GFR AFRICAN AMERICAN: >60
GFR NON-AFRICAN AMERICAN: >60
GLOBULIN: 3.8 G/DL (ref 2.3–3.5)
GLUCOSE BLD-MCNC: 95 MG/DL (ref 74–109)
HCT VFR BLD CALC: 40 % (ref 42–52)
HCT VFR BLD CALC: 41.5 % (ref 42–52)
HEMOGLOBIN: 13.1 G/DL (ref 14–18)
HEMOGLOBIN: 13.6 G/DL (ref 14–18)
LYMPHOCYTES ABSOLUTE: 1.2 K/UL (ref 1–4.8)
LYMPHOCYTES RELATIVE PERCENT: 16.3 %
MAGNESIUM: 2.4 MG/DL (ref 1.7–2.3)
MCH RBC QN AUTO: 30.3 PG (ref 27–31.3)
MCH RBC QN AUTO: 30.3 PG (ref 27–31.3)
MCHC RBC AUTO-ENTMCNC: 32.7 % (ref 33–37)
MCHC RBC AUTO-ENTMCNC: 32.9 % (ref 33–37)
MCV RBC AUTO: 92.3 FL (ref 80–100)
MCV RBC AUTO: 92.6 FL (ref 80–100)
MONOCYTES ABSOLUTE: 0.5 K/UL (ref 0.2–0.8)
MONOCYTES RELATIVE PERCENT: 6.6 %
NEUTROPHILS ABSOLUTE: 5.4 K/UL (ref 1.4–6.5)
NEUTROPHILS RELATIVE PERCENT: 75.3 %
PDW BLD-RTO: 15.4 % (ref 11.5–14.5)
PDW BLD-RTO: 15.4 % (ref 11.5–14.5)
PLATELET # BLD: 140 K/UL (ref 130–400)
PLATELET # BLD: 142 K/UL (ref 130–400)
POTASSIUM SERPL-SCNC: 4.5 MEQ/L (ref 3.5–5.1)
PRO-BNP: 205 PG/ML
RAPID INFLUENZA  B AGN: NEGATIVE
RAPID INFLUENZA A AGN: NEGATIVE
RBC # BLD: 4.33 M/UL (ref 4.7–6.1)
RBC # BLD: 4.48 M/UL (ref 4.7–6.1)
SODIUM BLD-SCNC: 139 MEQ/L (ref 132–144)
TOTAL PROTEIN: 7.2 G/DL (ref 6.4–8.1)
TROPONIN: 0.07 NG/ML (ref 0–0.01)
TROPONIN: 0.1 NG/ML (ref 0–0.01)
WBC # BLD: 7.2 K/UL (ref 4.8–10.8)
WBC # BLD: 8 K/UL (ref 4.8–10.8)

## 2018-01-06 PROCEDURE — 93005 ELECTROCARDIOGRAM TRACING: CPT

## 2018-01-06 PROCEDURE — 6370000000 HC RX 637 (ALT 250 FOR IP): Performed by: INTERNAL MEDICINE

## 2018-01-06 PROCEDURE — 85027 COMPLETE CBC AUTOMATED: CPT

## 2018-01-06 PROCEDURE — 93970 EXTREMITY STUDY: CPT

## 2018-01-06 PROCEDURE — 71045 X-RAY EXAM CHEST 1 VIEW: CPT

## 2018-01-06 PROCEDURE — 86403 PARTICLE AGGLUT ANTBDY SCRN: CPT

## 2018-01-06 PROCEDURE — 2580000003 HC RX 258: Performed by: PHYSICIAN ASSISTANT

## 2018-01-06 PROCEDURE — 6360000004 HC RX CONTRAST MEDICATION: Performed by: FAMILY MEDICINE

## 2018-01-06 PROCEDURE — 87040 BLOOD CULTURE FOR BACTERIA: CPT

## 2018-01-06 PROCEDURE — 36415 COLL VENOUS BLD VENIPUNCTURE: CPT

## 2018-01-06 PROCEDURE — 83735 ASSAY OF MAGNESIUM: CPT

## 2018-01-06 PROCEDURE — 96374 THER/PROPH/DIAG INJ IV PUSH: CPT

## 2018-01-06 PROCEDURE — 85025 COMPLETE CBC W/AUTO DIFF WBC: CPT

## 2018-01-06 PROCEDURE — 6360000002 HC RX W HCPCS: Performed by: PHYSICIAN ASSISTANT

## 2018-01-06 PROCEDURE — 71275 CT ANGIOGRAPHY CHEST: CPT

## 2018-01-06 PROCEDURE — 2580000003 HC RX 258: Performed by: FAMILY MEDICINE

## 2018-01-06 PROCEDURE — 99285 EMERGENCY DEPT VISIT HI MDM: CPT

## 2018-01-06 PROCEDURE — 6370000000 HC RX 637 (ALT 250 FOR IP): Performed by: FAMILY MEDICINE

## 2018-01-06 PROCEDURE — 2000000000 HC ICU R&B

## 2018-01-06 PROCEDURE — 94640 AIRWAY INHALATION TREATMENT: CPT

## 2018-01-06 PROCEDURE — 85730 THROMBOPLASTIN TIME PARTIAL: CPT

## 2018-01-06 PROCEDURE — 6360000002 HC RX W HCPCS: Performed by: FAMILY MEDICINE

## 2018-01-06 PROCEDURE — 83880 ASSAY OF NATRIURETIC PEPTIDE: CPT

## 2018-01-06 PROCEDURE — 80053 COMPREHEN METABOLIC PANEL: CPT

## 2018-01-06 PROCEDURE — 84484 ASSAY OF TROPONIN QUANT: CPT

## 2018-01-06 RX ORDER — HEPARIN SODIUM 5000 [USP'U]/ML
40 INJECTION, SOLUTION INTRAVENOUS; SUBCUTANEOUS PRN
Status: DISCONTINUED | OUTPATIENT
Start: 2018-01-06 | End: 2018-01-06 | Stop reason: SDUPTHER

## 2018-01-06 RX ORDER — MAGNESIUM 30 MG
250 TABLET ORAL DAILY
COMMUNITY

## 2018-01-06 RX ORDER — POTASSIUM CHLORIDE 750 MG/1
20 TABLET, FILM COATED, EXTENDED RELEASE ORAL DAILY
Status: DISCONTINUED | OUTPATIENT
Start: 2018-01-07 | End: 2018-01-12 | Stop reason: HOSPADM

## 2018-01-06 RX ORDER — FUROSEMIDE 20 MG/1
20 TABLET ORAL DAILY
COMMUNITY
End: 2018-05-16 | Stop reason: ALTCHOICE

## 2018-01-06 RX ORDER — PANTOPRAZOLE SODIUM 40 MG/1
40 TABLET, DELAYED RELEASE ORAL
Status: DISCONTINUED | OUTPATIENT
Start: 2018-01-07 | End: 2018-01-12 | Stop reason: HOSPADM

## 2018-01-06 RX ORDER — LORAZEPAM 0.5 MG/1
0.5 TABLET ORAL EVERY 4 HOURS PRN
Status: ON HOLD | COMMUNITY
End: 2018-01-17 | Stop reason: HOSPADM

## 2018-01-06 RX ORDER — ACETAMINOPHEN 325 MG/1
650 TABLET ORAL EVERY 4 HOURS PRN
Status: DISCONTINUED | OUTPATIENT
Start: 2018-01-06 | End: 2018-01-12 | Stop reason: HOSPADM

## 2018-01-06 RX ORDER — ASPIRIN 81 MG/1
81 TABLET, CHEWABLE ORAL DAILY
Status: DISCONTINUED | OUTPATIENT
Start: 2018-01-07 | End: 2018-01-12 | Stop reason: HOSPADM

## 2018-01-06 RX ORDER — METHYLPREDNISOLONE SODIUM SUCCINATE 125 MG/2ML
125 INJECTION, POWDER, LYOPHILIZED, FOR SOLUTION INTRAMUSCULAR; INTRAVENOUS ONCE
Status: COMPLETED | OUTPATIENT
Start: 2018-01-06 | End: 2018-01-06

## 2018-01-06 RX ORDER — METOPROLOL SUCCINATE 25 MG/1
25 TABLET, EXTENDED RELEASE ORAL DAILY
Status: DISCONTINUED | OUTPATIENT
Start: 2018-01-07 | End: 2018-01-12 | Stop reason: HOSPADM

## 2018-01-06 RX ORDER — HEPARIN SODIUM 5000 [USP'U]/ML
80 INJECTION, SOLUTION INTRAVENOUS; SUBCUTANEOUS ONCE
Status: DISCONTINUED | OUTPATIENT
Start: 2018-01-06 | End: 2018-01-06 | Stop reason: SDUPTHER

## 2018-01-06 RX ORDER — LORAZEPAM 0.5 MG/1
0.5 TABLET ORAL EVERY 4 HOURS PRN
Status: DISCONTINUED | OUTPATIENT
Start: 2018-01-06 | End: 2018-01-12 | Stop reason: HOSPADM

## 2018-01-06 RX ORDER — SODIUM CHLORIDE 0.9 % (FLUSH) 0.9 %
10 SYRINGE (ML) INJECTION PRN
Status: DISCONTINUED | OUTPATIENT
Start: 2018-01-06 | End: 2018-01-12 | Stop reason: HOSPADM

## 2018-01-06 RX ORDER — POTASSIUM CHLORIDE 750 MG/1
20 TABLET, FILM COATED, EXTENDED RELEASE ORAL DAILY
COMMUNITY
End: 2018-08-23 | Stop reason: ALTCHOICE

## 2018-01-06 RX ORDER — HEPARIN SODIUM 5000 [USP'U]/ML
80 INJECTION, SOLUTION INTRAVENOUS; SUBCUTANEOUS PRN
Status: DISCONTINUED | OUTPATIENT
Start: 2018-01-06 | End: 2018-01-09 | Stop reason: ALTCHOICE

## 2018-01-06 RX ORDER — IPRATROPIUM BROMIDE AND ALBUTEROL SULFATE 2.5; .5 MG/3ML; MG/3ML
1 SOLUTION RESPIRATORY (INHALATION) ONCE
Status: COMPLETED | OUTPATIENT
Start: 2018-01-06 | End: 2018-01-06

## 2018-01-06 RX ORDER — HEPARIN SODIUM 10000 [USP'U]/100ML
18 INJECTION, SOLUTION INTRAVENOUS CONTINUOUS
Status: DISCONTINUED | OUTPATIENT
Start: 2018-01-06 | End: 2018-01-06 | Stop reason: SDUPTHER

## 2018-01-06 RX ORDER — ALBUTEROL SULFATE 90 UG/1
2 AEROSOL, METERED RESPIRATORY (INHALATION) EVERY 4 HOURS PRN
Status: DISCONTINUED | OUTPATIENT
Start: 2018-01-06 | End: 2018-01-12 | Stop reason: HOSPADM

## 2018-01-06 RX ORDER — ALBUTEROL SULFATE 2.5 MG/3ML
2.5 SOLUTION RESPIRATORY (INHALATION)
Status: DISCONTINUED | OUTPATIENT
Start: 2018-01-06 | End: 2018-01-06

## 2018-01-06 RX ORDER — MIRTAZAPINE 15 MG/1
15 TABLET, FILM COATED ORAL NIGHTLY
Status: DISCONTINUED | OUTPATIENT
Start: 2018-01-06 | End: 2018-01-12 | Stop reason: HOSPADM

## 2018-01-06 RX ORDER — ACETAMINOPHEN 160 MG
2000 TABLET,DISINTEGRATING ORAL DAILY
Status: ON HOLD | COMMUNITY
End: 2018-09-27 | Stop reason: HOSPADM

## 2018-01-06 RX ORDER — FINASTERIDE 5 MG/1
5 TABLET, FILM COATED ORAL DAILY
Status: DISCONTINUED | OUTPATIENT
Start: 2018-01-07 | End: 2018-01-12 | Stop reason: HOSPADM

## 2018-01-06 RX ORDER — TAMSULOSIN HYDROCHLORIDE 0.4 MG/1
0.4 CAPSULE ORAL NIGHTLY
Status: DISCONTINUED | OUTPATIENT
Start: 2018-01-06 | End: 2018-01-12 | Stop reason: HOSPADM

## 2018-01-06 RX ORDER — ALBUTEROL SULFATE 90 UG/1
2 AEROSOL, METERED RESPIRATORY (INHALATION) EVERY 4 HOURS PRN
COMMUNITY
End: 2022-01-11 | Stop reason: SDUPTHER

## 2018-01-06 RX ORDER — ONDANSETRON 2 MG/ML
4 INJECTION INTRAMUSCULAR; INTRAVENOUS EVERY 6 HOURS PRN
Status: DISCONTINUED | OUTPATIENT
Start: 2018-01-06 | End: 2018-01-12 | Stop reason: HOSPADM

## 2018-01-06 RX ORDER — HEPARIN SODIUM 10000 [USP'U]/100ML
18 INJECTION, SOLUTION INTRAVENOUS CONTINUOUS
Status: DISCONTINUED | OUTPATIENT
Start: 2018-01-06 | End: 2018-01-09

## 2018-01-06 RX ORDER — TRAZODONE HYDROCHLORIDE 150 MG/1
150 TABLET ORAL NIGHTLY
Status: DISCONTINUED | OUTPATIENT
Start: 2018-01-06 | End: 2018-01-12 | Stop reason: HOSPADM

## 2018-01-06 RX ORDER — LANOLIN ALCOHOL/MO/W.PET/CERES
10 CREAM (GRAM) TOPICAL NIGHTLY PRN
Status: ON HOLD | COMMUNITY
End: 2018-09-27 | Stop reason: HOSPADM

## 2018-01-06 RX ORDER — LEVOFLOXACIN 750 MG/1
750 TABLET ORAL ONCE
Status: COMPLETED | OUTPATIENT
Start: 2018-01-06 | End: 2018-01-06

## 2018-01-06 RX ORDER — HEPARIN SODIUM 5000 [USP'U]/ML
80 INJECTION, SOLUTION INTRAVENOUS; SUBCUTANEOUS PRN
Status: DISCONTINUED | OUTPATIENT
Start: 2018-01-06 | End: 2018-01-06 | Stop reason: SDUPTHER

## 2018-01-06 RX ORDER — HEPARIN SODIUM 5000 [USP'U]/ML
80 INJECTION, SOLUTION INTRAVENOUS; SUBCUTANEOUS ONCE
Status: COMPLETED | OUTPATIENT
Start: 2018-01-06 | End: 2018-01-06

## 2018-01-06 RX ORDER — HEPARIN SODIUM 5000 [USP'U]/ML
40 INJECTION, SOLUTION INTRAVENOUS; SUBCUTANEOUS PRN
Status: DISCONTINUED | OUTPATIENT
Start: 2018-01-06 | End: 2018-01-09 | Stop reason: ALTCHOICE

## 2018-01-06 RX ORDER — DOCUSATE SODIUM 100 MG/1
100 CAPSULE, LIQUID FILLED ORAL DAILY
Status: ON HOLD | COMMUNITY
End: 2022-06-07

## 2018-01-06 RX ORDER — LEVOTHYROXINE SODIUM 0.12 MG/1
125 TABLET ORAL DAILY
Status: DISCONTINUED | OUTPATIENT
Start: 2018-01-07 | End: 2018-01-12 | Stop reason: HOSPADM

## 2018-01-06 RX ORDER — FUROSEMIDE 20 MG/1
20 TABLET ORAL DAILY
Status: DISCONTINUED | OUTPATIENT
Start: 2018-01-07 | End: 2018-01-12 | Stop reason: HOSPADM

## 2018-01-06 RX ORDER — SODIUM CHLORIDE 0.9 % (FLUSH) 0.9 %
10 SYRINGE (ML) INJECTION EVERY 12 HOURS SCHEDULED
Status: DISCONTINUED | OUTPATIENT
Start: 2018-01-06 | End: 2018-01-12 | Stop reason: HOSPADM

## 2018-01-06 RX ORDER — DOCUSATE SODIUM 100 MG/1
100 CAPSULE, LIQUID FILLED ORAL DAILY
Status: DISCONTINUED | OUTPATIENT
Start: 2018-01-07 | End: 2018-01-12 | Stop reason: HOSPADM

## 2018-01-06 RX ADMIN — IPRATROPIUM BROMIDE AND ALBUTEROL SULFATE 1 AMPULE: .5; 3 SOLUTION RESPIRATORY (INHALATION) at 16:13

## 2018-01-06 RX ADMIN — AZITHROMYCIN MONOHYDRATE 500 MG: 500 INJECTION, POWDER, LYOPHILIZED, FOR SOLUTION INTRAVENOUS at 21:10

## 2018-01-06 RX ADMIN — CEFTRIAXONE 1 G: 1 INJECTION, POWDER, FOR SOLUTION INTRAMUSCULAR; INTRAVENOUS at 22:24

## 2018-01-06 RX ADMIN — HEPARIN SODIUM 8600 UNITS: 5000 INJECTION, SOLUTION INTRAVENOUS; SUBCUTANEOUS at 18:48

## 2018-01-06 RX ADMIN — HEPARIN SODIUM AND DEXTROSE 18 UNITS/KG/HR: 10000; 5 INJECTION INTRAVENOUS at 18:49

## 2018-01-06 RX ADMIN — MIRTAZAPINE 15 MG: 15 TABLET, FILM COATED ORAL at 22:24

## 2018-01-06 RX ADMIN — METHYLPREDNISOLONE SODIUM SUCCINATE 125 MG: 125 INJECTION, POWDER, FOR SOLUTION INTRAMUSCULAR; INTRAVENOUS at 16:00

## 2018-01-06 RX ADMIN — TRAZODONE HYDROCHLORIDE 150 MG: 150 TABLET ORAL at 22:24

## 2018-01-06 RX ADMIN — SODIUM CHLORIDE, PRESERVATIVE FREE 10 ML: 5 INJECTION INTRAVENOUS at 21:10

## 2018-01-06 RX ADMIN — IOPAMIDOL 100 ML: 755 INJECTION, SOLUTION INTRAVENOUS at 17:29

## 2018-01-06 RX ADMIN — Medication 10 ML: at 17:30

## 2018-01-06 RX ADMIN — LEVOFLOXACIN 750 MG: 750 TABLET, FILM COATED ORAL at 20:28

## 2018-01-06 ASSESSMENT — PULMONARY FUNCTION TESTS: PEFR_L/MIN: 410

## 2018-01-06 ASSESSMENT — ENCOUNTER SYMPTOMS
CHEST TIGHTNESS: 1
EYES NEGATIVE: 1
WHEEZING: 1
COUGH: 1
GASTROINTESTINAL NEGATIVE: 1
SHORTNESS OF BREATH: 1

## 2018-01-06 ASSESSMENT — PAIN SCALES - GENERAL
PAINLEVEL_OUTOF10: 0
PAINLEVEL_OUTOF10: 0

## 2018-01-06 NOTE — H&P
Historical Provider, MD       Allergies:  Review of patient's allergies indicates no known allergies. Social History:      The patient currently lives home    TOBACCO:   reports that he quit smoking about 18 years ago. His smoking use included Cigarettes. He started smoking about 17 years ago. He has quit using smokeless tobacco.  ETOH:   reports that he does not drink alcohol. Family History:       Positive as follows:        Problem Relation Age of Onset    Other Mother 80     Old Age    Stroke Father 45       REVIEW OF SYSTEMS:   Pertinent positives as noted in the HPI. All other systems reviewed and negative. PHYSICAL EXAM:    /84   Pulse 99   Temp 97.6 °F (36.4 °C) (Oral)   Resp 20   Ht 6' (1.829 m)   Wt 237 lb (107.5 kg)   SpO2 94%   BMI 32.14 kg/m²     General appearance:  No apparent distress, appears stated age and cooperative. HEENT:  Normal cephalic, atraumatic without obvious deformity. Pupils equal, round, and reactive to light. Extra ocular muscles intact. Conjunctivae/corneas clear. Neck: Supple, with full range of motion. No jugular venous distention. Trachea midline. Respiratory:  labored respiratory effort. diminished  Cardiovascular:  Regular rate and rhythm with normal S1/S2 without murmurs, rubs or gallops. Abdomen: Soft, non-tender, non-distended with normal bowel sounds. Musculoskeletal:  Positive lower extremity edema bilaterally. Full range of motion without deformity. Skin: Skin color, texture, turgor normal.  No rashes or lesions. Neurologic:  Neurovascularly intact without any focal sensory/motor deficits.  Cranial nerves: II-XII intact, grossly non-focal.  Psychiatric:  Alert and oriented, thought content appropriate, normal insight  Capillary Refill: Brisk,< 3 seconds   Peripheral Pulses: +2 palpable, equal bilaterally       Labs:     Recent Labs      01/06/18   1605   WBC  7.2   HGB  13.6*   HCT  41.5*   PLT  142     Recent Labs      01/06/18   1605

## 2018-01-06 NOTE — ED PROVIDER NOTES
of systems was reviewed and negative. PAST MEDICAL HISTORY     Past Medical History:   Diagnosis Date    Abnormality of gait and mobility     Acute sinusitis     Anxiety     Aspiration into respiratory tract 11/10/2017    Aspiration pneumonia (HCC)     BPH (benign prostatic hyperplasia)     COPD (chronic obstructive pulmonary disease) ([facility] 12/5/2013    Debility     Elevated CK 12/30/2013    Foraminal stenosis of lumbosacral region 6/7/2016    GERD (gastroesophageal reflux disease) 12/11/2014    History of asthma 1/13/2014    HTN (hypertension) 1/13/2014    Hyperlipidemia     Hypothyroidism     Insomnia     Lower extremity weakness 1/24/2014    On home oxygen therapy     2L at night    Osteoarthritis of spine with radiculopathy, lumbar region 6/7/2016    Papillary thyroid carcinoma ([facility] [facility] 12/9/2015    Positive D dimer 12/5/2013    Sleep apnea 1/24/2014    Type 2 diabetes mellitus ([facility] [address] [facility]     Vitamin D deficiency          SURGICAL HISTORY       Past Surgical History:   Procedure Laterality Date    BRONCHOSCOPY N/A 11/11/2017    BRONCHOSCOPY FIBEROPTIC performed by Dominic England MD at [address] [facility] Right 11/14/2017    RIGHT THORACOTOMY WEDGE RESECTION FOR FOREIGN BODY AND FOB DOUBLE LUMEN (1ST CASE) performed by Brittani Carrasquillo MD at [address] [place] [address]  2006         CURRENT MEDICATIONS       Previous Medications    ALBUTEROL SULFATE  (90 BASE) MCG/ACT INHALER    Inhale 2 puffs into the lungs every 4 hours as needed for Wheezing    ASPIRIN 81 MG TABLET    Take 81 mg by mouth daily. BUDESONIDE-FORMOTEROL (SYMBICORT) 160-4.5 MCG/ACT AERO    Inhale 2 puffs into the lungs 2 times daily.     CHOLECALCIFEROL (VITAMIN D3) 2000 UNITS CAPS    Take 2,000 Units by mouth daily    DOCUSATE SODIUM (COLACE) 100 MG CAPSULE    Take 100 mg by mouth daily    FAMOTIDINE (PEPCID AC PO)    Take by mouth daily     FINASTERIDE (PROSCAR) 5 MG TABLET    Take 1 is to get stronger and return home. SCREENINGS    Mariya Coma Scale  Eye Opening: Spontaneous  Best Verbal Response: Oriented  Best Motor Response: Obeys commands  [place] Coma Scale Score: 15        PHYSICAL EXAM    (up to 7 for level 4, 8 or more for level 5)     ED Triage Vitals [01/06/18 1545]   BP Temp Temp Source Pulse Resp SpO2 Height Weight   (!) 144/84 97.6 °F (36.4 °C) Oral 98 (!) 32 96 % 6' (1.829 m) 237 lb (107.5 kg)       Physical Exam   Constitutional: He is oriented to person, place, and time. He appears well-developed and well-nourished. He is active. HENT:   Head: Normocephalic and atraumatic. Eyes: Conjunctivae and EOM are normal. Pupils are equal, round, and reactive to light. Neck: Normal range of motion. Neck supple. Cardiovascular: Normal rate, normal heart sounds and intact distal pulses. Pulmonary/Chest: He has decreased breath sounds. He has wheezes. He has rhonchi. Abdominal: Soft. Bowel sounds are normal.   Musculoskeletal: He exhibits edema. He exhibits no deformity. Neurological: He is alert and oriented to person, place, and time. He has normal reflexes. Skin: Skin is warm and dry. Psychiatric: He has a normal mood and affect. His behavior is normal. Judgment and thought content normal.   Nursing note and vitals reviewed. Exam of extremities: pedal edema 3 + left leg ,  Trac in the right leg        DIAGNOSTIC RESULTS     EKG:       EKG: normal sinus rhythm, Rate 50, left axis deviation, right incomplete bundle branch block, no ST or T-wave changes. RADIOLOGY:   Non-plain film images such as CT, Ultrasound and MRI are read by the radiologist. Plain radiographic images are visualized and preliminarily interpreted by the emergency physician with the below findings:        Interpretation per the Radiologist below, if available at the time of this note:    CTA Chest W WO  (PE study)   Final Result   1.  POSITIVE FOR BILATERAL PULMONARY EMBOLI AS DESCRIBED IN

## 2018-01-06 NOTE — ED TRIAGE NOTES
Pt to ER with c/o shortness of breath x 3 hours. Pt states he has cough with clear phlegm production. Lungs clear/diminished. Pt has 1 + pitting edema to left leg.

## 2018-01-07 ENCOUNTER — APPOINTMENT (OUTPATIENT)
Dept: CT IMAGING | Age: 78
DRG: 299 | End: 2018-01-07
Payer: MEDICARE

## 2018-01-07 LAB
ALBUMIN SERPL-MCNC: 2.9 G/DL (ref 3.9–4.9)
ALP BLD-CCNC: 75 U/L (ref 35–104)
ALT SERPL-CCNC: 13 U/L (ref 0–41)
APTT: 40.7 SEC (ref 21.6–35.4)
APTT: 46.4 SEC (ref 21.6–35.4)
APTT: 75.9 SEC (ref 21.6–35.4)
AST SERPL-CCNC: 20 U/L (ref 0–40)
BASOPHILS ABSOLUTE: 0 K/UL (ref 0–0.2)
BASOPHILS RELATIVE PERCENT: 0.7 %
BILIRUB SERPL-MCNC: 0.2 MG/DL (ref 0–1.2)
BILIRUBIN DIRECT: 0 MG/DL (ref 0–0.3)
BILIRUBIN, INDIRECT: 0.2 MG/DL (ref 0–0.6)
EOSINOPHILS ABSOLUTE: 0 K/UL (ref 0–0.7)
EOSINOPHILS RELATIVE PERCENT: 0 %
HCT VFR BLD CALC: 38.7 % (ref 42–52)
HEMOGLOBIN: 12.7 G/DL (ref 14–18)
INR BLD: 1.1
LYMPHOCYTES ABSOLUTE: 0.8 K/UL (ref 1–4.8)
LYMPHOCYTES RELATIVE PERCENT: 14.3 %
MCH RBC QN AUTO: 30.2 PG (ref 27–31.3)
MCHC RBC AUTO-ENTMCNC: 32.9 % (ref 33–37)
MCV RBC AUTO: 91.8 FL (ref 80–100)
MONOCYTES ABSOLUTE: 0.2 K/UL (ref 0.2–0.8)
MONOCYTES RELATIVE PERCENT: 2.9 %
NEUTROPHILS ABSOLUTE: 4.7 K/UL (ref 1.4–6.5)
NEUTROPHILS RELATIVE PERCENT: 82.1 %
PDW BLD-RTO: 15.3 % (ref 11.5–14.5)
PLATELET # BLD: 140 K/UL (ref 130–400)
PROTHROMBIN TIME: 11.5 SEC (ref 8.1–13.7)
RBC # BLD: 4.21 M/UL (ref 4.7–6.1)
TOTAL PROTEIN: 6.8 G/DL (ref 6.4–8.1)
TROPONIN: 0.04 NG/ML (ref 0–0.01)
WBC # BLD: 5.8 K/UL (ref 4.8–10.8)

## 2018-01-07 PROCEDURE — 2500000003 HC RX 250 WO HCPCS

## 2018-01-07 PROCEDURE — 6370000000 HC RX 637 (ALT 250 FOR IP): Performed by: INTERNAL MEDICINE

## 2018-01-07 PROCEDURE — 36415 COLL VENOUS BLD VENIPUNCTURE: CPT

## 2018-01-07 PROCEDURE — 2700000000 HC OXYGEN THERAPY PER DAY

## 2018-01-07 PROCEDURE — 80076 HEPATIC FUNCTION PANEL: CPT

## 2018-01-07 PROCEDURE — 6360000002 HC RX W HCPCS: Performed by: PHYSICIAN ASSISTANT

## 2018-01-07 PROCEDURE — 85610 PROTHROMBIN TIME: CPT

## 2018-01-07 PROCEDURE — 6360000002 HC RX W HCPCS: Performed by: FAMILY MEDICINE

## 2018-01-07 PROCEDURE — 94760 N-INVAS EAR/PLS OXIMETRY 1: CPT

## 2018-01-07 PROCEDURE — 2000000000 HC ICU R&B

## 2018-01-07 PROCEDURE — 6360000004 HC RX CONTRAST MEDICATION: Performed by: INTERNAL MEDICINE

## 2018-01-07 PROCEDURE — 94640 AIRWAY INHALATION TREATMENT: CPT

## 2018-01-07 PROCEDURE — 74177 CT ABD & PELVIS W/CONTRAST: CPT

## 2018-01-07 PROCEDURE — 99223 1ST HOSP IP/OBS HIGH 75: CPT | Performed by: INTERNAL MEDICINE

## 2018-01-07 PROCEDURE — 84484 ASSAY OF TROPONIN QUANT: CPT

## 2018-01-07 PROCEDURE — 2580000003 HC RX 258: Performed by: PHYSICIAN ASSISTANT

## 2018-01-07 PROCEDURE — 85025 COMPLETE CBC W/AUTO DIFF WBC: CPT

## 2018-01-07 PROCEDURE — 85730 THROMBOPLASTIN TIME PARTIAL: CPT

## 2018-01-07 PROCEDURE — 2580000003 HC RX 258: Performed by: INTERNAL MEDICINE

## 2018-01-07 RX ORDER — SODIUM CHLORIDE 0.9 % (FLUSH) 0.9 %
10 SYRINGE (ML) INJECTION PRN
Status: DISCONTINUED | OUTPATIENT
Start: 2018-01-07 | End: 2018-01-12 | Stop reason: HOSPADM

## 2018-01-07 RX ADMIN — METOPROLOL SUCCINATE 25 MG: 25 TABLET, FILM COATED, EXTENDED RELEASE ORAL at 08:15

## 2018-01-07 RX ADMIN — SODIUM CHLORIDE, PRESERVATIVE FREE 10 ML: 5 INJECTION INTRAVENOUS at 20:49

## 2018-01-07 RX ADMIN — Medication 400 MG: at 08:15

## 2018-01-07 RX ADMIN — POTASSIUM CHLORIDE 20 MEQ: 750 TABLET, FILM COATED, EXTENDED RELEASE ORAL at 08:16

## 2018-01-07 RX ADMIN — BARIUM SULFATE 450 ML: 21 SUSPENSION ORAL at 13:28

## 2018-01-07 RX ADMIN — LEVOTHYROXINE SODIUM 125 MCG: 125 TABLET ORAL at 07:44

## 2018-01-07 RX ADMIN — Medication 2 PUFF: at 09:09

## 2018-01-07 RX ADMIN — ASPIRIN 81 MG 81 MG: 81 TABLET ORAL at 08:16

## 2018-01-07 RX ADMIN — MIRTAZAPINE 15 MG: 15 TABLET, FILM COATED ORAL at 23:06

## 2018-01-07 RX ADMIN — SODIUM CHLORIDE, PRESERVATIVE FREE 10 ML: 5 INJECTION INTRAVENOUS at 13:40

## 2018-01-07 RX ADMIN — IOPAMIDOL 100 ML: 755 INJECTION, SOLUTION INTRAVENOUS at 13:28

## 2018-01-07 RX ADMIN — VITAMIN D, TAB 1000IU (100/BT) 2000 UNITS: 25 TAB at 08:15

## 2018-01-07 RX ADMIN — Medication 2 PUFF: at 17:28

## 2018-01-07 RX ADMIN — PANTOPRAZOLE SODIUM 40 MG: 40 TABLET, DELAYED RELEASE ORAL at 07:44

## 2018-01-07 RX ADMIN — SODIUM CHLORIDE, PRESERVATIVE FREE 10 ML: 5 INJECTION INTRAVENOUS at 08:16

## 2018-01-07 RX ADMIN — FUROSEMIDE 20 MG: 20 TABLET ORAL at 08:15

## 2018-01-07 RX ADMIN — DOCUSATE SODIUM 100 MG: 100 CAPSULE, LIQUID FILLED ORAL at 08:15

## 2018-01-07 RX ADMIN — Medication 2 PUFF: at 23:58

## 2018-01-07 RX ADMIN — TRAZODONE HYDROCHLORIDE 150 MG: 150 TABLET ORAL at 23:06

## 2018-01-07 RX ADMIN — TAMSULOSIN HYDROCHLORIDE 0.4 MG: 0.4 CAPSULE ORAL at 08:22

## 2018-01-07 RX ADMIN — HEPARIN SODIUM 4150 UNITS: 5000 INJECTION, SOLUTION INTRAVENOUS; SUBCUTANEOUS at 20:48

## 2018-01-07 RX ADMIN — FINASTERIDE 5 MG: 5 TABLET, FILM COATED ORAL at 08:16

## 2018-01-07 RX ADMIN — HEPARIN SODIUM AND DEXTROSE 12 UNITS/KG/HR: 10000; 5 INJECTION INTRAVENOUS at 13:41

## 2018-01-07 ASSESSMENT — PAIN SCALES - GENERAL
PAINLEVEL_OUTOF10: 0
PAINLEVEL_OUTOF10: 0

## 2018-01-07 NOTE — CONSULTS
Inpatient consult to Cardiology  Consult performed by: Cat Kim  Consult ordered by: Lynette Daniels              [address] [facility] Consult Note        Date of Consult:  2018    Patient:  Krysta Jimenez    :   CSN: [id number]    Consulting Cardiologist: Roxi White MD    Primary Cardiologist: Roxi White MD    Requesting Physician:  Dom Muñoz DO      Reason for Consult:  Elevated Troponin in face of Pulmonary Embolus      Assessment:    1. Acute SOB  2. Hypoxia  3. Left Lower Extremity Edema  4. Acute Large Bilateral Pulmonary Emboli  5. Left Leg DVT ( w/ Baker cyst noted on US )  6. Positive Troponins, Probably Due to #3, RO ACS / MI  7. No Prior CAD Hx  8. Negative Myoview Stress   9. Prior Normal LV function  10. HLP  11. HTN  12. DM, Type 2  13. Recent Foreign Body Aspiration ( [facility] ) post Resection Thoracotomy   14. COPD  13. Past Tobacco use  16. Hx Papillary Thyroid Ca s/p Remote Thyroidectomy  17. BPH  18. GERD  19. DJD  20. Lumbar Spinal Stenosis  21. Hx Elevated PSA    Plan:    1. Cardiac Supportive Care. 2. Pulmonary and Medicine care. 3. Serial Cardiac Enzymes and Telemetry  4. Echocardiogram  5. Lexiscan Myoview Stress in AM, Possible Cardiac Cath if needed  6. Possible IVC Filter Placement [facility] ) if suggested by Pulmonary  7. Heparin / Lovenox for now. 8. ASA / Metoprolol / Nitrates for now. 9. Further recommendations to follow. 10. See Orders. HISTORY OF PRESENT ILLNESS:      Krysta Jimenez is a pleasant 69 y.o. male who presented to [facility] emergency room [facility] 2018 was sudden onset of shortness of breath associated with documented hypoxia. Patient noted by CT angiography to have large bilateral pulmonary emboli and started on appropriate medical care by pulmonary medicine and emergency room.   Subsequent cardiac enzymes have elevated we were asked to see from a cardiovascular standpoint for thyroid carcinoma ([facility] [address] 12/9/2015    Positive D dimer 12/5/2013    Sleep apnea 1/24/2014    Type 2 diabetes mellitus ([facility] [address]     Vitamin D deficiency        Past Surgical History:   Procedure Laterality Date    BRONCHOSCOPY N/A 11/11/2017    BRONCHOSCOPY FIBEROPTIC performed by Loulou Horta MD at [address] [facility] [address] Right 11/14/2017    RIGHT THORACOTOMY WEDGE RESECTION FOR FOREIGN BODY AND FOB DOUBLE LUMEN (1ST CASE) performed by Isaura Bacon MD at [address] [place] [address]  2006       Prior to Admission medications    Medication Sig Start Date End Date Taking? Authorizing Provider   furosemide (LASIX) 20 MG tablet Take 20 mg by mouth daily   Yes Historical Provider, MD   Cholecalciferol (VITAMIN D3) 2000 units CAPS Take 2,000 Units by mouth daily   Yes Historical Provider, MD   albuterol sulfate  (90 Base) MCG/ACT inhaler Inhale 2 puffs into the lungs every 4 hours as needed for Wheezing   Yes Historical Provider, MD   melatonin 3 MG TABS tablet Take 10 mg by mouth nightly as needed   Yes Historical Provider, MD   magnesium 30 MG tablet Take 250 mg by mouth daily   Yes Historical Provider, MD   docusate sodium (COLACE) 100 MG capsule Take 100 mg by mouth daily   Yes Historical Provider, MD   potassium chloride (KLOR-CON) 10 MEQ extended release tablet Take 20 mEq by mouth daily   Yes Historical Provider, MD   LORazepam (ATIVAN) 0.5 MG tablet Take 0.5 mg by mouth every 4 hours as needed for Anxiety.    Yes Historical Provider, MD   mirtazapine (REMERON) 15 MG tablet Take 15 mg by mouth nightly   Yes Historical Provider, MD   traZODone (DESYREL) 150 MG tablet Take 1 tablet by mouth nightly 12/5/17  Yes Edie May MD   metoprolol succinate (TOPROL XL) 25 MG extended release tablet Take 1 tablet by mouth daily 12/5/17  Yes Edie May MD   finasteride (PROSCAR) 5 MG tablet Take 1 tablet by mouth daily 12/5/17  Yes Edie May MD 01/06/18  3:58 PM   Result Value Ref Range    Ventricular Rate 95 BPM    Atrial Rate 95 BPM    P-R Interval 172 ms    QRS Duration 94 ms    Q-T Interval 372 ms    QTc Calculation (Bazett) 467 ms    P Axis 27 degrees    R Axis -57 degrees    T Axis 59 degrees   Comprehensive Metabolic Panel    Collection Time: 01/06/18  4:05 PM   Result Value Ref Range    Sodium 139 132 - 144 mEq/L    Potassium 4.5 3.5 - 5.1 mEq/L    Chloride 98 98 - 107 mEq/L    CO2 28 22 - 29 mEq/L    Anion Gap 13 7 - 13 mEq/L    Glucose 95 74 - 109 mg/dL    BUN 17 8 - 23 mg/dL    CREATININE 1.11 0.70 - 1.20 mg/dL    GFR Non-African American >60.0 >60    GFR  >60.0 >60    Calcium 8.8 8.6 - 10.2 mg/dL    Total Protein 7.2 6.4 - 8.1 g/dL    Alb 3.4 (L) 3.9 - 4.9 g/dL    Total Bilirubin 0.3 0.0 - 1.2 mg/dL    Alkaline Phosphatase 84 35 - 104 U/L    ALT 15 0 - 41 U/L    AST 24 0 - 40 U/L    Globulin 3.8 (H) 2.3 - 3.5 g/dL   CBC Auto Differential    Collection Time: 01/06/18  4:05 PM   Result Value Ref Range    WBC 7.2 4.8 - 10.8 K/uL    RBC 4.48 (L) 4.70 - 6.10 M/uL    Hemoglobin 13.6 (L) 14.0 - 18.0 g/dL    Hematocrit 41.5 (L) 42.0 - 52.0 %    MCV 92.6 80.0 - 100.0 fL    MCH 30.3 27.0 - 31.3 pg    MCHC 32.7 (L) 33.0 - 37.0 %    RDW 15.4 (H) 11.5 - 14.5 %    Platelets 482 421 - 672 K/uL    Neutrophils % 75.3 %    Lymphocytes % 16.3 %    Monocytes % 6.6 %    Eosinophils % 1.4 %    Basophils % 0.4 %    Neutrophils # 5.4 1.4 - 6.5 K/uL    Lymphocytes # 1.2 1.0 - 4.8 K/uL    Monocytes # 0.5 0.2 - 0.8 K/uL    Eosinophils # 0.1 0.0 - 0.7 K/uL    Basophils # 0.0 0.0 - 0.2 K/uL   Magnesium    Collection Time: 01/06/18  4:05 PM   Result Value Ref Range    Magnesium 2.4 (H) 1.7 - 2.3 mg/dL   Rapid Influenza A/B Antigens    Collection Time: 01/06/18  4:21 PM   Result Value Ref Range    Rapid Influenza A Ag Negative Negative    Rapid Influenza B Ag Negative Negative   APTT    Collection Time: 01/06/18  5:59 PM   Result Value Ref Range    aPTT 30.2

## 2018-01-07 NOTE — PROGRESS NOTES
INCIDENTAL FINDING IS THE PRESENCE OF A LEFT POPLITEAL CYST (BAKER'S CYST). CTA Chest W WO  (PE study)   Final Result   1. POSITIVE FOR BILATERAL PULMONARY EMBOLI AS DESCRIBED IN BODY OF REPORT. 2. SEVERE COPD WITH INNUMERABLE SMALL PULMONARY BULLAE. 3. POSTOPERATIVE CHANGES INVOLVING THE RIGHT LOWER LOBE. 4. SUBSEGMENTAL ATELECTASIS AND SMALL PULMONARY INFILTRATE IN REMAINING PORTION OF RLL. 5. SMALL RIGHT PLEURAL EFFUSION. All CT scans at this facility use dose modulation, iterative reconstruction, and/or weight based dosing when appropriate to reduce radiation dose to as low as reasonably achievable. XR CHEST PORTABLE    (Results Pending)           Assessment/Plan:    Active Hospital Problems    Diagnosis Date Noted    Pulmonary embolism, bilateral ([address] [I26.99] 2018        # B/L acute submassive pulmonary embolism and acute LLE DVT- started on heparin gtt, hemodynamically stable without hypotension, mild trop elevation, BNP low 200's  - resume heparin gtt  - will monitor in ICU today, if continued to be stable, will move to floor tomorrow  - pulmonology consult   - pt is not likely to be switched to Xarelto (his wife  of a bleeding complication while on Xarelto)    # acute Left femoral DVT- as above   # elevated troponin- no chest pain, likely related to PE, will consult cardiology and obtain an echo  # HTN/ HLD/ COPD/ GEORGES/ hypothyroid resume home meds  # Dispo- ICU for today, possible floor transfer tomorrow     Additional work up or/and treatment plan may be added today or then after based on clinical progression. I am managing a portion of pt care. Some medical issues are handled by other specialists. Additional work up and treatment should be done in out pt setting by pt PCP and other out pt providers. In addition to examining and evaluating pt, I spent additional time explaining care, normal and abnormal findings, and treatment plan.  All of pt questions were

## 2018-01-07 NOTE — PROGRESS NOTES
Alcira Sheldon is a 69 y.o. male patient.     Current Facility-Administered Medications   Medication Dose Route Frequency Provider Last Rate Last Dose    sodium chloride flush 0.9 % injection 10 mL  10 mL Intravenous PRN Holly Terrazas MD   10 mL at 01/06/18 1730    heparin 25,000 units in dextrose 5% 250 mL infusion  18 Units/kg/hr Intravenous Continuous Holly Terrazas MD 12.9 mL/hr at 01/07/18 0614 12 Units/kg/hr at 01/07/18 0614    sodium chloride flush 0.9 % injection 10 mL  10 mL Intravenous 2 times per day INGRID Foy   10 mL at 01/06/18 2110    sodium chloride flush 0.9 % injection 10 mL  10 mL Intravenous PRN Sen Foy        acetaminophen (TYLENOL) tablet 650 mg  650 mg Oral Q4H PRN INGRID Foy        magnesium hydroxide (MILK OF MAGNESIA) 400 MG/5ML suspension 30 mL  30 mL Oral Daily PRN INGRID Foy        ondansetron [facility]) injection 4 mg  4 mg Intravenous Q6H PRN INGRID Sanz        heparin (porcine) injection 8,300 Units  80 Units/kg Intravenous PRN INGRID Foy        heparin (porcine) injection 4,150 Units  40 Units/kg Intravenous PRN INGRID Foy        cefTRIAXone (ROCEPHIN) 1 g in sterile water 10 mL IV syringe  1 g Intravenous Q24H INGRID Sanz   1 g at 01/06/18 2224    azithromycin (ZITHROMAX) 500 mg in D5W 250ml addavial  500 mg Intravenous Q24H Sen Sanz   Stopped at 01/06/18 2222    mometasone-formoterol (DULERA) 200-5 MCG/ACT inhaler 2 puff  2 puff Inhalation BID Swapnil Kim MD        traZODone (DESYREL) tablet 150 mg  150 mg Oral Nightly Swapnil Kim MD   150 mg at 01/06/18 2224    metoprolol succinate (TOPROL XL) extended release tablet 25 mg  25 mg Oral Daily Swapnil Kim MD        finasteride (PROSCAR) tablet 5 mg  5 mg Oral Daily Swapnil Kim MD        tamsulosin [facility]) capsule 0.4 mg  0.4 mg Oral Nightly Swapnil Kim MD

## 2018-01-07 NOTE — ONCOLOGY
Last Dose    sodium chloride flush 0.9 % injection 10 mL  10 mL Intravenous PRN Roberto Terrazas MD   10 mL at 01/06/18 1730    heparin 25,000 units in dextrose 5% 250 mL infusion  18 Units/kg/hr Intravenous Continuous Roberto Terrazas MD 12.9 mL/hr at 01/07/18 0614 12 Units/kg/hr at 01/07/18 0614    sodium chloride flush 0.9 % injection 10 mL  10 mL Intravenous 2 times per day INGRID Lemus   10 mL at 01/07/18 0816    sodium chloride flush 0.9 % injection 10 mL  10 mL Intravenous PRN Sen Lemus        acetaminophen (TYLENOL) tablet 650 mg  650 mg Oral Q4H PRN INGRID Lemus        magnesium hydroxide (MILK OF MAGNESIA) 400 MG/5ML suspension 30 mL  30 mL Oral Daily PRN INGRID Lemus        ondansetron [facility]) injection 4 mg  4 mg Intravenous Q6H PRN INGRID Bundy        heparin (porcine) injection 8,300 Units  80 Units/kg Intravenous PRN INGRID Lemus        heparin (porcine) injection 4,150 Units  40 Units/kg Intravenous PRN INGRID Lemus        cefTRIAXone (ROCEPHIN) 1 g in sterile water 10 mL IV syringe  1 g Intravenous Q24H INGRID Bundy   1 g at 01/06/18 2224    azithromycin (ZITHROMAX) 500 mg in D5W 250ml addavial  500 mg Intravenous Q24H Sen Bundy   Stopped at 01/06/18 2222    mometasone-formoterol (DULERA) 200-5 MCG/ACT inhaler 2 puff  2 puff Inhalation BID Mateo Denver, MD   2 puff at 01/07/18 8599    traZODone (DESYREL) tablet 150 mg  150 mg Oral Nightly Mateo Denver, MD   150 mg at 01/06/18 2224    metoprolol succinate (TOPROL XL) extended release tablet 25 mg  25 mg Oral Daily Mateo Denver, MD   25 mg at 01/07/18 0815    finasteride (PROSCAR) tablet 5 mg  5 mg Oral Daily Mateo Denver, MD   5 mg at 01/07/18 0816    tamsulosin (FLOMAX) capsule 0.4 mg  0.4 mg Oral Nightly Mateo Denver, MD   0.4 mg at 01/07/18 7907    levothyroxine (SYNTHROID) tablet 125 mcg  125 mcg THROMBOSIS IN THE LOWER EXTREMITIES COMPARISON:  NONE AVAILABLE TECHNIQUE:  Sonographic imaging of the deep venous system of the bilateral  lower extremity was performed by a registered sonographer and the images were submitted for interpretation. FINDINGS: Right lower extremity: The right common femoral, superficial femoral, and popliteal veins demonstrate normal color flow, augmentation, and compressibility. No venous duplex ultrasound evidence of DVT in the right lower extremity. Left lower extremity: There is venous thrombus within the left femoral, popliteal and posterior tibial vein. The venous duplex ultrasound findings are compatible with deep venous thrombosis in the left lower extremity. Also, an incidental finding is the presence of a 1.2 x 5.0 cm left popliteal cyst (Baker's cyst). 1. POSITIVE FOR DEEP VENOUS THROMBOSIS IN THE LEFT LOWER EXTREMITY AS DESCRIBED. 2. NO EVIDENCE OF DEEP VENOUS THROMBOSIS WITHIN THE  RIGHT  LOWER EXTREMITY. 3. INCIDENTAL FINDING IS THE PRESENCE OF A LEFT POPLITEAL CYST (BAKER'S CYST). ASSESSMENT AND PLAN:  The patient had first lifetime left leg DVT and PE. He had near syncope and acute SOB for large bilateral PE yesterday. Some improvement of symptoms today. The patient has not been ill and no evidence of cancer. This may be a provoked PE as a result of recent lung surgery (developed swelling before discharge). I reassured the patient today. I suspect he should maintain Heparin for 1 more day and then switch to Xarelto tomorrow. I explained risks and benefits. He will need Xarelto for at least 6-12 months. I will add CT abdomen and pelvis to rule out carcinoma and thrombophilia work-up. Thanks. Will follow.     Electronically signed by Lisa Carr DO on 1/7/2018 at 11:26 AM

## 2018-01-07 NOTE — PROGRESS NOTES
Assessment complete. Patient is awake and A&Ox3. No complaints of pain. Patient left leg + 1 edema. Heparin infusing at 1290 units/hr from previous shift. Next PTT due at 12pm. Patient sitting up eating breakfast. Denies any other needs at this time.

## 2018-01-08 ENCOUNTER — APPOINTMENT (OUTPATIENT)
Dept: GENERAL RADIOLOGY | Age: 78
DRG: 299 | End: 2018-01-08
Payer: MEDICARE

## 2018-01-08 LAB
ALBUMIN SERPL-MCNC: 3.2 G/DL (ref 3.9–4.9)
ALP BLD-CCNC: 69 U/L (ref 35–104)
ALT SERPL-CCNC: 13 U/L (ref 0–41)
ANION GAP SERPL CALCULATED.3IONS-SCNC: 12 MEQ/L (ref 7–13)
APTT: 42.7 SEC (ref 21.6–35.4)
APTT: 43.7 SEC (ref 21.6–35.4)
APTT: 76.7 SEC (ref 21.6–35.4)
APTT: >124 SEC (ref 21.6–35.4)
AST SERPL-CCNC: 23 U/L (ref 0–40)
BASOPHILS ABSOLUTE: 0 K/UL (ref 0–0.2)
BASOPHILS RELATIVE PERCENT: 0.6 %
BILIRUB SERPL-MCNC: 0.2 MG/DL (ref 0–1.2)
BUN BLDV-MCNC: 17 MG/DL (ref 8–23)
C-REACTIVE PROTEIN, HIGH SENSITIVITY: 17.9 MG/L (ref 0–5)
CALCIUM SERPL-MCNC: 8.3 MG/DL (ref 8.6–10.2)
CHLORIDE BLD-SCNC: 101 MEQ/L (ref 98–107)
CO2: 27 MEQ/L (ref 22–29)
CREAT SERPL-MCNC: 1.19 MG/DL (ref 0.7–1.2)
EKG ATRIAL RATE: 95 BPM
EKG P AXIS: 27 DEGREES
EKG P-R INTERVAL: 172 MS
EKG Q-T INTERVAL: 372 MS
EKG QRS DURATION: 94 MS
EKG QTC CALCULATION (BAZETT): 467 MS
EKG R AXIS: -57 DEGREES
EKG T AXIS: 59 DEGREES
EKG VENTRICULAR RATE: 95 BPM
EOSINOPHILS ABSOLUTE: 0.2 K/UL (ref 0–0.7)
EOSINOPHILS RELATIVE PERCENT: 2.2 %
GFR AFRICAN AMERICAN: >60
GFR NON-AFRICAN AMERICAN: 59.4
GLOBULIN: 3.6 G/DL (ref 2.3–3.5)
GLUCOSE BLD-MCNC: 104 MG/DL (ref 74–109)
HCT VFR BLD CALC: 39.3 % (ref 42–52)
HEMOGLOBIN: 12.9 G/DL (ref 14–18)
LV EF: 60 %
LVEF MODALITY: NORMAL
LYMPHOCYTES ABSOLUTE: 1.6 K/UL (ref 1–4.8)
LYMPHOCYTES RELATIVE PERCENT: 21.5 %
MAGNESIUM: 2.4 MG/DL (ref 1.7–2.3)
MCH RBC QN AUTO: 30.1 PG (ref 27–31.3)
MCHC RBC AUTO-ENTMCNC: 32.8 % (ref 33–37)
MCV RBC AUTO: 91.8 FL (ref 80–100)
MONOCYTES ABSOLUTE: 0.5 K/UL (ref 0.2–0.8)
MONOCYTES RELATIVE PERCENT: 6.6 %
NEUTROPHILS ABSOLUTE: 5.1 K/UL (ref 1.4–6.5)
NEUTROPHILS RELATIVE PERCENT: 69.1 %
PDW BLD-RTO: 15.7 % (ref 11.5–14.5)
PLATELET # BLD: 124 K/UL (ref 130–400)
POTASSIUM SERPL-SCNC: 4.4 MEQ/L (ref 3.5–5.1)
RBC # BLD: 4.28 M/UL (ref 4.7–6.1)
SEDIMENTATION RATE, ERYTHROCYTE: 31 MM (ref 0–20)
SODIUM BLD-SCNC: 140 MEQ/L (ref 132–144)
TOTAL PROTEIN: 6.8 G/DL (ref 6.4–8.1)
TROPONIN: 0.07 NG/ML (ref 0–0.01)
WBC # BLD: 7.3 K/UL (ref 4.8–10.8)

## 2018-01-08 PROCEDURE — 99233 SBSQ HOSP IP/OBS HIGH 50: CPT | Performed by: INTERNAL MEDICINE

## 2018-01-08 PROCEDURE — 71046 X-RAY EXAM CHEST 2 VIEWS: CPT

## 2018-01-08 PROCEDURE — 2580000003 HC RX 258: Performed by: PHYSICIAN ASSISTANT

## 2018-01-08 PROCEDURE — 85730 THROMBOPLASTIN TIME PARTIAL: CPT

## 2018-01-08 PROCEDURE — 85635 REPTILASE TEST: CPT

## 2018-01-08 PROCEDURE — 93010 ELECTROCARDIOGRAM REPORT: CPT | Performed by: INTERNAL MEDICINE

## 2018-01-08 PROCEDURE — 85732 THROMBOPLASTIN TIME PARTIAL: CPT

## 2018-01-08 PROCEDURE — 81241 F5 GENE: CPT

## 2018-01-08 PROCEDURE — 93005 ELECTROCARDIOGRAM TRACING: CPT

## 2018-01-08 PROCEDURE — 85670 THROMBIN TIME PLASMA: CPT

## 2018-01-08 PROCEDURE — 85652 RBC SED RATE AUTOMATED: CPT

## 2018-01-08 PROCEDURE — 85610 PROTHROMBIN TIME: CPT

## 2018-01-08 PROCEDURE — 6370000000 HC RX 637 (ALT 250 FOR IP): Performed by: INTERNAL MEDICINE

## 2018-01-08 PROCEDURE — 85598 HEXAGNAL PHOSPH PLTLT NEUTRL: CPT

## 2018-01-08 PROCEDURE — 83735 ASSAY OF MAGNESIUM: CPT

## 2018-01-08 PROCEDURE — 85025 COMPLETE CBC W/AUTO DIFF WBC: CPT

## 2018-01-08 PROCEDURE — 6370000000 HC RX 637 (ALT 250 FOR IP): Performed by: PHYSICIAN ASSISTANT

## 2018-01-08 PROCEDURE — 86147 CARDIOLIPIN ANTIBODY EA IG: CPT

## 2018-01-08 PROCEDURE — 93306 TTE W/DOPPLER COMPLETE: CPT

## 2018-01-08 PROCEDURE — 94640 AIRWAY INHALATION TREATMENT: CPT

## 2018-01-08 PROCEDURE — 6360000002 HC RX W HCPCS: Performed by: FAMILY MEDICINE

## 2018-01-08 PROCEDURE — 84484 ASSAY OF TROPONIN QUANT: CPT

## 2018-01-08 PROCEDURE — 81240 F2 GENE: CPT

## 2018-01-08 PROCEDURE — 2700000000 HC OXYGEN THERAPY PER DAY

## 2018-01-08 PROCEDURE — 36415 COLL VENOUS BLD VENIPUNCTURE: CPT

## 2018-01-08 PROCEDURE — 85613 RUSSELL VIPER VENOM DILUTED: CPT

## 2018-01-08 PROCEDURE — 86141 C-REACTIVE PROTEIN HS: CPT

## 2018-01-08 PROCEDURE — 80053 COMPREHEN METABOLIC PANEL: CPT

## 2018-01-08 PROCEDURE — 2000000000 HC ICU R&B

## 2018-01-08 RX ADMIN — FUROSEMIDE 20 MG: 20 TABLET ORAL at 08:02

## 2018-01-08 RX ADMIN — METOPROLOL SUCCINATE 25 MG: 25 TABLET, FILM COATED, EXTENDED RELEASE ORAL at 08:02

## 2018-01-08 RX ADMIN — ACETAMINOPHEN 650 MG: 325 TABLET, FILM COATED ORAL at 16:04

## 2018-01-08 RX ADMIN — POTASSIUM CHLORIDE 20 MEQ: 750 TABLET, FILM COATED, EXTENDED RELEASE ORAL at 08:02

## 2018-01-08 RX ADMIN — TAMSULOSIN HYDROCHLORIDE 0.4 MG: 0.4 CAPSULE ORAL at 08:02

## 2018-01-08 RX ADMIN — HEPARIN SODIUM AND DEXTROSE 9 UNITS/KG/HR: 10000; 5 INJECTION INTRAVENOUS at 12:20

## 2018-01-08 RX ADMIN — Medication 2 PUFF: at 10:41

## 2018-01-08 RX ADMIN — ASPIRIN 81 MG 81 MG: 81 TABLET ORAL at 08:02

## 2018-01-08 RX ADMIN — VITAMIN D, TAB 1000IU (100/BT) 2000 UNITS: 25 TAB at 08:02

## 2018-01-08 RX ADMIN — FINASTERIDE 5 MG: 5 TABLET, FILM COATED ORAL at 08:02

## 2018-01-08 RX ADMIN — SODIUM CHLORIDE, PRESERVATIVE FREE 10 ML: 5 INJECTION INTRAVENOUS at 22:19

## 2018-01-08 RX ADMIN — SODIUM CHLORIDE, PRESERVATIVE FREE 10 ML: 5 INJECTION INTRAVENOUS at 08:02

## 2018-01-08 RX ADMIN — DOCUSATE SODIUM 100 MG: 100 CAPSULE, LIQUID FILLED ORAL at 08:02

## 2018-01-08 RX ADMIN — LEVOTHYROXINE SODIUM 125 MCG: 125 TABLET ORAL at 06:52

## 2018-01-08 RX ADMIN — TAMSULOSIN HYDROCHLORIDE 0.4 MG: 0.4 CAPSULE ORAL at 22:18

## 2018-01-08 RX ADMIN — PANTOPRAZOLE SODIUM 40 MG: 40 TABLET, DELAYED RELEASE ORAL at 06:52

## 2018-01-08 RX ADMIN — HEPARIN SODIUM AND DEXTROSE 12 UNITS/KG/HR: 10000; 5 INJECTION INTRAVENOUS at 09:13

## 2018-01-08 RX ADMIN — Medication 2 PUFF: at 20:41

## 2018-01-08 RX ADMIN — TRAZODONE HYDROCHLORIDE 150 MG: 150 TABLET ORAL at 22:18

## 2018-01-08 RX ADMIN — Medication 400 MG: at 08:02

## 2018-01-08 RX ADMIN — MIRTAZAPINE 15 MG: 15 TABLET, FILM COATED ORAL at 22:18

## 2018-01-08 ASSESSMENT — PAIN SCALES - GENERAL
PAINLEVEL_OUTOF10: 0
PAINLEVEL_OUTOF10: 8
PAINLEVEL_OUTOF10: 0
PAINLEVEL_OUTOF10: 0

## 2018-01-08 NOTE — FLOWSHEET NOTE
Shift assessment complete, VSS, afebrile. Pt stating STEWART and some SOB at rest, pt on 35% venti mask. No signs of distress or complaints of pain. Pt has been NPO since midnight for stress test today. Per Dr Jose J Lu pt is too unstable for stress test today, nuclear med notified. Skin intact, no redness. Electronically signed by Julio C Reddy RN on 1/8/2018 at 8:49 AM     Pt has been on 5LNC humidified for most of this shift and is tolerating well. Saturations decrease when getting up. Only complaint of pain was in the shoulder related to arthritis, pt medicated (see emar). Pt has been up to the chair today and tolerated well. Encouraged patient to reposition while in bed, pt refused turns every 2 hours, pt educated of risk for skin breakdown and risk of pressure ulcers.  Electronically signed by Julio C Reddy RN on 1/8/2018 at 5:35 PM

## 2018-01-08 NOTE — H&P
Provider, MD   aspirin 81 MG tablet Take 81 mg by mouth daily.     Yes Historical Provider, MD   Famotidine (PEPCID AC PO) Take by mouth daily        Historical Provider, MD            Allergies:  Review of patient's allergies indicates no known allergies.     Social History:       The patient currently lives home     TOBACCO:   reports that he quit smoking about 18 years ago. His smoking use included Cigarettes. He started smoking about 17 years ago. He has quit using smokeless tobacco.  ETOH:   reports that he does not drink alcohol.        Family History:        Positive as follows:     Family History              Problem Relation Age of Onset    Other Mother 80       Old Age    Stroke Father 45            REVIEW OF SYSTEMS:   Pertinent positives as noted in the HPI. All other systems reviewed and negative.     PHYSICAL EXAM:     /84   Pulse 99   Temp 97.6 °F (36.4 °C) (Oral)   Resp 20   Ht 6' (1.829 m)   Wt 237 lb (107.5 kg)   SpO2 94%   BMI 32.14 kg/m²      General appearance:  No apparent distress, appears stated age and cooperative. HEENT:  Normal cephalic, atraumatic without obvious deformity. Pupils equal, round, and reactive to light. Extra ocular muscles intact. Conjunctivae/corneas clear. Neck: Supple, with full range of motion. No jugular venous distention. Trachea midline. Respiratory:  labored respiratory effort. diminished  Cardiovascular:  Regular rate and rhythm with normal S1/S2 without murmurs, rubs or gallops. Abdomen: Soft, non-tender, non-distended with normal bowel sounds. Musculoskeletal:  Positive lower extremity edema bilaterally. Full range of motion without deformity. Skin: Skin color, texture, turgor normal.  No rashes or lesions. Neurologic:  Neurovascularly intact without any focal sensory/motor deficits.  Cranial nerves: II-XII intact, grossly non-focal.  Psychiatric:  Alert and oriented, thought content appropriate, normal insight  Capillary Refill: Brisk,< 3 seconds   Peripheral Pulses: +2 palpable, equal bilaterally         Labs:          Recent Labs      01/06/18   1605   WBC  7.2   HGB  13.6*   HCT  41.5*   PLT  142          Recent Labs      01/06/18   1605   NA  139   K  4.5   CL  98   CO2  28   BUN  17   CREATININE  1.11   CALCIUM  8.8          Recent Labs      01/06/18   1605   AST  24   ALT  15   BILITOT  0.3   ALKPHOS  84      No results for input(s): INR in the last 72 hours. No results for input(s): Donna Fix in the last 72 hours.     Urinalysis:      Lab Results   Component Value Date     NITRU Negative 11/18/2017     WBCUA 0-2 11/18/2017     BACTERIA Moderate 11/18/2017     RBCUA 5-10 11/18/2017     BLOODU LARGE 11/18/2017     SPECGRAV 1.027 11/18/2017     GLUCOSEU Negative 11/18/2017         Radiology:      CXR: I have reviewed the CXR with the following interpretation: pending  EKG:  I have reviewed the EKG with the following interpretation: pending     CTA Chest W WO  (PE study)   Final Result   1. POSITIVE FOR BILATERAL PULMONARY EMBOLI AS DESCRIBED IN BODY OF REPORT. 2. SEVERE COPD WITH INNUMERABLE SMALL PULMONARY BULLAE. 3. POSTOPERATIVE CHANGES INVOLVING THE RIGHT LOWER LOBE. 4. SUBSEGMENTAL ATELECTASIS AND SMALL PULMONARY INFILTRATE IN REMAINING PORTION OF RLL. 5. SMALL RIGHT PLEURAL EFFUSION.     All CT scans at this facility use dose modulation, iterative reconstruction, and/or weight based dosing when appropriate to reduce radiation dose to as low as reasonably achievable.           XR CHEST PORTABLE    (Results Pending)   US VENOUS DOPPLER LOWER EXTREMITIES BILATERAL    (Results Pending)         ASSESSMENT:          Active Hospital Problems     Diagnosis Date Noted    Pulmonary embolism, bilateral ([address] [I26.99] 01/06/2018         PLAN:           DVT Prophylaxis: heparin  Diet:    Code Status: Full Code     PT/OT Eval Status: eval and tx     1.  Bilateral pulmonary embolisms-multiple-will admit the patient to icu and start iv heparin. Consult hem/onc  2. Hypoxia-will consult Dr. Jonathan Brown [place]     Thank you Garrett Darby MD for the opportunity to be involved in this patient's care. If you have any questions or concerns please feel free to contact me.      [address] [place] [address]

## 2018-01-08 NOTE — PLAN OF CARE
Problem: Falls - Risk of  Goal: Absence of falls  Outcome: Met This Shift      Problem: Venous Thromboembolism:  Goal: Will show no signs or symptoms of venous thromboembolism  Will show no signs or symptoms of venous thromboembolism   Outcome: Ongoing    Goal: Absence of signs or symptoms of impaired coagulation  Absence of signs or symptoms of impaired coagulation   Outcome: Ongoing      Problem: Bleeding:  Goal: Will show no signs and symptoms of excessive bleeding  Will show no signs and symptoms of excessive bleeding   Outcome: Ongoing

## 2018-01-08 NOTE — PROGRESS NOTES
Labs      01/06/18   1605  01/07/18   0438  01/08/18   0312   NA  139   --   140   K  4.5   --   4.4   CL  98   --   101   CO2  28   --   27   BUN  17   --   17   CREATININE  1.11   --   1.19   GLUCOSE  95   --   104   CALCIUM  8.8   --   8.3*   PROT  7.2  6.8  6.8   LABALBU  3.4*  2.9*  3.2*   BILITOT  0.3  0.2  0.2   ALKPHOS  84  75  69   AST  24  20  23   ALT  15  13  13   LABGLOM  >60.0   --   59.4*   GFRAA  >60.0   --   >60.0   GLOB  3.8*   --   3.6*       MV Settings:          No results for input(s): PHART, JJU4MLY, PO2ART, RZF0KEZ, BEART, P5XPNJHT in the last 72 hours. O2 Device: Venturi mask  O2 Flow Rate (L/min): 35 L/min    DIET GENERAL;     MEDICATIONS during current hospitalization:    Continuous Infusions:   heparin (porcine) Stopped (01/08/18 1120)       Scheduled Meds:   sodium chloride flush  10 mL Intravenous 2 times per day    mometasone-formoterol  2 puff Inhalation BID    traZODone  150 mg Oral Nightly    metoprolol succinate  25 mg Oral Daily    finasteride  5 mg Oral Daily    tamsulosin  0.4 mg Oral Nightly    levothyroxine  125 mcg Oral Daily    mirtazapine  15 mg Oral Nightly    vitamin D  2,000 Units Oral Daily    magnesium oxide  400 mg Oral Daily    docusate sodium  100 mg Oral Daily    potassium chloride  20 mEq Oral Daily    aspirin  81 mg Oral Daily    furosemide  20 mg Oral Daily    pantoprazole  40 mg Oral QAM AC       PRN Meds:sodium chloride flush, sodium chloride flush, sodium chloride flush, acetaminophen, magnesium hydroxide, ondansetron, heparin (porcine), heparin (porcine), melatonin, LORazepam, albuterol sulfate HFA    Radiology  Xr Chest Standard (2 Vw)    Result Date: 1/8/2018  EXAMINATION: XR CHEST STANDARD TWO VW REASON FOR EXAM: Follow-up right lower lobe atelectasis FINDINGS: Frontal view and lateral view of the chest were obtained. The heart and mediastinum are unremarkable. No evidence of pulmonary vascular congestion.  Large cavity 16 cm in maximum ultimately required for definitive histological diagnosis. 4. Colonic diverticulosis without findings of diverticulitis. Otherwise bowel appears unremarkable. 5. Markedly enlarged prostate gland causing extrinsic \"mass effect\" on the floor the bladder. 6. Additional details can be found in the body of the report. All CT scans at this facility use dose modulation, iterative reconstruction, and/or weight based dosing when appropriate to reduce radiation dose to as low as reasonably achievable. Xr Chest Portable    Result Date: 1/8/2018  EXAMINATION: Portable AP ERECT view of the chest. CLINICAL HISTORY: Cough and sob . COMPARISONS: 12/15/2017 FINDINGS: Normal cardiac silhouette. There are atherosclerotic calcifications in the aortic arch. The right costophrenic angle is blunted secondary to a small to moderate size pleural effusion, similar to last exam. There are metallic clips in the right lung base. There is infiltrate/atelectasis in the right lung base, unchanged. Mild left basilar atelectasis or scarring, unchanged. No pneumothorax. There are mild degenerative changes in spine. RIGHT-SIDED PLEURAL EFFUSION WITH RIGHT BASILAR INFILTRATE/ATELECTASIS, UNCHANGED. MINIMAL LEFT BASILAR ATELECTASIS, UNCHANGED. Us Venous Doppler Lower Extremities Bilateral    Result Date: 1/7/2018  VENOUS DUPLEX ULTRASOUND OF THE BILATERAL LOWER EXTREMITY CLINICAL HISTORY:  BILATERAL POSTERIOR LEG PAIN AND SWELLING, PATIENT CURRENTLY WITH A PULMONARY EMBOLISM, EVALUATE FOR DEEP VENOUS THROMBOSIS IN THE LOWER EXTREMITIES COMPARISON:  NONE AVAILABLE TECHNIQUE:  Sonographic imaging of the deep venous system of the bilateral  lower extremity was performed by a registered sonographer and the images were submitted for interpretation. FINDINGS: Right lower extremity: The right common femoral, superficial femoral, and popliteal veins demonstrate normal color flow, augmentation, and compressibility.  No venous duplex ultrasound evidence of DVT in the right lower extremity. Left lower extremity: There is venous thrombus within the left femoral, popliteal and posterior tibial vein. The venous duplex ultrasound findings are compatible with deep venous thrombosis in the left lower extremity. Also, an incidental finding is the presence of a 1.2 x 5.0 cm left popliteal cyst (Baker's cyst). 1. POSITIVE FOR DEEP VENOUS THROMBOSIS IN THE LEFT LOWER EXTREMITY AS DESCRIBED. 2. NO EVIDENCE OF DEEP VENOUS THROMBOSIS WITHIN THE  RIGHT  LOWER EXTREMITY. 3. INCIDENTAL FINDING IS THE PRESENCE OF A LEFT POPLITEAL CYST (BAKER'S CYST). IMPRESSION AND SUGGESTION:  1. Acute pulmonary emboli, submassive, with evidence of left lower extremity deep venous thrombosis  2. Right lower lobe fibrosis, atelectasis following recent thoracotomy and wedge resection due to aspiration of foreign object  3. Small loculated medial pleural space with air or small loculated pneumothorax associated with recent thoracotomy as well. Overall x-ray findings suggest gradual improvement in aeration of the right lower lung  4. COPD with significant emphysema with no evidence of acute exacerbation at this point  5. Markedly enlarged prostate by CT of the abdomen, recent PSA done 3 days ago was only 3.5  I would keep patient in intensive care unit setting for monitoring given his acute decompensation last night.   I would also maintain heparin 1 more day due to the same reason otherwise starts her alto as recommended by hematology

## 2018-01-08 NOTE — PROGRESS NOTES
motion. No jugular venous distention. Trachea midline. Respiratory:  Normal respiratory effort. Clear to auscultation, bilaterally without Rales/Wheezes/Rhonchi. Cardiovascular: Regular rate and rhythm with normal S1/S2 without murmurs, rubs or gallops. Abdomen: Soft, non-tender, non-distended with normal bowel sounds. Musculoskeletal: No clubbing, cyanosis or edema bilaterally. Full range of motion without deformity. Skin: Skin color, texture, turgor normal.  No rashes or lesions. Neuro: Non Focal. Symetrical motor and tone. Nl Comprehension, Alert,awake and oriented. NL CN. Symetrical tone and reflexes. Psychiatric: Alert and oriented, thought content appropriate, normal insight  Capillary Refill: Brisk,< 3 seconds   Peripheral Pulses: +2 palpable, equal bilaterally       Labs:   Recent Labs      01/06/18 2038 01/07/18 0434 01/08/18   0312   WBC  8.0  5.8  7.3   HGB  13.1*  12.7*  12.9*   HCT  40.0*  38.7*  39.3*   PLT  140  140  124*     Recent Labs      01/06/18   1605  01/08/18   0312   NA  139  140   K  4.5  4.4   CL  98  101   CO2  28  27   BUN  17  17   CREATININE  1.11  1.19   CALCIUM  8.8  8.3*     Recent Labs      01/06/18   1605  01/07/18 0438  01/08/18   0312   AST  24  20  23   ALT  15  13  13   BILIDIR   --   0.0   --    BILITOT  0.3  0.2  0.2   ALKPHOS  84  75  69     Recent Labs      01/07/18 0433   INR  1.1     Recent Labs      01/06/18 2038 01/07/18 0433  01/08/18   0312   TROPONINI  0.067*  0.044*  0.071*       Urinalysis:      Lab Results   Component Value Date    NITRU Negative 11/18/2017    WBCUA 0-2 11/18/2017    BACTERIA Moderate 11/18/2017    RBCUA 5-10 11/18/2017    BLOODU LARGE 11/18/2017    SPECGRAV 1.027 11/18/2017    GLUCOSEU Negative 11/18/2017       Radiology:  XR CHEST STANDARD (2 VW)   Final Result   PERSISTENT CAVITY RIGHT BASE UNCHANGED FROM [facility] 6, 2018. NO ACUTE PROCESS IN THE LUNG FIELDS.          XR CHEST PORTABLE   Final Result   RIGHT-SIDED

## 2018-01-08 NOTE — PROGRESS NOTES
from a cardiovascular standpoint for further evaluation.     Patient was recently in the hospital November 2017 following aspiration of his dental [place].     Endoscopic retrieval resulted in partial thrombectomy and removal with prolonged a post procedural care. He recently developed a left leg edema which was treated with diuretics. This initially improved. However on the day of admission he had the sudden episode of shortness of breath and noticed on his pulse oximetry dropped to 67%. He therefore presented to the emergency room for further evaluation. CT angiography again noted large bilateral pulmonary emboli. Electrocardiogram noted sinus rhythm with incomplete right bundle branch block. Subsequent troponins were elevated concerning for possible acute coronary syndrome. Cardiac further evaluation was recommended.     Patient isn't currently on anticoagulation with heparin. He has some continued shortness of breath. He denies any chest pain orthopnea PND TIA or CVA symptomatology. Said no GI  or bleeding problems. Said no congestive heart failure.   He denies any fever or chills.     Cardiovascular and general review of systems otherwise negative     14 system review is otherwise negative other than noted above        OBJECTIVE:     MEDICATIONS:     Scheduled Meds:   sodium chloride flush  10 mL Intravenous 2 times per day    mometasone-formoterol  2 puff Inhalation BID    traZODone  150 mg Oral Nightly    metoprolol succinate  25 mg Oral Daily    finasteride  5 mg Oral Daily    tamsulosin  0.4 mg Oral Nightly    levothyroxine  125 mcg Oral Daily    mirtazapine  15 mg Oral Nightly    vitamin D  2,000 Units Oral Daily    magnesium oxide  400 mg Oral Daily    docusate sodium  100 mg Oral Daily    potassium chloride  20 mEq Oral Daily    aspirin  81 mg Oral Daily    furosemide  20 mg Oral Daily    pantoprazole  40 mg Oral QAM AC     Continuous Infusions:   heparin (porcine) 12 Units/kg/hr (01/08/18 0913)     PRN Meds:sodium chloride flush, sodium chloride flush, sodium chloride flush, acetaminophen, magnesium hydroxide, ondansetron, heparin (porcine), heparin (porcine), melatonin, LORazepam, albuterol sulfate HFA    PHYSICAL EXAM:    CURRENT VITALS: /76   Pulse 73   Temp 97.8 °F (36.6 °C) (Oral)   Resp 22   Ht 6' (1.829 m)   Wt 224 lb 6.9 oz (101.8 kg)   SpO2 98%   BMI 30.44 kg/m²     CONSTITUTIONAL:  awake, alert, cooperative, no apparent distress,   ENT:  Normocephalic, without obvious abnormality, atraumatic, sinuses nontender on palpation, external ears without lesions,  NECK:  Supple, symmetrical, trachea midline, no adenopathy, thyroid symmetric, not enlarged and no tenderness, skin normal  LUNGS:  No increased work of breathing, good air exchange, clear to auscultation bilaterally, no crackles or wheezing  CARDIOVASCULAR:  Normal apical impulse, regular rate and rhythm, normal S1 and S2,  and no murmur noted  ABDOMEN:  Obese, Normal bowel sounds, soft, non-distended, non-tender, no masses palpated, no hepatosplenomegally  EXTREMITIES:  No edema, Pulses strong throughout. NEUROLOGIC:  Awake, alert, oriented to name, place and time.  Following all commands and moving all extremties  SKIN:  no bruising or bleeding, normal skin color, texture, turgor and no rashes     Data:        LABS:      Recent Results (from the past 24 hour(s))   APTT    Collection Time: 01/07/18 11:34 AM   Result Value Ref Range    aPTT 46.4 (H) 21.6 - 35.4 sec   APTT    Collection Time: 01/07/18  7:29 PM   Result Value Ref Range    aPTT 40.7 (H) 21.6 - 35.4 sec   CBC Auto Differential    Collection Time: 01/08/18  3:12 AM   Result Value Ref Range    WBC 7.3 4.8 - 10.8 K/uL    RBC 4.28 (L) 4.70 - 6.10 M/uL    Hemoglobin 12.9 (L) 14.0 - 18.0 g/dL    Hematocrit 39.3 (L) 42.0 - 52.0 %    MCV 91.8 80.0 - 100.0 fL    MCH 30.1 27.0 - 31.3 pg    MCHC 32.8 (L) 33.0 - 37.0 %    RDW 15.7 (H) 11.5 - 14.5 % Platelets 961 (L) 857 - 400 K/uL    Neutrophils % 69.1 %    Lymphocytes % 21.5 %    Monocytes % 6.6 %    Eosinophils % 2.2 %    Basophils % 0.6 %    Neutrophils # 5.1 1.4 - 6.5 K/uL    Lymphocytes # 1.6 1.0 - 4.8 K/uL    Monocytes # 0.5 0.2 - 0.8 K/uL    Eosinophils # 0.2 0.0 - 0.7 K/uL    Basophils # 0.0 0.0 - 0.2 K/uL   Comprehensive Metabolic Panel    Collection Time: 01/08/18  3:12 AM   Result Value Ref Range    Sodium 140 132 - 144 mEq/L    Potassium 4.4 3.5 - 5.1 mEq/L    Chloride 101 98 - 107 mEq/L    CO2 27 22 - 29 mEq/L    Anion Gap 12 7 - 13 mEq/L    Glucose 104 74 - 109 mg/dL    BUN 17 8 - 23 mg/dL    CREATININE 1.19 0.70 - 1.20 mg/dL    GFR Non-African American 59.4 (L) >60    GFR  >60.0 >60    Calcium 8.3 (L) 8.6 - 10.2 mg/dL    Total Protein 6.8 6.4 - 8.1 g/dL    Alb 3.2 (L) 3.9 - 4.9 g/dL    Total Bilirubin 0.2 0.0 - 1.2 mg/dL    Alkaline Phosphatase 69 35 - 104 U/L    ALT 13 0 - 41 U/L    AST 23 0 - 40 U/L    Globulin 3.6 (H) 2.3 - 3.5 g/dL   Magnesium    Collection Time: 01/08/18  3:12 AM   Result Value Ref Range    Magnesium 2.4 (H) 1.7 - 2.3 mg/dL   Sedimentation Rate    Collection Time: 01/08/18  3:12 AM   Result Value Ref Range    Sed Rate 31 (H) 0 - 20 mm   High sensitivity CRP    Collection Time: 01/08/18  3:12 AM   Result Value Ref Range    CRP High Sensitivity 17.9 (H) 0.0 - 5.0 mg/L   Troponin    Collection Time: 01/08/18  3:12 AM   Result Value Ref Range    Troponin 0.071 (HH) 0.000 - 0.010 ng/mL   APTT    Collection Time: 01/08/18  3:12 AM   Result Value Ref Range    aPTT >124.0 (HH) 21.6 - 35.4 sec   APTT    Collection Time: 01/08/18  8:36 AM   Result Value Ref Range    aPTT 76.7 (H) 21.6 - 35.4 sec            CXR:   RIGHT-SIDED PLEURAL EFFUSION WITH RIGHT BASILAR INFILTRATE/ATELECTASIS, UNCHANGED.       ECG:     Normal sinus rhythm  Left axis deviation  Incomplete right bundle branch block     CTA CHEST:  1.  POSITIVE FOR BILATERAL PULMONARY EMBOLI AS DESCRIBED IN BODY OF

## 2018-01-09 ENCOUNTER — APPOINTMENT (OUTPATIENT)
Dept: NUCLEAR MEDICINE | Age: 78
DRG: 299 | End: 2018-01-09
Payer: MEDICARE

## 2018-01-09 ENCOUNTER — APPOINTMENT (OUTPATIENT)
Dept: NON INVASIVE DIAGNOSTICS | Age: 78
DRG: 299 | End: 2018-01-09
Payer: MEDICARE

## 2018-01-09 LAB
ALBUMIN SERPL-MCNC: 3 G/DL (ref 3.9–4.9)
ALP BLD-CCNC: 69 U/L (ref 35–104)
ALT SERPL-CCNC: 15 U/L (ref 0–41)
ANION GAP SERPL CALCULATED.3IONS-SCNC: 15 MEQ/L (ref 7–13)
APTT: 50.8 SEC (ref 21.6–35.4)
AST SERPL-CCNC: 22 U/L (ref 0–40)
BASOPHILS ABSOLUTE: 0 K/UL (ref 0–0.2)
BASOPHILS RELATIVE PERCENT: 0.3 %
BILIRUB SERPL-MCNC: 0.2 MG/DL (ref 0–1.2)
BUN BLDV-MCNC: 24 MG/DL (ref 8–23)
CALCIUM SERPL-MCNC: 8.3 MG/DL (ref 8.6–10.2)
CHLORIDE BLD-SCNC: 100 MEQ/L (ref 98–107)
CO2: 25 MEQ/L (ref 22–29)
CREAT SERPL-MCNC: 1.07 MG/DL (ref 0.7–1.2)
EOSINOPHILS ABSOLUTE: 0.3 K/UL (ref 0–0.7)
EOSINOPHILS RELATIVE PERCENT: 4.3 %
GFR AFRICAN AMERICAN: >60
GFR NON-AFRICAN AMERICAN: >60
GLOBULIN: 3.4 G/DL (ref 2.3–3.5)
GLUCOSE BLD-MCNC: 106 MG/DL (ref 74–109)
HCT VFR BLD CALC: 38.9 % (ref 42–52)
HEMOGLOBIN: 12.8 G/DL (ref 14–18)
LYMPHOCYTES ABSOLUTE: 1.6 K/UL (ref 1–4.8)
LYMPHOCYTES RELATIVE PERCENT: 22.8 %
MAGNESIUM: 2.4 MG/DL (ref 1.7–2.3)
MCH RBC QN AUTO: 30.7 PG (ref 27–31.3)
MCHC RBC AUTO-ENTMCNC: 33 % (ref 33–37)
MCV RBC AUTO: 93 FL (ref 80–100)
MONOCYTES ABSOLUTE: 0.5 K/UL (ref 0.2–0.8)
MONOCYTES RELATIVE PERCENT: 7.3 %
NEUTROPHILS ABSOLUTE: 4.5 K/UL (ref 1.4–6.5)
NEUTROPHILS RELATIVE PERCENT: 65.3 %
PDW BLD-RTO: 15.7 % (ref 11.5–14.5)
PLATELET # BLD: 121 K/UL (ref 130–400)
POTASSIUM SERPL-SCNC: 4.3 MEQ/L (ref 3.5–5.1)
RBC # BLD: 4.18 M/UL (ref 4.7–6.1)
SODIUM BLD-SCNC: 140 MEQ/L (ref 132–144)
TOTAL PROTEIN: 6.4 G/DL (ref 6.4–8.1)
WBC # BLD: 6.8 K/UL (ref 4.8–10.8)

## 2018-01-09 PROCEDURE — 2700000000 HC OXYGEN THERAPY PER DAY

## 2018-01-09 PROCEDURE — 80053 COMPREHEN METABOLIC PANEL: CPT

## 2018-01-09 PROCEDURE — 85730 THROMBOPLASTIN TIME PARTIAL: CPT

## 2018-01-09 PROCEDURE — 36415 COLL VENOUS BLD VENIPUNCTURE: CPT

## 2018-01-09 PROCEDURE — 3430000000 HC RX DIAGNOSTIC RADIOPHARMACEUTICAL: Performed by: INTERNAL MEDICINE

## 2018-01-09 PROCEDURE — 6370000000 HC RX 637 (ALT 250 FOR IP): Performed by: INTERNAL MEDICINE

## 2018-01-09 PROCEDURE — 94640 AIRWAY INHALATION TREATMENT: CPT

## 2018-01-09 PROCEDURE — 2580000003 HC RX 258: Performed by: PHYSICIAN ASSISTANT

## 2018-01-09 PROCEDURE — 99233 SBSQ HOSP IP/OBS HIGH 50: CPT | Performed by: INTERNAL MEDICINE

## 2018-01-09 PROCEDURE — 85025 COMPLETE CBC W/AUTO DIFF WBC: CPT

## 2018-01-09 PROCEDURE — 6360000002 HC RX W HCPCS: Performed by: INTERNAL MEDICINE

## 2018-01-09 PROCEDURE — 83735 ASSAY OF MAGNESIUM: CPT

## 2018-01-09 PROCEDURE — 2580000003 HC RX 258: Performed by: FAMILY MEDICINE

## 2018-01-09 PROCEDURE — 78452 HT MUSCLE IMAGE SPECT MULT: CPT

## 2018-01-09 PROCEDURE — 2000000000 HC ICU R&B

## 2018-01-09 PROCEDURE — 93017 CV STRESS TEST TRACING ONLY: CPT

## 2018-01-09 PROCEDURE — A9502 TC99M TETROFOSMIN: HCPCS | Performed by: INTERNAL MEDICINE

## 2018-01-09 PROCEDURE — 6360000002 HC RX W HCPCS: Performed by: FAMILY MEDICINE

## 2018-01-09 RX ORDER — POLYETHYLENE GLYCOL 3350 17 G/17G
17 POWDER, FOR SOLUTION ORAL DAILY
Status: DISCONTINUED | OUTPATIENT
Start: 2018-01-09 | End: 2018-01-12 | Stop reason: HOSPADM

## 2018-01-09 RX ORDER — SENNA AND DOCUSATE SODIUM 50; 8.6 MG/1; MG/1
2 TABLET, FILM COATED ORAL DAILY PRN
Status: DISCONTINUED | OUTPATIENT
Start: 2018-01-09 | End: 2018-01-12 | Stop reason: HOSPADM

## 2018-01-09 RX ADMIN — RIVAROXABAN 15 MG: 15 TABLET, FILM COATED ORAL at 14:13

## 2018-01-09 RX ADMIN — Medication 400 MG: at 14:02

## 2018-01-09 RX ADMIN — POTASSIUM CHLORIDE 20 MEQ: 750 TABLET, FILM COATED, EXTENDED RELEASE ORAL at 14:01

## 2018-01-09 RX ADMIN — Medication 2 PUFF: at 20:21

## 2018-01-09 RX ADMIN — ASPIRIN 81 MG 81 MG: 81 TABLET ORAL at 14:00

## 2018-01-09 RX ADMIN — REGADENOSON 0.4 MG: 0.08 INJECTION, SOLUTION INTRAVENOUS at 11:59

## 2018-01-09 RX ADMIN — SODIUM CHLORIDE, PRESERVATIVE FREE 10 ML: 5 INJECTION INTRAVENOUS at 21:41

## 2018-01-09 RX ADMIN — SODIUM CHLORIDE, PRESERVATIVE FREE 10 ML: 5 INJECTION INTRAVENOUS at 09:00

## 2018-01-09 RX ADMIN — MIRTAZAPINE 15 MG: 15 TABLET, FILM COATED ORAL at 21:40

## 2018-01-09 RX ADMIN — FUROSEMIDE 20 MG: 20 TABLET ORAL at 14:02

## 2018-01-09 RX ADMIN — POLYETHYLENE GLYCOL 3350 17 G: 17 POWDER, FOR SOLUTION ORAL at 14:03

## 2018-01-09 RX ADMIN — TRAZODONE HYDROCHLORIDE 150 MG: 150 TABLET ORAL at 21:40

## 2018-01-09 RX ADMIN — HEPARIN SODIUM AND DEXTROSE 970 UNITS/HR: 10000; 5 INJECTION INTRAVENOUS at 05:43

## 2018-01-09 RX ADMIN — TETROFOSMIN 34.8 MILLICURIE: 0.23 INJECTION, POWDER, LYOPHILIZED, FOR SOLUTION INTRAVENOUS at 11:57

## 2018-01-09 RX ADMIN — PANTOPRAZOLE SODIUM 40 MG: 40 TABLET, DELAYED RELEASE ORAL at 14:01

## 2018-01-09 RX ADMIN — TAMSULOSIN HYDROCHLORIDE 0.4 MG: 0.4 CAPSULE ORAL at 21:40

## 2018-01-09 RX ADMIN — DOCUSATE SODIUM 100 MG: 100 CAPSULE, LIQUID FILLED ORAL at 14:01

## 2018-01-09 RX ADMIN — Medication 2 PUFF: at 09:57

## 2018-01-09 RX ADMIN — TETROFOSMIN 11.2 MILLICURIE: 0.23 INJECTION, POWDER, LYOPHILIZED, FOR SOLUTION INTRAVENOUS at 09:20

## 2018-01-09 RX ADMIN — FINASTERIDE 5 MG: 5 TABLET, FILM COATED ORAL at 14:02

## 2018-01-09 RX ADMIN — VITAMIN D, TAB 1000IU (100/BT) 2000 UNITS: 25 TAB at 14:03

## 2018-01-09 RX ADMIN — Medication 20 ML: at 09:31

## 2018-01-09 RX ADMIN — METOPROLOL SUCCINATE 25 MG: 25 TABLET, FILM COATED, EXTENDED RELEASE ORAL at 14:02

## 2018-01-09 RX ADMIN — RIVAROXABAN 15 MG: 15 TABLET, FILM COATED ORAL at 18:50

## 2018-01-09 RX ADMIN — Medication 20 ML: at 13:22

## 2018-01-09 RX ADMIN — LEVOTHYROXINE SODIUM 125 MCG: 125 TABLET ORAL at 14:01

## 2018-01-09 ASSESSMENT — PAIN SCALES - GENERAL
PAINLEVEL_OUTOF10: 0

## 2018-01-09 NOTE — PROGRESS NOTES
Hematology/Oncology   Progress Note        CHIEF COMPLAINT/HPI:  Denies chest pain. Has shortness of breath at rest. Has mild cough but no hemoptysis. He is constipated. C/o pain in left leg. Dr. Bach Fears starting him on Xarelto. REVIEW OF SYSTEMS:    Unremarkable except for symptoms mentioned in HPI.     Current Inpatient Medications:    Current Facility-Administered Medications   Medication Dose Route Frequency Provider Last Rate Last Dose    technetium tetrofosmin (Tc-MYOVIEW) injection 30 millicurie  30 millicurie Intravenous ONCE PRN Deangelo Fair MD        sodium chloride flush 0.9 % injection 10 mL  10 mL Intravenous PRN Jon Licona DO   10 mL at 01/07/18 1340    sodium chloride flush 0.9 % injection 10 mL  10 mL Intravenous PRN Krishna Terrazas MD   20 mL at 01/09/18 0931    heparin 25,000 units in dextrose 5% 250 mL infusion  18 Units/kg/hr Intravenous Continuous Krishna Terrazas MD 9.7 mL/hr at 01/09/18 0543 970 Units/hr at 01/09/18 0543    sodium chloride flush 0.9 % injection 10 mL  10 mL Intravenous 2 times per day INGRID Schmitt   10 mL at 01/08/18 2219    sodium chloride flush 0.9 % injection 10 mL  10 mL Intravenous PRN Sen Schmitt        acetaminophen (TYLENOL) tablet 650 mg  650 mg Oral Q4H PRN INGRID Schmitt   650 mg at 01/08/18 1604    magnesium hydroxide (MILK OF MAGNESIA) 400 MG/5ML suspension 30 mL  30 mL Oral Daily PRN INGRID Schmitt        ondansetron [facility]) injection 4 mg  4 mg Intravenous Q6H PRN INGRID Buckley        heparin (porcine) injection 8,300 Units  80 Units/kg Intravenous PRN INGRID Schmitt        heparin (porcine) injection 4,150 Units  40 Units/kg Intravenous PRN Murphy Sill, [place]   4,150 Units at 01/07/18 2048    mometasone-formoterol (DULERA) 200-5 MCG/ACT inhaler 2 puff  2 puff Inhalation BID Jb Yang MD   2 puff at 01/08/18 2041    traZODone (DESYREL) tablet 150 mg  150 mg Oral Nightly China Kim MD   150 mg at 01/08/18 2218    metoprolol succinate (TOPROL XL) extended release tablet 25 mg  25 mg Oral Daily China Kim MD   25 mg at 01/08/18 0802    finasteride (PROSCAR) tablet 5 mg  5 mg Oral Daily China Kim MD   5 mg at 01/08/18 0802    tamsulosin (FLOMAX) capsule 0.4 mg  0.4 mg Oral Nightly China Kim MD   0.4 mg at 01/08/18 2218    levothyroxine (SYNTHROID) tablet 125 mcg  125 mcg Oral Daily China Kim MD   Stopped at 01/09/18 0546    mirtazapine (REMERON) tablet 15 mg  15 mg Oral Nightly China Kim MD   15 mg at 01/08/18 2218    vitamin D (CHOLECALCIFEROL) tablet 2,000 Units  2,000 Units Oral Daily China Kim MD   2,000 Units at 01/08/18 0802    melatonin tablet 10 mg  10 mg Oral Nightly PRN China Kim MD        magnesium oxide (MAG-OX) tablet 400 mg  400 mg Oral Daily China Kim MD   400 mg at 01/08/18 0802    docusate sodium (COLACE) capsule 100 mg  100 mg Oral Daily China Kim MD   100 mg at 01/08/18 0802    potassium chloride (KLOR-CON) extended release tablet 20 mEq  20 mEq Oral Daily China Kim MD   20 mEq at 01/08/18 0802    LORazepam (ATIVAN) tablet 0.5 mg  0.5 mg Oral Q4H PRN China Kim MD        albuterol sulfate  (90 Base) MCG/ACT inhaler 2 puff  2 puff Inhalation Q4H PRN China Kim MD   2 puff at 01/07/18 1728    aspirin chewable tablet 81 mg  81 mg Oral Daily China Kim MD   81 mg at 01/08/18 0802    furosemide (LASIX) tablet 20 mg  20 mg Oral Daily China Kim MD   20 mg at 01/08/18 0802    pantoprazole (PROTONIX) tablet 40 mg  40 mg Oral QAM AC China Kim MD   Stopped at 01/09/18 6262       PHYSICAL EXAM:    EYES:  Lids and lashes normal, pupils equal, round and reactive to light, extra ocular muscles intact, sclera clear, conjunctiva normal    ENT:  Normocephalic, without obvious abnormality, atraumatic, sinuses nontender on palpation, Constipation  Patient Active Problem List   Diagnosis    COPD [facility]ronic obstructive [facility] disease) [facility]    HTN (hypertension)    Hyperlipidemia    GERD (gastroesophageal reflux disease)    Hypothyroidism    Type 2 diabetes mellitus (HCC)    BPH (benign prostatic hyperplasia)    Anxiety    Insomnia    Acute sinusitis    Papillary thyroid carcinoma (HCC)    Elevated PSA    Osteoarthritis of spine with radiculopathy, lumbar region    Foraminal stenosis of lumbosacral region    Skin cyst    Local infection of skin and subcutaneous tissue    Aspiration into respiratory tract    Abnormality of gait and mobility w/debility secondary to aspiration pneumonia due to dental filling/crown, [facility] admit 11/21/17    Vitamin D deficiency    Aspiration pneumonia ([facility]    Debility    On home oxygen therapy    Hypoxemia    Pulmonary embolism, bilateral (HCC)       PLAN:   I shall discontinue IV unfractionated Heparin today and start him on Xarelto 15 mg BID x 21 days followed by Xarelto 20 mg daily. Patient may be discharged home on Xarelto. He will benefit from stool softners, fiber and Miralax for constiopation.           Electronically signed by Mallika Gaines MD on 1/9/18 at 9:55 AM

## 2018-01-09 NOTE — PROGRESS NOTES
17  24*   CREATININE  1.19  1.07   GLUCOSE  104  106   CALCIUM  8.3*  8.3*   PROT  6.8  6.4   LABALBU  3.2*  3.0*   BILITOT  0.2  0.2   ALKPHOS  69  69   AST  23  22   ALT  13  15   LABGLOM  59.4*  >60.0   GFRAA  >60.0  >60.0   GLOB  3.6*  3.4       MV Settings:          No results for input(s): PHART, NUJ3MAZ, PO2ART, XVG3NPQ, BEART, H4DVIEEL in the last 72 hours. O2 Device: Nasal cannula  O2 Flow Rate (L/min): 5 L/min    Diet NPO, After Midnight     MEDICATIONS during current hospitalization:    Continuous Infusions:     Scheduled Meds:   polyethylene glycol  17 g Oral Daily    rivaroxaban  15 mg Oral BID WC    sodium chloride flush  10 mL Intravenous 2 times per day    mometasone-formoterol  2 puff Inhalation BID    traZODone  150 mg Oral Nightly    metoprolol succinate  25 mg Oral Daily    finasteride  5 mg Oral Daily    tamsulosin  0.4 mg Oral Nightly    levothyroxine  125 mcg Oral Daily    mirtazapine  15 mg Oral Nightly    vitamin D  2,000 Units Oral Daily    magnesium oxide  400 mg Oral Daily    docusate sodium  100 mg Oral Daily    potassium chloride  20 mEq Oral Daily    aspirin  81 mg Oral Daily    furosemide  20 mg Oral Daily    pantoprazole  40 mg Oral QAM AC       PRN Meds:technetium tetrofosmin, sennosides-docusate sodium, sodium chloride flush, sodium chloride flush, sodium chloride flush, acetaminophen, magnesium hydroxide, ondansetron, melatonin, LORazepam, albuterol sulfate HFA    Radiology  Xr Chest Standard (2 Vw)    Result Date: 1/8/2018  EXAMINATION: XR CHEST STANDARD TWO VW REASON FOR EXAM: Follow-up right lower lobe atelectasis FINDINGS: Frontal view and lateral view of the chest were obtained. The heart and mediastinum are unremarkable. No evidence of pulmonary vascular congestion. Large cavity 16 cm in maximum diameter overlies the right base medially.  Postsurgical clips overlie the right base where there is associated residual linear scarring and reactive pleural Markedly enlarged prostate gland causing extrinsic \"mass effect\" on the floor the bladder. 6. Additional details can be found in the body of the report. All CT scans at this facility use dose modulation, iterative reconstruction, and/or weight based dosing when appropriate to reduce radiation dose to as low as reasonably achievable. Xr Chest Portable    Result Date: 1/8/2018  EXAMINATION: Portable AP ERECT view of the chest. CLINICAL HISTORY: Cough and sob . COMPARISONS: 12/15/2017 FINDINGS: Normal cardiac silhouette. There are atherosclerotic calcifications in the aortic arch. The right costophrenic angle is blunted secondary to a small to moderate size pleural effusion, similar to last exam. There are metallic clips in the right lung base. There is infiltrate/atelectasis in the right lung base, unchanged. Mild left basilar atelectasis or scarring, unchanged. No pneumothorax. There are mild degenerative changes in spine. RIGHT-SIDED PLEURAL EFFUSION WITH RIGHT BASILAR INFILTRATE/ATELECTASIS, UNCHANGED. MINIMAL LEFT BASILAR ATELECTASIS, UNCHANGED. Us Venous Doppler Lower Extremities Bilateral    Result Date: 1/7/2018  VENOUS DUPLEX ULTRASOUND OF THE BILATERAL LOWER EXTREMITY CLINICAL HISTORY:  BILATERAL POSTERIOR LEG PAIN AND SWELLING, PATIENT CURRENTLY WITH A PULMONARY EMBOLISM, EVALUATE FOR DEEP VENOUS THROMBOSIS IN THE LOWER EXTREMITIES COMPARISON:  NONE AVAILABLE TECHNIQUE:  Sonographic imaging of the deep venous system of the bilateral  lower extremity was performed by a registered sonographer and the images were submitted for interpretation. FINDINGS: Right lower extremity: The right common femoral, superficial femoral, and popliteal veins demonstrate normal color flow, augmentation, and compressibility. No venous duplex ultrasound evidence of DVT in the right lower extremity. Left lower extremity: There is venous thrombus within the left femoral, popliteal and posterior tibial vein.  The venous

## 2018-01-09 NOTE — PROGRESS NOTES
CARDIOLOGY [address] PROGRESS NOTE         1/9/2018      Sakshi Arroyo    [id number]  1940    Rounding MD: Loly Pemberton MD ,[facility]    Primary Cardiologist:  Julius Gamez MD       Reason for Consult:  Elevated Troponin in face of Pulmonary Embolus         SUBJECTIVE:     Patient has still significant SOB with Activity. On O2 still. Denies CP, LH, TIA or CVA sxs. Stress Myoview delayed til more stable. Cardiac and general ROS otherwise negative and unchanged.       Assessment:     1. Acute SOB  2. Hypoxia  3. Left Lower Extremity Edema  4. Acute Large Bilateral Pulmonary Emboli  5. Left Leg DVT ( w/ Baker cyst noted on US )  6. Positive Troponins, Probably Due to #3, RO ACS / MI  7. No Prior CAD Hx  8. Negative Myoview Stress 2013 and 1/18  9. Normal LV function, LVEF 60%  10. HLP  11. HTN  12. DM, Type 2  13. Recent Foreign Body Aspiration ( [facility] [place] ) post Resection Thoracotomy 11/17  14. COPD  13. Past Tobacco use  16. Hx Papillary Thyroid Ca s/p Remote Thyroidectomy  17. BPH  18. GERD  19. DJD  20. Lumbar Spinal Stenosis  21. Hx Elevated PSA     Plan:     1. Cardiac Supportive Care. 2. Pulmonary and Medicine care. 3. Serial Cardiac Enzymes and Telemetry  4. Echocardiogram Done, Negative  5. Lexiscan Myoview Stress Done, Negative  6. Defer IVC Filter Placement [facility] ) if recurrent PE despite A/C Rx.  7. OK for Xarelto per with Pulmonary  8. ASA / Metoprolol / Nitrates for now. 9. Further recommendations to follow. 10. OK to DC home form CV point. 11. Follow up with [address] on PRN basis. 12. See Orders.           HISTORY OF PRESENT ILLNESS:      Sakshi Arroyo is a pleasant 69 y.o. male who presented to [facility] emergency room [facility] 1/6/2018 was sudden onset of shortness of breath associated with documented hypoxia.   Patient noted by CT angiography to have large bilateral pulmonary emboli and started on appropriate medical care by pulmonary medicine and 30.7 27.0 - 31.3 pg    MCHC 33.0 33.0 - 37.0 %    RDW 15.7 (H) 11.5 - 14.5 %    Platelets 651 (L) 175 - 400 K/uL    Neutrophils % 65.3 %    Lymphocytes % 22.8 %    Monocytes % 7.3 %    Eosinophils % 4.3 %    Basophils % 0.3 %    Neutrophils # 4.5 1.4 - 6.5 K/uL    Lymphocytes # 1.6 1.0 - 4.8 K/uL    Monocytes # 0.5 0.2 - 0.8 K/uL    Eosinophils # 0.3 0.0 - 0.7 K/uL    Basophils # 0.0 0.0 - 0.2 K/uL   Comprehensive Metabolic Panel    Collection Time: 01/09/18  5:20 AM   Result Value Ref Range    Sodium 140 132 - 144 mEq/L    Potassium 4.3 3.5 - 5.1 mEq/L    Chloride 100 98 - 107 mEq/L    CO2 25 22 - 29 mEq/L    Anion Gap 15 (H) 7 - 13 mEq/L    Glucose 106 74 - 109 mg/dL    BUN 24 (H) 8 - 23 mg/dL    CREATININE 1.07 0.70 - 1.20 mg/dL    GFR Non-African American >60.0 >60    GFR  >60.0 >60    Calcium 8.3 (L) 8.6 - 10.2 mg/dL    Total Protein 6.4 6.4 - 8.1 g/dL    Alb 3.0 (L) 3.9 - 4.9 g/dL    Total Bilirubin 0.2 0.0 - 1.2 mg/dL    Alkaline Phosphatase 69 35 - 104 U/L    ALT 15 0 - 41 U/L    AST 22 0 - 40 U/L    Globulin 3.4 2.3 - 3.5 g/dL   Magnesium    Collection Time: 01/09/18  5:20 AM   Result Value Ref Range    Magnesium 2.4 (H) 1.7 - 2.3 mg/dL   APTT    Collection Time: 01/09/18  5:30 AM   Result Value Ref Range    aPTT 50.8 (H) 21.6 - 35.4 sec            CXR:   RIGHT-SIDED PLEURAL EFFUSION WITH RIGHT BASILAR INFILTRATE/ATELECTASIS, UNCHANGED.       ECG:     Normal sinus rhythm  Left axis deviation  Incomplete right bundle branch block     CTA CHEST:  1. POSITIVE FOR BILATERAL PULMONARY EMBOLI AS DESCRIBED IN BODY OF REPORT. 2. SEVERE COPD WITH INNUMERABLE SMALL PULMONARY BULLAE. 3. POSTOPERATIVE CHANGES INVOLVING THE RIGHT LOWER LOBE. 4. SUBSEGMENTAL ATELECTASIS AND SMALL PULMONARY INFILTRATE IN REMAINING PORTION OF RLL. 5. SMALL RIGHT PLEURAL EFFUSION.          DUPLEX US LEGS:  1.  POSITIVE FOR DEEP VENOUS THROMBOSIS IN THE LEFT LOWER EXTREMITY    2. NO EVIDENCE OF DEEP VENOUS THROMBOSIS WITHIN THE  RIGHT  LOWER EXTREMITY. 3. INCIDENTAL FINDING IS THE PRESENCE OF A LEFT POPLITEAL CYST (BAKER'S CYST).                  ECHO:    Normal left ventricular systolic function, no regional wall motion abnormalities, estimated ejection fraction of 60%.   Normal left ventricular size and function.   Normal left ventricular wall thickness.   Impaired relaxation compatible with diastolic dysfunction. ( reversed E/A   ratio)   Trace (+) mitral regurgitation is present.   No evidence of mitral valve stenosis.   No evidence of aortic valve stenosis.   No evidence of aortic valve regurgitation .   There is mild tricuspid regurgitation with estimated RVSP of 62 mm Hg.   Right ventricular systolic pressure of 62 mm Hg consistent with moderate   pulmonary hypertension.   Normal right ventricular chamber size and function.     LEXISCAN MYOVIEW STRESS:  Normal , No Ischemia or MI  Normal LV Function.       Mandeep Waller MD ,[facility]  [address] [place] [address] Cardiology

## 2018-01-09 NOTE — CARE COORDINATION
Pt remains in ICU but has been targeted for transfer out to [facility]. Pt has kathy able to tolerate nasal O2 @ 5l/nc today, which is much increased than the Home O2 @ 2l/nc at . He has recently completed his Lexiscan. Remains on Heparin gtt and will be dc'd on Xarelto and a 30 day free card wilkl be placed in his paper chart. PT/OT has been ordered and pt may need a short time of rehab since he was participating w PT thru [facility] and has a history of gait abnormality. C3 will follow. @ [phone] I called to speak w pt's son Veatrice Boast ([phone]) and had to leave a very generic voice msg stating what C3 role is in pt care and the fact that  has ordered a therapy eval for \"Demarco\" to determine his dc need and if he may need some rehab prior to going home. I see by a note in EPIC that [facility] plans to move in w pt to help care for him. Therapy has not come to eval pt as of yet. C3 will follow.

## 2018-01-09 NOTE — PROCEDURES
[address]                       [address], [address] [facility]                                CARDIAC STRESS TEST    PATIENT NAME: Soren Lawrence                    :        1940  MED REC NO:   [id number]                            ROOM:       [facility]  ACCOUNT NO:   [de-identified]                           ADMIT DATE: 2018  PROVIDER:     Michoacano Brown MD    CARDIOVASCULAR DIAGNOSTIC DEPARTMENT    DATE OF STUDY:  2018    LEXISCAN MYOCARDIAL PERFUSION STUDY    INDICATIONS FOR TESTIN. Chest pain. 2.  Resting EKG normal sinus rhythm, left axis deviation _____ reversal.    STRESS TEST:  The patient was injected with 0.4 mg of Lexiscan in a rapid  bolus fashion. The patient did not complain of any chest discomfort. There were no significant dysrhythmias. No significant ST changes. Normal  recovery phase. IMAGING RESULTS:  The patient was injected with 34.8 mCi of technetium  Myoview shortly after injection of Lexiscan and imaging performed soon  thereafter. Earlier rest imaging was performed after the injection of 11.2  mCi of technetium Myoview. Rest/stress SPECT tomographic images were  reviewed and revealed normal perfusion. There is no evidence of myocardial  ischemia or myocardial infarction. Gated wall motion is normal.   Calculated LV ejection fraction of 79%. TID ratio is normal.    FINAL IMPRESSION:  1. Normal Lexiscan myocardial perfusion stress test.  2.  No evidence of myocardial ischemia or myocardial infarction by  perfusion imaging. 3.  Normal LV systolic function, calculated LV ejection fraction of 79%. 4.  Low risk Lexiscan _____ perfusion stress test.  5.  No previous available for comparison.       Med Ramos MD    D: 2018 15:07:45       T: 2018 15:54:56     [email or web address]  Job#: [id number]     Doc#: [id number]    CC:

## 2018-01-09 NOTE — PROGRESS NOTES
Physical Therapy Missed Treatment   Facility/Department: [facility] MED SURG IC12/IC12-01    NAME: Micheline Ku    :  (9 y.o.)  MRN: [id number]    Account: [de-identified]  Gender: male      PT evaluation and treatment orders received. Chart reviewed. PT eval attempted. Pt sleeping upon PT arrival. Pt declining this date d/t fatigue. Will follow and attempt PT evaluation again at earliest convenience.         Qing Lovett, PT, 18 at 3:38 PM

## 2018-01-09 NOTE — PROGRESS NOTES
Hospitalist Progress Note      PCP: Ijeoma Da Silva MD    Date of Admission: 1/6/2018    Chief Complaint:    Chief Complaint   Patient presents with    Shortness of Breath     x 3 days. history of copd, started taking lasix yesterday. wears 2 liters home o2       HPI:   No new events. Getting stress test today. Subjective:  12 point ROS negative other than mentioned above       Medications:  Reviewed    Infusion Medications    heparin (porcine) 970 Units/hr (01/09/18 0543)     Scheduled Medications    sodium chloride flush  10 mL Intravenous 2 times per day    mometasone-formoterol  2 puff Inhalation BID    traZODone  150 mg Oral Nightly    metoprolol succinate  25 mg Oral Daily    finasteride  5 mg Oral Daily    tamsulosin  0.4 mg Oral Nightly    levothyroxine  125 mcg Oral Daily    mirtazapine  15 mg Oral Nightly    vitamin D  2,000 Units Oral Daily    magnesium oxide  400 mg Oral Daily    docusate sodium  100 mg Oral Daily    potassium chloride  20 mEq Oral Daily    aspirin  81 mg Oral Daily    furosemide  20 mg Oral Daily    pantoprazole  40 mg Oral QAM AC     PRN Meds: technetium tetrofosmin, sodium chloride flush, sodium chloride flush, sodium chloride flush, acetaminophen, magnesium hydroxide, ondansetron, heparin (porcine), heparin (porcine), melatonin, LORazepam, albuterol sulfate HFA      Intake/Output Summary (Last 24 hours) at 01/09/18 0946  Last data filed at 01/09/18 0820   Gross per 24 hour   Intake              952 ml   Output             2250 ml   Net            -1298 ml       Exam:    /67   Pulse 59   Temp 98.2 °F (36.8 °C) (Oral)   Resp 18   Ht 6' (1.829 m)   Wt 224 lb 6.9 oz (101.8 kg)   SpO2 100%   BMI 30.44 kg/m²     General appearance: No apparent distress, appears stated age and cooperative. HEENT: Pupils equal, round, and reactive to light. Conjunctivae/corneas clear. Neck: Supple, with full range of motion. No jugular venous distention.  Trachea

## 2018-01-10 LAB
ANTICARDIOLIPIN IGG ANTIBODY: 5 GPL (ref 0–14)
APTT: 38.7 SEC (ref 21.6–35.4)
BASOPHILS ABSOLUTE: 0 K/UL (ref 0–0.2)
BASOPHILS RELATIVE PERCENT: 0.3 %
CARDIOLIPIN AB IGM: 39 MPL (ref 0–12)
DRVVT CONFIRMATION TEST: ABNORMAL RATIO
DRVVT SCREEN: 34 SEC (ref 33–44)
DRVVT,DIL: ABNORMAL SEC (ref 33–44)
EOSINOPHILS ABSOLUTE: 0.3 K/UL (ref 0–0.7)
EOSINOPHILS RELATIVE PERCENT: 4.2 %
FACTOR V LEIDEN: ABNORMAL
HCT VFR BLD CALC: 40.7 % (ref 42–52)
HEMOGLOBIN: 13.2 G/DL (ref 14–18)
HEXAGONAL PHOSPHOLIPID NEUTRALIZAT TEST: ABNORMAL
LUPUS ANTICOAG INTERP: ABNORMAL
LYMPHOCYTES ABSOLUTE: 1.4 K/UL (ref 1–4.8)
LYMPHOCYTES RELATIVE PERCENT: 18.8 %
MCH RBC QN AUTO: 29.9 PG (ref 27–31.3)
MCHC RBC AUTO-ENTMCNC: 32.4 % (ref 33–37)
MCV RBC AUTO: 92.2 FL (ref 80–100)
MONOCYTES ABSOLUTE: 0.6 K/UL (ref 0.2–0.8)
MONOCYTES RELATIVE PERCENT: 7.7 %
NEUTROPHILS ABSOLUTE: 5.1 K/UL (ref 1.4–6.5)
NEUTROPHILS RELATIVE PERCENT: 69 %
PDW BLD-RTO: 15.8 % (ref 11.5–14.5)
PLATELET # BLD: 127 K/UL (ref 130–400)
PLT NEUTA: ABNORMAL
PROTHROMBIN G20210A MUTATION: NEGATIVE
PT D: 14.9 SEC (ref 12–15.5)
PT PCR SPECIMEN: NORMAL
PTT D: >150 SEC (ref 32–48)
PTT-D CORR REFLEX: 48 SEC (ref 32–48)
PTT-HEPARIN NEUTRALIZED: 50 SEC (ref 32–48)
RBC # BLD: 4.41 M/UL (ref 4.7–6.1)
REPTILASE TIME: 19.9 SEC
THROMBIN TIME: >150 SEC (ref 14.7–19.5)
WBC # BLD: 7.3 K/UL (ref 4.8–10.8)

## 2018-01-10 PROCEDURE — 6370000000 HC RX 637 (ALT 250 FOR IP): Performed by: INTERNAL MEDICINE

## 2018-01-10 PROCEDURE — 85730 THROMBOPLASTIN TIME PARTIAL: CPT

## 2018-01-10 PROCEDURE — 2580000003 HC RX 258: Performed by: PHYSICIAN ASSISTANT

## 2018-01-10 PROCEDURE — 36415 COLL VENOUS BLD VENIPUNCTURE: CPT

## 2018-01-10 PROCEDURE — 2060000000 HC ICU INTERMEDIATE R&B

## 2018-01-10 PROCEDURE — 94640 AIRWAY INHALATION TREATMENT: CPT

## 2018-01-10 PROCEDURE — 85025 COMPLETE CBC W/AUTO DIFF WBC: CPT

## 2018-01-10 PROCEDURE — 94664 DEMO&/EVAL PT USE INHALER: CPT

## 2018-01-10 PROCEDURE — 99231 SBSQ HOSP IP/OBS SF/LOW 25: CPT | Performed by: INTERNAL MEDICINE

## 2018-01-10 RX ADMIN — SODIUM CHLORIDE, PRESERVATIVE FREE 10 ML: 5 INJECTION INTRAVENOUS at 20:47

## 2018-01-10 RX ADMIN — TAMSULOSIN HYDROCHLORIDE 0.4 MG: 0.4 CAPSULE ORAL at 20:47

## 2018-01-10 RX ADMIN — LEVOTHYROXINE SODIUM 125 MCG: 125 TABLET ORAL at 06:42

## 2018-01-10 RX ADMIN — Medication 2 PUFF: at 20:02

## 2018-01-10 RX ADMIN — ASPIRIN 81 MG 81 MG: 81 TABLET ORAL at 09:15

## 2018-01-10 RX ADMIN — DOCUSATE SODIUM 100 MG: 100 CAPSULE, LIQUID FILLED ORAL at 09:15

## 2018-01-10 RX ADMIN — Medication 400 MG: at 09:15

## 2018-01-10 RX ADMIN — VITAMIN D, TAB 1000IU (100/BT) 2000 UNITS: 25 TAB at 09:15

## 2018-01-10 RX ADMIN — FINASTERIDE 5 MG: 5 TABLET, FILM COATED ORAL at 09:15

## 2018-01-10 RX ADMIN — MIRTAZAPINE 15 MG: 15 TABLET, FILM COATED ORAL at 20:47

## 2018-01-10 RX ADMIN — RIVAROXABAN 15 MG: 15 TABLET, FILM COATED ORAL at 17:59

## 2018-01-10 RX ADMIN — FUROSEMIDE 20 MG: 20 TABLET ORAL at 09:15

## 2018-01-10 RX ADMIN — TRAZODONE HYDROCHLORIDE 150 MG: 150 TABLET ORAL at 20:47

## 2018-01-10 RX ADMIN — POTASSIUM CHLORIDE 20 MEQ: 750 TABLET, FILM COATED, EXTENDED RELEASE ORAL at 09:15

## 2018-01-10 RX ADMIN — POLYETHYLENE GLYCOL 3350 17 G: 17 POWDER, FOR SOLUTION ORAL at 09:16

## 2018-01-10 RX ADMIN — METOPROLOL SUCCINATE 25 MG: 25 TABLET, FILM COATED, EXTENDED RELEASE ORAL at 09:15

## 2018-01-10 RX ADMIN — RIVAROXABAN 15 MG: 15 TABLET, FILM COATED ORAL at 09:15

## 2018-01-10 RX ADMIN — SODIUM CHLORIDE, PRESERVATIVE FREE 10 ML: 5 INJECTION INTRAVENOUS at 09:16

## 2018-01-10 RX ADMIN — PANTOPRAZOLE SODIUM 40 MG: 40 TABLET, DELAYED RELEASE ORAL at 06:42

## 2018-01-10 RX ADMIN — Medication 2 PUFF: at 09:46

## 2018-01-10 ASSESSMENT — PAIN SCALES - GENERAL
PAINLEVEL_OUTOF10: 0

## 2018-01-10 NOTE — PROGRESS NOTES
CARDIOLOGY [address] PROGRESS NOTE         1/10/2018      Sakshi Arroyo    [id number]  1940    Rounding MD: Loly Pemberton MD ,[facility]    Primary Cardiologist:  Julius Gamez MD       Reason for Consult:  Elevated Troponin in face of Pulmonary Embolus         SUBJECTIVE:     Patient has still significant SOB with Activity. On O2 still. Denies CP, LH, TIA or CVA sxs. Stress Myoview was negative. Cardiac and general ROS otherwise negative and unchanged.       Assessment:     1. Acute SOB  2. Hypoxia  3. Left Lower Extremity Edema  4. Acute Large Bilateral Pulmonary Emboli  5. Left Leg DVT ( w/ Baker cyst noted on US )  6. Positive Troponins, Probably Due to #3, RO ACS / MI  7. No Prior CAD Hx  8. Negative Myoview Stress 2013 and 1/18  9. Normal LV function, LVEF 60%  10. HLP  11. HTN  12. DM, Type 2  13. Recent Foreign Body Aspiration ( [facility] [place] ) post Resection Thoracotomy 11/17  14. COPD  13. Past Tobacco use  16. Hx Papillary Thyroid Ca s/p Remote Thyroidectomy  17. BPH  18. GERD  19. DJD  20. Lumbar Spinal Stenosis  21. Hx Elevated PSA     Plan:     1. Cardiac Supportive Care. 2. Pulmonary and Medicine care for PE and COPD and Anticoagulation. 3. Serial Cardiac Enzymes and Telemetry  4. Echocardiogram Done, Negative  5. Lexiscan Myoview Stress Done, Negative  6. Defer IVC Filter Placement [facility] ) if recurrent PE despite A/C Rx.  7. OK for Xarelto per with Pulmonary  8. ASA / Metoprolol / Nitrates for now. 9. Further recommendations to follow. 10. OK to DC home form CV point. 11. Cardiology Sign off. 12. Follow up with [address] on PRN basis. 13. See Orders.           HISTORY OF PRESENT ILLNESS:      Sakshi Arroyo is a pleasant 69 y.o. male who presented to [facility] emergency room [facility] 1/6/2018 was sudden onset of shortness of breath associated with documented hypoxia.   Patient noted by CT angiography to have large bilateral pulmonary emboli and started on % 18.8 %    Monocytes % 7.7 %    Eosinophils % 4.2 %    Basophils % 0.3 %    Neutrophils # 5.1 1.4 - 6.5 K/uL    Lymphocytes # 1.4 1.0 - 4.8 K/uL    Monocytes # 0.6 0.2 - 0.8 K/uL    Eosinophils # 0.3 0.0 - 0.7 K/uL    Basophils # 0.0 0.0 - 0.2 K/uL   APTT    Collection Time: 01/10/18  5:46 AM   Result Value Ref Range    aPTT 38.7 (H) 21.6 - 35.4 sec            CXR:   RIGHT-SIDED PLEURAL EFFUSION WITH RIGHT BASILAR INFILTRATE/ATELECTASIS, UNCHANGED.       ECG:     Normal sinus rhythm  Left axis deviation  Incomplete right bundle branch block     CTA CHEST:  1. POSITIVE FOR BILATERAL PULMONARY EMBOLI AS DESCRIBED IN BODY OF REPORT. 2. SEVERE COPD WITH INNUMERABLE SMALL PULMONARY BULLAE. 3. POSTOPERATIVE CHANGES INVOLVING THE RIGHT LOWER LOBE. 4. SUBSEGMENTAL ATELECTASIS AND SMALL PULMONARY INFILTRATE IN REMAINING PORTION OF RLL. 5. SMALL RIGHT PLEURAL EFFUSION.          DUPLEX US LEGS:  1. POSITIVE FOR DEEP VENOUS THROMBOSIS IN THE LEFT LOWER EXTREMITY    2. NO EVIDENCE OF DEEP VENOUS THROMBOSIS WITHIN THE  RIGHT  LOWER EXTREMITY. 3. INCIDENTAL FINDING IS THE PRESENCE OF A LEFT POPLITEAL CYST (BAKER'S CYST).                  ECHO:    Normal left ventricular systolic function, no regional wall motion abnormalities, estimated ejection fraction of 60%.   Normal left ventricular size and function.   Normal left ventricular wall thickness.   Impaired relaxation compatible with diastolic dysfunction. ( reversed E/A   ratio)   Trace (+) mitral regurgitation is present.   No evidence of mitral valve stenosis.   No evidence of aortic valve stenosis.   No evidence of aortic valve regurgitation .   There is mild tricuspid regurgitation with estimated RVSP of 62 mm Hg.   Right ventricular systolic pressure of 62 mm Hg consistent with moderate   pulmonary hypertension.   Normal right ventricular chamber size and function.     LEXISCAN MYOVIEW STRESS:  Normal , No Ischemia or MI  Normal LV Function.       Michel HURLEY Tammi Pham MD ,[facility] Cardiology

## 2018-01-10 NOTE — PROGRESS NOTES
mild degenerative changes in spine. RIGHT-SIDED PLEURAL EFFUSION WITH RIGHT BASILAR INFILTRATE/ATELECTASIS, UNCHANGED. MINIMAL LEFT BASILAR ATELECTASIS, UNCHANGED. Us Venous Doppler Lower Extremities Bilateral    Result Date: 1/7/2018  VENOUS DUPLEX ULTRASOUND OF THE BILATERAL LOWER EXTREMITY CLINICAL HISTORY:  BILATERAL POSTERIOR LEG PAIN AND SWELLING, PATIENT CURRENTLY WITH A PULMONARY EMBOLISM, EVALUATE FOR DEEP VENOUS THROMBOSIS IN THE LOWER EXTREMITIES COMPARISON:  NONE AVAILABLE TECHNIQUE:  Sonographic imaging of the deep venous system of the bilateral  lower extremity was performed by a registered sonographer and the images were submitted for interpretation. FINDINGS: Right lower extremity: The right common femoral, superficial femoral, and popliteal veins demonstrate normal color flow, augmentation, and compressibility. No venous duplex ultrasound evidence of DVT in the right lower extremity. Left lower extremity: There is venous thrombus within the left femoral, popliteal and posterior tibial vein. The venous duplex ultrasound findings are compatible with deep venous thrombosis in the left lower extremity. Also, an incidental finding is the presence of a 1.2 x 5.0 cm left popliteal cyst (Baker's cyst). 1. POSITIVE FOR DEEP VENOUS THROMBOSIS IN THE LEFT LOWER EXTREMITY AS DESCRIBED. 2. NO EVIDENCE OF DEEP VENOUS THROMBOSIS WITHIN THE  RIGHT  LOWER EXTREMITY. 3. INCIDENTAL FINDING IS THE PRESENCE OF A LEFT POPLITEAL CYST (BAKER'S CYST). ASSESSMENT /Plan:  1. Acute pulmonary emboli, patient clinically doing much better recently placed on Xarelto after heparin treatment  2. Acute DVT left lower extremity  3. Right lower lobe fibrosis and atelectasis following recent Trina March mean wedge resection due to aspiration of foreign object  4. Small loculated medial pleural space with air or possible small loculated pneumothorax associated with recent thoracotomy as well  5.  COPD with significant

## 2018-01-11 PROBLEM — R26.9 GAIT ABNORMALITY: Status: ACTIVE | Noted: 2018-01-11

## 2018-01-11 LAB
APTT: 42.4 SEC (ref 21.6–35.4)
BASOPHILS ABSOLUTE: 0 K/UL (ref 0–0.2)
BASOPHILS RELATIVE PERCENT: 0.3 %
BLOOD CULTURE, ROUTINE: NORMAL
CULTURE, BLOOD 2: NORMAL
EOSINOPHILS ABSOLUTE: 0.2 K/UL (ref 0–0.7)
EOSINOPHILS RELATIVE PERCENT: 3.1 %
HCT VFR BLD CALC: 41.7 % (ref 42–52)
HEMOGLOBIN: 13.3 G/DL (ref 14–18)
LYMPHOCYTES ABSOLUTE: 1.3 K/UL (ref 1–4.8)
LYMPHOCYTES RELATIVE PERCENT: 16.2 %
MCH RBC QN AUTO: 30.2 PG (ref 27–31.3)
MCHC RBC AUTO-ENTMCNC: 32 % (ref 33–37)
MCV RBC AUTO: 94.4 FL (ref 80–100)
MONOCYTES ABSOLUTE: 0.6 K/UL (ref 0.2–0.8)
MONOCYTES RELATIVE PERCENT: 6.8 %
NEUTROPHILS ABSOLUTE: 5.9 K/UL (ref 1.4–6.5)
NEUTROPHILS RELATIVE PERCENT: 73.6 %
PDW BLD-RTO: 15.6 % (ref 11.5–14.5)
PLATELET # BLD: 119 K/UL (ref 130–400)
RBC # BLD: 4.42 M/UL (ref 4.7–6.1)
WBC # BLD: 8 K/UL (ref 4.8–10.8)

## 2018-01-11 PROCEDURE — G8988 SELF CARE GOAL STATUS: HCPCS

## 2018-01-11 PROCEDURE — 6370000000 HC RX 637 (ALT 250 FOR IP): Performed by: INTERNAL MEDICINE

## 2018-01-11 PROCEDURE — 85025 COMPLETE CBC W/AUTO DIFF WBC: CPT

## 2018-01-11 PROCEDURE — 97162 PT EVAL MOD COMPLEX 30 MIN: CPT

## 2018-01-11 PROCEDURE — 85730 THROMBOPLASTIN TIME PARTIAL: CPT

## 2018-01-11 PROCEDURE — G8978 MOBILITY CURRENT STATUS: HCPCS

## 2018-01-11 PROCEDURE — 97166 OT EVAL MOD COMPLEX 45 MIN: CPT

## 2018-01-11 PROCEDURE — 2700000000 HC OXYGEN THERAPY PER DAY

## 2018-01-11 PROCEDURE — 99231 SBSQ HOSP IP/OBS SF/LOW 25: CPT | Performed by: INTERNAL MEDICINE

## 2018-01-11 PROCEDURE — 94664 DEMO&/EVAL PT USE INHALER: CPT

## 2018-01-11 PROCEDURE — G8987 SELF CARE CURRENT STATUS: HCPCS

## 2018-01-11 PROCEDURE — 99221 1ST HOSP IP/OBS SF/LOW 40: CPT | Performed by: PHYSICAL MEDICINE & REHABILITATION

## 2018-01-11 PROCEDURE — 2580000003 HC RX 258: Performed by: PHYSICIAN ASSISTANT

## 2018-01-11 PROCEDURE — 94640 AIRWAY INHALATION TREATMENT: CPT

## 2018-01-11 PROCEDURE — 36415 COLL VENOUS BLD VENIPUNCTURE: CPT

## 2018-01-11 PROCEDURE — G8979 MOBILITY GOAL STATUS: HCPCS

## 2018-01-11 PROCEDURE — 2060000000 HC ICU INTERMEDIATE R&B

## 2018-01-11 RX ADMIN — Medication 2 PUFF: at 21:09

## 2018-01-11 RX ADMIN — SODIUM CHLORIDE, PRESERVATIVE FREE 10 ML: 5 INJECTION INTRAVENOUS at 07:48

## 2018-01-11 RX ADMIN — DOCUSATE SODIUM 100 MG: 100 CAPSULE, LIQUID FILLED ORAL at 07:48

## 2018-01-11 RX ADMIN — POTASSIUM CHLORIDE 20 MEQ: 750 TABLET, FILM COATED, EXTENDED RELEASE ORAL at 07:48

## 2018-01-11 RX ADMIN — ASPIRIN 81 MG 81 MG: 81 TABLET ORAL at 07:48

## 2018-01-11 RX ADMIN — RIVAROXABAN 15 MG: 15 TABLET, FILM COATED ORAL at 07:48

## 2018-01-11 RX ADMIN — Medication 400 MG: at 07:48

## 2018-01-11 RX ADMIN — RIVAROXABAN 15 MG: 15 TABLET, FILM COATED ORAL at 16:57

## 2018-01-11 RX ADMIN — FINASTERIDE 5 MG: 5 TABLET, FILM COATED ORAL at 07:48

## 2018-01-11 RX ADMIN — MIRTAZAPINE 15 MG: 15 TABLET, FILM COATED ORAL at 20:53

## 2018-01-11 RX ADMIN — PANTOPRAZOLE SODIUM 40 MG: 40 TABLET, DELAYED RELEASE ORAL at 06:39

## 2018-01-11 RX ADMIN — FUROSEMIDE 20 MG: 20 TABLET ORAL at 07:48

## 2018-01-11 RX ADMIN — TRAZODONE HYDROCHLORIDE 150 MG: 150 TABLET ORAL at 20:53

## 2018-01-11 RX ADMIN — VITAMIN D, TAB 1000IU (100/BT) 2000 UNITS: 25 TAB at 07:48

## 2018-01-11 RX ADMIN — TAMSULOSIN HYDROCHLORIDE 0.4 MG: 0.4 CAPSULE ORAL at 20:53

## 2018-01-11 RX ADMIN — METOPROLOL SUCCINATE 25 MG: 25 TABLET, FILM COATED, EXTENDED RELEASE ORAL at 07:48

## 2018-01-11 RX ADMIN — LEVOTHYROXINE SODIUM 125 MCG: 125 TABLET ORAL at 06:39

## 2018-01-11 RX ADMIN — Medication 2 PUFF: at 07:05

## 2018-01-11 ASSESSMENT — PAIN SCALES - GENERAL
PAINLEVEL_OUTOF10: 0

## 2018-01-11 NOTE — PROGRESS NOTES
effort. Clear to auscultation, bilaterally without Rales/Wheezes/Rhonchi. Cardiovascular: Regular rate and rhythm with normal S1/S2 without murmurs, rubs or gallops. Abdomen: Soft, non-tender, non-distended with normal bowel sounds. Musculoskeletal: No clubbing, cyanosis or edema bilaterally. Full range of motion without deformity. Skin: Skin color, texture, turgor normal.  No rashes or lesions. Neuro: Non Focal. Symetrical motor and tone. Nl Comprehension, Alert,awake and oriented. NL CN. Symetrical tone and reflexes. Psychiatric: Alert and oriented, thought content appropriate, normal insight  Capillary Refill: Brisk,< 3 seconds   Peripheral Pulses: +2 palpable, equal bilaterally       Labs:   Recent Labs      01/09/18   0520  01/10/18   0546  01/11/18   0644   WBC  6.8  7.3  8.0   HGB  12.8*  13.2*  13.3*   HCT  38.9*  40.7*  41.7*   PLT  121*  127*  119*     Recent Labs      01/09/18   0520   NA  140   K  4.3   CL  100   CO2  25   BUN  24*   CREATININE  1.07   CALCIUM  8.3*     Recent Labs      01/09/18   0520   AST  22   ALT  15   BILITOT  0.2   ALKPHOS  69     No results for input(s): INR in the last 72 hours. No results for input(s): Marianne Sports in the last 72 hours. Urinalysis:      Lab Results   Component Value Date    NITRU Negative 11/18/2017    WBCUA 0-2 11/18/2017    BACTERIA Moderate 11/18/2017    RBCUA 5-10 11/18/2017    BLOODU LARGE 11/18/2017    SPECGRAV 1.027 11/18/2017    GLUCOSEU Negative 11/18/2017       Radiology:  RADIOLOGY REPORT   Final Result      XR CHEST STANDARD (2 VW)   Final Result   PERSISTENT CAVITY RIGHT BASE UNCHANGED FROM [facility] 6, 2018. NO ACUTE PROCESS IN THE LUNG FIELDS. XR CHEST PORTABLE   Final Result   RIGHT-SIDED PLEURAL EFFUSION WITH RIGHT BASILAR INFILTRATE/ATELECTASIS, UNCHANGED. MINIMAL LEFT BASILAR ATELECTASIS, UNCHANGED. CT ABDOMEN PELVIS W IV CONTRAST Additional Contrast? Oral   Final Result   1.  No hepatomegaly and stable

## 2018-01-11 NOTE — DISCHARGE SUMMARY
edema bilaterally. Full range of motion without deformity. Skin: Skin color, texture, turgor normal.  No rashes or lesions. Neuro: Non Focal. Symetrical motor and tone. Nl Comprehension, Alert,awake and oriented. NL CN. Symetrical tone and reflexes. Psychiatric: Alert and oriented, thought content appropriate, normal insight  Capillary Refill: Brisk,< 3 seconds   Peripheral Pulses: +2 palpable, equal bilaterally     Patient was seen by the following consultants while admitted to [facility]:   Consults:  [address] TO CRITICAL CARE  IP CONSULT TO HEM/ONC  IP CONSULT TO PULMONOLOGY  IP CONSULT TO CARDIOLOGY    Significant Diagnostic Studies:    Cta Chest W Wo  (pe Study)    Result Date: 1/6/2018  EXAM: CT SCAN OF THE THORAX WITH CONTRAST/PE PROTOCOL/CTA CHEST COMPARISON: 11/11/2017 REASON FOR EXAMINATION:  HISTORY OF COPD AND DIABETES, HISTORY OF A WEDGE RESECTION IN THE RIGHT LUNG, PATIENT WITH COUGH, DYSPNEA, EVALUATE FOR POSSIBLE PULMONARY EMBOLISM TECHNIQUE: Helical CTA was performed through the chest utilizing 100 cc of Isovue 370 intravenous contrast.  Images were obtained with bolus tracking in order to opacify the pulmonary arteries. Thick section coronal MIP 3D reconstructions were performed  on a separate workstation. FINDINGS: The CTA thorax demonstrates multiple \"filling defects\" within the main pulmonary arteries and segmental branches of the right and left pulmonary arteries compatible with bilateral pulmonary emboli. Pulmonary emboli are also seen in subsegmental  pulmonary arteries in the lower lobes. There is an atherosclerotic aorta but no aortic aneurysm or dissection. The heart is not enlarged and but there is a trace amount of pericardial fluid. There is no mediastinal mass. There are several nonspecific small mediastinal lymph nodes. There is hyperinflation of the lungs with innumerable small pulmonary bullae compatible with severe COPD.  There is presumably partial right lower lobe pulmonary resection with surgical sutures and clips adjacent to a large cavity involving the medial aspect of the right lung base. Also, there is subsegmental atelectasis and a streaky infiltrate involving the remaining right lower lobe. There is a small right pleural effusion. The left lung appears relatively clear. CT images through the upper abdomen appear unremarkable. 1. POSITIVE FOR BILATERAL PULMONARY EMBOLI AS DESCRIBED IN BODY OF REPORT. 2. SEVERE COPD WITH INNUMERABLE SMALL PULMONARY BULLAE. 3. POSTOPERATIVE CHANGES INVOLVING THE RIGHT LOWER LOBE. 4. SUBSEGMENTAL ATELECTASIS AND SMALL PULMONARY INFILTRATE IN REMAINING PORTION OF RLL. 5. SMALL RIGHT PLEURAL EFFUSION. All CT scans at this facility use dose modulation, iterative reconstruction, and/or weight based dosing when appropriate to reduce radiation dose to as low as reasonably achievable. Ct Abdomen Pelvis W Iv Contrast Additional Contrast? Oral    Result Date: 1/7/2018  EXAMINATION:  CT ABDOMEN AND PELVIS WITH IV CONTRAST CLINICAL HISTORY:  DIABETES WITH A HISTORY OF THYROID CANCER, PATIENT WITH RECENT PULMONARY EMBOLISM, EVALUATE POSSIBLE METASTATIC DISEASE IN ABDOMEN COMPARISON:  11/14/2013 TECHNIQUE:  CT abdomen and pelvis is obtained following IV injection of 100 mL of Isovue-370. Also, oral contrast media was given to the patient for this CT abdomen/pelvis. FINDINGS:  There is no hepatomegaly but there are several small round hypodense liver lesions which appear unchanged since the 11/14/2013 CT abdomen and these are compatible with small hepatic cysts. The gallbladder, spleen, pancreas and adrenal glands appear normal. There is no retroperitoneal adenopathy. There is a stable 2.4 cm infrarenal abdominal aortic aneurysm. There is no \"free fluid\", abscess or pneumoperitoneum in the abdomen. There are bilateral normal size kidneys with innumerable renal cysts.  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 The right costophrenic angle is blunted secondary to a small to moderate size pleural effusion, similar to last exam. There are metallic clips in the right lung base. There is infiltrate/atelectasis in the right lung base, unchanged. Mild left basilar atelectasis or scarring, unchanged. No pneumothorax. There are mild degenerative changes in spine. RIGHT-SIDED PLEURAL EFFUSION WITH RIGHT BASILAR INFILTRATE/ATELECTASIS, UNCHANGED. MINIMAL LEFT BASILAR ATELECTASIS, UNCHANGED. Us Venous Doppler Lower Extremities Bilateral    Result Date: 1/7/2018  VENOUS DUPLEX ULTRASOUND OF THE BILATERAL LOWER EXTREMITY CLINICAL HISTORY:  BILATERAL POSTERIOR LEG PAIN AND SWELLING, PATIENT CURRENTLY WITH A PULMONARY EMBOLISM, EVALUATE FOR DEEP VENOUS THROMBOSIS IN THE LOWER EXTREMITIES COMPARISON:  NONE AVAILABLE TECHNIQUE:  Sonographic imaging of the deep venous system of the bilateral  lower extremity was performed by a registered sonographer and the images were submitted for interpretation. FINDINGS: Right lower extremity: The right common femoral, superficial femoral, and popliteal veins demonstrate normal color flow, augmentation, and compressibility. No venous duplex ultrasound evidence of DVT in the right lower extremity. Left lower extremity: There is venous thrombus within the left femoral, popliteal and posterior tibial vein. The venous duplex ultrasound findings are compatible with deep venous thrombosis in the left lower extremity. Also, an incidental finding is the presence of a 1.2 x 5.0 cm left popliteal cyst (Baker's cyst). 1. POSITIVE FOR DEEP VENOUS THROMBOSIS IN THE LEFT LOWER EXTREMITY AS DESCRIBED. 2. NO EVIDENCE OF DEEP VENOUS THROMBOSIS WITHIN THE  RIGHT  LOWER EXTREMITY. 3. INCIDENTAL FINDING IS THE PRESENCE OF A LEFT POPLITEAL CYST (BAKER'S CYST).        Discharge Medications:       Seema Mai 1 Medication Instructions SVE:[id number]    Printed on:01/11/18 0166

## 2018-01-11 NOTE — CONSULTS
compatible with severe COPD. There is presumably partial right lower lobe pulmonary resection with surgical sutures and clips adjacent to a large cavity involving the medial aspect of the right lung base. Also, there is subsegmental atelectasis and a streaky infiltrate involving the remaining right lower lobe. There is a small right pleural effusion. The left lung appears relatively clear. CT images through the upper abdomen appear unremarkable. 1. POSITIVE FOR BILATERAL PULMONARY EMBOLI AS DESCRIBED IN BODY OF REPORT. 2. SEVERE COPD WITH INNUMERABLE SMALL PULMONARY BULLAE. 3. POSTOPERATIVE CHANGES INVOLVING THE RIGHT LOWER LOBE. 4. SUBSEGMENTAL ATELECTASIS AND SMALL PULMONARY INFILTRATE IN REMAINING PORTION OF RLL. 5. SMALL RIGHT PLEURAL EFFUSION. All CT scans at this facility use dose modulation, iterative reconstruction, and/or weight based dosing when appropriate to reduce radiation dose to as low as reasonably achievable. Ct Abdomen Pelvis W Iv Contrast Additional Contrast? Oral    Result Date: 1/7/2018  EXAMINATION:  CT ABDOMEN AND PELVIS WITH IV CONTRAST CLINICAL HISTORY:  DIABETES WITH A HISTORY OF THYROID CANCER, PATIENT WITH RECENT PULMONARY EMBOLISM, EVALUATE POSSIBLE METASTATIC DISEASE IN ABDOMEN COMPARISON:  11/14/2013 TECHNIQUE:  CT abdomen and pelvis is obtained following IV injection of 100 mL of Isovue-370. Also, oral contrast media was given to the patient for this CT abdomen/pelvis. FINDINGS:  There is no hepatomegaly but there are several small round hypodense liver lesions which appear unchanged since the 11/14/2013 CT abdomen and these are compatible with small hepatic cysts. The gallbladder, spleen, pancreas and adrenal glands appear normal. There is no retroperitoneal adenopathy. There is a stable 2.4 cm infrarenal abdominal aortic aneurysm. There is no \"free fluid\", abscess or pneumoperitoneum in the abdomen. There are bilateral normal size kidneys with innumerable renal cysts. There are atherosclerotic calcifications in the aortic arch. The right costophrenic angle is blunted secondary to a small to moderate size pleural effusion, similar to last exam. There are metallic clips in the right lung base. There is infiltrate/atelectasis in the right lung base, unchanged. Mild left basilar atelectasis or scarring, unchanged. No pneumothorax. There are mild degenerative changes in spine. RIGHT-SIDED PLEURAL EFFUSION WITH RIGHT BASILAR INFILTRATE/ATELECTASIS, UNCHANGED. MINIMAL LEFT BASILAR ATELECTASIS, UNCHANGED. Us Venous Doppler Lower Extremities Bilateral    Result Date: 1/7/2018  VENOUS DUPLEX ULTRASOUND OF THE BILATERAL LOWER EXTREMITY CLINICAL HISTORY:  BILATERAL POSTERIOR LEG PAIN AND SWELLING, PATIENT CURRENTLY WITH A PULMONARY EMBOLISM, EVALUATE FOR DEEP VENOUS THROMBOSIS IN THE LOWER EXTREMITIES COMPARISON:  NONE AVAILABLE TECHNIQUE:  Sonographic imaging of the deep venous system of the bilateral  lower extremity was performed by a registered sonographer and the images were submitted for interpretation. FINDINGS: Right lower extremity: The right common femoral, superficial femoral, and popliteal veins demonstrate normal color flow, augmentation, and compressibility. No venous duplex ultrasound evidence of DVT in the right lower extremity. Left lower extremity: There is venous thrombus within the left femoral, popliteal and posterior tibial vein. The venous duplex ultrasound findings are compatible with deep venous thrombosis in the left lower extremity. Also, an incidental finding is the presence of a 1.2 x 5.0 cm left popliteal cyst (Baker's cyst). 1. POSITIVE FOR DEEP VENOUS THROMBOSIS IN THE LEFT LOWER EXTREMITY AS DESCRIBED. 2. NO EVIDENCE OF DEEP VENOUS THROMBOSIS WITHIN THE  RIGHT  LOWER EXTREMITY. 3. INCIDENTAL FINDING IS THE PRESENCE OF A LEFT POPLITEAL CYST (BAKER'S CYST).               Labs:     labs reviewed and discussed with patient and staff    Lab Results Component Value Date    POCGLU 98 11/26/2017    POCGLU 102 11/25/2017    POCGLU 118 11/22/2017    POCGLU 119 11/21/2017    POCGLU 103 11/21/2017     Lab Results   Component Value Date     01/09/2018    K 4.3 01/09/2018     01/09/2018    CO2 25 01/09/2018    BUN 24 01/09/2018    CREATININE 1.07 01/09/2018    CALCIUM 8.3 01/09/2018    LABALBU 3.0 01/09/2018    BILITOT 0.2 01/09/2018    ALKPHOS 69 01/09/2018    AST 22 01/09/2018    ALT 15 01/09/2018     Lab Results   Component Value Date    WBC 8.0 01/11/2018    RBC 4.42 01/11/2018    HGB 13.3 01/11/2018    HCT 41.7 01/11/2018    MCV 94.4 01/11/2018    MCH 30.2 01/11/2018    MCHC 32.0 01/11/2018    RDW 15.6 01/11/2018     01/11/2018    MPV 10.7 07/23/2015     Lab Results   Component Value Date    VITD25 40.8 03/10/2015     Lab Results   Component Value Date    COLORU ORANGE 11/18/2017    NITRU Negative 11/18/2017    GLUCOSEU Negative 11/18/2017    KETUA Negative 11/18/2017    UROBILINOGEN 1.0 11/18/2017    BILIRUBINUR SMALL 11/18/2017    BILIRUBINUR neg 09/29/2017     Lab Results   Component Value Date    PROTIME 11.5 01/07/2018     Lab Results   Component Value Date    INR 1.1 01/07/2018         I discussed results with patient. Current Rehabilitation Assessments:    Rehabilitation:  Physical therapy: FIMS:  Bed Mobility:      Transfers: Sit to Stand: Contact guard assistance  Stand to sit: Contact guard assistance  Bed to Chair: Contact guard assistance (using ww), Ambulation 1  Surface: level tile  Device: Rolling Walker  Other Apparatus: O2  Assistance: Contact guard assistance  Quality of Gait: decreased bilateral step length, unsteady, decreased safety awareness, improper use of ww, increased lateral sway  Distance: 10ft x 2,      FIMS:  ,  , Assessment: Patient demonstrates decline in functional mobility needing CGA with transfers and gait. Additionally, patient is at risk for falls due to unsteadiness with weightbearing position.

## 2018-01-11 NOTE — CARE COORDINATION
CALLED [facility] IS $32.13/MONTH. 30DAY FREE CARD ON CHART. PER LSW PT IS AGREEABLE TO REHAB, REFERRAL ORDERED.

## 2018-01-11 NOTE — PROGRESS NOTES
of asthma 1/13/2014    HTN (hypertension) 1/13/2014    Hyperlipidemia     Hypothyroidism     Insomnia     Lower extremity weakness 1/24/2014    On home oxygen therapy     2L at night    Osteoarthritis of spine with radiculopathy, lumbar region 6/7/2016    Papillary thyroid carcinoma ([facility] [address] 12/9/2015    Positive D dimer 12/5/2013    Sleep apnea 1/24/2014    Type 2 diabetes mellitus ([facility] [address]     Vitamin D deficiency      Past Surgical History:   Procedure Laterality Date    BRONCHOSCOPY N/A 11/11/2017    BRONCHOSCOPY FIBEROPTIC performed by Martin Meredith MD at [address] [facility] Right 11/14/2017    RIGHT THORACOTOMY WEDGE RESECTION FOR FOREIGN BODY AND FOB DOUBLE LUMEN (1ST CASE) performed by Coco Carranza MD at [address] [place] [address]  2006       Chart Reviewed: Yes  Patient assessed for rehabilitation services?: Yes  General Comment  Comments: Pt agreeable to PT evaluation. Restrictions:  Restrictions/Precautions: Fall Risk  Body mass index is 30.18 kg/m². SUBJECTIVE: Subjective: \"I try not to use anything to walk with. \"     Post Treatment Pain Screening:   Pain Screening  Patient Currently in Pain: No  Pain Assessment  Pain Assessment: 0-10  Pain Level: 0    Prior Level of Function:  Social/Functional History  Lives With: Alone  Type of Home: House  Home Layout: One level  Home Access: Stairs to enter without rails  Entrance Stairs - Number of Steps: 1 threshold step  Home Equipment: Rolling walker, 4 wheeled walker, Cane  ADL Assistance: Independent  Homemaking Assistance: Independent  Ambulation Assistance: Independent (primarily without AD, occasionally uses cane or walker)  Transfer Assistance: Independent  Additional Comments: denies any history of falls    OBJECTIVE:   Vision/Hearing:  Vision: Within Functional Limits (wears glasses for reading)  Hearing: Exceptions to [facility]  Hearing Exceptions: Bilateral hearing aid;Hard of hearing/hearing

## 2018-01-12 ENCOUNTER — HOSPITAL ENCOUNTER (INPATIENT)
Age: 78
LOS: 5 days | Discharge: HOME HEALTH | DRG: 176 | End: 2018-01-17
Attending: PHYSICAL MEDICINE & REHABILITATION | Admitting: PHYSICAL MEDICINE & REHABILITATION
Payer: MEDICARE

## 2018-01-12 VITALS
HEART RATE: 84 BPM | HEIGHT: 72 IN | RESPIRATION RATE: 18 BRPM | SYSTOLIC BLOOD PRESSURE: 116 MMHG | WEIGHT: 222.5 LBS | DIASTOLIC BLOOD PRESSURE: 74 MMHG | TEMPERATURE: 98 F | OXYGEN SATURATION: 92 % | BODY MASS INDEX: 30.14 KG/M2

## 2018-01-12 LAB
APTT: 39.3 SEC (ref 21.6–35.4)
BASOPHILS ABSOLUTE: 0 K/UL (ref 0–0.2)
BASOPHILS RELATIVE PERCENT: 0.2 %
BILIRUBIN URINE: NEGATIVE
BLOOD, URINE: ABNORMAL
CLARITY: CLEAR
COLOR: YELLOW
EOSINOPHILS ABSOLUTE: 0.3 K/UL (ref 0–0.7)
EOSINOPHILS RELATIVE PERCENT: 3.3 %
GLUCOSE URINE: NEGATIVE MG/DL
HCT VFR BLD CALC: 40 % (ref 42–52)
HEMOGLOBIN: 13 G/DL (ref 14–18)
KETONES, URINE: NEGATIVE MG/DL
LEUKOCYTE ESTERASE, URINE: NEGATIVE
LYMPHOCYTES ABSOLUTE: 1.2 K/UL (ref 1–4.8)
LYMPHOCYTES RELATIVE PERCENT: 14.4 %
MCH RBC QN AUTO: 29.9 PG (ref 27–31.3)
MCHC RBC AUTO-ENTMCNC: 32.6 % (ref 33–37)
MCV RBC AUTO: 91.8 FL (ref 80–100)
MONOCYTES ABSOLUTE: 0.7 K/UL (ref 0.2–0.8)
MONOCYTES RELATIVE PERCENT: 8.9 %
NEUTROPHILS ABSOLUTE: 6 K/UL (ref 1.4–6.5)
NEUTROPHILS RELATIVE PERCENT: 73.2 %
NITRITE, URINE: NEGATIVE
PDW BLD-RTO: 15.7 % (ref 11.5–14.5)
PH UA: 5.5 (ref 5–9)
PLATELET # BLD: 110 K/UL (ref 130–400)
PROTEIN UA: NEGATIVE MG/DL
RBC # BLD: 4.35 M/UL (ref 4.7–6.1)
RBC UA: ABNORMAL /HPF (ref 0–2)
SPECIFIC GRAVITY UA: 1.02 (ref 1–1.03)
UROBILINOGEN, URINE: 0.2 E.U./DL
WBC # BLD: 8.2 K/UL (ref 4.8–10.8)
WBC UA: ABNORMAL /HPF (ref 0–5)

## 2018-01-12 PROCEDURE — 97116 GAIT TRAINING THERAPY: CPT

## 2018-01-12 PROCEDURE — 85025 COMPLETE CBC W/AUTO DIFF WBC: CPT

## 2018-01-12 PROCEDURE — 6370000000 HC RX 637 (ALT 250 FOR IP): Performed by: INTERNAL MEDICINE

## 2018-01-12 PROCEDURE — 2700000000 HC OXYGEN THERAPY PER DAY

## 2018-01-12 PROCEDURE — 99232 SBSQ HOSP IP/OBS MODERATE 35: CPT | Performed by: INTERNAL MEDICINE

## 2018-01-12 PROCEDURE — 97110 THERAPEUTIC EXERCISES: CPT

## 2018-01-12 PROCEDURE — 36415 COLL VENOUS BLD VENIPUNCTURE: CPT

## 2018-01-12 PROCEDURE — 94760 N-INVAS EAR/PLS OXIMETRY 1: CPT

## 2018-01-12 PROCEDURE — 1180000000 HC REHAB R&B

## 2018-01-12 PROCEDURE — 99231 SBSQ HOSP IP/OBS SF/LOW 25: CPT | Performed by: PHYSICAL MEDICINE & REHABILITATION

## 2018-01-12 PROCEDURE — 85730 THROMBOPLASTIN TIME PARTIAL: CPT

## 2018-01-12 PROCEDURE — 81001 URINALYSIS AUTO W/SCOPE: CPT

## 2018-01-12 PROCEDURE — 87086 URINE CULTURE/COLONY COUNT: CPT

## 2018-01-12 PROCEDURE — 94640 AIRWAY INHALATION TREATMENT: CPT

## 2018-01-12 RX ORDER — PANTOPRAZOLE SODIUM 40 MG/1
40 TABLET, DELAYED RELEASE ORAL
Status: CANCELLED | OUTPATIENT
Start: 2018-01-13

## 2018-01-12 RX ORDER — ASPIRIN 81 MG/1
81 TABLET, CHEWABLE ORAL DAILY
Status: CANCELLED | OUTPATIENT
Start: 2018-01-13

## 2018-01-12 RX ORDER — ONDANSETRON 2 MG/ML
4 INJECTION INTRAMUSCULAR; INTRAVENOUS EVERY 6 HOURS PRN
Status: CANCELLED | OUTPATIENT
Start: 2018-01-12

## 2018-01-12 RX ORDER — PANTOPRAZOLE SODIUM 40 MG/1
40 TABLET, DELAYED RELEASE ORAL
Status: DISCONTINUED | OUTPATIENT
Start: 2018-01-13 | End: 2018-01-17 | Stop reason: HOSPADM

## 2018-01-12 RX ORDER — SODIUM CHLORIDE 0.9 % (FLUSH) 0.9 %
10 SYRINGE (ML) INJECTION EVERY 12 HOURS SCHEDULED
Status: CANCELLED | OUTPATIENT
Start: 2018-01-12

## 2018-01-12 RX ORDER — TRAZODONE HYDROCHLORIDE 150 MG/1
150 TABLET ORAL NIGHTLY
Status: CANCELLED | OUTPATIENT
Start: 2018-01-12

## 2018-01-12 RX ORDER — POTASSIUM CHLORIDE 750 MG/1
20 TABLET, FILM COATED, EXTENDED RELEASE ORAL DAILY
Status: CANCELLED | OUTPATIENT
Start: 2018-01-13

## 2018-01-12 RX ORDER — POTASSIUM CHLORIDE 750 MG/1
20 TABLET, FILM COATED, EXTENDED RELEASE ORAL DAILY
Status: DISCONTINUED | OUTPATIENT
Start: 2018-01-13 | End: 2018-01-17 | Stop reason: HOSPADM

## 2018-01-12 RX ORDER — DOCUSATE SODIUM 100 MG/1
100 CAPSULE, LIQUID FILLED ORAL DAILY
Status: CANCELLED | OUTPATIENT
Start: 2018-01-13

## 2018-01-12 RX ORDER — ACETAMINOPHEN 325 MG/1
650 TABLET ORAL EVERY 4 HOURS PRN
Status: DISCONTINUED | OUTPATIENT
Start: 2018-01-12 | End: 2018-01-17 | Stop reason: HOSPADM

## 2018-01-12 RX ORDER — SODIUM CHLORIDE 0.9 % (FLUSH) 0.9 %
10 SYRINGE (ML) INJECTION PRN
Status: CANCELLED | OUTPATIENT
Start: 2018-01-12

## 2018-01-12 RX ORDER — ALBUTEROL SULFATE 90 UG/1
2 AEROSOL, METERED RESPIRATORY (INHALATION) EVERY 4 HOURS PRN
Status: CANCELLED | OUTPATIENT
Start: 2018-01-12

## 2018-01-12 RX ORDER — POLYETHYLENE GLYCOL 3350 17 G/17G
17 POWDER, FOR SOLUTION ORAL DAILY
Status: DISCONTINUED | OUTPATIENT
Start: 2018-01-13 | End: 2018-01-17 | Stop reason: HOSPADM

## 2018-01-12 RX ORDER — ALBUTEROL SULFATE 90 UG/1
2 AEROSOL, METERED RESPIRATORY (INHALATION) EVERY 4 HOURS PRN
Status: DISCONTINUED | OUTPATIENT
Start: 2018-01-12 | End: 2018-01-17 | Stop reason: HOSPADM

## 2018-01-12 RX ORDER — METOPROLOL SUCCINATE 25 MG/1
25 TABLET, EXTENDED RELEASE ORAL DAILY
Status: CANCELLED | OUTPATIENT
Start: 2018-01-13

## 2018-01-12 RX ORDER — MIRTAZAPINE 15 MG/1
15 TABLET, FILM COATED ORAL NIGHTLY
Status: CANCELLED | OUTPATIENT
Start: 2018-01-12

## 2018-01-12 RX ORDER — TRAZODONE HYDROCHLORIDE 150 MG/1
150 TABLET ORAL NIGHTLY
Status: DISCONTINUED | OUTPATIENT
Start: 2018-01-12 | End: 2018-01-17 | Stop reason: HOSPADM

## 2018-01-12 RX ORDER — ACETAMINOPHEN 325 MG/1
650 TABLET ORAL EVERY 4 HOURS PRN
Status: CANCELLED | OUTPATIENT
Start: 2018-01-12

## 2018-01-12 RX ORDER — GUAIFENESIN 600 MG/1
600 TABLET, EXTENDED RELEASE ORAL 2 TIMES DAILY
Status: DISCONTINUED | OUTPATIENT
Start: 2018-01-12 | End: 2018-01-12 | Stop reason: HOSPADM

## 2018-01-12 RX ORDER — DOCUSATE SODIUM 100 MG/1
100 CAPSULE, LIQUID FILLED ORAL DAILY
Status: DISCONTINUED | OUTPATIENT
Start: 2018-01-13 | End: 2018-01-17 | Stop reason: HOSPADM

## 2018-01-12 RX ORDER — SODIUM CHLORIDE 0.9 % (FLUSH) 0.9 %
10 SYRINGE (ML) INJECTION PRN
Status: DISCONTINUED | OUTPATIENT
Start: 2018-01-12 | End: 2018-01-17 | Stop reason: HOSPADM

## 2018-01-12 RX ORDER — LORAZEPAM 0.5 MG/1
0.5 TABLET ORAL EVERY 4 HOURS PRN
Status: CANCELLED | OUTPATIENT
Start: 2018-01-12

## 2018-01-12 RX ORDER — MIRTAZAPINE 15 MG/1
15 TABLET, FILM COATED ORAL NIGHTLY
Status: DISCONTINUED | OUTPATIENT
Start: 2018-01-12 | End: 2018-01-17 | Stop reason: HOSPADM

## 2018-01-12 RX ORDER — BENZONATATE 100 MG/1
100 CAPSULE ORAL 3 TIMES DAILY PRN
Status: DISCONTINUED | OUTPATIENT
Start: 2018-01-12 | End: 2018-01-12 | Stop reason: HOSPADM

## 2018-01-12 RX ORDER — ASPIRIN 81 MG/1
81 TABLET, CHEWABLE ORAL DAILY
Status: DISCONTINUED | OUTPATIENT
Start: 2018-01-13 | End: 2018-01-17 | Stop reason: HOSPADM

## 2018-01-12 RX ORDER — SENNA AND DOCUSATE SODIUM 50; 8.6 MG/1; MG/1
2 TABLET, FILM COATED ORAL DAILY PRN
Status: CANCELLED | OUTPATIENT
Start: 2018-01-12

## 2018-01-12 RX ORDER — LORAZEPAM 0.5 MG/1
0.5 TABLET ORAL EVERY 4 HOURS PRN
Status: DISCONTINUED | OUTPATIENT
Start: 2018-01-12 | End: 2018-01-17 | Stop reason: HOSPADM

## 2018-01-12 RX ORDER — METOPROLOL SUCCINATE 25 MG/1
25 TABLET, EXTENDED RELEASE ORAL DAILY
Status: DISCONTINUED | OUTPATIENT
Start: 2018-01-13 | End: 2018-01-17 | Stop reason: HOSPADM

## 2018-01-12 RX ORDER — FINASTERIDE 5 MG/1
5 TABLET, FILM COATED ORAL DAILY
Status: CANCELLED | OUTPATIENT
Start: 2018-01-13

## 2018-01-12 RX ORDER — POLYETHYLENE GLYCOL 3350 17 G/17G
17 POWDER, FOR SOLUTION ORAL DAILY
Status: CANCELLED | OUTPATIENT
Start: 2018-01-13

## 2018-01-12 RX ORDER — SENNA AND DOCUSATE SODIUM 50; 8.6 MG/1; MG/1
2 TABLET, FILM COATED ORAL DAILY PRN
Status: DISCONTINUED | OUTPATIENT
Start: 2018-01-12 | End: 2018-01-17 | Stop reason: HOSPADM

## 2018-01-12 RX ORDER — LEVOTHYROXINE SODIUM 0.12 MG/1
125 TABLET ORAL DAILY
Status: CANCELLED | OUTPATIENT
Start: 2018-01-13

## 2018-01-12 RX ORDER — FINASTERIDE 5 MG/1
5 TABLET, FILM COATED ORAL DAILY
Status: DISCONTINUED | OUTPATIENT
Start: 2018-01-13 | End: 2018-01-17 | Stop reason: HOSPADM

## 2018-01-12 RX ORDER — TAMSULOSIN HYDROCHLORIDE 0.4 MG/1
0.4 CAPSULE ORAL NIGHTLY
Status: CANCELLED | OUTPATIENT
Start: 2018-01-12

## 2018-01-12 RX ORDER — SODIUM CHLORIDE 0.9 % (FLUSH) 0.9 %
10 SYRINGE (ML) INJECTION EVERY 12 HOURS SCHEDULED
Status: DISCONTINUED | OUTPATIENT
Start: 2018-01-12 | End: 2018-01-17 | Stop reason: ALTCHOICE

## 2018-01-12 RX ORDER — TAMSULOSIN HYDROCHLORIDE 0.4 MG/1
0.4 CAPSULE ORAL NIGHTLY
Status: DISCONTINUED | OUTPATIENT
Start: 2018-01-12 | End: 2018-01-17 | Stop reason: HOSPADM

## 2018-01-12 RX ORDER — FUROSEMIDE 20 MG/1
20 TABLET ORAL DAILY
Status: DISCONTINUED | OUTPATIENT
Start: 2018-01-13 | End: 2018-01-17 | Stop reason: HOSPADM

## 2018-01-12 RX ORDER — ONDANSETRON 2 MG/ML
4 INJECTION INTRAMUSCULAR; INTRAVENOUS EVERY 6 HOURS PRN
Status: DISCONTINUED | OUTPATIENT
Start: 2018-01-12 | End: 2018-01-17 | Stop reason: HOSPADM

## 2018-01-12 RX ORDER — FUROSEMIDE 20 MG/1
20 TABLET ORAL DAILY
Status: CANCELLED | OUTPATIENT
Start: 2018-01-13

## 2018-01-12 RX ADMIN — RIVAROXABAN 15 MG: 15 TABLET, FILM COATED ORAL at 18:22

## 2018-01-12 RX ADMIN — RIVAROXABAN 15 MG: 15 TABLET, FILM COATED ORAL at 10:22

## 2018-01-12 RX ADMIN — BENZONATATE 100 MG: 100 CAPSULE ORAL at 10:39

## 2018-01-12 RX ADMIN — DOCUSATE SODIUM 100 MG: 100 CAPSULE, LIQUID FILLED ORAL at 10:23

## 2018-01-12 RX ADMIN — MIRTAZAPINE 15 MG: 15 TABLET, FILM COATED ORAL at 20:03

## 2018-01-12 RX ADMIN — FINASTERIDE 5 MG: 5 TABLET, FILM COATED ORAL at 10:22

## 2018-01-12 RX ADMIN — PANTOPRAZOLE SODIUM 40 MG: 40 TABLET, DELAYED RELEASE ORAL at 06:11

## 2018-01-12 RX ADMIN — Medication 400 MG: at 10:22

## 2018-01-12 RX ADMIN — Medication 2 PUFF: at 07:34

## 2018-01-12 RX ADMIN — FUROSEMIDE 20 MG: 20 TABLET ORAL at 10:22

## 2018-01-12 RX ADMIN — VITAMIN D, TAB 1000IU (100/BT) 2000 UNITS: 25 TAB at 10:23

## 2018-01-12 RX ADMIN — LEVOTHYROXINE SODIUM 125 MCG: 125 TABLET ORAL at 06:11

## 2018-01-12 RX ADMIN — TRAZODONE HYDROCHLORIDE 150 MG: 150 TABLET ORAL at 20:03

## 2018-01-12 RX ADMIN — TAMSULOSIN HYDROCHLORIDE 0.4 MG: 0.4 CAPSULE ORAL at 20:03

## 2018-01-12 RX ADMIN — ASPIRIN 81 MG 81 MG: 81 TABLET ORAL at 10:22

## 2018-01-12 RX ADMIN — METOPROLOL SUCCINATE 25 MG: 25 TABLET, FILM COATED, EXTENDED RELEASE ORAL at 10:22

## 2018-01-12 RX ADMIN — POTASSIUM CHLORIDE 20 MEQ: 750 TABLET, FILM COATED, EXTENDED RELEASE ORAL at 10:23

## 2018-01-12 RX ADMIN — POLYETHYLENE GLYCOL 3350 17 G: 17 POWDER, FOR SOLUTION ORAL at 10:39

## 2018-01-12 ASSESSMENT — PAIN DESCRIPTION - PAIN TYPE: TYPE: ACUTE PAIN

## 2018-01-12 NOTE — CONSULTS
R Irvin, DO        melatonin tablet 10 mg  10 mg Oral Nightly PRN Nicanor Reges, DO        [START ON 1/13/2018] metoprolol succinate (TOPROL XL) extended release tablet 25 mg  25 mg Oral Daily Nicanor Reges, DO        mirtazapine (REMERON) tablet 15 mg  15 mg Oral Nightly Nicanor Reges, DO        mometasone-formoterol (DULERA) 200-5 MCG/ACT inhaler 2 puff  2 puff Inhalation BID Nicanor Reges, DO        [START ON 1/13/2018] pantoprazole (PROTONIX) tablet 40 mg  40 mg Oral QAM AC Yazid R Irvin, DO        [START ON 1/13/2018] polyethylene glycol (GLYCOLAX) packet 17 g  17 g Oral Daily Nicanor Reges, DO        [START ON 1/13/2018] potassium chloride (KLOR-CON) extended release tablet 20 mEq  20 mEq Oral Daily Nicanor Reges, DO        rivaroxaban (XARELTO) tablet 15 mg  15 mg Oral BID WC Nicanor Reges, DO        sennosides-docusate sodium (SENOKOT-S) 8.6-50 MG tablet 2 tablet  2 tablet Oral Daily PRN Nicanor Reges, DO        sodium chloride flush 0.9 % injection 10 mL  10 mL Intravenous PRN Nicanor Reges, DO        sodium chloride flush 0.9 % injection 10 mL  10 mL Intravenous PRN Nicanor Reges, DO        tamsulosin (FLOMAX) capsule 0.4 mg  0.4 mg Oral Nightly Nicanor Reges, DO        traZODone (DESYREL) tablet 150 mg  150 mg Oral Nightly Nicanor Reges, DO       [place] ON 1/13/2018] vitamin D (CHOLECALCIFEROL) tablet 2,000 Units  2,000 Units Oral Daily Nicanor Reges, DO         No outpatient prescriptions have been marked as taking for the 1/12/18 encounter [facility] Encounter). No Known Allergies     ROS is negative except for symptoms mentioned in history of present illness. Denies any chest pain but he still has dyspnea on any activity. He denies any lightheadedness or syncope. He has no chest pain ,cough but denies any sputum production.   He denies abdominal pain, change in bowel habits, melena, hematochezia, urinary symptoms, leg swelling or leg cramps,  back pain

## 2018-01-12 NOTE — PROGRESS NOTES
assistance (using ww), Ambulation 1  Surface: level tile  Device: Rolling Walker  Other Apparatus: O2  Assistance: Contact guard assistance  Quality of Gait: decreased bilateral step length, unsteady, decreased safety awareness, improper use of ww, increased lateral sway  Distance: 10ft x 2,      FIMS:  ,  , Assessment: Patient demonstrates decline in functional mobility needing CGA with transfers and gait. Additionally, patient is at risk for falls due to unsteadiness with weightbearing position. Further inpatient PT recommended prior to discharge home. Occupational therapy: FIMS:   ,  ,      Speech therapy: FIMS:        Lab/X-ray studies reviewed, analyzed and discussed with patient and staff:   Recent Results (from the past 24 hour(s))   CBC Auto Differential    Collection Time: 01/12/18  6:20 AM   Result Value Ref Range    Platelets 948 (L) 816 - 400 K/uL    WBC 8.2 4.8 - 10.8 K/uL    RBC 4.35 (L) 4.70 - 6.10 M/uL    Hemoglobin 13.0 (L) 14.0 - 18.0 g/dL    Hematocrit 40.0 (L) 42.0 - 52.0 %    MCV 91.8 80.0 - 100.0 fL    MCH 29.9 27.0 - 31.3 pg    MCHC 32.6 (L) 33.0 - 37.0 %    RDW 15.7 (H) 11.5 - 14.5 %    Neutrophils % 73.2 %    Lymphocytes % 14.4 %    Monocytes % 8.9 %    Eosinophils % 3.3 %    Basophils % 0.2 %    Neutrophils # 6.0 1.4 - 6.5 K/uL    Lymphocytes # 1.2 1.0 - 4.8 K/uL    Monocytes # 0.7 0.2 - 0.8 K/uL    Eosinophils # 0.3 0.0 - 0.7 K/uL    Basophils # 0.0 0.0 - 0.2 K/uL   APTT    Collection Time: 01/12/18  6:20 AM   Result Value Ref Range    aPTT 39.3 (H) 21.6 - 35.4 sec       Previous extensive, complex labs, notes and diagnostics reviewed and analyzed. ALLERGIES:    Allergies as of 01/06/2018    (No Known Allergies)      (please also verify by checking [facility])     Complex Physical Medicine & Rehab Issues Assess & Plan:   1.  Severe abnormality of gait and mobility and impaired self-care and ADL's secondary to progressive Pulmonary disease secondary to bilateral PEs and on old COPD and recent pneumonia . Functional and medical status reassessed regarding patients ability to participate in therapies and patient found to be able to participate in acute intensive comprehensive inpatient rehabilitation program including PT/OT to improve balance, ambulation, ADLs, and to improve the P/AROM. 2. Bowel and Bladder dysfunction:  frequent toileting, ambulate to bathroom with assistance, check post void residuals. Check for C.difficile x1 if >2 loose stools in 24 hours, continue bowel & bladder program.   3. Moderate to severe generalized OA pain: reassess pain every shift and prior to and after each therapy session, give prn  Tylenol, modalities prn in therapy, consider Lidoderm, K-pad prn.   4. Skin breakdown risk:  continue pressure relief program.  Daily skin exams and reports from nursing. 5. Complex discharge planning:  Patient cleared to come to acute rehabilitation today. His goal is to be 100% independent lives alone at home with daily checks by his family after 2 week stay. Complex Active General Medical Issues that complicate care Assess & Plan:     1.  Active Problems:    Type 2 diabetes mellitus (HCC)    Osteoarthritis of spine with radiculopathy, lumbar region    Bilateral pulmonary embolism (HCC)    Gait abnormality                 Himanshu Soriano D.O., PM&R     Attending    [address] [place], [place] [address]en [facility]

## 2018-01-12 NOTE — PROGRESS NOTES
GERD (gastroesophageal reflux disease) 12/11/2014    History of asthma 1/13/2014    HTN (hypertension) 1/13/2014    Hyperlipidemia     Hypothyroidism     Insomnia     Lower extremity weakness 1/24/2014    On home oxygen therapy     2L at night    Osteoarthritis of spine with radiculopathy, lumbar region 6/7/2016    Papillary thyroid carcinoma ([facility] [address] 12/9/2015    Positive D dimer 12/5/2013    Sleep apnea 1/24/2014    Type 2 diabetes mellitus ([facility] [address]     Vitamin D deficiency      Past Surgical History:   Procedure Laterality Date    BRONCHOSCOPY N/A 11/11/2017    BRONCHOSCOPY FIBEROPTIC performed by Matt Kauffman MD at [address] [facility] Right 11/14/2017    RIGHT THORACOTOMY WEDGE RESECTION FOR FOREIGN BODY AND FOB DOUBLE LUMEN (1ST CASE) performed by Coy Moore MD at [address] [place] [address]  2006         Restrictions  Restrictions/Precautions: Fall Risk    Subjective   General  Chart Reviewed: Yes  Family / Caregiver Present: No  Subjective  Subjective: \"I slept ok last night\"       Pain Screening  Patient Currently in Pain: No     Pain Reassessment:       None. Orientation       Objective   Bed mobility  Supine to Sit: Supervision    Transfers  Sit to Stand: Contact guard assistance  Stand to sit: Contact guard assistance    Ambulation  Ambulation?: Yes  Ambulation 1  Surface: level tile  Device: Rolling Walker  Assistance: Contact guard assistance  Quality of Gait: NBOS, Forward flexed, Verbal cues for safe technique with walker backing up to sit down. Distance: 61' x 2  Stairs/Curb  Stairs?: No        Assessment   Body structures, Functions, Activity limitations: Decreased functional mobility ; Decreased strength;Decreased safe awareness;Decreased endurance;Decreased coordination;Decreased balance  Assessment: Pt well motivated. Verbal cues for safe technique with walker backing up to sit down. Gopod tolerance to treatment.   Prognosis: Good  REQUIRES PT FOLLOW

## 2018-01-12 NOTE — PROGRESS NOTES
Report called to nurse [facility] on rehab. IV and telemetry removed. transport put in. Patient in no distress.

## 2018-01-12 NOTE — PROGRESS NOTES
INPATIENT PROGRESS NOTES    PATIENT NAME: Ca Tong  MRN: [id number]  SERVICE DATE:  January 12, 2018   SERVICE TIME:  2:08 PM      PRIMARY SERVICE:  Pulmonary Medicine     INTERVAL HPI: Patient seen and examined at bedside, Interval Notes, orders reviewed. Nursing notes noted: Patient reports he has an increase in coughing for the past couple days. Patient reports that the sputum in clear. Patient still denies any shortness of breath and chest pain. Is now been on Xarelto for 3 days. Patient has remained on 2 L of oxygen and has been between 90 and 93% SPO2. Patient has not had any fever, chills, nausea, vomiting and diarrhea. Review of Systems  Please see HPI above. OBJECTIVE    Body mass index is 30.18 kg/m². PHYSICAL EXAM:  Vitals:  /74   Pulse 84   Temp 98 °F (36.7 °C)   Resp 18   Ht 6' (1.829 m)   Wt 222 lb 8 oz (100.9 kg)   SpO2 92%   BMI 30.18 kg/m²   General: Patient is comfortable in bed, No distress. Head: Atraumatic , Normocephalic   Eyes: PERRL. No sclera icterus. No conjunctival injection. No discharge   ENT: No nasal  discharge. Pharynx clear. Neck:  Trachea midline. No thyromegaly, no JVD, No cervical adenopathy. Resp : Clear to auscultation bilaterally with no wheeze rhonchi or rales noted. Normal effort muscle use. CV: Normal  rate. Regular rhythm. No mumur ,  Rub or gallop  ABD: Non-tender. Non-distended. No masses. No organmegaly. Normal bowel sounds. No hernia. EXT: No Pitting, No Cyanosis No clubbing  Neuro: no focal weakness  Skin: Warm and dry. No erythema rash on exposed extremities. DATA:   Recent Labs      01/11/18   0644  01/12/18   0620   WBC  8.0  8.2   HGB  13.3*  13.0*   HCT  41.7*  40.0*   MCV  94.4  91.8   PLT  119*  110*     No results for input(s): NA, K, CL, CO2, BUN, CREATININE, GLUCOSE, CALCIUM, PROT, LABALBU, BILITOT, ALKPHOS, AST, ALT, LABGLOM, GFRAA, AGRATIO, GLOB in the last 72 hours.       No results for input(s): PHART, [id number], 6, 2018. NO ACUTE PROCESS IN THE LUNG FIELDS. Xr Chest Standard (2 Vw)    Result Date: 12/15/2017  EXAMINATION: XR CHEST STANDARD TWO VW. DATE AND TIME:12/15/2017 10:19 AM CLINICAL HISTORY: Shortness of breath  J44.9 Chronic obstructive pulmonary disease, unspecified COPD type ([facility] [address] ICD10 COMPARISONS: 11/29/2017 FINDINGS: Chronic pleural parenchymal changes at the right base again seen with no improvement. The size of the pleural fluid collection may be slightly increased or possibly there is some increased atelectasis or consolidation at the right base. There is suggestion of a fluid level demonstrated on today's exam which was not clearly seen on the prior study. This appears to be located posteriorly. Minimal discoid atelectasis at the left base. PERSISTENT PLEURAL-PARENCHYMAL POSTOPERATIVE CHANGES AT THE RIGHT BASE. FLUID LEVEL NOW DEMONSTRATED. THIS MOST LIKELY IS RELATED TO POSTOPERATIVE CHANGE WITH LOCULATED FLUID AND AIR. ABSCESS IS LESS LIKELY. CLOSE FOLLOW-UP RECOMMENDED. Cta Chest W Wo  (pe Study)    Result Date: 1/6/2018  EXAM: CT SCAN OF THE THORAX WITH CONTRAST/PE PROTOCOL/CTA CHEST COMPARISON: 11/11/2017 REASON FOR EXAMINATION:  HISTORY OF COPD AND DIABETES, HISTORY OF A WEDGE RESECTION IN THE RIGHT LUNG, PATIENT WITH COUGH, DYSPNEA, EVALUATE FOR POSSIBLE PULMONARY EMBOLISM TECHNIQUE: Helical CTA was performed through the chest utilizing 100 cc of Isovue 370 intravenous contrast.  Images were obtained with bolus tracking in order to opacify the pulmonary arteries. Thick section coronal MIP 3D reconstructions were performed  on a separate workstation. FINDINGS: The CTA thorax demonstrates multiple \"filling defects\" within the main pulmonary arteries and segmental branches of the right and left pulmonary arteries compatible with bilateral pulmonary emboli. Pulmonary emboli are also seen in subsegmental  pulmonary arteries in the lower lobes.  There is an atherosclerotic aorta but no aortic aneurysm or dissection. The heart is not enlarged and but there is a trace amount of pericardial fluid. There is no mediastinal mass. There are several nonspecific small mediastinal lymph nodes. There is hyperinflation of the lungs with innumerable small pulmonary bullae compatible with severe COPD. There is presumably partial right lower lobe pulmonary resection with surgical sutures and clips adjacent to a large cavity involving the medial aspect of the right lung base. Also, there is subsegmental atelectasis and a streaky infiltrate involving the remaining right lower lobe. There is a small right pleural effusion. The left lung appears relatively clear. CT images through the upper abdomen appear unremarkable. 1. POSITIVE FOR BILATERAL PULMONARY EMBOLI AS DESCRIBED IN BODY OF REPORT. 2. SEVERE COPD WITH INNUMERABLE SMALL PULMONARY BULLAE. 3. POSTOPERATIVE CHANGES INVOLVING THE RIGHT LOWER LOBE. 4. SUBSEGMENTAL ATELECTASIS AND SMALL PULMONARY INFILTRATE IN REMAINING PORTION OF RLL. 5. SMALL RIGHT PLEURAL EFFUSION. All CT scans at this facility use dose modulation, iterative reconstruction, and/or weight based dosing when appropriate to reduce radiation dose to as low as reasonably achievable. Xr Chest Portable    Result Date: 1/8/2018  EXAMINATION: Portable AP ERECT view of the chest. CLINICAL HISTORY: Cough and sob . COMPARISONS: 12/15/2017 FINDINGS: Normal cardiac silhouette. There are atherosclerotic calcifications in the aortic arch. The right costophrenic angle is blunted secondary to a small to moderate size pleural effusion, similar to last exam. There are metallic clips in the right lung base. There is infiltrate/atelectasis in the right lung base, unchanged. Mild left basilar atelectasis or scarring, unchanged. No pneumothorax. There are mild degenerative changes in spine. RIGHT-SIDED PLEURAL EFFUSION WITH RIGHT BASILAR INFILTRATE/ATELECTASIS, UNCHANGED.  MINIMAL LEFT BASILAR ATELECTASIS,

## 2018-01-13 LAB
APTT: 47.4 SEC (ref 21.6–35.4)
BASOPHILS ABSOLUTE: 0 K/UL (ref 0–0.2)
BASOPHILS RELATIVE PERCENT: 0.4 %
EOSINOPHILS ABSOLUTE: 0.3 K/UL (ref 0–0.7)
EOSINOPHILS RELATIVE PERCENT: 4.2 %
HCT VFR BLD CALC: 39.3 % (ref 42–52)
HEMOGLOBIN: 12.8 G/DL (ref 14–18)
LYMPHOCYTES ABSOLUTE: 1.2 K/UL (ref 1–4.8)
LYMPHOCYTES RELATIVE PERCENT: 16.3 %
MCH RBC QN AUTO: 30 PG (ref 27–31.3)
MCHC RBC AUTO-ENTMCNC: 32.6 % (ref 33–37)
MCV RBC AUTO: 92.1 FL (ref 80–100)
MONOCYTES ABSOLUTE: 0.7 K/UL (ref 0.2–0.8)
MONOCYTES RELATIVE PERCENT: 9.7 %
NEUTROPHILS ABSOLUTE: 5.2 K/UL (ref 1.4–6.5)
NEUTROPHILS RELATIVE PERCENT: 69.4 %
PDW BLD-RTO: 15.3 % (ref 11.5–14.5)
PLATELET # BLD: 104 K/UL (ref 130–400)
RBC # BLD: 4.27 M/UL (ref 4.7–6.1)
WBC # BLD: 7.5 K/UL (ref 4.8–10.8)

## 2018-01-13 PROCEDURE — G8979 MOBILITY GOAL STATUS: HCPCS

## 2018-01-13 PROCEDURE — 85025 COMPLETE CBC W/AUTO DIFF WBC: CPT

## 2018-01-13 PROCEDURE — 1180000000 HC REHAB R&B

## 2018-01-13 PROCEDURE — 97165 OT EVAL LOW COMPLEX 30 MIN: CPT

## 2018-01-13 PROCEDURE — G8978 MOBILITY CURRENT STATUS: HCPCS

## 2018-01-13 PROCEDURE — 2700000000 HC OXYGEN THERAPY PER DAY

## 2018-01-13 PROCEDURE — 36415 COLL VENOUS BLD VENIPUNCTURE: CPT

## 2018-01-13 PROCEDURE — 97110 THERAPEUTIC EXERCISES: CPT

## 2018-01-13 PROCEDURE — 97535 SELF CARE MNGMENT TRAINING: CPT

## 2018-01-13 PROCEDURE — 85730 THROMBOPLASTIN TIME PARTIAL: CPT

## 2018-01-13 PROCEDURE — 97116 GAIT TRAINING THERAPY: CPT

## 2018-01-13 PROCEDURE — 97530 THERAPEUTIC ACTIVITIES: CPT

## 2018-01-13 PROCEDURE — 97162 PT EVAL MOD COMPLEX 30 MIN: CPT

## 2018-01-13 PROCEDURE — 6370000000 HC RX 637 (ALT 250 FOR IP): Performed by: INTERNAL MEDICINE

## 2018-01-13 PROCEDURE — 97112 NEUROMUSCULAR REEDUCATION: CPT

## 2018-01-13 PROCEDURE — 94640 AIRWAY INHALATION TREATMENT: CPT

## 2018-01-13 RX ADMIN — VITAMIN D, TAB 1000IU (100/BT) 2000 UNITS: 25 TAB at 09:40

## 2018-01-13 RX ADMIN — RIVAROXABAN 15 MG: 15 TABLET, FILM COATED ORAL at 16:18

## 2018-01-13 RX ADMIN — Medication 2 PUFF: at 16:29

## 2018-01-13 RX ADMIN — Medication 400 MG: at 09:41

## 2018-01-13 RX ADMIN — TAMSULOSIN HYDROCHLORIDE 0.4 MG: 0.4 CAPSULE ORAL at 21:14

## 2018-01-13 RX ADMIN — POTASSIUM CHLORIDE 20 MEQ: 750 TABLET, FILM COATED, EXTENDED RELEASE ORAL at 09:41

## 2018-01-13 RX ADMIN — ASPIRIN 81 MG 81 MG: 81 TABLET ORAL at 09:41

## 2018-01-13 RX ADMIN — RIVAROXABAN 15 MG: 15 TABLET, FILM COATED ORAL at 09:39

## 2018-01-13 RX ADMIN — FINASTERIDE 5 MG: 5 TABLET, FILM COATED ORAL at 09:41

## 2018-01-13 RX ADMIN — DOCUSATE SODIUM 100 MG: 100 CAPSULE, LIQUID FILLED ORAL at 09:41

## 2018-01-13 RX ADMIN — LEVOTHYROXINE SODIUM 125 MCG: 100 TABLET ORAL at 06:09

## 2018-01-13 RX ADMIN — PANTOPRAZOLE SODIUM 40 MG: 40 TABLET, DELAYED RELEASE ORAL at 06:10

## 2018-01-13 RX ADMIN — MIRTAZAPINE 15 MG: 15 TABLET, FILM COATED ORAL at 21:14

## 2018-01-13 RX ADMIN — METOPROLOL SUCCINATE 25 MG: 25 TABLET, FILM COATED, EXTENDED RELEASE ORAL at 09:39

## 2018-01-13 RX ADMIN — TRAZODONE HYDROCHLORIDE 150 MG: 150 TABLET ORAL at 21:14

## 2018-01-13 RX ADMIN — Medication 2 PUFF: at 05:32

## 2018-01-13 RX ADMIN — POLYETHYLENE GLYCOL 3350 17 G: 17 POWDER, FOR SOLUTION ORAL at 09:41

## 2018-01-13 RX ADMIN — FUROSEMIDE 20 MG: 20 TABLET ORAL at 09:40

## 2018-01-13 ASSESSMENT — PAIN SCALES - GENERAL
PAINLEVEL_OUTOF10: 0
PAINLEVEL_OUTOF10: 7
PAINLEVEL_OUTOF10: 6
PAINLEVEL_OUTOF10: 0

## 2018-01-13 ASSESSMENT — PAIN DESCRIPTION - FREQUENCY
FREQUENCY: INTERMITTENT
FREQUENCY: INTERMITTENT

## 2018-01-13 ASSESSMENT — PAIN DESCRIPTION - DESCRIPTORS: DESCRIPTORS: ACHING

## 2018-01-13 ASSESSMENT — PAIN DESCRIPTION - PAIN TYPE
TYPE: CHRONIC PAIN
TYPE: CHRONIC PAIN

## 2018-01-13 ASSESSMENT — PAIN DESCRIPTION - ORIENTATION
ORIENTATION: RIGHT
ORIENTATION: RIGHT

## 2018-01-13 ASSESSMENT — PAIN DESCRIPTION - LOCATION
LOCATION: SHOULDER
LOCATION: KNEE

## 2018-01-13 NOTE — PROGRESS NOTES
hand rails )  Sit to Supine: Unable to assess (seated in chair )  Transfers  Sit to stand: Stand by assistance  Stand to sit: Stand by assistance (verbal cues for safe technique )     Cognition  Overall Cognitive Status: WFL (verbal cues for safety at times )  Perception  Overall Perceptual Status: WFL     Sensation  Overall Sensation Status: WFL        LUE AROM (degrees)  LUE AROM : WFL  Left Hand AROM (degrees)  Left Hand AROM: WFL  RUE AROM (degrees)  RUE AROM : WFL  Right Hand AROM (degrees)  Right Hand AROM: WFL  LUE Strength  Gross LUE Strength: WFL (observed during functional activities )  L Hand Grasp:  (WFL)  L Hand Release:  (WFL)  RUE Strength  Gross RUE Strength: WFL (obsrved during functional activities )  R Hand Grasp:  (WFL)  R Hand Release:  (WFL)     Hand Dominance  Hand Dominance: Right     Assessment   Performance deficits / Impairments: Decreased ADL status; Decreased balance;Decreased high-level IADLs;Decreased endurance;Decreased safe awareness  Prognosis: Good  Decision Making: Low Complexity  History: Multi comorb  Exam: 5 perf imp   Assistance / Modification: SBA  Discharge Recommendations: Continue to assess pending progress  REQUIRES OT FOLLOW UP: Yes  Activity Tolerance  Activity Tolerance: Patient limited by fatigue (SPO2 Sitting EOB with NC at 92%, after ADL's and back to chair SPO2 was 88%. )  Safety Devices  Safety Devices in place: Yes  Type of devices: All fall risk precautions in place        Discharge Recommendations:  Continue to assess pending progress     Plan   Plan  Times per week: 5-7x/wk, 15-90 minutes   Plan weeks: 1-2  Current Treatment Recommendations: Strengthening, Balance Training, Functional Mobility Training, Endurance Training, Self-Care / ADL, Safety Education & Training, Equipment Evaluation, Education, & procurement      Goals  Patient Goals   Patient goals : To return home.      [x]  Patient will complete self care as followed using the recommended adaptive equipment and/or adaptive techniques as instructed:  Feeding:IND  Grooming: IND  Bathing: IND  UE Dressing: IND  LE Dressing: IND  Toileting: IND  Toilet Transfers: Mod IND  Tub Transfers: Mod IND   []  Patient will sequence self-care routine with  and  verbal/tactile cues. []  Patient will improve  UE sensation and/or utilize compensatory techniques for safe completion of self-care as projected. []  Patient will improve static and dynamic standing balance to complete pants management at IND level     []  Patient will improve  UE Function (AROM, strength, motor control, tone normalization) to complete ADLs as projected. [x]  Patient will improve functional endurance to tolerate/complete 60 minutes of ADLs. []  Patient will improve  UE strength and endurance to  in order to participate in self-care activities as projected. []  Patient will improve  hand fine motor coordination to  in order to manage clothing fasteners/self-care containers in a timely manner     []  Patient will improve visual perception to  in order to improve participation in self care and leisure activities     []  Patient will perform kitchen mobility at device level without episodes of LOB and good safety awareness      [x]  Patient will perform basic room mobility at Mod IND level. [x]  Patient will access appropriate D/C site with as few architectural barriers as possible. []  Patient and/or caregiver will demonstrate understanding of hip precautions with  verbal/tactile cues. []  Patient and/or caregiver will demonstrate understanding of energy conservation techniques with  verbal/tactile cues.       []  Patient and/or caregiver will demonstrate understanding of recommended HEP for .        []  Patient's cognition will improve to safely perform ADLs:  Comprehension:   Expression:   Social Interaction:   Problem Solving:   Memory:         Therapy Time   Individual Concurrent Group Co-treatment   Time In 0805

## 2018-01-13 NOTE — PROGRESS NOTES
Pulmonary Disorder  (acute or chronic)  [x]   Severe or Chronic w/ Exacerbation  []     Surgical Status No [x]   Surgeries     General []   Surgery Lower []   Abdominal Thoracic or []   Upper Abdominal Thoracic with  PulmonaryDisorder  []     Chest X-ray Clear/Not  Ordered     [x]  Chronic Changes  Results Pending  []  Infiltrates, atelectasis, pleural effusion, or edema  []  Infiltrates in more than one lobe []  Infiltrate + Atelectasis, &/or pleural effusion  []    Respiratory Pattern Regular,  RR = 12-20 [x]  Increased,  RR = 21-25 []  STEWART, irregular,  or RR = 26-30 []  Decreased FEV1  or RR = 31-35 []  Severe SOB, use  of accessory muscles, or RR ? 35  []    Mental Status Alert, oriented,  Cooperative [x]  Confused but Follows commands []  Lethargic or unable to follow commands []  Obtunded  []  Comatose  []    Breath Sounds Clear to  auscultation  [x]  Decreased unilaterally or  in bases only []  Decreased  bilaterally  []  Crackles or intermittent wheezes []  Wheezes []    Cough Strong, Spontan., & nonproductive [x]  Strong,  spontaneous, &  productive []  Weak,  Nonproductive []  Weak, productive or  with wheezes []  No spontaneous  cough or may require suctioning []    Level of Activity Ambulatory []  Ambulatory w/ Assist  [x]  Non-ambulatory []  Paraplegic []  Quadriplegic []    Total    Score:_____4__     Triage Score:___5_____      Tri       Triage:     1. (>20) Freq: Q3    2. (16-20) Freq: Q4   3. (11-15) Freq: QID & Albuterol Q2 PRN    4. (6-10) Freq: TID & Albuterol Q2 PRN    5. (0-5) Freq Q4prn

## 2018-01-13 NOTE — PROGRESS NOTES
Osteoarthritis of spine with radiculopathy, lumbar region 6/7/2016    Papillary thyroid carcinoma ([facility] [address] 12/9/2015    Positive D dimer 12/5/2013    Sleep apnea 1/24/2014    Type 2 diabetes mellitus ([facility] [address]     Vitamin D deficiency      Past Surgical History:   Procedure Laterality Date    BRONCHOSCOPY N/A 11/11/2017    BRONCHOSCOPY FIBEROPTIC performed by Dominic England MD at [address] [facility] Right 11/14/2017    RIGHT THORACOTOMY WEDGE RESECTION FOR FOREIGN BODY AND FOB DOUBLE LUMEN (1ST CASE) performed by Brittani Carrasquillo MD at [address] [place] [address]       Chart Reviewed: Yes  Patient assessed for rehabilitation services?: Yes  Family / Caregiver Present: No  General Comment  Comments: Pt agreeable to PT evaluation. Restrictions:  Restrictions/Precautions: Fall Risk (2 L O2 NC)  Body mass index is 30.11 kg/m². SUBJECTIVE: Subjective: \"I am tired. \"    Pre Treatment Pain Screening  Pain at present: 6  Scale Used: Numeric Score  Intervention List: Patient able to continue with treatment;Patient declined any intervention  Comments / Details: \"arthritis\"    Post Treatment Pain Screening:  Pain Assessment  Pain Assessment: 0-10  Pain Level: 7  Pain Type: Chronic pain  Pain Location: Knee  Pain Orientation: Right  Pain Frequency: Intermittent    Prior Level of Function:  Social/Functional History  Lives With: Alone  Type of Home: House  Home Layout: One level  Home Access: Level entry  Entrance Stairs - Number of Steps: 1 threshold step  Bathroom Shower/Tub: Tub/Shower unit  Bathroom Equipment: Hand-held shower  Home Equipment: Rolling walker, 4 wheeled walker, [employer] From: Family  ADL Assistance: Independent  Homemaking Assistance: Independent  Ambulation Assistance: Independent (rollator)  Transfer Assistance: Independent  Active : Yes  Occupation: Retired  Additional Comments: denies any history of falls    OBJECTIVE:   Vision/Hearing:  Vision: appropriate AD  Long term goal 3: ERYES >41/56  Long term goal 4: 6 min walk test >430 meters (age related norm)    ELOS: 5-7 days       Therapy Time:    Individual   Time In 1108   Time Out 1138   Minutes Cat [place] 4, [address] [facility], 01/13/18 at 12:07 PM

## 2018-01-13 NOTE — PROGRESS NOTES
Occupational Therapy  Facility/Department: [facility]  Daily Treatment Note  NAME: Alcira Sheldon  :   MRN: [id number]    Date of Service: 2018    Patient Diagnosis(es): There were no encounter diagnoses. has a past medical history of Abnormality of gait and mobility; Acute sinusitis; Anxiety; Aspiration into respiratory tract; Aspiration pneumonia (HCC); BPH (benign prostatic hyperplasia); COPD (chronic obstructive pulmonary disease) ([facility] [address] Debility; Elevated CK; Foraminal stenosis of lumbosacral region; GERD (gastroesophageal reflux disease); History of asthma; HTN (hypertension); Hyperlipidemia; Hypothyroidism; Insomnia; Lower extremity weakness; On home oxygen therapy; Osteoarthritis of spine with radiculopathy, lumbar region; Papillary thyroid carcinoma ([facility] Positive D dimer; Sleep apnea; Type 2 diabetes mellitus ([facility] and Vitamin D deficiency. has a past surgical history that includes knee surgery (Left, ); Thyroidectomy (); bronchoscopy (N/A, 2017); and thoracotomy (Right, 2017). Restrictions  Restrictions/Precautions  Restrictions/Precautions: Fall Risk  Required Braces or Orthoses?: No  Subjective   General  Chart Reviewed: Yes  Patient assessed for rehabilitation services?: Yes  Family / Caregiver Present: No  Referring Practitioner: Dr. Maikel Rojas   Diagnosis: Hypoxemia 2 to bilateral Pulmonary embolism   Subjective  Subjective: \"I need my hearing aids, I can't hear you. \"  Pre Treatment Pain Screening  Pain at present: 0  Scale Used: Numeric Score  Pain Assessment  Patient Currently in Pain: No  Pain Assessment: 0-10  Pain Level: 0  Vital Signs  Patient Currently in Pain: No   Orientation     Objective     Pt completed puzzle activity in order to improve fine motor skills and activity tolerance for increased success and safety with daily living tasks                           Assessment      Activity Tolerance  Activity Tolerance: Patient Tolerated

## 2018-01-14 LAB
APTT: 44.6 SEC (ref 21.6–35.4)
BASOPHILS ABSOLUTE: 0 K/UL (ref 0–0.2)
BASOPHILS RELATIVE PERCENT: 0.3 %
EOSINOPHILS ABSOLUTE: 0.3 K/UL (ref 0–0.7)
EOSINOPHILS RELATIVE PERCENT: 4 %
HCT VFR BLD CALC: 37.9 % (ref 42–52)
HEMOGLOBIN: 12.4 G/DL (ref 14–18)
LYMPHOCYTES ABSOLUTE: 1.3 K/UL (ref 1–4.8)
LYMPHOCYTES RELATIVE PERCENT: 19.2 %
MCH RBC QN AUTO: 30.2 PG (ref 27–31.3)
MCHC RBC AUTO-ENTMCNC: 32.6 % (ref 33–37)
MCV RBC AUTO: 92.6 FL (ref 80–100)
MONOCYTES ABSOLUTE: 0.7 K/UL (ref 0.2–0.8)
MONOCYTES RELATIVE PERCENT: 10.1 %
NEUTROPHILS ABSOLUTE: 4.5 K/UL (ref 1.4–6.5)
NEUTROPHILS RELATIVE PERCENT: 66.4 %
PDW BLD-RTO: 15.6 % (ref 11.5–14.5)
PLATELET # BLD: 110 K/UL (ref 130–400)
RBC # BLD: 4.09 M/UL (ref 4.7–6.1)
URINE CULTURE, ROUTINE: NORMAL
WBC # BLD: 6.8 K/UL (ref 4.8–10.8)

## 2018-01-14 PROCEDURE — 94640 AIRWAY INHALATION TREATMENT: CPT

## 2018-01-14 PROCEDURE — 96125 COGNITIVE TEST BY HC PRO: CPT

## 2018-01-14 PROCEDURE — 85730 THROMBOPLASTIN TIME PARTIAL: CPT

## 2018-01-14 PROCEDURE — 1180000000 HC REHAB R&B

## 2018-01-14 PROCEDURE — 2700000000 HC OXYGEN THERAPY PER DAY

## 2018-01-14 PROCEDURE — 92610 EVALUATE SWALLOWING FUNCTION: CPT

## 2018-01-14 PROCEDURE — 92523 SPEECH SOUND LANG COMPREHEN: CPT

## 2018-01-14 PROCEDURE — 36415 COLL VENOUS BLD VENIPUNCTURE: CPT

## 2018-01-14 PROCEDURE — 6370000000 HC RX 637 (ALT 250 FOR IP): Performed by: INTERNAL MEDICINE

## 2018-01-14 PROCEDURE — 85025 COMPLETE CBC W/AUTO DIFF WBC: CPT

## 2018-01-14 RX ADMIN — METOPROLOL SUCCINATE 25 MG: 25 TABLET, FILM COATED, EXTENDED RELEASE ORAL at 10:26

## 2018-01-14 RX ADMIN — LEVOTHYROXINE SODIUM 125 MCG: 100 TABLET ORAL at 06:04

## 2018-01-14 RX ADMIN — Medication 2 PUFF: at 05:15

## 2018-01-14 RX ADMIN — DOCUSATE SODIUM 100 MG: 100 CAPSULE, LIQUID FILLED ORAL at 10:26

## 2018-01-14 RX ADMIN — TAMSULOSIN HYDROCHLORIDE 0.4 MG: 0.4 CAPSULE ORAL at 20:44

## 2018-01-14 RX ADMIN — VITAMIN D, TAB 1000IU (100/BT) 2000 UNITS: 25 TAB at 10:26

## 2018-01-14 RX ADMIN — FINASTERIDE 5 MG: 5 TABLET, FILM COATED ORAL at 10:26

## 2018-01-14 RX ADMIN — MIRTAZAPINE 15 MG: 15 TABLET, FILM COATED ORAL at 20:44

## 2018-01-14 RX ADMIN — POTASSIUM CHLORIDE 20 MEQ: 750 TABLET, FILM COATED, EXTENDED RELEASE ORAL at 10:27

## 2018-01-14 RX ADMIN — FUROSEMIDE 20 MG: 20 TABLET ORAL at 10:26

## 2018-01-14 RX ADMIN — PANTOPRAZOLE SODIUM 40 MG: 40 TABLET, DELAYED RELEASE ORAL at 06:04

## 2018-01-14 RX ADMIN — Medication 2 PUFF: at 16:53

## 2018-01-14 RX ADMIN — TRAZODONE HYDROCHLORIDE 150 MG: 150 TABLET ORAL at 20:44

## 2018-01-14 RX ADMIN — RIVAROXABAN 15 MG: 15 TABLET, FILM COATED ORAL at 17:29

## 2018-01-14 RX ADMIN — RIVAROXABAN 15 MG: 15 TABLET, FILM COATED ORAL at 10:27

## 2018-01-14 RX ADMIN — POLYETHYLENE GLYCOL 3350 17 G: 17 POWDER, FOR SOLUTION ORAL at 10:30

## 2018-01-14 RX ADMIN — ASPIRIN 81 MG 81 MG: 81 TABLET ORAL at 10:27

## 2018-01-14 RX ADMIN — Medication 400 MG: at 10:26

## 2018-01-14 ASSESSMENT — PAIN SCALES - GENERAL: PAINLEVEL_OUTOF10: 0

## 2018-01-14 NOTE — H&P
small right pleural effusion. The left lung appears relatively clear. CT images through the upper abdomen appear unremarkable. 1. POSITIVE FOR BILATERAL PULMONARY EMBOLI AS DESCRIBED IN BODY OF REPORT. 2. SEVERE COPD WITH INNUMERABLE SMALL PULMONARY BULLAE. 3. POSTOPERATIVE CHANGES INVOLVING THE RIGHT LOWER LOBE. 4. SUBSEGMENTAL ATELECTASIS AND SMALL PULMONARY INFILTRATE IN REMAINING PORTION OF RLL. 5. SMALL RIGHT PLEURAL EFFUSION. All CT scans at this facility use dose modulation, iterative reconstruction, and/or weight based dosing when appropriate to reduce radiation dose to as low as reasonably achievable. Ct Abdomen Pelvis W Iv Contrast Additional Contrast? Oral    Result Date: 1/7/2018  EXAMINATION:  CT ABDOMEN AND PELVIS WITH IV CONTRAST CLINICAL HISTORY:  DIABETES WITH A HISTORY OF THYROID CANCER, PATIENT WITH RECENT PULMONARY EMBOLISM, EVALUATE POSSIBLE METASTATIC DISEASE IN ABDOMEN COMPARISON:  11/14/2013 TECHNIQUE:  CT abdomen and pelvis is obtained following IV injection of 100 mL of Isovue-370. Also, oral contrast media was given to the patient for this CT abdomen/pelvis. FINDINGS:  There is no hepatomegaly but there are several small round hypodense liver lesions which appear unchanged since the 11/14/2013 CT abdomen and these are compatible with small hepatic cysts. The gallbladder, spleen, pancreas and adrenal glands appear normal. There is no retroperitoneal adenopathy. There is a stable 2.4 cm infrarenal abdominal aortic aneurysm. There is no \"free fluid\", abscess or pneumoperitoneum in the abdomen. There are bilateral normal size kidneys with innumerable renal cysts. Previously reported 2 cm exophytic \"Bosniak type II\" left renal lesion appears unchanged. Also, a 10 cm cyst with peripheral calcification in the lower pole of the left kidney qualifies for a \"Bosniak type II\" renal cyst and appears unchanged. No nephrolithiasis or hydronephrosis involving either kidney.  Nondilated small bowel 8.3 01/09/2018    LABALBU 3.0 01/09/2018    BILITOT 0.2 01/09/2018    ALKPHOS 69 01/09/2018    AST 22 01/09/2018    ALT 15 01/09/2018     Lab Results   Component Value Date    WBC 7.5 01/13/2018    RBC 4.27 01/13/2018    HGB 12.8 01/13/2018    HCT 39.3 01/13/2018    MCV 92.1 01/13/2018    MCH 30.0 01/13/2018    MCHC 32.6 01/13/2018    RDW 15.3 01/13/2018     01/13/2018    MPV 10.7 07/23/2015     Lab Results   Component Value Date    VITD25 40.8 03/10/2015     Lab Results   Component Value Date    COLORU Yellow 01/12/2018    NITRU Negative 01/12/2018    GLUCOSEU Negative 01/12/2018    KETUA Negative 01/12/2018    UROBILINOGEN 0.2 01/12/2018    BILIRUBINUR Negative 01/12/2018    BILIRUBINUR neg 09/29/2017     Lab Results   Component Value Date    PROTIME 11.5 01/07/2018     Lab Results   Component Value Date    INR 1.1 01/07/2018         I discussed results with patient. The patient remains highly medically complex and continues to have severe problems with activities of daily living and mobility. The patient was assessed to be able to tolerate intensive rehabilitation and therefore was admitted to Rehabilitation to address these needs. Prior Function; everyday activities:     independent    Prior Device(s) used:    cane    History of falls:    no      In depth analysis of complex functional data; the patient has been:    Current Rehabilitation Assessments:    Physical therapy: FIMS:  Bed Mobility: Scooting: Stand by assistance    Transfers: Sit to Stand: Stand by assistance  Stand to sit: Stand by assistance  Bed to Chair: Stand by assistance, Ambulation 1  Surface: level tile, carpet  Device: Rolling Walker  Other Apparatus: O2 (2L)  Assistance: Contact guard assistance  Quality of Gait: cues for fwd gaze and upright posture, decreased hs and hip extension; toe off cues for correction with fair carryover  Distance: 150 feet   Comments: VC for activity pacing and O2 management.  RPE 4/10 after amb Pitney Wanda, DO        albuterol sulfate  (90 Base) MCG/ACT inhaler 2 puff  2 puff Inhalation Q4H PRN Pitney Wanda, DO        aspirin chewable tablet 81 mg  81 mg Oral Daily Pitney Wanda, DO   81 mg at 01/13/18 0941    docusate sodium (COLACE) capsule 100 mg  100 mg Oral Daily Pitney Wanda, DO   100 mg at 01/13/18 0941    finasteride (PROSCAR) tablet 5 mg  5 mg Oral Daily Pitney Wanda, DO   5 mg at 01/13/18 0941    furosemide (LASIX) tablet 20 mg  20 mg Oral Daily Pitney Wanda, DO   20 mg at 01/13/18 0940    levothyroxine (SYNTHROID) tablet 125 mcg  125 mcg Oral Daily Pitney Wanda, DO   125 mcg at 01/13/18 2292    LORazepam (ATIVAN) tablet 0.5 mg  0.5 mg Oral Q4H PRN Pitney Wanda, DO        magnesium oxide (MAG-OX) tablet 400 mg  400 mg Oral Daily Pitney Wanda, DO   400 mg at 01/13/18 0941    melatonin tablet 10 mg  10 mg Oral Nightly PRN Pitney Wanda, DO        metoprolol succinate (TOPROL XL) extended release tablet 25 mg  25 mg Oral Daily Pitney Wanda, DO   25 mg at 01/13/18 1716    mirtazapine (REMERON) tablet 15 mg  15 mg Oral Nightly Pitney Wanda, DO   15 mg at 01/12/18 2003    mometasone-formoterol (DULERA) 200-5 MCG/ACT inhaler 2 puff  2 puff Inhalation BID Pitney Wanda, DO   2 puff at 01/13/18 1629    pantoprazole (PROTONIX) tablet 40 mg  40 mg Oral QAM AC Yazid R Irvin, DO   40 mg at 01/13/18 0610    polyethylene glycol (GLYCOLAX) packet 17 g  17 g Oral Daily Pitney Wanda, DO   17 g at 01/13/18 0941    potassium chloride (KLOR-CON) extended release tablet 20 mEq  20 mEq Oral Daily Pitney Wanda, DO   20 mEq at 01/13/18 0941    rivaroxaban (XARELTO) tablet 15 mg  15 mg Oral BID WC Pitney Wanda, DO   15 mg at 01/13/18 1618    sennosides-docusate sodium (SENOKOT-S) 8.6-50 MG tablet 2 tablet  2 tablet Oral Daily PRN Pitney Wanda, DO        sodium chloride flush 0.9 % injection 10 mL  10 mL Intravenous PRN Dom Dickinsones, DO        kg)   SpO2 97%   BMI 30.11 kg/m² *    Physical Exam   Constitutional: He is oriented to person, place, and time. He appears well-developed and well-nourished. HENT:   Head: Normocephalic. Eyes: EOM are normal. Pupils are equal, round, and reactive to light. Neck: Normal range of motion. Neck supple. Cardiovascular: Normal rate, normal heart sounds and intact distal pulses. Pulmonary/Chest: He has wheezes. He has rales. Abdominal: Soft. Bowel sounds are normal.   Musculoskeletal: Normal range of motion. Neurological: He is alert and oriented to person, place, and time. He has normal reflexes. Skin: Skin is warm. Ortho Exam  Neurologic Exam     Mental Status   Oriented to person, place, and time. Arthritic knees with varus deformity B  Proximal muscle weakness B     Cranial Nerves     CN III, IV, VI   Pupils are equal, round, and reactive to light. Extraocular motions are normal.       After extensive review of the records and above physical exam, I have formulated the following diagnoses and plan:      DIAGNOSES:      1. The patient was admitted to the acute rehabilitation unit with the primary rehab diagnoses being severe abnormality of gait and mobility and impaired self care and ADL's due to B pulmonary emboli    Compared to Pre-Admission Assessment, patients medical and functional status remain challengingly complex and patient continues to require intensive therapeutic intervention from multiple therapies, therefore, initiate acute intensive comprehensive inpatient rehabilitation program including PT/OT to improve balance, ambulation, ADLs, and to improve the P/AROM.   Functional and medical status reassessed regarding patients ability to participate in therapies and patient found to be able to participate in acute intensive comprehensive inpatient rehabilitation program.  Therapeutic modifications regarding activities in therapies, place, amount of time per day and intensity of rehabilitation psychologist or speech therapist as appropriate. I reviewed the patient's old and current charts and discussed other rehabilitation options with the rehabilitation team including the rehab RN and the admission team as well as the patient. I feel that the patient's functional recovery would be best served at an acute inpatient rehabilitation program because the patient needs intense therapy three hours per day, direct RN supervision and daily monitoring by a physician for medical status. This cannot be sufficiently provided by home health care, a skilled nursing facility or in an outpatient setting. I further feel that the patient has the potential to improve functional abilities in an acute intensive rehabilitation program.    Old records were reviewed and summarized. 2.  Other diagnoses which complicate rehabilitation stay include:      Active Problems:    Bilateral pulmonary embolism (HCC)    Patient Active Problem List   Diagnosis    COPD (chronic obstructive pulmonary disease) (HCC)    HTN (hypertension)    Hyperlipidemia    GERD (gastroesophageal reflux disease)    Hypothyroidism    Type 2 diabetes mellitus (HCC)    BPH (benign prostatic hyperplasia)    Anxiety    Insomnia    Acute sinusitis    Papillary thyroid carcinoma (HCC)    Elevated PSA    Osteoarthritis of spine with radiculopathy, lumbar region    Foraminal stenosis of lumbosacral region    Skin cyst    Local infection of skin and subcutaneous tissue    Aspiration into respiratory tract    Vitamin D deficiency    Aspiration pneumonia ([address]    Debility    On home oxygen therapy    Hypoxemia    Bilateral pulmonary embolism (HCC)    Gait abnormality    Abnormality of gait and mobility due to hypoxemia secondary to bilateral pulmonary emboli, [facility] admit 01/12/18     Sleep apnea         I am especially concerned about hypoxia nd need for daytime oxygen    The patient's high risk medication use includes Kaity Alcaraz D.O., [email or web address]

## 2018-01-14 NOTE — PROGRESS NOTES
language, visuoconstructional skills, conceptual thinking, calculations, and orientation. Visuospatial/Executive: 5/5   Naming:   3/3   Attention:   6/6   Language:   2/3   Abstraction:   1/2   Delayed recall:  3/5   Orientation:   6/6          A score of 25/30 was attained. This indicates a mild cognitive linguistic impairment as per the assessment parameters. RAW SCORE SEVERITY   26-30 Within functional   limits   18-25 Mild cognitive   impairment   10-17 Moderate cognitive impairment   <10 Severe cognitive impairment                                   SALIENT CLINICAL OBSERVATIONS  [x] Latent processing  [] Latent responses  [] Inconsistent responses   [] Perseveration   [] Cueing necessary for accurate completion of task        DISCHARGE PLANNING:  Given current cognitive/linguistic status, SLP recommends:   []24 Hour Supervision   [] Close Supervision     [x]Intermittent Supervision   [] No Supervision    [x]  Patient stated goals: get stronger with walking          Education was completed:  Speech Pathologist (SLP) completed education with the patient and/or family regarding identified cognitive linguistic deficits and subsequent need for speech pathology intervention. Discussed deficit areas to be targeted by formal intervention and established short/long term goals. Reviewed compensatory strategies to improve functional outcome (as appropriate). Encouraged patient and/or family to engage SLP in structured Q&A session relative to identified deficit areas. Patient and/or family indicated understanding of all information provided via satisfactory verbal response. [x] Patient was an active participant in goal planning process. [] Patient was unable to participate in goal planning process. [] Goal planning not appropriate at this time as intervention was not recommended.       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    THERAPY RECOMMENDATIONS:  Impairments result in the decreased ability for: []ADL's    [] Work   [] School   [x] Home       Treatment Plan:Treatment Plan:   Pt to be seen 1-2x/wk for 1-2 weeks  Speech Pathology intervention is recommended with emphasis on the following:  Compensatory strategies for memory        Long Term Goals:  Pt will increase memory from min assist to modified independent. Short Term Goals:   1. Educate pt on internal and external memory strategies. 2. Pt to use memory strategy to recall 4-6 pictures after 5 minute delay 90%x. 3. Pt to complete high level naming activities to increase word fluency to 11 items in 1 minute. [x]  Prognosis for improvements is Good, motivation    Pain:0 /10, N/A   []Nursing notified    Safety Devices:  [x] Call light within reach [x] Chair alarm activated  [] Bed alarm activated    Patient Education:  Treatment goals discussed with [x]  Patient  []  family. The [x]  Patient  []  family understand the diagnosis, prognosis and plan of care. [] Reinforcement needed      FIM:  Comprehension          Expression   []7 - Independent   []7 - Indpendent   [x]6 - Modified Independent  [x]6 - Modified Independent   []5 - Supervision   []5 - Supervision   []4 - Min Assist   []4 - Min Assist   []3 - Mod Assist   []3 - Mod Assist   []2 - Max Assist   []2 - Max Assist   []1 - Dependent   []1 - Dependent    Problem Solving        Memory   []7 - Independent   []7 - Independent   [x]6 - Modified Independent  []6 - Modified Independent   []5 - Supervision   [x]5 - Supervision   []4 - Min Assist   [x]4 - Min Assist   []3 - Mod Assist   []3 - Mod Assist   []2 - Max Assist   []2 - Max Assist   []1 - Dependent   [] 1 -Dependent      Zion Robert M.A., CCC-SLP 01/14/18

## 2018-01-14 NOTE — PROGRESS NOTES
SPEECH/LANGUAGE PATHOLOGY  BEDSIDE SWALLOWING EVALUATION    PATIENT NAME:  Eduarda Palomino      :  1940      TODAY'S DATE:  2018  ROOM: [place] [address]    Time in: 1040      Time out: 1050      [x]The admitting diagnosis and active problem list, as listed below have been reviewed prior to initiation of this evaluation. Pulmonary emboli (HCC) [I26.99]  Bilateral pulmonary embolism (HCC) [I26.99]  Patient Active Problem List   Diagnosis    COPD (chronic obstructive pulmonary disease) ([address]    HTN (hypertension)    Hyperlipidemia    GERD (gastroesophageal reflux disease)    Hypothyroidism    Type 2 diabetes mellitus (HCC)    BPH (benign prostatic hyperplasia)    Anxiety    Insomnia    Acute sinusitis    Papillary thyroid carcinoma (HCC)    Elevated PSA    Osteoarthritis of spine with radiculopathy, lumbar region    Foraminal stenosis of lumbosacral region    Skin cyst    Local infection of skin and subcutaneous tissue    Aspiration into respiratory tract    Vitamin D deficiency    Aspiration pneumonia ([address]    Debility    On home oxygen therapy    Hypoxemia    Bilateral pulmonary embolism (HCC)    Gait abnormality    Abnormality of gait and mobility due to hypoxemia secondary to bilateral pulmonary emboli, [facility] admit 18     Sleep apnea     No Known Allergies    Order received for SLP eval and treat. Due to pt's history of aspiration pneumonia, bedside swallow evaluation was also performed. With further chart review, pt was admitted last November due to aspiration of tooth (crown) into his lungs, which was surgically removed, along with a section of his lungs. MBS performed 2017 which revealed a functional swallow.  Rec: Regular with thin    DYSPHAGIA DIAGNOSIS:   Mild oral dysphagia     DIET RECOMMENDATIONS:  Soft/Dysphagia Level III with thin liquids    FEEDING RECOMMENDATIONS:    []No assistance required   []Stand by assist    []Full assistance pharyngeal swallow noted  []  Absent swallow    Pharyngeal Stage Impression:  Pharyngeal stage of swallow WFL. Pt reports apples \"sometimes gets stuck\". Educated pt on current diet recommendation and also, removing skin of apples and cutting up in small bites at home. Long Term Goals:  [x] Will tolerate least restrictive diet safely      []Goals to be determined after MBS  `    [] No Treatment indicated at this time    Short Term Goals:   Pt to be seen 1x/week for 10-15 minutes   [] will improve oral/motor/swallow strength/function   [x] will use safe swallow strategies with no assist   [] will improve pharyngeal swallow strength/function   [x] will tolerate current diet level with no overt s/s of aspiration    Plan of Care:    [] Oral/motor exercises   [] Adduction/voice/pitch exercises   [] Dysphagia exercies    [] Mastication exercises   [x] Therapeutic meal/monitoring/feeding  [] MBS Recommended        [x]  Prognosis for improvements is Good, motivation    Pain:0 /10, N/A   []Nursing notified    Safety Devices:  [x] Call light within reach [x] Chair alarm activated  [] Bed alarm activated    Patient Education:  Treatment goals discussed with [x]  Patient  []  family. The [x]  Patient  []  family understand the diagnosis, prognosis and plan of care. [] Reinforcement needed    Zion Melendez [address] 01/14/18 11:31 AM

## 2018-01-14 NOTE — PROGRESS NOTES
tract    Vitamin D deficiency    Aspiration pneumonia ([facility]    Debility    On home oxygen therapy    Hypoxemia    Bilateral pulmonary embolism (HCC)    Gait abnormality    Abnormality of gait and mobility due to hypoxemia secondary to bilateral pulmonary emboli, [facility] admit 01/12/18     Sleep apnea   1.    Giovanna Garrison D.O., PM&R     Attending    [address] [place], [place] [address]en [facility]

## 2018-01-15 PROBLEM — R26.9 GAIT ABNORMALITY: Status: RESOLVED | Noted: 2018-01-11 | Resolved: 2018-01-15

## 2018-01-15 PROBLEM — T17.908A ASPIRATION INTO RESPIRATORY TRACT: Status: RESOLVED | Noted: 2017-11-10 | Resolved: 2018-01-15

## 2018-01-15 PROBLEM — L08.9 LOCAL INFECTION OF SKIN AND SUBCUTANEOUS TISSUE: Status: RESOLVED | Noted: 2017-06-09 | Resolved: 2018-01-15

## 2018-01-15 PROBLEM — L72.9 SKIN CYST: Status: RESOLVED | Noted: 2017-06-09 | Resolved: 2018-01-15

## 2018-01-15 LAB
APTT: 45 SEC (ref 21.6–35.4)
BASOPHILS ABSOLUTE: 0 K/UL (ref 0–0.2)
BASOPHILS RELATIVE PERCENT: 0.3 %
EOSINOPHILS ABSOLUTE: 0.3 K/UL (ref 0–0.7)
EOSINOPHILS RELATIVE PERCENT: 4 %
HCT VFR BLD CALC: 37.1 % (ref 42–52)
HEMOGLOBIN: 12.1 G/DL (ref 14–18)
LYMPHOCYTES ABSOLUTE: 1.3 K/UL (ref 1–4.8)
LYMPHOCYTES RELATIVE PERCENT: 19.1 %
MCH RBC QN AUTO: 30.1 PG (ref 27–31.3)
MCHC RBC AUTO-ENTMCNC: 32.5 % (ref 33–37)
MCV RBC AUTO: 92.4 FL (ref 80–100)
MONOCYTES ABSOLUTE: 0.7 K/UL (ref 0.2–0.8)
MONOCYTES RELATIVE PERCENT: 9.9 %
NEUTROPHILS ABSOLUTE: 4.4 K/UL (ref 1.4–6.5)
NEUTROPHILS RELATIVE PERCENT: 66.7 %
PDW BLD-RTO: 15.3 % (ref 11.5–14.5)
PLATELET # BLD: 117 K/UL (ref 130–400)
RBC # BLD: 4.02 M/UL (ref 4.7–6.1)
WBC # BLD: 6.6 K/UL (ref 4.8–10.8)

## 2018-01-15 PROCEDURE — 85025 COMPLETE CBC W/AUTO DIFF WBC: CPT

## 2018-01-15 PROCEDURE — 6370000000 HC RX 637 (ALT 250 FOR IP): Performed by: INTERNAL MEDICINE

## 2018-01-15 PROCEDURE — 97530 THERAPEUTIC ACTIVITIES: CPT

## 2018-01-15 PROCEDURE — 36415 COLL VENOUS BLD VENIPUNCTURE: CPT

## 2018-01-15 PROCEDURE — 85730 THROMBOPLASTIN TIME PARTIAL: CPT

## 2018-01-15 PROCEDURE — 2700000000 HC OXYGEN THERAPY PER DAY

## 2018-01-15 PROCEDURE — 97112 NEUROMUSCULAR REEDUCATION: CPT

## 2018-01-15 PROCEDURE — 97116 GAIT TRAINING THERAPY: CPT

## 2018-01-15 PROCEDURE — 97535 SELF CARE MNGMENT TRAINING: CPT

## 2018-01-15 PROCEDURE — 94760 N-INVAS EAR/PLS OXIMETRY 1: CPT

## 2018-01-15 PROCEDURE — 99233 SBSQ HOSP IP/OBS HIGH 50: CPT | Performed by: PHYSICAL MEDICINE & REHABILITATION

## 2018-01-15 PROCEDURE — 1180000000 HC REHAB R&B

## 2018-01-15 PROCEDURE — 94640 AIRWAY INHALATION TREATMENT: CPT

## 2018-01-15 RX ADMIN — FUROSEMIDE 20 MG: 20 TABLET ORAL at 08:15

## 2018-01-15 RX ADMIN — RIVAROXABAN 15 MG: 15 TABLET, FILM COATED ORAL at 08:15

## 2018-01-15 RX ADMIN — RIVAROXABAN 15 MG: 15 TABLET, FILM COATED ORAL at 18:52

## 2018-01-15 RX ADMIN — POLYETHYLENE GLYCOL 3350 17 G: 17 POWDER, FOR SOLUTION ORAL at 08:17

## 2018-01-15 RX ADMIN — Medication 400 MG: at 08:15

## 2018-01-15 RX ADMIN — TAMSULOSIN HYDROCHLORIDE 0.4 MG: 0.4 CAPSULE ORAL at 22:11

## 2018-01-15 RX ADMIN — Medication 2 PUFF: at 05:58

## 2018-01-15 RX ADMIN — ASPIRIN 81 MG 81 MG: 81 TABLET ORAL at 08:15

## 2018-01-15 RX ADMIN — MIRTAZAPINE 15 MG: 15 TABLET, FILM COATED ORAL at 22:11

## 2018-01-15 RX ADMIN — Medication 2 PUFF: at 17:42

## 2018-01-15 RX ADMIN — DOCUSATE SODIUM 100 MG: 100 CAPSULE, LIQUID FILLED ORAL at 08:16

## 2018-01-15 RX ADMIN — VITAMIN D, TAB 1000IU (100/BT) 2000 UNITS: 25 TAB at 08:15

## 2018-01-15 RX ADMIN — TRAZODONE HYDROCHLORIDE 150 MG: 150 TABLET ORAL at 22:11

## 2018-01-15 RX ADMIN — PANTOPRAZOLE SODIUM 40 MG: 40 TABLET, DELAYED RELEASE ORAL at 08:16

## 2018-01-15 RX ADMIN — POTASSIUM CHLORIDE 20 MEQ: 750 TABLET, FILM COATED, EXTENDED RELEASE ORAL at 08:15

## 2018-01-15 RX ADMIN — LEVOTHYROXINE SODIUM 125 MCG: 100 TABLET ORAL at 05:36

## 2018-01-15 RX ADMIN — METOPROLOL SUCCINATE 25 MG: 25 TABLET, FILM COATED, EXTENDED RELEASE ORAL at 08:16

## 2018-01-15 RX ADMIN — PANTOPRAZOLE SODIUM 40 MG: 40 TABLET, DELAYED RELEASE ORAL at 05:37

## 2018-01-15 RX ADMIN — FINASTERIDE 5 MG: 5 TABLET, FILM COATED ORAL at 08:15

## 2018-01-15 ASSESSMENT — PAIN DESCRIPTION - LOCATION: LOCATION: SHOULDER

## 2018-01-15 ASSESSMENT — PAIN SCALES - GENERAL
PAINLEVEL_OUTOF10: 1
PAINLEVEL_OUTOF10: 0
PAINLEVEL_OUTOF10: 0

## 2018-01-15 ASSESSMENT — PAIN DESCRIPTION - PAIN TYPE
TYPE: CHRONIC PAIN
TYPE: CHRONIC PAIN

## 2018-01-15 ASSESSMENT — PAIN DESCRIPTION - FREQUENCY: FREQUENCY: INTERMITTENT

## 2018-01-15 ASSESSMENT — PAIN DESCRIPTION - PROGRESSION: CLINICAL_PROGRESSION: NOT CHANGED

## 2018-01-15 ASSESSMENT — PAIN DESCRIPTION - ORIENTATION: ORIENTATION: RIGHT

## 2018-01-15 ASSESSMENT — PAIN DESCRIPTION - DESCRIPTORS: DESCRIPTORS: DULL

## 2018-01-15 NOTE — PROGRESS NOTES
basket with R hand to grab large blocks and placed them on poles at  differnet heights with L hand. Pt. then stood to remove the blocks alternating hands with S for sit to stand transfer and SBA for balance. Pt. placed resistive clothespins on poles alternating hands with no difficulty. Pt. on 2L O2. Assessment   Performance deficits / Impairments: Decreased endurance;Decreased ADL status; Decreased functional mobility ; Decreased strength  Assessment: Pt's decreased endurance is his largest barrier to increased safety and independence with ADLs, IADLs, and functional mobility/transfers. Pt would continue to benefit from skilled OT services to increase endurance and maximize safety/independence. Prognosis: Good  Decision Making: Low Complexity  REQUIRES OT FOLLOW UP: Yes  Activity Tolerance  Activity Tolerance: Patient Tolerated treatment well  Activity Tolerance: Pt required intermittent rest breaks d/t fatigue. OT educated pt on implementing rest breaks at [facility] strategy to maximize safety and reduce risk of falls. Pt demo'd good understanding as he self-initiated rest breaks as needed throughout session. Safety Devices  Safety Devices in place: Yes  Type of devices: All fall risk precautions in place        Plan   Plan  Times per week: 5-7x/wk, 15-90 minutes   Plan weeks: 1-2  Current Treatment Recommendations: Strengthening, Balance Training, Functional Mobility Training, Endurance Training, Self-Care / ADL, Safety Education & Training, Equipment Evaluation, Education, & procurement  Plan Comment: Continue per OT POC    Goals  Patient Goals   Patient goals : To return home.         Therapy Time   Individual Concurrent Group Co-treatment   Time In 1300         Time Out 1330         Minutes [id number] Electronically signed by CHERIE Carreon on 1/15/2018 at 2:17 PM

## 2018-01-15 NOTE — PROGRESS NOTES
Physical Therapy Rehab Treatment Note  Facility/Department: [facility]  Room: 61/R261-       NAME: Julio Sullivan  :  (32 y.o.)  MRN: [id number]  CODE STATUS: Full Code    Date of Service: 1/15/2018  Chart Reviewed: Yes  Family / Caregiver Present: No    Restrictions: Body mass index is 30.11 kg/m². SUBJECTIVE:  I just feel like I can't get better. One step forward and 2 steps backward. Pre Pain Treatment Screen: 0  Post Treatment Pain Screenin    Transfers: sba sit to stand without device. OBJECTIVE:   Perez Balance: 40/56       ASSESSMENT/COMMENTS:Pt tolerated Perez Balance test this am. Pt said he just feels so weak and can't get his energy back. PLAN OF CARE/Safety:   Safety Devices  Type of devices: All fall risk precautions in place; Chair alarm in place;Gait belt      Therapy Time:   Individual   Time In  0930   Time Out 1000   Minutes 30     Minutes:30      Transfer/Bed mobility trainin      Gait training:      Neuro re education:23     Therapeutic ex:      Ezra Rosas PTA, 01/15/18 at 9:57 AM

## 2018-01-15 NOTE — PROGRESS NOTES
Offered patient bedside tablet and he accepted it.consent signed. explained to patient its use and gave him 2 informational sheets. encouraged patient to ask staff any questions about its use.

## 2018-01-15 NOTE — PROGRESS NOTES
Physical Therapy Rehab Treatment Note  Facility/Department: [facility]  Room: R261/R261-01       NAME: Arian Carvajal  :  (73 y.o.)  MRN: [id number]  CODE STATUS: Full Code    Date of Service: 1/15/2018  Chart Reviewed: Yes  Family / Caregiver Present: No    Restrictions:  Restrictions/Precautions: Fall Risk  Body mass index is 30.11 kg/m². SUBJECTIVE: Subjective: I have a wheeled walker, rollator and a \"hurry cane\" I just got for Sushma. \"I haven't used it yet. \"  \"I mostly use the rollator\"  Pain Screening  Patient Currently in Pain: Yes  Pre Treatment Pain Screening  Pain at present: 4  Scale Used: Numeric Score  Intervention List: Patient able to continue with treatment;Patient declined any intervention  Comments / Details: right shoulder    Post Treatment Pain Screening:  Pain Assessment  Pain Level: 1  Pain Type: Chronic pain  Pain Location: Shoulder  Pain Orientation: Right  Pain Descriptors: Dull  Pain Frequency: Intermittent    OBJECTIVE:        Bed mobility  Bridging: Modified independent   Rolling to Left: Modified independent  Rolling to Right: Modified independent  Supine to Sit: Modified independent  Sit to Supine: Modified independent  Scooting: Modified independent    Transfers  Sit to Stand: Supervision;Modified independent  Stand to sit: Supervision;Modified independent  Bed to Chair: Supervision;Modified independent    Ambulation  Ambulation?: Yes  More Ambulation?: Yes  Ambulation 1  Surface: level tile  Device: No Device  Other Apparatus: O2 (2L)  Assistance: Stand by assistance;Supervision  Quality of Gait: Lateral sway with gait. No LOB or deviation off of path, toe out, decreased step length and heel strike  Distance: 150' x 2  Comments: Mild dyspnea after gait. No difference with shoes or without.   Ambulation 2  Device 2: Rollator  Assistance 2: Supervision;Modified Independent  Quality of Gait 2: Steadier gait without sway and with increased step length

## 2018-01-15 NOTE — PROGRESS NOTES
the patient's rehabilitation course as planned. The patient's tentative discharge date was set. Patient and family education was discussed. The patient was made aware of the team discussion regarding their progress. Complex Physical Medicine & Rehab Issues Assess & Plan:   1. Severe abnormality of gait and mobility and impaired self-care and ADL's secondary to progressive hypoxemia secondary to bilateral pulmonary emboli. Functional and medical status reassessed regarding patients ability to participate in therapies and patient found to be able to participate in acute intensive comprehensive inpatient rehabilitation program including PT/OT to improve balance, ambulation, ADLs, and to improve the P/AROM. Therapeutic modifications regarding activities in therapies, place, amount of time per day and intensity of therapy made daily. In bed therapies or bedside therapies prn.   2. Bowel and Bladder dysfunction:  frequent toileting, ambulate to bathroom with assistance, check post void residuals. Check for C.difficile x1 if >2 loose stools in 24 hours, continue bowel & bladder program.  Monitor bowel and bladder function. Lactinex 2 PO every AC. MOM prn, Brown Bomb prn, Glycerin suppository prn, enema prn.  3. Severe low back pain generalized OA pain: reassess pain every shift and prior to and after each therapy session, give prn  Tylenol, modalities prn in therapy, Lidoderm, K-pad prn.   4. Skin healing and breakdown risk:  continue pressure relief program.  Daily skin exams and reports from nursing. 5. Severe fatigue due to Nutritional and hydration deficiency:  continue to monitor I&Os, calorie counts prn, dietary consult prn. Add CoQ10 and vitamin B12  6. Acute episodic insomnia with situational adjustment disorder:  prn Ambien, monitor for day time sedation. 7. Falls risk elevated:  patient to use call light to get nursing assistance to get up, bed and chair alarm.   8. Elevated DVT risk: progressive activities in PT, continue prophylaxis SATURNINO hose, elevation and Xarelto (also on ASA). 9. Complex discharge planning:  Weekly team meeting every Monday to assess progress towards goals, discuss and address social, psychological and medical comorbidities and to address difficulties they may be having progressing in therapy. Patient and family education is in progress. The patient is to follow-up with their family physician after discharge. Complex Active General Medical Issues that complicate care Assess & Plan:     1. Principal Problem:    Abnormality of gait and mobility due to hypoxemia secondary to bilateral pulmonary emboli, [facility] admit 01/12/18   Active Problems:  1. Hypoxemia, Bilateral pulmonary embolism, COPD (chronic obstructive pulmonary disease), on home oxygen therapy - Pulse oximeter checks, monitor vital signs, oxygen  via nasal cannula with humidification. Albuterol, Dulera. Xarelto as a blood thinner titrate O2  2. HTN (hypertension), Hyperlipidemia - continue blood pressure checks, adjust/add medications (Toprol, Lasix). 3.  Active chronic GERD (gastroesophageal reflux disease) - Protonix, elevate head of bed. 4.   Hypothyroidism - Synthroid. 5.   History of Type 2 diabetes mellitus - Continue blood sugar checks, diet, adjust/add medications prn. No results for input(s): POCGLU in the last 72 hours. 6.   BPH (benign prostatic hyperplasia). - Proscar, Flomax, check post void residual  7. Severe active chronic Anxiety - emotional support, adjust/add medications (Desyrel, Ativan, Remeron). Add rotation psychology and  recreational therapy  8. Vitamin D deficiency - bolus dose vitamin D supplement and recheck as outpatient. 9.    Sleep apnea. -Add CPAP at at bedtime           This note transcribed by Kandace Vu on 01/15/18 at 11:44 a.m.         Tan Cummings D.O., PM&R     Attending    [address]

## 2018-01-16 LAB
APTT: 40.8 SEC (ref 21.6–35.4)
BASOPHILS ABSOLUTE: 0 K/UL (ref 0–0.2)
BASOPHILS RELATIVE PERCENT: 0.5 %
EOSINOPHILS ABSOLUTE: 0.2 K/UL (ref 0–0.7)
EOSINOPHILS RELATIVE PERCENT: 3.9 %
HCT VFR BLD CALC: 39.1 % (ref 42–52)
HEMOGLOBIN: 12.8 G/DL (ref 14–18)
LYMPHOCYTES ABSOLUTE: 1.2 K/UL (ref 1–4.8)
LYMPHOCYTES RELATIVE PERCENT: 20.3 %
MCH RBC QN AUTO: 30.1 PG (ref 27–31.3)
MCHC RBC AUTO-ENTMCNC: 32.8 % (ref 33–37)
MCV RBC AUTO: 91.8 FL (ref 80–100)
MONOCYTES ABSOLUTE: 0.5 K/UL (ref 0.2–0.8)
MONOCYTES RELATIVE PERCENT: 9 %
NEUTROPHILS ABSOLUTE: 3.9 K/UL (ref 1.4–6.5)
NEUTROPHILS RELATIVE PERCENT: 66.3 %
PDW BLD-RTO: 15.5 % (ref 11.5–14.5)
PLATELET # BLD: 121 K/UL (ref 130–400)
RBC # BLD: 4.26 M/UL (ref 4.7–6.1)
WBC # BLD: 5.9 K/UL (ref 4.8–10.8)

## 2018-01-16 PROCEDURE — 2700000000 HC OXYGEN THERAPY PER DAY

## 2018-01-16 PROCEDURE — 85025 COMPLETE CBC W/AUTO DIFF WBC: CPT

## 2018-01-16 PROCEDURE — 99232 SBSQ HOSP IP/OBS MODERATE 35: CPT | Performed by: INTERNAL MEDICINE

## 2018-01-16 PROCEDURE — 36415 COLL VENOUS BLD VENIPUNCTURE: CPT

## 2018-01-16 PROCEDURE — 97530 THERAPEUTIC ACTIVITIES: CPT

## 2018-01-16 PROCEDURE — 94760 N-INVAS EAR/PLS OXIMETRY 1: CPT

## 2018-01-16 PROCEDURE — 94640 AIRWAY INHALATION TREATMENT: CPT

## 2018-01-16 PROCEDURE — 6370000000 HC RX 637 (ALT 250 FOR IP): Performed by: INTERNAL MEDICINE

## 2018-01-16 PROCEDURE — 97127 HC SP THER IVNTJ W/FOCUS COG FUNCJ: CPT

## 2018-01-16 PROCEDURE — 99232 SBSQ HOSP IP/OBS MODERATE 35: CPT | Performed by: PHYSICAL MEDICINE & REHABILITATION

## 2018-01-16 PROCEDURE — 1180000000 HC REHAB R&B

## 2018-01-16 PROCEDURE — 85730 THROMBOPLASTIN TIME PARTIAL: CPT

## 2018-01-16 PROCEDURE — 97110 THERAPEUTIC EXERCISES: CPT | Performed by: INTERNAL MEDICINE

## 2018-01-16 PROCEDURE — 97116 GAIT TRAINING THERAPY: CPT

## 2018-01-16 RX ADMIN — ACETAMINOPHEN 650 MG: 325 TABLET, FILM COATED ORAL at 09:23

## 2018-01-16 RX ADMIN — RIVAROXABAN 15 MG: 15 TABLET, FILM COATED ORAL at 16:28

## 2018-01-16 RX ADMIN — Medication 2 PUFF: at 16:49

## 2018-01-16 RX ADMIN — LEVOTHYROXINE SODIUM 125 MCG: 100 TABLET ORAL at 06:42

## 2018-01-16 RX ADMIN — RIVAROXABAN 15 MG: 15 TABLET, FILM COATED ORAL at 08:15

## 2018-01-16 RX ADMIN — DOCUSATE SODIUM 100 MG: 100 CAPSULE, LIQUID FILLED ORAL at 08:19

## 2018-01-16 RX ADMIN — POLYETHYLENE GLYCOL 3350 17 G: 17 POWDER, FOR SOLUTION ORAL at 08:15

## 2018-01-16 RX ADMIN — TRAZODONE HYDROCHLORIDE 150 MG: 150 TABLET ORAL at 20:50

## 2018-01-16 RX ADMIN — METOPROLOL SUCCINATE 25 MG: 25 TABLET, FILM COATED, EXTENDED RELEASE ORAL at 08:15

## 2018-01-16 RX ADMIN — ASPIRIN 81 MG 81 MG: 81 TABLET ORAL at 08:15

## 2018-01-16 RX ADMIN — Medication 2 PUFF: at 05:20

## 2018-01-16 RX ADMIN — VITAMIN D, TAB 1000IU (100/BT) 2000 UNITS: 25 TAB at 08:15

## 2018-01-16 RX ADMIN — FINASTERIDE 5 MG: 5 TABLET, FILM COATED ORAL at 08:15

## 2018-01-16 RX ADMIN — PANTOPRAZOLE SODIUM 40 MG: 40 TABLET, DELAYED RELEASE ORAL at 06:42

## 2018-01-16 RX ADMIN — POTASSIUM CHLORIDE 20 MEQ: 750 TABLET, FILM COATED, EXTENDED RELEASE ORAL at 08:15

## 2018-01-16 RX ADMIN — Medication 400 MG: at 08:15

## 2018-01-16 RX ADMIN — MIRTAZAPINE 15 MG: 15 TABLET, FILM COATED ORAL at 20:50

## 2018-01-16 RX ADMIN — TAMSULOSIN HYDROCHLORIDE 0.4 MG: 0.4 CAPSULE ORAL at 20:50

## 2018-01-16 RX ADMIN — FUROSEMIDE 20 MG: 20 TABLET ORAL at 08:15

## 2018-01-16 ASSESSMENT — PAIN SCALES - GENERAL
PAINLEVEL_OUTOF10: 0
PAINLEVEL_OUTOF10: 0
PAINLEVEL_OUTOF10: 7
PAINLEVEL_OUTOF10: 0
PAINLEVEL_OUTOF10: 7
PAINLEVEL_OUTOF10: 7

## 2018-01-16 ASSESSMENT — PAIN DESCRIPTION - DESCRIPTORS
DESCRIPTORS: ACHING
DESCRIPTORS: ACHING;DULL
DESCRIPTORS: ACHING;DULL

## 2018-01-16 ASSESSMENT — PAIN DESCRIPTION - PAIN TYPE
TYPE: CHRONIC PAIN

## 2018-01-16 ASSESSMENT — PAIN DESCRIPTION - LOCATION
LOCATION: SHOULDER
LOCATION: SHOULDER

## 2018-01-16 ASSESSMENT — PAIN DESCRIPTION - FREQUENCY: FREQUENCY: INTERMITTENT

## 2018-01-16 ASSESSMENT — PAIN DESCRIPTION - ORIENTATION
ORIENTATION: RIGHT
ORIENTATION: RIGHT

## 2018-01-16 NOTE — PROGRESS NOTES
Occupational Therapy  Facility/Department: [facility]  Daily Treatment Note  NAME: Sakshi Arroyo  :   MRN: [id number]    Date of Service: 2018    Patient Diagnosis(es): There were no encounter diagnoses. 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 has a past surgical history that includes knee surgery (Left, ); Thyroidectomy (); bronchoscopy (N/A, 2017); and thoracotomy (Right, 2017). Restrictions  Restrictions/Precautions  Restrictions/Precautions: Fall Risk, General Precautions  Other position/activity restrictions: 2L O2  Subjective   General  Referring Practitioner: Dr. Hailee Montaño   Diagnosis: Hypoxemia 2 to bilateral Pulmonary embolism   Pre Treatment Pain Screening  Pain at present: 0  Intervention List: Nurse called to administer meds; Patient able to continue with treatment  Pain Assessment  Patient Currently in Pain: Yes  Pain Assessment: 0-10  Pain Level: 7  Pain Type: Chronic pain  Pain Location: Shoulder  Pain Descriptors: Aching  Objective     Pt. demonstrated modified ind. functional room mobility w/w level, supr during functional mobility in the environment w/w level d/t endurance level concerns. Pt. engaged in 2# UE ADL strengthening but unable to complete R/UE flexion, abd/add d/t increase in shoulder pain from \"arthritis\".   Pt. completed exercises from b/elbows to hands with fair tolerance. Assessment      Activity Tolerance  Activity Tolerance: Patient limited by pain  Activity Tolerance: Poor tolerance to UE ADl strengthening/AROM in AM  Safety Devices  Safety Devices in place: Yes  Type of devices: All fall risk precautions in place       Discharge Recommendations:  Continue to assess pending progress     Plan   Plan  Times per week: 5-7x/wk, 15-90 minutes   Plan weeks: 1-2  Current Treatment Recommendations: Strengthening, Balance Training, Functional Mobility Training, Endurance Training, Self-Care / ADL, Safety Education & Training, Equipment Evaluation, Education, & procurement  Plan Comment: Continue per OT POC  Goals  Patient Goals   Patient goals : To return home. Therapy Time   Individual Concurrent Group Co-treatment   Time In 0836         Time Out 0900                     -6 min. d/t CHERIE assisting another pt.          Minutes [address] CHER[address]    Electronically signed by CHERIE Rowell on 1/16/2018 at 1:41 PM

## 2018-01-16 NOTE — PROGRESS NOTES
Standard (2 Vw)    Result Date: 12/15/2017  EXAMINATION: XR CHEST STANDARD TWO VW. DATE AND TIME:12/15/2017 10:19 AM CLINICAL HISTORY: Shortness of breath  J44.9 Chronic obstructive pulmonary disease, unspecified COPD type ([address] ICD10 COMPARISONS: 11/29/2017 FINDINGS: Chronic pleural parenchymal changes at the right base again seen with no improvement. The size of the pleural fluid collection may be slightly increased or possibly there is some increased atelectasis or consolidation at the right base. There is suggestion of a fluid level demonstrated on today's exam which was not clearly seen on the prior study. This appears to be located posteriorly. Minimal discoid atelectasis at the left base. PERSISTENT PLEURAL-PARENCHYMAL POSTOPERATIVE CHANGES AT THE RIGHT BASE. FLUID LEVEL NOW DEMONSTRATED. THIS MOST LIKELY IS RELATED TO POSTOPERATIVE CHANGE WITH LOCULATED FLUID AND AIR. ABSCESS IS LESS LIKELY. CLOSE FOLLOW-UP RECOMMENDED. Cta Chest W Wo  (pe Study)    Result Date: 1/6/2018  EXAM: CT SCAN OF THE THORAX WITH CONTRAST/PE PROTOCOL/CTA CHEST COMPARISON: 11/11/2017 REASON FOR EXAMINATION:  HISTORY OF COPD AND DIABETES, HISTORY OF A WEDGE RESECTION IN THE RIGHT LUNG, PATIENT WITH COUGH, DYSPNEA, EVALUATE FOR POSSIBLE PULMONARY EMBOLISM TECHNIQUE: Helical CTA was performed through the chest utilizing 100 cc of Isovue 370 intravenous contrast.  Images were obtained with bolus tracking in order to opacify the pulmonary arteries. Thick section coronal MIP 3D reconstructions were performed  on a separate workstation. FINDINGS: The CTA thorax demonstrates multiple \"filling defects\" within the main pulmonary arteries and segmental branches of the right and left pulmonary arteries compatible with bilateral pulmonary emboli. Pulmonary emboli are also seen in subsegmental  pulmonary arteries in the lower lobes. There is an atherosclerotic aorta but no aortic aneurysm or dissection.  The heart is not enlarged and but

## 2018-01-16 NOTE — HOME CARE
Coordinatior     []Yes   [x]No  Signed up for Palliative Care Services   []Yes   [x]No                                            GENITOURINARY () DIALYSIS OR FAY                                      GASTROENTEROLOGY (GI) OSTOMY AND TUBE FEED            HOME CARE SERVICES       [x]   Skilled Nursing       [x]  Med/Surg       [] Mental Health   [x]   Physical Therapy    [x]  Home Safety Eval    []  Fast track   [x]   Occupational Therapy     []  Cognition and Memory   [x]  Speech therapy        []  Swallow Eval & Treat    []  Cognition   [x]  Home Care Aide   [x]            OXYGEN                                 [x] Home O2        [] Bipap            [x] CPap States he has had 2 CPAP's and can't tolerate.    [] Ventilator     [] Nebulizer  Supplier/Contact # : Medical Services           WOUND CARE                                 Wound Care / Hakeem Nesbitt / Hale Blizzard / Maricarmen Bagley / Chloe Clark / [facility]      LAB WORK                                  Type:                               Last Drawn     Hgb A1c     5.9                Date: 8/8/16     OUTPATIENT TESTING                                   [] Coumad[facility]         [] Other Tests                                    Home Care Coordinator: RN Signature Electronically signed by Cornel Levin RN on 1/16/18 at 11:55 AM   1/16/2018

## 2018-01-16 NOTE — PROGRESS NOTES
Physical Therapy    Facility/Department: [facility] Daily Treatment Note    NAME: Arian Carvajal    :  (69 y.o.)  MRN: [id number]    Account: [de-identified]  Gender: male    Date of Service: 18      SUBJECTIVE:     Start of tx pain 0/10  Location:   Description:   Response:     End of tx pain 0/10  Location:   Description:   Response:     Falls Safety Armband Present: [x] Yes  [] No    The below exercises were completed to facilitate strength, motor control   and muscular endurance. Supine:   AP: x20   QS: x20   SAQ: x20    Glut set: x20   Abduction: x20   Heel slides: x20    The below exercises were completed to facilitate strength, motor control   and muscular endurance.     Seated:  AP: x20  LAQ: x20  Hip Flex: x20  Abduction: x20  Adduction: x20  HS Curls: x20        Safety/Judgment:     Safety Devices  Type of devices: Gait belt    Minutes:      Transfer/Bed mobility training:      Gait training:      Neuro re education:      Therapeutic ex: 30      Therapy Time:    Individual   Time In 930   Time Out 1000   Minutes 30

## 2018-01-16 NOTE — PROGRESS NOTES
completed a STM task that targeted both STM and word finding. He was able to complete this task with approx 80% acc for the memory portion and approx 70% acc (given extra time to complete the task) for word finding. \"    Treatment Area(s):  Cognition      Participation in Treatment (at discharge): Cooperative    Functional Status at time of Discharge:    · Cognition: Patient demonstrates minimal cognitive deficits. · Communication: Patient demonstrates no communication deficits. · Language: Patient demonstrates no language deficits. · Motor Speech: Patient demonstrates no motor speech deficits. · Swallow: Patient demonstrates minimal dysphagia. Clinical Bedside assessment was completed on 1/14/18                       Instrumental assessment was not completed                                Swallowing Guidelines: Patient tolerating  soft consistencies (NDD level III) with thin liquids            Patient is discharged to Home. ST rec: follow up with Rj Calderon or OP to ensure tolerance of prescribed diet and further promote cognitive-linguistic functioning at the next level of care.               Amina Sink

## 2018-01-16 NOTE — PROGRESS NOTES
Physical Therapy Rehab Treatment Note  [facility]Department[facility]  [facility]       NAME: Krysta Jimenez  : [birth year] (12 y.o.)  MRN: [id number]  CODE STATUS: Full Code    Date of Service: 2018  Chart Reviewed: Yes  Family / Caregiver Present: No    Restrictions:  Restrictions/Precautions: Fall Risk  Position Activity Restriction  Other position/activity restrictions: 2L O2  Body mass index is 30.11 kg/m². SUBJECTIVE: Subjective: I feel good. Pain Screening  Patient Currently in Pain: Yes  Pre Treatment Pain Screening  Pain at present: 8  Scale Used: Numeric Score  Intervention List: Patient able to continue with treatment;Nurse called to administer meds  Comments / Details: right shoulder    Post Treatment Pain Screening:  Pain Assessment  Pain Level: 7  Pain Type: Chronic pain  Pain Location: Shoulder  Pain Orientation: Right  Pain Descriptors: Aching;Dull  Pain Frequency: Intermittent  Pain Intervention(s): Medication (see eMar); Declines    OBJECTIVE:   Overall Orientation Status: Within Normal Limits    Bed mobility  Bridging: Independent  Rolling to Left: Independent  Rolling to Right: Independent  Supine to Sit: Independent  Sit to Supine: Independent  Scooting: Independent    Transfers  Sit to Stand: Modified independent  Stand to sit: Modified independent  Bed to Chair: Modified independent    Ambulation  Ambulation?: Yes  Ambulation 1  Surface: level tile  Device: No Device  Assistance: Contact guard assistance;Stand by assistance  Quality of Gait: Pt with LOB x 1: self corrected but pt given CGA afterwards for safety. Distance: 100'  Ambulation 2  Surface - 2: carpet;ramp;level tile  Device 2: Rollator  Assistance 2: Modified Independent  Quality of Gait 2: Steadier gait without difficulty or LOB. Pt safer with AD for gait.   Distance: 200'  Stairs/Curb  Stairs?: Yes   Stairs  # Steps : 12  Stairs Height: 6\"  Rails: Bilateral  Assistance: Supervision;Modified independent Comment: non-reciprical.   Pt's SpO2 tested after stairs and was 88%: pt instructed with diaphramatic breathing and able to get sat levels up to 93% and greater after 5 -6 deep breaths. (Pt at 97% after gait.)    ASSESSMENT/COMMENTS: Pt declined need for HEP as was just recently in rehab. Pt also agreeable to use rollator at home. Attempted to get hold of nursing to find out if pt could be weaned off O2, but RN N/A during treatment time. Pt does not wear O2 at home. Treatment limited by rest breaks.        PLAN OF CARE/Safety: WNL  Safety Devices  Type of devices: Gait belt      Therapy Time:   Individual   Time In 0900   Time Out 0930   Minutes 30     Minutes:30      Transfer/Bed mobility trainin      Gait trainin      Neuro re education:     Therapeutic ex:      Darron Brush PTA, 18 at 12:05 PM

## 2018-01-16 NOTE — PROGRESS NOTES
Physical Therapy  Facility/Department: [facility] MED SURG O694/U043-10  Physical Therapy Discharge      NAME: Negra Spear    : [birth year] (69 y.o.)  MRN: [id number]    Account: [de-identified]  Gender: male      Patient has been discharged from acute care hospital. DC patient from current PT program.      Electronically signed by Dayana Ty PT on 18 at 4:33 PM

## 2018-01-16 NOTE — PROGRESS NOTES
Subjective: The patient complains of moderate to severe acute  on chronic dyspnea on exertion as well as gait instability partially relieved by  oxygen, Xarelto, rest breaks and exacerbated by  recent bilateral PEs. I am concerned about patients reported lack of  social supports at home and his acute idiopathic DVT/PE's. He is preparing for discharge. He has home oxygen therefore does not require a re-evaluation. ROS x10: The patient also complains of severely impaired mobility and activities of daily living. Otherwise no new problems with vision, hearing, nose, mouth, throat, dermal, cardiovascular, GI, , pulmonary, musculoskeletal, psychiatric or neurological. See Rehab H&P on Rehab chart dated 01/13/18. Vital signs:  /79   Pulse 60   Temp 98.4 °F (36.9 °C) (Oral)   Resp 18   Wt 222 lb (100.7 kg)   SpO2 96%   BMI 30.11 kg/m²   I/O:   PO/Intake:  fair PO intake, soft diet with thin liquids. Bowel/Bladder:  continent, no problems noted. General:  Patient is well developed, adequately nourished, non-obese and     well kempt. HEENT:    PERRLA, hearing intact to loud voice, external inspection of ear     and nose benign. Inspection of lips, tongue and gums benign  Musculoskeletal: No significant change in strength or tone. All joints stable. Inspection and palpation of digits and nails show no clubbing,       cyanosis or inflammatory conditions. Neuro/Psychiatric: Affect: flat but pleasant. Alert and oriented to person, place and     situation. No significant change in deep tendon reflexes or     sensation  Lungs:  Diminished CTA-B. Respiration effort is normal at rest.  Dyspnea on exertion   Heart:   S1 = S2, RRR. No loud murmurs. Abdomen:  Soft, non-tender, no enlargement of liver or spleen. Extremities:  No significant lower extremity edema or tenderness.   Skin:   Intact to general survey, numerous bilateral upper 70 bruises      Rehabilitation:  Physical therapy: FIMS:  Bed Mobility: Scooting: Modified independent    Transfers: Sit to Stand: Supervision, Modified independent  Stand to sit: Supervision, Modified independent  Bed to Chair: Supervision, Modified independent, Ambulation 1  Surface: level tile, carpet, ramp  Device: Rollator  Other Apparatus: O2 (2L)  Assistance: Supervision, Modified Independent  Quality of Gait: Steady gait without LOB or difficulty. No c/o of dyspnea after gait  Distance: 250'  Comments: Mild dyspnea after gait. No difference with shoes or without., Stairs  # Steps : 4  Stairs Height: 6\"  Rails: Bilateral  Device: No Device  Assistance: Supervision  Comment: non-reciprical    FIMS: Bed, Chair, Wheel Chair: 5 - Requires setup/supervision/cues  Walk: 5 - Supervision Requires standby supervision or cuing to walk/operate wheelchair at least 150 feet  Distance Walked: 150'  Stairs: 2- Maximal Assistance Performs 25-49% of the effort to go up and down 4 to 6 stairs and requires the assistance of one person only,  , Assessment: Patient o2 levels decrease to 85-89 with exertion, yet recovers to 91-95 with rest and cues for breathing technique. Challenged by standing balance and LE strengthening activities. Occupational therapy: FIMS:  Eatin - Patient feeds self  Groomin - Requires setup/cues to do all tasks  Bathin - Able to bathe all 10 areas with setup/sup/cues  Dressing-Upper: 5 - Requires setup/supervision/cues and/or requires assist with presthesis/brace only  Dressing-Lower: 5 - Requires setup/supervision/cues and/or staff applies TEDS/prosthesis/brace only  Toiletin - Requires setup/supervision/cues  Toilet Transfer: 5 - Requires setup/supervision/cues  Shower Transfer: 5 - Supervision, set-up, cues,  , Assessment: Pt's decreased endurance is his largest barrier to increased safety and independence with ADLs, IADLs, and functional mobility/transfers.  Pt would continue to therapy  8. Vitamin D deficiency - bolus dose vitamin D supplement and recheck as outpatient. 9.    Sleep apnea. -Add CPAP at at bedtime. 10. Oropharyngeal dysphagia - soft diet with thin liquids. This note transcribed by Beni Marinelli on 01/16/18 at 11:51 a.m.         Christiano Guzman D.O., PM&R     Attending    [address]

## 2018-01-17 VITALS
WEIGHT: 222 LBS | TEMPERATURE: 98 F | HEART RATE: 56 BPM | RESPIRATION RATE: 18 BRPM | DIASTOLIC BLOOD PRESSURE: 76 MMHG | OXYGEN SATURATION: 97 % | BODY MASS INDEX: 30.11 KG/M2 | SYSTOLIC BLOOD PRESSURE: 103 MMHG

## 2018-01-17 PROBLEM — Z79.01 CURRENT USE OF LONG TERM ANTICOAGULATION: Chronic | Status: ACTIVE | Noted: 2018-01-17

## 2018-01-17 LAB
APTT: 39.2 SEC (ref 21.6–35.4)
BASOPHILS ABSOLUTE: 0 K/UL (ref 0–0.2)
BASOPHILS RELATIVE PERCENT: 0.4 %
EOSINOPHILS ABSOLUTE: 0.2 K/UL (ref 0–0.7)
EOSINOPHILS RELATIVE PERCENT: 3.8 %
HCT VFR BLD CALC: 36.4 % (ref 42–52)
HEMOGLOBIN: 11.9 G/DL (ref 14–18)
LYMPHOCYTES ABSOLUTE: 1.2 K/UL (ref 1–4.8)
LYMPHOCYTES RELATIVE PERCENT: 20.6 %
MCH RBC QN AUTO: 30.1 PG (ref 27–31.3)
MCHC RBC AUTO-ENTMCNC: 32.6 % (ref 33–37)
MCV RBC AUTO: 92.2 FL (ref 80–100)
MONOCYTES ABSOLUTE: 0.4 K/UL (ref 0.2–0.8)
MONOCYTES RELATIVE PERCENT: 7.4 %
NEUTROPHILS ABSOLUTE: 3.9 K/UL (ref 1.4–6.5)
NEUTROPHILS RELATIVE PERCENT: 67.8 %
PDW BLD-RTO: 15.8 % (ref 11.5–14.5)
PLATELET # BLD: 121 K/UL (ref 130–400)
RBC # BLD: 3.95 M/UL (ref 4.7–6.1)
WBC # BLD: 5.7 K/UL (ref 4.8–10.8)

## 2018-01-17 PROCEDURE — 97530 THERAPEUTIC ACTIVITIES: CPT

## 2018-01-17 PROCEDURE — 97535 SELF CARE MNGMENT TRAINING: CPT

## 2018-01-17 PROCEDURE — 6370000000 HC RX 637 (ALT 250 FOR IP): Performed by: INTERNAL MEDICINE

## 2018-01-17 PROCEDURE — 97112 NEUROMUSCULAR REEDUCATION: CPT

## 2018-01-17 PROCEDURE — 97116 GAIT TRAINING THERAPY: CPT

## 2018-01-17 PROCEDURE — 85730 THROMBOPLASTIN TIME PARTIAL: CPT

## 2018-01-17 PROCEDURE — 94640 AIRWAY INHALATION TREATMENT: CPT

## 2018-01-17 PROCEDURE — 85025 COMPLETE CBC W/AUTO DIFF WBC: CPT

## 2018-01-17 PROCEDURE — 36415 COLL VENOUS BLD VENIPUNCTURE: CPT

## 2018-01-17 PROCEDURE — 99239 HOSP IP/OBS DSCHRG MGMT >30: CPT | Performed by: PHYSICAL MEDICINE & REHABILITATION

## 2018-01-17 RX ADMIN — FINASTERIDE 5 MG: 5 TABLET, FILM COATED ORAL at 09:01

## 2018-01-17 RX ADMIN — DOCUSATE SODIUM 100 MG: 100 CAPSULE, LIQUID FILLED ORAL at 09:01

## 2018-01-17 RX ADMIN — POTASSIUM CHLORIDE 20 MEQ: 750 TABLET, FILM COATED, EXTENDED RELEASE ORAL at 09:01

## 2018-01-17 RX ADMIN — MELATONIN TAB 3 MG 3 MG: 3 TAB at 01:49

## 2018-01-17 RX ADMIN — POLYETHYLENE GLYCOL 3350 17 G: 17 POWDER, FOR SOLUTION ORAL at 09:01

## 2018-01-17 RX ADMIN — LEVOTHYROXINE SODIUM 125 MCG: 100 TABLET ORAL at 06:43

## 2018-01-17 RX ADMIN — VITAMIN D, TAB 1000IU (100/BT) 2000 UNITS: 25 TAB at 09:02

## 2018-01-17 RX ADMIN — FUROSEMIDE 20 MG: 20 TABLET ORAL at 09:01

## 2018-01-17 RX ADMIN — Medication 2 PUFF: at 05:19

## 2018-01-17 RX ADMIN — Medication 400 MG: at 12:35

## 2018-01-17 RX ADMIN — PANTOPRAZOLE SODIUM 40 MG: 40 TABLET, DELAYED RELEASE ORAL at 06:43

## 2018-01-17 RX ADMIN — RIVAROXABAN 15 MG: 15 TABLET, FILM COATED ORAL at 09:01

## 2018-01-17 RX ADMIN — ASPIRIN 81 MG 81 MG: 81 TABLET ORAL at 09:01

## 2018-01-17 RX ADMIN — METOPROLOL SUCCINATE 25 MG: 25 TABLET, FILM COATED, EXTENDED RELEASE ORAL at 09:02

## 2018-01-17 ASSESSMENT — PAIN SCALES - GENERAL: PAINLEVEL_OUTOF10: 0

## 2018-01-17 NOTE — DISCHARGE SUMMARY
stairs and requires the assistance of one person only,  , Assessment: Patient o2 levels decrease to 85-89 with exertion, yet recovers to 91-95 with rest and cues for breathing technique. Challenged by standing balance and LE strengthening activities. Occupational therapy: FIMS:  Eatin - Feeds self with adaptive equipment/dentures and/or feeds self with modified diet and/or performs own tube feeding  Groomin - Patient independent with all grooming tasks  Bathin - Able to bathe all 10 areas with device  Dressing-Upper: 5 - Requires setup/supervision/cues and/or requires assist with presthesis/brace only  Dressing-Lower: 5 - Requires setup/supervision/cues and/or staff applies TEDS/prosthesis/brace only  Toiletin - Requires device (grab bar/walker/etc.)  Toilet Transfer: 6 - Independent with device (grab bar/walker/slide bar)  Shower Transfer: 0 - Activity does not occur,  , Assessment: Pt's decreased endurance is his largest barrier to increased safety and independence with ADLs, IADLs, and functional mobility/transfers. Pt would continue to benefit from skilled OT services to increase endurance and maximize safety/independence.      Speech therapy: FIMS: Comprehension: 7 - Patient understands complex ideas (math/planning)  Expression: 6 - Device used to express complex ideas/needs  Social Interaction: 6 - Patient requires medication for mood and/or effect  Problem Solvin - Independent with device (e.g. notes, schedules)  Memory: 6 - Patient requires device to recall (e.g. memory book)      Lab/X-ray studies reviewed, analyzed and discussed with patient and staff:   Recent Results (from the past 24 hour(s))   APTT    Collection Time: 18  5:27 AM   Result Value Ref Range    aPTT 39.2 (H) 21.6 - 35.4 sec   CBC Auto Differential    Collection Time: 18  5:27 AM   Result Value Ref Range    WBC 5.7 4.8 - 10.8 K/uL    RBC 3.95 (L) 4.70 - 6.10 M/uL    Hemoglobin 11.9 (L) 14.0 - 18.0 g/dL Hematocrit 36.4 (L) 42.0 - 52.0 %    MCV 92.2 80.0 - 100.0 fL    MCH 30.1 27.0 - 31.3 pg    MCHC 32.6 (L) 33.0 - 37.0 %    RDW 15.8 (H) 11.5 - 14.5 %    Platelets 121 (L) 227 - 400 K/uL    Neutrophils % 67.8 %    Lymphocytes % 20.6 %    Monocytes % 7.4 %    Eosinophils % 3.8 %    Basophils % 0.4 %    Neutrophils # 3.9 1.4 - 6.5 K/uL    Lymphocytes # 1.2 1.0 - 4.8 K/uL    Monocytes # 0.4 0.2 - 0.8 K/uL    Eosinophils # 0.2 0.0 - 0.7 K/uL    Basophils # 0.0 0.0 - 0.2 K/uL       Previous extensive, complex labs, notes and diagnostics reviewed and analyzed. ALLERGIES:    Allergies as of 01/12/2018    (No Known Allergies)      (please also verify by checking [facility])       Complex Physical Medicine & Rehab Issues Assess & Plan:   1. Severe abnormality of gait and mobility and impaired self-care and ADL's secondary to progressive hypoxemia secondary to bilateral pulmonary emboli. Functional and medical status has improved greatly status post acute rehabilitation process at [facility] (formerly [facility]). The patient has completed the acute rehabilitation program and is ready for discharge today after therapies to home alone with home health PT, OT, ST, RN, aide and  versus outpatient PT, OT and ST. Patient and family education is complete  The patient is to follow-up with their family physician after discharge. 2. Left LLE DVT, elevated DVT risk:  prophylaxis SATURNINO hose, elevation and Xarelto (also on ASA). Complex Active General Medical Issues that complicated care to be followed by family physician:       1. Principal Problem:    Abnormality of gait and mobility due to hypoxemia secondary to bilateral pulmonary emboli, [facility] admit 01/12/18   Active Problems:  1. Hypoxemia, Bilateral pulmonary embolism, COPD (chronic obstructive pulmonary disease), on home oxygen therapy - oxygen via nasal cannula with humidification. Albuterol, Dulera. Xarelto as a blood thinner titrate O2. Home oxygen.    2.   HTN (hypertension), Hyperlipidemia - Toprol, Lasix. 3.  Active chronic GERD (gastroesophageal reflux disease) - Protonix. 4.   Hypothyroidism - Synthroid. 5.   History of Type 2 diabetes mellitus. 6.   BPH (benign prostatic hyperplasia). - Proscar, Flomax. 7.  Severe active chronic Anxiety - Desyrel, Ativan, Remeron. Status post Rehab Psychology and  recreational therapy. 8.   Vitamin D deficiency - bolus dose vitamin D supplement and recheck as outpatient. 9.    Sleep apnea. -Add CPAP at at bedtime. 10. Oropharyngeal dysphagia - soft diet with thin liquids. This note transcribed by Ti Lilly on 01/17/18 at 11:46 a.m.         Himanshu Soriano D.O., PM&R     Attending    [address] [place], [place] [address]en [facility]

## 2018-01-17 NOTE — PROGRESS NOTES
Facility/Department: [facility]  Physical Therapy Acute Rehab Discharge Summary  Room: [facility]    NAME: Pierce Parks  :   MRN: [id number]    Admission Date: 2018  3:52 PM  Discharge Date: 2018    Rehab Diagnosis(es):    Patient Active Problem List    Diagnosis Date Noted    Current use of long term anticoagulation 2018    Abnormality of gait and mobility due to hypoxemia secondary to bilateral pulmonary emboli, [facility] admit 18      Bilateral pulmonary embolism ([address] 2018    Hypoxemia     Vitamin D deficiency     Debility     On home oxygen therapy     Osteoarthritis of spine with radiculopathy, lumbar region 2016    Foraminal stenosis of lumbosacral region 2016    Elevated PSA 2016    Anxiety     Insomnia     Hypothyroidism     Type 2 diabetes mellitus (HCC)     BPH (benign prostatic hyperplasia)     GERD (gastroesophageal reflux disease) 2014    Sleep apnea 2014    HTN (hypertension) 2014    Hyperlipidemia 2014    COPD (chronic obstructive pulmonary disease) ([address] 2013       Past Medical History:   Diagnosis Date    Abnormality of gait and mobility     Acute sinusitis     Anxiety     Aspiration into respiratory tract 11/10/2017    Aspiration pneumonia (HCC)     BPH (benign prostatic hyperplasia)     COPD (chronic obstructive pulmonary disease) ([address] 2013    Debility     Elevated CK 2013    Foraminal stenosis of lumbosacral region 2016    GERD (gastroesophageal reflux disease) 2014    History of asthma 2014    HTN (hypertension) 2014    Hyperlipidemia     Hypothyroidism     Insomnia     Lower extremity weakness 2014    On home oxygen therapy     2L at night    Osteoarthritis of spine with radiculopathy, lumbar region 2016    Papillary thyroid carcinoma ([address] 2015    Positive D dimer 2013    Sleep apnea 2014    Type 2 diabetes mellitus ([facility]     Vitamin D deficiency      Past Surgical History:   Procedure Laterality Date    BRONCHOSCOPY N/A 11/11/2017    BRONCHOSCOPY FIBEROPTIC performed by Curtis Clay MD at [address] Right 11/14/2017    RIGHT THORACOTOMY WEDGE RESECTION FOR FOREIGN BODY AND FOB DOUBLE LUMEN (1ST CASE) performed by Danae Preciado MD at [address] [facility]  2006       Indications for Skilled Intervention: Decreased functional mobility , Decreased strength, Decreased endurance, Decreased balance    GOALS:  Short term goals  Short term goal 1: indep with bed mobilty - MET  Short term goal 2: indep with functional transfers - MET  Short term goal 3: amb >250ft with LRAD and mod I - MET  Short term goal 4: 5xSTS <13 seconds - Not Met: completes in 16.26sec  Long term goals  Long term goal 1: 4 stairs with handrails and indep - MET  Long term goal 2: DGI >21/24 with appropriate AD - MET @ 22/24  Long term goal 3: PEREZ >41/56 - MET @ 50ft  Long term goal 4: 6 min walk test >430 meters (age related norm) - Not Met: ambulation to limit X 3min @ 79meters    Summary of POC: Throughout rehab stay, pt received skilled physical therapy treatment 5 days. Skilled Services Provided: Strengthening, Balance Training, Functional Mobility Training, Transfer Training, Gait Training, Stair training, Neuromuscular Re-education, Endurance Training, Home Exercise Program, Safety Education & Training, Patient/Caregiver Education & Training, Equipment Evaluation, Education, & procurement (Please refer to daily notes for all treatment details)    ASSESSMENT: DGI and Perez Balance scores indicative of low falls risk. Ambulatory endurance limited to 3minutes at this time, and score for 5XSTS indicating residual LE weakness. However deficits do not significantly effect functional mobility, which pt completes consistently and safely at indep level.  Pt would benefit from further PT to address

## 2018-01-17 NOTE — PROGRESS NOTES
Physical Therapy Rehab Treatment Note  Facility/Department: [facility]  Room: R261/R261-01       NAME: Ca Tong  :  (57 y.o.)  MRN: [id number]  CODE STATUS: Full Code    Date of Service: 2018  Chart Reviewed: Yes  Family / Caregiver Present: No    Restrictions:  Restrictions/Precautions: Fall Risk, General Precautions  Position Activity Restriction  Other position/activity restrictions: 02 PRN       SUBJECTIVE: Subjective: \"I'm looking forward to going home. \"  \"They told me I don't qualify for needing O2 at home during the day. \"  Pt felt \"it would be pushing it\" if trying to keep walking after three minutes for 6 minute walk test.  Pain Screening  Patient Currently in Pain: No  Pre Treatment Pain Screening  Pain at present: 0  Scale Used: Numeric Score  Intervention List: Patient able to continue with treatment       OBJECTIVE:               Neuromuscular Education  NDT Treatment: Gait  (Pt unable to tolerate > 3 minutes for 6 minute walk test.  3 minutes = 260 feet or approximately or 79.2 meters)  Neuromuscular Comments: 5 sit to stands with hands crossed = 16.26 seconds  Outcomes Measures:  Perez Balance Score: 50           Bed mobility  Bridging: Independent  Rolling to Left: Independent  Rolling to Right: Independent  Supine to Sit: Independent  Sit to Supine: Independent  Scooting: Independent    Transfers  Sit to Stand: Modified independent  Stand to sit: Modified independent  Bed to Chair: Modified independent  Car Transfer: Modified independent    Ambulation  Ambulation?: Yes  Ambulation 1  Surface: level tile;carpet;ramp  Device: Rollator  Other Apparatus: O2 (2L with activity secondary to varying from 90 to 93% on room air.)  Assistance: Modified Independent  Quality of Gait: Steady gait with decreased lucy.   Distance: 260'  Stairs/Curb  Stairs?: Yes   Stairs  # Steps : 12  Stairs Height: 6\"  Rails: Bilateral  Curbs: 4\"  Device:  (rollator)  Assistance: Modified independent

## 2018-01-17 NOTE — PROGRESS NOTES
Occupational Therapy  Facility/Department: [facility]  Daily Treatment Note  NAME: Carmen Villanueva  :   MRN: [id number]    Date of Service: 2018    Patient Diagnosis(es): There were no encounter diagnoses. has a past medical history of Abnormality of gait and mobility; Acute sinusitis; Anxiety; Aspiration into respiratory tract; Aspiration pneumonia (HCC); BPH (benign prostatic hyperplasia); COPD (chronic obstructive pulmonary disease) ([facility] Debility; Elevated CK; Foraminal stenosis of lumbosacral region; GERD (gastroesophageal reflux disease); History of asthma; HTN (hypertension); Hyperlipidemia; Hypothyroidism; Insomnia; Lower extremity weakness; On home oxygen therapy; Osteoarthritis of spine with radiculopathy, lumbar region; Papillary thyroid carcinoma ([facility] Positive D dimer; Sleep apnea; Type 2 diabetes mellitus ([facility] and Vitamin D deficiency. has a past surgical history that includes knee surgery (Left, ); Thyroidectomy (); bronchoscopy (N/A, 2017); and thoracotomy (Right, 2017). Restrictions  Restrictions/Precautions  Restrictions/Precautions: Fall Risk, General Precautions    Other position/activity restrictions: 02 PRN  Subjective   General  Referring Practitioner: Dr. Ulysses Caddy   Diagnosis: Hypoxemia 2 to bilateral Pulmonary embolism \"  Pre Treatment Pain Screening  Pain at present: 0  Pain Assessment  Patient Currently in Pain: No    Objective     Pt. completed sit/stand transfers with supr. Pt engaged in graded ue activities from standing position to increase his ADL standing tolerance and safety. Task s included folding laundry, wiping tabletop down graded, reaching,placing crossing midline utilizing the rom arc x 3 levels good standing tolerances w/o LOB. Assessment    Tx. tolerated well. All safety precautions in place.           Plan   Plan  Times per week: 5-7x/wk, 15-90 minutes   Plan weeks: 1-2  Current Treatment Recommendations:

## 2018-01-17 NOTE — PROGRESS NOTES
visit/ check up. This office visit is important, as it may prevent need for repeat and/or future hospitalizations. **    Your medical team at [facility] ([facility]) appreciates the opportunity to work with you to get well!     Sincerely,  Sujatha Álvarez

## 2018-01-17 NOTE — PROGRESS NOTES
walker;Wisam  Receives Help From: Family  ADL Assistance: Independent  Active : Yes  Mode of Transportation: Truck  Occupation: Retired  Type of occupation:    Leisure & Hobbies: wood working     Overall Cognitive Status: WFL     Pt. was provided with information on the [facility] pulmonary out.pt. program which he has expressed great interest in after home health is finished. Plan   D/C OT.  Pt. to discharge to home this date.               Nicole Cuevas      Electronically signed by CHERIE Garcia on 1/17/2018 at 3:16 PM

## 2018-01-17 NOTE — DISCHARGE INSTR - PHARMACY
Anticoagulation therapy rivaroxaban 15 mg. Twice a day for 12 days. Then change to 20 mg. Daily. You have a follow up visit with Dr. Violette Gaspar on 1/19/2018, @ 8:00. For follow up with anticoagulation.

## 2018-01-18 ENCOUNTER — CARE COORDINATION (OUTPATIENT)
Dept: CASE MANAGEMENT | Age: 78
End: 2018-01-18

## 2018-01-18 DIAGNOSIS — R26.9 ABNORMALITY OF GAIT AND MOBILITY: Primary | ICD-10-CM

## 2018-01-18 NOTE — CARE COORDINATION
Other, Straight cane  Other Patient DME:  Hand-held shower head, Rollator, 4 wheeled walker, Cane.   Patient Home Equipment:  Oxygen, Nebulizer  Do you have support at home?:  Alone  Do you feel like you have everything you need to keep you well at home?:  Yes  Are you an active caregiver in your home?:  No  Care Transitions Interventions         Follow Up  Future Appointments  Date Time Provider Stefanie Garcia   1/26/2018 1:30 PM Trenton Washington MD [facility]   2/15/2018 1:45 PM Lisa Vidal MD [address] [place] [address]

## 2018-02-12 NOTE — PROGRESS NOTES
1915 Report received at bedside, assumed care of pt. Pt denies any needs at this time. 1920 Friend at bedside. 1955 Medicated Pt per MAR. Shift assessment completed, see notes. A/O x 4. NSR on monitor. O2 at 96% on 2L NC. Lungs clear diminished. CT # 28, 18 at the chest wall. No crepitus. Suction turned off. Lung sounds are present and equal bilaterally with equal chest expansion. .  Pt denies pain, N/V/D. C/o constipation. Per pt no BM since Friday. Will continue to medicate per [facility] with stool softeners/laxatives. Pt in chair, feet elevated. Refuses SCDs, sherri hose on. Alarm on chair. Call light is in reach. Pt denies any needs at this time. 2015 Pt ambulated from room 7 to room 3 and back. Tolerated ok. Sats dropped to 89% while on 2L NC. Increased O2 to 4L. Had pt sit on edge of bed. Instructed pt to breath in through nose and out through mouth. Sats returned to 97%. Pt wanted to lay in bed. Put pt in high fowlers position. Chair alarm on. Call light is in reach. Pt denies any needs at this time. 2024 Pt c/o pain. Medicated with Percocet per MAR. Pt stated he is uncomfortable in the bed and requested to get in the chair. Still on NC 4L. Assisted pt with sitting in chair. Bedside table with in reach. Call light is in reach. Alarm on chair. Pt denies any needs at this time. Will continue to monitor. 2100 Turned O2 back down to 2L NC. Pt normally wears 2L at night while sleeping. Will continue to monitor oxygen saturation. 2200 Pt sat 96% on 2L NC. Pt denies pain at this time. Pt is sleeping but easy to wake. VSS. Pt denies any needs at this time, will continue to monitor. 2300 Pt sleeping. VSS. NSR, HR 80s. O2> 92%. 0000 Reassessment completed, see notes. A/O x 4. NSR on monitor. O2 at 93% on 2L NC. Lungs clear diminished. CT # 28, 18 at the chest wall. No crepitus. Suction turned off. Lung sounds are present and equal bilaterally with equal chest expansion. Pt denies pain, N/V/D.  Pt still no

## 2018-02-15 ENCOUNTER — OFFICE VISIT (OUTPATIENT)
Dept: PULMONOLOGY | Age: 78
End: 2018-02-15
Payer: MEDICARE

## 2018-02-15 VITALS
HEIGHT: 72 IN | HEART RATE: 80 BPM | WEIGHT: 231.2 LBS | BODY MASS INDEX: 31.31 KG/M2 | DIASTOLIC BLOOD PRESSURE: 66 MMHG | TEMPERATURE: 98.2 F | OXYGEN SATURATION: 99 % | SYSTOLIC BLOOD PRESSURE: 116 MMHG

## 2018-02-15 DIAGNOSIS — J98.4 PNEUMONIA WITH CAVITY OF LUNG: Primary | ICD-10-CM

## 2018-02-15 DIAGNOSIS — J44.9 CHRONIC OBSTRUCTIVE PULMONARY DISEASE, UNSPECIFIED COPD TYPE (HCC): ICD-10-CM

## 2018-02-15 DIAGNOSIS — I26.99 BILATERAL PULMONARY EMBOLISM (HCC): ICD-10-CM

## 2018-02-15 DIAGNOSIS — J18.9 PNEUMONIA WITH CAVITY OF LUNG: Primary | ICD-10-CM

## 2018-02-15 DIAGNOSIS — R09.02 HYPOXEMIA: ICD-10-CM

## 2018-02-15 DIAGNOSIS — E66.9 OBESITY (BMI 30-39.9): ICD-10-CM

## 2018-02-15 PROCEDURE — 99214 OFFICE O/P EST MOD 30 MIN: CPT | Performed by: INTERNAL MEDICINE

## 2018-02-15 PROCEDURE — 3023F SPIROM DOC REV: CPT | Performed by: INTERNAL MEDICINE

## 2018-02-15 PROCEDURE — 1036F TOBACCO NON-USER: CPT | Performed by: INTERNAL MEDICINE

## 2018-02-15 PROCEDURE — G8417 CALC BMI ABV UP PARAM F/U: HCPCS | Performed by: INTERNAL MEDICINE

## 2018-02-15 PROCEDURE — 1123F ACP DISCUSS/DSCN MKR DOCD: CPT | Performed by: INTERNAL MEDICINE

## 2018-02-15 PROCEDURE — G8482 FLU IMMUNIZE ORDER/ADMIN: HCPCS | Performed by: INTERNAL MEDICINE

## 2018-02-15 PROCEDURE — 1111F DSCHRG MED/CURRENT MED MERGE: CPT | Performed by: INTERNAL MEDICINE

## 2018-02-15 PROCEDURE — 4040F PNEUMOC VAC/ADMIN/RCVD: CPT | Performed by: INTERNAL MEDICINE

## 2018-02-15 PROCEDURE — G8926 SPIRO NO PERF OR DOC: HCPCS | Performed by: INTERNAL MEDICINE

## 2018-02-15 PROCEDURE — G8427 DOCREV CUR MEDS BY ELIG CLIN: HCPCS | Performed by: INTERNAL MEDICINE

## 2018-02-15 RX ORDER — CEFUROXIME AXETIL 500 MG/1
500 TABLET ORAL 2 TIMES DAILY
Qty: 20 TABLET | Refills: 0 | Status: SHIPPED | OUTPATIENT
Start: 2018-02-15 | End: 2018-02-25

## 2018-02-15 ASSESSMENT — ENCOUNTER SYMPTOMS
VOMITING: 0
RHINORRHEA: 0
COUGH: 1
NAUSEA: 0
CHEST TIGHTNESS: 0
SORE THROAT: 0
EYE ITCHING: 0
ABDOMINAL PAIN: 0
DIARRHEA: 0
VOICE CHANGE: 0
SHORTNESS OF BREATH: 1
WHEEZING: 1

## 2018-02-15 NOTE — PROGRESS NOTES
Subjective:     Shari Gomez is a 69 y.o. male who complains today of:     Chief Complaint   Patient presents with    Follow-up       HPI  C/o Cough with dark yellow mucus x3 weeks   Patient is using 2 lit with sleep and prn. C/o shortness of breath with exertion. nol Wheezing. No Hemoptysis  No Chest pain or pleuritic pain  No Fever or chills. No Rhinorrhea or postnasal drip.     Patient is Symbicort 2 puff BID, albuterol HFA prn     Allergies:  Review of patient's allergies indicates no known allergies.   Past Medical History:   Diagnosis Date    Abnormality of gait and mobility     Acute sinusitis     Anxiety     Aspiration into respiratory tract 11/10/2017    Aspiration pneumonia (HCC)     BPH (benign prostatic hyperplasia)     COPD (chronic obstructive pulmonary disease) ([address] 12/5/2013    Debility     Elevated CK 12/30/2013    Foraminal stenosis of lumbosacral region 6/7/2016    GERD (gastroesophageal reflux disease) 12/11/2014    History of asthma 1/13/2014    HTN (hypertension) 1/13/2014    Hyperlipidemia     Hypothyroidism     Insomnia     Lower extremity weakness 1/24/2014    On home oxygen therapy     2L at night    Osteoarthritis of spine with radiculopathy, lumbar region 6/7/2016    Papillary thyroid carcinoma ([address] 12/9/2015    Positive D dimer 12/5/2013    Sleep apnea 1/24/2014    Type 2 diabetes mellitus ([address]     Vitamin D deficiency      Past Surgical History:   Procedure Laterality Date    BRONCHOSCOPY N/A 11/11/2017    BRONCHOSCOPY FIBEROPTIC performed by Rena Doss MD at [address] [facility] Right 11/14/2017    RIGHT THORACOTOMY WEDGE RESECTION FOR FOREIGN BODY AND FOB DOUBLE LUMEN (1ST CASE) performed by Severo Meier, MD at [address] [place] [address]  2006     Family History   Problem Relation Age of Onset    Other Mother 80     Old Age    Stroke Father 45     Social History     Social History    Marital status:  Spouse name: N/A    Number of children: 2    Years of education: N/A     Occupational History    retired      Social History Main Topics    Smoking status: Former Smoker     Types: Cigarettes     Start date: 6/1/2000     Quit date: 2000    Smokeless tobacco: Former User      Comment: quit 2000    Alcohol use No    Drug use: No    Sexual activity: Not on file     Other Topics Concern    Not on file     Social History Narrative    The patient lives alone in a one story home with one step to enter the home. The bedroom and bathroom are on the first floor. His social support includes his children. He has a wheeled walker, rollator, cane and dressing assistive device at home. He was receiving home health care services prior to admission to include PT, OT and RN. He does not have history of falls prior to admission. He was independent with mobility with a wheeled walker as needed prior to admission. He was independent with self care prior to admission. His goal is to get stronger and return home. Review of Systems   Constitutional: Negative for chills, diaphoresis, fatigue and fever. HENT: Negative for congestion, mouth sores, nosebleeds, postnasal drip, rhinorrhea, sneezing, sore throat and voice change. Eyes: Negative for itching and visual disturbance. Respiratory: Positive for cough, shortness of breath and wheezing. Negative for chest tightness. Cardiovascular: Negative. Negative for chest pain, palpitations and leg swelling. Gastrointestinal: Negative for abdominal pain, diarrhea, nausea and vomiting. Genitourinary: Negative for difficulty urinating and hematuria. Musculoskeletal: Negative for arthralgias, joint swelling and myalgias. Skin: Negative for rash. Allergic/Immunologic: Negative for environmental allergies. Neurological: Negative for dizziness, tremors, weakness and headaches.    Psychiatric/Behavioral: Negative for behavioral problems and sleep mouth daily      potassium chloride (KLOR-CON) 10 MEQ extended release tablet Take 20 mEq by mouth daily      mirtazapine (REMERON) 15 MG tablet Take 15 mg by mouth nightly      traZODone (DESYREL) 150 MG tablet Take 1 tablet by mouth nightly 30 tablet 0    metoprolol succinate (TOPROL XL) 25 MG extended release tablet Take 1 tablet by mouth daily 30 tablet 3    finasteride (PROSCAR) 5 MG tablet Take 1 tablet by mouth daily 30 tablet 3    tamsulosin (FLOMAX) 0.4 MG capsule Take 1 capsule by mouth nightly 30 capsule 3    levothyroxine (SYNTHROID) 125 MCG tablet Take 1 tablet by mouth Daily 30 tablet 3    Famotidine (PEPCID AC PO) Take by mouth daily       budesonide-formoterol (SYMBICORT) 160-4.5 MCG/ACT AERO Inhale 2 puffs into the lungs 2 times daily.  aspirin 81 MG tablet Take 81 mg by mouth daily. No current facility-administered medications for this visit. Results for orders placed during the hospital encounter of 01/06/18   XR CHEST STANDARD (2 VW)    Narrative EXAMINATION: XR CHEST STANDARD TWO VW    REASON FOR EXAM: Follow-up right lower lobe atelectasis     FINDINGS: Frontal view and lateral view of the chest were obtained. The heart and mediastinum are unremarkable. No evidence of pulmonary vascular congestion. Large cavity 16 cm in maximum diameter overlies the right base medially. Postsurgical clips overlie the right base where there is associated residual linear scarring and reactive pleural changes. No definite air-fluid level seen within the cavity. No   evidence of pneumothorax. Scarring at the left base unchanged. No new developing infiltrate in the lung fields. Bones and soft tissues intact. Impression PERSISTENT CAVITY RIGHT BASE UNCHANGED FROM [facility] 6, 2018.     NO ACUTE PROCESS IN THE LUNG FIELDS.        1/6/2018  9:04 PM Valeriano, Chpo Incoming Lab Results From Coleen Fletcher MD CC'd Results   Narrative   EXAM: CT SCAN OF THE THORAX WITH CONTRAST/PE

## 2018-03-22 ENCOUNTER — HOSPITAL ENCOUNTER (OUTPATIENT)
Dept: GENERAL RADIOLOGY | Age: 78
Discharge: HOME | End: 2018-03-24
Payer: MEDICARE

## 2018-03-22 DIAGNOSIS — M25.519 SHOULDER PAIN, UNSPECIFIED CHRONICITY, UNSPECIFIED LATERALITY: ICD-10-CM

## 2018-03-22 PROCEDURE — 73030 X-RAY EXAM OF SHOULDER: CPT

## 2018-05-16 ENCOUNTER — HOSPITAL ENCOUNTER (OUTPATIENT)
Dept: GENERAL RADIOLOGY | Age: 78
Discharge: HOME | End: 2018-05-18
Payer: MEDICARE

## 2018-05-16 ENCOUNTER — OFFICE VISIT (OUTPATIENT)
Dept: PULMONOLOGY | Age: 78
End: 2018-05-16
Payer: MEDICARE

## 2018-05-16 VITALS
OXYGEN SATURATION: 95 % | BODY MASS INDEX: 31.56 KG/M2 | WEIGHT: 233 LBS | HEIGHT: 72 IN | DIASTOLIC BLOOD PRESSURE: 76 MMHG | SYSTOLIC BLOOD PRESSURE: 134 MMHG | HEART RATE: 73 BPM | TEMPERATURE: 98.4 F

## 2018-05-16 DIAGNOSIS — J44.9 CHRONIC OBSTRUCTIVE PULMONARY DISEASE, UNSPECIFIED COPD TYPE (HCC): ICD-10-CM

## 2018-05-16 DIAGNOSIS — J44.9 CHRONIC OBSTRUCTIVE PULMONARY DISEASE, UNSPECIFIED COPD TYPE (HCC): Primary | ICD-10-CM

## 2018-05-16 DIAGNOSIS — E66.9 OBESITY (BMI 30-39.9): ICD-10-CM

## 2018-05-16 DIAGNOSIS — G47.33 OBSTRUCTIVE SLEEP APNEA SYNDROME: ICD-10-CM

## 2018-05-16 DIAGNOSIS — I26.99 BILATERAL PULMONARY EMBOLISM (HCC): ICD-10-CM

## 2018-05-16 DIAGNOSIS — R09.02 HYPOXIA: ICD-10-CM

## 2018-05-16 PROCEDURE — 1036F TOBACCO NON-USER: CPT | Performed by: INTERNAL MEDICINE

## 2018-05-16 PROCEDURE — G8427 DOCREV CUR MEDS BY ELIG CLIN: HCPCS | Performed by: INTERNAL MEDICINE

## 2018-05-16 PROCEDURE — 71046 X-RAY EXAM CHEST 2 VIEWS: CPT

## 2018-05-16 PROCEDURE — 99214 OFFICE O/P EST MOD 30 MIN: CPT | Performed by: INTERNAL MEDICINE

## 2018-05-16 PROCEDURE — 3023F SPIROM DOC REV: CPT | Performed by: INTERNAL MEDICINE

## 2018-05-16 PROCEDURE — 4040F PNEUMOC VAC/ADMIN/RCVD: CPT | Performed by: INTERNAL MEDICINE

## 2018-05-16 PROCEDURE — G8926 SPIRO NO PERF OR DOC: HCPCS | Performed by: INTERNAL MEDICINE

## 2018-05-16 PROCEDURE — 1123F ACP DISCUSS/DSCN MKR DOCD: CPT | Performed by: INTERNAL MEDICINE

## 2018-05-16 PROCEDURE — G8417 CALC BMI ABV UP PARAM F/U: HCPCS | Performed by: INTERNAL MEDICINE

## 2018-05-16 RX ORDER — ALBUTEROL SULFATE 1.25 MG/3ML
1 SOLUTION RESPIRATORY (INHALATION) EVERY 6 HOURS PRN
COMMUNITY
End: 2018-12-14

## 2018-05-16 ASSESSMENT — ENCOUNTER SYMPTOMS
RHINORRHEA: 0
WHEEZING: 1
VOICE CHANGE: 0
NAUSEA: 0
ABDOMINAL PAIN: 0
VOMITING: 0
SHORTNESS OF BREATH: 1
EYE ITCHING: 0
SORE THROAT: 0
COUGH: 1
DIARRHEA: 0
CHEST TIGHTNESS: 0

## 2018-08-23 ENCOUNTER — OFFICE VISIT (OUTPATIENT)
Dept: PULMONOLOGY | Age: 78
End: 2018-08-23
Payer: MEDICARE

## 2018-08-23 ENCOUNTER — HOSPITAL ENCOUNTER (OUTPATIENT)
Dept: GENERAL RADIOLOGY | Age: 78
Discharge: HOME | End: 2018-08-25
Payer: MEDICARE

## 2018-08-23 VITALS
WEIGHT: 231 LBS | TEMPERATURE: 97.3 F | BODY MASS INDEX: 31.29 KG/M2 | OXYGEN SATURATION: 94 % | HEART RATE: 73 BPM | SYSTOLIC BLOOD PRESSURE: 120 MMHG | HEIGHT: 72 IN | RESPIRATION RATE: 16 BRPM | DIASTOLIC BLOOD PRESSURE: 80 MMHG

## 2018-08-23 DIAGNOSIS — J44.9 CHRONIC OBSTRUCTIVE PULMONARY DISEASE, UNSPECIFIED COPD TYPE (HCC): Primary | ICD-10-CM

## 2018-08-23 DIAGNOSIS — Z01.818 PRE-OPERATIVE CLEARANCE: ICD-10-CM

## 2018-08-23 DIAGNOSIS — E66.9 OBESITY (BMI 30-39.9): ICD-10-CM

## 2018-08-23 DIAGNOSIS — I26.99 BILATERAL PULMONARY EMBOLISM (HCC): ICD-10-CM

## 2018-08-23 DIAGNOSIS — G47.33 OBSTRUCTIVE SLEEP APNEA SYNDROME: ICD-10-CM

## 2018-08-23 PROCEDURE — 99214 OFFICE O/P EST MOD 30 MIN: CPT | Performed by: INTERNAL MEDICINE

## 2018-08-23 PROCEDURE — 4040F PNEUMOC VAC/ADMIN/RCVD: CPT | Performed by: INTERNAL MEDICINE

## 2018-08-23 PROCEDURE — G8427 DOCREV CUR MEDS BY ELIG CLIN: HCPCS | Performed by: INTERNAL MEDICINE

## 2018-08-23 PROCEDURE — 1101F PT FALLS ASSESS-DOCD LE1/YR: CPT | Performed by: INTERNAL MEDICINE

## 2018-08-23 PROCEDURE — G8926 SPIRO NO PERF OR DOC: HCPCS | Performed by: INTERNAL MEDICINE

## 2018-08-23 PROCEDURE — 1123F ACP DISCUSS/DSCN MKR DOCD: CPT | Performed by: INTERNAL MEDICINE

## 2018-08-23 PROCEDURE — 71046 X-RAY EXAM CHEST 2 VIEWS: CPT

## 2018-08-23 PROCEDURE — 1036F TOBACCO NON-USER: CPT | Performed by: INTERNAL MEDICINE

## 2018-08-23 PROCEDURE — G8417 CALC BMI ABV UP PARAM F/U: HCPCS | Performed by: INTERNAL MEDICINE

## 2018-08-23 PROCEDURE — 3023F SPIROM DOC REV: CPT | Performed by: INTERNAL MEDICINE

## 2018-08-23 RX ORDER — ACETAMINOPHEN 500 MG
500 TABLET ORAL EVERY 6 HOURS PRN
COMMUNITY
End: 2021-08-06

## 2018-08-23 ASSESSMENT — ENCOUNTER SYMPTOMS
RHINORRHEA: 0
COUGH: 1
CHEST TIGHTNESS: 0
SHORTNESS OF BREATH: 0
DIARRHEA: 0
EYE ITCHING: 0
ABDOMINAL PAIN: 0
VOMITING: 0
NAUSEA: 0
WHEEZING: 1
VOICE CHANGE: 0
SORE THROAT: 0

## 2018-08-23 NOTE — PROGRESS NOTES
Subjective:     Erica Vera is a 69 y.o. male who complains today of:     Chief Complaint   Patient presents with    Follow-up     three month f/u for Chest Xray results. HPI  Patient said he is going for rt. Shoulder surgery and need clearance. CXR done in 5/18. Show post ope changes in rt. Base with volume loss, rt. Pleural effusion vs small rt. Pleural effusion. Patient is using 2 lit with sleep   No C/o shortness of breath with exertion. Occasional Wheezing   C/o Cough with white   Sputum  No Hemoptysis  No Chest tightness   No Chest pain with radiation  or pleuritic pain  No Fever or chills. No Rhinorrhea and postnasal drip. He is Using bronchodilator with Symbicort, albuterol HFA , nebulizer with albuterol         . Allergies:  Patient has no known allergies.   Past Medical History:   Diagnosis Date    Abnormality of gait and mobility     Acute sinusitis     Anxiety     Aspiration into respiratory tract 11/10/2017    Aspiration pneumonia (HCC)     BPH (benign prostatic hyperplasia)     COPD (chronic obstructive pulmonary disease) ([facility] 12/5/2013    Debility     Elevated CK 12/30/2013    Foraminal stenosis of lumbosacral region 6/7/2016    GERD (gastroesophageal reflux disease) 12/11/2014    History of asthma 1/13/2014    HTN (hypertension) 1/13/2014    Hyperlipidemia     Hypothyroidism     Insomnia     Lower extremity weakness 1/24/2014    On home oxygen therapy     2L at night    Osteoarthritis of spine with radiculopathy, lumbar region 6/7/2016    Papillary thyroid carcinoma ([address] 12/9/2015    Positive D dimer 12/5/2013    Sleep apnea 1/24/2014    Type 2 diabetes mellitus ([address]     Vitamin D deficiency      Past Surgical History:   Procedure Laterality Date    BRONCHOSCOPY N/A 11/11/2017    BRONCHOSCOPY FIBEROPTIC performed by Kellen Segura MD at [address] [facility] Right 11/14/2017    RIGHT THORACOTOMY WEDGE RESECTION FOR FOREIGN BODY AND FOB DOUBLE LUMEN (1ST CASE) performed by Sun Stacy MD at [facility]  2006     Family History   Problem Relation Age of Onset    Other Mother 80        Old Age    Stroke Father 45     Social History     Social History    Marital status:      Spouse name: N/A    Number of children: 2    Years of education: N/A     Occupational History    retired      Social History Main Topics    Smoking status: Former Smoker     Types: Cigarettes     Start date: 6/1/2000     Quit date: 2000    Smokeless tobacco: Former User      Comment: quit 2000    Alcohol use No    Drug use: No    Sexual activity: Not on file     Other Topics Concern    Not on file     Social History Narrative    The patient lives alone in a one story home with one step to enter the home. The bedroom and bathroom are on the first floor. His social support includes his children. He has a wheeled walker, rollator, cane and dressing assistive device at home. He was receiving home health care services prior to admission to include PT, OT and RN. He does not have history of falls prior to admission. He was independent with mobility with a wheeled walker as needed prior to admission. He was independent with self care prior to admission. His goal is to get stronger and return home. Review of Systems   Constitutional: Negative for chills, diaphoresis, fatigue and fever. HENT: Negative for congestion, mouth sores, nosebleeds, postnasal drip, rhinorrhea, sneezing, sore throat and voice change. Eyes: Negative for itching and visual disturbance. Respiratory: Positive for cough and wheezing. Negative for chest tightness and shortness of breath. Cardiovascular: Negative. Negative for chest pain, palpitations and leg swelling. Gastrointestinal: Negative for abdominal pain, diarrhea, nausea and vomiting. Genitourinary: Negative for difficulty urinating and hematuria.    Musculoskeletal: Negative for arthralgias, joint swelling and myalgias. Skin: Negative for rash. Allergic/Immunologic: Negative for environmental allergies. Neurological: Negative for dizziness, tremors, weakness and headaches. Psychiatric/Behavioral: Negative for behavioral problems and sleep disturbance. Objective:     Vitals:    08/23/18 1356   BP: 120/80   Pulse: 73   Resp: 16   Temp: 97.3 °F (36.3 °C)   TempSrc: Tympanic   SpO2: 94%   Weight: 231 lb (104.8 kg)   Height: 6' (1.829 m)         Physical Exam   Constitutional: He is oriented to person, place, and time. He appears well-developed and well-nourished. obese   HENT:   Head: Normocephalic and atraumatic. Nose: Nose normal.   Mouth/Throat: Oropharynx is clear and moist.   Eyes: Pupils are equal, round, and reactive to light. Conjunctivae and EOM are normal.   Neck: No JVD present. No tracheal deviation present. No thyromegaly present. Cardiovascular: Normal rate and regular rhythm. Exam reveals no gallop and no friction rub. No murmur heard. Pulmonary/Chest: Effort normal. No respiratory distress. He has no wheezes. He has rales (rt. base). He exhibits no tenderness. diminished Breath sound bilaterally. Abdominal: He exhibits no distension. Musculoskeletal: Normal range of motion. Lymphadenopathy:     He has no cervical adenopathy. Neurological: He is alert and oriented to person, place, and time. No cranial nerve deficit. Skin: Skin is warm and dry. No rash noted. Psychiatric: He has a normal mood and affect.  His behavior is normal.       Current Outpatient Prescriptions   Medication Sig Dispense Refill    acetaminophen (TYLENOL) 500 MG tablet Take 500 mg by mouth every 6 hours as needed for Pain      albuterol (ACCUNEB) 1.25 MG/3ML nebulizer solution Inhale 1 ampule into the lungs every 6 hours as needed      rivaroxaban (XARELTO) 20 MG TABS tablet Take 1 tablet by mouth daily (with breakfast) Starting on 2/1/2018 30 tablet 0  Cholecalciferol (VITAMIN D3) 2000 units CAPS Take 2,000 Units by mouth daily      albuterol sulfate  (90 Base) MCG/ACT inhaler Inhale 2 puffs into the lungs every 4 hours as needed for Wheezing      melatonin 3 MG TABS tablet Take 10 mg by mouth nightly as needed      mirtazapine (REMERON) 15 MG tablet Take 15 mg by mouth nightly      traZODone (DESYREL) 150 MG tablet Take 1 tablet by mouth nightly 30 tablet 0    finasteride (PROSCAR) 5 MG tablet Take 1 tablet by mouth daily 30 tablet 3    tamsulosin (FLOMAX) 0.4 MG capsule Take 1 capsule by mouth nightly 30 capsule 3    levothyroxine (SYNTHROID) 125 MCG tablet Take 1 tablet by mouth Daily 30 tablet 3    budesonide-formoterol (SYMBICORT) 160-4.5 MCG/ACT AERO Inhale 2 puffs into the lungs 2 times daily.  rivaroxaban (XARELTO) 15 MG TABS tablet Take 1 tablet by mouth 2 times daily (with meals) for 25 doses 25 tablet 0    magnesium 30 MG tablet Take 250 mg by mouth daily      docusate sodium (COLACE) 100 MG capsule Take 100 mg by mouth daily      Famotidine (PEPCID AC PO) Take by mouth daily        No current facility-administered medications for this visit. Results for orders placed during the hospital encounter of 05/16/18   XR CHEST STANDARD (2 VW)    Narrative EXAMINATION: XR CHEST (2 VW)    CLINICAL HISTORY: 81-year-old with a history of chronic obstructive pulmonary disease and pulmonary bulla. He is status post right lung surgery    COMPARISONS: Chest x-ray 11/29/2017 and 1/6/2018    FINDINGS: Frontal and lateral views the chest were obtained. Surgical clips are again demonstrated in the infrahilar right lung region. There is right lung volume loss with an elevated right hemidiaphragm and blunting of the costophrenic angle. Differential considerations  include small pleural effusion versus pleural thickening. Patchy opacity is demonstrated in the right lung base which may reflect postoperative scarring.  However with this

## 2018-08-31 ENCOUNTER — HOSPITAL ENCOUNTER (OUTPATIENT)
Dept: SLEEP CENTER | Age: 78
Discharge: HOME | End: 2018-09-02
Payer: MEDICARE

## 2018-08-31 PROCEDURE — 95810 POLYSOM 6/> YRS 4/> PARAM: CPT

## 2018-09-04 ENCOUNTER — HOSPITAL ENCOUNTER (OUTPATIENT)
Dept: PULMONOLOGY | Age: 78
Discharge: HOME | End: 2018-09-04
Payer: MEDICARE

## 2018-09-04 DIAGNOSIS — J44.9 OBSTRUCTIVE CHRONIC BRONCHITIS WITHOUT EXACERBATION (HCC): Primary | ICD-10-CM

## 2018-09-04 DIAGNOSIS — Z01.818 PRE-OPERATIVE CLEARANCE: ICD-10-CM

## 2018-09-04 LAB
BASE EXCESS ARTERIAL: 3 (ref -3–3)
HCO3 ARTERIAL: 27.3 MMOL/L (ref 21–29)
LACTATE: 1.27 MMOL/L (ref 0.4–2)
O2 SAT, ARTERIAL: 97 % (ref 93–100)
PCO2 ARTERIAL: 41 MM HG (ref 35–45)
PERFORMED ON: ABNORMAL
PH ARTERIAL: 7.43 (ref 7.35–7.45)
PO2 ARTERIAL: 90 MM HG (ref 75–108)
POC FIO2: 21
POC SAMPLE TYPE: ABNORMAL
TCO2 ARTERIAL: 29 (ref 22–29)

## 2018-09-04 PROCEDURE — 94729 DIFFUSING CAPACITY: CPT | Performed by: INTERNAL MEDICINE

## 2018-09-04 PROCEDURE — 94060 EVALUATION OF WHEEZING: CPT | Performed by: INTERNAL MEDICINE

## 2018-09-04 PROCEDURE — 94726 PLETHYSMOGRAPHY LUNG VOLUMES: CPT | Performed by: INTERNAL MEDICINE

## 2018-09-04 PROCEDURE — 94726 PLETHYSMOGRAPHY LUNG VOLUMES: CPT

## 2018-09-04 PROCEDURE — 82803 BLOOD GASES ANY COMBINATION: CPT

## 2018-09-04 PROCEDURE — 94729 DIFFUSING CAPACITY: CPT

## 2018-09-04 PROCEDURE — 94060 EVALUATION OF WHEEZING: CPT

## 2018-09-04 PROCEDURE — 83605 ASSAY OF LACTIC ACID: CPT

## 2018-09-04 PROCEDURE — 36600 WITHDRAWAL OF ARTERIAL BLOOD: CPT

## 2018-09-04 PROCEDURE — 6360000002 HC RX W HCPCS: Performed by: INTERNAL MEDICINE

## 2018-09-04 RX ORDER — ALBUTEROL SULFATE 2.5 MG/3ML
2.5 SOLUTION RESPIRATORY (INHALATION) ONCE
Status: COMPLETED | OUTPATIENT
Start: 2018-09-04 | End: 2018-09-04

## 2018-09-04 RX ADMIN — ALBUTEROL SULFATE 2.5 MG: 2.5 SOLUTION RESPIRATORY (INHALATION) at 13:27

## 2018-09-05 ENCOUNTER — OFFICE VISIT (OUTPATIENT)
Dept: PULMONOLOGY | Age: 78
End: 2018-09-05
Payer: MEDICARE

## 2018-09-05 VITALS
HEART RATE: 67 BPM | BODY MASS INDEX: 31.21 KG/M2 | SYSTOLIC BLOOD PRESSURE: 116 MMHG | WEIGHT: 230.4 LBS | DIASTOLIC BLOOD PRESSURE: 80 MMHG | OXYGEN SATURATION: 97 % | TEMPERATURE: 98.9 F | HEIGHT: 72 IN

## 2018-09-05 DIAGNOSIS — R09.02 HYPOXEMIA: ICD-10-CM

## 2018-09-05 DIAGNOSIS — J44.9 CHRONIC OBSTRUCTIVE PULMONARY DISEASE, UNSPECIFIED COPD TYPE (HCC): ICD-10-CM

## 2018-09-05 DIAGNOSIS — Z01.811 ENCOUNTER FOR PRE-OPERATIVE RESPIRATORY CLEARANCE: Primary | ICD-10-CM

## 2018-09-05 DIAGNOSIS — E66.9 OBESITY (BMI 30-39.9): ICD-10-CM

## 2018-09-05 DIAGNOSIS — G47.33 OBSTRUCTIVE SLEEP APNEA SYNDROME: ICD-10-CM

## 2018-09-05 PROCEDURE — 1036F TOBACCO NON-USER: CPT | Performed by: INTERNAL MEDICINE

## 2018-09-05 PROCEDURE — G8427 DOCREV CUR MEDS BY ELIG CLIN: HCPCS | Performed by: INTERNAL MEDICINE

## 2018-09-05 PROCEDURE — 1101F PT FALLS ASSESS-DOCD LE1/YR: CPT | Performed by: INTERNAL MEDICINE

## 2018-09-05 PROCEDURE — G8417 CALC BMI ABV UP PARAM F/U: HCPCS | Performed by: INTERNAL MEDICINE

## 2018-09-05 PROCEDURE — 3023F SPIROM DOC REV: CPT | Performed by: INTERNAL MEDICINE

## 2018-09-05 PROCEDURE — 1123F ACP DISCUSS/DSCN MKR DOCD: CPT | Performed by: INTERNAL MEDICINE

## 2018-09-05 PROCEDURE — 4040F PNEUMOC VAC/ADMIN/RCVD: CPT | Performed by: INTERNAL MEDICINE

## 2018-09-05 PROCEDURE — G8926 SPIRO NO PERF OR DOC: HCPCS | Performed by: INTERNAL MEDICINE

## 2018-09-05 PROCEDURE — 99214 OFFICE O/P EST MOD 30 MIN: CPT | Performed by: INTERNAL MEDICINE

## 2018-09-05 ASSESSMENT — ENCOUNTER SYMPTOMS
COUGH: 0
RHINORRHEA: 0
EYE ITCHING: 0
SHORTNESS OF BREATH: 1
VOMITING: 0
DIARRHEA: 0
VOICE CHANGE: 0
NAUSEA: 0
SORE THROAT: 0
CHEST TIGHTNESS: 0
WHEEZING: 1
ABDOMINAL PAIN: 0

## 2018-09-05 NOTE — PROGRESS NOTES
Subjective:     Elizbeth Ahumada is a 69 y.o. male who complains today of:     Chief Complaint   Patient presents with    Follow-up     SS Resuts       HPI  Patient had sleep study, PFT and ABG done. He is going for right shoulder surgery in 2 weeks. He has some exertional shortness of breath. He is using 2 L O2 with sleep. He has no complaint of chest pain or pleuritic pain. He does have occasional wheezing and cough with white mucus. No fever or chills no nausea vomiting diarrhea or abdominal pain. He is Using bronchodilator with Symbicort, albuterol HFA , nebulizer with albuterol . He underwent for sleep study. Patient said he could not sleep well due to leg pain. He said he was using CPAP/BiPAP in past but currently not using it. Allergies:  Patient has no known allergies.   Past Medical History:   Diagnosis Date    Abnormality of gait and mobility     Acute sinusitis     Anxiety     Aspiration into respiratory tract 11/10/2017    Aspiration pneumonia (HCC)     BPH (benign prostatic hyperplasia)     COPD (chronic obstructive pulmonary disease) ([address] 12/5/2013    Debility     Elevated CK 12/30/2013    Foraminal stenosis of lumbosacral region 6/7/2016    GERD (gastroesophageal reflux disease) 12/11/2014    History of asthma 1/13/2014    HTN (hypertension) 1/13/2014    Hyperlipidemia     Hypothyroidism     Insomnia     Lower extremity weakness 1/24/2014    On home oxygen therapy     2L at night    Osteoarthritis of spine with radiculopathy, lumbar region 6/7/2016    Papillary thyroid carcinoma ([address] 12/9/2015    Positive D dimer 12/5/2013    Sleep apnea 1/24/2014    Type 2 diabetes mellitus ([address]     Vitamin D deficiency      Past Surgical History:   Procedure Laterality Date    BRONCHOSCOPY N/A 11/11/2017    BRONCHOSCOPY FIBEROPTIC performed by Sarah Harmon MD at [address] [facility] Right 11/14/2017    RIGHT THORACOTOMY WEDGE RESECTION FOR LUNG BASE   ]    Ref Range & Units 1d ago  (9/4/18) 9mo ago  (11/26/17) 9mo ago  (11/25/17) 9mo ago  (11/22/17)    pH, Arterial 7.350 - 7.450 7.429        pCO2, Arterial 35 - 45 mm Hg 41        pO2, Arterial 75 - 108 mm Hg 90         HCO3, Arterial 21.0 - 29.0 mmol/L 27.3        Base Excess, Arterial -3 - 3 3        O2 Sat, Arterial 93 - 100 % 97         TCO2, Arterial 22 - 29 29        Lactate 0.40 - 2.00 mmol/L 1.27        FIO2  21.000        Sample Type  ART        Performed on  SEE BELOW  ACCU-CHEK  ACCU-CHEK  ACCU-CHEK    Comment: Performed on POC   Sample Type: Arterial   Draw site: L Radial   Rosendo's test: Positive       PFT sow mild restriction     Assessment/Plan:     1. Encounter for pre-operative respiratory clearance  Patient had PFT done shows mild restriction and ABG shows pH 7.42 with PCO2 41 PO2 90 saturation 97% and bicarb of 27.3 on room air. He does have a sleep apnea. The chest x-ray shows scarring and past.  He  is moderate but acceptable risk. He is clear for surgery with moderate risk of postoperative pulmonary complication    2. Obstructive sleep apnea syndrome  Patient has sleep study done which shows severe sleep apnea the patient could not sleep well. He does not want to go to CPAP but is instead he will be started on auto CPAP 5-20 cm of H2O.    3. Chronic obstructive pulmonary disease, unspecified COPD type ([address]  PFT done final report is pending. He does have a shortness of breath and off-and-on wheezing. He will continue nebulizer with albuterol when necessary albuterol HFA when necessary and Symbicort 2 puff twice a day. 4. Hypoxemia  He is on 2 L O2 with sleep and he will continue to using O2 with CPAP if he is able to tolerate CPAP therapy    5. Obesity (BMI 30-39. 9)  Patient patient is advised try to lose weight. obesity related risk explained to the patient ,  Current weight:  230 lb 6.4 oz (104.5 kg) Lbs. BMI:  Body mass index is 31.25 kg/m².         Return in about 3

## 2018-09-09 NOTE — PROCEDURES
[address]                       [address], [address] [facility]                                PULMONARY FUNCTION    PATIENT NAME: Jostin Salazar                    :        1940  MED REC NO:   [id number]                            ROOM:  ACCOUNT NO:   [de-identified]                           ADMIT DATE: 2018  PROVIDER:     Shaunna Evans MD    DATE OF PROCEDURE:  2018    REASON FOR STUDY:  Cough, wheezing. INTERPRETATION:  FVC is 3.48, 77% of predicted. FEV1 is 2.87, 88% of  predicted. FEV1/FVC is 82%. FEF 25-75% is 3.27, 140% of predicted. Postbronchodilator study shows no improvement. Lung volume study shows  residual volume 2.47, 91% of predicted. TLC 5.87, 78% of predicted. Diffusion capacity is 18.92, 81% of predicted. Airway resistance is 0.09,  54% of predicted. SUMMARY:  Mild restrictive disease. There is no significant response to  bronchodilator. Static lung volume study suggests total lung capacity is  mildly impaired. Diffusion capacity is within normal range. Airway  resistance is low. ABG done on room air showing pH 7.4, PCO2 of 41, PO2 of  90, saturation 97, and bicarb 27.3. Clinical correlation is requested.       Hi Lopez MD    D: 2018 14:41:57       T: 2018 18:59:39     AM/MATT_RY  Job#: [id number]     Doc#: [id number]    CC:

## 2018-09-11 ENCOUNTER — HOSPITAL ENCOUNTER (OUTPATIENT)
Dept: PREADMISSION TESTING | Age: 78
Discharge: HOME | End: 2018-09-15
Payer: MEDICARE

## 2018-09-11 VITALS
HEART RATE: 70 BPM | BODY MASS INDEX: 32.34 KG/M2 | OXYGEN SATURATION: 95 % | WEIGHT: 231 LBS | DIASTOLIC BLOOD PRESSURE: 82 MMHG | TEMPERATURE: 97.6 F | SYSTOLIC BLOOD PRESSURE: 139 MMHG | HEIGHT: 71 IN | RESPIRATION RATE: 18 BRPM

## 2018-09-11 DIAGNOSIS — Z87.891 PAST USE OF TOBACCO: Chronic | ICD-10-CM

## 2018-09-11 PROBLEM — M19.011 OSTEOARTHRITIS OF RIGHT SHOULDER: Status: ACTIVE | Noted: 2018-09-11

## 2018-09-11 LAB
ANION GAP SERPL CALCULATED.3IONS-SCNC: 11 MEQ/L (ref 7–13)
BUN BLDV-MCNC: 14 MG/DL (ref 8–23)
CALCIUM SERPL-MCNC: 9 MG/DL (ref 8.6–10.2)
CHLORIDE BLD-SCNC: 104 MEQ/L (ref 98–107)
CO2: 26 MEQ/L (ref 22–29)
CREAT SERPL-MCNC: 0.98 MG/DL (ref 0.7–1.2)
EKG ATRIAL RATE: 68 BPM
EKG P AXIS: 34 DEGREES
EKG P-R INTERVAL: 178 MS
EKG Q-T INTERVAL: 420 MS
EKG QRS DURATION: 92 MS
EKG QTC CALCULATION (BAZETT): 446 MS
EKG R AXIS: -33 DEGREES
EKG T AXIS: 24 DEGREES
EKG VENTRICULAR RATE: 68 BPM
GFR AFRICAN AMERICAN: >60
GFR NON-AFRICAN AMERICAN: >60
GLUCOSE BLD-MCNC: 86 MG/DL (ref 74–109)
HCT VFR BLD CALC: 46.4 % (ref 42–52)
HEMOGLOBIN: 15.5 G/DL (ref 14–18)
INR BLD: 1.2
MCH RBC QN AUTO: 31.4 PG (ref 27–31.3)
MCHC RBC AUTO-ENTMCNC: 33.3 % (ref 33–37)
MCV RBC AUTO: 94.3 FL (ref 80–100)
PDW BLD-RTO: 13.9 % (ref 11.5–14.5)
PLATELET # BLD: 121 K/UL (ref 130–400)
POTASSIUM SERPL-SCNC: 4.4 MEQ/L (ref 3.5–5.1)
PROTHROMBIN TIME: 12.6 SEC (ref 9.6–12.3)
RBC # BLD: 4.92 M/UL (ref 4.7–6.1)
SODIUM BLD-SCNC: 141 MEQ/L (ref 132–144)
WBC # BLD: 6 K/UL (ref 4.8–10.8)

## 2018-09-11 PROCEDURE — 85610 PROTHROMBIN TIME: CPT

## 2018-09-11 PROCEDURE — 93005 ELECTROCARDIOGRAM TRACING: CPT

## 2018-09-11 PROCEDURE — 80048 BASIC METABOLIC PNL TOTAL CA: CPT

## 2018-09-11 PROCEDURE — 85027 COMPLETE CBC AUTOMATED: CPT

## 2018-09-11 RX ORDER — SODIUM CHLORIDE 0.9 % (FLUSH) 0.9 %
10 SYRINGE (ML) INJECTION PRN
Status: CANCELLED | OUTPATIENT
Start: 2018-09-11

## 2018-09-11 RX ORDER — SODIUM CHLORIDE 0.9 % (FLUSH) 0.9 %
10 SYRINGE (ML) INJECTION EVERY 12 HOURS SCHEDULED
Status: CANCELLED | OUTPATIENT
Start: 2018-09-11

## 2018-09-11 RX ORDER — CLONAZEPAM 0.5 MG/1
0.5 TABLET ORAL NIGHTLY PRN
COMMUNITY

## 2018-09-11 RX ORDER — PRAVASTATIN SODIUM 20 MG
20 TABLET ORAL EVERY EVENING
Status: ON HOLD | COMMUNITY
End: 2022-01-03 | Stop reason: HOSPADM

## 2018-09-11 RX ORDER — LIDOCAINE HYDROCHLORIDE 10 MG/ML
1 INJECTION, SOLUTION EPIDURAL; INFILTRATION; INTRACAUDAL; PERINEURAL
Status: CANCELLED | OUTPATIENT
Start: 2018-09-11 | End: 2018-09-11

## 2018-09-11 RX ORDER — SODIUM CHLORIDE, SODIUM LACTATE, POTASSIUM CHLORIDE, CALCIUM CHLORIDE 600; 310; 30; 20 MG/100ML; MG/100ML; MG/100ML; MG/100ML
INJECTION, SOLUTION INTRAVENOUS CONTINUOUS
Status: CANCELLED | OUTPATIENT
Start: 2018-09-11

## 2018-09-14 PROCEDURE — 93010 ELECTROCARDIOGRAM REPORT: CPT | Performed by: INTERNAL MEDICINE

## 2018-09-16 ENCOUNTER — HOSPITAL ENCOUNTER (OUTPATIENT)
Dept: SLEEP CENTER | Age: 78
Discharge: HOME | End: 2018-09-18
Payer: MEDICARE

## 2018-09-16 PROCEDURE — 95811 POLYSOM 6/>YRS CPAP 4/> PARM: CPT

## 2018-09-16 PROCEDURE — 95811 POLYSOM 6/>YRS CPAP 4/> PARM: CPT | Performed by: INTERNAL MEDICINE

## 2018-09-17 ENCOUNTER — ANESTHESIA EVENT (OUTPATIENT)
Dept: OPERATING ROOM | Age: 78
End: 2018-09-17
Payer: MEDICARE

## 2018-09-17 NOTE — ANESTHESIA PRE PROCEDURE
Department of Anesthesiology  Preprocedure Note       Name:  Jeaneth Sharma   Age:  69 y.o.  :  1940                                          MRN:  [id number]         Date:  2018      Surgeon: Pina Acosta):  Paulina Edmonds MD    Procedure: Procedure(s):  RIGHT SHOULDER ARTHROSCOPY SUBACROMIAL DECOMPRESS WITH POSSIBLE BICEPS TENODESIS, 23 HR OBS/BEDDED OUTPT, ARTHROSCOPIC FRANSISCA C-ARM, ARTHREX BICEPS TENODESIS TRAY, ARTHREX BICEPS BUTTON, CARMEN FRAME BEACH CHAIR    CASE #1 1 HOUR    Medications prior to admission:   Prior to Admission medications    Medication Sig Start Date End Date Taking? Authorizing Provider   pravastatin (PRAVACHOL) 20 MG tablet Take 20 mg by mouth every evening    Historical Provider, MD   clonazePAM (KLONOPIN) 0.5 MG tablet Take 0.5 mg by mouth nightly as needed. Elana Guadalupe     Historical Provider, MD   CPAP Machine MISC by Does not apply route Auto CPAP  5-20 cm 18   Santo Wilcox MD   acetaminophen (TYLENOL) 500 MG tablet Take 500 mg by mouth every 6 hours as needed for Pain    Historical Provider, MD   albuterol (ACCUNEB) 1.25 MG/3ML nebulizer solution Inhale 1 ampule into the lungs every 6 hours as needed    Historical Provider, MD   rivaroxaban (XARELTO) 15 MG TABS tablet Take 1 tablet by mouth 2 times daily (with meals) for 25 doses 18  Mary Booker MD   rivaroxaban (XARELTO) 20 MG TABS tablet Take 1 tablet by mouth daily (with breakfast) Starting on 2018   Mary Booker MD   Cholecalciferol (VITAMIN D3) 2000 units CAPS Take 2,000 Units by mouth daily    Historical Provider, MD   albuterol sulfate  (90 Base) MCG/ACT inhaler Inhale 2 puffs into the lungs every 4 hours as needed for Wheezing    Historical Provider, MD   melatonin 3 MG TABS tablet Take 10 mg by mouth nightly as needed    Historical Provider, MD   magnesium 30 MG tablet Take 250 mg by mouth daily    Historical Provider, MD   docusate sodium (COLACE) 100 MG capsule Take 100 mg by spine with radiculopathy, 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    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    Vitamin D deficiency E55.9    Debility R53.81    On home oxygen therapy Z99.81    Hypoxemia R09.02    Bilateral pulmonary embolism (HCC) I26.99    Abnormality of gait and mobility due to hypoxemia secondary to bilateral pulmonary emboli, [facility] admit 01/12/18  R26.9    Sleep apnea G47.30    Current use of long term anticoagulation Z79.01    Pre-operative clearance Z01.818    Osteoarthritis of right shoulder M19.011    Past use of tobacco Z87.891       Past Medical History:        Diagnosis Date    Abnormality of gait and mobility     Acute sinusitis     Anxiety     Aspiration into respiratory tract 11/10/2017    Aspiration pneumonia (HCC)     BPH (benign prostatic hyperplasia)     COPD (chronic obstructive pulmonary disease) ([address] 12/5/2013    Debility     Elevated CK 12/30/2013    Foraminal stenosis of lumbosacral region 6/7/2016    GERD (gastroesophageal reflux disease) 12/11/2014    History of asthma 1/13/2014    HTN (hypertension) 1/13/2014    past braden / no current meds    Hx of blood clots 01/2018    DVT  ( unsure which leg but both were swollen ) -> PE -> thus Xarelto    Hyperlipidemia     meds > 10 yrs    Hypothyroidism     Insomnia     Lower extremity weakness 1/24/2014    On home oxygen therapy     2L at night    Osteoarthritis of spine with radiculopathy, lumbar region 6/7/2016    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    no trx to follow    Positive D dimer 12/5/2013    Sleep apnea 1/24/2014    Type 2 diabetes mellitus ([address]     diet control  / no meds    Vitamin D deficiency        Past Surgical History:        Procedure Laterality Date    BRONCHOSCOPY N/A 11/11/2017    BRONCHOSCOPY FIBEROPTIC performed by Juan Manuel Haynes MD at [address] [place] [address] COLONOSCOPY      ENDOSCOPY, COLON, DIAGNOSTIC      EYE SURGERY      phaco with IOL OU    HERNIA REPAIR  1990s    repair mitral valve stenosis.   No evidence of aortic valve stenosis.   No evidence of aortic valve regurgitation .   There is mild tricuspid regurgitation with estimated RVSP of 62 mm Hg.   Right ventricular systolic pressure of 62 mm Hg consistent with moderate   pulmonary hypertension.   Normal right ventricular chamber size and function. Neuro/Psych:   (+) neuromuscular disease:, psychiatric history:depression/anxiety             GI/Hepatic/Renal:   (+) GERD:,           Endo/Other:    (+) DiabetesType II DM, , hypothyroidism, blood dyscrasia: anticoagulation therapy, arthritis: OA., malignancy/cancer. Pt had PAT visit. Abdominal:           Vascular:   + DVT, . Anesthesia Plan      general and regional     ASA 3     (ETT  Interscalene nerve block with US consented to be done post op  Patient fell on way into hospital today. Seen and treated in ED. No LOC. Laceration above eye closed)  Induction: intravenous. MIPS: Postoperative opioids intended and Prophylactic antiemetics administered. Anesthetic plan and risks discussed with patient. Plan discussed with CRNA.     Attending anesthesiologist reviewed and agrees with [address] DO   9/18/2018

## 2018-09-18 ENCOUNTER — HOSPITAL ENCOUNTER (EMERGENCY)
Age: 78
Discharge: HOME | End: 2018-09-18
Attending: ORTHOPAEDIC SURGERY | Admitting: ORTHOPAEDIC SURGERY
Payer: MEDICARE

## 2018-09-18 ENCOUNTER — ANESTHESIA (OUTPATIENT)
Dept: OPERATING ROOM | Age: 78
End: 2018-09-18
Payer: MEDICARE

## 2018-09-18 ENCOUNTER — HOSPITAL ENCOUNTER (OUTPATIENT)
Age: 78
Discharge: NURSING, REHAB OR LONG-TERM CARE | End: 2018-09-19
Attending: ORTHOPAEDIC SURGERY | Admitting: ORTHOPAEDIC SURGERY
Payer: MEDICARE

## 2018-09-18 ENCOUNTER — HOSPITAL ENCOUNTER (OUTPATIENT)
Dept: GENERAL RADIOLOGY | Age: 78
Discharge: HOME | End: 2018-09-20
Payer: MEDICARE

## 2018-09-18 VITALS — TEMPERATURE: 95.9 F | SYSTOLIC BLOOD PRESSURE: 109 MMHG | OXYGEN SATURATION: 96 % | DIASTOLIC BLOOD PRESSURE: 59 MMHG

## 2018-09-18 VITALS
DIASTOLIC BLOOD PRESSURE: 102 MMHG | RESPIRATION RATE: 18 BRPM | TEMPERATURE: 97.6 F | OXYGEN SATURATION: 97 % | WEIGHT: 230 LBS | BODY MASS INDEX: 32.54 KG/M2 | HEART RATE: 80 BPM | SYSTOLIC BLOOD PRESSURE: 150 MMHG

## 2018-09-18 DIAGNOSIS — S01.81XA FOREHEAD LACERATION, INITIAL ENCOUNTER: ICD-10-CM

## 2018-09-18 DIAGNOSIS — R52 PAIN: ICD-10-CM

## 2018-09-18 DIAGNOSIS — S09.90XA CLOSED HEAD INJURY, INITIAL ENCOUNTER: Primary | ICD-10-CM

## 2018-09-18 PROBLEM — M19.011 ARTHRITIS OF RIGHT SHOULDER REGION: Status: ACTIVE | Noted: 2018-09-18

## 2018-09-18 LAB
GLUCOSE BLD-MCNC: 129 MG/DL (ref 60–115)
GLUCOSE BLD-MCNC: 86 MG/DL
GLUCOSE BLD-MCNC: 86 MG/DL (ref 60–115)
PERFORMED ON: ABNORMAL
PERFORMED ON: NORMAL

## 2018-09-18 PROCEDURE — 6360000002 HC RX W HCPCS: Performed by: STUDENT IN AN ORGANIZED HEALTH CARE EDUCATION/TRAINING PROGRAM

## 2018-09-18 PROCEDURE — 94150 VITAL CAPACITY TEST: CPT

## 2018-09-18 PROCEDURE — 2580000003 HC RX 258: Performed by: ORTHOPAEDIC SURGERY

## 2018-09-18 PROCEDURE — C1713 ANCHOR/SCREW BN/BN,TIS/BN: HCPCS | Performed by: ORTHOPAEDIC SURGERY

## 2018-09-18 PROCEDURE — C1776 JOINT DEVICE (IMPLANTABLE): HCPCS | Performed by: ORTHOPAEDIC SURGERY

## 2018-09-18 PROCEDURE — 3700000000 HC ANESTHESIA ATTENDED CARE: Performed by: ORTHOPAEDIC SURGERY

## 2018-09-18 PROCEDURE — 6370000000 HC RX 637 (ALT 250 FOR IP): Performed by: ORTHOPAEDIC SURGERY

## 2018-09-18 PROCEDURE — G0378 HOSPITAL OBSERVATION PER HR: HCPCS

## 2018-09-18 PROCEDURE — 3209999900 FLUORO FOR SURGICAL PROCEDURES

## 2018-09-18 PROCEDURE — 6370000000 HC RX 637 (ALT 250 FOR IP): Performed by: EMERGENCY MEDICINE

## 2018-09-18 PROCEDURE — 2500000003 HC RX 250 WO HCPCS: Performed by: NURSE PRACTITIONER

## 2018-09-18 PROCEDURE — 7100000001 HC PACU RECOVERY - ADDTL 15 MIN: Performed by: ORTHOPAEDIC SURGERY

## 2018-09-18 PROCEDURE — 3600000004 HC SURGERY LEVEL 4 BASE: Performed by: ORTHOPAEDIC SURGERY

## 2018-09-18 PROCEDURE — 6360000002 HC RX W HCPCS: Performed by: NURSE ANESTHETIST, CERTIFIED REGISTERED

## 2018-09-18 PROCEDURE — 94640 AIRWAY INHALATION TREATMENT: CPT

## 2018-09-18 PROCEDURE — 3700000001 HC ADD 15 MINUTES (ANESTHESIA): Performed by: ORTHOPAEDIC SURGERY

## 2018-09-18 PROCEDURE — 2500000003 HC RX 250 WO HCPCS: Performed by: NURSE ANESTHETIST, CERTIFIED REGISTERED

## 2018-09-18 PROCEDURE — A4648 IMPLANTABLE TISSUE MARKER: HCPCS | Performed by: ORTHOPAEDIC SURGERY

## 2018-09-18 PROCEDURE — 6360000002 HC RX W HCPCS: Performed by: NURSE PRACTITIONER

## 2018-09-18 PROCEDURE — 6360000002 HC RX W HCPCS: Performed by: ORTHOPAEDIC SURGERY

## 2018-09-18 PROCEDURE — 6370000000 HC RX 637 (ALT 250 FOR IP): Performed by: NURSE PRACTITIONER

## 2018-09-18 PROCEDURE — 3600000014 HC SURGERY LEVEL 4 ADDTL 15MIN: Performed by: ORTHOPAEDIC SURGERY

## 2018-09-18 PROCEDURE — 2720000010 HC SURG SUPPLY STERILE: Performed by: ORTHOPAEDIC SURGERY

## 2018-09-18 PROCEDURE — 7100000000 HC PACU RECOVERY - FIRST 15 MIN: Performed by: ORTHOPAEDIC SURGERY

## 2018-09-18 PROCEDURE — 99282 EMERGENCY DEPT VISIT SF MDM: CPT

## 2018-09-18 PROCEDURE — 2709999900 HC NON-CHARGEABLE SUPPLY: Performed by: ORTHOPAEDIC SURGERY

## 2018-09-18 PROCEDURE — 2580000003 HC RX 258: Performed by: NURSE ANESTHETIST, CERTIFIED REGISTERED

## 2018-09-18 PROCEDURE — 12011 RPR F/E/E/N/L/M 2.5 CM/<: CPT

## 2018-09-18 PROCEDURE — 2580000003 HC RX 258: Performed by: NURSE PRACTITIONER

## 2018-09-18 DEVICE — BUTTON SUT L12MM DIA2.6MM TI FOR TENS SLDE TECH DST BICEPS: Type: IMPLANTABLE DEVICE | Status: FUNCTIONAL

## 2018-09-18 RX ORDER — HYDROCODONE BITARTRATE AND ACETAMINOPHEN 5; 325 MG/1; MG/1
1 TABLET ORAL PRN
Status: DISCONTINUED | OUTPATIENT
Start: 2018-09-18 | End: 2018-09-18 | Stop reason: HOSPADM

## 2018-09-18 RX ORDER — SODIUM CHLORIDE 0.9 % (FLUSH) 0.9 %
10 SYRINGE (ML) INJECTION PRN
Status: DISCONTINUED | OUTPATIENT
Start: 2018-09-18 | End: 2018-09-18 | Stop reason: HOSPADM

## 2018-09-18 RX ORDER — ROCURONIUM BROMIDE 10 MG/ML
INJECTION, SOLUTION INTRAVENOUS PRN
Status: DISCONTINUED | OUTPATIENT
Start: 2018-09-18 | End: 2018-09-18 | Stop reason: SDUPTHER

## 2018-09-18 RX ORDER — PROPOFOL 10 MG/ML
INJECTION, EMULSION INTRAVENOUS PRN
Status: DISCONTINUED | OUTPATIENT
Start: 2018-09-18 | End: 2018-09-18 | Stop reason: SDUPTHER

## 2018-09-18 RX ORDER — ACETAMINOPHEN 325 MG/1
650 TABLET ORAL EVERY 4 HOURS PRN
Status: DISCONTINUED | OUTPATIENT
Start: 2018-09-18 | End: 2018-09-19 | Stop reason: HOSPADM

## 2018-09-18 RX ORDER — GLYCOPYRROLATE 1 MG/5 ML
SYRINGE (ML) INTRAVENOUS PRN
Status: DISCONTINUED | OUTPATIENT
Start: 2018-09-18 | End: 2018-09-18 | Stop reason: SDUPTHER

## 2018-09-18 RX ORDER — MORPHINE SULFATE 2 MG/ML
2 INJECTION, SOLUTION INTRAMUSCULAR; INTRAVENOUS
Status: DISCONTINUED | OUTPATIENT
Start: 2018-09-18 | End: 2018-09-19

## 2018-09-18 RX ORDER — SENNA AND DOCUSATE SODIUM 50; 8.6 MG/1; MG/1
1 TABLET, FILM COATED ORAL DAILY
Status: DISCONTINUED | OUTPATIENT
Start: 2018-09-18 | End: 2018-09-19 | Stop reason: HOSPADM

## 2018-09-18 RX ORDER — MEPERIDINE HYDROCHLORIDE 25 MG/ML
12.5 INJECTION INTRAMUSCULAR; INTRAVENOUS; SUBCUTANEOUS EVERY 5 MIN PRN
Status: DISCONTINUED | OUTPATIENT
Start: 2018-09-18 | End: 2018-09-18 | Stop reason: HOSPADM

## 2018-09-18 RX ORDER — SODIUM CHLORIDE, SODIUM LACTATE, POTASSIUM CHLORIDE, CALCIUM CHLORIDE 600; 310; 30; 20 MG/100ML; MG/100ML; MG/100ML; MG/100ML
INJECTION, SOLUTION INTRAVENOUS CONTINUOUS
Status: DISCONTINUED | OUTPATIENT
Start: 2018-09-18 | End: 2018-09-18

## 2018-09-18 RX ORDER — MORPHINE SULFATE 4 MG/ML
4 INJECTION, SOLUTION INTRAMUSCULAR; INTRAVENOUS
Status: DISCONTINUED | OUTPATIENT
Start: 2018-09-18 | End: 2018-09-19

## 2018-09-18 RX ORDER — SODIUM CHLORIDE 9 MG/ML
INJECTION, SOLUTION INTRAVENOUS CONTINUOUS
Status: DISCONTINUED | OUTPATIENT
Start: 2018-09-18 | End: 2018-09-19 | Stop reason: HOSPADM

## 2018-09-18 RX ORDER — METOCLOPRAMIDE HYDROCHLORIDE 5 MG/ML
10 INJECTION INTRAMUSCULAR; INTRAVENOUS
Status: DISCONTINUED | OUTPATIENT
Start: 2018-09-18 | End: 2018-09-18 | Stop reason: HOSPADM

## 2018-09-18 RX ORDER — TAMSULOSIN HYDROCHLORIDE 0.4 MG/1
0.4 CAPSULE ORAL NIGHTLY
Status: DISCONTINUED | OUTPATIENT
Start: 2018-09-18 | End: 2018-09-19 | Stop reason: HOSPADM

## 2018-09-18 RX ORDER — HYDRALAZINE HYDROCHLORIDE 25 MG/1
25 TABLET, FILM COATED ORAL EVERY 6 HOURS PRN
Status: DISCONTINUED | OUTPATIENT
Start: 2018-09-18 | End: 2018-09-19 | Stop reason: HOSPADM

## 2018-09-18 RX ORDER — HYDROCODONE BITARTRATE AND ACETAMINOPHEN 5; 325 MG/1; MG/1
2 TABLET ORAL PRN
Status: DISCONTINUED | OUTPATIENT
Start: 2018-09-18 | End: 2018-09-18 | Stop reason: HOSPADM

## 2018-09-18 RX ORDER — DIPHENHYDRAMINE HYDROCHLORIDE 50 MG/ML
12.5 INJECTION INTRAMUSCULAR; INTRAVENOUS
Status: DISCONTINUED | OUTPATIENT
Start: 2018-09-18 | End: 2018-09-18 | Stop reason: HOSPADM

## 2018-09-18 RX ORDER — FENTANYL CITRATE 50 UG/ML
50 INJECTION, SOLUTION INTRAMUSCULAR; INTRAVENOUS EVERY 10 MIN PRN
Status: DISCONTINUED | OUTPATIENT
Start: 2018-09-18 | End: 2018-09-18 | Stop reason: HOSPADM

## 2018-09-18 RX ORDER — SODIUM CHLORIDE 0.9 % (FLUSH) 0.9 %
10 SYRINGE (ML) INJECTION EVERY 12 HOURS SCHEDULED
Status: DISCONTINUED | OUTPATIENT
Start: 2018-09-18 | End: 2018-09-19 | Stop reason: HOSPADM

## 2018-09-18 RX ORDER — ONDANSETRON 2 MG/ML
4 INJECTION INTRAMUSCULAR; INTRAVENOUS EVERY 6 HOURS PRN
Status: DISCONTINUED | OUTPATIENT
Start: 2018-09-18 | End: 2018-09-19 | Stop reason: HOSPADM

## 2018-09-18 RX ORDER — SUCCINYLCHOLINE/SOD CL,ISO/PF 100 MG/5ML
SYRINGE (ML) INTRAVENOUS PRN
Status: DISCONTINUED | OUTPATIENT
Start: 2018-09-18 | End: 2018-09-18 | Stop reason: SDUPTHER

## 2018-09-18 RX ORDER — SODIUM CHLORIDE 0.9 % (FLUSH) 0.9 %
10 SYRINGE (ML) INJECTION PRN
Status: DISCONTINUED | OUTPATIENT
Start: 2018-09-18 | End: 2018-09-19 | Stop reason: HOSPADM

## 2018-09-18 RX ORDER — SODIUM CHLORIDE 0.9 % (FLUSH) 0.9 %
10 SYRINGE (ML) INJECTION EVERY 12 HOURS SCHEDULED
Status: DISCONTINUED | OUTPATIENT
Start: 2018-09-18 | End: 2018-09-18 | Stop reason: HOSPADM

## 2018-09-18 RX ORDER — DOCUSATE SODIUM 100 MG/1
100 CAPSULE, LIQUID FILLED ORAL 2 TIMES DAILY
Status: DISCONTINUED | OUTPATIENT
Start: 2018-09-18 | End: 2018-09-19 | Stop reason: HOSPADM

## 2018-09-18 RX ORDER — ALBUTEROL SULFATE 90 UG/1
2 AEROSOL, METERED RESPIRATORY (INHALATION) EVERY 4 HOURS PRN
Status: DISCONTINUED | OUTPATIENT
Start: 2018-09-18 | End: 2018-09-19 | Stop reason: HOSPADM

## 2018-09-18 RX ORDER — LIDOCAINE HYDROCHLORIDE 10 MG/ML
1 INJECTION, SOLUTION EPIDURAL; INFILTRATION; INTRACAUDAL; PERINEURAL
Status: COMPLETED | OUTPATIENT
Start: 2018-09-18 | End: 2018-09-18

## 2018-09-18 RX ORDER — FINASTERIDE 5 MG/1
5 TABLET, FILM COATED ORAL DAILY
Status: DISCONTINUED | OUTPATIENT
Start: 2018-09-18 | End: 2018-09-19 | Stop reason: HOSPADM

## 2018-09-18 RX ORDER — LIDOCAINE HYDROCHLORIDE 20 MG/ML
INJECTION, SOLUTION INFILTRATION; PERINEURAL PRN
Status: DISCONTINUED | OUTPATIENT
Start: 2018-09-18 | End: 2018-09-18 | Stop reason: SDUPTHER

## 2018-09-18 RX ORDER — MIRTAZAPINE 15 MG/1
15 TABLET, FILM COATED ORAL NIGHTLY
Status: DISCONTINUED | OUTPATIENT
Start: 2018-09-18 | End: 2018-09-19 | Stop reason: HOSPADM

## 2018-09-18 RX ORDER — FENTANYL CITRATE 50 UG/ML
INJECTION, SOLUTION INTRAMUSCULAR; INTRAVENOUS PRN
Status: DISCONTINUED | OUTPATIENT
Start: 2018-09-18 | End: 2018-09-18 | Stop reason: SDUPTHER

## 2018-09-18 RX ORDER — OXYCODONE HYDROCHLORIDE AND ACETAMINOPHEN 5; 325 MG/1; MG/1
1 TABLET ORAL EVERY 4 HOURS PRN
Status: DISCONTINUED | OUTPATIENT
Start: 2018-09-18 | End: 2018-09-19 | Stop reason: HOSPADM

## 2018-09-18 RX ORDER — PRAVASTATIN SODIUM 20 MG
20 TABLET ORAL EVERY EVENING
Status: DISCONTINUED | OUTPATIENT
Start: 2018-09-18 | End: 2018-09-19 | Stop reason: HOSPADM

## 2018-09-18 RX ORDER — ONDANSETRON 2 MG/ML
4 INJECTION INTRAMUSCULAR; INTRAVENOUS
Status: DISCONTINUED | OUTPATIENT
Start: 2018-09-18 | End: 2018-09-18 | Stop reason: HOSPADM

## 2018-09-18 RX ORDER — ONDANSETRON 2 MG/ML
INJECTION INTRAMUSCULAR; INTRAVENOUS PRN
Status: DISCONTINUED | OUTPATIENT
Start: 2018-09-18 | End: 2018-09-18 | Stop reason: SDUPTHER

## 2018-09-18 RX ORDER — LIDOCAINE HYDROCHLORIDE 10 MG/ML
INJECTION, SOLUTION EPIDURAL; INFILTRATION; INTRACAUDAL; PERINEURAL
Status: DISPENSED
Start: 2018-09-18 | End: 2018-09-18

## 2018-09-18 RX ORDER — MAGNESIUM HYDROXIDE 1200 MG/15ML
LIQUID ORAL CONTINUOUS PRN
Status: COMPLETED | OUTPATIENT
Start: 2018-09-18 | End: 2018-09-18

## 2018-09-18 RX ORDER — ALBUTEROL SULFATE 1.25 MG/3ML
1.25 SOLUTION RESPIRATORY (INHALATION) EVERY 6 HOURS PRN
Status: DISCONTINUED | OUTPATIENT
Start: 2018-09-18 | End: 2018-09-19 | Stop reason: HOSPADM

## 2018-09-18 RX ORDER — DEXAMETHASONE SODIUM PHOSPHATE 4 MG/ML
INJECTION, SOLUTION INTRA-ARTICULAR; INTRALESIONAL; INTRAMUSCULAR; INTRAVENOUS; SOFT TISSUE PRN
Status: DISCONTINUED | OUTPATIENT
Start: 2018-09-18 | End: 2018-09-18 | Stop reason: SDUPTHER

## 2018-09-18 RX ORDER — AMLODIPINE BESYLATE 2.5 MG/1
2.5 TABLET ORAL DAILY
Status: DISCONTINUED | OUTPATIENT
Start: 2018-09-18 | End: 2018-09-19 | Stop reason: HOSPADM

## 2018-09-18 RX ORDER — LIDOCAINE HYDROCHLORIDE 10 MG/ML
INJECTION, SOLUTION EPIDURAL; INFILTRATION; INTRACAUDAL; PERINEURAL
Status: DISCONTINUED
Start: 2018-09-18 | End: 2018-09-18 | Stop reason: WASHOUT

## 2018-09-18 RX ORDER — OXYCODONE HYDROCHLORIDE AND ACETAMINOPHEN 5; 325 MG/1; MG/1
2 TABLET ORAL EVERY 4 HOURS PRN
Status: DISCONTINUED | OUTPATIENT
Start: 2018-09-18 | End: 2018-09-19

## 2018-09-18 RX ADMIN — SUGAMMADEX 200 MG: 100 INJECTION, SOLUTION INTRAVENOUS at 10:27

## 2018-09-18 RX ADMIN — FENTANYL CITRATE 50 MCG: 50 INJECTION, SOLUTION INTRAMUSCULAR; INTRAVENOUS at 07:54

## 2018-09-18 RX ADMIN — ROCURONIUM BROMIDE 50 MG: 10 INJECTION INTRAVENOUS at 07:58

## 2018-09-18 RX ADMIN — PHENYLEPHRINE HYDROCHLORIDE 100 MCG: 10 INJECTION INTRAVENOUS at 07:59

## 2018-09-18 RX ADMIN — PHENYLEPHRINE HYDROCHLORIDE 100 MCG: 10 INJECTION INTRAVENOUS at 09:00

## 2018-09-18 RX ADMIN — Medication 0.2 MG: at 09:21

## 2018-09-18 RX ADMIN — HYDROMORPHONE HYDROCHLORIDE 0.5 MG: 1 INJECTION, SOLUTION INTRAMUSCULAR; INTRAVENOUS; SUBCUTANEOUS at 11:30

## 2018-09-18 RX ADMIN — SODIUM CHLORIDE, POTASSIUM CHLORIDE, SODIUM LACTATE AND CALCIUM CHLORIDE: 600; 310; 30; 20 INJECTION, SOLUTION INTRAVENOUS at 12:00

## 2018-09-18 RX ADMIN — OXYCODONE HYDROCHLORIDE AND ACETAMINOPHEN 1 TABLET: 5; 325 TABLET ORAL at 13:30

## 2018-09-18 RX ADMIN — DEXAMETHASONE SODIUM PHOSPHATE 4 MG: 4 INJECTION, SOLUTION INTRAMUSCULAR; INTRAVENOUS at 08:30

## 2018-09-18 RX ADMIN — DOCUSATE SODIUM 100 MG: 100 CAPSULE, LIQUID FILLED ORAL at 18:09

## 2018-09-18 RX ADMIN — Medication 2 PUFF: at 20:26

## 2018-09-18 RX ADMIN — FENTANYL CITRATE 50 MCG: 50 INJECTION, SOLUTION INTRAMUSCULAR; INTRAVENOUS at 10:35

## 2018-09-18 RX ADMIN — ONDANSETRON HYDROCHLORIDE 4 MG: 2 INJECTION, SOLUTION INTRAMUSCULAR; INTRAVENOUS at 10:25

## 2018-09-18 RX ADMIN — PHENYLEPHRINE HYDROCHLORIDE 100 MCG: 10 INJECTION INTRAVENOUS at 10:02

## 2018-09-18 RX ADMIN — PHENYLEPHRINE HYDROCHLORIDE 50 MCG: 10 INJECTION INTRAVENOUS at 09:55

## 2018-09-18 RX ADMIN — PHENYLEPHRINE HYDROCHLORIDE 50 MCG: 10 INJECTION INTRAVENOUS at 08:14

## 2018-09-18 RX ADMIN — FENTANYL CITRATE 50 MCG: 50 INJECTION, SOLUTION INTRAMUSCULAR; INTRAVENOUS at 09:27

## 2018-09-18 RX ADMIN — Medication 1 EACH: at 06:11

## 2018-09-18 RX ADMIN — PROPOFOL 150 MG: 10 INJECTION, EMULSION INTRAVENOUS at 07:54

## 2018-09-18 RX ADMIN — FINASTERIDE 5 MG: 5 TABLET, FILM COATED ORAL at 20:43

## 2018-09-18 RX ADMIN — Medication 2 G: at 17:38

## 2018-09-18 RX ADMIN — SODIUM CHLORIDE: 9 INJECTION, SOLUTION INTRAVENOUS at 18:22

## 2018-09-18 RX ADMIN — LIDOCAINE HYDROCHLORIDE 40 MG: 20 INJECTION, SOLUTION INFILTRATION; PERINEURAL at 07:54

## 2018-09-18 RX ADMIN — FENTANYL CITRATE 50 MCG: 50 INJECTION, SOLUTION INTRAMUSCULAR; INTRAVENOUS at 08:36

## 2018-09-18 RX ADMIN — LIDOCAINE HYDROCHLORIDE 1 ML: 10 INJECTION, SOLUTION EPIDURAL; INFILTRATION; INTRACAUDAL; PERINEURAL at 06:55

## 2018-09-18 RX ADMIN — Medication 2 G: at 07:44

## 2018-09-18 RX ADMIN — MIRTAZAPINE 15 MG: 15 TABLET, FILM COATED ORAL at 20:43

## 2018-09-18 RX ADMIN — PRAVASTATIN SODIUM 20 MG: 20 TABLET ORAL at 20:43

## 2018-09-18 RX ADMIN — PHENYLEPHRINE HYDROCHLORIDE 100 MCG: 10 INJECTION INTRAVENOUS at 09:39

## 2018-09-18 RX ADMIN — HYDROMORPHONE HYDROCHLORIDE 0.5 MG: 1 INJECTION, SOLUTION INTRAMUSCULAR; INTRAVENOUS; SUBCUTANEOUS at 11:45

## 2018-09-18 RX ADMIN — SENNOSIDES AND DOCUSATE SODIUM 1 TABLET: 8.6; 5 TABLET ORAL at 18:09

## 2018-09-18 RX ADMIN — PHENYLEPHRINE HYDROCHLORIDE 30 MCG/MIN: 10 INJECTION INTRAVENOUS at 08:06

## 2018-09-18 RX ADMIN — SODIUM CHLORIDE: 9 INJECTION, SOLUTION INTRAVENOUS at 18:08

## 2018-09-18 RX ADMIN — TAMSULOSIN HYDROCHLORIDE 0.4 MG: 0.4 CAPSULE ORAL at 20:43

## 2018-09-18 RX ADMIN — ASPIRIN 325 MG: 325 TABLET, DELAYED RELEASE ORAL at 18:09

## 2018-09-18 RX ADMIN — PHENYLEPHRINE HYDROCHLORIDE 200 MCG: 10 INJECTION INTRAVENOUS at 08:05

## 2018-09-18 RX ADMIN — SODIUM CHLORIDE, POTASSIUM CHLORIDE, SODIUM LACTATE AND CALCIUM CHLORIDE 1000 ML: 600; 310; 30; 20 INJECTION, SOLUTION INTRAVENOUS at 06:55

## 2018-09-18 RX ADMIN — Medication 100 MG: at 07:54

## 2018-09-18 ASSESSMENT — PULMONARY FUNCTION TESTS
PIF_VALUE: 14
PIF_VALUE: 15
PIF_VALUE: 14
PIF_VALUE: 15
PIF_VALUE: 14
PIF_VALUE: 14
PIF_VALUE: 15
PIF_VALUE: 14
PIF_VALUE: 15
PIF_VALUE: 20
PIF_VALUE: 15
PIF_VALUE: 15
PIF_VALUE: 2
PIF_VALUE: 14
PIF_VALUE: 14
PIF_VALUE: 15
PIF_VALUE: 14
PIF_VALUE: 12
PIF_VALUE: 15
PIF_VALUE: 13
PIF_VALUE: 2
PIF_VALUE: 15
PIF_VALUE: 13
PIF_VALUE: 15
PIF_VALUE: 14
PIF_VALUE: 2
PIF_VALUE: 18
PIF_VALUE: 15
PIF_VALUE: 0
PIF_VALUE: 15
PIF_VALUE: 15
PIF_VALUE: 2
PIF_VALUE: 14
PIF_VALUE: 14
PIF_VALUE: 15
PIF_VALUE: 2
PIF_VALUE: 15
PIF_VALUE: 5
PIF_VALUE: 15
PIF_VALUE: 14
PIF_VALUE: 3
PIF_VALUE: 14
PIF_VALUE: 15
PIF_VALUE: 15
PIF_VALUE: 14
PIF_VALUE: 21
PIF_VALUE: 15
PIF_VALUE: 14
PIF_VALUE: 2
PIF_VALUE: 2
PIF_VALUE: 14
PIF_VALUE: 2
PIF_VALUE: 14
PIF_VALUE: 15
PIF_VALUE: 15
PIF_VALUE: 14
PIF_VALUE: 4
PIF_VALUE: 15
PIF_VALUE: 12
PIF_VALUE: 14
PIF_VALUE: 16
PIF_VALUE: 11
PIF_VALUE: 14
PIF_VALUE: 0
PIF_VALUE: 15
PIF_VALUE: 14
PIF_VALUE: 12
PIF_VALUE: 15
PIF_VALUE: 14
PIF_VALUE: 14
PIF_VALUE: 15
PIF_VALUE: 15
PIF_VALUE: 14
PIF_VALUE: 15
PIF_VALUE: 12
PIF_VALUE: 15
PIF_VALUE: 16
PIF_VALUE: 14
PIF_VALUE: 14
PIF_VALUE: 15
PIF_VALUE: 14
PIF_VALUE: 19
PIF_VALUE: 14
PIF_VALUE: 4
PIF_VALUE: 15
PIF_VALUE: 1
PIF_VALUE: 14
PIF_VALUE: 15
PIF_VALUE: 14
PIF_VALUE: 15
PIF_VALUE: 14
PIF_VALUE: 15
PIF_VALUE: 0
PIF_VALUE: 3
PIF_VALUE: 12
PIF_VALUE: 15
PIF_VALUE: 12
PIF_VALUE: 14
PIF_VALUE: 14
PIF_VALUE: 16
PIF_VALUE: 16
PIF_VALUE: 15
PIF_VALUE: 14
PIF_VALUE: 2
PIF_VALUE: 15
PIF_VALUE: 16
PIF_VALUE: 14
PIF_VALUE: 14
PIF_VALUE: 15
PIF_VALUE: 14
PIF_VALUE: 15
PIF_VALUE: 13
PIF_VALUE: 14
PIF_VALUE: 2
PIF_VALUE: 1
PIF_VALUE: 15
PIF_VALUE: 15
PIF_VALUE: 12
PIF_VALUE: 1
PIF_VALUE: 14
PIF_VALUE: 16
PIF_VALUE: 15
PIF_VALUE: 13
PIF_VALUE: 15
PIF_VALUE: 1
PIF_VALUE: 15
PIF_VALUE: 10
PIF_VALUE: 3
PIF_VALUE: 14
PIF_VALUE: 12
PIF_VALUE: 15
PIF_VALUE: 14
PIF_VALUE: 15
PIF_VALUE: 14
PIF_VALUE: 15
PIF_VALUE: 14
PIF_VALUE: 3
PIF_VALUE: 15
PIF_VALUE: 14
PIF_VALUE: 1
PIF_VALUE: 14
PIF_VALUE: 15
PIF_VALUE: 16
PIF_VALUE: 14
PIF_VALUE: 15
PIF_VALUE: 14
PIF_VALUE: 15
PIF_VALUE: 14
PIF_VALUE: 15
PIF_VALUE: 14
PIF_VALUE: 1
PIF_VALUE: 15
PIF_VALUE: 14
PIF_VALUE: 0
PIF_VALUE: 15
PIF_VALUE: 14
PIF_VALUE: 8
PIF_VALUE: 14
PIF_VALUE: 16
PIF_VALUE: 2
PIF_VALUE: 14
PIF_VALUE: 15
PIF_VALUE: 14
PIF_VALUE: 19
PIF_VALUE: 15
PIF_VALUE: 14
PIF_VALUE: 4
PIF_VALUE: 2
PIF_VALUE: 14
PIF_VALUE: 15
PIF_VALUE: 14
PIF_VALUE: 16

## 2018-09-18 ASSESSMENT — PAIN DESCRIPTION - ORIENTATION: ORIENTATION: RIGHT

## 2018-09-18 ASSESSMENT — PAIN SCALES - GENERAL
PAINLEVEL_OUTOF10: 5
PAINLEVEL_OUTOF10: 6
PAINLEVEL_OUTOF10: 8
PAINLEVEL_OUTOF10: 7

## 2018-09-18 ASSESSMENT — PAIN DESCRIPTION - PAIN TYPE: TYPE: CHRONIC PAIN

## 2018-09-18 ASSESSMENT — ENCOUNTER SYMPTOMS
NAUSEA: 0
PHOTOPHOBIA: 0
EYE DISCHARGE: 0
COLOR CHANGE: 1
VOICE CHANGE: 0
VOMITING: 0
ANAL BLEEDING: 0
APNEA: 0
ABDOMINAL DISTENTION: 0

## 2018-09-18 ASSESSMENT — PAIN DESCRIPTION - DESCRIPTORS
DESCRIPTORS: ACHING;CONSTANT
DESCRIPTORS: ACHING

## 2018-09-18 ASSESSMENT — PAIN DESCRIPTION - LOCATION: LOCATION: SHOULDER

## 2018-09-18 ASSESSMENT — PAIN - FUNCTIONAL ASSESSMENT: PAIN_FUNCTIONAL_ASSESSMENT: 0-10

## 2018-09-18 NOTE — PROGRESS NOTES
history  ?  20 pack years  []   Pulmonary Disorder  (acute or chronic)  [x]   Severe or Chronic w/ Exacerbation  []     Surgical Status No []   Surgeries     General [x]   Surgery Lower []   Abdominal Thoracic or []   Upper Abdominal Thoracic with  PulmonaryDisorder  []     Chest X-ray Clear/Not  Ordered     [x]  Chronic Changes  Results Pending  []  Infiltrates, atelectasis, pleural effusion, or edema  []  Infiltrates in more than one lobe []  Infiltrate + Atelectasis, &/or pleural effusion  []    Respiratory Pattern Regular,  RR = 12-20 [x]  Increased,  RR = 21-25 []  STEWART, irregular,  or RR = 26-30 []  Decreased FEV1  or RR = 31-35 []  Severe SOB, use  of accessory muscles, or RR ? 35  []    Mental Status Alert, oriented,  Cooperative [x]  Confused but Follows commands []  Lethargic or unable to follow commands []  Obtunded  []  Comatose  []    Breath Sounds Clear to  auscultation  []  Decreased unilaterally or  in bases only [x]  Decreased  bilaterally  []  Crackles or intermittent wheezes []  Wheezes []    Cough Strong, Spontan., & nonproductive [x]  Strong,  spontaneous, &  productive []  Weak,  Nonproductive []  Weak, productive or  with wheezes []  No spontaneous  cough or may require suctioning []    Level of Activity Ambulatory [x]  Ambulatory w/ Assist  []  Non-ambulatory []  Paraplegic []  Quadriplegic []    Total    Score:___5____     Triage Score:____5____      Tri       Triage:     1. (>20) Freq: Q3    2. (16-20) Freq: Q4   3. (11-15) Freq: QID & Albuterol Q2 PRN    4. (6-10) Freq: TID & Albuterol Q2 PRN    5. (0-5) Freq Q4prn

## 2018-09-18 NOTE — ED NOTES
Wound care complete  1 cm Lac noted above Left Eye.   Bleeding controlled at this time  Patient tolerated procedure well      Samantha Pat RN  09/18/18 0512

## 2018-09-18 NOTE — ED NOTES
Short stay surgery updated on patient status and estimated length of stay     Sue Lopez RN  09/18/18 9323

## 2018-09-18 NOTE — ED PROVIDER NOTES
[facility] ED  eMERGENCY dEPARTMENT eNCOUnter      Pt Name: Lisa Horn  MRN: [id number]  [place] 1940  Date of evaluation: 9/18/2018  Provider: Zachary Bautista MD    CHIEF COMPLAINT       Chief Complaint   Patient presents with    Fall         HISTORY OF PRESENT ILLNESS   (Location/Symptom, Timing/Onset, Context/Setting, Quality, Duration, Modifying Factors, Severity)  Note limiting factors. Lisa Horn is a 69 y.o.   patient tripped and fell walking in the door of the hospital to have surgery on his right shoulder. He denies loss of consciousness neck pain back pain he does have a headache present illness (frontal and small laceration. He denies difficulty seeing speaking he does have chronic hearing loss with hearing aids. Was mild to moderate in severity nothing improves or worsens symptoms. Not currently taking Xarelto which was discontinued for surgery. HPI    Nursing Notes were reviewed. REVIEW OF SYSTEMS    (2-9 systems for level 4, 10 or more for level 5)     Review of Systems   Constitutional: Negative for activity change, appetite change and unexpected weight change. HENT: Negative for ear discharge, nosebleeds and voice change. Eyes: Negative for photophobia and discharge. Respiratory: Negative for apnea. Cardiovascular: Negative for chest pain. Gastrointestinal: Negative for abdominal distention, anal bleeding, nausea and vomiting. Endocrine: Negative for cold intolerance, heat intolerance and polyphagia. Genitourinary: Negative for genital sores. Musculoskeletal: Negative for joint swelling. Skin: Positive for color change and wound. Negative for pallor. Allergic/Immunologic: Negative for immunocompromised state. Neurological: Positive for headaches. Negative for seizures and facial asymmetry. Hematological: Does not bruise/bleed easily. Psychiatric/Behavioral: Negative for behavioral problems, self-injury and sleep disturbance. education: N/A     Occupational History    retired      Social History Main Topics    Smoking status: Former Smoker     Packs/day: 1.00     Years: 40.00     Types: Cigarettes     Start date: 6/1/2000     Quit date: 2000    Smokeless tobacco: Former User      Comment: quit 2000    Alcohol use 0.0 oz/week      Comment: rare social    Drug use: No    Sexual activity: Not Asked     Other Topics Concern    None     Social History Narrative    The patient lives alone in a one story home with one step to enter the home. The bedroom and bathroom are on the first floor. His social support includes his children. He has a wheeled walker, rollator, cane and dressing assistive device at home. He was receiving home health care services prior to admission to include PT, OT and RN. He does not have history of falls prior to admission. He was independent with mobility with a wheeled walker as needed prior to admission. He was independent with self care prior to admission. His goal is to get stronger and return home. SCREENINGS             PHYSICAL EXAM    (up to 7 for level 4, 8 or more for level 5)     ED Triage Vitals [09/18/18 0552]   BP Temp Temp Source Pulse Resp SpO2 Height Weight   (!) 150/102 97.6 °F (36.4 °C) Oral 80 18 97 % -- 230 lb (104.3 kg)       Physical Exam   Constitutional: He is oriented to person, place, and time. He appears well-developed and well-nourished. No distress. HENT:   Head: Normocephalic. Mouth/Throat: Oropharynx is clear and moist. No oropharyngeal exudate. 4 cm left frontal hematoma 1 cm superficial laceration   Eyes: Pupils are equal, round, and reactive to light. Right eye exhibits no discharge. Left eye exhibits no discharge. Neck: Normal range of motion. Neck supple. Cardiovascular: Normal rate, regular rhythm, normal heart sounds and intact distal pulses. Pulmonary/Chest: Effort normal and breath sounds normal. No stridor. No respiratory distress.  He has no

## 2018-09-18 NOTE — ED TRIAGE NOTES
Patient arrived to the ER per Surgery Staff. Patient is scheduled to have Right Rotator Cuff repair with Dr Jaimie Peace this am.   Per patient and family, patient was ambulating into the hospital and tripped. Patient stated he fell and hit his head. Patient denies LOC. Patient alert and oriented x3  Respirations equal and unlabored  Skin pink warm dry  No active bleeding.

## 2018-09-18 NOTE — PROGRESS NOTES
Pt to [facility]SS from ED. Pt fell in registration when he arrived to surgery. Sent to ED with laceration on L forehead. L forehead wound with skin glue, No LOC, is alert and wants to proceed with surgery. Registration having a difficult time registering pt because ED used our surgery encounter. Son in registration. OR charge nurse notified of the above.

## 2018-09-18 NOTE — PROGRESS NOTES
Physical Therapy Missed Treatment   Facility/Department: [facility] MED SURG N229/N229-01    NAME: Sulaiman Garza    :  (89 y.o.)  MRN: [id number]    Account: [de-identified]  Gender: male      PT evaluation and treatment orders received. Chart reviewed. PT evaluation attempted. Pt resting in bed - very sleepy. Unable to stay alert in conversation. Will hold PT evaluation at this time. Will follow and attempt PT evaluation again at earliest availability.         Jennifer Mon, PT, 18 at 4:32 PM

## 2018-09-19 ENCOUNTER — HOSPITAL ENCOUNTER (INPATIENT)
Age: 78
LOS: 8 days | Discharge: HOME HEALTH | DRG: 554 | End: 2018-09-27
Attending: PHYSICAL MEDICINE & REHABILITATION | Admitting: PHYSICAL MEDICINE & REHABILITATION
Payer: MEDICARE

## 2018-09-19 VITALS
HEART RATE: 71 BPM | OXYGEN SATURATION: 97 % | BODY MASS INDEX: 32.2 KG/M2 | TEMPERATURE: 97.9 F | RESPIRATION RATE: 18 BRPM | DIASTOLIC BLOOD PRESSURE: 73 MMHG | HEIGHT: 71 IN | WEIGHT: 230 LBS | SYSTOLIC BLOOD PRESSURE: 125 MMHG

## 2018-09-19 PROBLEM — I26.99 BILATERAL PULMONARY EMBOLISM (HCC): Status: RESOLVED | Noted: 2018-01-06 | Resolved: 2018-09-19

## 2018-09-19 PROBLEM — J45.909 ASTHMA: Status: ACTIVE | Noted: 2018-09-19

## 2018-09-19 PROBLEM — Z87.01 HISTORY OF ASPIRATION PNEUMONIA: Status: ACTIVE | Noted: 2018-09-19

## 2018-09-19 PROBLEM — Z86.711 HISTORY OF PULMONARY EMBOLISM: Status: ACTIVE | Noted: 2018-09-19

## 2018-09-19 PROBLEM — M19.019 ARTHRITIS OF SHOULDER: Status: ACTIVE | Noted: 2018-09-19

## 2018-09-19 PROBLEM — R26.9 ABNORMALITY OF GAIT AND MOBILITY: Status: ACTIVE | Noted: 2018-09-19

## 2018-09-19 PROBLEM — C73 THYROID CANCER (HCC): Status: ACTIVE | Noted: 2018-09-19

## 2018-09-19 PROBLEM — Z96.652 HISTORY OF TOTAL LEFT KNEE REPLACEMENT: Status: ACTIVE | Noted: 2018-09-19

## 2018-09-19 PROBLEM — Z98.890 S/P THORACOTOMY: Status: ACTIVE | Noted: 2018-09-19

## 2018-09-19 LAB
HCT VFR BLD CALC: 41.7 % (ref 42–52)
HEMOGLOBIN: 14 G/DL (ref 14–18)
MCH RBC QN AUTO: 31.6 PG (ref 27–31.3)
MCHC RBC AUTO-ENTMCNC: 33.5 % (ref 33–37)
MCV RBC AUTO: 94.2 FL (ref 80–100)
PDW BLD-RTO: 13.8 % (ref 11.5–14.5)
PLATELET # BLD: 106 K/UL (ref 130–400)
RBC # BLD: 4.43 M/UL (ref 4.7–6.1)
WBC # BLD: 8.7 K/UL (ref 4.8–10.8)

## 2018-09-19 PROCEDURE — 2700000000 HC OXYGEN THERAPY PER DAY

## 2018-09-19 PROCEDURE — 94640 AIRWAY INHALATION TREATMENT: CPT

## 2018-09-19 PROCEDURE — 94664 DEMO&/EVAL PT USE INHALER: CPT

## 2018-09-19 PROCEDURE — 97167 OT EVAL HIGH COMPLEX 60 MIN: CPT

## 2018-09-19 PROCEDURE — G8988 SELF CARE GOAL STATUS: HCPCS

## 2018-09-19 PROCEDURE — 85027 COMPLETE CBC AUTOMATED: CPT

## 2018-09-19 PROCEDURE — 6370000000 HC RX 637 (ALT 250 FOR IP): Performed by: NURSE PRACTITIONER

## 2018-09-19 PROCEDURE — 6360000002 HC RX W HCPCS: Performed by: ORTHOPAEDIC SURGERY

## 2018-09-19 PROCEDURE — G0378 HOSPITAL OBSERVATION PER HR: HCPCS

## 2018-09-19 PROCEDURE — 6370000000 HC RX 637 (ALT 250 FOR IP): Performed by: ORTHOPAEDIC SURGERY

## 2018-09-19 PROCEDURE — 99212 OFFICE O/P EST SF 10 MIN: CPT | Performed by: PHYSICAL MEDICINE & REHABILITATION

## 2018-09-19 PROCEDURE — 36415 COLL VENOUS BLD VENIPUNCTURE: CPT

## 2018-09-19 PROCEDURE — 97162 PT EVAL MOD COMPLEX 30 MIN: CPT

## 2018-09-19 PROCEDURE — G8979 MOBILITY GOAL STATUS: HCPCS

## 2018-09-19 PROCEDURE — 94150 VITAL CAPACITY TEST: CPT

## 2018-09-19 PROCEDURE — 97535 SELF CARE MNGMENT TRAINING: CPT

## 2018-09-19 PROCEDURE — G8978 MOBILITY CURRENT STATUS: HCPCS

## 2018-09-19 PROCEDURE — 1180000000 HC REHAB R&B

## 2018-09-19 PROCEDURE — G8987 SELF CARE CURRENT STATUS: HCPCS

## 2018-09-19 PROCEDURE — 94760 N-INVAS EAR/PLS OXIMETRY 1: CPT

## 2018-09-19 RX ORDER — FINASTERIDE 5 MG/1
5 TABLET, FILM COATED ORAL DAILY
Status: CANCELLED | OUTPATIENT
Start: 2018-09-20

## 2018-09-19 RX ORDER — BISACODYL 10 MG
10 SUPPOSITORY, RECTAL RECTAL DAILY PRN
Status: CANCELLED | OUTPATIENT
Start: 2018-09-19

## 2018-09-19 RX ORDER — TAMSULOSIN HYDROCHLORIDE 0.4 MG/1
0.4 CAPSULE ORAL NIGHTLY
Status: DISCONTINUED | OUTPATIENT
Start: 2018-09-19 | End: 2018-09-27 | Stop reason: HOSPADM

## 2018-09-19 RX ORDER — OXYCODONE HYDROCHLORIDE AND ACETAMINOPHEN 5; 325 MG/1; MG/1
1 TABLET ORAL EVERY 6 HOURS PRN
Status: CANCELLED | OUTPATIENT
Start: 2018-09-19

## 2018-09-19 RX ORDER — DOCUSATE SODIUM 100 MG/1
100 CAPSULE, LIQUID FILLED ORAL 2 TIMES DAILY
Status: DISCONTINUED | OUTPATIENT
Start: 2018-09-19 | End: 2018-09-24 | Stop reason: ALTCHOICE

## 2018-09-19 RX ORDER — DOCUSATE SODIUM 100 MG/1
100 CAPSULE, LIQUID FILLED ORAL 2 TIMES DAILY
Status: CANCELLED | OUTPATIENT
Start: 2018-09-19

## 2018-09-19 RX ORDER — HYDRALAZINE HYDROCHLORIDE 25 MG/1
25 TABLET, FILM COATED ORAL EVERY 6 HOURS PRN
Status: CANCELLED | OUTPATIENT
Start: 2018-09-19

## 2018-09-19 RX ORDER — ALBUTEROL SULFATE 90 UG/1
2 AEROSOL, METERED RESPIRATORY (INHALATION) EVERY 4 HOURS PRN
Status: CANCELLED | OUTPATIENT
Start: 2018-09-19

## 2018-09-19 RX ORDER — TAMSULOSIN HYDROCHLORIDE 0.4 MG/1
0.4 CAPSULE ORAL NIGHTLY
Status: CANCELLED | OUTPATIENT
Start: 2018-09-19

## 2018-09-19 RX ORDER — BISACODYL 10 MG
10 SUPPOSITORY, RECTAL RECTAL DAILY PRN
Status: DISCONTINUED | OUTPATIENT
Start: 2018-09-19 | End: 2018-09-27 | Stop reason: HOSPADM

## 2018-09-19 RX ORDER — AMLODIPINE BESYLATE 2.5 MG/1
2.5 TABLET ORAL DAILY
Status: DISCONTINUED | OUTPATIENT
Start: 2018-09-20 | End: 2018-09-27 | Stop reason: HOSPADM

## 2018-09-19 RX ORDER — AMLODIPINE BESYLATE 2.5 MG/1
2.5 TABLET ORAL DAILY
Status: CANCELLED | OUTPATIENT
Start: 2018-09-20

## 2018-09-19 RX ORDER — SODIUM PHOSPHATE, DIBASIC AND SODIUM PHOSPHATE, MONOBASIC 7; 19 G/133ML; G/133ML
1 ENEMA RECTAL
Status: CANCELLED | OUTPATIENT
Start: 2018-09-19 | End: 2018-09-19

## 2018-09-19 RX ORDER — SODIUM PHOSPHATE, DIBASIC AND SODIUM PHOSPHATE, MONOBASIC 7; 19 G/133ML; G/133ML
1 ENEMA RECTAL
Status: DISPENSED | OUTPATIENT
Start: 2018-09-19 | End: 2018-09-19

## 2018-09-19 RX ORDER — MIRTAZAPINE 15 MG/1
15 TABLET, FILM COATED ORAL NIGHTLY
Status: DISCONTINUED | OUTPATIENT
Start: 2018-09-19 | End: 2018-09-27 | Stop reason: HOSPADM

## 2018-09-19 RX ORDER — HYDRALAZINE HYDROCHLORIDE 25 MG/1
25 TABLET, FILM COATED ORAL EVERY 6 HOURS PRN
Status: DISCONTINUED | OUTPATIENT
Start: 2018-09-19 | End: 2018-09-27 | Stop reason: HOSPADM

## 2018-09-19 RX ORDER — ACETAMINOPHEN 325 MG/1
650 TABLET ORAL EVERY 4 HOURS PRN
Status: DISCONTINUED | OUTPATIENT
Start: 2018-09-19 | End: 2018-09-27 | Stop reason: HOSPADM

## 2018-09-19 RX ORDER — OXYCODONE HYDROCHLORIDE AND ACETAMINOPHEN 5; 325 MG/1; MG/1
1 TABLET ORAL EVERY 6 HOURS PRN
Status: DISCONTINUED | OUTPATIENT
Start: 2018-09-19 | End: 2018-09-27 | Stop reason: HOSPADM

## 2018-09-19 RX ORDER — FINASTERIDE 5 MG/1
5 TABLET, FILM COATED ORAL DAILY
Status: DISCONTINUED | OUTPATIENT
Start: 2018-09-20 | End: 2018-09-27 | Stop reason: HOSPADM

## 2018-09-19 RX ORDER — ALBUTEROL SULFATE 1.25 MG/3ML
1.25 SOLUTION RESPIRATORY (INHALATION) EVERY 6 HOURS PRN
Status: DISCONTINUED | OUTPATIENT
Start: 2018-09-19 | End: 2018-09-27 | Stop reason: HOSPADM

## 2018-09-19 RX ORDER — MIRTAZAPINE 15 MG/1
15 TABLET, FILM COATED ORAL NIGHTLY
Status: CANCELLED | OUTPATIENT
Start: 2018-09-19

## 2018-09-19 RX ORDER — ALBUTEROL SULFATE 90 UG/1
2 AEROSOL, METERED RESPIRATORY (INHALATION) EVERY 4 HOURS PRN
Status: DISCONTINUED | OUTPATIENT
Start: 2018-09-19 | End: 2018-09-27 | Stop reason: HOSPADM

## 2018-09-19 RX ORDER — ACETAMINOPHEN 325 MG/1
650 TABLET ORAL EVERY 4 HOURS PRN
Status: CANCELLED | OUTPATIENT
Start: 2018-09-19

## 2018-09-19 RX ORDER — ALBUTEROL SULFATE 1.25 MG/3ML
1.25 SOLUTION RESPIRATORY (INHALATION) EVERY 6 HOURS PRN
Status: CANCELLED | OUTPATIENT
Start: 2018-09-19

## 2018-09-19 RX ADMIN — Medication 2 PUFF: at 08:00

## 2018-09-19 RX ADMIN — DOCUSATE SODIUM 100 MG: 100 CAPSULE, LIQUID FILLED ORAL at 09:30

## 2018-09-19 RX ADMIN — VITAMIN D, TAB 1000IU (100/BT) 2000 UNITS: 25 TAB at 09:30

## 2018-09-19 RX ADMIN — RIVAROXABAN 20 MG: 20 TABLET, FILM COATED ORAL at 19:47

## 2018-09-19 RX ADMIN — OXYCODONE HYDROCHLORIDE AND ACETAMINOPHEN 1 TABLET: 5; 325 TABLET ORAL at 18:44

## 2018-09-19 RX ADMIN — DOCUSATE SODIUM 100 MG: 100 CAPSULE, LIQUID FILLED ORAL at 21:14

## 2018-09-19 RX ADMIN — SENNOSIDES AND DOCUSATE SODIUM 1 TABLET: 8.6; 5 TABLET ORAL at 09:30

## 2018-09-19 RX ADMIN — OXYCODONE HYDROCHLORIDE AND ACETAMINOPHEN 1 TABLET: 5; 325 TABLET ORAL at 09:31

## 2018-09-19 RX ADMIN — LEVOTHYROXINE SODIUM 137 MCG: 25 TABLET ORAL at 06:17

## 2018-09-19 RX ADMIN — TAMSULOSIN HYDROCHLORIDE 0.4 MG: 0.4 CAPSULE ORAL at 21:14

## 2018-09-19 RX ADMIN — OXYCODONE HYDROCHLORIDE AND ACETAMINOPHEN 2 TABLET: 5; 325 TABLET ORAL at 04:07

## 2018-09-19 RX ADMIN — Medication 2 G: at 01:19

## 2018-09-19 RX ADMIN — MIRTAZAPINE 15 MG: 15 TABLET, FILM COATED ORAL at 21:14

## 2018-09-19 ASSESSMENT — PAIN SCALES - GENERAL
PAINLEVEL_OUTOF10: 8
PAINLEVEL_OUTOF10: 0
PAINLEVEL_OUTOF10: 0
PAINLEVEL_OUTOF10: 7
PAINLEVEL_OUTOF10: 0
PAINLEVEL_OUTOF10: 0
PAINLEVEL_OUTOF10: 4

## 2018-09-19 ASSESSMENT — ENCOUNTER SYMPTOMS
VOMITING: 0
ABDOMINAL PAIN: 0
BLOOD IN STOOL: 0
ANAL BLEEDING: 0
PHOTOPHOBIA: 0
COUGH: 0
EYE REDNESS: 0
NAUSEA: 0
FACIAL SWELLING: 0
WHEEZING: 0
CHOKING: 0
EYE PAIN: 0
COLOR CHANGE: 0
CONSTIPATION: 0
TROUBLE SWALLOWING: 0
SHORTNESS OF BREATH: 0
ABDOMINAL DISTENTION: 0
CHEST TIGHTNESS: 0

## 2018-09-19 NOTE — PROGRESS NOTES
of pt care. Some medical issues are handled by other specialists. Additional work up and treatment should be done in out pt setting by pt PCP and other out pt providers. In addition to examining and evaluating pt, I spent additional time explaining care, normal and abnormal findings, and treatment plan. All of pt questions were answered. Counseling, diet and education were  provided. Case will be discussed with nursing staff when appropriate. Family will be updated if and when appropriate.       Diet: DIET GENERAL;    Code Status: Full Code    Electronically signed by CINDY Szymanski CNP on 9/19/2018 at 3:22 PM

## 2018-09-19 NOTE — PROGRESS NOTES
Assistance: Independent  Ambulation Assistance: Independent  Transfer Assistance: Independent  Additional Comments: son and dtr can assist upon DC home with IADLs    OBJECTIVE:   Vision/Hearing:  Vision: Impaired  Vision Exceptions: Wears glasses at all times  Hearing: Within functional limits    Cognition:  Overall Orientation Status: Within Functional Limits  Follows Commands: Within Functional Limits    Observation/Palpation  Observation: pt at times impulsive requiring safety awareness cues    ROM:  RLE AROM: WFL  LLE AROM : WFL    Strength:  Strength RLE  Strength RLE: WFL  Strength LLE  Strength LLE: WFL    Neuro:  Balance  Standing - Static: Good;-  Standing - Dynamic: Fair;+ (tolerates narrow KIMMY , eyes closed, reaching fwd; unable to tandem or modified tandem stance)             Bed mobility  Supine to Sit: Stand by assistance (with HOB elevated, requiring cues to maintain RUE precautions)  Sit to Supine: Stand by assistance    Transfers  Sit to Stand: Stand by assistance  Stand to sit: Stand by assistance    Ambulation  Ambulation?: Yes  Ambulation 1  Surface: level tile  Device:  (hurry-cane)  Other Apparatus:  (RUE sling)  Assistance: Contact guard assistance  Quality of Gait: wide KIMMY, path deviation, postural sway  Distance: 40 ft x2  Comments: requires VCs for safety, tends to leave cane behind,     Activity Tolerance  Activity Tolerance: Patient Tolerated treatment well          ASSESSMENT:   Body structures, Functions, Activity limitations: Decreased functional mobility ; Decreased strength;Decreased endurance;Decreased safe awareness;Decreased balance  Decision Making: Medium Complexity    Prognosis: Good  Barriers to Learning: pt with slightly impulsive behaviors, requiring increased cues for safety and environmental awareness    DISCHARGE RECOMMENDATIONS:  Discharge Recommendations: Continue to assess pending progress    Assessment: Pt demonstrates the above deficits and decline in functional mobility status placing them at increased risk for falls. Pt would benefit from physical therapy to address above deficits and allow for safe return home at highest level of function, decrease risk for falls, and improve QOL. REQUIRES PT FOLLOW UP: Yes      PLAN OF CARE:  Plan  Times per week: 7x/wk  Current Treatment Recommendations: Strengthening, Balance Training, Functional Mobility Training, Gait Training, Home Exercise Program, Safety Education & Training, Transfer Training, Endurance Training, Neuromuscular Re-education, Patient/Caregiver Education & Training, Pain Management, Positioning  Safety Devices  Type of devices: Call light within reach, Left in bed    G-Code:  PT G-Codes  Functional Assessment Tool Used: clinical judgement  Functional Limitation: Mobility: Walking and moving around  Mobility: Walking and Moving Around Current Status ():  At least 20 percent but less than 40 percent impaired, limited or restricted  Mobility: Walking and Moving Around Goal Status (): 0 percent impaired, limited or restricted    Goals:  Patient goals : go home  Short term goals  Short term goal 1: Pt to demo >good standing balance  Short term goal 2: Pt to demo mod I transfers  Short term goal 3: Pt to ambulate 150' mod I with LRAD    [facility] (6 CLICK) [address] Mobility Raw Score : 17    Therapy Time:   Individual   Time In 0950   Time Out 1013   Minutes [facility] [address] [facility], 09/19/18 at 10:26 AM

## 2018-09-19 NOTE — CARE COORDINATION
MET WITH PATIENT DURING QUALITY ROUNDS TO DISCUSS D/C PLANS. HE STATES HE LIVES AT HOME ALONE. HE HAS A ROLLATOR HE USES. HIS SON LIVES IN [place] AND IS AVAILABLE IF HE NEEDS HIM. THERAPY IS RECOMMENDING REHAB. PATIENT IS IN AGREEMENT. HELEN LOPRESTI RN NOTIFIED OF RECOMMENDATION. PATIENT WILL BE READY FOR TRANSFER TODAY IF ACCEPTED. HOME CARE WILL BE THE BACK UP PLAN. CARE TEAM TO FOLLOW.  Electronically signed by Connie Pascal RN on 9/19/2018 at 10:32 AM

## 2018-09-19 NOTE — PROGRESS NOTES
years  []   Pulmonary Disorder  (acute or chronic)  []   Severe or Chronic w/ Exacerbation  []     Surgical Status No []   Surgeries     General [x]   Surgery Lower []   Abdominal Thoracic or []   Upper Abdominal Thoracic with  PulmonaryDisorder  []     Chest X-ray Clear/Not  Ordered     [x]  Chronic Changes  Results Pending  []  Infiltrates, atelectasis, pleural effusion, or edema  []  Infiltrates in more than one lobe []  Infiltrate + Atelectasis, &/or pleural effusion  []    Respiratory Pattern Regular,  RR = 12-20 [x]  Increased,  RR = 21-25 []  STEWART, irregular,  or RR = 26-30 []  Decreased FEV1  or RR = 31-35 []  Severe SOB, use  of accessory muscles, or RR ? 35  []    Mental Status Alert, oriented,  Cooperative [x]  Confused but Follows commands []  Lethargic or unable to follow commands []  Obtunded  []  Comatose  []    Breath Sounds Clear to  auscultation  [x]  Decreased unilaterally or  in bases only []  Decreased  bilaterally  []  Crackles or intermittent wheezes []  Wheezes []    Cough Strong, Spontan., & nonproductive [x]  Strong,  spontaneous, &  productive []  Weak,  Nonproductive []  Weak, productive or  with wheezes []  No spontaneous  cough or may require suctioning []    Level of Activity Ambulatory []  Ambulatory w/ Assist  []  Non-ambulatory []  Paraplegic []  Quadriplegic []    Total    Score:___3____     Triage Score:___5_____      Tri       Triage:     1. (>20) Freq: Q3    2. (16-20) Freq: Q4   3. (11-15) Freq: QID & Albuterol Q2 PRN    4. (6-10) Freq: TID & Albuterol Q2 PRN    5. (0-5) Freq Q4prn

## 2018-09-19 NOTE — CONSULTS
radiographs 3/22/2018. Intraoperative fluoroscopy was provided for Dr. Anselmo Argueta right shoulder arthroscopy procedure. A total of 25.9 seconds of fluoroscopy was used, with 22 fluoroscopic stills saved. No diagnostic images were obtained. Please see Dr. Anselmo Argueta surgical notes for completeness. Labs:     labs reviewed and discussed with patient and staff    Lab Results   Component Value Date    POCGLU 129 09/18/2018    POCGLU 86 09/18/2018    POCGLU 98 11/26/2017    POCGLU 102 11/25/2017    POCGLU 118 11/22/2017     Lab Results   Component Value Date     09/11/2018    K 4.4 09/11/2018     09/11/2018    CO2 26 09/11/2018    BUN 14 09/11/2018    CREATININE 0.98 09/11/2018    CALCIUM 9.0 09/11/2018    LABALBU 3.6 07/20/2018    BILITOT 0.3 07/20/2018    ALKPHOS 68 07/20/2018    AST 22 07/20/2018    ALT 21 07/20/2018     Lab Results   Component Value Date    WBC 8.7 09/19/2018    RBC 4.43 09/19/2018    HGB 14.0 09/19/2018    HCT 41.7 09/19/2018    MCV 94.2 09/19/2018    MCH 31.6 09/19/2018    MCHC 33.5 09/19/2018    RDW 13.8 09/19/2018     09/19/2018    MPV 10.7 07/23/2015     Lab Results   Component Value Date    VITD25 40.8 03/10/2015     Lab Results   Component Value Date    COLORU Yellow 01/12/2018    NITRU Negative 01/12/2018    GLUCOSEU Negative 01/12/2018    KETUA Negative 01/12/2018    UROBILINOGEN 0.2 01/12/2018    BILIRUBINUR Negative 01/12/2018    BILIRUBINUR neg 09/29/2017     Lab Results   Component Value Date    PROTIME 12.6 09/11/2018     Lab Results   Component Value Date    INR 1.2 09/11/2018         I discussed results with patient.     Current Rehabilitation Assessments:    Rehabilitation:  Physical therapy: FIMS:  Bed Mobility:      Transfers: Sit to Stand: Stand by assistance  Stand to sit: Stand by assistance, Ambulation 1  Surface: level tile  Device:  (hurry-cane)  Other Apparatus:  (RUE sling)  Assistance: Contact guard assistance  Quality of Gait: wide KIMMY, path Debbra Paget CHAIR CASE #1 1 HOUR performed by Yahir Bains MD at [facility] 11/14/2017    RIGHT THORACOTOMY WEDGE RESECTION FOR FOREIGN BODY AND FOB DOUBLE LUMEN (1ST CASE) performed by Annette Skiff, MD at [address] [place] [address]  2006    cancer / no chemo or radiation         Allergies:   No Known Allergies     Current Medications:     Current Facility-Administered Medications: 0.9 % sodium chloride infusion, , Intravenous, Continuous  sodium chloride flush 0.9 % injection 10 mL, 10 mL, Intravenous, 2 times per day  sodium chloride flush 0.9 % injection 10 mL, 10 mL, Intravenous, PRN  acetaminophen (TYLENOL) tablet 650 mg, 650 mg, Oral, Q4H PRN  oxyCODONE-acetaminophen (PERCOCET) 5-325 MG per tablet 1 tablet, 1 tablet, Oral, Q4H PRN **OR** [DISCONTINUED] oxyCODONE-acetaminophen (PERCOCET) 5-325 MG per tablet 2 tablet, 2 tablet, Oral, Q4H PRN  docusate sodium (COLACE) capsule 100 mg, 100 mg, Oral, BID  magnesium hydroxide (MILK OF MAGNESIA) 400 MG/5ML suspension 30 mL, 30 mL, Oral, Daily PRN  sennosides-docusate sodium (SENOKOT-S) 8.6-50 MG tablet 1 tablet, 1 tablet, Oral, Daily  ondansetron (ZOFRAN) injection 4 mg, 4 mg, Intravenous, Q6H PRN  albuterol (ACCUNEB) nebulizer solution 1.25 mg, 1.25 mg, Nebulization, Q6H PRN  mometasone-formoterol (DULERA) 200-5 MCG/ACT inhaler 2 puff, 2 puff, Inhalation, BID  vitamin D (CHOLECALCIFEROL) tablet 2,000 Units, 2,000 Units, Oral, Daily  finasteride (PROSCAR) tablet 5 mg, 5 mg, Oral, Daily  mirtazapine (REMERON) tablet 15 mg, 15 mg, Oral, Nightly  pravastatin (PRAVACHOL) tablet 20 mg, 20 mg, Oral, QPM  tamsulosin (FLOMAX) capsule 0.4 mg, 0.4 mg, Oral, Nightly  melatonin tablet 10 mg, 10 mg, Oral, Nightly PRN  levothyroxine (SYNTHROID) tablet 137 mcg, 137 mcg, Oral, Daily  albuterol sulfate  (90 Base) MCG/ACT inhaler 2 puff, 2 puff, Inhalation, Q4H PRN  amLODIPine (NORVASC) tablet 2.5 mg, 2.5 mg, Oral, Daily  hydrALAZINE (APRESOLINE) tablet 25 Positive for activity change and fatigue. Negative for appetite change, chills, fever and unexpected weight change. HENT: Negative for ear discharge, ear pain, facial swelling, hearing loss and trouble swallowing. Eyes: Negative for photophobia, pain and redness. Respiratory: Negative for cough, choking, chest tightness, shortness of breath and wheezing. Cardiovascular: Negative for chest pain, palpitations and leg swelling. Gastrointestinal: Negative for abdominal distention, abdominal pain, anal bleeding, blood in stool, constipation, nausea and vomiting. Genitourinary: Negative for difficulty urinating, dysuria, flank pain, frequency and urgency. Musculoskeletal: Positive for arthralgias, gait problem and myalgias. Negative for neck pain and neck stiffness. Skin: Negative for color change, pallor, rash and wound. Neurological: Positive for weakness. Negative for dizziness, tremors, syncope, facial asymmetry, speech difficulty, light-headedness, numbness and headaches. Hematological: Negative for adenopathy. Does not bruise/bleed easily. Psychiatric/Behavioral: Positive for sleep disturbance. Negative for agitation, behavioral problems, confusion, decreased concentration, dysphoric mood, hallucinations, self-injury and suicidal ideas. The patient is not nervous/anxious and is not hyperactive. All other systems reviewed and are negative. Physical Exam:    /64   Pulse 85   Temp 98.1 °F (36.7 °C) (Oral)   Resp 18   Ht 5' 10.5\" (1.791 m)   Wt 230 lb (104.3 kg)   SpO2 98%   BMI 32.54 kg/m²      Physical Exam   Constitutional: He is oriented to person, place, and time. Vital signs are normal. He appears well-developed and well-nourished. Non-toxic appearance. He does not have a sickly appearance. He does not appear ill. No distress. HENT:   Head: Normocephalic and atraumatic.    Right Ear: Hearing normal.   Left Ear: Hearing normal.   Nose: Nose normal. 1. Active Problems:    HTN (hypertension)    Type 2 diabetes mellitus (HCC)    Anxiety    Vitamin D deficiency    On home oxygen therapy    Hypoxemia    Gait abnormality    Sleep apnea    Osteoarthritis of right shoulder    Arthritis of right shoulder region    Arthritis of shoulder    History of pulmonary embolism  Resolved Problems:    * No resolved hospital problems. *            Recommendations:    1. Considering all of the factors above including the patient's current medical status, social status/home envirnment, their functional needs and their ability to participate in a therapy program, I feel that they would best be served at a  Acute   Rehabilitation level of care. I reviewed the varous local options re these levels of care with the patient and family. 2. Vitamin B12 shot times one  3. Improve Hydration and Nutrition by adding Proteinex and push PO fluids        It was my pleasure to evaluate Brooke Torre today. Please call [phone] with questions.     Marianne Kelly, DO

## 2018-09-19 NOTE — PROGRESS NOTES
functional mobility , Decreased ADL status, Decreased ROM, Decreased strength, Decreased safe awareness, Decreased balance, Decreased endurance, Decreased fine motor control, Decreased coordination, Decreased high-level IADLs  Prognosis: Good  Discharge Recommendations: Continue to assess pending progress  History: multi comorb  Exam: 10 deficits  Assistance / Modification: Max A      Barriers to Improvement:  NWB and no ROM to R shld      Discharge Recommendations:  Therapy (R/S)    Six Click Score  How much help for putting on and taking off regular lower body clothing?: A Little  How much help for Bathing?: A Lot  How much help for Toileting?: A Little  How much help for putting on and taking off regular upper body clothing?: A Lot  How much help for taking care of personal grooming?: A Little  How much help for eating meals?: A Little  AM-[facility] Inpatient Daily Activity Raw Score: 16  AM-PAC Inpatient ADL T-Scale Score : 35.96  ADL Inpatient CMS 0-100% Score: 53.32    Recommended DME:  [] W/W   [x] Kevon Moran   [] Rollator   [] W/C   [x] Lizabeth Sahu  [x] Shower Chair   []Dressing AD []  BSC  [] Other:    Plan:Times per week: 1-3x,      G-Codes:  OT G-codes  Functional Limitation: Self care  Self Care Current Status (): At least 60 percent but less than 80 percent impaired, limited or restricted  Self Care Goal Status (): At least 1 percent but less than 20 percent impaired, limited or restricted    Time in:  9:35  Time out:  10:05  Total minutes:  30  Timed treatment minutes:  10  Tx:  Pt instructed in Limited ROM for bathing and dressing and distal AROM there ex. Pt educated in adaptive tech for bathing and dressing. Pt will benefit from further OT intervention.       Electronically signed by:    CASSIE Gee  9/19/2018, 12:52 PM

## 2018-09-19 NOTE — OP NOTE
Dahiana Maloney [address]                       [address], [address] [facility]                                 OPERATIVE REPORT    PATIENT NAME: Victorina Haynes                    :        1940  MED REC NO:   [id number]                            ROOM:       N229  ACCOUNT NO:   [de-identified]                           ADMIT DATE: 2018  PROVIDER:     Titus Uriarte MD    DATE OF PROCEDURE:  2018    PREOPERATIVE DIAGNOSIS:  Right shoulder arthritis. POSTOPERATIVE DIAGNOSIS:  Right shoulder arthritis. OPERATION PERFORMED:  1. Extensive debridement of right shoulder glenohumeral joint (anterior,  superior, and inferior labrum). 2.  Ostectomy and removal of humeral osteophytes and humeral head. 3.  Abrasion arthroplasty. 4.  Open subpectoral biceps tenodesis. 5.  Arthroscopic subacromial decompression. SURGEON:  Titus Uriarte MD.    FIRST ASSISTANT:  Leela Zuniga PA-C. Leela Zuniga PA-C was present  throughout the entire case. Given the nature of the disease process and  the procedure, a skilled surgical first assistant was necessary during the  case. The assistant was necessary to hold retractors and manipulate the  extremity during the procedure. A certified scrub tech was at the back  table managing the instruments and supplies for the surgical case. ANESTHESIA:  General anesthesia. COMPLICATIONS:  None apparent. ESTIMATED BLOOD LOSS:  100 mL. TOURNIQUET:  None. SPECIMENS:  None. IMPLANTS:  None. FINDINGS:  The patient has diffuse glenohumeral disease with notable  osteophytes and circumferential degenerated labrum. We thoroughly debrided  the labrum. We resected the biceps. It was later tenodesed. He agreed  for cartilage change in the central aspect of the glenoid in the center of  humeral head. He had notable synovitic change throughout. The rotator  cuff was intact.   Subacromial decompression was performed with

## 2018-09-20 PROBLEM — Z79.01 HX OF LONG TERM USE OF BLOOD THINNERS: Status: ACTIVE | Noted: 2018-09-20

## 2018-09-20 PROBLEM — Z92.29 HX OF LONG TERM USE OF BLOOD THINNERS: Status: ACTIVE | Noted: 2018-09-20

## 2018-09-20 LAB
BILIRUBIN URINE: NEGATIVE
BLOOD, URINE: NEGATIVE
CLARITY: CLEAR
COLOR: YELLOW
GLUCOSE URINE: NEGATIVE MG/DL
HCT VFR BLD CALC: 40.7 % (ref 42–52)
HEMOGLOBIN: 13.5 G/DL (ref 14–18)
KETONES, URINE: NEGATIVE MG/DL
LEUKOCYTE ESTERASE, URINE: NEGATIVE
MCH RBC QN AUTO: 31.3 PG (ref 27–31.3)
MCHC RBC AUTO-ENTMCNC: 33.1 % (ref 33–37)
MCV RBC AUTO: 94.3 FL (ref 80–100)
NITRITE, URINE: NEGATIVE
PDW BLD-RTO: 13.8 % (ref 11.5–14.5)
PH UA: 5.5 (ref 5–9)
PLATELET # BLD: 95 K/UL (ref 130–400)
PROTEIN UA: NEGATIVE MG/DL
RBC # BLD: 4.31 M/UL (ref 4.7–6.1)
SPECIFIC GRAVITY UA: 1.02 (ref 1–1.03)
UROBILINOGEN, URINE: 0.2 E.U./DL
WBC # BLD: 6.4 K/UL (ref 4.8–10.8)

## 2018-09-20 PROCEDURE — 94761 N-INVAS EAR/PLS OXIMETRY MLT: CPT

## 2018-09-20 PROCEDURE — 6370000000 HC RX 637 (ALT 250 FOR IP): Performed by: NURSE PRACTITIONER

## 2018-09-20 PROCEDURE — 81003 URINALYSIS AUTO W/O SCOPE: CPT

## 2018-09-20 PROCEDURE — 97166 OT EVAL MOD COMPLEX 45 MIN: CPT

## 2018-09-20 PROCEDURE — 97116 GAIT TRAINING THERAPY: CPT

## 2018-09-20 PROCEDURE — 1180000000 HC REHAB R&B

## 2018-09-20 PROCEDURE — 97112 NEUROMUSCULAR REEDUCATION: CPT

## 2018-09-20 PROCEDURE — 97530 THERAPEUTIC ACTIVITIES: CPT

## 2018-09-20 PROCEDURE — 94640 AIRWAY INHALATION TREATMENT: CPT

## 2018-09-20 PROCEDURE — 6360000002 HC RX W HCPCS: Performed by: PHYSICAL MEDICINE & REHABILITATION

## 2018-09-20 PROCEDURE — 85027 COMPLETE CBC AUTOMATED: CPT

## 2018-09-20 PROCEDURE — G0008 ADMIN INFLUENZA VIRUS VAC: HCPCS | Performed by: PHYSICAL MEDICINE & REHABILITATION

## 2018-09-20 PROCEDURE — 87086 URINE CULTURE/COLONY COUNT: CPT

## 2018-09-20 PROCEDURE — 97162 PT EVAL MOD COMPLEX 30 MIN: CPT

## 2018-09-20 PROCEDURE — 36415 COLL VENOUS BLD VENIPUNCTURE: CPT

## 2018-09-20 PROCEDURE — 97535 SELF CARE MNGMENT TRAINING: CPT

## 2018-09-20 PROCEDURE — 90686 IIV4 VACC NO PRSV 0.5 ML IM: CPT | Performed by: PHYSICAL MEDICINE & REHABILITATION

## 2018-09-20 PROCEDURE — 97140 MANUAL THERAPY 1/> REGIONS: CPT

## 2018-09-20 PROCEDURE — 99222 1ST HOSP IP/OBS MODERATE 55: CPT | Performed by: PHYSICAL MEDICINE & REHABILITATION

## 2018-09-20 RX ADMIN — RIVAROXABAN 20 MG: 20 TABLET, FILM COATED ORAL at 18:16

## 2018-09-20 RX ADMIN — Medication 2 PUFF: at 16:15

## 2018-09-20 RX ADMIN — ACETAMINOPHEN 650 MG: 325 TABLET ORAL at 15:52

## 2018-09-20 RX ADMIN — TAMSULOSIN HYDROCHLORIDE 0.4 MG: 0.4 CAPSULE ORAL at 20:41

## 2018-09-20 RX ADMIN — LEVOTHYROXINE SODIUM 137 MCG: 25 TABLET ORAL at 05:46

## 2018-09-20 RX ADMIN — DOCUSATE SODIUM 100 MG: 100 CAPSULE, LIQUID FILLED ORAL at 11:02

## 2018-09-20 RX ADMIN — FINASTERIDE 5 MG: 5 TABLET, FILM COATED ORAL at 15:52

## 2018-09-20 RX ADMIN — INFLUENZA A VIRUS A/MICHIGAN/45/2015 X-275 (H1N1) ANTIGEN (FORMALDEHYDE INACTIVATED), INFLUENZA A VIRUS A/SINGAPORE/INFIMH-16-0019/2016 IVR-186 (H3N2) ANTIGEN (FORMALDEHYDE INACTIVATED), INFLUENZA B VIRUS B/PHUKET/3073/2013 ANTIGEN (FORMALDEHYDE INACTIVATED), AND INFLUENZA B VIRUS B/MARYLAND/15/2016 BX-69A ANTIGEN (FORMALDEHYDE INACTIVATED) 0.5 ML: 15; 15; 15; 15 INJECTION, SUSPENSION INTRAMUSCULAR at 13:46

## 2018-09-20 RX ADMIN — AMLODIPINE BESYLATE 2.5 MG: 2.5 TABLET ORAL at 11:01

## 2018-09-20 RX ADMIN — Medication 2 PUFF: at 05:25

## 2018-09-20 RX ADMIN — DOCUSATE SODIUM 100 MG: 100 CAPSULE, LIQUID FILLED ORAL at 20:41

## 2018-09-20 RX ADMIN — VITAMIN D, TAB 1000IU (100/BT) 2000 UNITS: 25 TAB at 11:02

## 2018-09-20 RX ADMIN — MIRTAZAPINE 15 MG: 15 TABLET, FILM COATED ORAL at 20:41

## 2018-09-20 ASSESSMENT — ENCOUNTER SYMPTOMS
EYE PAIN: 0
CHOKING: 0
COLOR CHANGE: 0
NAUSEA: 0
ABDOMINAL PAIN: 0
FACIAL SWELLING: 0
CHEST TIGHTNESS: 0
VOMITING: 0
PHOTOPHOBIA: 0
TROUBLE SWALLOWING: 0
CONSTIPATION: 1
COUGH: 0
ANAL BLEEDING: 0
EYE REDNESS: 0
BLOOD IN STOOL: 0
SHORTNESS OF BREATH: 0
ABDOMINAL DISTENTION: 0
WHEEZING: 0

## 2018-09-20 ASSESSMENT — PAIN SCALES - GENERAL
PAINLEVEL_OUTOF10: 4
PAINLEVEL_OUTOF10: 0
PAINLEVEL_OUTOF10: 0
PAINLEVEL_OUTOF10: 2
PAINLEVEL_OUTOF10: 0
PAINLEVEL_OUTOF10: 0

## 2018-09-20 ASSESSMENT — PAIN DESCRIPTION - LOCATION
LOCATION: HIP
LOCATION: GROIN

## 2018-09-20 ASSESSMENT — PAIN DESCRIPTION - ORIENTATION
ORIENTATION: RIGHT

## 2018-09-20 ASSESSMENT — PAIN DESCRIPTION - DESCRIPTORS
DESCRIPTORS: CRAMPING
DESCRIPTORS: SORE
DESCRIPTORS: CRAMPING
DESCRIPTORS: ACHING

## 2018-09-20 NOTE — PROGRESS NOTES
Occupational Therapy   Occupational Therapy Initial Assessment  Date: 2018   Patient Name: Suzy Lopez  MRN: [id number]     : 1940    Date of Service: 2018    Discharge Recommendations:  Continue to assess pending progress         Patient Diagnosis(es): There were no encounter diagnoses. has a past medical history of Abnormality of gait and mobility; Acute sinusitis; Anxiety; Aspiration into respiratory tract; Aspiration pneumonia ([facility] [address] Bilateral pulmonary embolism (HCC); BPH (benign prostatic hyperplasia); COPD (chronic obstructive pulmonary disease) ([address] Debility; Elevated CK; Foraminal stenosis of lumbosacral region; GERD (gastroesophageal reflux disease); History of asthma; HTN (hypertension); Hx of blood clots; Hyperlipidemia; Hypothyroidism; Insomnia; Lower extremity weakness; On home oxygen therapy; Osteoarthritis of spine with radiculopathy, lumbar region; Papillary thyroid carcinoma ([facility] [address] Positive D dimer; Sleep apnea; Type 2 diabetes mellitus ([facility] [address] and Vitamin D deficiency. has a past surgical history that includes knee surgery (Left, ); Thyroidectomy (); bronchoscopy (N/A, 2017); thoracotomy (Right, 2017); Colonoscopy; Endoscopy, colon, diagnostic; eye surgery; hernia repair (); and pr arthroscopy shoulder surgical biceps tenodesis (Right, 2018). Restrictions  Restrictions/Precautions  Restrictions/Precautions: Fall Risk  Upper Extremity Weight Bearing Restrictions  Right Upper Extremity Weight Bearing: Non Weight Bearing  Position Activity Restriction  Other position/activity restrictions: NWB R UE; No shoulder ROM;  Sling donned R UE    Subjective   General  Chart Reviewed: Yes  Family / Caregiver Present: No  Referring Practitioner: Radha  Diagnosis: Right SHoulder OA S/P Right Shoulder Arthroscopy Subacromial decompression with Biceps  Pain Assessment  Patient Currently in Pain: No  Pain Assessment:  (denies)  Pain Level:

## 2018-09-20 NOTE — H&P
07/20/2018    AST 22 07/20/2018    ALT 21 07/20/2018     Lab Results   Component Value Date    WBC 6.4 09/20/2018    RBC 4.31 09/20/2018    HGB 13.5 09/20/2018    HCT 40.7 09/20/2018    MCV 94.3 09/20/2018    MCH 31.3 09/20/2018    MCHC 33.1 09/20/2018    RDW 13.8 09/20/2018    PLT 95 09/20/2018    MPV 10.7 07/23/2015     Lab Results   Component Value Date    VITD25 40.8 03/10/2015     Lab Results   Component Value Date    COLORU Yellow 09/20/2018    NITRU Negative 09/20/2018    GLUCOSEU Negative 09/20/2018    KETUA Negative 09/20/2018    UROBILINOGEN 0.2 09/20/2018    BILIRUBINUR Negative 09/20/2018    BILIRUBINUR neg 09/29/2017     Lab Results   Component Value Date    PROTIME 12.6 09/11/2018     Lab Results   Component Value Date    INR 1.2 09/11/2018         I discussed results with patient. The patient remains highly medically complex and continues to have severe problems with activities of daily living and mobility. The patient was assessed to be able to tolerate intensive rehabilitation and therefore was admitted to Rehabilitation to address these needs. Social/Functional History  Social/Functional History  Lives With: Alone  Type of Home: House  Home Layout: One level  Home Access: Level entry  Bathroom Shower/Tub: Tub/Shower unit, Curtain  Bathroom Equipment: Shower chair  Home Equipment: 4 wheeled walker, Cane, Oxygen  ADL Assistance: Independent  Homemaking Assistance: Independent  Homemaking Responsibilities: Yes  Meal Prep Responsibility: Primary  Cleaning Responsibility: Primary  Bill Paying/Finance Responsibility: Primary  Shopping Responsibility: Primary  Ambulation Assistance: Independent (with rollator in home.  Rarely uses rollator outside home.)  Transfer Assistance: Independent  Active : Yes  Mode of Transportation: [facility]  Occupation: Retired  Type of occupation:  ([employer] [address])  Leisure & Hobbies: [id number] [email or web address] long rifles, wood carving         History of falls: None      In depth analysis of complex functional data; the patient has been:    Current Rehabilitation Assessments:    Physical therapy: FIMS:  Bed Mobility:      Transfers: Sit to Stand: Supervision  Stand to sit: Supervision  Bed to Chair: Supervision, Ambulation 1  Surface: level tile, carpet  Device: No Device  Other Apparatus:  (sling)  Assistance: Stand by assistance  Quality of Gait: Lateral sway with mild antalgic gait pattern. Distance: [id number], Stairs  # Steps : 4  Stairs Height: 6\"  Rails: Left ascending  Assistance: Supervision, Stand by assistance  Comment: Step to pattern. FIMS: Bed, Chair, Wheel Chair: 3 - Requires 25-49% assistance to transfer  Walk: 2 - Maximal Assistance Requires up to Maximal Assistance AND requires assistance of one person to walk between  feet (Patient performs 25-49% of locomotion effort or goes between  feet)  Distance Walked: 75  Stairs: 2- Maximal Assistance Performs 25-49% of the effort to go up and down 4 to 6 stairs and requires the assistance of one person only,  , Assessment: No LOb during gait however balance challanged with DGI tasks.       Occupational therapy: FIMS:  Eatin - Feeds self with setup/supervision/cues and/or requires only setup/supervision/cues to perform tube feedings  Groomin - Requires setup/cues to do all tasks  Bathing: 3 - Able to bathe 5-7 areas  Dressing-Upper: 0 - Did not occur  Dressing-Lower: 2 - Requires assist with 4-5 parts of dressing  Toiletin - Did not occur  Toilet Transfer: 4 - Requires steadying assistance only < 25% assist  Tub Transfer: 0 - Activity does not occur  Shower Transfer: 6 - Modified independence,  ,      Speech therapy: FIMS: Comprehension: 6 - Complex ideas 90% or device (hearing aid/glasses)  Expression: 7 - Patient expresses complex ideas/needs  Social Interaction: 7 - Patient has appropriate behavior/relations 100% of the time  Problem Solvin - Patient independent with complex

## 2018-09-20 NOTE — PROGRESS NOTES
Anxiety     Aspiration into respiratory tract 11/10/2017    Aspiration pneumonia (HCC)     Bilateral pulmonary embolism ([address] 1/6/2018    BPH (benign prostatic hyperplasia)     COPD (chronic obstructive pulmonary disease) ([address] 12/5/2013    Debility     Elevated CK 12/30/2013    Foraminal stenosis of lumbosacral region 6/7/2016    GERD (gastroesophageal reflux disease) 12/11/2014    History of asthma 1/13/2014    HTN (hypertension) 1/13/2014    past braden / no current meds    Hx of blood clots 01/2018    DVT  ( unsure which leg but both were swollen ) -> PE -> thus Xarelto    Hyperlipidemia     meds > 10 yrs    Hypothyroidism     Insomnia     Lower extremity weakness 1/24/2014    On home oxygen therapy     2L at night    Osteoarthritis of spine with radiculopathy, lumbar region 6/7/2016    Papillary thyroid carcinoma ([facility] 12/2006    no trx to follow    Positive D dimer 12/5/2013    Sleep apnea 1/24/2014    Type 2 diabetes mellitus ([facility]     diet control  / no meds    Vitamin D deficiency      Past Surgical History:   Procedure Laterality Date    BRONCHOSCOPY N/A 11/11/2017    BRONCHOSCOPY FIBEROPTIC performed by Dulce Fountain MD at [facility] COLONOSCOPY      ENDOSCOPY, COLON, DIAGNOSTIC      EYE SURGERY      phaco with IOL OU    HERNIA REPAIR  2684B    repair umbilical hernia    KNEE SURGERY Left 1970    [place] ARTHROSCOPY SHOULDER SURGICAL BICEPS TENODESIS Right 9/18/2018    RIGHT SHOULDER ARTHROSCOPY SUBACROMIAL DECOMPRESS WITH POSSIBLE BICEPS TENODESIS ARTHROSCOPIC FRANSISCA C- ARM ARTHREX BICEPS TENODESIS [facility] CASE #1 1 HOUR performed by Loki Russo MD at [address] [place] [address] Right 11/14/2017    RIGHT THORACOTOMY WEDGE RESECTION FOR FOREIGN BODY AND FOB DOUBLE LUMEN (1ST CASE) performed by Иван Hdez MD at [address] [place] [address]  2006    cancer / no chemo or radiation       Chart Reviewed: Yes  Family / Caregiver Present: Yes (son present during interview)  Diagnosis: Right Shoulder OA s/p R Shoulder Arthrosocy Subacromial Decompression with Biceps  General Comment  Comments: PT initated at bedside. Agreeable to PT evaluation    Restrictions:  Restrictions/Precautions: Fall Risk  Upper Extremity Weight Bearing Restrictions  Right Upper Extremity Weight Bearing: Non Weight Bearing  Position Activity Restriction  Other position/activity restrictions: NWB R UE; No shoulder ROM; Sling donned R UE     SUBJECTIVE: Subjective: \"My hip's been giving me trouble. \"    Pre Treatment Pain Screening  Comments / Details: denies    Post Treatment Pain Screening:  Pain Assessment  Pain Assessment:  (denies)    Prior Level of Function:  Social/Functional History  Lives With: Alone  Type of Home: House  Home Layout: One level  Home Access: Level entry  Home Equipment: 4 wheeled walker, Cane, Oxygen  ADL Assistance: Independent  Homemaking Assistance: Independent  Ambulation Assistance: Independent (with rollator in home. Rarely uses rollator outside home.)  Transfer Assistance: Independent    OBJECTIVE:   Vision/Hearing:  Vision Exceptions: Wears glasses at all times  Hearing Exceptions: Bilateral hearing aid    Cognition:  Overall Orientation Status: Within Normal Limits  Follows Commands: Within Functional Limits  Observation/Palpation  Observation: RIght arm in sling donned. Pt alert and pleasant. No acute distress. Healing laceration R eyebrow. ROM:  RLE PROM: WFL  RLE General PROM: Limited hip ROM all directions, especially IR/ER, ADD, Flex  LLE PROM: WFL    Strength:  Strength RLE  Strength RLE: WFL  Strength LLE  Strength LLE: WFL    Neuro:        Balance  Comments: DGI completed  Motor Control  Gross Motor?: WNL    Bed mobility  Rolling to Left: Modified independent  Rolling to Right: Unable to assess (NA)  Supine to Sit: Moderate assistance  Sit to Supine: Supervision  Comment: Pt instructed in logroll technique.  Difficulty pushing up with L UE to Home Exercise Program, Safety Education & Training, Transfer Training, Endurance Training, Neuromuscular Re-education, Patient/Caregiver Education & Training, Pain Management, Positioning, Equipment Evaluation, Education, & procurement, Modalities, ROM, ADL/Self-care Training, Manual Therapy - Soft Tissue Mobilization, Manual Therapy - Joint Manipulation    Patient's Goal:  Get back home    GOALS:  Short term goals  Short term goal 1: Pt to complete HEP with indep  Short term goal 2: Pt to achieve 19/24 DGI to demo meaningful improvement in balance with decreased risk of falls  Long term goals  Long term goal 1: Pt to complete all bed mobility with indep  Long term goal 2: Pt to complete all transfers with indep  Long term goal 3: Pt to ambulate 150ft with LRD and indep  Long term goal 4: Pt to manage 4 steps with HR indep    ELOS:   Plan weeks: 1    Therapy Time:    Individual   Time In 1100   Time Out 1200   Minutes 60     60 Minutes (25min manual; 15min transfers)       Ralph Rodriguez PT, 09/20/18 at 12:56 PM

## 2018-09-20 NOTE — PROGRESS NOTES
Arthroscopy Subacromial decompression with Biceps  Pre Treatment Pain Screening  Pain at present: 0  Scale Used: Numeric Score  Intervention List: Patient able to continue with treatment  Pain Assessment  Patient Currently in Pain: Yes  Pain Location: Groin;Leg;Other (Comment) (right thigh )  Pain Orientation: Right  Pain Descriptors: Cramping  Pain Intervention(s): Heat applied;Repositioned  Vital Signs  Patient Currently in Pain: Yes     Objective      Standing Balance  Sit to stand: Stand by assistance  Stand to sit: Stand by assistance     Transfers  Sit to stand: Stand by assistance  Stand to sit: Stand by assistance  Transfer Comments: Pt stood to reposition and relieve leg cramping. Upper Extremity Function  UE Strengthing: Pt wore a 1# wrist weight on his Left wrist to don/doff rubber rings onto their corresponding vertical graded douglas. Pt had Min difficulty with activity and required rest breaks throughout. To increase LUE strength and endurance for improved transfers. Pt reported increased fatigue with activity. Assessment   Performance deficits / Impairments: Decreased functional mobility ; Decreased ADL status; Decreased strength;Decreased endurance;Decreased high-level IADLs;Decreased balance  Prognosis: Good  Decision Making: Medium Complexity  History: multiple  Exam: 5 deficits  Assistance / Modification: Max A  REQUIRES OT FOLLOW UP: Yes  Activity Tolerance  Activity Tolerance: Patient limited by fatigue  Activity Tolerance: Fair  Safety Devices  Safety Devices in place: Yes  Type of devices:  All fall risk precautions in place  Restraints  Initially in place: No          Plan   Plan  Times per week: 5-7x/wk  mins  Times per day: Daily  Plan weeks: 1 1/2 weeks  Current Treatment Recommendations: Strengthening, Balance Training, Functional Mobility Training, Home Management Training, Self-Care / ADL, Neuromuscular Re-education, Endurance Training    Goals  Patient Goals   Patient goals :

## 2018-09-21 LAB — URINE CULTURE, ROUTINE: NORMAL

## 2018-09-21 PROCEDURE — 97530 THERAPEUTIC ACTIVITIES: CPT

## 2018-09-21 PROCEDURE — 97535 SELF CARE MNGMENT TRAINING: CPT

## 2018-09-21 PROCEDURE — 6370000000 HC RX 637 (ALT 250 FOR IP): Performed by: PHYSICAL MEDICINE & REHABILITATION

## 2018-09-21 PROCEDURE — 6370000000 HC RX 637 (ALT 250 FOR IP): Performed by: NURSE PRACTITIONER

## 2018-09-21 PROCEDURE — 1180000000 HC REHAB R&B

## 2018-09-21 PROCEDURE — 94640 AIRWAY INHALATION TREATMENT: CPT

## 2018-09-21 PROCEDURE — 97116 GAIT TRAINING THERAPY: CPT

## 2018-09-21 PROCEDURE — 99233 SBSQ HOSP IP/OBS HIGH 50: CPT | Performed by: PHYSICAL MEDICINE & REHABILITATION

## 2018-09-21 RX ADMIN — OXYCODONE HYDROCHLORIDE AND ACETAMINOPHEN 1 TABLET: 5; 325 TABLET ORAL at 07:29

## 2018-09-21 RX ADMIN — MAGESIUM CITRATE 150 ML: 1.75 LIQUID ORAL at 11:56

## 2018-09-21 RX ADMIN — AMLODIPINE BESYLATE 2.5 MG: 2.5 TABLET ORAL at 07:25

## 2018-09-21 RX ADMIN — Medication 2 PUFF: at 04:35

## 2018-09-21 RX ADMIN — Medication 2 PUFF: at 16:32

## 2018-09-21 RX ADMIN — VITAMIN D, TAB 1000IU (100/BT) 2000 UNITS: 25 TAB at 07:26

## 2018-09-21 RX ADMIN — DOCUSATE SODIUM 100 MG: 100 CAPSULE, LIQUID FILLED ORAL at 07:26

## 2018-09-21 RX ADMIN — LEVOTHYROXINE SODIUM 137 MCG: 25 TABLET ORAL at 05:20

## 2018-09-21 RX ADMIN — DOCUSATE SODIUM 100 MG: 100 CAPSULE, LIQUID FILLED ORAL at 21:10

## 2018-09-21 RX ADMIN — RIVAROXABAN 20 MG: 20 TABLET, FILM COATED ORAL at 17:17

## 2018-09-21 RX ADMIN — MIRTAZAPINE 15 MG: 15 TABLET, FILM COATED ORAL at 21:10

## 2018-09-21 RX ADMIN — FINASTERIDE 5 MG: 5 TABLET, FILM COATED ORAL at 11:56

## 2018-09-21 RX ADMIN — OXYCODONE HYDROCHLORIDE AND ACETAMINOPHEN 1 TABLET: 5; 325 TABLET ORAL at 01:41

## 2018-09-21 RX ADMIN — TAMSULOSIN HYDROCHLORIDE 0.4 MG: 0.4 CAPSULE ORAL at 21:11

## 2018-09-21 RX ADMIN — OXYCODONE HYDROCHLORIDE AND ACETAMINOPHEN 1 TABLET: 5; 325 TABLET ORAL at 23:11

## 2018-09-21 ASSESSMENT — PAIN DESCRIPTION - ORIENTATION
ORIENTATION: RIGHT
ORIENTATION: RIGHT

## 2018-09-21 ASSESSMENT — PAIN DESCRIPTION - PAIN TYPE
TYPE: ACUTE PAIN
TYPE: CHRONIC PAIN

## 2018-09-21 ASSESSMENT — PAIN SCALES - GENERAL
PAINLEVEL_OUTOF10: 3
PAINLEVEL_OUTOF10: 0
PAINLEVEL_OUTOF10: 1
PAINLEVEL_OUTOF10: 7
PAINLEVEL_OUTOF10: 8
PAINLEVEL_OUTOF10: 5

## 2018-09-21 ASSESSMENT — PAIN DESCRIPTION - LOCATION
LOCATION: LEG
LOCATION: SHOULDER

## 2018-09-21 ASSESSMENT — PAIN DESCRIPTION - DESCRIPTORS: DESCRIPTORS: ACHING

## 2018-09-21 ASSESSMENT — PAIN DESCRIPTION - PROGRESSION: CLINICAL_PROGRESSION: GRADUALLY IMPROVING

## 2018-09-21 NOTE — PROGRESS NOTES
present: 0  Pain Assessment  Pain Level: 0   Orientation     Objective      Pt completed sponge bath seated in bathroom chair at sink    ADL  Grooming: Setup  UE Bathing: Minimal assistance (L UE)  LE Bathing: Minimal assistance (B feet)  UE Dressing: Minimal assistance (pull over head X 1/1)  LE Dressing: Minimal assistance (R sock)  Toileting: Supervision        Toilet Transfers  Toilet - Technique: Ambulating  Equipment Used: Grab bars  Toilet Transfer: Supervision  Toilet Transfers Comments: 0 device              Assessment      Activity Tolerance  Activity Tolerance: Patient Tolerated treatment well  Safety Devices  Safety Devices in place: Yes  Type of devices: All fall risk precautions in place          Plan   Plan  Times per week: 5-7x/wk  mins  Times per day: Daily  Plan weeks: 1 1/2 weeks  Current Treatment Recommendations: Strengthening, Balance Training, Functional Mobility Training, Home Management Training, Self-Care / ADL, Neuromuscular Re-education, Endurance Training  G-Code     OutComes Score                                           AM-PAC Score             Goals  Patient Goals   Patient goals :  To return to home when ready       Therapy Time   Individual Concurrent Group Co-treatment   Time In 0800         Time Out 0900         Minutes 60               Electronically signed by CHERIE Farris on 9/21/2018 at 11:48 AM  CHERIE Farris

## 2018-09-22 PROCEDURE — 94640 AIRWAY INHALATION TREATMENT: CPT

## 2018-09-22 PROCEDURE — 97110 THERAPEUTIC EXERCISES: CPT

## 2018-09-22 PROCEDURE — 6370000000 HC RX 637 (ALT 250 FOR IP): Performed by: NURSE PRACTITIONER

## 2018-09-22 PROCEDURE — 97112 NEUROMUSCULAR REEDUCATION: CPT

## 2018-09-22 PROCEDURE — 1180000000 HC REHAB R&B

## 2018-09-22 PROCEDURE — 97116 GAIT TRAINING THERAPY: CPT

## 2018-09-22 PROCEDURE — 6370000000 HC RX 637 (ALT 250 FOR IP): Performed by: PHYSICAL MEDICINE & REHABILITATION

## 2018-09-22 PROCEDURE — 97150 GROUP THERAPEUTIC PROCEDURES: CPT

## 2018-09-22 PROCEDURE — 97530 THERAPEUTIC ACTIVITIES: CPT

## 2018-09-22 RX ORDER — LACTULOSE 10 G/15ML
20 SOLUTION ORAL 2 TIMES DAILY PRN
Status: DISCONTINUED | OUTPATIENT
Start: 2018-09-22 | End: 2018-09-27 | Stop reason: HOSPADM

## 2018-09-22 RX ADMIN — FINASTERIDE 5 MG: 5 TABLET, FILM COATED ORAL at 12:07

## 2018-09-22 RX ADMIN — Medication 2 PUFF: at 04:33

## 2018-09-22 RX ADMIN — MELATONIN TAB 3 MG 10 MG: 3 TAB at 22:39

## 2018-09-22 RX ADMIN — ACETAMINOPHEN 650 MG: 325 TABLET ORAL at 12:07

## 2018-09-22 RX ADMIN — DOCUSATE SODIUM 100 MG: 100 CAPSULE, LIQUID FILLED ORAL at 07:49

## 2018-09-22 RX ADMIN — LACTULOSE 20 G: 20 SOLUTION ORAL at 22:39

## 2018-09-22 RX ADMIN — AMLODIPINE BESYLATE 2.5 MG: 2.5 TABLET ORAL at 07:49

## 2018-09-22 RX ADMIN — MAGESIUM CITRATE 150 ML: 1.75 LIQUID ORAL at 12:08

## 2018-09-22 RX ADMIN — RIVAROXABAN 20 MG: 20 TABLET, FILM COATED ORAL at 17:59

## 2018-09-22 RX ADMIN — TAMSULOSIN HYDROCHLORIDE 0.4 MG: 0.4 CAPSULE ORAL at 19:40

## 2018-09-22 RX ADMIN — Medication 2 PUFF: at 18:13

## 2018-09-22 RX ADMIN — VITAMIN D, TAB 1000IU (100/BT) 2000 UNITS: 25 TAB at 07:49

## 2018-09-22 RX ADMIN — LEVOTHYROXINE SODIUM 137 MCG: 25 TABLET ORAL at 06:41

## 2018-09-22 RX ADMIN — DOCUSATE SODIUM 100 MG: 100 CAPSULE, LIQUID FILLED ORAL at 19:40

## 2018-09-22 RX ADMIN — MIRTAZAPINE 15 MG: 15 TABLET, FILM COATED ORAL at 19:40

## 2018-09-22 RX ADMIN — OXYCODONE HYDROCHLORIDE AND ACETAMINOPHEN 1 TABLET: 5; 325 TABLET ORAL at 19:40

## 2018-09-22 ASSESSMENT — PAIN SCALES - GENERAL
PAINLEVEL_OUTOF10: 8
PAINLEVEL_OUTOF10: 0
PAINLEVEL_OUTOF10: 3
PAINLEVEL_OUTOF10: 3
PAINLEVEL_OUTOF10: 0

## 2018-09-22 ASSESSMENT — PAIN DESCRIPTION - PAIN TYPE: TYPE: ACUTE PAIN

## 2018-09-22 ASSESSMENT — PAIN DESCRIPTION - DESCRIPTORS: DESCRIPTORS: CRAMPING

## 2018-09-22 ASSESSMENT — PAIN DESCRIPTION - LOCATION
LOCATION: GROIN
LOCATION: GROIN

## 2018-09-22 ASSESSMENT — PAIN DESCRIPTION - ORIENTATION: ORIENTATION: RIGHT

## 2018-09-22 NOTE — PROGRESS NOTES
Physical Therapy Rehab Treatment Note  [facility]  [facility]       NAME: Kali Mar  :  (70 y.o.)  MRN: [id number]  CODE STATUS: Full Code    Date of Service: 2018  Chart Reviewed: Yes  Family / Caregiver Present: No    Restrictions:  Restrictions/Precautions: Fall Risk  Upper Extremity Weight Bearing Restrictions  Right Upper Extremity Weight Bearing: Non Weight Bearing  Position Activity Restriction  Other position/activity restrictions: NWB R UE; No shoulder ROM; Sling donned R UE       SUBJECTIVE: Subjective: \"the groin is bothering me more than the shoulder. \"  Pain Screening  Patient Currently in Pain: Denies  Pre Treatment Pain Screening  Pain at present: 0  Scale Used: Numeric Score  Intervention List: Patient able to continue with treatment;Patient declined any intervention    Post Treatment Pain Screening:  Pain Assessment  Pain Assessment: 0-10  Pain Level: 0    OBJECTIVE:   Overall Orientation Status: Within Normal Limits    Transfers  Sit to Stand: Supervision  Stand to sit: Supervision    Ambulation  Ambulation?: Yes  Ambulation 1  Surface: level tile;carpet  Device: No Device  Other Apparatus:  (shawnaing RUE)  Assistance: Stand by assistance  Quality of Gait: WBOS, antalgic gait, lateral sway, mild lat deviations throughout, though pt able to self-correct w/o complete LOB, decreased foot clearance this PM w/ increased fatigue  Distance: 200ft, 50ft x 2    Activity Tolerance  Activity Tolerance: Patient Tolerated treatment well    Other exercises 1: Sink ex's x10 ea w/ LUE support      ASSESSMENT/COMMENTS:  Assessment: Tx limited d/t pt needing to have a BM. Pt challenged w/ sink ex's on RLE d/t pain in groin. PLAN OF CARE/Safety:   Plan Comment: Cont per POC. Safety Devices  Type of devices:  All fall risk precautions in place      Therapy Time:   Individual   Time In 1300   Time Out 1323   Minutes 23     Minutes:23      Transfer/Bed mobility

## 2018-09-22 NOTE — PROGRESS NOTES
with Biceps  Pre Treatment Pain Screening  Pain at present: 0  Scale Used: Numeric Score  Intervention List: Patient able to continue with treatment  Pain Assessment  Patient Currently in Pain: No  Vital Signs  Patient Currently in Pain: No     Objective  Pt tolerated 40 minutes of unsupported sitting EOM. Pt engaged in various therapeutic activities with one other pt. Pt was pleasant and socialized with therapist and other pt. Pt sat EOM to engage in various dynamic sitting activities. Pt used his left hand to toss bean bags at a corn hole board with Min difficulty and rest breaks. Pt used his left hand to toss rings at a pole at various graded locations with Min difficulty and rest breaks. To facilitate dynamic sitting balance with weight shifting and trunk rotation/flexion. Standing Balance  Sit to stand: Stand by assistance  Stand to sit: Stand by assistance     Transfers  Stand Pivot Transfers: Contact guard assistance (WC > EOM )  Sit to stand: Stand by assistance  Stand to sit: Stand by assistance     Pt stood to engage in a game of [employer] using his Left hand. Pt stood with F balance for a maximum of 12 minutes to engage in activity. To increase standing tolerance for improved ADL completion. Problem solving incorporated into activity. Cognition  Overall Cognitive Status: WFL     Upper Extremity Function  UE Strengthing: HEP (can exercises) 2x10 reps, 2# dumbbell, various planes, rest breaks prn. To increase LUE strength and endurance for improved transfers. Educated pt in exercise technique and provided cues to maintain. Pt sat EOM to complete exercise routine. Assessment   Activity Tolerance  Activity Tolerance: Patient Tolerated treatment well  Safety Devices  Safety Devices in place: Yes  Type of devices:  All fall risk precautions in place          Plan   Plan  Times per week: 5-7x/wk  mins  Times per day: Daily  Plan weeks: 1 1/2 weeks  Current Treatment Recommendations:

## 2018-09-22 NOTE — PROGRESS NOTES
Physical Therapy Rehab Treatment Note  [facility]Department[facility]  [facility]       NAME: Pam Moseley  : [birth year] (89 y.o.)  MRN: [id number]  CODE STATUS: Full Code    Date of Service: 2018  Chart Reviewed: Yes  Family / Caregiver Present: No    Restrictions:  Restrictions/Precautions: Fall Risk  Upper Extremity Weight Bearing Restrictions  Right Upper Extremity Weight Bearing: Non Weight Bearing  Position Activity Restriction  Other position/activity restrictions: NWB R UE; No shoulder ROM; Sling donned R UE       SUBJECTIVE: Subjective: Pt declines pain currently. Pt states \"I have bad groin muscle though that gives me trouble. \" Pt declines any issues since fall yesterday.   Pain Screening  Patient Currently in Pain: Denies  Pre Treatment Pain Screening  Pain at present: 0  Scale Used: Numeric Score  Intervention List: Patient able to continue with treatment;Patient declined any intervention    Post Treatment Pain Screening:  Pain Assessment  Pain Assessment: 0-10  Pain Level: 0    OBJECTIVE:   Overall Orientation Status: Within Normal Limits    Neuromuscular Education  Neuromuscular Comments: gait drills in hallway: /L//march w/ 0-1 UE support on railing. bean bag toss unsupported w/ LUE    Transfers  Sit to Stand: Supervision  Stand to sit: Supervision    Ambulation  Ambulation?: Yes  Ambulation 1  Surface: level tile;carpet  Device: No Device  Other Apparatus:  (RUE sling)  Assistance: Stand by assistance  Quality of Gait: WBOS, antalgic gait, lateral sway, mild lat deviations throughout, though pt able to self-correct w/o complete LOB  Distance: 200ft  Stairs/Curb  Stairs?: Yes   Stairs  # Steps : 12  Stairs Height: 6\"  Rails: Left ascending  Device: No Device  Assistance: Stand by assistance    Activity Tolerance  Activity Tolerance: Patient Tolerated treatment well    Exercises  Hip Flexion: x20  Hip Abduction: w/ RTB x20  Knee Long Arc Quad: x20 (attempted w/ 1# wt, though pt noting inc groin pain so performed w/o)  Ankle Pumps: x20  Comments: hip add w/ ball 3''x20     Outcomes Measures:  Alexander Balance Score: 41     ASSESSMENT/COMMENTS:  Assessment: Initiated alexander this date w/ pt scoring 41/56. Pt most challenged alt step taps and unable to try SLS d/t safety concerns    PLAN OF CARE/Safety:   Plan Comment: Cont per POC. Safety Devices  Type of devices:  All fall risk precautions in place      Therapy Time:   Individual   Time In 0800   Time Out 0900   Minutes 60     Minutes: 60      Transfer/Bed mobility trainin      Gait training: 15      Neuro re education: 20     Therapeutic ex:10      Gaby Medrano PTA, 18 at 9:26 AM

## 2018-09-23 PROCEDURE — 6370000000 HC RX 637 (ALT 250 FOR IP): Performed by: NURSE PRACTITIONER

## 2018-09-23 PROCEDURE — 1180000000 HC REHAB R&B

## 2018-09-23 PROCEDURE — 94640 AIRWAY INHALATION TREATMENT: CPT

## 2018-09-23 PROCEDURE — 6370000000 HC RX 637 (ALT 250 FOR IP): Performed by: PHYSICAL MEDICINE & REHABILITATION

## 2018-09-23 PROCEDURE — 2700000000 HC OXYGEN THERAPY PER DAY

## 2018-09-23 RX ORDER — IPRATROPIUM BROMIDE AND ALBUTEROL SULFATE 2.5; .5 MG/3ML; MG/3ML
1 SOLUTION RESPIRATORY (INHALATION) EVERY 4 HOURS PRN
Status: DISCONTINUED | OUTPATIENT
Start: 2018-09-23 | End: 2018-09-27 | Stop reason: HOSPADM

## 2018-09-23 RX ORDER — IPRATROPIUM BROMIDE AND ALBUTEROL SULFATE 2.5; .5 MG/3ML; MG/3ML
1 SOLUTION RESPIRATORY (INHALATION)
Status: DISCONTINUED | OUTPATIENT
Start: 2018-09-23 | End: 2018-09-23

## 2018-09-23 RX ADMIN — OXYCODONE HYDROCHLORIDE AND ACETAMINOPHEN 1 TABLET: 5; 325 TABLET ORAL at 18:49

## 2018-09-23 RX ADMIN — IPRATROPIUM BROMIDE AND ALBUTEROL SULFATE 1 AMPULE: .5; 3 SOLUTION RESPIRATORY (INHALATION) at 19:25

## 2018-09-23 RX ADMIN — Medication 2 PUFF: at 17:03

## 2018-09-23 RX ADMIN — Medication 2 PUFF: at 04:50

## 2018-09-23 RX ADMIN — DOCUSATE SODIUM 100 MG: 100 CAPSULE, LIQUID FILLED ORAL at 21:00

## 2018-09-23 RX ADMIN — MIRTAZAPINE 15 MG: 15 TABLET, FILM COATED ORAL at 21:00

## 2018-09-23 RX ADMIN — DOCUSATE SODIUM 100 MG: 100 CAPSULE, LIQUID FILLED ORAL at 08:42

## 2018-09-23 RX ADMIN — RIVAROXABAN 20 MG: 20 TABLET, FILM COATED ORAL at 17:17

## 2018-09-23 RX ADMIN — VITAMIN D, TAB 1000IU (100/BT) 2000 UNITS: 25 TAB at 08:42

## 2018-09-23 RX ADMIN — TAMSULOSIN HYDROCHLORIDE 0.4 MG: 0.4 CAPSULE ORAL at 21:00

## 2018-09-23 RX ADMIN — OXYCODONE HYDROCHLORIDE AND ACETAMINOPHEN 1 TABLET: 5; 325 TABLET ORAL at 08:46

## 2018-09-23 RX ADMIN — AMLODIPINE BESYLATE 2.5 MG: 2.5 TABLET ORAL at 08:42

## 2018-09-23 RX ADMIN — LEVOTHYROXINE SODIUM 137 MCG: 25 TABLET ORAL at 05:39

## 2018-09-23 RX ADMIN — MAGESIUM CITRATE 150 ML: 1.75 LIQUID ORAL at 11:50

## 2018-09-23 RX ADMIN — FINASTERIDE 5 MG: 5 TABLET, FILM COATED ORAL at 10:11

## 2018-09-23 ASSESSMENT — PAIN SCALES - GENERAL
PAINLEVEL_OUTOF10: 7
PAINLEVEL_OUTOF10: 6

## 2018-09-23 NOTE — PROGRESS NOTES
has appropriate behavior/relations 100% of the time  Problem Solvin - Patient independent with complex tasks  Memory: 7 - Patient independent with meds/people/schedule      Lab/X-ray studies reviewed, analyzed and discussed with patient and staff:   No results found for this or any previous visit (from the past 24 hour(s)). Xr Chest Standard (2 Vw)Result Date: 2018   . CONCLUSION: SCARRING AT RIGHT LUNG BASE        Fluoro For Surgical Procedures Result Date: 2018  FLUORO FOR SURGICAL PROCEDURES : 2018 CLINICAL HISTORY: R52 Pain ICD10. COMPARISON: Right shoulder radiographs 3/22/2018. Intraoperative fluoroscopy was provided for Dr. Hardeep Thayer right shoulder arthroscopy procedure. A total of 25.9 seconds of fluoroscopy was used, with 22 fluoroscopic stills saved. No diagnostic images were obtained. Please see Dr. Hardeep Thaeyr surgical notes for completeness. Previous extensive, complex labs, notes and diagnostics reviewed and analyzed. ALLERGIES:    Allergies as of 2018    (No Known Allergies)      (please also verify by checking MAR)     Today I evaluated this patient for periodic reassessment of medical and functional status. The patient was discussed in detail at the treatment team meeting focusing on current medical issues, progress in therapies, social issues, psychological issues, barriers to progress and strategies to address these barriers, and discharge planning. See the hand written addendum to rehab progress note. The patient continues to be high risk for future disability and their medical and rehabilitation prognosis continue to be good and therefore, we will continue the patient's rehabilitation course as planned. The patient's tentative discharge date was set. Patient and family education was discussed. The patient was made aware of the team discussion regarding their progress. Complex Physical Medicine & Rehab Issues Assess & Plan:   1.  Severe abnormality of gait

## 2018-09-23 NOTE — PROGRESS NOTES
and mobility and impaired self-care and ADL's secondary to progressive  flare up of generalized osteoarthritis status post right shoulder surgery . Functional and medical status reassessed regarding patients ability to participate in therapies and patient found to be able to participate in acute intensive comprehensive inpatient rehabilitation program including PT/OT to improve balance, ambulation, ADLs, and to improve the P/AROM. Therapeutic modifications regarding activities in therapies, place, amount of time per day and intensity of therapy made daily. In bed therapies or bedside therapies prn.   2. Bowel opiate-related constipation and Bladder dysfunction:  frequent toileting, ambulate to bathroom with assistance, check post void residuals. Check for C.difficile x1 if >2 loose stools in 24 hours, continue bowel & bladder program.  Monitor bowel and bladder function. Lactinex 2 PO every AC. MOM prn, Brown Bomb prn, Glycerin suppository prn, enema prn. Mag Citrate  3. Severe postop right shoulder pain severe OA in right hip-generalized OA pain: reassess pain every shift and prior to and after each therapy session, give prn Tylenol and  Percocet, modalities prn in therapy, Lidoderm, K-pad prn. NWB to RUE. Sling intact   4. Skin healing right shoulder incisions and breakdown risk:  continue pressure relief program.  Daily skin exams and reports from nursing. 5. Severe progressive Fatigue due to nutritional and hydration deficiency:  continue to monitor I&Os, calorie counts prn, dietary consult prn. Vitamin B12 vitamin D and CoQ10  6. Acute episodic insomnia with situational adjustment disorder:  prn Ambien, monitor for day time sedation. 7. Falls risk elevated:  patient to use call light to get nursing assistance to get up, bed and chair alarm. 8. Elevated DVT risk: progressive activities in PT, continue prophylaxis SATURNINO hose, elevation . 9.  Complex discharge planning:  Weekly team meeting every Monday

## 2018-09-24 PROCEDURE — 6370000000 HC RX 637 (ALT 250 FOR IP): Performed by: NURSE PRACTITIONER

## 2018-09-24 PROCEDURE — 97535 SELF CARE MNGMENT TRAINING: CPT

## 2018-09-24 PROCEDURE — 94640 AIRWAY INHALATION TREATMENT: CPT

## 2018-09-24 PROCEDURE — 1180000000 HC REHAB R&B

## 2018-09-24 PROCEDURE — 97110 THERAPEUTIC EXERCISES: CPT

## 2018-09-24 PROCEDURE — 97150 GROUP THERAPEUTIC PROCEDURES: CPT

## 2018-09-24 PROCEDURE — 97116 GAIT TRAINING THERAPY: CPT

## 2018-09-24 PROCEDURE — 2700000000 HC OXYGEN THERAPY PER DAY

## 2018-09-24 PROCEDURE — 99232 SBSQ HOSP IP/OBS MODERATE 35: CPT | Performed by: PHYSICAL MEDICINE & REHABILITATION

## 2018-09-24 PROCEDURE — 97112 NEUROMUSCULAR REEDUCATION: CPT

## 2018-09-24 RX ADMIN — Medication 2 PUFF: at 16:33

## 2018-09-24 RX ADMIN — AMLODIPINE BESYLATE 2.5 MG: 2.5 TABLET ORAL at 07:59

## 2018-09-24 RX ADMIN — MIRTAZAPINE 15 MG: 15 TABLET, FILM COATED ORAL at 19:31

## 2018-09-24 RX ADMIN — RIVAROXABAN 20 MG: 20 TABLET, FILM COATED ORAL at 16:47

## 2018-09-24 RX ADMIN — DOCUSATE SODIUM 100 MG: 100 CAPSULE, LIQUID FILLED ORAL at 07:58

## 2018-09-24 RX ADMIN — LEVOTHYROXINE SODIUM 137 MCG: 25 TABLET ORAL at 05:07

## 2018-09-24 RX ADMIN — OXYCODONE HYDROCHLORIDE AND ACETAMINOPHEN 1 TABLET: 5; 325 TABLET ORAL at 00:53

## 2018-09-24 RX ADMIN — FINASTERIDE 5 MG: 5 TABLET, FILM COATED ORAL at 12:03

## 2018-09-24 RX ADMIN — VITAMIN D, TAB 1000IU (100/BT) 2000 UNITS: 25 TAB at 07:58

## 2018-09-24 RX ADMIN — Medication 2 PUFF: at 05:02

## 2018-09-24 RX ADMIN — OXYCODONE HYDROCHLORIDE AND ACETAMINOPHEN 1 TABLET: 5; 325 TABLET ORAL at 19:31

## 2018-09-24 RX ADMIN — TAMSULOSIN HYDROCHLORIDE 0.4 MG: 0.4 CAPSULE ORAL at 19:31

## 2018-09-24 ASSESSMENT — PAIN DESCRIPTION - PAIN TYPE: TYPE: ACUTE PAIN

## 2018-09-24 ASSESSMENT — PAIN DESCRIPTION - FREQUENCY: FREQUENCY: CONTINUOUS

## 2018-09-24 ASSESSMENT — PAIN DESCRIPTION - ORIENTATION: ORIENTATION: RIGHT

## 2018-09-24 ASSESSMENT — PAIN DESCRIPTION - LOCATION: LOCATION: SHOULDER

## 2018-09-24 ASSESSMENT — PAIN DESCRIPTION - DESCRIPTORS
DESCRIPTORS: ACHING
DESCRIPTORS: ACHING

## 2018-09-24 ASSESSMENT — PAIN SCALES - GENERAL
PAINLEVEL_OUTOF10: 0
PAINLEVEL_OUTOF10: 8
PAINLEVEL_OUTOF10: 8
PAINLEVEL_OUTOF10: 2
PAINLEVEL_OUTOF10: 7
PAINLEVEL_OUTOF10: 0
PAINLEVEL_OUTOF10: 2
PAINLEVEL_OUTOF10: 2

## 2018-09-24 NOTE — PROGRESS NOTES
Physical Therapy Rehab Treatment Note  [facility]Department[facility]  [facility]       NAME: Paula Mojica  :  (69 y.o.)  MRN: [id number]  CODE STATUS: Full Code    Date of Service: 2018  Chart Reviewed: Yes  Patient assessed for rehabilitation services?: Yes  Family / Caregiver Present: No  Diagnosis: Right Shoulder OA s/p R Shoulder Arthrosocy Subacromial Decompression with Biceps    Restrictions:  Restrictions/Precautions: Fall Risk       SUBJECTIVE: Subjective: \"My shoulder feels fine, its my groin that hurts\"  Response To Previous Treatment: Patient with no complaints from previous session.   Pain Screening  Patient Currently in Pain: Yes  Pre Treatment Pain Screening  Pain at present: 8  Scale Used: Numeric Score  Intervention List: Patient able to continue with treatment;Patient declined any intervention    Post Treatment Pain Screening:  Pain Assessment  Pain Assessment: 0-10  Pain Level: 8  Pain Type: Acute pain  Pain Location:  (groin)  Pain Descriptors: Aching  Pain Frequency: Continuous  Pain Intervention(s): Declines    OBJECTIVE:   Follows Commands: Within Functional Limits            Transfers  Sit to Stand: Supervision;Modified independent  Stand to sit: Supervision;Modified independent           Exercises  Neurodevelopmental Techniques: mod tandem standing no LUE support x30 sec, narrow KIMMY standing eyes closed x30sec, alt tapping 4\" step x20 , alt. side tapping 4\" step x20  (VC needed to remind pt to lift foot and completely clear the step so the pts toes didnt drag on the step)  Other exercises  Other exercises?: Yes  Other exercises 2: standing heel raises x20 LUE support  Other exercises 3: standing marching x20 LUE support  Other exercises 4: standing hip abd x20 LUE support  Other exercises 5: standing leg ext x20 LUE support  Other exercises 6: standing mini squats x10 LUE support     ASSESSMENT/COMMENTS:   VC needed to remind pt to completely  his foot while

## 2018-09-24 NOTE — PROGRESS NOTES
vitamin D and CoQ10  6. Acute episodic insomnia with situational adjustment disorder:  prn Ambien, monitor for day time sedation. 7. Falls risk elevated:  patient to use call light to get nursing assistance to get up, bed and chair alarm. 8. Elevated DVT risk: progressive activities in PT, continue prophylaxis SATURNINO hose, elevation . 9. Complex discharge planning:  Discharge 9/28 to home alone with home health care PT OT RN aide. SP weekly team meeting   to assess progress towards goals, discuss and address social, psychological and medical comorbidities and to address difficulties they may be having progressing in therapy. Patient and family education is in progress. The patient is to follow-up with their family physician after discharge. Complex Active General Medical Issues that complicate care Assess & Plan:    1. HTN (hypertension), Hyperlipidemia-out of signs every shift, check CBC and BMP, dose and titrate cardiac medications including Norvasc Apresoline, Xarelto-consult hospitalist for back of medical and monitor for bleeding at incision  2. GERD (gastroesophageal reflux disease)- stools or blood well on Xarelto  3. Hypothyroidism due to thyroid removal and history of thyroid cancer-Synthroid, monitor vital signs while on Synthroid  4. Type 2 diabetes mellitus diet controlled by history however do not see any diabetes medications I will check his home medication lists  5. BPH (benign prostatic hyperplasia) with history of urinary retention-check UA and postvoid residual dose Proscar and Flomax change Flomax to at bedtime and monitor orthostasis. 6.   Osteoarthritis of spine with radiculopathy, lumbar region-add Lidoderm, Ultram, a crate mattress, heat versus ice  7. Vitamin D deficiency-bolus dose vitamin D add daily dose vitamin D and recheck as an outpatient  8. Sleep apnea and insomnia-CPAP at at bedtime Remeron nightly  9.  History of bilateral pulmonary embolisms usually on

## 2018-09-24 NOTE — PROGRESS NOTES
Occupational Therapy  Facility/Department: [facility]  Daily Treatment Note  NAME: Sonal Canseco  :   MRN: [id number]    Date of Service: 2018    Discharge Recommendations:  Continue to assess pending progress       Patient Diagnosis(es): There were no encounter diagnoses. has a past medical history of Abnormality of gait and mobility; Acute sinusitis; Anxiety; Aspiration into respiratory tract; Aspiration pneumonia ([facility] [address] Bilateral pulmonary embolism (HCC); BPH (benign prostatic hyperplasia); COPD (chronic obstructive pulmonary disease) ([address] Debility; Elevated CK; Foraminal stenosis of lumbosacral region; GERD (gastroesophageal reflux disease); History of asthma; HTN (hypertension); Hx of blood clots; Hyperlipidemia; Hypothyroidism; Insomnia; Lower extremity weakness; On home oxygen therapy; Osteoarthritis of spine with radiculopathy, lumbar region; Papillary thyroid carcinoma ([facility] [address] Positive D dimer; Sleep apnea; Type 2 diabetes mellitus ([facility] [address] and Vitamin D deficiency. has a past surgical history that includes knee surgery (Left, ); Thyroidectomy (); bronchoscopy (N/A, 2017); thoracotomy (Right, 2017); Colonoscopy; Endoscopy, colon, diagnostic; eye surgery; hernia repair (); and pr arthroscopy shoulder surgical biceps tenodesis (Right, 2018). Restrictions  Restrictions/Precautions  Restrictions/Precautions: Fall Risk  Upper Extremity Weight Bearing Restrictions  Right Upper Extremity Weight Bearing: Non Weight Bearing  Position Activity Restriction  Other position/activity restrictions: NWB R UE; No shoulder ROM;  Sling donned R UE  Subjective   General  Chart Reviewed: Yes  Patient assessed for rehabilitation services?: Yes  Response to previous treatment: Patient with no complaints from previous session  Family / Caregiver Present: No  Referring Practitioner: Radha  Diagnosis: Right SHoulder OA S/P Right Shoulder Arthroscopy Subacromial decompression with Biceps  Pre Treatment Pain Screening  Pain at present: 2  Intervention List: Patient able to continue with treatment  Pain Assessment  Pain Level: 2  Pain Location: Shoulder  Pain Orientation: Right  Pain Descriptors: Aching   Orientation     Objective      ADL  Grooming: Independent  UE Bathing: Minimal assistance (L UE)  LE Bathing: Modified independent   UE Dressing: Supervision;Verbal cueing  LE Dressing: Minimal assistance (pull up X 1/2)  Toileting: Modified independent         Toilet Transfers  Toilet - Technique: Ambulating  Equipment Used: Grab bars  Toilet Transfer: Supervision  Toilet Transfers Comments: 0 device  Shower Transfers  Shower - Transfer Type: To and From  Shower - Transfer To: Shower seat with back  Shower - Technique: Ambulating  Shower Transfers: Stand by assistance  Shower Transfers Comments: 0 device                 Assessment      Activity Tolerance  Activity Tolerance: Patient Tolerated treatment well  Safety Devices  Safety Devices in place: Yes  Type of devices: All fall risk precautions in place          Plan   Plan  Times per week: 5-7x/wk  mins  Times per day: Daily  Plan weeks: 1 1/2 weeks  Current Treatment Recommendations: Strengthening, Balance Training, Functional Mobility Training, Home Management Training, Self-Care / ADL, Neuromuscular Re-education, Endurance Training  Plan Comment: Con't OT POC for d/c on 9-28-18  G-Code     OutComes Score                                           AM-PAC Score             Goals  Patient Goals   Patient goals :  To return to home when ready       Therapy Time   Individual Concurrent Group Co-treatment   Time In 0800         Time Out 0900         Minutes 60               Electronically signed by CHERIE Hoffmann on 9/24/2018 at [address] CHERIE Tejada

## 2018-09-24 NOTE — PROGRESS NOTES
2  Stairs/Curb  Stairs?: Yes   Stairs  # Steps : 12  Stairs Height: 6\"  Rails: Left ascending  Device: No Device  Assistance: Stand by assistance    Exercises  Hip Flexion: x20  Hip Abduction: w/ RTB x20/ Ball squeezes  Knee Long Arc Quad: x 20  Ankle Pumps: x20     ASSESSMENT/COMMENTS:  Assessment: Patient shows an increase in DGI score to 18. Pain in right groin limits ambulation requiring rest break to decrease pain    PLAN OF CARE/Safety:   Safety Devices  Type of devices:  All fall risk precautions in place      Therapy Time:   Individual   Time In 0900   Time Out 1000   Minutes 60     Minutes:60      Transfer/Bed mobility trainin      Gait training: 15     Therapeutic ex: [address], PTA, 18 at 12:16 PM

## 2018-09-25 PROCEDURE — 97112 NEUROMUSCULAR REEDUCATION: CPT

## 2018-09-25 PROCEDURE — 1180000000 HC REHAB R&B

## 2018-09-25 PROCEDURE — 94760 N-INVAS EAR/PLS OXIMETRY 1: CPT

## 2018-09-25 PROCEDURE — 97530 THERAPEUTIC ACTIVITIES: CPT

## 2018-09-25 PROCEDURE — 6370000000 HC RX 637 (ALT 250 FOR IP): Performed by: PHYSICAL MEDICINE & REHABILITATION

## 2018-09-25 PROCEDURE — 6370000000 HC RX 637 (ALT 250 FOR IP): Performed by: NURSE PRACTITIONER

## 2018-09-25 PROCEDURE — 97110 THERAPEUTIC EXERCISES: CPT

## 2018-09-25 PROCEDURE — 94640 AIRWAY INHALATION TREATMENT: CPT

## 2018-09-25 PROCEDURE — 2700000000 HC OXYGEN THERAPY PER DAY

## 2018-09-25 PROCEDURE — 97116 GAIT TRAINING THERAPY: CPT

## 2018-09-25 PROCEDURE — 97140 MANUAL THERAPY 1/> REGIONS: CPT

## 2018-09-25 PROCEDURE — 99232 SBSQ HOSP IP/OBS MODERATE 35: CPT | Performed by: PHYSICAL MEDICINE & REHABILITATION

## 2018-09-25 RX ADMIN — TAMSULOSIN HYDROCHLORIDE 0.4 MG: 0.4 CAPSULE ORAL at 20:53

## 2018-09-25 RX ADMIN — LEVOTHYROXINE SODIUM 137 MCG: 25 TABLET ORAL at 05:12

## 2018-09-25 RX ADMIN — IPRATROPIUM BROMIDE AND ALBUTEROL SULFATE 1 AMPULE: .5; 3 SOLUTION RESPIRATORY (INHALATION) at 23:27

## 2018-09-25 RX ADMIN — Medication 2 PUFF: at 16:00

## 2018-09-25 RX ADMIN — Medication 2 PUFF: at 05:51

## 2018-09-25 RX ADMIN — AMLODIPINE BESYLATE 2.5 MG: 2.5 TABLET ORAL at 10:16

## 2018-09-25 RX ADMIN — VITAMIN D, TAB 1000IU (100/BT) 2000 UNITS: 25 TAB at 10:16

## 2018-09-25 RX ADMIN — FINASTERIDE 5 MG: 5 TABLET, FILM COATED ORAL at 12:11

## 2018-09-25 RX ADMIN — MIRTAZAPINE 15 MG: 15 TABLET, FILM COATED ORAL at 20:53

## 2018-09-25 RX ADMIN — ACETAMINOPHEN 650 MG: 325 TABLET ORAL at 20:55

## 2018-09-25 RX ADMIN — RIVAROXABAN 20 MG: 20 TABLET, FILM COATED ORAL at 17:48

## 2018-09-25 RX ADMIN — MAGESIUM CITRATE 150 ML: 1.75 LIQUID ORAL at 12:13

## 2018-09-25 RX ADMIN — ACETAMINOPHEN 650 MG: 325 TABLET ORAL at 07:04

## 2018-09-25 ASSESSMENT — PAIN SCALES - GENERAL
PAINLEVEL_OUTOF10: 5
PAINLEVEL_OUTOF10: 6
PAINLEVEL_OUTOF10: 5
PAINLEVEL_OUTOF10: 0

## 2018-09-25 ASSESSMENT — PAIN DESCRIPTION - PAIN TYPE: TYPE: ACUTE PAIN

## 2018-09-25 NOTE — PROGRESS NOTES
Arthroscopy Subacromial decompression with Biceps  Pre Treatment Pain Screening  Pain at present: 0  Scale Used: Numeric Score  Intervention List: Patient able to continue with treatment  Pain Assessment  Patient Currently in Pain: No  Pain Assessment: 0-10  Pain Level: 0  Vital Signs  Patient Currently in Pain: No   Orientation     Objective    Coordination  Fine Motor: Pt. engaged in B coordination activity, assembling large nuts/bolts. Pt. was able to stabilize with R hand and reach with L hand to gather pieces without difficulty. Pt. had no difficulty manipulating with L hand, min with R hand. Pt. engaged in tabletop static standing task. Pt. was able to stand up to 8 minutes, initially to place small dowels in vertical douglas. Pt. then stood x 5 minutes to hang rings on dowels and a further 3 minutes to remove rings and dowels from douglas. Pt. had no difficulty with transfer sit-stand and was able to perform without cues. Assessment   Assessment: Pt. demonstrates G balance and endurance and G activity tolerance. Pt. continues to benefit from skilled OT to maximize independence with ADLs and IADLs. Activity Tolerance  Activity Tolerance: Patient Tolerated treatment well  Safety Devices  Safety Devices in place: Yes  Type of devices: All fall risk precautions in place          Plan   Plan  Times per week: 5-7x/wk  mins  Times per day: Daily  Plan weeks: 1 1/2 weeks  Current Treatment Recommendations: Strengthening, Balance Training, Functional Mobility Training, Home Management Training, Self-Care / ADL, Neuromuscular Re-education, Endurance Training  Plan Comment: Con't OT POC for d/c on 9-28-18  G-Code     OutComes Score  AM-PAC Score  Goals  Patient Goals   Patient goals :  To return to home when ready       Therapy Time   Individual Concurrent Group Co-treatment   Time In 1400         Time Out 1430         Minutes 30                 STEVE Nelson/L Electronically signed by Elisabeth Rascon

## 2018-09-25 NOTE — PROGRESS NOTES
hip add w/ ball x20 seated EOB without trunk support      ASSESSMENT/COMMENTS:     Pt was able to participate in seated therex on the edge of the mat without trunk support demonstrating good postural awareness and trunk control. Pt tolerated dynamic balance exercises with minimal c/o pain, but was CGA due to occasional LOB. No UE support was offered to the pt during these exercises in order to challenge the pt's dynamic balance. PLAN OF CARE/Safety:   Safety Devices  Type of devices:  All fall risk precautions in place      Therapy Time:   Individual   Time In 1300   Time Out 1330   Minutes 30     Minutes:30      Transfer/Bed mobility trainin      Gait training:10      Neuro re education:15     Therapeutic ex:3      Willie Lemus PTA, 18 at 1:26 PM

## 2018-09-25 NOTE — PROGRESS NOTES
[facility] Occupational Therapy      Date: 2018  Patient Name: Ion Todd        MRN: [id number]  Account: [de-identified]   : 1940  (69 y.o.)  Room: [place] [address]    Long term goal for bathing changed to min assist due to patient will need assist to bathe his left arm 2° no AROM of the right arm allowed.      Electronically signed by Mercedes Sandhoff, OTR/L on 2018 at 3:28 PM

## 2018-09-26 PROCEDURE — 94640 AIRWAY INHALATION TREATMENT: CPT

## 2018-09-26 PROCEDURE — 99232 SBSQ HOSP IP/OBS MODERATE 35: CPT | Performed by: PHYSICAL MEDICINE & REHABILITATION

## 2018-09-26 PROCEDURE — 97110 THERAPEUTIC EXERCISES: CPT

## 2018-09-26 PROCEDURE — 97112 NEUROMUSCULAR REEDUCATION: CPT

## 2018-09-26 PROCEDURE — 97116 GAIT TRAINING THERAPY: CPT

## 2018-09-26 PROCEDURE — 97535 SELF CARE MNGMENT TRAINING: CPT

## 2018-09-26 PROCEDURE — 6370000000 HC RX 637 (ALT 250 FOR IP): Performed by: PHYSICAL MEDICINE & REHABILITATION

## 2018-09-26 PROCEDURE — 97140 MANUAL THERAPY 1/> REGIONS: CPT

## 2018-09-26 PROCEDURE — 1180000000 HC REHAB R&B

## 2018-09-26 PROCEDURE — 6370000000 HC RX 637 (ALT 250 FOR IP): Performed by: NURSE PRACTITIONER

## 2018-09-26 PROCEDURE — 97530 THERAPEUTIC ACTIVITIES: CPT

## 2018-09-26 RX ADMIN — TAMSULOSIN HYDROCHLORIDE 0.4 MG: 0.4 CAPSULE ORAL at 21:31

## 2018-09-26 RX ADMIN — MELATONIN TAB 3 MG 10 MG: 3 TAB at 21:35

## 2018-09-26 RX ADMIN — MIRTAZAPINE 15 MG: 15 TABLET, FILM COATED ORAL at 21:31

## 2018-09-26 RX ADMIN — Medication 2 PUFF: at 17:04

## 2018-09-26 RX ADMIN — VITAMIN D, TAB 1000IU (100/BT) 2000 UNITS: 25 TAB at 08:11

## 2018-09-26 RX ADMIN — ACETAMINOPHEN 650 MG: 325 TABLET ORAL at 21:35

## 2018-09-26 RX ADMIN — MAGESIUM CITRATE 150 ML: 1.75 LIQUID ORAL at 11:56

## 2018-09-26 RX ADMIN — LEVOTHYROXINE SODIUM 137 MCG: 25 TABLET ORAL at 05:56

## 2018-09-26 RX ADMIN — ACETAMINOPHEN 650 MG: 325 TABLET ORAL at 08:44

## 2018-09-26 RX ADMIN — AMLODIPINE BESYLATE 2.5 MG: 2.5 TABLET ORAL at 08:11

## 2018-09-26 RX ADMIN — FINASTERIDE 5 MG: 5 TABLET, FILM COATED ORAL at 11:56

## 2018-09-26 RX ADMIN — Medication 2 PUFF: at 05:28

## 2018-09-26 RX ADMIN — RIVAROXABAN 20 MG: 20 TABLET, FILM COATED ORAL at 17:52

## 2018-09-26 ASSESSMENT — PAIN SCALES - GENERAL
PAINLEVEL_OUTOF10: 4
PAINLEVEL_OUTOF10: 0
PAINLEVEL_OUTOF10: 0
PAINLEVEL_OUTOF10: 3
PAINLEVEL_OUTOF10: 4

## 2018-09-26 ASSESSMENT — PAIN DESCRIPTION - LOCATION: LOCATION: GROIN

## 2018-09-26 ASSESSMENT — PAIN DESCRIPTION - DESCRIPTORS: DESCRIPTORS: ACHING

## 2018-09-26 ASSESSMENT — PAIN DESCRIPTION - ORIENTATION: ORIENTATION: RIGHT

## 2018-09-26 NOTE — CARE COORDINATION
DECOMPRESS WITH POSSIBLE BICEPS TENODESIS ARTHROSCOPIC FRANSISCA C- ARM ARTHREX BICEPS TENODESIS GALE Shay CHAIR CASE #1 1 HOUR performed by Amish Blakely MD at [address] Right 11/14/2017    RIGHT THORACOTOMY WEDGE RESECTION FOR FOREIGN BODY AND FOB DOUBLE LUMEN (1ST CASE) performed by Maria T Medrano MD at [address] [place] [address]  2006    cancer / no chemo or radiation     Chart Reviewed: Yes  Patient assessed for rehabilitation services?: Yes  Family / Caregiver Present: No  Diagnosis: Right Shoulder OA s/p R Shoulder Arthrosocy Subacromial Decompression with Biceps  Restrictions  Restrictions/Precautions: Fall Risk  Upper Extremity Weight Bearing Restrictions  Right Upper Extremity Weight Bearing: Non Weight Bearing  Position Activity Restriction  Other position/activity restrictions: NWB R UE; No shoulder ROM; Sling donned R UE  CASE MANAGEMENT  Social/Functional History:  Lives With: Alone  Home Layout: One level  Home Equipment: 4 wheeled walker, Cane, Oxygen  Ambulation Assistance: Independent (with rollator in home. Rarely uses rollator outside home.)  Transfer Assistance: Independent  Occupation: Retired  Type of occupation:  ([employer])  Leisure & Hobbies: [address] [facility] [employer], wood carving  Hand Dominance: Right        Pts assets/resources/support system:    COVERAGE INFORMATION:Payor: MEDICARE / Plan: MEDICARE PART A AND B / Product Type: *No Product type* /     NURSING  Weight: 232 lb 12.9 oz (105.6 kg) / Body mass index is 31.57 kg/m². DIET GENERAL;    SpO2: 93 % (09/26/18 1104)  O2 Flow Rate (L/min):  (2) (09/26/18 9619)  No active isolations    Skin Issues: Yes ______    No_______    Pain Managed:  Yes ______    No ______    Bladder continence: Yes _____  No _______    Bowel continence:Yes _____  No _______        Other concerns:      PHYSICAL THERAPY  Bed mobility:  Supine to Sit: Modified independent (09/26/18 6715)  Sit to 6  GOAL: Groomin  GOAL: Bathin  GOAL: Dressing-Upper: 6  GOAL: Dressing-Lower: 7  GOAL: Toiletin  GOAL: Toilet: 7  GOAL: Tub, Shower: 6  OT Treatment Time: __________  SPEECH THERAPY     LTG:       COGNITION  OT: Problem Solvin - Patient independent with complex tasks (18)  Memory: 7 - Patient independent with meds/people/schedule (18)  GOAL: Problem Solvin  GOAL: Memory: 7  SP:      RECREATIONAL THERAPY  Attendance to recreational therapy programs:    []  Pet Therapy  [] Music Therapy  [] Art Therapy    [] Recreation Therapy Group [] Support Group           Patient social interaction (mood, participation):      Patient strengths:    Patients goal:    Problems/Barriers:  1. Safety -           - Intervention / Plan:    ___ falls protocol     ___PT/OT    ____SP          - Results:    2. Potential DME needs         - Intervention / Plan:  ____ PT/OT     ____assess equipment needs/access         - Results:  3. Weakness          - Intervention / Plan:  ___ PT/OT      ____           - Results:    4. Discharge planning needs          - Intervention / Plan:  ___ Weekly team conference      ____family training          - Results:  5.            - Intervention / Plan:  ___           - Results:  6.            - Intervention / Plan:               - Results:    7.            - Intervention / Plan:               - Results:        Discharge Plan   Estimated Length of Stay:    Tentative Discharge date:      Anticipated Discharge Destination:       Team recommendations:    1. Follow up Therapy :     RN___     PT___    OT___  SP ____ SW ____ Aide _____    2.  [facility] ______      OP ______    Other:     Equipment needed at Discharge:      Team Members Present at Conference:    Physician:     :     RN:    Occupational Therapist:     Physical Therapist:     Speech Therapist:    Recreational Therapist:    Other:    Other:

## 2018-09-26 NOTE — PROGRESS NOTES
Subjective: The patient complains of severe  acute on chronic right shoulder pain is also generalized body aches partially relieved by rest, ice, Tylenol, Percocet, PT, OT and exacerbated by  patient, range of motion, weightbearing. I am concerned about patients  severe postop constipation relieved with meds. CO sever eright groin pain dt lumbar syenosis  exac by PT rel with heat and meds. It seems to be improving with heat and Percocet. He is awaiting a new CPAP to be delivered to his home. His discharge date was moved up to [phone] because he has an appointment with his eye doctor the following day. ROS x10: The patient also complains of severely impaired mobility and activities of daily living. Otherwise no new problems with vision, hearing, nose, mouth, throat, dermal, cardiovascular, GI, , pulmonary, musculoskeletal, psychiatric or neurological. See Rehab H&P on Rehab chart dated . Vital signs:  /66   Pulse 67   Temp 97 °F (36.1 °C)   Resp 16   Ht 6' (1.829 m)   Wt 232 lb 12.9 oz (105.6 kg)   SpO2 95%   BMI 31.57 kg/m²   I/O:   PO/Intake:  fair PO intake, no problems observed or reported. Bowel/Bladder:  Continent, constipation-improving  General:  Patient is well developed, adequately nourished, non-obese and     well kempt. HEENT:    PERRLA, hearing intact to loud voice, external inspection of ear     and nose benign. Inspection of lips, tongue and gums benign  Musculoskeletal: No significant change in strength or tone. All joints stable. Inspection and palpation of digits and nails show no clubbing,       cyanosis or inflammatory conditions. Neuro/Psychiatric: Affect: flat but pleasant. Alert and oriented to person, place and     situation. No significant change in deep tendon reflexes or     sensation  Lungs:  CTA-B. Respiration effort is normal at rest.     Heart:   S1 = S2, RRR. No loud murmurs.     Abdomen:  Soft, non-tender, no enlargement of liver or spleen. Extremities:  No significant lower extremity edema or tenderness. Right shoulder nonweightbearing  Skin:   Intact right shoulder incisions to general survey,  skin  Tear on his left elbow with dry sterile dressing  Rehabilitation:  Physical therapy: FIMS:  Bed Mobility: Scooting: Modified independent    Transfers: Sit to Stand: Modified independent  Stand to sit: Modified independent  Bed to Chair: Supervision, Modified independent  Stand Pivot Transfers: Modified independent, Ambulation 1  Surface: carpet  Device: No Device  Other Apparatus:  (RUE sling)  Assistance: Stand by assistance, Supervision  Quality of Gait: occasional vary from straight path but pt was able to correct, no LOB. Distance: 200' (seated rest break due to fatigue following 200')  Comments: patient ambulated up/down low grade ramp at SBA- however decreased gait speed observed , Stairs  # Steps : 12  Stairs Height: 6\"  Rails: Left ascending  Device: No Device  Assistance: Supervision, Stand by assistance  Comment: Step to pattern. FIMS: Bed, Chair, Wheel Chair: 5 - Requires setup/supervision/cues  Walk: 5 - Supervision Requires standby supervision or cuing to walk at least 150 feet  Distance Walked: 200'  Stairs: 5- Supervision Requires supervision(e.g., standing by, cuing, or coaxing) to go up and down one flight of stairs,  , Assessment: Patient continues to show fear with stepping up to objects without UE support.  Patient shows decreased pain and increased ROM with R hip with STM and contract relax stretch techniques    Occupational therapy: FIMS:  Eatin - Feeds self with setup/supervision/cues and/or requires only setup/supervision/cues to perform tube feedings  Groomin - Patient independent with all grooming tasks  Bathin - Did not occur  Dressing-Upper: 0 - Did not occur (Patient already dressed for bed)  Dressing-Lower: 0 - Did not occur (Patient already dressed for bed)  Toiletin - Requires device (russellb bar/walker/etc.)  Toilet Transfer: 6 - Independent with device (grab bar/walker/slide bar)  Tub Transfer: 0 - Activity does not occur  Shower Transfer: 0 - Activity does not occur,  , Assessment: Pt. demonstrates G balance and endurance and G activity tolerance. Pt. continues to benefit from skilled OT to maximize independence with ADLs and IADLs. Speech therapy: FIMS: Comprehension: 6 - Complex ideas 90% or device (hearing aid/glasses)  Expression: 7 - Patient expresses complex ideas/needs  Social Interaction: 7 - Patient has appropriate behavior/relations 100% of the time  Problem Solvin - Patient independent with complex tasks  Memory: 7 - Patient independent with meds/people/schedule      Lab/X-ray studies reviewed, analyzed and discussed with patient and staff:   No results found for this or any previous visit (from the past 24 hour(s)). Xr Chest Standard (2 Vw)Result Date: 2018   . CONCLUSION: SCARRING AT RIGHT LUNG BASE        Fluoro For Surgical Procedures Result Date: 2018  FLUORO FOR SURGICAL PROCEDURES : 2018 CLINICAL HISTORY: R52 Pain ICD10. COMPARISON: Right shoulder radiographs 3/22/2018. Intraoperative fluoroscopy was provided for Dr. Evander Frankel right shoulder arthroscopy procedure. A total of 25.9 seconds of fluoroscopy was used, with 22 fluoroscopic stills saved. No diagnostic images were obtained. Please see Dr. Evander Frankel surgical notes for completeness. Previous extensive, complex labs, notes and diagnostics reviewed and analyzed. ALLERGIES:    Allergies as of 2018    (No Known Allergies)      (please also verify by checking [facility])       Complex Physical Medicine & Rehab Issues Assess & Plan:   1. Severe abnormality of gait and mobility and impaired self-care and ADL's secondary to progressive  flare up of generalized osteoarthritis status post right shoulder surgery .   Functional and medical status reassessed regarding patients ability to participate in

## 2018-09-26 NOTE — PROGRESS NOTES
gait and mobility     Acute sinusitis     Anxiety     Aspiration into respiratory tract 11/10/2017    Aspiration pneumonia (HCC)     Bilateral pulmonary embolism (HCC) 1/6/2018    BPH (benign prostatic hyperplasia)     COPD (chronic obstructive pulmonary disease) ([facility] [address] 12/5/2013    Debility     Elevated CK 12/30/2013    Foraminal stenosis of lumbosacral region 6/7/2016    GERD (gastroesophageal reflux disease) 12/11/2014    History of asthma 1/13/2014    HTN (hypertension) 1/13/2014    past braden / no current meds    Hx of blood clots 01/2018    DVT  ( unsure which leg but both were swollen ) -> PE -> thus Xarelto    Hyperlipidemia     meds > 10 yrs    Hypothyroidism     Insomnia     Lower extremity weakness 1/24/2014    On home oxygen therapy     2L at night    Osteoarthritis of spine with radiculopathy, lumbar region 6/7/2016    Papillary thyroid carcinoma ([facility] [address] 12/2006    no trx to follow    Positive D dimer 12/5/2013    Sleep apnea 1/24/2014    Type 2 diabetes mellitus ([facility] [address]     diet control  / no meds    Vitamin D deficiency      Past Surgical History:   Procedure Laterality Date    BRONCHOSCOPY N/A 11/11/2017    BRONCHOSCOPY FIBEROPTIC performed by Lacy Marinelli MD at [address] [facility] [place], DIAGNOSTIC      EYE SURGERY      phaco with IOL OU    HERNIA REPAIR  [id number]    repair umbilical hernia    KNEE SURGERY Left 1970    HI ARTHROSCOPY SHOULDER SURGICAL BICEPS TENODESIS Right 9/18/2018    RIGHT SHOULDER ARTHROSCOPY SUBACROMIAL DECOMPRESS WITH POSSIBLE BICEPS TENODESIS ARTHROSCOPIC FRANSISCA C- ARM ARTHREX BICEPS TENODESIS [facility] CASE #1 1 HOUR performed by Valeria Earl MD at [address] [place] [address] Right 11/14/2017    RIGHT THORACOTOMY WEDGE RESECTION FOR FOREIGN BODY AND FOB DOUBLE LUMEN (1ST CASE) performed by Bhupinder Leon MD at [address] [place] [address]  2006    cancer / no chemo or radiation       Indications

## 2018-09-27 ENCOUNTER — TELEPHONE (OUTPATIENT)
Dept: PULMONOLOGY | Age: 78
End: 2018-09-27

## 2018-09-27 VITALS
OXYGEN SATURATION: 93 % | SYSTOLIC BLOOD PRESSURE: 142 MMHG | HEIGHT: 72 IN | DIASTOLIC BLOOD PRESSURE: 77 MMHG | WEIGHT: 232.81 LBS | HEART RATE: 66 BPM | BODY MASS INDEX: 31.53 KG/M2 | RESPIRATION RATE: 18 BRPM | TEMPERATURE: 97 F

## 2018-09-27 PROBLEM — M15.9 PRIMARY OSTEOARTHRITIS INVOLVING MULTIPLE JOINTS: Status: ACTIVE | Noted: 2018-09-27

## 2018-09-27 PROBLEM — M15.0 PRIMARY OSTEOARTHRITIS INVOLVING MULTIPLE JOINTS: Status: ACTIVE | Noted: 2018-09-27

## 2018-09-27 PROCEDURE — 94640 AIRWAY INHALATION TREATMENT: CPT

## 2018-09-27 PROCEDURE — 97535 SELF CARE MNGMENT TRAINING: CPT

## 2018-09-27 PROCEDURE — 6370000000 HC RX 637 (ALT 250 FOR IP): Performed by: NURSE PRACTITIONER

## 2018-09-27 PROCEDURE — 99238 HOSP IP/OBS DSCHRG MGMT 30/<: CPT | Performed by: PHYSICAL MEDICINE & REHABILITATION

## 2018-09-27 PROCEDURE — 97116 GAIT TRAINING THERAPY: CPT

## 2018-09-27 PROCEDURE — 2700000000 HC OXYGEN THERAPY PER DAY

## 2018-09-27 PROCEDURE — 97110 THERAPEUTIC EXERCISES: CPT

## 2018-09-27 PROCEDURE — 97530 THERAPEUTIC ACTIVITIES: CPT

## 2018-09-27 RX ORDER — AMLODIPINE BESYLATE 2.5 MG/1
2.5 TABLET ORAL DAILY
Qty: 30 TABLET | Refills: 3 | Status: SHIPPED | OUTPATIENT
Start: 2018-09-28 | End: 2018-12-14

## 2018-09-27 RX ORDER — IPRATROPIUM BROMIDE AND ALBUTEROL SULFATE 2.5; .5 MG/3ML; MG/3ML
3 SOLUTION RESPIRATORY (INHALATION) EVERY 4 HOURS PRN
Qty: 360 ML | Refills: 1 | Status: SHIPPED | OUTPATIENT
Start: 2018-09-27 | End: 2018-12-14

## 2018-09-27 RX ADMIN — LEVOTHYROXINE SODIUM 137 MCG: 25 TABLET ORAL at 06:58

## 2018-09-27 RX ADMIN — AMLODIPINE BESYLATE 2.5 MG: 2.5 TABLET ORAL at 09:40

## 2018-09-27 RX ADMIN — VITAMIN D, TAB 1000IU (100/BT) 2000 UNITS: 25 TAB at 09:40

## 2018-09-27 RX ADMIN — Medication 2 PUFF: at 05:01

## 2018-09-27 RX ADMIN — FINASTERIDE 5 MG: 5 TABLET, FILM COATED ORAL at 09:40

## 2018-09-27 ASSESSMENT — PAIN SCALES - GENERAL: PAINLEVEL_OUTOF10: 0

## 2018-09-27 NOTE — PROGRESS NOTES
[facility] OCCUPATIONAL THERAPY DISCHARGE SUMMARY- REHAB     Date: 2018  Patient Name: Paula Mojica        MRN: [id number]  Account: [de-identified]   : 1940  (69 y.o.)  Room: [place] [address]    Diagnosis:  Right SHoulder OA S/P Right Shoulder Arthroscopy Subacromial decompression with Biceps    Past Medical History:   Diagnosis Date    Abnormality of gait and mobility     Acute sinusitis     Anxiety     Aspiration into respiratory tract 11/10/2017    Aspiration pneumonia ([address]     Bilateral pulmonary embolism ([address] 2018    BPH (benign prostatic hyperplasia)     COPD (chronic obstructive pulmonary disease) ([address] 2013    Debility     Elevated CK 2013    Foraminal stenosis of lumbosacral region 2016    GERD (gastroesophageal reflux disease) 2014    History of asthma 2014    HTN (hypertension) 2014    past braden / no current meds    Hx of blood clots 2018    DVT  ( unsure which leg but both were swollen ) -> PE -> thus Xarelto    Hyperlipidemia     meds > 10 yrs    Hypothyroidism     Insomnia     Lower extremity weakness 2014    On home oxygen therapy     2L at night    Osteoarthritis of spine with radiculopathy, lumbar region 2016    Papillary thyroid carcinoma ([address] 2006    no trx to follow    Positive D dimer 2013    Sleep apnea 2014    Type 2 diabetes mellitus ([address]     diet control  / no meds    Vitamin D deficiency      Past Surgical History:   Procedure Laterality Date    BRONCHOSCOPY N/A 2017    BRONCHOSCOPY FIBEROPTIC performed by Kira Tapia MD at [address] [place] [address] COLONOSCOPY      ENDOSCOPY, COLON, DIAGNOSTIC      EYE SURGERY      phaco with IOL OU    HERNIA REPAIR  [id number]    repair umbilical hernia    KNEE SURGERY Left     [place] ARTHROSCOPY SHOULDER SURGICAL BICEPS TENODESIS Right 2018    RIGHT SHOULDER ARTHROSCOPY SUBACROMIAL DECOMPRESS WITH POSSIBLE BICEPS TENODESIS ARTHROSCOPIC FRANSISCA C- ARM ARTHREX

## 2018-09-27 NOTE — PROGRESS NOTES
adductors while performing Contract Relax stretching      ASSESSMENT/COMMENTS:  Assessment: Patient shows good safety awareness and ambulation tolerance with decreased fatigue    PLAN OF CARE/Safety:   Safety Devices  Type of devices:  All fall risk precautions in place      Therapy Time:   Individual   Time In 1130   Time Out 1200   Minutes 30     Minutes: 30      Transfer/Bed mobility training: 15      Gait training: 10       Manual: [id number], 09/27/18 at 12:10 PM

## 2018-09-27 NOTE — TELEPHONE ENCOUNTER
Called on 9/27/18 at 10:23 am and left message to call back to set an appointment to go over results of sleep study, titration.

## 2018-10-24 ENCOUNTER — OFFICE VISIT (OUTPATIENT)
Dept: PULMONOLOGY | Age: 78
End: 2018-10-24
Payer: MEDICARE

## 2018-10-24 VITALS
TEMPERATURE: 98.6 F | RESPIRATION RATE: 16 BRPM | WEIGHT: 231 LBS | HEIGHT: 72 IN | SYSTOLIC BLOOD PRESSURE: 122 MMHG | DIASTOLIC BLOOD PRESSURE: 60 MMHG | HEART RATE: 79 BPM | OXYGEN SATURATION: 91 % | BODY MASS INDEX: 31.29 KG/M2

## 2018-10-24 DIAGNOSIS — G47.33 OSA (OBSTRUCTIVE SLEEP APNEA): ICD-10-CM

## 2018-10-24 DIAGNOSIS — J44.9 CHRONIC OBSTRUCTIVE PULMONARY DISEASE, UNSPECIFIED COPD TYPE (HCC): Primary | ICD-10-CM

## 2018-10-24 DIAGNOSIS — E66.9 OBESITY (BMI 30-39.9): ICD-10-CM

## 2018-10-24 DIAGNOSIS — R09.02 HYPOXIA: ICD-10-CM

## 2018-10-24 PROCEDURE — 1123F ACP DISCUSS/DSCN MKR DOCD: CPT | Performed by: INTERNAL MEDICINE

## 2018-10-24 PROCEDURE — G8926 SPIRO NO PERF OR DOC: HCPCS | Performed by: INTERNAL MEDICINE

## 2018-10-24 PROCEDURE — G8482 FLU IMMUNIZE ORDER/ADMIN: HCPCS | Performed by: INTERNAL MEDICINE

## 2018-10-24 PROCEDURE — G8427 DOCREV CUR MEDS BY ELIG CLIN: HCPCS | Performed by: INTERNAL MEDICINE

## 2018-10-24 PROCEDURE — 99214 OFFICE O/P EST MOD 30 MIN: CPT | Performed by: INTERNAL MEDICINE

## 2018-10-24 PROCEDURE — 4040F PNEUMOC VAC/ADMIN/RCVD: CPT | Performed by: INTERNAL MEDICINE

## 2018-10-24 PROCEDURE — 1101F PT FALLS ASSESS-DOCD LE1/YR: CPT | Performed by: INTERNAL MEDICINE

## 2018-10-24 PROCEDURE — 3023F SPIROM DOC REV: CPT | Performed by: INTERNAL MEDICINE

## 2018-10-24 PROCEDURE — G8417 CALC BMI ABV UP PARAM F/U: HCPCS | Performed by: INTERNAL MEDICINE

## 2018-10-24 PROCEDURE — 1111F DSCHRG MED/CURRENT MED MERGE: CPT | Performed by: INTERNAL MEDICINE

## 2018-10-24 PROCEDURE — 1036F TOBACCO NON-USER: CPT | Performed by: INTERNAL MEDICINE

## 2018-10-24 ASSESSMENT — ENCOUNTER SYMPTOMS
DIARRHEA: 0
COUGH: 0
VOICE CHANGE: 0
EYE ITCHING: 0
SORE THROAT: 0
ABDOMINAL PAIN: 0
NAUSEA: 0
RHINORRHEA: 0
SHORTNESS OF BREATH: 1
CHEST TIGHTNESS: 0
VOMITING: 0

## 2018-10-24 NOTE — PROGRESS NOTES
oz/week      Comment: rare social    Drug use: No    Sexual activity: Not on file     Other Topics Concern    Not on file     Social History Narrative    The patient lives alone in a one story home with one step to enter the home. The bedroom and bathroom are on the first floor. His social support includes his children. He has a wheeled walker, rollator, cane and dressing assistive device at home. He was receiving home health care services prior to admission to include PT, OT and RN. He does not have history of falls prior to admission. He was independent with mobility with a wheeled walker as needed prior to admission. He was independent with self care prior to admission. His goal is to get stronger and return home. LIVES AT HOME ALONE. HE HAS A ROLLATOR HE USES. HIS SON LIVES IN [place] AND IS AVAILABLE IF HE NEEDS HIM. Type of Home: House    Home Layout: One level    Home Access: Level entry    Home Equipment: Cane, 4 wheeled walker    ADL Assistance: Independent    Homemaking Assistance: Independent    Ambulation Assistance: Independent    Transfer Assistance: Independent    Additional Comments: son and dtr can assist upon DC home with IADLs             Review of Systems   Constitutional: Negative for chills, diaphoresis, fatigue and fever. HENT: Negative for congestion, mouth sores, nosebleeds, postnasal drip, rhinorrhea, sneezing, sore throat and voice change. Eyes: Negative for itching and visual disturbance. Respiratory: Positive for shortness of breath. Negative for cough and chest tightness. Cardiovascular: Negative. Negative for chest pain, palpitations and leg swelling. Gastrointestinal: Negative for abdominal pain, diarrhea, nausea and vomiting. Genitourinary: Negative for difficulty urinating and hematuria. Musculoskeletal: Negative for arthralgias, joint swelling and myalgias. Skin: Negative for rash.    Allergic/Immunologic: Negative for environmental

## 2018-11-05 ENCOUNTER — HOSPITAL ENCOUNTER (OUTPATIENT)
Dept: PHYSICAL THERAPY | Age: 78
Setting detail: THERAPIES SERIES
Discharge: HOME | End: 2018-11-05
Payer: MEDICARE

## 2018-11-05 PROCEDURE — 97110 THERAPEUTIC EXERCISES: CPT

## 2018-11-05 PROCEDURE — G8985 CARRY GOAL STATUS: HCPCS

## 2018-11-05 PROCEDURE — G8984 CARRY CURRENT STATUS: HCPCS

## 2018-11-05 PROCEDURE — 97162 PT EVAL MOD COMPLEX 30 MIN: CPT

## 2018-11-05 ASSESSMENT — PAIN SCALES - GENERAL: PAINLEVEL_OUTOF10: 0

## 2018-11-05 NOTE — PROGRESS NOTES
[address]  PHYSICAL THERAPY EVALUATION    Date: 2018  Patient Name: Aryan Damon       MRN: [id number]   Account: [de-identified]   : 1940  (78 y.o.)   Gender: male   Referring Practitioner: Josh Allred                 Diagnosis: Osteoarthritis, shoulder; shoulder pain  Treatment Diagnosis: right shoulder pain  Additional Pertinent Hx: OA, GERD, COPD, hearing loss, history of thyroid CA, thyroid removed, knee surgery, lung surgery 2017 to remove foreign body, history of PE        Other position/activity restrictions: Comments: ROM; 5lb max WB    Past Medical History:  has a past medical history of Abnormality of gait and mobility; Acute sinusitis; Anxiety; Aspiration into respiratory tract; Aspiration pneumonia ([address] Bilateral pulmonary embolism (HCC); BPH (benign prostatic hyperplasia); COPD (chronic obstructive pulmonary disease) ([address] Debility; Elevated CK; Foraminal stenosis of lumbosacral region; GERD (gastroesophageal reflux disease); History of asthma; HTN (hypertension); Hx of blood clots; Hyperlipidemia; Hypothyroidism; Insomnia; Lower extremity weakness; On home oxygen therapy; Osteoarthritis of spine with radiculopathy, lumbar region; Papillary thyroid carcinoma ([address] Positive D dimer; Sleep apnea; Type 2 diabetes mellitus ([address] and Vitamin D deficiency. Past Surgical History:   has a past surgical history that includes knee surgery (Left, ); Thyroidectomy (); bronchoscopy (N/A, 2017); thoracotomy (Right, 2017); Colonoscopy; Endoscopy, colon, diagnostic; eye surgery; hernia repair (); and pr arthroscopy shoulder surgical biceps tenodesis (Right, 2018).     Vital Signs  Patient Currently in Pain: No   Pain Screening  Patient Currently in Pain: No  Pain Assessment  Pain Assessment: 0-10  Pain Level: 0                Type of Home: House  Home Layout: One level  Home Access: Level entry  ADL Assistance: Independent  Ambulation but less than 40 percent impaired, limited or restricted    Patient Education  New Education Provided: goals of PT, HEP    POST-PAIN     Pain Rating (0-10 pain scale):   0/10  Location and pain description same as pre-treatment unless indicated. Action: [] NA  [] Call Physician  [] Perform HEP  [] Meds as prescribed    Evaluation and patient rights have been reviewed and patient agrees with plan of care. Yes  [x]  No  []   Explain:       Kennedy Fall Risk Assessment  Risk Factor Scale  Score   History of Falls [x] Yes  [] No 25  0 25   Secondary Diagnosis [] Yes  [x] No 15  0 0   Ambulatory Aid [] Furniture  [] Crutches/cane/walker  [x] None/bedrest/wheelchair/nurse 30  15  0 0   IV/Heparin Lock [] Yes  [] No 20  0 0   Gait/Transferring [] Impaired  [x] Weak  [] Normal/bedrest/immobile 20  10  0 10   Mental Status [] Forgets limitations  [x] Oriented to own ability 15  0 0      Total: 35     Based on the Assessment score: check the appropriate box. []  No intervention needed   Low =   Score of 0-24  [x]  Use standard prevention interventions Moderate =  Score of 24-44   [x] Discuss fall prevention strategies   [x] Indicate moderate falls risk on eval  []  Use high risk prevention interventions High = Score of 45 and higher   [] Discuss fall prevention strategies   [] Provide supervision during treatment time    Goals  Short term goals  Time Frame for Short term goals: 4 weeks  Short term goal 1: Patient will report 3/10 pain in right shoulder with ADLs. Short term goal 2: Patient will be independent with HEP. Long term goals  Time Frame for Long term goals : 6 weeks  Long term goal 1: Patient will increase right shoulder ROM flexion/abduction to 100 deg, external rotation to 20 degrees, and internal rotation to 65 degrees for improved functional tolerance. Long term goal 2: Patient will increase strength in right shoulder >/= 1/2 manual muscle grade for improved functional tolerance.   Long term goal 3: UEFI >/= 55/80 to demonstrate improved functional tolerance.      PT Individual Minutes  Time In: 0727  Time Out: 1150  Minutes: 45  Timed Code Treatment Minutes: 8 Minutes  Procedure Minutes: 37 PT evaluation minutes  Electronically signed by Albert Owen PT on 11/5/18 at 3:50 PM

## 2018-11-07 ENCOUNTER — HOSPITAL ENCOUNTER (OUTPATIENT)
Dept: PHYSICAL THERAPY | Age: 78
Setting detail: THERAPIES SERIES
Discharge: HOME | End: 2018-11-07
Payer: MEDICARE

## 2018-11-07 PROCEDURE — 97140 MANUAL THERAPY 1/> REGIONS: CPT

## 2018-11-07 PROCEDURE — 97110 THERAPEUTIC EXERCISES: CPT

## 2018-11-14 ENCOUNTER — HOSPITAL ENCOUNTER (OUTPATIENT)
Dept: PHYSICAL THERAPY | Age: 78
Setting detail: THERAPIES SERIES
Discharge: HOME | End: 2018-11-14
Payer: MEDICARE

## 2018-11-14 PROCEDURE — 97110 THERAPEUTIC EXERCISES: CPT

## 2018-11-14 PROCEDURE — 97140 MANUAL THERAPY 1/> REGIONS: CPT

## 2018-11-14 ASSESSMENT — PAIN DESCRIPTION - LOCATION: LOCATION: SHOULDER

## 2018-11-14 ASSESSMENT — PAIN SCALES - GENERAL: PAINLEVEL_OUTOF10: 3

## 2018-11-14 ASSESSMENT — PAIN DESCRIPTION - ORIENTATION: ORIENTATION: RIGHT

## 2018-11-16 ENCOUNTER — HOSPITAL ENCOUNTER (OUTPATIENT)
Dept: PHYSICAL THERAPY | Age: 78
Setting detail: THERAPIES SERIES
Discharge: HOME | End: 2018-11-16
Payer: MEDICARE

## 2018-11-16 PROCEDURE — 97110 THERAPEUTIC EXERCISES: CPT

## 2018-11-16 PROCEDURE — 97140 MANUAL THERAPY 1/> REGIONS: CPT

## 2018-11-16 ASSESSMENT — PAIN DESCRIPTION - LOCATION: LOCATION: SHOULDER

## 2018-11-16 ASSESSMENT — PAIN SCALES - GENERAL: PAINLEVEL_OUTOF10: 2

## 2018-11-16 ASSESSMENT — PAIN DESCRIPTION - ORIENTATION: ORIENTATION: RIGHT

## 2018-11-16 ASSESSMENT — PAIN DESCRIPTION - DESCRIPTORS: DESCRIPTORS: SORE

## 2018-11-19 ENCOUNTER — HOSPITAL ENCOUNTER (OUTPATIENT)
Dept: PHYSICAL THERAPY | Age: 78
Setting detail: THERAPIES SERIES
Discharge: HOME | End: 2018-11-19
Payer: MEDICARE

## 2018-11-19 PROCEDURE — 97140 MANUAL THERAPY 1/> REGIONS: CPT

## 2018-11-19 PROCEDURE — 97110 THERAPEUTIC EXERCISES: CPT

## 2018-11-19 ASSESSMENT — PAIN DESCRIPTION - DESCRIPTORS: DESCRIPTORS: SORE

## 2018-11-19 ASSESSMENT — PAIN DESCRIPTION - LOCATION: LOCATION: SHOULDER

## 2018-11-19 ASSESSMENT — PAIN DESCRIPTION - ORIENTATION: ORIENTATION: RIGHT

## 2018-11-19 ASSESSMENT — PAIN SCALES - GENERAL: PAINLEVEL_OUTOF10: 2

## 2018-11-21 ENCOUNTER — HOSPITAL ENCOUNTER (OUTPATIENT)
Dept: PHYSICAL THERAPY | Age: 78
Setting detail: THERAPIES SERIES
Discharge: HOME | End: 2018-11-21
Payer: MEDICARE

## 2018-11-21 PROCEDURE — 97110 THERAPEUTIC EXERCISES: CPT

## 2018-11-21 PROCEDURE — 97140 MANUAL THERAPY 1/> REGIONS: CPT

## 2018-11-21 ASSESSMENT — PAIN SCALES - GENERAL: PAINLEVEL_OUTOF10: 0

## 2018-11-21 NOTE — PROGRESS NOTES
Short term goals: 4 weeks  Short term goal 1: Patient will report 3/10 pain in right shoulder with ADLs. Short term goal 2: Patient will be independent with HEP. Long term goals  Time Frame for Long term goals : 6 weeks  Long term goal 1: Patient will increase right shoulder ROM flexion/abduction to 100 deg, external rotation to 20 degrees, and internal rotation to 65 degrees for improved functional tolerance. Long term goal 2: Patient will increase strength in right shoulder >/= 1/2 manual muscle grade for improved functional tolerance. Long term goal 3: UEFI >/= 55/80 to demonstrate improved functional tolerance. Progress toward goals: Therex, manual, and modalities to improve strength and ROM for improved tolerance to functional activities    POST-PAIN       Pain Rating (0-10 pain scale):   0/10   Location and pain description same as pre-treatment unless indicated. Action: [] NA   [x] Perform HEP  [] Meds as prescribed  [] Modalities as prescribed   [] Call Physician     Frequency/Duration:  Plan  Times per week: 2  Plan weeks: 6  Current Treatment Recommendations: Strengthening, ROM, Endurance Training, Neuromuscular Re-education, Manual Therapy - Soft Tissue Mobilization, Manual Therapy - Joint Manipulation, Home Exercise Program, Safety Education & Training, Patient/Caregiver Education & Training, Equipment Evaluation, Education, & procurement, Modalities  Plan Comment: transfer care to Gale Galeas PT     Pt to continue current HEP. See objective section for any therapeutic exercise changes, additions or modifications this date.          PT Individual Minutes  Time In: 4377  Time Out: 1030  Minutes: 49  Timed Code Treatment Minutes: 39 Minutes  Procedure Minutes: 10    Activity Minutes Units   Ther Ex 27 2   Manual  12 1   Neuro Ed     CP 10          Signature:  Electronically signed by Soco Morejon PTA on 11/21/18 at 1:29 PM

## 2018-11-26 ENCOUNTER — HOSPITAL ENCOUNTER (OUTPATIENT)
Dept: PHYSICAL THERAPY | Age: 78
Setting detail: THERAPIES SERIES
Discharge: HOME | End: 2018-11-26
Payer: MEDICARE

## 2018-11-26 PROCEDURE — 97140 MANUAL THERAPY 1/> REGIONS: CPT

## 2018-11-26 PROCEDURE — 97110 THERAPEUTIC EXERCISES: CPT

## 2018-11-28 ENCOUNTER — HOSPITAL ENCOUNTER (OUTPATIENT)
Dept: PHYSICAL THERAPY | Age: 78
Setting detail: THERAPIES SERIES
Discharge: HOME | End: 2018-11-28
Payer: MEDICARE

## 2018-11-28 PROCEDURE — 97140 MANUAL THERAPY 1/> REGIONS: CPT

## 2018-11-28 PROCEDURE — 97110 THERAPEUTIC EXERCISES: CPT

## 2018-12-03 ENCOUNTER — HOSPITAL ENCOUNTER (OUTPATIENT)
Dept: PHYSICAL THERAPY | Age: 78
Setting detail: THERAPIES SERIES
Discharge: HOME | End: 2018-12-03
Payer: MEDICARE

## 2018-12-03 PROCEDURE — G8984 CARRY CURRENT STATUS: HCPCS

## 2018-12-03 PROCEDURE — 97140 MANUAL THERAPY 1/> REGIONS: CPT

## 2018-12-03 PROCEDURE — 97110 THERAPEUTIC EXERCISES: CPT

## 2018-12-03 PROCEDURE — G8985 CARRY GOAL STATUS: HCPCS

## 2018-12-03 ASSESSMENT — PAIN DESCRIPTION - PAIN TYPE: TYPE: ACUTE PAIN

## 2018-12-03 ASSESSMENT — PAIN DESCRIPTION - LOCATION: LOCATION: SHOULDER

## 2018-12-03 ASSESSMENT — PAIN SCALES - GENERAL: PAINLEVEL_OUTOF10: 4

## 2018-12-03 ASSESSMENT — PAIN DESCRIPTION - ORIENTATION: ORIENTATION: RIGHT

## 2018-12-03 ASSESSMENT — PAIN DESCRIPTION - DESCRIPTORS: DESCRIPTORS: SHARP

## 2018-12-03 NOTE — PROGRESS NOTES
[address] [address]  Outpatient Physical Therapy    Treatment Note        Date: 12/3/2018  Patient: Edmund Chambers  :   ACCT #: [de-identified]  Referring Practitioner: Fartun Crane  Diagnosis: Osteoarthritis, shoulder; shoulder pain    Visit Information:  PT Visit Information  Onset Date: 18  PT Insurance Information: [employer]; [employer]-secondary  Total # of Visits to Date: 9  Plan of Care/Certification Expiration Date: 19  No Show: 0  Canceled Appointment: 0  Progress Note Counter:  ( PN this date fro 8-10 visits - nex PN by 1-3-18)    Subjective: Reports doing HEP daily. HEP Compliance:  [x] Good [] Fair [] Poor [] Reports not doing due to:    Vital Signs  Patient Currently in Pain: Denies   Pain Screening  Patient Currently in Pain: Denies  Pain Assessment  Pain Assessment: 0-10  Pain Level: 4 (w/ bathing and dressing )  Pain Type: Acute pain  Pain Location: Shoulder  Pain Orientation: Right  Pain Descriptors: Sharp    OBJECTIVE:   Exercises  Exercise 3: pulleys flexion 4 minutes to increase ROM  Exercise 4: wand exercises flexion 5s x 10; ER 5s x 10 ; Abd 5s x 10 - pt requiring some cuing during ex  Exercise 11:  MMT adn ROM msrs                                Strength: [] NT  [x] MMT completed:        Strength RUE  R Shoulder Flexion: 3+/5 (in available range)  R Shoulder Extension: 4+/5  R Shoulder ABduction: 4-/5 (in available range)  R Shoulder External Rotation: 3+/5 (in available range )          ROM: [] NT  [x] ROM measurements:                 AROM RUE (degrees)  RUE General AROM: Pt used trunk and UT compensations during GHJ AROM  R Shoulder Flexion 0-180: 72 deg   R Shoulder Extension 0-45: 54  R Shoulder ABduction 0-180: 87  R Shoulder Int Rotation  0-70: 58 in 70 deg GHJ abd  R Shoulder Ext Rotation 0-90: 6 in 70 deg GHJ abd           Manual:   Manual therapy  PROM: PROM  right shoulder all planes gentle  Soft Tissue Mobalization: STM distal pec

## 2018-12-06 ENCOUNTER — HOSPITAL ENCOUNTER (OUTPATIENT)
Dept: PHYSICAL THERAPY | Age: 78
Setting detail: THERAPIES SERIES
Discharge: HOME | End: 2018-12-06
Payer: MEDICARE

## 2018-12-06 PROCEDURE — 97140 MANUAL THERAPY 1/> REGIONS: CPT

## 2018-12-06 PROCEDURE — 97110 THERAPEUTIC EXERCISES: CPT

## 2018-12-06 ASSESSMENT — PAIN DESCRIPTION - DESCRIPTORS: DESCRIPTORS: DULL

## 2018-12-06 ASSESSMENT — PAIN DESCRIPTION - ORIENTATION: ORIENTATION: RIGHT

## 2018-12-06 ASSESSMENT — PAIN DESCRIPTION - LOCATION: LOCATION: SHOULDER

## 2018-12-06 ASSESSMENT — PAIN SCALES - GENERAL: PAINLEVEL_OUTOF10: 4

## 2018-12-06 ASSESSMENT — PAIN DESCRIPTION - PAIN TYPE: TYPE: ACUTE PAIN

## 2018-12-10 ENCOUNTER — HOSPITAL ENCOUNTER (OUTPATIENT)
Dept: PHYSICAL THERAPY | Age: 78
Setting detail: THERAPIES SERIES
Discharge: HOME | End: 2018-12-10
Payer: MEDICARE

## 2018-12-10 PROCEDURE — 97140 MANUAL THERAPY 1/> REGIONS: CPT

## 2018-12-10 PROCEDURE — 97110 THERAPEUTIC EXERCISES: CPT

## 2018-12-10 NOTE — PROGRESS NOTES
[address] [address]  Outpatient Physical Therapy    Treatment Note        Date: 12/10/2018  Patient: Yoselyn Darby  :   ACCT #: [de-identified]  Referring Practitioner: Evelina Thomas  Diagnosis: Osteoarthritis, shoulder; shoulder pain    Visit Information:  PT Visit Information  Onset Date: 18  PT Insurance Information: [employer]; [employer]-secondary  Total # of Visits to Date: 6  Plan of Care/Certification Expiration Date: 19  No Show: 0  Canceled Appointment: 0  Progress Note Counter: -10 ( PN due by 1-3-18)    Subjective: Pt reports having 0/10 pain level at rest. Pain increasing to 4/10 w/ start of pulleys. HEP Compliance:  [x] Good [] Fair [] Poor [] Reports not doing due to:    Vital Signs  Patient Currently in Pain: Denies (at rest)   Pain Screening  Patient Currently in Pain: Denies (at rest)    OBJECTIVE:   Exercises  Exercise 1: table slides flexion, scaption, abd 5s x 15, right (increased cues for abd) to increase ROM  Exercise 2: scapular retraction, shrugs 5s x 20  Exercise 3: pulleys flexion 4 minutes to increase ROM  Exercise 4: wand exercises flexion 5s x 10; ER 5s x 10 ; Abd 5s x 10; ext 5s x 10 - pt requiring some cuing during ex (standing)   Exercise 6: Finger ladder Flex hold 5seconds x8 ea (flex L15, scap L18)   Exercise 7: 3 way bicep x 10, right  Exercise 8: wall slides flexion 5s x 5     Strength: [x] NT  [] MMT completed:     ROM: [] NT  [x] ROM measurements:   PROM RUE (degrees)  RUE General PROM: Flex 123 deg, ABD 91 deg      Manual:   Manual therapy  PROM: PROM  right shoulder all planes gentle  Soft Tissue Mobalization: STM distal pec /bicep to decrease gaurding and mm tension ; total manual 10 min     Modalities:  CP to RT shldr 10 min     Assessment:     Body structures, Functions, Activity limitations: Decreased ROM, Decreased strength, Decreased endurance  Assessment: Inc time/effort to achieve L 18 on finger ladder w/ scaption plane w/ inc pain/ UT involvement. Pt cont's to report no pain at rest though pain ranging from 1-4/10 w/ mvmt throughout session. Increased flex/ABD ROMs achieved passively this date. Treatment Diagnosis: right shoulder pain  Prognosis: Good     Goals:  Short term goals  Time Frame for Short term goals: 4 weeks  Short term goal 1: Patient will report 3/10 pain in right shoulder with ADLs. Short term goal 2: Patient will be independent with HEP. Long term goals  Time Frame for Long term goals : 6 weeks  Long term goal 1: Patient will increase right shoulder ROM flexion/abduction to 100 deg, external rotation to 20 degrees, and internal rotation to 65 degrees for improved functional tolerance. Long term goal 2: Patient will increase strength in right shoulder >/= 1/2 manual muscle grade for improved functional tolerance. Long term goal 3: UEFI >/= 55/80 to demonstrate improved functional tolerance. Progress toward goals: Rt shldr ROM/strength     POST-PAIN       Pain Rating (0-10 pain scale):   0/10   Location and pain description same as pre-treatment unless indicated. Action: [] NA   [] Perform HEP  [] Meds as prescribed  [] Modalities as prescribed   [] Call Physician     Frequency/Duration:  Plan  Times per week: 2  Plan weeks: 4-5 additional   Current Treatment Recommendations: Strengthening, ROM, Endurance Training, Neuromuscular Re-education, Manual Therapy - Soft Tissue Mobilization, Manual Therapy - Joint Manipulation, Home Exercise Program, Safety Education & Training, Patient/Caregiver Education & Training, Equipment Evaluation, Education, & procurement, Modalities  Plan Comment: transfer care to Ann Gonzalez PT     Pt to continue current HEP. See objective section for any therapeutic exercise changes, additions or modifications this date.      PT Individual Minutes  Time In: 1340  Time Out: 1430  Minutes: 50  Timed Code Treatment Minutes: 40 Minutes  Procedure Minutes: 10 min     Signature:

## 2018-12-12 ENCOUNTER — HOSPITAL ENCOUNTER (OUTPATIENT)
Dept: PHYSICAL THERAPY | Age: 78
Setting detail: THERAPIES SERIES
Discharge: HOME | End: 2018-12-12
Payer: MEDICARE

## 2018-12-12 PROCEDURE — 97110 THERAPEUTIC EXERCISES: CPT

## 2018-12-14 ENCOUNTER — TELEPHONE (OUTPATIENT)
Dept: PULMONOLOGY | Age: 78
End: 2018-12-14

## 2018-12-14 ENCOUNTER — APPOINTMENT (OUTPATIENT)
Dept: CT IMAGING | Age: 78
DRG: 176 | End: 2018-12-14
Payer: MEDICARE

## 2018-12-14 ENCOUNTER — HOSPITAL ENCOUNTER (INPATIENT)
Age: 78
LOS: 3 days | Discharge: HOME | DRG: 176 | End: 2018-12-17
Attending: STUDENT IN AN ORGANIZED HEALTH CARE EDUCATION/TRAINING PROGRAM | Admitting: INTERNAL MEDICINE
Payer: MEDICARE

## 2018-12-14 DIAGNOSIS — R77.8 ELEVATED TROPONIN: Primary | ICD-10-CM

## 2018-12-14 DIAGNOSIS — R09.02 HYPOXEMIA: ICD-10-CM

## 2018-12-14 DIAGNOSIS — I26.99 OTHER ACUTE PULMONARY EMBOLISM WITHOUT ACUTE COR PULMONALE (HCC): ICD-10-CM

## 2018-12-14 LAB
ALBUMIN SERPL-MCNC: 3.7 G/DL (ref 3.9–4.9)
ALP BLD-CCNC: 70 U/L (ref 35–104)
ALT SERPL-CCNC: 16 U/L (ref 0–41)
ANION GAP SERPL CALCULATED.3IONS-SCNC: 12 MEQ/L (ref 7–13)
APTT: 37.1 SEC (ref 21.6–35.4)
AST SERPL-CCNC: 21 U/L (ref 0–40)
BACTERIA: NEGATIVE /HPF
BASOPHILS ABSOLUTE: 0 K/UL (ref 0–0.2)
BASOPHILS RELATIVE PERCENT: 0.6 %
BILIRUB SERPL-MCNC: 0.4 MG/DL (ref 0–1.2)
BILIRUBIN URINE: NEGATIVE
BLOOD, URINE: ABNORMAL
BUN BLDV-MCNC: 16 MG/DL (ref 8–23)
C-REACTIVE PROTEIN, HIGH SENSITIVITY: 6.2 MG/L (ref 0–5)
CALCIUM SERPL-MCNC: 8.5 MG/DL (ref 8.6–10.2)
CHLORIDE BLD-SCNC: 104 MEQ/L (ref 98–107)
CHOLESTEROL, TOTAL: 184 MG/DL (ref 0–199)
CK MB: 9.3 NG/ML (ref 0–6.7)
CLARITY: CLEAR
CO2: 25 MEQ/L (ref 22–29)
COLOR: YELLOW
CREAT SERPL-MCNC: 1.13 MG/DL (ref 0.7–1.2)
CREATINE KINASE-MB INDEX: 3 % (ref 0–3.5)
EOSINOPHILS ABSOLUTE: 0.2 K/UL (ref 0–0.7)
EOSINOPHILS RELATIVE PERCENT: 3.3 %
EPITHELIAL CELLS, UA: ABNORMAL /HPF (ref 0–5)
GFR AFRICAN AMERICAN: >60
GFR NON-AFRICAN AMERICAN: >60
GLOBULIN: 3.4 G/DL (ref 2.3–3.5)
GLUCOSE BLD-MCNC: 86 MG/DL (ref 74–109)
GLUCOSE URINE: NEGATIVE MG/DL
HCT VFR BLD CALC: 45.9 % (ref 42–52)
HDLC SERPL-MCNC: 57 MG/DL (ref 40–59)
HEMOGLOBIN: 15.4 G/DL (ref 14–18)
HYALINE CASTS: ABNORMAL /HPF (ref 0–5)
INR BLD: 1.1
KETONES, URINE: NEGATIVE MG/DL
LACTIC ACID: 2.1 MMOL/L (ref 0.5–2.2)
LDL CHOLESTEROL CALCULATED: 100 MG/DL (ref 0–129)
LEUKOCYTE ESTERASE, URINE: ABNORMAL
LYMPHOCYTES ABSOLUTE: 1.4 K/UL (ref 1–4.8)
LYMPHOCYTES RELATIVE PERCENT: 20.2 %
MAGNESIUM: 2.2 MG/DL (ref 1.7–2.3)
MCH RBC QN AUTO: 31.1 PG (ref 27–31.3)
MCHC RBC AUTO-ENTMCNC: 33.5 % (ref 33–37)
MCV RBC AUTO: 92.9 FL (ref 80–100)
MONOCYTES ABSOLUTE: 0.5 K/UL (ref 0.2–0.8)
MONOCYTES RELATIVE PERCENT: 7.3 %
NEUTROPHILS ABSOLUTE: 4.6 K/UL (ref 1.4–6.5)
NEUTROPHILS RELATIVE PERCENT: 68.6 %
NITRITE, URINE: NEGATIVE
PDW BLD-RTO: 14.9 % (ref 11.5–14.5)
PH UA: 5 (ref 5–9)
PLATELET # BLD: 111 K/UL (ref 130–400)
POC CREATININE WHOLE BLOOD: 1
POTASSIUM SERPL-SCNC: 4.2 MEQ/L (ref 3.5–5.1)
PRO-BNP: 111 PG/ML
PROTEIN UA: NEGATIVE MG/DL
PROTHROMBIN TIME: 11.2 SEC (ref 9–11.5)
RAPID INFLUENZA  B AGN: NEGATIVE
RAPID INFLUENZA A AGN: NEGATIVE
RBC # BLD: 4.94 M/UL (ref 4.7–6.1)
RBC UA: ABNORMAL /HPF (ref 0–5)
SODIUM BLD-SCNC: 141 MEQ/L (ref 132–144)
SPECIFIC GRAVITY UA: 1.01 (ref 1–1.03)
TOTAL CK: 312 U/L (ref 0–190)
TOTAL PROTEIN: 7.1 G/DL (ref 6.4–8.1)
TRIGL SERPL-MCNC: 133 MG/DL (ref 0–200)
TROPONIN: 0.04 NG/ML (ref 0–0.01)
TSH SERPL DL<=0.05 MIU/L-ACNC: 6.59 UIU/ML (ref 0.27–4.2)
URINE REFLEX TO CULTURE: YES
UROBILINOGEN, URINE: 0.2 E.U./DL
WBC # BLD: 6.7 K/UL (ref 4.8–10.8)
WBC UA: ABNORMAL /HPF (ref 0–5)

## 2018-12-14 PROCEDURE — 83880 ASSAY OF NATRIURETIC PEPTIDE: CPT

## 2018-12-14 PROCEDURE — 6370000000 HC RX 637 (ALT 250 FOR IP): Performed by: PHYSICIAN ASSISTANT

## 2018-12-14 PROCEDURE — 2580000003 HC RX 258: Performed by: PHYSICIAN ASSISTANT

## 2018-12-14 PROCEDURE — 84439 ASSAY OF FREE THYROXINE: CPT

## 2018-12-14 PROCEDURE — 87086 URINE CULTURE/COLONY COUNT: CPT

## 2018-12-14 PROCEDURE — 99285 EMERGENCY DEPT VISIT HI MDM: CPT

## 2018-12-14 PROCEDURE — 82553 CREATINE MB FRACTION: CPT

## 2018-12-14 PROCEDURE — 81001 URINALYSIS AUTO W/SCOPE: CPT

## 2018-12-14 PROCEDURE — 93005 ELECTROCARDIOGRAM TRACING: CPT

## 2018-12-14 PROCEDURE — 6360000002 HC RX W HCPCS: Performed by: EMERGENCY MEDICINE

## 2018-12-14 PROCEDURE — 6360000004 HC RX CONTRAST MEDICATION: Performed by: STUDENT IN AN ORGANIZED HEALTH CARE EDUCATION/TRAINING PROGRAM

## 2018-12-14 PROCEDURE — 71275 CT ANGIOGRAPHY CHEST: CPT

## 2018-12-14 PROCEDURE — 6370000000 HC RX 637 (ALT 250 FOR IP): Performed by: STUDENT IN AN ORGANIZED HEALTH CARE EDUCATION/TRAINING PROGRAM

## 2018-12-14 PROCEDURE — 94664 DEMO&/EVAL PT USE INHALER: CPT

## 2018-12-14 PROCEDURE — 87040 BLOOD CULTURE FOR BACTERIA: CPT

## 2018-12-14 PROCEDURE — 84484 ASSAY OF TROPONIN QUANT: CPT

## 2018-12-14 PROCEDURE — 87804 INFLUENZA ASSAY W/OPTIC: CPT

## 2018-12-14 PROCEDURE — 85610 PROTHROMBIN TIME: CPT

## 2018-12-14 PROCEDURE — 83735 ASSAY OF MAGNESIUM: CPT

## 2018-12-14 PROCEDURE — 80061 LIPID PANEL: CPT

## 2018-12-14 PROCEDURE — 2060000000 HC ICU INTERMEDIATE R&B

## 2018-12-14 PROCEDURE — 96372 THER/PROPH/DIAG INJ SC/IM: CPT

## 2018-12-14 PROCEDURE — 36415 COLL VENOUS BLD VENIPUNCTURE: CPT

## 2018-12-14 PROCEDURE — 94640 AIRWAY INHALATION TREATMENT: CPT

## 2018-12-14 PROCEDURE — 85730 THROMBOPLASTIN TIME PARTIAL: CPT

## 2018-12-14 PROCEDURE — 82550 ASSAY OF CK (CPK): CPT

## 2018-12-14 PROCEDURE — 81003 URINALYSIS AUTO W/O SCOPE: CPT

## 2018-12-14 PROCEDURE — 86141 C-REACTIVE PROTEIN HS: CPT

## 2018-12-14 PROCEDURE — 85025 COMPLETE CBC W/AUTO DIFF WBC: CPT

## 2018-12-14 PROCEDURE — 83605 ASSAY OF LACTIC ACID: CPT

## 2018-12-14 PROCEDURE — 80053 COMPREHEN METABOLIC PANEL: CPT

## 2018-12-14 PROCEDURE — 84443 ASSAY THYROID STIM HORMONE: CPT

## 2018-12-14 RX ORDER — SODIUM CHLORIDE 0.9 % (FLUSH) 0.9 %
10 SYRINGE (ML) INJECTION EVERY 12 HOURS SCHEDULED
Status: DISCONTINUED | OUTPATIENT
Start: 2018-12-14 | End: 2018-12-17 | Stop reason: HOSPADM

## 2018-12-14 RX ORDER — TAMSULOSIN HYDROCHLORIDE 0.4 MG/1
0.4 CAPSULE ORAL NIGHTLY
Status: DISCONTINUED | OUTPATIENT
Start: 2018-12-14 | End: 2018-12-17 | Stop reason: HOSPADM

## 2018-12-14 RX ORDER — LEVALBUTEROL 1.25 MG/.5ML
1.25 SOLUTION, CONCENTRATE RESPIRATORY (INHALATION) ONCE
Status: COMPLETED | OUTPATIENT
Start: 2018-12-14 | End: 2018-12-14

## 2018-12-14 RX ORDER — LEVOTHYROXINE SODIUM 137 UG/1
137 TABLET ORAL
Status: DISCONTINUED | OUTPATIENT
Start: 2018-12-15 | End: 2018-12-17 | Stop reason: HOSPADM

## 2018-12-14 RX ORDER — ALBUTEROL SULFATE 90 UG/1
2 AEROSOL, METERED RESPIRATORY (INHALATION)
Status: DISCONTINUED | OUTPATIENT
Start: 2018-12-14 | End: 2018-12-17 | Stop reason: HOSPADM

## 2018-12-14 RX ORDER — ACETAMINOPHEN 80 MG
TABLET,CHEWABLE ORAL ONCE
Status: DISCONTINUED | OUTPATIENT
Start: 2018-12-14 | End: 2018-12-17 | Stop reason: HOSPADM

## 2018-12-14 RX ORDER — ONDANSETRON 2 MG/ML
4 INJECTION INTRAMUSCULAR; INTRAVENOUS EVERY 6 HOURS PRN
Status: DISCONTINUED | OUTPATIENT
Start: 2018-12-14 | End: 2018-12-17 | Stop reason: HOSPADM

## 2018-12-14 RX ORDER — ASPIRIN 81 MG/1
324 TABLET, CHEWABLE ORAL ONCE
Status: COMPLETED | OUTPATIENT
Start: 2018-12-14 | End: 2018-12-14

## 2018-12-14 RX ORDER — ALBUTEROL SULFATE 90 UG/1
2 AEROSOL, METERED RESPIRATORY (INHALATION) 3 TIMES DAILY
Status: DISCONTINUED | OUTPATIENT
Start: 2018-12-15 | End: 2018-12-17 | Stop reason: HOSPADM

## 2018-12-14 RX ORDER — PRAVASTATIN SODIUM 20 MG
20 TABLET ORAL EVERY EVENING
Status: DISCONTINUED | OUTPATIENT
Start: 2018-12-14 | End: 2018-12-17 | Stop reason: HOSPADM

## 2018-12-14 RX ORDER — ACETAMINOPHEN 325 MG/1
650 TABLET ORAL EVERY 4 HOURS PRN
Status: DISCONTINUED | OUTPATIENT
Start: 2018-12-14 | End: 2018-12-17 | Stop reason: HOSPADM

## 2018-12-14 RX ORDER — DOCUSATE SODIUM 100 MG/1
100 CAPSULE, LIQUID FILLED ORAL DAILY
Status: DISCONTINUED | OUTPATIENT
Start: 2018-12-15 | End: 2018-12-17 | Stop reason: HOSPADM

## 2018-12-14 RX ORDER — MIRTAZAPINE 15 MG/1
15 TABLET, FILM COATED ORAL NIGHTLY
Status: DISCONTINUED | OUTPATIENT
Start: 2018-12-14 | End: 2018-12-17 | Stop reason: HOSPADM

## 2018-12-14 RX ORDER — SODIUM CHLORIDE 0.9 % (FLUSH) 0.9 %
10 SYRINGE (ML) INJECTION PRN
Status: DISCONTINUED | OUTPATIENT
Start: 2018-12-14 | End: 2018-12-17 | Stop reason: HOSPADM

## 2018-12-14 RX ORDER — SODIUM CHLORIDE 9 MG/ML
INJECTION, SOLUTION INTRAVENOUS CONTINUOUS
Status: DISCONTINUED | OUTPATIENT
Start: 2018-12-14 | End: 2018-12-16

## 2018-12-14 RX ORDER — ALBUTEROL SULFATE 90 UG/1
2 AEROSOL, METERED RESPIRATORY (INHALATION) EVERY 4 HOURS PRN
Status: DISCONTINUED | OUTPATIENT
Start: 2018-12-14 | End: 2018-12-14

## 2018-12-14 RX ORDER — CLONAZEPAM 0.5 MG/1
0.5 TABLET ORAL NIGHTLY
Status: DISCONTINUED | OUTPATIENT
Start: 2018-12-14 | End: 2018-12-17 | Stop reason: HOSPADM

## 2018-12-14 RX ORDER — FINASTERIDE 5 MG/1
5 TABLET, FILM COATED ORAL DAILY
Status: DISCONTINUED | OUTPATIENT
Start: 2018-12-15 | End: 2018-12-17 | Stop reason: HOSPADM

## 2018-12-14 RX ADMIN — LEVALBUTEROL 1.25 MG: 1.25 SOLUTION, CONCENTRATE RESPIRATORY (INHALATION) at 20:07

## 2018-12-14 RX ADMIN — CLONAZEPAM 0.5 MG: 0.5 TABLET ORAL at 23:18

## 2018-12-14 RX ADMIN — PRAVASTATIN SODIUM 20 MG: 20 TABLET ORAL at 23:18

## 2018-12-14 RX ADMIN — ENOXAPARIN SODIUM 105 MG: 150 INJECTION SUBCUTANEOUS at 20:05

## 2018-12-14 RX ADMIN — Medication 10 ML: at 23:18

## 2018-12-14 RX ADMIN — MIRTAZAPINE 15 MG: 15 TABLET, FILM COATED ORAL at 23:18

## 2018-12-14 RX ADMIN — IOPAMIDOL 100 ML: 612 INJECTION, SOLUTION INTRAVENOUS at 18:54

## 2018-12-14 RX ADMIN — SODIUM CHLORIDE: 9 INJECTION, SOLUTION INTRAVENOUS at 23:18

## 2018-12-14 RX ADMIN — ASPIRIN 81 MG 324 MG: 81 TABLET ORAL at 19:23

## 2018-12-14 RX ADMIN — TAMSULOSIN HYDROCHLORIDE 0.4 MG: 0.4 CAPSULE ORAL at 23:18

## 2018-12-14 RX ADMIN — Medication 2 PUFF: at 23:41

## 2018-12-14 ASSESSMENT — ENCOUNTER SYMPTOMS
SINUS PRESSURE: 0
TROUBLE SWALLOWING: 0
SHORTNESS OF BREATH: 1
COUGH: 1
CHEST TIGHTNESS: 0
ABDOMINAL PAIN: 0
DIARRHEA: 0
BACK PAIN: 0
VOMITING: 0

## 2018-12-14 ASSESSMENT — PULMONARY FUNCTION TESTS: PEFR_L/MIN: 300

## 2018-12-14 ASSESSMENT — PAIN SCALES - GENERAL: PAINLEVEL_OUTOF10: 0

## 2018-12-14 ASSESSMENT — HEART SCORE: ECG: 1

## 2018-12-14 NOTE — ED PROVIDER NOTES
headaches. Hematological: Does not bruise/bleed easily. All other systems reviewed and are negative. Except as noted above the remainder of the review of systems was reviewed and negative.        PAST MEDICAL HISTORY     Past Medical History:   Diagnosis Date    Abnormality of gait and mobility     Acute sinusitis     Anxiety     Aspiration into respiratory tract 11/10/2017    Aspiration pneumonia (HCC)     Bilateral pulmonary embolism ([address] 1/6/2018    BPH (benign prostatic hyperplasia)     COPD (chronic obstructive pulmonary disease) ([address] 12/5/2013    Debility     Elevated CK 12/30/2013    Foraminal stenosis of lumbosacral region 6/7/2016    GERD (gastroesophageal reflux disease) 12/11/2014    History of asthma 1/13/2014    HTN (hypertension) 1/13/2014    past braden / no current meds    Hx of blood clots 01/2018    DVT  ( unsure which leg but both were swollen ) -> PE -> thus Xarelto    Hyperlipidemia     meds > 10 yrs    Hypothyroidism     Insomnia     Lower extremity weakness 1/24/2014    On home oxygen therapy     2L at night    Osteoarthritis of spine with radiculopathy, lumbar region 6/7/2016    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    no trx to follow    Positive D dimer 12/5/2013    Sleep apnea 1/24/2014    Type 2 diabetes mellitus ([address]     diet control  / no meds    Vitamin D deficiency          SURGICALHISTORY       Past Surgical History:   Procedure Laterality Date    BRONCHOSCOPY N/A 11/11/2017    BRONCHOSCOPY FIBEROPTIC performed by Steve Donis MD at [address] [place] [address], DIAGNOSTIC      EYE SURGERY      phaco with IOL OU    HERNIA REPAIR  [address]    repair umbilical hernia    KNEE SURGERY Left 1970    [place] ARTHROSCOPY SHOULDER SURGICAL BICEPS TENODESIS Right 9/18/2018    RIGHT SHOULDER ARTHROSCOPY SUBACROMIAL DECOMPRESS WITH POSSIBLE BICEPS TENODESIS ARTHROSCOPIC FRANSISCA C- ARM ARTHREX BICEPS TENODESIS TRAY ARTHREX BICEPS BUTTON CARMEN (up to 7 for level 4, 8 or more for level 5)     ED Triage Vitals [12/14/18 1620]   BP Temp Temp Source Pulse Resp SpO2 Height Weight   (!) 149/89 97.4 °F (36.3 °C) Oral 81 18 96 % 6' (1.829 m) 235 lb (106.6 kg)       Physical Exam   Constitutional: He is oriented to person, place, and time. He appears well-developed and well-nourished. No distress. HENT:   Head: Normocephalic and atraumatic. Head is without Valentine's sign. Right Ear: External ear normal.   Left Ear: External ear normal.   Nose: Nose normal.   Mouth/Throat: Oropharynx is clear and moist. No oropharyngeal exudate. Eyes: Pupils are equal, round, and reactive to light. Conjunctivae and EOM are normal. Right eye exhibits no discharge. No foreign body present in the right eye. Left eye exhibits no exudate. No scleral icterus. Neck: Normal range of motion. Neck supple. No JVD present. No neck rigidity. No tracheal deviation present. No thyromegaly present. Cardiovascular: Normal rate, regular rhythm, normal heart sounds and intact distal pulses. Exam reveals no gallop, no distant heart sounds and no friction rub. No murmur heard. Pulmonary/Chest: Effort normal and breath sounds normal. No stridor. No respiratory distress. He has no wheezes. He has no rales. He exhibits no tenderness. Abdominal: Soft. Bowel sounds are normal. He exhibits no distension, no pulsatile liver, no ascites and no mass. There is no hepatosplenomegaly. There is no tenderness. There is no rebound and no guarding. No hernia. Musculoskeletal: Normal range of motion. He exhibits no edema or tenderness. No calf tenderness to palpation. Lymphadenopathy:        Head (right side): No submental adenopathy present. Head (left side): No submental adenopathy present. He has no cervical adenopathy. Neurological: He is alert and oriented to person, place, and time. He has normal reflexes. He displays normal reflexes.  No cranial nerve deficit or sensory

## 2018-12-14 NOTE — TELEPHONE ENCOUNTER
Patient states that he is having a hard time breathing. States his oxygen level goes down passed 90% when he gets up. Wanted to see you this afternoon. Told patient that Dr. Kajal Mendez was not in office this afternoon and advised pt to go to the ER.  Please Advise

## 2018-12-15 ENCOUNTER — APPOINTMENT (OUTPATIENT)
Dept: ULTRASOUND IMAGING | Age: 78
DRG: 176 | End: 2018-12-15
Payer: MEDICARE

## 2018-12-15 LAB
ALBUMIN SERPL-MCNC: 2.9 G/DL (ref 3.9–4.9)
ALP BLD-CCNC: 60 U/L (ref 35–104)
ALT SERPL-CCNC: 14 U/L (ref 0–41)
ANION GAP SERPL CALCULATED.3IONS-SCNC: 11 MEQ/L (ref 7–13)
AST SERPL-CCNC: 19 U/L (ref 0–40)
BASOPHILS ABSOLUTE: 0 K/UL (ref 0–0.2)
BASOPHILS RELATIVE PERCENT: 0.5 %
BILIRUB SERPL-MCNC: 0.4 MG/DL (ref 0–1.2)
BUN BLDV-MCNC: 12 MG/DL (ref 8–23)
CALCIUM SERPL-MCNC: 8.1 MG/DL (ref 8.6–10.2)
CHLORIDE BLD-SCNC: 107 MEQ/L (ref 98–107)
CO2: 23 MEQ/L (ref 22–29)
CREAT SERPL-MCNC: 1 MG/DL (ref 0.7–1.2)
D DIMER: 1.14 MG/L FEU (ref 0–0.5)
EKG ATRIAL RATE: 65 BPM
EKG ATRIAL RATE: 65 BPM
EKG ATRIAL RATE: 79 BPM
EKG P AXIS: 20 DEGREES
EKG P AXIS: 21 DEGREES
EKG P AXIS: 35 DEGREES
EKG P-R INTERVAL: 168 MS
EKG P-R INTERVAL: 188 MS
EKG P-R INTERVAL: 192 MS
EKG Q-T INTERVAL: 398 MS
EKG Q-T INTERVAL: 416 MS
EKG Q-T INTERVAL: 446 MS
EKG QRS DURATION: 100 MS
EKG QRS DURATION: 102 MS
EKG QRS DURATION: 96 MS
EKG QTC CALCULATION (BAZETT): 432 MS
EKG QTC CALCULATION (BAZETT): 456 MS
EKG QTC CALCULATION (BAZETT): 463 MS
EKG R AXIS: -26 DEGREES
EKG R AXIS: -28 DEGREES
EKG R AXIS: -40 DEGREES
EKG T AXIS: 32 DEGREES
EKG T AXIS: 37 DEGREES
EKG T AXIS: 39 DEGREES
EKG VENTRICULAR RATE: 65 BPM
EKG VENTRICULAR RATE: 65 BPM
EKG VENTRICULAR RATE: 79 BPM
EOSINOPHILS ABSOLUTE: 0.3 K/UL (ref 0–0.7)
EOSINOPHILS RELATIVE PERCENT: 5.7 %
GFR AFRICAN AMERICAN: >60
GFR AFRICAN AMERICAN: >60
GFR NON-AFRICAN AMERICAN: >60
GFR NON-AFRICAN AMERICAN: >60
GLOBULIN: 3.1 G/DL (ref 2.3–3.5)
GLUCOSE BLD-MCNC: 94 MG/DL (ref 74–109)
HCT VFR BLD CALC: 43 % (ref 42–52)
HEMOGLOBIN: 14.2 G/DL (ref 14–18)
LV EF: 55 %
LVEF MODALITY: NORMAL
LYMPHOCYTES ABSOLUTE: 1.2 K/UL (ref 1–4.8)
LYMPHOCYTES RELATIVE PERCENT: 23 %
MCH RBC QN AUTO: 31.1 PG (ref 27–31.3)
MCHC RBC AUTO-ENTMCNC: 33 % (ref 33–37)
MCV RBC AUTO: 94.2 FL (ref 80–100)
MONOCYTES ABSOLUTE: 0.4 K/UL (ref 0.2–0.8)
MONOCYTES RELATIVE PERCENT: 7.7 %
NEUTROPHILS ABSOLUTE: 3.4 K/UL (ref 1.4–6.5)
NEUTROPHILS RELATIVE PERCENT: 63.1 %
PDW BLD-RTO: 15.3 % (ref 11.5–14.5)
PERFORMED ON: NORMAL
PLATELET # BLD: 102 K/UL (ref 130–400)
POC CREATININE: 1 MG/DL (ref 0.8–1.3)
POC SAMPLE TYPE: NORMAL
POTASSIUM REFLEX MAGNESIUM: 3.8 MEQ/L (ref 3.5–5.1)
RBC # BLD: 4.56 M/UL (ref 4.7–6.1)
SODIUM BLD-SCNC: 141 MEQ/L (ref 132–144)
T4 FREE: 1.54 NG/DL (ref 0.93–1.7)
TOTAL PROTEIN: 6 G/DL (ref 6.4–8.1)
TROPONIN: 0.03 NG/ML (ref 0–0.01)
TROPONIN: 0.03 NG/ML (ref 0–0.01)
WBC # BLD: 5.3 K/UL (ref 4.8–10.8)

## 2018-12-15 PROCEDURE — 94640 AIRWAY INHALATION TREATMENT: CPT

## 2018-12-15 PROCEDURE — 6360000002 HC RX W HCPCS: Performed by: PHYSICIAN ASSISTANT

## 2018-12-15 PROCEDURE — 2060000000 HC ICU INTERMEDIATE R&B

## 2018-12-15 PROCEDURE — 2700000000 HC OXYGEN THERAPY PER DAY

## 2018-12-15 PROCEDURE — 93005 ELECTROCARDIOGRAM TRACING: CPT

## 2018-12-15 PROCEDURE — 6370000000 HC RX 637 (ALT 250 FOR IP): Performed by: PHYSICIAN ASSISTANT

## 2018-12-15 PROCEDURE — 93306 TTE W/DOPPLER COMPLETE: CPT

## 2018-12-15 PROCEDURE — 85025 COMPLETE CBC W/AUTO DIFF WBC: CPT

## 2018-12-15 PROCEDURE — 99222 1ST HOSP IP/OBS MODERATE 55: CPT | Performed by: INTERNAL MEDICINE

## 2018-12-15 PROCEDURE — 6370000000 HC RX 637 (ALT 250 FOR IP): Performed by: INTERNAL MEDICINE

## 2018-12-15 PROCEDURE — 36415 COLL VENOUS BLD VENIPUNCTURE: CPT

## 2018-12-15 PROCEDURE — 2580000003 HC RX 258: Performed by: PHYSICIAN ASSISTANT

## 2018-12-15 PROCEDURE — 84484 ASSAY OF TROPONIN QUANT: CPT

## 2018-12-15 PROCEDURE — 94660 CPAP INITIATION&MGMT: CPT

## 2018-12-15 PROCEDURE — 93970 EXTREMITY STUDY: CPT

## 2018-12-15 PROCEDURE — 85379 FIBRIN DEGRADATION QUANT: CPT

## 2018-12-15 PROCEDURE — 80053 COMPREHEN METABOLIC PANEL: CPT

## 2018-12-15 RX ADMIN — PRAVASTATIN SODIUM 20 MG: 20 TABLET ORAL at 17:01

## 2018-12-15 RX ADMIN — Medication 2 PUFF: at 14:07

## 2018-12-15 RX ADMIN — LEVOTHYROXINE SODIUM 137 MCG: 137 TABLET ORAL at 05:26

## 2018-12-15 RX ADMIN — DOCUSATE SODIUM 100 MG: 100 CAPSULE, LIQUID FILLED ORAL at 09:46

## 2018-12-15 RX ADMIN — ENOXAPARIN SODIUM 105 MG: 150 INJECTION SUBCUTANEOUS at 09:47

## 2018-12-15 RX ADMIN — VITAMIN D, TAB 1000IU (100/BT) 2000 UNITS: 25 TAB at 09:47

## 2018-12-15 RX ADMIN — Medication 10 ML: at 09:49

## 2018-12-15 RX ADMIN — Medication 10 ML: at 21:12

## 2018-12-15 RX ADMIN — Medication 2 PUFF: at 19:33

## 2018-12-15 RX ADMIN — Medication 2 PUFF: at 19:34

## 2018-12-15 RX ADMIN — MELATONIN TAB 3 MG 10 MG: 3 TAB at 22:43

## 2018-12-15 RX ADMIN — Medication 2 PUFF: at 07:33

## 2018-12-15 RX ADMIN — FINASTERIDE 5 MG: 5 TABLET, FILM COATED ORAL at 09:47

## 2018-12-15 RX ADMIN — CLONAZEPAM 0.5 MG: 0.5 TABLET ORAL at 21:12

## 2018-12-15 RX ADMIN — TAMSULOSIN HYDROCHLORIDE 0.4 MG: 0.4 CAPSULE ORAL at 21:12

## 2018-12-15 RX ADMIN — MIRTAZAPINE 15 MG: 15 TABLET, FILM COATED ORAL at 21:12

## 2018-12-15 RX ADMIN — ENOXAPARIN SODIUM 105 MG: 150 INJECTION SUBCUTANEOUS at 21:12

## 2018-12-15 RX ADMIN — SODIUM CHLORIDE: 9 INJECTION, SOLUTION INTRAVENOUS at 14:11

## 2018-12-15 RX ADMIN — Medication 200 MG: at 09:48

## 2018-12-15 ASSESSMENT — PAIN SCALES - GENERAL
PAINLEVEL_OUTOF10: 0
PAINLEVEL_OUTOF10: 0

## 2018-12-15 NOTE — H&P
Type 2 diabetes mellitus (HCC)     diet control  / no meds    Vitamin D deficiency      PAST SURGICAL HISTORY:    Past Surgical History:   Procedure Laterality Date    BRONCHOSCOPY N/A 11/11/2017    BRONCHOSCOPY FIBEROPTIC performed by Colby Grimm MD at [facility] COLONOSCOPY      ENDOSCOPY, COLON, DIAGNOSTIC      EYE SURGERY      phaco with IOL OU    HERNIA REPAIR  [id number]    repair umbilical hernia    KNEE SURGERY Left 1970    [place] ARTHROSCOPY SHOULDER SURGICAL BICEPS TENODESIS Right 9/18/2018    RIGHT SHOULDER ARTHROSCOPY SUBACROMIAL DECOMPRESS WITH POSSIBLE BICEPS TENODESIS ARTHROSCOPIC FRANSISCA C- [facility] H[facility] performed by Gabby Jara MD at [address] [place] [address] Right 11/14/2017    RIGHT THORACOTOMY WEDGE RESECTION FOR FOREIGN BODY AND FOB DOUBLE LUMEN (1ST CASE) performed by Griselda Novak MD at [address] [place] [address]  2006    cancer / no chemo or radiation     FAMILY HISTORY:    Family History   Problem Relation Age of Onset    Other Mother 80        Old Age    Stroke Father 45    No Known Problems Sister     Psoriasis Daughter     No Known Problems Son      SOCIAL HISTORY:    Social History     Social History    Marital status:      Spouse name: N/A    Number of children: 2    Years of education: N/A     Occupational History    retired      Social History Main Topics    Smoking status: Former Smoker     Packs/day: 1.00     Years: 40.00     Types: Cigarettes     Start date: 6/1/2000     Quit date: 2000    Smokeless tobacco: Former User      Comment: quit 2000    Alcohol use 0.0 oz/week      Comment: rare social    Drug use: No    Sexual activity: Not on file     Other Topics Concern    Not on file     Social History Narrative    The patient lives alone in a one story home with one step to enter the home. The bedroom and bathroom are on the first floor. His social support includes his children. Diagnostic tests reviewed for today's visit:    Most recent labs and imaging results reviewed.      LABS:    Recent Results (from the past 24 hour(s))   APTT    Collection Time: 12/14/18  5:15 PM   Result Value Ref Range    aPTT 37.1 (H) 21.6 - 35.4 sec   Brain Natriuretic Peptide    Collection Time: 12/14/18  5:15 PM   Result Value Ref Range    Pro- pg/mL   CBC Auto Differential    Collection Time: 12/14/18  5:15 PM   Result Value Ref Range    WBC 6.7 4.8 - 10.8 K/uL    RBC 4.94 4.70 - 6.10 M/uL    Hemoglobin 15.4 14.0 - 18.0 g/dL    Hematocrit 45.9 42.0 - 52.0 %    MCV 92.9 80.0 - 100.0 fL    MCH 31.1 27.0 - 31.3 pg    MCHC 33.5 33.0 - 37.0 %    RDW 14.9 (H) 11.5 - 14.5 %    Platelets 431 (L) 371 - 400 K/uL    Neutrophils % 68.6 %    Lymphocytes % 20.2 %    Monocytes % 7.3 %    Eosinophils % 3.3 %    Basophils % 0.6 %    Neutrophils # 4.6 1.4 - 6.5 K/uL    Lymphocytes # 1.4 1.0 - 4.8 K/uL    Monocytes # 0.5 0.2 - 0.8 K/uL    Eosinophils # 0.2 0.0 - 0.7 K/uL    Basophils # 0.0 0.0 - 0.2 K/uL   CK    Collection Time: 12/14/18  5:15 PM   Result Value Ref Range    Total  (H) 0 - 190 U/L   Comprehensive Metabolic Panel    Collection Time: 12/14/18  5:15 PM   Result Value Ref Range    Sodium 141 132 - 144 mEq/L    Potassium 4.2 3.5 - 5.1 mEq/L    Chloride 104 98 - 107 mEq/L    CO2 25 22 - 29 mEq/L    Anion Gap 12 7 - 13 mEq/L    Glucose 86 74 - 109 mg/dL    BUN 16 8 - 23 mg/dL    CREATININE 1.13 0.70 - 1.20 mg/dL    GFR Non-African American >60.0 >60    GFR  >60.0 >60    Calcium 8.5 (L) 8.6 - 10.2 mg/dL    Total Protein 7.1 6.4 - 8.1 g/dL    Alb 3.7 (L) 3.9 - 4.9 g/dL    Total Bilirubin 0.4 0.0 - 1.2 mg/dL    Alkaline Phosphatase 70 35 - 104 U/L    ALT 16 0 - 41 U/L    AST 21 0 - 40 U/L    Globulin 3.4 2.3 - 3.5 g/dL   Lactic Acid, Plasma    Collection Time: 12/14/18  5:15 PM   Result Value Ref Range    Lactic Acid 2.1 0.5 - 2.2 mmol/L   Lipid Panel    Collection Time: 12/14/18  5:15 PM

## 2018-12-15 NOTE — ED NOTES
Placed patient on 2l o2 per his request, spo2 was reading 91-92% on room air at that time, it is 96% on 2l o2 via nasal cannula.       Jaskaran Meredith RN  12/14/18 6028

## 2018-12-15 NOTE — PROGRESS NOTES
history  ?  20 pack years  []   Pulmonary Disorder  (acute or chronic)  [x]   Severe or Chronic w/ Exacerbation  []     Surgical Status No [x]   Surgeries     General []   Surgery Lower []   Abdominal Thoracic or []   Upper Abdominal Thoracic with  PulmonaryDisorder  []     Chest X-ray Clear/Not  Ordered     [x]  Chronic Changes  Results Pending  []  Infiltrates, atelectasis, pleural effusion, or edema  []  Infiltrates in more than one lobe []  Infiltrate + Atelectasis, &/or pleural effusion  []    Respiratory Pattern Regular,  RR = 12-20 [x]  Increased,  RR = 21-25 []  STEWART, irregular,  or RR = 26-30 []  Decreased FEV1  or RR = 31-35 []  Severe SOB, use  of accessory muscles, or RR ? 35  []    Mental Status Alert, oriented,  Cooperative [x]  Confused but Follows commands []  Lethargic or unable to follow commands []  Obtunded  []  Comatose  []    Breath Sounds Clear to  auscultation  []  Decreased unilaterally or  in bases only []  Decreased  bilaterally  []  Crackles or intermittent wheezes [x]  Wheezes []    Cough Strong, Spontan., & nonproductive [x]  Strong,  spontaneous, &  productive []  Weak,  Nonproductive []  Weak, productive or  with wheezes []  No spontaneous  cough or may require suctioning []    Level of Activity Ambulatory []  Ambulatory w/ Assist  [x]  Non-ambulatory []  Paraplegic []  Quadriplegic []    Total    Score:____7___     Triage Score:___4_____      Tri       Triage:     1. (>20) Freq: Q3    2. (16-20) Freq: Q4   3. (11-15) Freq: QID & Albuterol Q2 PRN    4. (6-10) Freq: TID & Albuterol Q2 PRN    5. (0-5) Freq Q4prn

## 2018-12-16 LAB — URINE CULTURE, ROUTINE: NORMAL

## 2018-12-16 PROCEDURE — 94640 AIRWAY INHALATION TREATMENT: CPT

## 2018-12-16 PROCEDURE — 94660 CPAP INITIATION&MGMT: CPT

## 2018-12-16 PROCEDURE — 6360000002 HC RX W HCPCS: Performed by: PHYSICIAN ASSISTANT

## 2018-12-16 PROCEDURE — 6370000000 HC RX 637 (ALT 250 FOR IP): Performed by: INTERNAL MEDICINE

## 2018-12-16 PROCEDURE — 2060000000 HC ICU INTERMEDIATE R&B

## 2018-12-16 PROCEDURE — 6370000000 HC RX 637 (ALT 250 FOR IP): Performed by: PHYSICIAN ASSISTANT

## 2018-12-16 PROCEDURE — 2700000000 HC OXYGEN THERAPY PER DAY

## 2018-12-16 PROCEDURE — 2580000003 HC RX 258: Performed by: PHYSICIAN ASSISTANT

## 2018-12-16 PROCEDURE — 94760 N-INVAS EAR/PLS OXIMETRY 1: CPT

## 2018-12-16 PROCEDURE — 99232 SBSQ HOSP IP/OBS MODERATE 35: CPT | Performed by: INTERNAL MEDICINE

## 2018-12-16 RX ORDER — ASPIRIN 81 MG/1
81 TABLET ORAL DAILY
Status: DISCONTINUED | OUTPATIENT
Start: 2018-12-16 | End: 2018-12-17 | Stop reason: HOSPADM

## 2018-12-16 RX ADMIN — MIRTAZAPINE 15 MG: 15 TABLET, FILM COATED ORAL at 20:49

## 2018-12-16 RX ADMIN — Medication 2 PUFF: at 13:00

## 2018-12-16 RX ADMIN — FINASTERIDE 5 MG: 5 TABLET, FILM COATED ORAL at 08:53

## 2018-12-16 RX ADMIN — CLONAZEPAM 0.5 MG: 0.5 TABLET ORAL at 20:49

## 2018-12-16 RX ADMIN — MAGNESIUM HYDROXIDE 30 ML: 400 SUSPENSION ORAL at 15:20

## 2018-12-16 RX ADMIN — SODIUM CHLORIDE: 9 INJECTION, SOLUTION INTRAVENOUS at 03:25

## 2018-12-16 RX ADMIN — Medication 2 PUFF: at 19:50

## 2018-12-16 RX ADMIN — MELATONIN TAB 3 MG 10 MG: 3 TAB at 22:32

## 2018-12-16 RX ADMIN — Medication 10 ML: at 09:00

## 2018-12-16 RX ADMIN — ENOXAPARIN SODIUM 105 MG: 150 INJECTION SUBCUTANEOUS at 20:48

## 2018-12-16 RX ADMIN — Medication 10 ML: at 20:49

## 2018-12-16 RX ADMIN — VITAMIN D, TAB 1000IU (100/BT) 2000 UNITS: 25 TAB at 08:53

## 2018-12-16 RX ADMIN — ASPIRIN 81 MG: 81 TABLET, COATED ORAL at 14:38

## 2018-12-16 RX ADMIN — Medication 200 MG: at 08:53

## 2018-12-16 RX ADMIN — DOCUSATE SODIUM 100 MG: 100 CAPSULE, LIQUID FILLED ORAL at 08:53

## 2018-12-16 RX ADMIN — PRAVASTATIN SODIUM 20 MG: 20 TABLET ORAL at 17:04

## 2018-12-16 RX ADMIN — Medication 2 PUFF: at 08:14

## 2018-12-16 RX ADMIN — LEVOTHYROXINE SODIUM 137 MCG: 137 TABLET ORAL at 05:53

## 2018-12-16 RX ADMIN — TAMSULOSIN HYDROCHLORIDE 0.4 MG: 0.4 CAPSULE ORAL at 20:49

## 2018-12-16 RX ADMIN — ENOXAPARIN SODIUM 105 MG: 150 INJECTION SUBCUTANEOUS at 08:54

## 2018-12-16 ASSESSMENT — PAIN SCALES - GENERAL: PAINLEVEL_OUTOF10: 0

## 2018-12-16 NOTE — ONCOLOGY
extremity venous duplex sonogram, January 6, 2018. FINDINGS: Bilateral lower extremity duplex sonogram was performed. Deep veins of both lower extremities are easily compressible. Normal deep venous blood flow was documented with color Doppler images and pulsed Doppler waveform images. Left lower extremity deep venous thrombosis visible on January 6, 2018 examination has resolved. There is no evidence of recurrent deep vein thrombosis. Incidentally noted is a 5 cm maximum length left popliteal fossa Baker's cyst. CONCLUSION: NO ACUTE DEEP VEIN THROMBOSIS    ASSESSMENT AND PLAN:  SOB and some hypoxia now better. CT showed PE in distribution of RLL. Only 1 small, segmental, PE possibly new from previous. The majority of PE from 1/2018 much improved. I suspect he did not fail Xarelto. I can't explain SOB and hypoxia. Cardiology will be consulted. Lovenox for now, but can change back to Xarelto from my standpoint.       Electronically signed by Kerrie Clark DO on 12/16/18 at 1:55 PM

## 2018-12-17 ENCOUNTER — HOSPITAL ENCOUNTER (OUTPATIENT)
Dept: PHYSICAL THERAPY | Age: 78
Setting detail: THERAPIES SERIES
Discharge: HOME | End: 2018-12-17
Payer: MEDICARE

## 2018-12-17 ENCOUNTER — APPOINTMENT (OUTPATIENT)
Dept: GENERAL RADIOLOGY | Age: 78
DRG: 176 | End: 2018-12-17
Payer: MEDICARE

## 2018-12-17 VITALS
HEART RATE: 74 BPM | TEMPERATURE: 98.1 F | RESPIRATION RATE: 18 BRPM | OXYGEN SATURATION: 97 % | DIASTOLIC BLOOD PRESSURE: 78 MMHG | SYSTOLIC BLOOD PRESSURE: 136 MMHG | BODY MASS INDEX: 31.83 KG/M2 | WEIGHT: 235 LBS | HEIGHT: 72 IN

## 2018-12-17 PROCEDURE — 71046 X-RAY EXAM CHEST 2 VIEWS: CPT

## 2018-12-17 PROCEDURE — 94640 AIRWAY INHALATION TREATMENT: CPT

## 2018-12-17 PROCEDURE — 2700000000 HC OXYGEN THERAPY PER DAY

## 2018-12-17 PROCEDURE — 6370000000 HC RX 637 (ALT 250 FOR IP): Performed by: PHYSICIAN ASSISTANT

## 2018-12-17 PROCEDURE — 6360000002 HC RX W HCPCS: Performed by: PHYSICIAN ASSISTANT

## 2018-12-17 PROCEDURE — 2580000003 HC RX 258: Performed by: PHYSICIAN ASSISTANT

## 2018-12-17 PROCEDURE — 6370000000 HC RX 637 (ALT 250 FOR IP): Performed by: INTERNAL MEDICINE

## 2018-12-17 PROCEDURE — 99232 SBSQ HOSP IP/OBS MODERATE 35: CPT | Performed by: INTERNAL MEDICINE

## 2018-12-17 RX ORDER — ASPIRIN 81 MG/1
81 TABLET ORAL DAILY
Qty: 30 TABLET | Refills: 3 | Status: SHIPPED | OUTPATIENT
Start: 2018-12-17

## 2018-12-17 RX ORDER — LACTULOSE 10 G/15ML
30 SOLUTION ORAL ONCE
Status: COMPLETED | OUTPATIENT
Start: 2018-12-17 | End: 2018-12-17

## 2018-12-17 RX ADMIN — Medication 2 PUFF: at 13:35

## 2018-12-17 RX ADMIN — VITAMIN D, TAB 1000IU (100/BT) 2000 UNITS: 25 TAB at 09:59

## 2018-12-17 RX ADMIN — LEVOTHYROXINE SODIUM 137 MCG: 137 TABLET ORAL at 05:49

## 2018-12-17 RX ADMIN — Medication 2 PUFF: at 07:59

## 2018-12-17 RX ADMIN — LACTULOSE 30 G: 20 SOLUTION ORAL at 10:00

## 2018-12-17 RX ADMIN — ASPIRIN 81 MG: 81 TABLET, COATED ORAL at 09:59

## 2018-12-17 RX ADMIN — DOCUSATE SODIUM 100 MG: 100 CAPSULE, LIQUID FILLED ORAL at 10:00

## 2018-12-17 RX ADMIN — Medication 200 MG: at 09:59

## 2018-12-17 RX ADMIN — Medication 10 ML: at 10:01

## 2018-12-17 RX ADMIN — FINASTERIDE 5 MG: 5 TABLET, FILM COATED ORAL at 10:04

## 2018-12-17 RX ADMIN — ENOXAPARIN SODIUM 105 MG: 150 INJECTION SUBCUTANEOUS at 10:00

## 2018-12-17 NOTE — CARE COORDINATION
MET WITH PATIENT, PLAN IS TO GO HOME TODAY AND CONTINUE WITH OUTPT THERAPY. COURTESY CALL MADE TO [facility] TO CANCEL APPT, ADVISED PT WILL RETURN TOMORROW. IMM INITIALED, PT VERBALIZED UNDERSTANDING.

## 2018-12-17 NOTE — PROGRESS NOTES
[address]       Physical Therapy  Cancellation/No-show Note  Patient Name:  Suzy Lopez  :     Date:  2018  Referring Practitioner: Blanca Galvan  Diagnosis: Osteoarthritis, shoulder; shoulder pain    Visit Information:  PT Visit Information  Onset Date: 18  PT Insurance Information: Acuna Dinesh; [employer] [facility]  Total # of Visits to Date: 12  Plan of Care/Certification Expiration Date: 19  No Show: 0  Canceled Appointment: 1  Progress Note Counter: 3/8-10 ( PN due by 1-3-18) -cx on 18    For today's appointment patient:  [x]  Cancelled  []  Rescheduled appointment  []  No-show   []  Called pt to remind of next appointment     Reason given by patient:  []  Patient ill  []  Conflicting appointment  []  No transportation    []  Conflict with work  []  No reason given  [x]  Other:   In Hospital     Comments:   Pt states he will be at next scheduled appt, aware of needing resume orders to return to outpatient PT.      Signature: Electronically signed by Gabrielle Escalona PTA on 18 at 12:55 PM

## 2018-12-17 NOTE — DISCHARGE SUMMARY
clonazePAM (KLONOPIN) 0.5 MG tablet  Take 0.5 mg by mouth nightly as needed. .             CPAP Machine MISC  by Does not apply route Auto CPAP  5-20 cm             docusate sodium (COLACE) 100 MG capsule  Take 100 mg by mouth daily             Famotidine (PEPCID AC PO)  Take by mouth daily as needed              finasteride (PROSCAR) 5 MG tablet  Take 1 tablet by mouth daily             levothyroxine (SYNTHROID) 125 MCG tablet  Take 1 tablet by mouth Daily             magnesium 30 MG tablet  Take 250 mg by mouth daily             mirtazapine (REMERON) 15 MG tablet  Take 15 mg by mouth nightly             pravastatin (PRAVACHOL) 20 MG tablet  Take 20 mg by mouth every evening             psyllium (METAMUCIL) 58.12 % PACK packet  Take 1 packet by mouth daily as needed (constipation)             tamsulosin (FLOMAX) 0.4 MG capsule  Take 1 capsule by mouth nightly             vitamin D (CHOLECALCIFEROL) 1000 UNIT TABS tablet  Take 2 tablets by mouth daily                 Disposition:   If discharged to Home, Any [facility] needs that were indicated and/or required as been addressed and set up by Social Work. Condition at discharge: Pt was medically stable at the time of discharge. Activity: activity as tolerated    Total time taken for discharging this patient: 40 minutes. Greater than 70% of time was spent focused exclusively on this patient. Time was taken to review chart, discuss plans with consultants, reconciling medications, discussing plan answering questions with patient.      SignedRandle Poster  12/17/2018, 4:08 PM  ----------------------------------------------------------------------------------------------------------------------    Isabella Baker

## 2018-12-17 NOTE — CONSULTS
of gait and mobility     Acute sinusitis     Anxiety     Aspiration into respiratory tract 11/10/2017    Aspiration pneumonia (HCC)     Bilateral pulmonary embolism (HCC) 1/6/2018    BPH (benign prostatic hyperplasia)     COPD (chronic obstructive pulmonary disease) ([facility] 12/5/2013    Debility     Elevated CK 12/30/2013    Foraminal stenosis of lumbosacral region 6/7/2016    GERD (gastroesophageal reflux disease) 12/11/2014    History of asthma 1/13/2014    HTN (hypertension) 1/13/2014    past braden / no current meds    Hx of blood clots 01/2018    DVT  ( unsure which leg but both were swollen ) -> PE -> thus Xarelto    Hyperlipidemia     meds > 10 yrs    Hypothyroidism     Insomnia     Lower extremity weakness 1/24/2014    On home oxygen therapy     2L at night    Osteoarthritis of spine with radiculopathy, lumbar region 6/7/2016    Papillary thyroid carcinoma ([facility] 12/2006    no trx to follow    Positive D dimer 12/5/2013    Sleep apnea 1/24/2014    Type 2 diabetes mellitus ([facility]     diet control  / no meds    Vitamin D deficiency        Past Surgical History:  Past Surgical History:   Procedure Laterality Date    BRONCHOSCOPY N/A 11/11/2017    BRONCHOSCOPY FIBEROPTIC performed by Jenn Butterfield MD at [facility], DIAGNOSTIC      EYE SURGERY      phaco with IOL OU    HERNIA REPAIR  [id number]    repair umbilical hernia    KNEE SURGERY Left 1970    [place] ARTHROSCOPY SHOULDER SURGICAL BICEPS TENODESIS Right 9/18/2018    RIGHT SHOULDER ARTHROSCOPY SUBACROMIAL DECOMPRESS WITH POSSIBLE BICEPS TENODESIS ARTHROSCOPIC FRANSISCA C- ARM ARTHREX BICEPS TENODESIS [facility] CASE #1 1 HOUR performed by Carolyn Lincoln MD at [address] [place] [address] Right 11/14/2017    RIGHT THORACOTOMY WEDGE RESECTION FOR FOREIGN BODY AND FOB DOUBLE LUMEN (1ST CASE) performed by Anika Mazariegos MD at [address] [place] [address]  2006    cancer / no chemo or levothyroxine (SYNTHROID) 125 MCG tablet Take 1 tablet by mouth Daily  Patient taking differently: Take 137 mcg by mouth Daily  12/5/17  Yes Santos Lerner MD   Famotidine (PEPCID AC PO) Take by mouth daily as needed    Yes Historical Provider, MD   budesonide-formoterol (SYMBICORT) 160-4.5 MCG/ACT AERO Inhale 2 puffs into the lungs 2 times daily.    Yes Historical Provider, MD   acetaminophen (TYLENOL) 500 MG tablet Take 500 mg by mouth every 6 hours as needed for Pain    Historical Provider, MD       Current Medications:      IV Medications:  Current Facility-Administered Medications   Medication Dose Route Frequency Provider Last Rate Last Dose    psyllium (METAMUCIL) 58.12 % packet 1 packet  1 packet Oral Daily PRN Claus Parker MD        aspirin EC tablet 81 mg  81 mg Oral Daily Susan Bullard MD   81 mg at 12/17/18 0959    melatonin tablet 10 mg  10 mg Oral Nightly PRN Claus Parker MD   10 mg at 12/16/18 2232    sodium chloride flush 0.9 % injection 10 mL  10 mL Intravenous 2 times per day Monica Granados PA-C   10 mL at 12/17/18 1001    sodium chloride flush 0.9 % injection 10 mL  10 mL Intravenous PRN Monica Granados PA-C        magnesium hydroxide (MILK OF MAGNESIA) 400 MG/5ML suspension 30 mL  30 mL Oral Daily PRN Monica Granados PA-C   30 mL at 12/16/18 1520    ondansetron (ZOFRAN) injection 4 mg  4 mg Intravenous Q6H PRN Monica Granados PA-C        acetaminophen (TYLENOL) tablet 650 mg  650 mg Oral Q4H PRN Monica Granados PA-C        enoxaparin (LOVENOX) injection 105 mg  1 mg/kg Subcutaneous BID Deanna Morales PA-C   105 mg at 12/17/18 1000    mometasone-formoterol (DULERA) 100-5 MCG/ACT inhaler 2 puff  2 puff Inhalation BID Monica Granados PA-C   2 puff at 12/17/18 0759    clonazePAM (KLONOPIN) tablet 0.5 mg  0.5 mg Oral Nightly Monica Granados PA-C   0.5 mg at 12/16/18 2049    docusate sodium (COLACE) capsule 100 mg  100 mg Oral Daily Monica Granados PA-C   100 mg at 12/17/18 1000    finasteride (PROSCAR) tablet 5 mg  5 mg Oral Daily Jennifer Powell PA-C   5 mg at 12/17/18 1004    levothyroxine (SYNTHROID) tablet 137 mcg  137 mcg Oral QAM AC Jennifer Powell PA-C   137 mcg at 12/17/18 0549    magnesium oxide (MAG-OX) tablet 200 mg  200 mg Oral Daily Jennifer Powell PA-C   200 mg at 12/17/18 4963    mirtazapine (REMERON) tablet 15 mg  15 mg Oral Nightly Jennifer Powell PA-C   15 mg at 12/16/18 2049    pravastatin (PRAVACHOL) tablet 20 mg  20 mg Oral QPM Jennifer Powell PA-C   20 mg at 12/16/18 1704    tamsulosin (FLOMAX) capsule 0.4 mg  0.4 mg Oral Nightly Jennifer Powell PA-C   0.4 mg at 12/16/18 2049    vitamin D (CHOLECALCIFEROL) tablet 2,000 Units  2,000 Units Oral Daily Jennifer Powell PA-C   2,000 Units at 12/17/18 6500    pill splitter   Does not apply Once Deanna Morales PA-C        albuterol sulfate  (90 Base) MCG/ACT inhaler 2 puff  2 puff Inhalation TID Shaista Fraire MD   2 puff at 12/17/18 0759    albuterol sulfate  (90 Base) MCG/ACT inhaler 2 puff  2 puff Inhalation Q2H PRN Shaista Fraire MD           ROS:   12 point review of systems was obtained in detail and is negative other than that noted above or below.        Vital Signs:  Vitals:    12/17/18 0023 12/17/18 0442 12/17/18 0752 12/17/18 0759   BP: (!) 145/78 129/72 131/71    Pulse: 72 60 59    Resp: 18 18 18 18   Temp: 98.4 °F (36.9 °C) 98.4 °F (36.9 °C) 97.9 °F (36.6 °C)    TempSrc: Oral Oral Oral    SpO2:   98% 95%   Weight:  235 lb (106.6 kg)     Height:           Intake/Output Summary (Last 24 hours) at 12/17/18 1214  Last data filed at 12/17/18 0248   Gross per 24 hour   Intake              840 ml   Output             1325 ml   Net             -485 ml       Patient Vitals for the past 96 hrs (Last 3 readings):   Weight   12/17/18 0442 235 lb (106.6 kg)   12/16/18 0552 235 lb 14.4 oz (107 kg)   12/16/18 0450 235 lb 14.4 oz (107 kg)       Physical Examination:  GENERAL APPEARANCE: Well developed, well nourished, in no acute distress. CHEST: Symmetric and non-tender. INTEGUMENT: Skin warm and dry, without gross excoriationis or lesions. HEENT: No gross abnormalities of conjunctiva, teeth, gums, oral mucosa  NECK: Supple, no JVD, no bruit. Thyroid not palpable. Carotid upstrokes normal.  NEURO/PSHCY: Alert and oriented x3; appropriate behavior and responses and responses, grossly normal cerebellar function with normal balance and coordination  LUNGS: Clear to auscultation bilaterally; normal respiratory effort. HEART: Rate and rhythm regular with no evident murmur; no gallop appreciated. There are no rubs, clicks or heaves. PMI nondisplaced. ABDOMEN: Soft, nontender, no palpable hepatosplenomegaly, no mases, no bruits. Abdominal aorta not noted to be enlarged. MUSCULOSKELETAL: Ambulatory with normal tandem gait. EXTREMITIES: Warm with good color, no clubbing or cyanois. There is no edema noted. PERIPHERAL VASCULAR: Pulses present and equally palpable; 2+ throughout. No femoral bruits. Diagnostics:    EKG:  Normal sinus rhythm  Normal ECG  When compared with ECG of 14-DEC-2018 17:56,  Minimal criteria for Anterior infarct are no longer present    Telemetry: normal sinus . FINAL IMPRESSION:  1. Normal Lexiscan myocardial perfusion stress test.  2.  No evidence of myocardial ischemia or myocardial infarction by  perfusion imaging. 3.  Normal LV systolic function, calculated LV ejection fraction of 79%.   4.  Low risk Lexiscan myocardial perfusion stress test.  5.  No previous available for comparison.     Marianne Mojica MD  D: 01/09/2018 15:07:45        Lab Data:  BMP:  Recent Labs      12/14/18   1715  12/14/18   1747  12/15/18   0634   NA  141   --   141   K  4.2   --   3.8   CL  104   --   107   CO2  25   --   23   BUN  16   --   12   CREATININE  1.13  1.0  1.00   LABGLOM  >60.0  >60  >60.0       CBC:  Recent Labs      12/14/18   1715  12/15/18   0634   WBC Pulmonary emboli within subsegmental branches supplying the right lower lobe. Postsurgical changes of the right lung base where there are areas of atelectasis/scarring versus minimal infiltrate. Emphysema predominantly of the bilateral upper lobes. Right lower lobe pulmonary nodules measuring up to 8 mm appear stable when compared to prior examination. 6 month follow-up CT recommended to ensure resolution. Evidence of coronary disease. All CT scans at this facility use dose modulation, iterative reconstruction, and/or weight based dosing when appropriate to reduce radiation dose to as low as reasonably achievable. Result Date: 12/15/2018  EXAMINATION: US DUP LOWER EXTREMITIES BILATERAL VENOUS CLINICAL HISTORY:  CHEST PAIN, ACUTE, PULMONARY EMBOLISM SUSPECTED . Searching for source of pulmonary embolism. COMPARISONS: CT pulmonary angiogram, December 14, 2018. Bilateral lower extremity venous duplex sonogram, January 6, 2018. FINDINGS: Bilateral lower extremity duplex sonogram was performed. Deep veins of both lower extremities are easily compressible. Normal deep venous blood flow was documented with color Doppler images and pulsed Doppler waveform images. Left lower extremity deep venous thrombosis visible on January 6, 2018 examination has resolved. There is no evidence of recurrent deep vein thrombosis. Incidentally noted is a 5 cm maximum length left popliteal fossa Baker's cyst. CONCLUSION: NO ACUTE DEEP VEIN THROMBOSIS      Impression:     Shortness of breath and hypoxia. Apparent recurrent pulmonary embolism. Patient has been compliant with Xarelto. Borderline elevation of troponins in flat pattern. Doubt acute coronary syndrome. Normal myocardial perfusion study earlier this year. No definite anginal symptoms. Underlying COPD and GEORGES. Essential hypertension, type 2 diabetes mellitus, and hyperlipidemia. Plan:     1. Schedule PA and lateral CXR.   2. Echocardiogram to

## 2018-12-18 ENCOUNTER — CARE COORDINATION (OUTPATIENT)
Dept: CASE MANAGEMENT | Age: 78
End: 2018-12-18

## 2018-12-18 DIAGNOSIS — I26.99 OTHER ACUTE PULMONARY EMBOLISM WITHOUT ACUTE COR PULMONALE (HCC): Primary | ICD-10-CM

## 2018-12-18 PROBLEM — M19.011 OSTEOARTHRITIS OF RIGHT SHOULDER: Status: RESOLVED | Noted: 2018-09-11 | Resolved: 2018-12-18

## 2018-12-18 PROCEDURE — 1111F DSCHRG MED/CURRENT MED MERGE: CPT | Performed by: INTERNAL MEDICINE

## 2018-12-19 LAB
BLOOD CULTURE, ROUTINE: NORMAL
CULTURE, BLOOD 2: NORMAL

## 2018-12-20 ENCOUNTER — HOSPITAL ENCOUNTER (OUTPATIENT)
Dept: PHYSICAL THERAPY | Age: 78
Setting detail: THERAPIES SERIES
Discharge: HOME | End: 2018-12-20
Payer: MEDICARE

## 2018-12-20 PROCEDURE — 93010 ELECTROCARDIOGRAM REPORT: CPT | Performed by: INTERNAL MEDICINE

## 2018-12-20 PROCEDURE — 97110 THERAPEUTIC EXERCISES: CPT

## 2018-12-20 ASSESSMENT — PAIN DESCRIPTION - DESCRIPTORS: DESCRIPTORS: ACHING

## 2018-12-20 ASSESSMENT — PAIN SCALES - GENERAL: PAINLEVEL_OUTOF10: 1

## 2018-12-20 ASSESSMENT — PAIN DESCRIPTION - LOCATION: LOCATION: SHOULDER

## 2018-12-20 ASSESSMENT — PAIN DESCRIPTION - PAIN TYPE: TYPE: ACUTE PAIN

## 2018-12-20 ASSESSMENT — PAIN DESCRIPTION - ORIENTATION: ORIENTATION: RIGHT

## 2018-12-20 NOTE — PROGRESS NOTES
[address] [address]  Outpatient Physical Therapy    Treatment Note        Date: 2018  Patient: Suzy Lopez  :   ACCT #: [de-identified]  Referring Practitioner: [employer]  Diagnosis: Osteoarthritis, shoulder; shoulder pain    Visit Information:  PT Visit Information  Onset Date: 18  PT Insurance Information: Burgess Montiel; [employer] [facility]  Total # of Visits to Date: 13  Plan of Care/Certification Expiration Date: 19  Progress Note Counter: 4/8-10 ( PN due by 1-3-18)     Subjective: pain is maybe 1/10  Comments: issued shoulder isometrics for HEP  HEP Compliance:  [x] Good [] Fair [] Poor [] Reports not doing due to:    Vital Signs  Patient Currently in Pain: Yes (at rest)   Pain Screening  Patient Currently in Pain: Yes (at rest)  Pain Assessment  Pain Level: 1  Pain Type: Acute pain  Pain Location: Shoulder  Pain Orientation: Right  Pain Descriptors: Aching    OBJECTIVE:   Exercises  Exercise 1: table slides flexion, scaption 10 sec x 10  Exercise 3: pulleys flexion 4 minutes to increase ROM  Exercise 4: supine, wand ex, flexion 5 sec x 10  Exercise 5: rows/lats OTB x 20  Exercise 9: shoulder ISO 4 ways 5\"x10 (flex, ext, abd, add)  Exercise 12: Sci-fit, L-1.0, 5 min, UE's  Exercise 20:  HEP, shoulder ISOs      Strength: [x] NT  [] MMT completed:   ROM: [] NT  [x] ROM measurements:      AROM RUE (degrees)  RUE General AROM: flexion, trunk and UT compensation past 90 deg, abd 93 deg, no compensation      Modalities:  Modalities  Cryotherapy (Minutes\Location): CP R shoulder post tx for pain and inflammation control x10 min     *Indicates exercise, modality, or manual techniques to be initiated when appropriate    Assessment:         Body structures, Functions, Activity limitations: Decreased ROM, Decreased strength, Decreased endurance  Assessment: increased reps with rows/lats and added UE Sci-fit and supine cane ex with 5 sec hold, to improve shoulder strength

## 2018-12-26 ENCOUNTER — OFFICE VISIT (OUTPATIENT)
Dept: PULMONOLOGY | Age: 78
End: 2018-12-26
Payer: MEDICARE

## 2018-12-26 VITALS
OXYGEN SATURATION: 94 % | HEART RATE: 77 BPM | HEIGHT: 72 IN | BODY MASS INDEX: 36.7 KG/M2 | TEMPERATURE: 97.6 F | DIASTOLIC BLOOD PRESSURE: 68 MMHG | RESPIRATION RATE: 16 BRPM | SYSTOLIC BLOOD PRESSURE: 126 MMHG | WEIGHT: 271 LBS

## 2018-12-26 DIAGNOSIS — E66.9 OBESITY (BMI 30-39.9): ICD-10-CM

## 2018-12-26 DIAGNOSIS — G47.33 OSA (OBSTRUCTIVE SLEEP APNEA): Primary | ICD-10-CM

## 2018-12-26 DIAGNOSIS — I26.99 OTHER ACUTE PULMONARY EMBOLISM WITHOUT ACUTE COR PULMONALE (HCC): ICD-10-CM

## 2018-12-26 DIAGNOSIS — J44.9 CHRONIC OBSTRUCTIVE PULMONARY DISEASE, UNSPECIFIED COPD TYPE (HCC): ICD-10-CM

## 2018-12-26 PROCEDURE — G8482 FLU IMMUNIZE ORDER/ADMIN: HCPCS | Performed by: INTERNAL MEDICINE

## 2018-12-26 PROCEDURE — 1123F ACP DISCUSS/DSCN MKR DOCD: CPT | Performed by: INTERNAL MEDICINE

## 2018-12-26 PROCEDURE — 1036F TOBACCO NON-USER: CPT | Performed by: INTERNAL MEDICINE

## 2018-12-26 PROCEDURE — G8926 SPIRO NO PERF OR DOC: HCPCS | Performed by: INTERNAL MEDICINE

## 2018-12-26 PROCEDURE — 1111F DSCHRG MED/CURRENT MED MERGE: CPT | Performed by: INTERNAL MEDICINE

## 2018-12-26 PROCEDURE — G8417 CALC BMI ABV UP PARAM F/U: HCPCS | Performed by: INTERNAL MEDICINE

## 2018-12-26 PROCEDURE — G8427 DOCREV CUR MEDS BY ELIG CLIN: HCPCS | Performed by: INTERNAL MEDICINE

## 2018-12-26 PROCEDURE — 99214 OFFICE O/P EST MOD 30 MIN: CPT | Performed by: INTERNAL MEDICINE

## 2018-12-26 PROCEDURE — 3023F SPIROM DOC REV: CPT | Performed by: INTERNAL MEDICINE

## 2018-12-26 PROCEDURE — 4040F PNEUMOC VAC/ADMIN/RCVD: CPT | Performed by: INTERNAL MEDICINE

## 2018-12-26 PROCEDURE — 1101F PT FALLS ASSESS-DOCD LE1/YR: CPT | Performed by: INTERNAL MEDICINE

## 2018-12-26 ASSESSMENT — ENCOUNTER SYMPTOMS
COUGH: 1
VOMITING: 0
WHEEZING: 1
CHEST TIGHTNESS: 0
ABDOMINAL PAIN: 0
EYE ITCHING: 0
SORE THROAT: 0
VOICE CHANGE: 0
DIARRHEA: 0
RHINORRHEA: 0
SHORTNESS OF BREATH: 0
NAUSEA: 0

## 2018-12-26 NOTE — PROGRESS NOTES
1/24/2014    On home oxygen therapy     2L at night    Osteoarthritis of spine with radiculopathy, lumbar region 6/7/2016    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    no trx to follow    Positive D dimer 12/5/2013    Sleep apnea 1/24/2014    Type 2 diabetes mellitus (HCC)     diet control  / no meds    Vitamin D deficiency      Past Surgical History:   Procedure Laterality Date    BRONCHOSCOPY N/A 11/11/2017    BRONCHOSCOPY FIBEROPTIC performed by Dulce Fountain MD at [facility] COLONOSCOPY      ENDOSCOPY, COLON, DIAGNOSTIC      EYE SURGERY      phaco with IOL OU    HERNIA REPAIR  [id number]    repair umbilical hernia    KNEE SURGERY Left 1970    LA ARTHROSCOPY SHOULDER SURGICAL BICEPS TENODESIS Right 9/18/2018    RIGHT SHOULDER ARTHROSCOPY SUBACROMIAL DECOMPRESS WITH POSSIBLE BICEPS TENODESIS ARTHROSCOPIC FRANSISCA C- ARM ARTHREX BICEPS TENODESIS [facility] CHAIR CASE #1 1 HOUR performed by Loki Russo MD at [address] [place] [address] Right 11/14/2017    RIGHT THORACOTOMY WEDGE RESECTION FOR FOREIGN BODY AND FOB DOUBLE LUMEN (1ST CASE) performed by Иван Hdez MD at [address] [place] [address]  2006    cancer / no chemo or radiation     Family History   Problem Relation Age of Onset    Other Mother 80        Old Age    Stroke Father 45    No Known Problems Sister     Psoriasis Daughter     No Known Problems Son      Social History     Social History    Marital status:      Spouse name: N/A    Number of children: 2    Years of education: N/A     Occupational History    retired      Social History Main Topics    Smoking status: Former Smoker     Packs/day: 1.00     Years: 40.00     Types: Cigarettes     Start date: 6/1/2000     Quit date: 2000    Smokeless tobacco: Former User      Comment: quit 2000    Alcohol use 0.0 oz/week      Comment: rare social    Drug use: No    Sexual activity: Not on file     Other Topics Concern    Not on file     Social History Narrative    The patient lives alone in a one story home with one step to enter the home. The bedroom and bathroom are on the first floor. His social support includes his children. He has a wheeled walker, rollator, cane and dressing assistive device at home. He was receiving home health care services prior to admission to include PT, OT and RN. He does not have history of falls prior to admission. He was independent with mobility with a wheeled walker as needed prior to admission. He was independent with self care prior to admission. His goal is to get stronger and return home. LIVES AT HOME ALONE. HE HAS A ROLLATOR HE USES. HIS SON LIVES IN [place] AND IS AVAILABLE IF HE NEEDS HIM. Type of Home: House    Home Layout: One level    Home Access: Level entry    Home Equipment: Cane, 4 wheeled walker    ADL Assistance: Independent    Homemaking Assistance: Independent    Ambulation Assistance: Independent    Transfer Assistance: Independent    Additional Comments: son and dtr can assist upon DC home with IADLs             Review of Systems   Constitutional: Negative for chills, diaphoresis, fatigue and fever. HENT: Negative for congestion, mouth sores, nosebleeds, postnasal drip, rhinorrhea, sneezing, sore throat and voice change. Eyes: Negative for itching and visual disturbance. Respiratory: Positive for cough and wheezing. Negative for chest tightness and shortness of breath. Cardiovascular: Negative. Negative for chest pain, palpitations and leg swelling. Gastrointestinal: Negative for abdominal pain, diarrhea, nausea and vomiting. Genitourinary: Negative for difficulty urinating and hematuria. Musculoskeletal: Negative for arthralgias, joint swelling and myalgias. Skin: Negative for rash. Allergic/Immunologic: Negative for environmental allergies. Neurological: Negative for dizziness, tremors, weakness and headaches.    Psychiatric/Behavioral: Positive for sleep

## 2018-12-27 ENCOUNTER — HOSPITAL ENCOUNTER (OUTPATIENT)
Dept: PHYSICAL THERAPY | Age: 78
Setting detail: THERAPIES SERIES
Discharge: HOME | End: 2018-12-27
Payer: MEDICARE

## 2018-12-27 PROCEDURE — 97110 THERAPEUTIC EXERCISES: CPT

## 2018-12-27 ASSESSMENT — PAIN DESCRIPTION - LOCATION: LOCATION: SHOULDER

## 2018-12-27 ASSESSMENT — PAIN DESCRIPTION - PAIN TYPE: TYPE: ACUTE PAIN

## 2018-12-27 ASSESSMENT — PAIN DESCRIPTION - FREQUENCY: FREQUENCY: INTERMITTENT

## 2018-12-27 ASSESSMENT — PAIN DESCRIPTION - ORIENTATION: ORIENTATION: RIGHT

## 2018-12-27 ASSESSMENT — PAIN DESCRIPTION - DESCRIPTORS: DESCRIPTORS: ACHING

## 2018-12-27 ASSESSMENT — PAIN SCALES - GENERAL: PAINLEVEL_OUTOF10: 0

## 2019-01-03 ENCOUNTER — HOSPITAL ENCOUNTER (OUTPATIENT)
Dept: PHYSICAL THERAPY | Age: 79
Setting detail: THERAPIES SERIES
Discharge: HOME | End: 2019-01-03
Payer: MEDICARE

## 2019-01-03 PROCEDURE — G8985 CARRY GOAL STATUS: HCPCS

## 2019-01-03 PROCEDURE — G8984 CARRY CURRENT STATUS: HCPCS

## 2019-01-03 PROCEDURE — 97110 THERAPEUTIC EXERCISES: CPT

## 2019-01-03 PROCEDURE — 97140 MANUAL THERAPY 1/> REGIONS: CPT

## 2019-01-08 ENCOUNTER — HOSPITAL ENCOUNTER (OUTPATIENT)
Dept: PHYSICAL THERAPY | Age: 79
Setting detail: THERAPIES SERIES
Discharge: HOME | End: 2019-01-08
Payer: MEDICARE

## 2019-01-08 PROCEDURE — 97140 MANUAL THERAPY 1/> REGIONS: CPT

## 2019-01-08 PROCEDURE — 97110 THERAPEUTIC EXERCISES: CPT

## 2019-01-08 ASSESSMENT — PAIN DESCRIPTION - LOCATION: LOCATION: SHOULDER

## 2019-01-08 ASSESSMENT — PAIN DESCRIPTION - DESCRIPTORS: DESCRIPTORS: SORE

## 2019-01-08 ASSESSMENT — PAIN DESCRIPTION - ORIENTATION: ORIENTATION: RIGHT

## 2019-01-08 ASSESSMENT — PAIN DESCRIPTION - PAIN TYPE: TYPE: ACUTE PAIN

## 2019-01-08 ASSESSMENT — PAIN SCALES - GENERAL: PAINLEVEL_OUTOF10: 3

## 2019-01-11 ENCOUNTER — HOSPITAL ENCOUNTER (OUTPATIENT)
Dept: PHYSICAL THERAPY | Age: 79
Setting detail: THERAPIES SERIES
Discharge: HOME | End: 2019-01-11
Payer: MEDICARE

## 2019-01-11 PROCEDURE — 97110 THERAPEUTIC EXERCISES: CPT

## 2019-01-11 PROCEDURE — 97140 MANUAL THERAPY 1/> REGIONS: CPT

## 2019-01-11 ASSESSMENT — PAIN SCALES - GENERAL: PAINLEVEL_OUTOF10: 3

## 2019-01-11 ASSESSMENT — PAIN DESCRIPTION - LOCATION: LOCATION: SHOULDER

## 2019-01-11 ASSESSMENT — PAIN DESCRIPTION - PAIN TYPE: TYPE: ACUTE PAIN

## 2019-01-11 ASSESSMENT — PAIN DESCRIPTION - ORIENTATION: ORIENTATION: RIGHT

## 2019-01-11 ASSESSMENT — PAIN DESCRIPTION - DESCRIPTORS: DESCRIPTORS: ACHING

## 2019-01-15 ENCOUNTER — HOSPITAL ENCOUNTER (OUTPATIENT)
Dept: PHYSICAL THERAPY | Age: 79
Setting detail: THERAPIES SERIES
Discharge: HOME | End: 2019-01-15
Payer: MEDICARE

## 2019-01-15 PROCEDURE — 97110 THERAPEUTIC EXERCISES: CPT

## 2019-01-15 ASSESSMENT — PAIN DESCRIPTION - LOCATION: LOCATION: SHOULDER

## 2019-01-15 ASSESSMENT — PAIN DESCRIPTION - PAIN TYPE: TYPE: CHRONIC PAIN

## 2019-01-15 ASSESSMENT — PAIN DESCRIPTION - DESCRIPTORS: DESCRIPTORS: ACHING

## 2019-01-15 ASSESSMENT — PAIN DESCRIPTION - FREQUENCY: FREQUENCY: INTERMITTENT

## 2019-01-15 ASSESSMENT — PAIN SCALES - GENERAL: PAINLEVEL_OUTOF10: 3

## 2019-01-15 ASSESSMENT — PAIN DESCRIPTION - ORIENTATION: ORIENTATION: RIGHT

## 2019-01-18 ENCOUNTER — HOSPITAL ENCOUNTER (OUTPATIENT)
Dept: PHYSICAL THERAPY | Age: 79
Setting detail: THERAPIES SERIES
Discharge: HOME | End: 2019-01-18
Payer: MEDICARE

## 2019-01-18 PROCEDURE — 97110 THERAPEUTIC EXERCISES: CPT

## 2019-01-18 PROCEDURE — G8985 CARRY GOAL STATUS: HCPCS

## 2019-01-18 PROCEDURE — G8984 CARRY CURRENT STATUS: HCPCS

## 2019-01-18 PROCEDURE — G8986 CARRY D/C STATUS: HCPCS

## 2019-01-18 ASSESSMENT — PAIN DESCRIPTION - ORIENTATION: ORIENTATION: RIGHT

## 2019-01-18 ASSESSMENT — PAIN SCALES - GENERAL: PAINLEVEL_OUTOF10: 1

## 2019-01-18 ASSESSMENT — PAIN DESCRIPTION - DESCRIPTORS: DESCRIPTORS: ACHING

## 2019-01-18 ASSESSMENT — PAIN DESCRIPTION - LOCATION: LOCATION: SHOULDER

## 2019-03-18 ENCOUNTER — TELEPHONE (OUTPATIENT)
Dept: UROLOGY | Age: 79
End: 2019-03-18

## 2019-03-18 DIAGNOSIS — R97.20 ELEVATED PSA: Primary | ICD-10-CM

## 2019-03-19 DIAGNOSIS — R97.20 ELEVATED PSA: ICD-10-CM

## 2019-03-19 LAB — PROSTATE SPECIFIC ANTIGEN: 3.39 NG/ML (ref 0–6.22)

## 2019-03-22 ENCOUNTER — OFFICE VISIT (OUTPATIENT)
Dept: UROLOGY | Age: 79
End: 2019-03-22
Payer: MEDICARE

## 2019-03-22 VITALS
HEART RATE: 82 BPM | SYSTOLIC BLOOD PRESSURE: 100 MMHG | BODY MASS INDEX: 31.83 KG/M2 | DIASTOLIC BLOOD PRESSURE: 60 MMHG | WEIGHT: 235 LBS | HEIGHT: 72 IN

## 2019-03-22 DIAGNOSIS — R33.9 URINARY RETENTION: ICD-10-CM

## 2019-03-22 DIAGNOSIS — R97.20 ELEVATED PSA: ICD-10-CM

## 2019-03-22 DIAGNOSIS — R35.0 FREQUENCY OF URINATION: Primary | ICD-10-CM

## 2019-03-22 LAB
BILIRUBIN, POC: ABNORMAL
BLOOD URINE, POC: ABNORMAL
CLARITY, POC: CLEAR
COLOR, POC: YELLOW
GLUCOSE URINE, POC: ABNORMAL
KETONES, POC: ABNORMAL
LEUKOCYTE EST, POC: ABNORMAL
NITRITE, POC: ABNORMAL
PH, POC: 5
POST VOID RESIDUAL (PVR): 48 ML
PROTEIN, POC: ABNORMAL
SPECIFIC GRAVITY, POC: 1.02
UROBILINOGEN, POC: 0.2

## 2019-03-22 PROCEDURE — 4040F PNEUMOC VAC/ADMIN/RCVD: CPT | Performed by: UROLOGY

## 2019-03-22 PROCEDURE — 1123F ACP DISCUSS/DSCN MKR DOCD: CPT | Performed by: UROLOGY

## 2019-03-22 PROCEDURE — 51798 US URINE CAPACITY MEASURE: CPT | Performed by: UROLOGY

## 2019-03-22 PROCEDURE — G8482 FLU IMMUNIZE ORDER/ADMIN: HCPCS | Performed by: UROLOGY

## 2019-03-22 PROCEDURE — 1036F TOBACCO NON-USER: CPT | Performed by: UROLOGY

## 2019-03-22 PROCEDURE — 81003 URINALYSIS AUTO W/O SCOPE: CPT | Performed by: UROLOGY

## 2019-03-22 PROCEDURE — G8427 DOCREV CUR MEDS BY ELIG CLIN: HCPCS | Performed by: UROLOGY

## 2019-03-22 PROCEDURE — 1101F PT FALLS ASSESS-DOCD LE1/YR: CPT | Performed by: UROLOGY

## 2019-03-22 PROCEDURE — 99214 OFFICE O/P EST MOD 30 MIN: CPT | Performed by: UROLOGY

## 2019-03-22 PROCEDURE — G8417 CALC BMI ABV UP PARAM F/U: HCPCS | Performed by: UROLOGY

## 2019-03-24 LAB — URINE CULTURE, ROUTINE: NORMAL

## 2019-03-27 ENCOUNTER — OFFICE VISIT (OUTPATIENT)
Dept: PULMONOLOGY | Age: 79
End: 2019-03-27
Payer: MEDICARE

## 2019-03-27 VITALS
SYSTOLIC BLOOD PRESSURE: 120 MMHG | RESPIRATION RATE: 16 BRPM | BODY MASS INDEX: 36.98 KG/M2 | HEIGHT: 72 IN | WEIGHT: 273 LBS | HEART RATE: 83 BPM | OXYGEN SATURATION: 95 % | TEMPERATURE: 98.6 F | DIASTOLIC BLOOD PRESSURE: 72 MMHG

## 2019-03-27 DIAGNOSIS — G47.33 OSA (OBSTRUCTIVE SLEEP APNEA): Primary | ICD-10-CM

## 2019-03-27 DIAGNOSIS — E66.9 OBESITY (BMI 30-39.9): ICD-10-CM

## 2019-03-27 DIAGNOSIS — J44.9 CHRONIC OBSTRUCTIVE PULMONARY DISEASE, UNSPECIFIED COPD TYPE (HCC): ICD-10-CM

## 2019-03-27 DIAGNOSIS — I26.99 BILATERAL PULMONARY EMBOLISM (HCC): ICD-10-CM

## 2019-03-27 PROCEDURE — 4040F PNEUMOC VAC/ADMIN/RCVD: CPT | Performed by: INTERNAL MEDICINE

## 2019-03-27 PROCEDURE — 1036F TOBACCO NON-USER: CPT | Performed by: INTERNAL MEDICINE

## 2019-03-27 PROCEDURE — G8482 FLU IMMUNIZE ORDER/ADMIN: HCPCS | Performed by: INTERNAL MEDICINE

## 2019-03-27 PROCEDURE — 3023F SPIROM DOC REV: CPT | Performed by: INTERNAL MEDICINE

## 2019-03-27 PROCEDURE — G8427 DOCREV CUR MEDS BY ELIG CLIN: HCPCS | Performed by: INTERNAL MEDICINE

## 2019-03-27 PROCEDURE — 99214 OFFICE O/P EST MOD 30 MIN: CPT | Performed by: INTERNAL MEDICINE

## 2019-03-27 PROCEDURE — G8926 SPIRO NO PERF OR DOC: HCPCS | Performed by: INTERNAL MEDICINE

## 2019-03-27 PROCEDURE — G8417 CALC BMI ABV UP PARAM F/U: HCPCS | Performed by: INTERNAL MEDICINE

## 2019-03-27 PROCEDURE — 1123F ACP DISCUSS/DSCN MKR DOCD: CPT | Performed by: INTERNAL MEDICINE

## 2019-03-27 ASSESSMENT — ENCOUNTER SYMPTOMS
DIARRHEA: 0
RHINORRHEA: 0
NAUSEA: 0
ABDOMINAL PAIN: 0
SORE THROAT: 0
EYE ITCHING: 0
COUGH: 0
VOMITING: 0
WHEEZING: 1
SHORTNESS OF BREATH: 1
CHEST TIGHTNESS: 0
VOICE CHANGE: 0

## 2019-04-15 ENCOUNTER — OFFICE VISIT (OUTPATIENT)
Dept: UROLOGY | Age: 79
End: 2019-04-15
Payer: MEDICARE

## 2019-04-15 VITALS
DIASTOLIC BLOOD PRESSURE: 62 MMHG | SYSTOLIC BLOOD PRESSURE: 104 MMHG | HEIGHT: 72 IN | HEART RATE: 80 BPM | BODY MASS INDEX: 32.12 KG/M2 | WEIGHT: 237.1 LBS

## 2019-04-15 DIAGNOSIS — R31.9 HEMATURIA, UNSPECIFIED TYPE: Primary | ICD-10-CM

## 2019-04-15 LAB
BILIRUBIN, POC: ABNORMAL
BLOOD URINE, POC: ABNORMAL
CLARITY, POC: CLEAR
COLOR, POC: YELLOW
GLUCOSE URINE, POC: ABNORMAL
KETONES, POC: ABNORMAL
LEUKOCYTE EST, POC: ABNORMAL
NITRITE, POC: ABNORMAL
PH, POC: 5
PROTEIN, POC: ABNORMAL
SPECIFIC GRAVITY, POC: 1.02
UROBILINOGEN, POC: 0.2

## 2019-04-15 PROCEDURE — G8427 DOCREV CUR MEDS BY ELIG CLIN: HCPCS | Performed by: UROLOGY

## 2019-04-15 PROCEDURE — 99214 OFFICE O/P EST MOD 30 MIN: CPT | Performed by: UROLOGY

## 2019-04-15 PROCEDURE — G8417 CALC BMI ABV UP PARAM F/U: HCPCS | Performed by: UROLOGY

## 2019-04-15 PROCEDURE — 4040F PNEUMOC VAC/ADMIN/RCVD: CPT | Performed by: UROLOGY

## 2019-04-15 PROCEDURE — 1123F ACP DISCUSS/DSCN MKR DOCD: CPT | Performed by: UROLOGY

## 2019-04-15 PROCEDURE — 1036F TOBACCO NON-USER: CPT | Performed by: UROLOGY

## 2019-04-15 PROCEDURE — 81003 URINALYSIS AUTO W/O SCOPE: CPT | Performed by: UROLOGY

## 2019-04-15 NOTE — PROGRESS NOTES
[facility] EVALUATION NOTE                                                 H&P                                                                                                                                                 Reason for Visit  Hematospermia    History of Present Illness  Recent episode of him at his sperm in  Patient on chronic anticoagulation  On Flomax and finasteride  3 sets of biopsies for elevated PSA which showed no evidence of malignancy      Urologic Review of Systems/Symptoms  Denies hematuria  Denies dysuria  Denies incontinence  Denies flank pain  Other Urologic: No issues with urination    Review of Systems  Head and neck: No issues/reviewed  Cardiac: No recent issues/reviewed  Pulmonary: No issues/reviewed  Gastrointestinal: No issues/reviewed  Neurologic: No recent issues/reviewed  Extremities: No issues/reviewed  Lymphatics: No lymphadenopathy no change  Genitourinary: See above  Skin: No issues/reviewed  Hospitalization: None recent  Continues to take anticoagulation  All 14 categories of Review of Systems otherwise reviewed no other findings reported.     Past Medical History:   Diagnosis Date    Abnormality of gait and mobility     Acute sinusitis     Anxiety     Aspiration into respiratory tract 11/10/2017    Aspiration pneumonia (HCC)     Bilateral pulmonary embolism (HCC) 1/6/2018    BPH (benign prostatic hyperplasia)     COPD (chronic obstructive pulmonary disease) [facility] [address] 12/5/2013    Debility     Elevated CK 12/30/2013    Foraminal stenosis of lumbosacral region 6/7/2016    GERD (gastroesophageal reflux disease) 12/11/2014    History of asthma 1/13/2014    HTN (hypertension) 1/13/2014    past braden / no current meds    Hx of blood clots 01/2018    DVT  ( unsure which leg but both were swollen ) -> PE -> thus Xarelto    Hyperlipidemia     meds > 10 yrs    Hypothyroidism     Insomnia     Lower extremity weakness 1/24/2014    On home oxygen therapy     2L use: No    Sexual activity: None   Lifestyle    Physical activity:     Days per week: None     Minutes per session: None    Stress: None   Relationships    Social connections:     Talks on phone: None     Gets together: None     Attends [employer] service: None     Active member of club or organization: None     Attends meetings of clubs or organizations: None     Relationship status: None    Intimate partner violence:     Fear of current or ex partner: None     Emotionally abused: None     Physically abused: None     Forced sexual activity: None   Other Topics Concern    None   Social History Narrative    The patient lives alone in a one story home with one step to enter the home. The bedroom and bathroom are on the first floor. His social support includes his children. He has a wheeled walker, rollator, cane and dressing assistive device at home. He was receiving home health care services prior to admission to include PT, OT and RN. He does not have history of falls prior to admission. He was independent with mobility with a wheeled walker as needed prior to admission. He was independent with self care prior to admission. His goal is to get stronger and return home. LIVES AT HOME ALONE. HE HAS A ROLLATOR HE USES. HIS SON LIVES IN [place] AND IS AVAILABLE IF HE NEEDS HIM.      Type of Home: House    Home Layout: One level    Home Access: Level entry    Home Equipment: Cane, 4 wheeled walker    ADL Assistance: Independent    Homemaking Assistance: Independent    Ambulation Assistance: Independent    Transfer Assistance: Independent    Additional Comments: son and dtr can assist upon DC home with IADLs     Family History   Problem Relation Age of Onset    Other Mother 80        Old Age    Stroke Father 45    No Known Problems Sister     Psoriasis Daughter     No Known Problems Son      Current Outpatient Medications   Medication Sig Dispense Refill    Respiratory Therapy Supplies [facility] visit on 04/15/19   POCT Urinalysis No Micro (Auto)   Result Value Ref Range    Color, UA yellow     Clarity, UA clear     Glucose, UA POC neg     Bilirubin, UA neg     Ketones, UA neg     Spec Grav, UA 1.020     Blood, UA POC large     pH, UA 5.0     Protein, UA POC neg     Urobilinogen, UA 0.2     Leukocytes, UA trace     Nitrite, UA neg        Physical Exam  Vitals:    04/15/19 1346   BP: 104/62   Pulse: 80   Weight: 237 lb 1.6 oz (107.5 kg)   Height: 6' (1.829 m)     Constitutional: patient is oriented to person, place, and time. patient appears well-developed. currently not in distress. Ears: Adequate hearing/no hearing loss  Head: Normocephalic. Atraumatic  Neck: Normal range of motion. Cardiovascular: Normal rate, BP reviewed. normal rhythm  Pulmonary/Chest: Normal respiratory effort  mild wheezing has COPD  Abdominal: Not distended. No abdominal discomfort  Urologic Exam  Urinalysis shows mild microhematuria. Exam otherwise unremarkable. Prostate exam deferred . Musculoskeletal: Normal range of motion. Walks with assistance of a walker. Extremities: No edema   Neurological: No deficits   Skin: Skin is warm and dry. No lesions. No rashes   Psychiatric:  Normal affect. Assessment/Medical Necessity-Decision Making  hematospermia which has resolved spontaneously most likely secondary to anticoagulation  History of multiple prostate biopsies that have been negative  On tamsulosin and finasteride with good control of voiding symptoms  Plan  No further workup planned  Patient assured  Urine culture sent  Greater than 50% of 35 minutes spent consulting patient face-to-face  Orders Placed This Encounter   Procedures    Urine Culture     Order Specific Question:   Specify (ex-cath, midstream, cysto, etc)? Answer:   m    POCT Urinalysis No Micro (Auto)     No orders of the defined types were placed in this encounter.     Tiny Cornejo MD       Please note this report has been partially produced using speech recognition software  And may cause contain errors related to that system including grammar, punctuation and spelling as well as words and phrases that may seem inappropriate. If there are questions or concerns please feel free to contact me to clarify.

## 2019-04-17 LAB — URINE CULTURE, ROUTINE: NORMAL

## 2019-05-10 DIAGNOSIS — Z79.01 CURRENT USE OF LONG TERM ANTICOAGULATION: ICD-10-CM

## 2019-05-10 DIAGNOSIS — I82.412 ACUTE DEEP VEIN THROMBOSIS (DVT) OF FEMORAL VEIN OF LEFT LOWER EXTREMITY (HCC): ICD-10-CM

## 2019-05-10 LAB
ALBUMIN SERPL-MCNC: 3.7 G/DL (ref 3.5–4.6)
ALP BLD-CCNC: 70 U/L (ref 35–104)
ALT SERPL-CCNC: 21 U/L (ref 0–41)
ANION GAP SERPL CALCULATED.3IONS-SCNC: 17 MEQ/L (ref 9–15)
AST SERPL-CCNC: 23 U/L (ref 0–40)
BILIRUB SERPL-MCNC: 0.4 MG/DL (ref 0.2–0.7)
BUN BLDV-MCNC: 17 MG/DL (ref 8–23)
CALCIUM SERPL-MCNC: 8.8 MG/DL (ref 8.5–9.9)
CHLORIDE BLD-SCNC: 103 MEQ/L (ref 95–107)
CO2: 22 MEQ/L (ref 20–31)
CREAT SERPL-MCNC: 1.15 MG/DL (ref 0.7–1.2)
GFR AFRICAN AMERICAN: >60
GFR NON-AFRICAN AMERICAN: >60
GLOBULIN: 3.3 G/DL (ref 2.3–3.5)
GLUCOSE BLD-MCNC: 104 MG/DL (ref 70–99)
POTASSIUM SERPL-SCNC: 4 MEQ/L (ref 3.4–4.9)
SODIUM BLD-SCNC: 142 MEQ/L (ref 135–144)
TOTAL PROTEIN: 7 G/DL (ref 6.3–8)

## 2019-05-13 LAB
ANTICARDIOLIPIN IGG ANTIBODY: 6 GPL (ref 0–14)
CARDIOLIPIN AB IGM: 33 MPL (ref 0–12)

## 2019-05-17 ENCOUNTER — TELEPHONE (OUTPATIENT)
Dept: PULMONOLOGY | Age: 79
End: 2019-05-17

## 2019-05-17 NOTE — TELEPHONE ENCOUNTER
Patient called stating that around his surgery for his lung that he is have pressure build up around it. Wants to know if he should come see you.  Please Advise

## 2019-05-23 ENCOUNTER — HOSPITAL ENCOUNTER (OUTPATIENT)
Dept: GENERAL RADIOLOGY | Age: 79
Discharge: HOME | End: 2019-05-25
Payer: MEDICARE

## 2019-05-23 DIAGNOSIS — M25.512 LEFT SHOULDER PAIN, UNSPECIFIED CHRONICITY: ICD-10-CM

## 2019-05-23 PROCEDURE — 73030 X-RAY EXAM OF SHOULDER: CPT

## 2019-06-07 ENCOUNTER — HOSPITAL ENCOUNTER (EMERGENCY)
Age: 79
Discharge: HOME | End: 2019-06-07
Attending: EMERGENCY MEDICINE
Payer: MEDICARE

## 2019-06-07 ENCOUNTER — APPOINTMENT (OUTPATIENT)
Dept: GENERAL RADIOLOGY | Age: 79
End: 2019-06-07
Payer: MEDICARE

## 2019-06-07 VITALS
TEMPERATURE: 98.4 F | WEIGHT: 237 LBS | HEART RATE: 72 BPM | BODY MASS INDEX: 32.1 KG/M2 | DIASTOLIC BLOOD PRESSURE: 91 MMHG | HEIGHT: 72 IN | SYSTOLIC BLOOD PRESSURE: 162 MMHG | OXYGEN SATURATION: 96 % | RESPIRATION RATE: 17 BRPM

## 2019-06-07 DIAGNOSIS — K59.00 CONSTIPATION, UNSPECIFIED CONSTIPATION TYPE: ICD-10-CM

## 2019-06-07 DIAGNOSIS — K56.41 FECAL IMPACTION IN RECTUM (HCC): Primary | ICD-10-CM

## 2019-06-07 PROCEDURE — 74022 RADEX COMPL AQT ABD SERIES: CPT

## 2019-06-07 PROCEDURE — 99283 EMERGENCY DEPT VISIT LOW MDM: CPT

## 2019-06-07 PROCEDURE — 6370000000 HC RX 637 (ALT 250 FOR IP): Performed by: EMERGENCY MEDICINE

## 2019-06-07 RX ORDER — SODIUM PHOSPHATE,MONO-DIBASIC 19G-7G/118
1 ENEMA (ML) RECTAL ONCE
Status: COMPLETED | OUTPATIENT
Start: 2019-06-07 | End: 2019-06-07

## 2019-06-07 RX ADMIN — MAGNESIUM CITRATE 296 ML: 1.75 LIQUID ORAL at 11:14

## 2019-06-07 RX ADMIN — Medication 1 ENEMA: at 11:07

## 2019-06-07 ASSESSMENT — ENCOUNTER SYMPTOMS
APNEA: 0
SINUS PRESSURE: 0
PHOTOPHOBIA: 0
RHINORRHEA: 0
DIARRHEA: 0
ABDOMINAL PAIN: 0
CONSTIPATION: 1
SORE THROAT: 0
VOMITING: 0
WHEEZING: 0
RECTAL PAIN: 1
COLOR CHANGE: 0
SHORTNESS OF BREATH: 0
EYE PAIN: 0
NAUSEA: 0
ABDOMINAL DISTENTION: 0
COUGH: 0
BACK PAIN: 0

## 2019-06-07 ASSESSMENT — PAIN SCALES - GENERAL: PAINLEVEL_OUTOF10: 4

## 2019-06-07 ASSESSMENT — PAIN DESCRIPTION - ONSET: ONSET: SUDDEN

## 2019-06-07 ASSESSMENT — PAIN DESCRIPTION - DESCRIPTORS: DESCRIPTORS: BURNING

## 2019-06-07 ASSESSMENT — PAIN DESCRIPTION - PAIN TYPE: TYPE: ACUTE PAIN

## 2019-06-07 ASSESSMENT — PAIN - FUNCTIONAL ASSESSMENT: PAIN_FUNCTIONAL_ASSESSMENT: ACTIVITIES ARE NOT PREVENTED

## 2019-06-07 ASSESSMENT — PAIN DESCRIPTION - PROGRESSION: CLINICAL_PROGRESSION: NOT CHANGED

## 2019-06-07 ASSESSMENT — PAIN DESCRIPTION - LOCATION: LOCATION: PERINEUM

## 2019-06-07 ASSESSMENT — PAIN DESCRIPTION - ORIENTATION: ORIENTATION: POSTERIOR

## 2019-06-07 ASSESSMENT — PAIN DESCRIPTION - FREQUENCY: FREQUENCY: CONTINUOUS

## 2019-06-07 NOTE — ED PROVIDER NOTES
[address] [place] [address] ED  eMERGENCY dEPARTMENT eNCOUnter      Pt Name: Edward Samuel  MRN: [id number]  [place] 1940  Date of evaluation: 6/7/2019  Provider: Katina Conrad MD    CHIEF COMPLAINT       Chief Complaint   Patient presents with    Constipation     x4 days. rectal pain due to difficulty passing stool         HISTORY OF PRESENT ILLNESS   (Location/Symptom, Timing/Onset,Context/Setting, Quality, Duration, Modifying Factors, Severity)  Note limiting factors. Edward Samuel is a 69 y.o. male who presents to the emergency department with complaint of constipation and rectal pain of 4 days' duration. Patient has history of constipation and takes Colace. Usually able to move with minimal rectal stimulation but not this time. Pain is 4 in a scale of 1-10 and sharp. Denies nausea or vomiting. Denies diarrhea, fever or chills. Denies dysuria. HPI    Nursing Notes were reviewed. REVIEW OF SYSTEMS    (2-9 systems for level 4, 10 or more for level 5)     Review of Systems   Constitutional: Negative. Negative for activity change, appetite change, chills, fatigue and fever. HENT: Negative for congestion, ear discharge, ear pain, hearing loss, rhinorrhea, sinus pressure and sore throat. Eyes: Negative for photophobia, pain and visual disturbance. Respiratory: Negative for apnea, cough, shortness of breath and wheezing. Cardiovascular: Negative for chest pain, palpitations and leg swelling. Gastrointestinal: Positive for constipation and rectal pain. Negative for abdominal distention, abdominal pain, diarrhea, nausea and vomiting. Endocrine: Negative for cold intolerance, heat intolerance and polyuria. Genitourinary: Negative for dysuria, flank pain, frequency and urgency. Musculoskeletal: Negative for arthralgias, back pain, gait problem, myalgias and neck stiffness. Skin: Negative for color change, pallor and rash.    Allergic/Immunologic: Negative for food allergies and immunocompromised state. Neurological: Negative for dizziness, tremors, syncope, weakness, light-headedness and headaches. Psychiatric/Behavioral: Negative for agitation, confusion and hallucinations. All other systems reviewed and are negative. Except as noted above the remainder of the review of systems was reviewed and negative.        PAST MEDICAL HISTORY     Past Medical History:   Diagnosis Date    Abnormality of gait and mobility     Acute sinusitis     Anxiety     Aspiration into respiratory tract 11/10/2017    Aspiration pneumonia (HCC)     Bilateral pulmonary embolism (HCC) 1/6/2018    BPH (benign prostatic hyperplasia)     COPD (chronic obstructive pulmonary disease) ([address] 12/5/2013    Debility     Elevated CK 12/30/2013    Foraminal stenosis of lumbosacral region 6/7/2016    GERD (gastroesophageal reflux disease) 12/11/2014    History of asthma 1/13/2014    HTN (hypertension) 1/13/2014    past braden / no current meds    Hx of blood clots 01/2018    DVT  ( unsure which leg but both were swollen ) -> PE -> thus Xarelto    Hyperlipidemia     meds > 10 yrs    Hypothyroidism     Insomnia     Lower extremity weakness 1/24/2014    On home oxygen therapy     2L at night    Osteoarthritis of spine with radiculopathy, lumbar region 6/7/2016    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    no trx to follow    Positive D dimer 12/5/2013    Sleep apnea 1/24/2014    Type 2 diabetes mellitus ([address]     diet control  / no meds    Vitamin D deficiency          SURGICAL HISTORY       Past Surgical History:   Procedure Laterality Date    BRONCHOSCOPY N/A 11/11/2017    BRONCHOSCOPY FIBEROPTIC performed by Yumiko Moser MD at [address] [place] [address] COLONOSCOPY      ENDOSCOPY, COLON, DIAGNOSTIC      EYE SURGERY      phaco with IOL OU    HERNIA REPAIR  1601U    repair umbilical hernia    KNEE SURGERY Left 1970    [place] ARTHROSCOPY SHOULDER SURGICAL BICEPS TENODESIS Right 9/18/2018    RIGHT SHOULDER capsule Take 100 mg by mouth daily      mirtazapine (REMERON) 15 MG tablet Take 15 mg by mouth nightly      finasteride (PROSCAR) 5 MG tablet Take 1 tablet by mouth daily  Qty: 30 tablet, Refills: 3      tamsulosin (FLOMAX) 0.4 MG capsule Take 1 capsule by mouth nightly  Qty: 30 capsule, Refills: 3      levothyroxine (SYNTHROID) 125 MCG tablet Take 1 tablet by mouth Daily  Qty: 30 tablet, Refills: 3      Famotidine (PEPCID AC PO) Take by mouth daily as needed       budesonide-formoterol (SYMBICORT) 160-4.5 MCG/ACT AERO Inhale 2 puffs into the lungs 2 times daily. !! - Potential duplicate medications found. Please discuss with provider. ALLERGIES     Patient has no known allergies. FAMILY HISTORY       Family History   Problem Relation Age of Onset    Other Mother 80        Old Age    Stroke Father 45    No Known Problems Sister     Psoriasis Daughter     No Known Problems Son           SOCIAL HISTORY       Social History     Socioeconomic History    Marital status:      Spouse name: None    Number of children: 2    Years of education: None    Highest education level: None   Occupational History    Occupation: retired   Social Needs    Financial resource strain: None    Food insecurity:     Worry: None     Inability: None    Transportation needs:     Medical: None     Non-medical: None   Tobacco Use    Smoking status: Former Smoker     Packs/day: 1.00     Years: 40.00     Pack years: 40.00     Types: Cigarettes     Start date: 2000     Last attempt to quit: 2000     Years since quittin.4    Smokeless tobacco: Former User    Tobacco comment: quit    Substance and Sexual Activity    Alcohol use:  Yes     Alcohol/week: 0.0 oz     Comment: rare social    Drug use: No    Sexual activity: None   Lifestyle    Physical activity:     Days per week: None     Minutes per session: None    Stress: None   Relationships    Social connections:     Talks on phone: None HENT:   Head: Normocephalic and atraumatic. Nose: Nose normal.   Mouth/Throat: Oropharynx is clear and moist. No oropharyngeal exudate. Eyes: Pupils are equal, round, and reactive to light. Conjunctivae and EOM are normal. Right eye exhibits no discharge. Left eye exhibits no discharge. No scleral icterus. Neck: Normal range of motion. Neck supple. No JVD present. No tracheal deviation present. No thyromegaly present. Cardiovascular: Normal rate, regular rhythm, normal heart sounds and intact distal pulses. Exam reveals no gallop and no friction rub. No murmur heard. Pulmonary/Chest: Effort normal and breath sounds normal. No stridor. No respiratory distress. He has no wheezes. He has no rales. He exhibits no tenderness. Abdominal: Soft. Bowel sounds are normal. He exhibits no distension and no mass. There is no tenderness. There is no rebound and no guarding. Fecal impaction successfully manually disimpacted   Musculoskeletal: Normal range of motion. He exhibits no edema, tenderness or deformity. Lymphadenopathy:     He has no cervical adenopathy. Neurological: He is alert and oriented to person, place, and time. He has normal reflexes. He displays normal reflexes. No cranial nerve deficit. He exhibits normal muscle tone. Coordination normal.   Skin: Skin is warm and dry. No rash noted. He is not diaphoretic. No erythema. No pallor. Psychiatric: He has a normal mood and affect. His behavior is normal. Judgment and thought content normal.   Nursing note and vitals reviewed.       DIAGNOSTIC RESULTS     EKG: All EKG's are interpreted by the Emergency Department Physician who either signs or Co-signs this chart in the absence of a cardiologist.        RADIOLOGY:   Non-plain film images such as CT, Ultrasound and MRI are read by the radiologist. Sylvia luma-id radiographicimages are visualized and preliminarily interpreted by the emergency physician with the below findings:        Interpretation per the Radiologist below, if available at the time of this note:    XR Acute Abd Series Chest 1 VW    (Results Pending)         ED BEDSIDE ULTRASOUND:   Performed by ED Physician - none    LABS:  Labs Reviewed - No data to display    All other labs were within normal range or not returned as of this dictation. EMERGENCY DEPARTMENT COURSE and DIFFERENTIALDIAGNOSIS/MDM:   Vitals:    Vitals:    06/07/19 1038 06/07/19 1044 06/07/19 1148   BP: (!) 144/80  (!) 162/91   Pulse: 86  72   Resp: 16  17   Temp: 98.4 °F (36.9 °C)     TempSrc: Oral     SpO2: 91%  96%   Weight:  237 lb (107.5 kg)    Height:  6' (1.829 m)            MDM  Number of Diagnoses or Management Options     Amount and/or Complexity of Data Reviewed  Tests in the radiology section of CPT®: reviewed and ordered    Risk of Complications, Morbidity, and/or Mortality  Presenting problems: moderate  Diagnostic procedures: moderate  Management options: moderate    Patient Progress  Patient progress: improved      CRITICAL CARE TIME   Total Critical Care time was  minutes, excluding separately reportable procedures. There was a high probability of clinically significant/life threatening deterioration in the patient's condition which required my urgentintervention. CONSULTS:  None    PROCEDURES:  Unless otherwise noted below, none     Procedures    FINAL IMPRESSION      1. Fecal impaction in rectum ([address] Improving   2.  Constipation, unspecified constipation type          DISPOSITION/PLAN   DISPOSITION        PATIENT REFERRED TO:  Taylor Manuel MD  [facility] [phone]    In 3 days        DISCHARGE MEDICATIONS:  Current Discharge Medication List             (Please note that portions of this note were completed with a voice recognitionprogram.  Efforts were made to edit the dictations but occasionally words are mis-transcribed.)    Marta Ryan MD (electronically signed)  Attending Emergency Physician          Arnav Raymond Dennis Decker MD  06/07/19 0965

## 2019-06-11 ENCOUNTER — OFFICE VISIT (OUTPATIENT)
Dept: PULMONOLOGY | Age: 79
End: 2019-06-11
Payer: MEDICARE

## 2019-06-11 VITALS
WEIGHT: 236.4 LBS | HEIGHT: 72 IN | OXYGEN SATURATION: 94 % | BODY MASS INDEX: 32.02 KG/M2 | SYSTOLIC BLOOD PRESSURE: 124 MMHG | TEMPERATURE: 97.7 F | HEART RATE: 67 BPM | DIASTOLIC BLOOD PRESSURE: 76 MMHG | RESPIRATION RATE: 16 BRPM

## 2019-06-11 DIAGNOSIS — E66.9 OBESITY (BMI 30-39.9): ICD-10-CM

## 2019-06-11 DIAGNOSIS — J44.9 CHRONIC OBSTRUCTIVE PULMONARY DISEASE, UNSPECIFIED COPD TYPE (HCC): ICD-10-CM

## 2019-06-11 DIAGNOSIS — I26.99 BILATERAL PULMONARY EMBOLISM (HCC): ICD-10-CM

## 2019-06-11 DIAGNOSIS — G47.33 OSA (OBSTRUCTIVE SLEEP APNEA): Primary | ICD-10-CM

## 2019-06-11 PROCEDURE — 1123F ACP DISCUSS/DSCN MKR DOCD: CPT | Performed by: INTERNAL MEDICINE

## 2019-06-11 PROCEDURE — 3023F SPIROM DOC REV: CPT | Performed by: INTERNAL MEDICINE

## 2019-06-11 PROCEDURE — G8417 CALC BMI ABV UP PARAM F/U: HCPCS | Performed by: INTERNAL MEDICINE

## 2019-06-11 PROCEDURE — G8926 SPIRO NO PERF OR DOC: HCPCS | Performed by: INTERNAL MEDICINE

## 2019-06-11 PROCEDURE — 1036F TOBACCO NON-USER: CPT | Performed by: INTERNAL MEDICINE

## 2019-06-11 PROCEDURE — 4040F PNEUMOC VAC/ADMIN/RCVD: CPT | Performed by: INTERNAL MEDICINE

## 2019-06-11 PROCEDURE — 99214 OFFICE O/P EST MOD 30 MIN: CPT | Performed by: INTERNAL MEDICINE

## 2019-06-11 PROCEDURE — G8427 DOCREV CUR MEDS BY ELIG CLIN: HCPCS | Performed by: INTERNAL MEDICINE

## 2019-06-11 ASSESSMENT — ENCOUNTER SYMPTOMS
RHINORRHEA: 0
COUGH: 0
ABDOMINAL PAIN: 0
WHEEZING: 0
CHEST TIGHTNESS: 0
SORE THROAT: 0
VOMITING: 0
VOICE CHANGE: 0
EYE ITCHING: 0
SHORTNESS OF BREATH: 1
DIARRHEA: 0
NAUSEA: 0

## 2019-06-11 NOTE — PROGRESS NOTES
Subjective:     Edward Samuel is a 69 y.o. male who complains today of:     Chief Complaint   Patient presents with    Follow-up     f/u for wound and GEORGES on BiPAP. HPI  Patient is using Symbicort, albuterol HFA  C/o shortness of breath  with exertion. No Wheezing   Cough with clear  Sputum  No Hemoptysis  No Chest tightness   No Chest pain with radiation  or pleuritic pain  No Fever or chills. No Rhinorrhea and postnasal drip. Patient is using Auto CPAP with 5-20 centimeters of H2O with heated humidity. Patient is using CPAP for about   8 hours every night. Patient is using CPAP with  Full face  Mask. Patient said  sleep is restful with the CPAP use. Patient is compliant with CPAP therapy and benefiting with CPAP use. No snoring with CPAP use. No early morning headache. No complaint of daytime sleepiness or tiredness with CPAP use. Patient denies taking naps. No sleepiness with driving. Patient denies difficulty falling asleep or staying asleep.           Allergies:  Pantoprazole  Past Medical History:   Diagnosis Date    Abnormality of gait and mobility     Acute sinusitis     Anxiety     Aspiration into respiratory tract 11/10/2017    Aspiration pneumonia (HCC)     Bilateral pulmonary embolism (HCC) 1/6/2018    BPH (benign prostatic hyperplasia)     COPD (chronic obstructive pulmonary disease) ([facility] [address] 12/5/2013    Debility     Elevated CK 12/30/2013    Foraminal stenosis of lumbosacral region 6/7/2016    GERD (gastroesophageal reflux disease) 12/11/2014    History of asthma 1/13/2014    HTN (hypertension) 1/13/2014    past braden / no current meds    Hx of blood clots 01/2018    DVT  ( unsure which leg but both were swollen ) -> PE -> thus Xarelto    Hyperlipidemia     meds > 10 yrs    Hypothyroidism     Insomnia     Lower extremity weakness 1/24/2014    On home oxygen therapy     2L at night    Osteoarthritis of spine with radiculopathy, lumbar region 6/7/2016    Papillary thyroid carcinoma ([facility] [address] 2006    no trx to follow    Positive D dimer 2013    Sleep apnea 2014    Type 2 diabetes mellitus (HCC)     diet control  / no meds    Vitamin D deficiency      Past Surgical History:   Procedure Laterality Date    BRONCHOSCOPY N/A 2017    BRONCHOSCOPY FIBEROPTIC performed by Avery Julio MD at [address] [place] [address] COLONOSCOPY      ENDOSCOPY, COLON, DIAGNOSTIC      EYE SURGERY      phaco with IOL OU    HERNIA REPAIR  [id number]    repair umbilical hernia    KNEE SURGERY Left 1970    [place] ARTHROSCOPY SHOULDER SURGICAL BICEPS TENODESIS Right 2018    RIGHT SHOULDER ARTHROSCOPY SUBACROMIAL DECOMPRESS WITH POSSIBLE BICEPS TENODESIS ARTHROSCOPIC FRANSISCA C- ARM ARTHREX BICEPS TENODESIS [facility] CHAIR CASE #1 1 HOUR performed by Wero Celestin MD at [address] [place] [address] Right 2017    RIGHT THORACOTOMY WEDGE RESECTION FOR FOREIGN BODY AND FOB DOUBLE LUMEN (1ST CASE) performed by Anjana Owens MD at [address] [place] [address]      cancer / no chemo or radiation     Family History   Problem Relation Age of Onset    Other Mother 80        Old Age    Stroke Father 45    No Known Problems Sister     Psoriasis Daughter     No Known Problems Son      Social History     Socioeconomic History    Marital status:       Spouse name: Not on file    Number of children: 2    Years of education: Not on file    Highest education level: Not on file   Occupational History    Occupation: retired   Social Needs    Financial resource strain: Not on file    Food insecurity:     Worry: Not on file     Inability: Not on file   Inkomerce needs:     Medical: Not on file     Non-medical: Not on file   Tobacco Use    Smoking status: Former Smoker     Packs/day: 1.00     Years: 40.00     Pack years: 40.00     Types: Cigarettes     Start date: 2000     Last attempt to quit: 2000     Years since quittin.4    Smokeless tobacco: Former User    Tobacco comment: quit 2000   Substance and Sexual Activity    Alcohol use: Yes     Alcohol/week: 0.0 oz     Comment: rare social    Drug use: No    Sexual activity: Not on file   Lifestyle    Physical activity:     Days per week: Not on file     Minutes per session: Not on file    Stress: Not on file   Relationships    Social connections:     Talks on phone: Not on file     Gets together: Not on file     Attends Taoism service: Not on file     Active member of club or organization: Not on file     Attends meetings of clubs or organizations: Not on file     Relationship status: Not on file    Intimate partner violence:     Fear of current or ex partner: Not on file     Emotionally abused: Not on file     Physically abused: Not on file     Forced sexual activity: Not on file   Other Topics Concern    Not on file   Social History Narrative    The patient lives alone in a one story home with one step to enter the home. The bedroom and bathroom are on the first floor. His social support includes his children. He has a wheeled walker, rollator, cane and dressing assistive device at home. He was receiving home health care services prior to admission to include PT, OT and RN. He does not have history of falls prior to admission. He was independent with mobility with a wheeled walker as needed prior to admission. He was independent with self care prior to admission. His goal is to get stronger and return home. LIVES AT HOME ALONE. HE HAS A ROLLATOR HE USES. HIS SON LIVES IN [place] AND IS AVAILABLE IF HE NEEDS HIM.      Type of Home: House    Home Layout: One level    Home Access: Level entry    Home Equipment: Cane, 4 wheeled walker    ADL Assistance: Independent    Homemaking Assistance: Independent    Ambulation Assistance: Independent    Transfer Assistance: Independent    Additional Comments: son and dtr can assist upon DC home with IADLs         Review of Systems adenopathy. Neurological: He is alert and oriented to person, place, and time. No cranial nerve deficit. Skin: Skin is warm and dry. No rash noted. Psychiatric: He has a normal mood and affect. His behavior is normal.       Current Outpatient Medications   Medication Sig Dispense Refill    rivaroxaban (XARELTO) 10 MG TABS tablet Take 1 tablet by mouth daily (with breakfast) 30 tablet 6    Respiratory Therapy Supplies OK New CPAP mask and supplies 1 Device 0    aspirin 81 MG EC tablet Take 1 tablet by mouth daily 30 tablet 3    psyllium (METAMUCIL) 58.12 % PACK packet Take 1 packet by mouth daily as needed (constipation) 30 packet 0    vitamin D (CHOLECALCIFEROL) 1000 UNIT TABS tablet Take 2 tablets by mouth daily 60 tablet 1    pravastatin (PRAVACHOL) 20 MG tablet Take 20 mg by mouth every evening      clonazePAM (KLONOPIN) 0.5 MG tablet Take 0.5 mg by mouth nightly as needed. .      CPAP Machine MISC by Does not apply route Auto CPAP  5-20 cm 1 each 0    acetaminophen (TYLENOL) 500 MG tablet Take 500 mg by mouth every 6 hours as needed for Pain      albuterol sulfate  (90 Base) MCG/ACT inhaler Inhale 2 puffs into the lungs every 4 hours as needed for Wheezing      magnesium 30 MG tablet Take 250 mg by mouth daily      docusate sodium (COLACE) 100 MG capsule Take 100 mg by mouth daily      mirtazapine (REMERON) 15 MG tablet Take 15 mg by mouth nightly      finasteride (PROSCAR) 5 MG tablet Take 1 tablet by mouth daily 30 tablet 3    tamsulosin (FLOMAX) 0.4 MG capsule Take 1 capsule by mouth nightly 30 capsule 3    levothyroxine (SYNTHROID) 125 MCG tablet Take 1 tablet by mouth Daily (Patient taking differently: Take 137 mcg by mouth Daily ) 30 tablet 3    Famotidine (PEPCID AC PO) Take by mouth daily as needed       budesonide-formoterol (SYMBICORT) 160-4.5 MCG/ACT AERO Inhale 2 puffs into the lungs 2 times daily. No current facility-administered medications for this visit. Results for orders placed during the hospital encounter of 12/14/18   XR CHEST STANDARD (2 VW)    Narrative EXAMINATION: Chest x-ray, 2 view    HISTORY: Hypoxia. Shortness of breath. TECHNIQUE: AP and lateral views of the chest    COMPARISON: CT thorax from December 14, 2018    FINDINGS:     Cardiomediastinal silhouette is within normal limits. Chronic mild elevation of the right hemidiaphragm. Postsurgical changes of the right lung base with right lung base atelectasis versus scarring. Left lung base subsegmental atelectasis versus   scarring. No pneumothorax, pleural effusion, or focal consolidation. Degenerative changes of the thoracic spine. Impression Postsurgical changes of the right lung base with scarring and/or atelectasis.   ]  Results for orders placed during the hospital encounter of 01/06/18   XR CHEST PORTABLE    Narrative EXAMINATION: Portable AP ERECT view of the chest.    CLINICAL HISTORY: Cough and sob . COMPARISONS: 12/15/2017    FINDINGS: Normal cardiac silhouette. There are atherosclerotic calcifications in the aortic arch. The right costophrenic angle is blunted secondary to a small to moderate size pleural effusion, similar to last exam. There are metallic clips in the right   lung base. There is infiltrate/atelectasis in the right lung base, unchanged. Mild left basilar atelectasis or scarring, unchanged. No pneumothorax. There are mild degenerative changes in spine. Impression RIGHT-SIDED PLEURAL EFFUSION WITH RIGHT BASILAR INFILTRATE/ATELECTASIS, UNCHANGED. MINIMAL LEFT BASILAR ATELECTASIS, UNCHANGED. Results for orders placed during the hospital encounter of 11/10/17   CT Chest W Contrast    Narrative EXAMINATION:  CT CHEST W CONTRAST    CLINICAL HISTORY:  ACUTE RESP ILLNESS, >37YEARS OLD  status post bronchoscopy to retrieve aspirated dental amalgam, unsuccessful. COMPARISONS:  12/2/2013    TECHNIQUE:  Spiral scans with 75 mL Isovue-370 IV.  Multiplanar 2-D reconstructions. Axial 3-D 10 x 3 mm MIP reconstructions were performed. All CT scans at this facility use dose modulation, iterative reconstruction, and/or weight based dosing when   appropriate to reduce radiation dose to as low as reasonably achievable. FINDINGS:  The known aspirated filling is identified within the distal segmental bronchus of the right lower lobe posterior basilar segment. Filling measures approximately 9 mm. There are mild emphysematous changes. There is mild scattered peripheral   reticularity without definite honeycombing. There are no consolidations or effusions. There is right upper lung perifissural nodularity consistent with small intrapulmonary lymph nodes. There is a left upper lobe 4 mm nodule (series 2 image 21),   unchanged from prior examination of 12/2/2013, and therefore benign. There are no suspicious lung nodules. There is mild bilateral lower lobe cylindrical bronchiectasis. Cardiac size and pulmonary vascularity are normal. There is mild mediastinal lipomatosis. There is mild thickening or trace fluid in the anterior pericardium, decreased compared to prior examination 12/2/2013. There are coronary artery calcifications. There are aortic calcifications. There is no evidence of aneurysm or dissection. There are borderline in size lymph nodes in the mediastinum anterior to the left mainstem bronchus and in the right hilum, unchanged from prior examination of 12/2/2013. There is no thoracic adenopathy. The esophagus is unremarkable. There is spondylosis. There are no acute or suspicious bone lesions. Scans of the subdiaphragmatic regions demonstrate a 6 mm oval metallic density in the gastric fundus, presumably representing a swallowed filling. There are stable small cysts in the liver. There are partially visualized renal cysts. Impression ASPIRATED FILLING IN RIGHT LOWER LOBE POSTERIOR BASILAR SEGMENTAL BRONCHUS.  MILD EMPHYSEMA AND LOWER LOBE CYLINDRICAL BRONCHIECTASIS. ADDITIONAL FINDINGS AS ABOVE. Assessment/Plan:     1. GEORGES (obstructive sleep apnea)  Patient is using Auto CPAP with 5-20 centimeters of H2O with heated humidity. He  is using CPAP for about   8 hours every night. He  is using CPAP with  Full face  Mask. He  said  sleep is restful with the CPAP use. He  is compliant with CPAP therapy and benefiting with CPAP use. No snoring with CPAP use. Continue CPAP as before     Counseling: CPAP/BiPAP uses, patient advised to use CPAP at least 5-6 hours every night. Driving: patient is advised for extreme caution when driving or operating machinery if there is a feeling of drowsiness, especially while driving it is preferable to stop driving and take a brief nap. Sleep hygiene:Avoid supine sleep, sleep on  sides. Avoid  sleep deprivation. Explained sleep hygiene. Advice to avoid Alcohol and sedative    2. Chronic obstructive pulmonary disease, unspecified COPD type ([address]  Patient is using Symbicort, albuterol HFA, he has C/o shortness of breath  with exertion. Cough with clear  Sputum. Continue bronchodilator as before . 3. Obesity (BMI 30-39. 9)  Patient patient is advised try to lose weight. obesity related risk explained to the patient ,  Current weight:  236 lb 6.4 oz (107.2 kg) Lbs. BMI:  Body mass index is 32.06 kg/m². 4. Bilateral pulmonary embolism ([address]  He is on Xarelto       Return in about 6 weeks (around 7/23/2019) for georges, COPD.       Jigar Molina MD

## 2019-07-17 ENCOUNTER — APPOINTMENT (OUTPATIENT)
Dept: GENERAL RADIOLOGY | Age: 79
End: 2019-07-17
Payer: MEDICARE

## 2019-07-17 ENCOUNTER — HOSPITAL ENCOUNTER (EMERGENCY)
Age: 79
Discharge: HOME | End: 2019-07-17
Attending: STUDENT IN AN ORGANIZED HEALTH CARE EDUCATION/TRAINING PROGRAM
Payer: MEDICARE

## 2019-07-17 VITALS
RESPIRATION RATE: 15 BRPM | HEART RATE: 79 BPM | SYSTOLIC BLOOD PRESSURE: 157 MMHG | BODY MASS INDEX: 33.64 KG/M2 | OXYGEN SATURATION: 96 % | DIASTOLIC BLOOD PRESSURE: 89 MMHG | TEMPERATURE: 97.9 F | WEIGHT: 235 LBS | HEIGHT: 70 IN

## 2019-07-17 DIAGNOSIS — J44.1 COPD EXACERBATION (HCC): ICD-10-CM

## 2019-07-17 DIAGNOSIS — R77.8 ELEVATED TROPONIN: ICD-10-CM

## 2019-07-17 DIAGNOSIS — J40 BRONCHITIS: Primary | ICD-10-CM

## 2019-07-17 LAB
ALBUMIN SERPL-MCNC: 3.7 G/DL (ref 3.5–4.6)
ALP BLD-CCNC: 73 U/L (ref 35–104)
ALT SERPL-CCNC: 25 U/L (ref 0–41)
ANION GAP SERPL CALCULATED.3IONS-SCNC: 11 MEQ/L (ref 9–15)
AST SERPL-CCNC: 29 U/L (ref 0–40)
BASOPHILS ABSOLUTE: 0 K/UL (ref 0–0.2)
BASOPHILS RELATIVE PERCENT: 0.6 %
BILIRUB SERPL-MCNC: 0.5 MG/DL (ref 0.2–0.7)
BUN BLDV-MCNC: 16 MG/DL (ref 8–23)
CALCIUM SERPL-MCNC: 8.8 MG/DL (ref 8.5–9.9)
CHLORIDE BLD-SCNC: 102 MEQ/L (ref 95–107)
CK MB: 6.6 NG/ML (ref 0–6.7)
CO2: 26 MEQ/L (ref 20–31)
CREAT SERPL-MCNC: 1.13 MG/DL (ref 0.7–1.2)
CREATINE KINASE-MB INDEX: 3.3 % (ref 0–3.5)
EKG ATRIAL RATE: 81 BPM
EKG P AXIS: 29 DEGREES
EKG P-R INTERVAL: 174 MS
EKG Q-T INTERVAL: 386 MS
EKG QRS DURATION: 100 MS
EKG QTC CALCULATION (BAZETT): 448 MS
EKG R AXIS: -46 DEGREES
EKG T AXIS: 71 DEGREES
EKG VENTRICULAR RATE: 81 BPM
EOSINOPHILS ABSOLUTE: 0.2 K/UL (ref 0–0.7)
EOSINOPHILS RELATIVE PERCENT: 3.4 %
GFR AFRICAN AMERICAN: >60
GFR NON-AFRICAN AMERICAN: >60
GLOBULIN: 3.5 G/DL (ref 2.3–3.5)
GLUCOSE BLD-MCNC: 77 MG/DL (ref 70–99)
HCT VFR BLD CALC: 46.1 % (ref 42–52)
HEMOGLOBIN: 15.8 G/DL (ref 14–18)
LYMPHOCYTES ABSOLUTE: 1.3 K/UL (ref 1–4.8)
LYMPHOCYTES RELATIVE PERCENT: 18.7 %
MAGNESIUM: 2.3 MG/DL (ref 1.7–2.4)
MCH RBC QN AUTO: 32.8 PG (ref 27–31.3)
MCHC RBC AUTO-ENTMCNC: 34.4 % (ref 33–37)
MCV RBC AUTO: 95.5 FL (ref 80–100)
MONOCYTES ABSOLUTE: 0.5 K/UL (ref 0.2–0.8)
MONOCYTES RELATIVE PERCENT: 8 %
NEUTROPHILS ABSOLUTE: 4.8 K/UL (ref 1.4–6.5)
NEUTROPHILS RELATIVE PERCENT: 69.3 %
PDW BLD-RTO: 13.8 % (ref 11.5–14.5)
PLATELET # BLD: 118 K/UL (ref 130–400)
POTASSIUM SERPL-SCNC: 4.7 MEQ/L (ref 3.4–4.9)
RBC # BLD: 4.82 M/UL (ref 4.7–6.1)
SODIUM BLD-SCNC: 139 MEQ/L (ref 135–144)
TOTAL CK: 202 U/L (ref 0–190)
TOTAL PROTEIN: 7.2 G/DL (ref 6.3–8)
TROPONIN: 0.02 NG/ML (ref 0–0.01)
TROPONIN: 0.02 NG/ML (ref 0–0.01)
WBC # BLD: 6.9 K/UL (ref 4.8–10.8)

## 2019-07-17 PROCEDURE — 6370000000 HC RX 637 (ALT 250 FOR IP): Performed by: PHYSICIAN ASSISTANT

## 2019-07-17 PROCEDURE — 71045 X-RAY EXAM CHEST 1 VIEW: CPT

## 2019-07-17 PROCEDURE — 94640 AIRWAY INHALATION TREATMENT: CPT

## 2019-07-17 PROCEDURE — 85025 COMPLETE CBC W/AUTO DIFF WBC: CPT

## 2019-07-17 PROCEDURE — 36415 COLL VENOUS BLD VENIPUNCTURE: CPT

## 2019-07-17 PROCEDURE — 99285 EMERGENCY DEPT VISIT HI MDM: CPT

## 2019-07-17 PROCEDURE — 84484 ASSAY OF TROPONIN QUANT: CPT

## 2019-07-17 PROCEDURE — 82553 CREATINE MB FRACTION: CPT

## 2019-07-17 PROCEDURE — 93005 ELECTROCARDIOGRAM TRACING: CPT | Performed by: PHYSICIAN ASSISTANT

## 2019-07-17 PROCEDURE — 82550 ASSAY OF CK (CPK): CPT

## 2019-07-17 PROCEDURE — 80053 COMPREHEN METABOLIC PANEL: CPT

## 2019-07-17 PROCEDURE — 83735 ASSAY OF MAGNESIUM: CPT

## 2019-07-17 RX ORDER — IPRATROPIUM BROMIDE AND ALBUTEROL SULFATE 2.5; .5 MG/3ML; MG/3ML
1 SOLUTION RESPIRATORY (INHALATION) CONTINUOUS PRN
Status: DISCONTINUED | OUTPATIENT
Start: 2019-07-17 | End: 2019-07-17 | Stop reason: HOSPADM

## 2019-07-17 RX ORDER — AZITHROMYCIN 250 MG/1
TABLET, FILM COATED ORAL
Qty: 6 TABLET | Refills: 0 | Status: SHIPPED | OUTPATIENT
Start: 2019-07-17 | End: 2019-07-27

## 2019-07-17 RX ORDER — AZITHROMYCIN 250 MG/1
500 TABLET, FILM COATED ORAL ONCE
Status: COMPLETED | OUTPATIENT
Start: 2019-07-17 | End: 2019-07-17

## 2019-07-17 RX ADMIN — IPRATROPIUM BROMIDE AND ALBUTEROL SULFATE 1 AMPULE: .5; 3 SOLUTION RESPIRATORY (INHALATION) at 16:00

## 2019-07-17 RX ADMIN — AZITHROMYCIN MONOHYDRATE 500 MG: 250 TABLET ORAL at 20:06

## 2019-07-17 ASSESSMENT — ENCOUNTER SYMPTOMS
VOICE CHANGE: 0
ABDOMINAL DISTENTION: 0
ANAL BLEEDING: 0
APNEA: 0
EYE DISCHARGE: 0
NAUSEA: 0
COUGH: 1
SHORTNESS OF BREATH: 1
PHOTOPHOBIA: 0
BACK PAIN: 0
VOMITING: 0

## 2019-07-17 NOTE — ED PROVIDER NOTES
[facility] ED  eMERGENCY dEPARTMENT eNCOUnter      Pt Name: Toshia Fuentes  MRN: [id number]  [place] 1940  Date of evaluation: 7/17/2019  Provider: Elias Salazar PA-C    CHIEF COMPLAINT       Chief Complaint   Patient presents with    Shortness of Breath     s.o.b. x2 days         HISTORY OF PRESENT ILLNESS   (Location/Symptom, Timing/Onset,Context/Setting, Quality, Duration, Modifying Factors, Severity)  Note limiting factors. Toshia Fuentes is a 69 y.o. male who [facility] with 3-day history of cough he states it feels that this congestion his throat but he cannot bring it up.   he become short of breath with ambulation. He does use home O2 as needed patient denies chest pain back pain abdominal pain nausea vomiting denies any swelling of feet or ankles. He has a history of COPD he is on Xarelto 10 mg and aspirin. Patient denies rectal bleeding dark tarry stools denies chest pain. Symptoms of mild to moderate severity worse with walking nothing improves symptoms. HPI    NursingNotes were reviewed. REVIEW OF SYSTEMS    (2-9 systems for level 4, 10 or more for level 5)     Review of Systems   Constitutional: Negative for activity change, appetite change, fever and unexpected weight change. HENT: Negative for ear discharge, nosebleeds and voice change. Eyes: Negative for photophobia and discharge. Respiratory: Positive for cough and shortness of breath. Negative for apnea. Cardiovascular: Negative for chest pain, palpitations and leg swelling. Gastrointestinal: Negative for abdominal distention, anal bleeding, nausea and vomiting. Endocrine: Negative for cold intolerance, heat intolerance and polyphagia. Genitourinary: Negative for dysuria, flank pain and hematuria. Musculoskeletal: Negative for back pain, joint swelling and neck pain. Skin: Negative for pallor.    Allergic/Immunologic: Negative for immunocompromised

## 2019-07-18 PROCEDURE — 93010 ELECTROCARDIOGRAM REPORT: CPT | Performed by: INTERNAL MEDICINE

## 2019-07-31 ENCOUNTER — OFFICE VISIT (OUTPATIENT)
Dept: PULMONOLOGY | Age: 79
End: 2019-07-31
Payer: MEDICARE

## 2019-07-31 VITALS
WEIGHT: 234 LBS | BODY MASS INDEX: 31.69 KG/M2 | RESPIRATION RATE: 16 BRPM | DIASTOLIC BLOOD PRESSURE: 74 MMHG | TEMPERATURE: 97.7 F | HEART RATE: 81 BPM | SYSTOLIC BLOOD PRESSURE: 122 MMHG | HEIGHT: 72 IN | OXYGEN SATURATION: 91 %

## 2019-07-31 DIAGNOSIS — J44.9 CHRONIC OBSTRUCTIVE PULMONARY DISEASE, UNSPECIFIED COPD TYPE (HCC): ICD-10-CM

## 2019-07-31 DIAGNOSIS — I26.99 BILATERAL PULMONARY EMBOLISM (HCC): ICD-10-CM

## 2019-07-31 DIAGNOSIS — E66.9 OBESITY (BMI 30-39.9): ICD-10-CM

## 2019-07-31 DIAGNOSIS — G47.33 OSA (OBSTRUCTIVE SLEEP APNEA): Primary | ICD-10-CM

## 2019-07-31 PROCEDURE — G8417 CALC BMI ABV UP PARAM F/U: HCPCS | Performed by: INTERNAL MEDICINE

## 2019-07-31 PROCEDURE — 1036F TOBACCO NON-USER: CPT | Performed by: INTERNAL MEDICINE

## 2019-07-31 PROCEDURE — 3023F SPIROM DOC REV: CPT | Performed by: INTERNAL MEDICINE

## 2019-07-31 PROCEDURE — 99214 OFFICE O/P EST MOD 30 MIN: CPT | Performed by: INTERNAL MEDICINE

## 2019-07-31 PROCEDURE — 4040F PNEUMOC VAC/ADMIN/RCVD: CPT | Performed by: INTERNAL MEDICINE

## 2019-07-31 PROCEDURE — G8926 SPIRO NO PERF OR DOC: HCPCS | Performed by: INTERNAL MEDICINE

## 2019-07-31 PROCEDURE — 1123F ACP DISCUSS/DSCN MKR DOCD: CPT | Performed by: INTERNAL MEDICINE

## 2019-07-31 PROCEDURE — G8427 DOCREV CUR MEDS BY ELIG CLIN: HCPCS | Performed by: INTERNAL MEDICINE

## 2019-07-31 RX ORDER — ALBUTEROL SULFATE 2.5 MG/3ML
2.5 SOLUTION RESPIRATORY (INHALATION) EVERY 6 HOURS PRN
Qty: 120 EACH | Refills: 3 | Status: SHIPPED | OUTPATIENT
Start: 2019-07-31 | End: 2020-11-05

## 2019-07-31 RX ORDER — ALFUZOSIN HYDROCHLORIDE 10 MG/1
TABLET, EXTENDED RELEASE ORAL
COMMUNITY
Start: 2009-03-18

## 2019-07-31 RX ORDER — AMITRIPTYLINE HYDROCHLORIDE 50 MG/1
TABLET, FILM COATED ORAL
COMMUNITY
End: 2021-08-06

## 2019-07-31 ASSESSMENT — ENCOUNTER SYMPTOMS
ABDOMINAL PAIN: 0
RHINORRHEA: 0
SORE THROAT: 0
VOICE CHANGE: 0
DIARRHEA: 0
SHORTNESS OF BREATH: 1
WHEEZING: 0
COUGH: 1
EYE ITCHING: 0
VOMITING: 0
NAUSEA: 0
CHEST TIGHTNESS: 0

## 2019-07-31 NOTE — PROGRESS NOTES
walker    ADL Assistance: Independent    Homemaking Assistance: Independent    Ambulation Assistance: Independent    Transfer Assistance: Independent    Additional Comments: son and dtr can assist upon DC home with IADLs         Review of Systems   Constitutional: Negative for chills, diaphoresis, fatigue and fever. HENT: Negative for congestion, mouth sores, nosebleeds, postnasal drip, rhinorrhea, sneezing, sore throat and voice change. Eyes: Negative for itching and visual disturbance. Respiratory: Positive for cough and shortness of breath. Negative for chest tightness and wheezing. Cardiovascular: Negative. Negative for chest pain, palpitations and leg swelling. Gastrointestinal: Negative for abdominal pain, diarrhea, nausea and vomiting. Genitourinary: Negative for difficulty urinating and hematuria. Musculoskeletal: Negative for arthralgias, joint swelling and myalgias. Skin: Negative for rash. Allergic/Immunologic: Negative for environmental allergies. Neurological: Negative for dizziness, tremors, weakness and headaches. Psychiatric/Behavioral: Positive for sleep disturbance. Negative for behavioral problems. Objective:     Vitals:    07/31/19 1307   BP: 122/74   Pulse: 81   Resp: 16   Temp: 97.7 °F (36.5 °C)   TempSrc: Oral   SpO2: 91%   Weight: 234 lb (106.1 kg)   Height: 6' (1.829 m)         Physical Exam   Constitutional: He is oriented to person, place, and time. He appears well-developed and well-nourished. HENT:   Head: Normocephalic and atraumatic. Nose: Nose normal.   Mouth/Throat: Oropharynx is clear and moist.   Eyes: Pupils are equal, round, and reactive to light. Conjunctivae and EOM are normal.   Neck: No JVD present. No tracheal deviation present. No thyromegaly present. Cardiovascular: Normal rate and regular rhythm. Exam reveals no gallop and no friction rub. No murmur heard.   Pulmonary/Chest: Effort normal and breath sounds normal. No respiratory significant interval change when compared to radiographs of December 17, 2018.   ]  No results found for this or any previous visit.]  No results found for this or any previous visit.]  Results for orders placed during the hospital encounter of 11/10/17   CT Chest W Contrast    Narrative EXAMINATION:  CT CHEST W CONTRAST    CLINICAL HISTORY:  ACUTE RESP ILLNESS, >37YEARS OLD  status post bronchoscopy to retrieve aspirated dental amalgam, unsuccessful. COMPARISONS:  12/2/2013    TECHNIQUE:  Spiral scans with 75 mL Isovue-370 IV. Multiplanar 2-D reconstructions. Axial 3-D 10 x 3 mm MIP reconstructions were performed. All CT scans at this facility use dose modulation, iterative reconstruction, and/or weight based dosing when   appropriate to reduce radiation dose to as low as reasonably achievable. FINDINGS:  The known aspirated filling is identified within the distal segmental bronchus of the right lower lobe posterior basilar segment. Filling measures approximately 9 mm. There are mild emphysematous changes. There is mild scattered peripheral   reticularity without definite honeycombing. There are no consolidations or effusions. There is right upper lung perifissural nodularity consistent with small intrapulmonary lymph nodes. There is a left upper lobe 4 mm nodule (series 2 image 21),   unchanged from prior examination of 12/2/2013, and therefore benign. There are no suspicious lung nodules. There is mild bilateral lower lobe cylindrical bronchiectasis. Cardiac size and pulmonary vascularity are normal. There is mild mediastinal lipomatosis. There is mild thickening or trace fluid in the anterior pericardium, decreased compared to prior examination 12/2/2013. There are coronary artery calcifications. There are aortic calcifications. There is no evidence of aneurysm or dissection.  There are borderline in size lymph nodes in the mediastinum anterior to the left mainstem bronchus and in the right hilum, (106.1 kg) Lbs. BMI:  Body mass index is 31.74 kg/m². 4. Bilateral pulmonary embolism ([address]  He is on Xarelto 10 mg daily       Return in about 3 months (around 10/31/2019) for rachael, COPD.       Nilam Beckwith MD

## 2019-08-01 ENCOUNTER — TELEPHONE (OUTPATIENT)
Dept: PULMONOLOGY | Age: 79
End: 2019-08-01

## 2019-08-02 ENCOUNTER — TELEPHONE (OUTPATIENT)
Dept: PULMONOLOGY | Age: 79
End: 2019-08-02

## 2019-08-05 ENCOUNTER — TELEPHONE (OUTPATIENT)
Dept: PULMONOLOGY | Age: 79
End: 2019-08-05

## 2019-08-07 NOTE — PROGRESS NOTES
yes  Pulmonary embolism, bilateral ([facility] [address] [address] [I26.99] 01/06/2018        # B/L acute submassive pulmonary embolism and acute LLE DVT-  - on 3L O2, will wean down   - heparin gtt off, start xarelto  - pulmonology on board     # acute Left femoral DVT- as above   # elevated troponin- no chest pain, likely related to PE, consult cardiology on board, echo reviewd  # HTN/ HLD/ COPD/ GEORGES/ hypothyroid resume home meds  # Dispo- floor, will try to wean down O2 today do home O2 eval tomorrow     Additional work up or/and treatment plan may be added today or then after based on clinical progression. I am managing a portion of pt care. Some medical issues are handled by other specialists. Additional work up and treatment should be done in out pt setting by pt PCP and other out pt providers. In addition to examining and evaluating pt, I spent additional time explaining care, normal and abnormal findings, and treatment plan. All of pt questions were answered. Counseling, diet and education were  provided. Case will be discussed with nursing staff when appropriate. Family will be updated if and when appropriate.       Diet: DIET GENERAL;    Code Status: Full Code    PT/OT Eval       Electronically signed by Caridad Flores DO on 1/10/2018 at 11:57 AM

## 2019-11-07 ENCOUNTER — APPOINTMENT (OUTPATIENT)
Dept: GENERAL RADIOLOGY | Age: 79
End: 2019-11-07
Payer: MEDICARE

## 2019-11-07 ENCOUNTER — APPOINTMENT (OUTPATIENT)
Dept: CT IMAGING | Age: 79
End: 2019-11-07
Payer: MEDICARE

## 2019-11-07 ENCOUNTER — HOSPITAL ENCOUNTER (EMERGENCY)
Age: 79
Discharge: HOME | End: 2019-11-07
Attending: INTERNAL MEDICINE
Payer: MEDICARE

## 2019-11-07 VITALS
BODY MASS INDEX: 30.61 KG/M2 | OXYGEN SATURATION: 95 % | HEIGHT: 72 IN | RESPIRATION RATE: 18 BRPM | TEMPERATURE: 97.7 F | WEIGHT: 226 LBS | HEART RATE: 74 BPM | SYSTOLIC BLOOD PRESSURE: 154 MMHG | DIASTOLIC BLOOD PRESSURE: 97 MMHG

## 2019-11-07 DIAGNOSIS — K59.00 CONSTIPATION, UNSPECIFIED CONSTIPATION TYPE: Primary | ICD-10-CM

## 2019-11-07 DIAGNOSIS — I10 ESSENTIAL HYPERTENSION: ICD-10-CM

## 2019-11-07 LAB
ALBUMIN SERPL-MCNC: 3.3 G/DL (ref 3.5–4.6)
ALP BLD-CCNC: 73 U/L (ref 35–104)
ALT SERPL-CCNC: 15 U/L (ref 0–41)
ANION GAP SERPL CALCULATED.3IONS-SCNC: 12 MEQ/L (ref 9–15)
AST SERPL-CCNC: 18 U/L (ref 0–40)
BASOPHILS ABSOLUTE: 0 K/UL (ref 0–0.2)
BASOPHILS RELATIVE PERCENT: 0.3 %
BILIRUB SERPL-MCNC: 0.4 MG/DL (ref 0.2–0.7)
BUN BLDV-MCNC: 14 MG/DL (ref 8–23)
CALCIUM SERPL-MCNC: 9.3 MG/DL (ref 8.5–9.9)
CHLORIDE BLD-SCNC: 104 MEQ/L (ref 95–107)
CO2: 23 MEQ/L (ref 20–31)
CREAT SERPL-MCNC: 1.16 MG/DL (ref 0.7–1.2)
EOSINOPHILS ABSOLUTE: 0.3 K/UL (ref 0–0.7)
EOSINOPHILS RELATIVE PERCENT: 4.9 %
GFR AFRICAN AMERICAN: >60
GFR NON-AFRICAN AMERICAN: >60
GLOBULIN: 3.5 G/DL (ref 2.3–3.5)
GLUCOSE BLD-MCNC: 104 MG/DL (ref 70–99)
HCT VFR BLD CALC: 46.2 % (ref 42–52)
HEMOGLOBIN: 14.9 G/DL (ref 14–18)
LYMPHOCYTES ABSOLUTE: 1.3 K/UL (ref 1–4.8)
LYMPHOCYTES RELATIVE PERCENT: 19.3 %
MCH RBC QN AUTO: 30.2 PG (ref 27–31.3)
MCHC RBC AUTO-ENTMCNC: 32.3 % (ref 33–37)
MCV RBC AUTO: 93.3 FL (ref 80–100)
MONOCYTES ABSOLUTE: 0.5 K/UL (ref 0.2–0.8)
MONOCYTES RELATIVE PERCENT: 7.7 %
NEUTROPHILS ABSOLUTE: 4.5 K/UL (ref 1.4–6.5)
NEUTROPHILS RELATIVE PERCENT: 67.8 %
PDW BLD-RTO: 14.4 % (ref 11.5–14.5)
PLATELET # BLD: 143 K/UL (ref 130–400)
POTASSIUM SERPL-SCNC: 4.2 MEQ/L (ref 3.4–4.9)
RBC # BLD: 4.95 M/UL (ref 4.7–6.1)
SODIUM BLD-SCNC: 139 MEQ/L (ref 135–144)
TOTAL PROTEIN: 6.8 G/DL (ref 6.3–8)
WBC # BLD: 6.6 K/UL (ref 4.8–10.8)

## 2019-11-07 PROCEDURE — 85025 COMPLETE CBC W/AUTO DIFF WBC: CPT

## 2019-11-07 PROCEDURE — 74177 CT ABD & PELVIS W/CONTRAST: CPT

## 2019-11-07 PROCEDURE — 6360000004 HC RX CONTRAST MEDICATION: Performed by: INTERNAL MEDICINE

## 2019-11-07 PROCEDURE — 80053 COMPREHEN METABOLIC PANEL: CPT

## 2019-11-07 PROCEDURE — 99284 EMERGENCY DEPT VISIT MOD MDM: CPT

## 2019-11-07 PROCEDURE — 2500000003 HC RX 250 WO HCPCS: Performed by: INTERNAL MEDICINE

## 2019-11-07 PROCEDURE — 74018 RADEX ABDOMEN 1 VIEW: CPT

## 2019-11-07 PROCEDURE — 36415 COLL VENOUS BLD VENIPUNCTURE: CPT

## 2019-11-07 RX ORDER — POLYETHYLENE GLYCOL 3350 17 G/17G
17 POWDER, FOR SOLUTION ORAL 2 TIMES DAILY
Qty: 170 G | Refills: 2 | Status: SHIPPED | OUTPATIENT
Start: 2019-11-07 | End: 2019-11-12

## 2019-11-07 RX ORDER — SODIUM CHLORIDE 0.9 % (FLUSH) 0.9 %
3 SYRINGE (ML) INJECTION EVERY 8 HOURS
Status: DISCONTINUED | OUTPATIENT
Start: 2019-11-07 | End: 2019-11-07 | Stop reason: HOSPADM

## 2019-11-07 RX ORDER — SODIUM PHOSPHATE, DIBASIC AND SODIUM PHOSPHATE, MONOBASIC 7; 19 G/133ML; G/133ML
1 ENEMA RECTAL
Qty: 2 BOTTLE | Refills: 1 | Status: SHIPPED | OUTPATIENT
Start: 2019-11-07 | End: 2019-11-07

## 2019-11-07 RX ADMIN — BARIUM SULFATE 450 ML: 20 SUSPENSION ORAL at 15:48

## 2019-11-07 RX ADMIN — IOPAMIDOL 100 ML: 755 INJECTION, SOLUTION INTRAVENOUS at 16:56

## 2019-11-07 ASSESSMENT — PAIN DESCRIPTION - FREQUENCY: FREQUENCY: CONTINUOUS

## 2019-11-07 ASSESSMENT — PAIN DESCRIPTION - PAIN TYPE
TYPE: ACUTE PAIN
TYPE: ACUTE PAIN

## 2019-11-07 ASSESSMENT — PAIN SCALES - GENERAL: PAINLEVEL_OUTOF10: 4

## 2019-11-07 ASSESSMENT — PAIN DESCRIPTION - LOCATION: LOCATION: ABDOMEN

## 2019-11-07 ASSESSMENT — PAIN DESCRIPTION - DESCRIPTORS: DESCRIPTORS: PRESSURE

## 2019-11-18 ENCOUNTER — APPOINTMENT (OUTPATIENT)
Dept: CT IMAGING | Age: 79
End: 2019-11-18
Payer: MEDICARE

## 2019-11-18 ENCOUNTER — HOSPITAL ENCOUNTER (EMERGENCY)
Age: 79
Discharge: HOME | End: 2019-11-19
Payer: MEDICARE

## 2019-11-18 VITALS
SYSTOLIC BLOOD PRESSURE: 137 MMHG | WEIGHT: 221 LBS | BODY MASS INDEX: 29.93 KG/M2 | DIASTOLIC BLOOD PRESSURE: 76 MMHG | OXYGEN SATURATION: 95 % | HEIGHT: 72 IN | RESPIRATION RATE: 17 BRPM | TEMPERATURE: 98 F | HEART RATE: 89 BPM

## 2019-11-18 DIAGNOSIS — R42 LIGHTHEADEDNESS: Primary | ICD-10-CM

## 2019-11-18 DIAGNOSIS — J44.1 COPD EXACERBATION (HCC): ICD-10-CM

## 2019-11-18 LAB
ALBUMIN SERPL-MCNC: 3.6 G/DL (ref 3.5–4.6)
ALP BLD-CCNC: 74 U/L (ref 35–104)
ALT SERPL-CCNC: 22 U/L (ref 0–41)
ANION GAP SERPL CALCULATED.3IONS-SCNC: 12 MEQ/L (ref 9–15)
APTT: 40.8 SEC (ref 24.4–36.8)
AST SERPL-CCNC: 30 U/L (ref 0–40)
BASOPHILS ABSOLUTE: 0.1 K/UL (ref 0–0.2)
BASOPHILS RELATIVE PERCENT: 1.2 %
BILIRUB SERPL-MCNC: 0.3 MG/DL (ref 0.2–0.7)
BUN BLDV-MCNC: 12 MG/DL (ref 8–23)
CALCIUM SERPL-MCNC: 9.1 MG/DL (ref 8.5–9.9)
CHLORIDE BLD-SCNC: 102 MEQ/L (ref 95–107)
CO2: 26 MEQ/L (ref 20–31)
CREAT SERPL-MCNC: 1.18 MG/DL (ref 0.7–1.2)
EKG ATRIAL RATE: 93 BPM
EKG P AXIS: 23 DEGREES
EKG P-R INTERVAL: 174 MS
EKG Q-T INTERVAL: 358 MS
EKG QRS DURATION: 90 MS
EKG QTC CALCULATION (BAZETT): 445 MS
EKG R AXIS: -52 DEGREES
EKG T AXIS: 65 DEGREES
EKG VENTRICULAR RATE: 93 BPM
EOSINOPHILS ABSOLUTE: 0.2 K/UL (ref 0–0.7)
EOSINOPHILS RELATIVE PERCENT: 2.2 %
GFR AFRICAN AMERICAN: >60
GFR NON-AFRICAN AMERICAN: 59.7
GLOBULIN: 3.5 G/DL (ref 2.3–3.5)
GLUCOSE BLD-MCNC: 116 MG/DL (ref 70–99)
HCT VFR BLD CALC: 47.4 % (ref 42–52)
HEMOGLOBIN: 15.4 G/DL (ref 14–18)
INR BLD: 1.1
LYMPHOCYTES ABSOLUTE: 1.3 K/UL (ref 1–4.8)
LYMPHOCYTES RELATIVE PERCENT: 14.9 %
MAGNESIUM: 2.3 MG/DL (ref 1.7–2.4)
MCH RBC QN AUTO: 29.9 PG (ref 27–31.3)
MCHC RBC AUTO-ENTMCNC: 32.4 % (ref 33–37)
MCV RBC AUTO: 92.5 FL (ref 80–100)
MONOCYTES ABSOLUTE: 0.6 K/UL (ref 0.2–0.8)
MONOCYTES RELATIVE PERCENT: 6.3 %
NEUTROPHILS ABSOLUTE: 6.8 K/UL (ref 1.4–6.5)
NEUTROPHILS RELATIVE PERCENT: 75.4 %
PDW BLD-RTO: 14.9 % (ref 11.5–14.5)
PLATELET # BLD: 150 K/UL (ref 130–400)
POC CREATININE WHOLE BLOOD: 1.3
POTASSIUM SERPL-SCNC: 4.7 MEQ/L (ref 3.4–4.9)
PROTHROMBIN TIME: 15 SEC (ref 12.3–14.9)
RBC # BLD: 5.13 M/UL (ref 4.7–6.1)
SODIUM BLD-SCNC: 140 MEQ/L (ref 135–144)
TOTAL PROTEIN: 7.1 G/DL (ref 6.3–8)
TROPONIN: 0.02 NG/ML (ref 0–0.01)
TSH SERPL DL<=0.05 MIU/L-ACNC: 7.38 UIU/ML (ref 0.44–3.86)
WBC # BLD: 9 K/UL (ref 4.8–10.8)

## 2019-11-18 PROCEDURE — 84480 ASSAY TRIIODOTHYRONINE (T3): CPT

## 2019-11-18 PROCEDURE — 36415 COLL VENOUS BLD VENIPUNCTURE: CPT

## 2019-11-18 PROCEDURE — 85025 COMPLETE CBC W/AUTO DIFF WBC: CPT

## 2019-11-18 PROCEDURE — 84443 ASSAY THYROID STIM HORMONE: CPT

## 2019-11-18 PROCEDURE — 83735 ASSAY OF MAGNESIUM: CPT

## 2019-11-18 PROCEDURE — 6370000000 HC RX 637 (ALT 250 FOR IP): Performed by: PERSONAL EMERGENCY RESPONSE ATTENDANT

## 2019-11-18 PROCEDURE — 2580000003 HC RX 258: Performed by: PERSONAL EMERGENCY RESPONSE ATTENDANT

## 2019-11-18 PROCEDURE — 6360000004 HC RX CONTRAST MEDICATION: Performed by: PERSONAL EMERGENCY RESPONSE ATTENDANT

## 2019-11-18 PROCEDURE — 70450 CT HEAD/BRAIN W/O DYE: CPT

## 2019-11-18 PROCEDURE — 94640 AIRWAY INHALATION TREATMENT: CPT

## 2019-11-18 PROCEDURE — 71275 CT ANGIOGRAPHY CHEST: CPT

## 2019-11-18 PROCEDURE — 93005 ELECTROCARDIOGRAM TRACING: CPT

## 2019-11-18 PROCEDURE — 84484 ASSAY OF TROPONIN QUANT: CPT

## 2019-11-18 PROCEDURE — 85610 PROTHROMBIN TIME: CPT

## 2019-11-18 PROCEDURE — 99284 EMERGENCY DEPT VISIT MOD MDM: CPT

## 2019-11-18 PROCEDURE — 84439 ASSAY OF FREE THYROXINE: CPT

## 2019-11-18 PROCEDURE — 2700000000 HC OXYGEN THERAPY PER DAY

## 2019-11-18 PROCEDURE — 80053 COMPREHEN METABOLIC PANEL: CPT

## 2019-11-18 PROCEDURE — 85730 THROMBOPLASTIN TIME PARTIAL: CPT

## 2019-11-18 RX ORDER — IPRATROPIUM BROMIDE AND ALBUTEROL SULFATE 2.5; .5 MG/3ML; MG/3ML
1 SOLUTION RESPIRATORY (INHALATION) CONTINUOUS PRN
Status: DISCONTINUED | OUTPATIENT
Start: 2019-11-18 | End: 2019-11-19 | Stop reason: HOSPADM

## 2019-11-18 RX ORDER — 0.9 % SODIUM CHLORIDE 0.9 %
1000 INTRAVENOUS SOLUTION INTRAVENOUS ONCE
Status: COMPLETED | OUTPATIENT
Start: 2019-11-18 | End: 2019-11-19

## 2019-11-18 RX ADMIN — IOPAMIDOL 100 ML: 612 INJECTION, SOLUTION INTRAVENOUS at 23:03

## 2019-11-18 RX ADMIN — IPRATROPIUM BROMIDE AND ALBUTEROL SULFATE 1 AMPULE: .5; 3 SOLUTION RESPIRATORY (INHALATION) at 21:41

## 2019-11-18 RX ADMIN — SODIUM CHLORIDE 1000 ML: 9 INJECTION, SOLUTION INTRAVENOUS at 22:12

## 2019-11-18 ASSESSMENT — ENCOUNTER SYMPTOMS
VOMITING: 0
BLOOD IN STOOL: 0
NAUSEA: 0
RHINORRHEA: 0
SHORTNESS OF BREATH: 1
SORE THROAT: 0
COUGH: 0
DIARRHEA: 0
COLOR CHANGE: 0
ABDOMINAL PAIN: 0
WHEEZING: 1

## 2019-11-19 LAB
T3 TOTAL: 0.95 NG/ML (ref 0.8–2)
T4 FREE: 1.34 NG/DL (ref 0.84–1.68)

## 2019-11-20 LAB
GFR AFRICAN AMERICAN: >60
GFR NON-AFRICAN AMERICAN: 53
PERFORMED ON: ABNORMAL
POC CREATININE: 1.3 MG/DL (ref 0.8–1.3)
POC SAMPLE TYPE: ABNORMAL

## 2019-12-10 ENCOUNTER — OFFICE VISIT (OUTPATIENT)
Dept: PULMONOLOGY | Age: 79
End: 2019-12-10
Payer: MEDICARE

## 2019-12-10 VITALS
SYSTOLIC BLOOD PRESSURE: 126 MMHG | RESPIRATION RATE: 16 BRPM | BODY MASS INDEX: 30.48 KG/M2 | HEART RATE: 81 BPM | WEIGHT: 225 LBS | OXYGEN SATURATION: 93 % | DIASTOLIC BLOOD PRESSURE: 62 MMHG | HEIGHT: 72 IN

## 2019-12-10 DIAGNOSIS — G47.33 OSA (OBSTRUCTIVE SLEEP APNEA): ICD-10-CM

## 2019-12-10 DIAGNOSIS — E66.9 OBESITY (BMI 30-39.9): ICD-10-CM

## 2019-12-10 DIAGNOSIS — J44.9 CHRONIC OBSTRUCTIVE PULMONARY DISEASE, UNSPECIFIED COPD TYPE (HCC): Primary | ICD-10-CM

## 2019-12-10 PROCEDURE — G8427 DOCREV CUR MEDS BY ELIG CLIN: HCPCS | Performed by: INTERNAL MEDICINE

## 2019-12-10 PROCEDURE — G8417 CALC BMI ABV UP PARAM F/U: HCPCS | Performed by: INTERNAL MEDICINE

## 2019-12-10 PROCEDURE — 3023F SPIROM DOC REV: CPT | Performed by: INTERNAL MEDICINE

## 2019-12-10 PROCEDURE — G8926 SPIRO NO PERF OR DOC: HCPCS | Performed by: INTERNAL MEDICINE

## 2019-12-10 PROCEDURE — G8484 FLU IMMUNIZE NO ADMIN: HCPCS | Performed by: INTERNAL MEDICINE

## 2019-12-10 PROCEDURE — 4040F PNEUMOC VAC/ADMIN/RCVD: CPT | Performed by: INTERNAL MEDICINE

## 2019-12-10 PROCEDURE — 1036F TOBACCO NON-USER: CPT | Performed by: INTERNAL MEDICINE

## 2019-12-10 PROCEDURE — 99214 OFFICE O/P EST MOD 30 MIN: CPT | Performed by: INTERNAL MEDICINE

## 2019-12-10 PROCEDURE — 1123F ACP DISCUSS/DSCN MKR DOCD: CPT | Performed by: INTERNAL MEDICINE

## 2019-12-10 ASSESSMENT — ENCOUNTER SYMPTOMS
SHORTNESS OF BREATH: 1
WHEEZING: 0
RHINORRHEA: 0
DIARRHEA: 0
COUGH: 1
VOMITING: 0
VOICE CHANGE: 0
SORE THROAT: 0
CHEST TIGHTNESS: 0
ABDOMINAL PAIN: 0
EYE ITCHING: 0
NAUSEA: 0

## 2020-01-10 ENCOUNTER — HOSPITAL ENCOUNTER (EMERGENCY)
Age: 80
Discharge: HOME | End: 2020-01-10
Payer: MEDICARE

## 2020-01-10 ENCOUNTER — APPOINTMENT (OUTPATIENT)
Dept: CT IMAGING | Age: 80
End: 2020-01-10
Payer: MEDICARE

## 2020-01-10 VITALS
TEMPERATURE: 97.5 F | OXYGEN SATURATION: 95 % | HEART RATE: 73 BPM | SYSTOLIC BLOOD PRESSURE: 163 MMHG | DIASTOLIC BLOOD PRESSURE: 90 MMHG | BODY MASS INDEX: 30.48 KG/M2 | RESPIRATION RATE: 16 BRPM | HEIGHT: 72 IN | WEIGHT: 225 LBS

## 2020-01-10 LAB
ALBUMIN SERPL-MCNC: 3.5 G/DL (ref 3.5–4.6)
ALP BLD-CCNC: 66 U/L (ref 35–104)
ALT SERPL-CCNC: 16 U/L (ref 0–41)
ANION GAP SERPL CALCULATED.3IONS-SCNC: 12 MEQ/L (ref 9–15)
APTT: 44.3 SEC (ref 24.4–36.8)
AST SERPL-CCNC: 19 U/L (ref 0–40)
BASOPHILS ABSOLUTE: 0.1 K/UL (ref 0–0.2)
BASOPHILS RELATIVE PERCENT: 1.7 %
BILIRUB SERPL-MCNC: 0.4 MG/DL (ref 0.2–0.7)
BUN BLDV-MCNC: 15 MG/DL (ref 8–23)
CALCIUM SERPL-MCNC: 9 MG/DL (ref 8.5–9.9)
CHLORIDE BLD-SCNC: 104 MEQ/L (ref 95–107)
CO2: 26 MEQ/L (ref 20–31)
CREAT SERPL-MCNC: 1.12 MG/DL (ref 0.7–1.2)
EKG ATRIAL RATE: 72 BPM
EKG P AXIS: 27 DEGREES
EKG P-R INTERVAL: 182 MS
EKG Q-T INTERVAL: 410 MS
EKG QRS DURATION: 92 MS
EKG QTC CALCULATION (BAZETT): 448 MS
EKG R AXIS: -44 DEGREES
EKG T AXIS: 46 DEGREES
EKG VENTRICULAR RATE: 72 BPM
EOSINOPHILS ABSOLUTE: 0.3 K/UL (ref 0–0.7)
EOSINOPHILS RELATIVE PERCENT: 5.5 %
GFR AFRICAN AMERICAN: >60
GFR AFRICAN AMERICAN: >60
GFR NON-AFRICAN AMERICAN: 59
GFR NON-AFRICAN AMERICAN: >60
GLOBULIN: 3.2 G/DL (ref 2.3–3.5)
GLUCOSE BLD-MCNC: 97 MG/DL (ref 70–99)
HCT VFR BLD CALC: 47.1 % (ref 42–52)
HEMOGLOBIN: 15.6 G/DL (ref 14–18)
INR BLD: 1
LYMPHOCYTES ABSOLUTE: 1.3 K/UL (ref 1–4.8)
LYMPHOCYTES RELATIVE PERCENT: 22.3 %
MAGNESIUM: 2.3 MG/DL (ref 1.7–2.4)
MCH RBC QN AUTO: 30.1 PG (ref 27–31.3)
MCHC RBC AUTO-ENTMCNC: 33.1 % (ref 33–37)
MCV RBC AUTO: 91.1 FL (ref 80–100)
MONOCYTES ABSOLUTE: 0.4 K/UL (ref 0.2–0.8)
MONOCYTES RELATIVE PERCENT: 7.2 %
NEUTROPHILS ABSOLUTE: 3.8 K/UL (ref 1.4–6.5)
NEUTROPHILS RELATIVE PERCENT: 63.3 %
PDW BLD-RTO: 15.4 % (ref 11.5–14.5)
PERFORMED ON: ABNORMAL
PLATELET # BLD: 140 K/UL (ref 130–400)
POC CREATININE WHOLE BLOOD: 1.2
POC CREATININE: 1.2 MG/DL (ref 0.8–1.3)
POC SAMPLE TYPE: ABNORMAL
POTASSIUM SERPL-SCNC: 4.2 MEQ/L (ref 3.4–4.9)
PROTHROMBIN TIME: 13.4 SEC (ref 12.3–14.9)
RBC # BLD: 5.17 M/UL (ref 4.7–6.1)
SODIUM BLD-SCNC: 142 MEQ/L (ref 135–144)
T4 FREE: 1.66 NG/DL (ref 0.84–1.68)
TOTAL CK: 109 U/L (ref 0–190)
TOTAL PROTEIN: 6.7 G/DL (ref 6.3–8)
TROPONIN: 0.02 NG/ML (ref 0–0.01)
TSH REFLEX: 4.3 UIU/ML (ref 0.44–3.86)
WBC # BLD: 5.9 K/UL (ref 4.8–10.8)

## 2020-01-10 PROCEDURE — 85025 COMPLETE CBC W/AUTO DIFF WBC: CPT

## 2020-01-10 PROCEDURE — 6360000002 HC RX W HCPCS: Performed by: NURSE PRACTITIONER

## 2020-01-10 PROCEDURE — 85730 THROMBOPLASTIN TIME PARTIAL: CPT

## 2020-01-10 PROCEDURE — 93010 ELECTROCARDIOGRAM REPORT: CPT | Performed by: INTERNAL MEDICINE

## 2020-01-10 PROCEDURE — 96361 HYDRATE IV INFUSION ADD-ON: CPT

## 2020-01-10 PROCEDURE — 71275 CT ANGIOGRAPHY CHEST: CPT

## 2020-01-10 PROCEDURE — 84439 ASSAY OF FREE THYROXINE: CPT

## 2020-01-10 PROCEDURE — 83735 ASSAY OF MAGNESIUM: CPT

## 2020-01-10 PROCEDURE — 84443 ASSAY THYROID STIM HORMONE: CPT

## 2020-01-10 PROCEDURE — 99284 EMERGENCY DEPT VISIT MOD MDM: CPT

## 2020-01-10 PROCEDURE — 84484 ASSAY OF TROPONIN QUANT: CPT

## 2020-01-10 PROCEDURE — 70450 CT HEAD/BRAIN W/O DYE: CPT

## 2020-01-10 PROCEDURE — 85610 PROTHROMBIN TIME: CPT

## 2020-01-10 PROCEDURE — 2580000003 HC RX 258: Performed by: NURSE PRACTITIONER

## 2020-01-10 PROCEDURE — 82550 ASSAY OF CK (CPK): CPT

## 2020-01-10 PROCEDURE — 6360000004 HC RX CONTRAST MEDICATION: Performed by: NURSE PRACTITIONER

## 2020-01-10 PROCEDURE — 93005 ELECTROCARDIOGRAM TRACING: CPT | Performed by: NURSE PRACTITIONER

## 2020-01-10 PROCEDURE — 80053 COMPREHEN METABOLIC PANEL: CPT

## 2020-01-10 PROCEDURE — 36415 COLL VENOUS BLD VENIPUNCTURE: CPT

## 2020-01-10 PROCEDURE — 96374 THER/PROPH/DIAG INJ IV PUSH: CPT

## 2020-01-10 PROCEDURE — 93005 ELECTROCARDIOGRAM TRACING: CPT | Performed by: EMERGENCY MEDICINE

## 2020-01-10 RX ORDER — LORAZEPAM 2 MG/ML
0.5 INJECTION INTRAMUSCULAR ONCE
Status: COMPLETED | OUTPATIENT
Start: 2020-01-10 | End: 2020-01-10

## 2020-01-10 RX ORDER — 0.9 % SODIUM CHLORIDE 0.9 %
1000 INTRAVENOUS SOLUTION INTRAVENOUS ONCE
Status: COMPLETED | OUTPATIENT
Start: 2020-01-10 | End: 2020-01-10

## 2020-01-10 RX ORDER — MECLIZINE HYDROCHLORIDE 25 MG/1
25 TABLET ORAL 3 TIMES DAILY PRN
Qty: 15 TABLET | Refills: 0 | Status: SHIPPED | OUTPATIENT
Start: 2020-01-10 | End: 2020-01-20

## 2020-01-10 RX ADMIN — SODIUM CHLORIDE 1000 ML: 9 INJECTION, SOLUTION INTRAVENOUS at 10:54

## 2020-01-10 RX ADMIN — LORAZEPAM 0.5 MG: 2 INJECTION INTRAMUSCULAR; INTRAVENOUS at 10:55

## 2020-01-10 RX ADMIN — IOPAMIDOL 100 ML: 755 INJECTION, SOLUTION INTRAVENOUS at 10:51

## 2020-01-10 NOTE — ED PROVIDER NOTES
[address] [facility] ED  EMERGENCY DEPARTMENT ENCOUNTER      Pt Name: Ronell Dandy  MRN: [id number]  [place] 1940  Date of evaluation: 1/10/2020  Provider: Sophie Pillai       Chief Complaint   Patient presents with    Dizziness     started yesterday afternoon         HISTORY OF PRESENT ILLNESS   (Location/Symptom, Timing/Onset,Context/Setting, Quality, Duration, Modifying Factors, Severity)  Note limiting factors. Ronell Dandy is a 67 y.o. male who presents to the emergency department with complaints of dizziness for the last day. He reports dizziness in particular with position changes either from lying to sitting or from sitting to standing. He reports episode yesterday in the kitchen from sitting to standing. He reports a strong sensation of spinning. He had another similar but more mild episode today again in the kitchen with position change and felt he should come to the ED for evaluation. He is no longer feeling any type of dizziness, lightheadedness. He feels he has been eating and drinking well. He denies any other acute complaints. He denies any headache fever sweats chills chest pain shortness of breath palpitations nausea vomiting diarrhea constipation abdominal pain. No excessive thirst, no excessive urination. Location/Symptom -positional dizziness  Onset -yesterday  Context/Setting - as above  Quality -spinning  Duration -1 day  Modifying Factors -as above  Severity -mild        Nursing Notes were reviewed. REVIEW OF SYSTEMS    (2-9 systems for level 4, 10 or more for level 5)     Review of Systems    Except as noted above the remainder of the review of systems was reviewed and negative.        PAST MEDICAL HISTORY     Past Medical History:   Diagnosis Date    Abnormality of gait and mobility     Acute sinusitis     Anxiety     Aspiration into respiratory tract 11/10/2017    Aspiration pneumonia (HCC)     Bilateral pulmonary embolism ([address] 1/6/2018    BPH (benign prostatic hyperplasia)     COPD (chronic obstructive pulmonary disease) ([address] 12/5/2013    Debility     Elevated CK 12/30/2013    Foraminal stenosis of lumbosacral region 6/7/2016    GERD (gastroesophageal reflux disease) 12/11/2014    History of asthma 1/13/2014    HTN (hypertension) 1/13/2014    past braden / no current meds    Hx of blood clots 01/2018    DVT  ( unsure which leg but both were swollen ) -> PE -> thus Xarelto    Hyperlipidemia     meds > 10 yrs    Hypothyroidism     Insomnia     Lower extremity weakness 1/24/2014    On home oxygen therapy     2L at night    Osteoarthritis of spine with radiculopathy, lumbar region 6/7/2016    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    no trx to follow    Positive D dimer 12/5/2013    Sleep apnea 1/24/2014    Type 2 diabetes mellitus ([address]     diet control  / no meds    Vitamin D deficiency      Past Surgical History:   Procedure Laterality Date    BRONCHOSCOPY N/A 11/11/2017    BRONCHOSCOPY FIBEROPTIC performed by Syed Sandoval MD at [address] [place] [address] Way      ENDOSCOPY, COLON, DIAGNOSTIC      EYE SURGERY      phaco with IOL OU    HERNIA REPAIR  0491[address]    repair umbilical hernia    KNEE SURGERY Left 1970    [place] ARTHROSCOPY SHOULDER SURGICAL BICEPS TENODESIS Right 9/18/2018    RIGHT SHOULDER ARTHROSCOPY SUBACROMIAL DECOMPRESS WITH POSSIBLE BICEPS TENODESIS ARTHROSCOPIC FRANSISCA C- ARM ARTHREX BICEPS TENODESIS [facility] CASE #1 1 HOUR performed by Shin Cates MD at [address] [place] [address] Right 11/14/2017    RIGHT THORACOTOMY WEDGE RESECTION FOR FOREIGN BODY AND FOB DOUBLE LUMEN (1ST CASE) performed by Sergio Sibley MD at [address] [place] [address]  2006    cancer / no chemo or radiation     Social History     Socioeconomic History    Marital status:       Spouse name: None    Number of children: 2    Years of education: None    Highest education level: None   Occupational History    Occupation: retired   Social Needs    Financial resource strain: None    Food insecurity:     Worry: None     Inability: None    Transportation needs:     Medical: None     Non-medical: None   Tobacco Use    Smoking status: Former Smoker     Packs/day: 1.00     Years: 40.00     Pack years: 40.00     Types: Cigarettes     Start date: 2000     Last attempt to quit: 2000     Years since quittin.0    Smokeless tobacco: Former User    Tobacco comment: quit    Substance and Sexual Activity    Alcohol use: Yes     Alcohol/week: 0.0 standard drinks     Comment: rare social    Drug use: No    Sexual activity: None   Lifestyle    Physical activity:     Days per week: None     Minutes per session: None    Stress: None   Relationships    Social connections:     Talks on phone: None     Gets together: None     Attends Ep[employer] service: None     Active member of club or organization: None     Attends meetings of clubs or organizations: None     Relationship status: None    Intimate partner violence:     Fear of current or ex partner: None     Emotionally abused: None     Physically abused: None     Forced sexual activity: None   Other Topics Concern    None   Social History Narrative    The patient lives alone in a one story home with one step to enter the home. The bedroom and bathroom are on the first floor. His social support includes his children. He has a wheeled walker, rollator, cane and dressing assistive device at home. He was receiving home health care services prior to admission to include PT, OT and RN. He does not have history of falls prior to admission. He was independent with mobility with a wheeled walker as needed prior to admission. He was independent with self care prior to admission. His goal is to get stronger and return home. LIVES AT HOME ALONE. HE HAS A ROLLATOR HE USES.  HIS SON LIVES IN [place] AND IS AVAILABLE IF HE NEEDS HIM.     Type of Home: House    Home Layout: One level    Home Access: Level entry    Home Equipment: Cane, 4 wheeled walker    ADL Assistance: [address]: Independent    Ambulation Assistance: Independent    Transfer Assistance: Independent    Additional Comments: son and dtr can assist upon DC home with IADLs       SCREENINGS    [place] Coma Scale  Eye Opening: Spontaneous  Best Verbal Response: Oriented  Best Motor Response: Obeys commands  [place] Coma Scale Score: 15        PHYSICAL EXAM    (up to 7 for level 4, 8 or more for level 5)     ED Triage Vitals [01/10/20 0949]   BP Temp Temp Source Pulse Resp SpO2 Height Weight   (!) 140/88 97.5 °F (36.4 °C) Oral 76 15 95 % 6' (1.829 m) 225 lb (102.1 kg)       Physical Exam    RESULTS     EKG: All EKG's are interpreted by the Emergency Department Physician who either signs or Co-signsthis chart in the absence of a cardiologist.    Sinus, 72, no STEMI    RADIOLOGY:   Non-plain filmimages such as CT, Ultrasound and MRI are read by the radiologist. Plain radiographic images are visualized and preliminarily interpreted by the emergency physician with the below findings:        Interpretation per the Radiologist below, if available at the time ofthis note:    CT Head WO Contrast   Final Result   1. No CT evidence of acute intracranial abnormality, as questioned. 2. Empty sella. 3. Moderate cerebral volume loss/atrophy with white matter changes suggestive of sequelae small vessel ischemic disease. CTA Chest W WO Contrast   Final Result   1. NO EVIDENCE OF CENTRAL OR SEGMENTAL PULMONARY EMBOLISM. 2. MILD TO MODERATE EMPHYSEMATOUS DISEASE. All CT scans at this facility use dose modulation, iterative reconstruction, and/or weight based dosing when appropriate to reduce radiation dose to as low as reasonably achievable.                   ED BEDSIDE ULTRASOUND:   Performed by ED Physician - none    LABS:  Labs Reviewed   CBC WITH AUTO DIFFERENTIAL - Abnormal; Notable for the following components:       Result Value    RDW 15.4 (*)     All other components within normal limits   TROPONIN - Abnormal; Notable for the following components:    Troponin 0.021 (*)     All other components within normal limits    Narrative:     CALL  [facility] tel. [phone],  Troponin results called to and read back by Kenroy Hinson (ER RN),  01/10/2020 11:23, by Ralph Morales   APTT - Abnormal; Notable for the following components:    aPTT 44.3 (*)     All other components within normal limits   TSH WITH REFLEX - Abnormal; Notable for the following components:    TSH 4.300 (*)     All other components within normal limits    Narrative:     CALL  [facility] tel. [phone],  Troponin results called to and read back by Kenroy Hinson (ER RN),  01/10/2020 11:23, by Rashida Bernal 92 - Normal   COMPREHENSIVE METABOLIC PANEL   CK   MAGNESIUM   PROTIME-INR   T4, FREE    Narrative:     CALL  [facility] tel. [phone],  Troponin results called to and read back by Kenroy Hinson (ER RN),  01/10/2020 11:23, by Elissa other labs were within normal range or not returned as of this dictation. EMERGENCY DEPARTMENT COURSE and DIFFERENTIAL DIAGNOSIS/MDM:   Vitals:    Vitals:    01/10/20 0949 01/10/20 1031 01/10/20 1045 01/10/20 1200   BP: (!) 140/88  (!) 171/99 (!) 163/90   Pulse: 76  67 73   Resp: 15 13 16 16   Temp: 97.5 °F (36.4 °C)      TempSrc: Oral      SpO2: 95% 92% 94% 95%   Weight: 225 lb (102.1 kg)      Height: 6' (1.829 m)               MDM on exam patient nontoxic in no distress. Currently asymptomatic. Will obtain laboratory radiology studies for further evaluation. Patient is reevaluated. He did have orthostatic hypotension as well as recreation of particular symptoms when going from sitting to standing. He is provided a liter of IV fluid. Laboratory studies are reviewed.   He does have elevated troponin, this is chronic for him. It is similar to past elevations. He is not experiencing any symptoms consistent with ACS. I have reviewed and offered observation, he does not feel this is necessary. He will hydrate. Have provided extended discharge instructions to the patient including signs, symptoms and red flags of which to return to the emergency department. they express good understanding of these instructions. Standard anticipatory guidance given to patient upon discharge. Have given them a specific time frame in which to follow-up and who to follow-up with. I have also advised them that they should return to the emergency department if they get worse, or not getting better or develop any new or concerning symptoms. Patient demonstrates understanding and all questions were answered. CRITICAL CARE TIME       CONSULTS:  None    PROCEDURES:  Unless otherwise noted below, none     Procedures    FINAL IMPRESSION      1.  Orthostatic dizziness          DISPOSITION/PLAN   DISPOSITION Decision To Discharge 01/10/2020 12:14:26 PM      PATIENT REFERRED TO:  Amaris Trevino MD  [facility] [address]  [phone]    Call in 1 day  For continued evaluation and management      DISCHARGE MEDICATIONS:  New Prescriptions    No medications on file          (Please notethat portions of this note were completed with a voice recognition program.  Efforts were made to edit the dictations but occasionally words are mis-transcribed.)    CINDY Lawson CNP (electronically signed)  Attending Emergency Physician          CINDY Lawson CNP  01/10/20 1007

## 2020-03-03 DIAGNOSIS — R97.20 ELEVATED PSA: ICD-10-CM

## 2020-03-03 LAB — PROSTATE SPECIFIC ANTIGEN: 3.74 NG/ML (ref 0–6.22)

## 2020-04-14 ENCOUNTER — VIRTUAL VISIT (OUTPATIENT)
Dept: PULMONOLOGY | Age: 80
End: 2020-04-14
Payer: MEDICARE

## 2020-04-14 PROCEDURE — G8428 CUR MEDS NOT DOCUMENT: HCPCS | Performed by: INTERNAL MEDICINE

## 2020-04-14 PROCEDURE — 1123F ACP DISCUSS/DSCN MKR DOCD: CPT | Performed by: INTERNAL MEDICINE

## 2020-04-14 PROCEDURE — 99214 OFFICE O/P EST MOD 30 MIN: CPT | Performed by: INTERNAL MEDICINE

## 2020-04-14 PROCEDURE — 4040F PNEUMOC VAC/ADMIN/RCVD: CPT | Performed by: INTERNAL MEDICINE

## 2020-04-14 ASSESSMENT — ENCOUNTER SYMPTOMS
CHEST TIGHTNESS: 0
DIARRHEA: 0
EYE ITCHING: 0
COUGH: 1
VOICE CHANGE: 0
WHEEZING: 1
VOMITING: 0
SHORTNESS OF BREATH: 1
SORE THROAT: 0
NAUSEA: 0
RHINORRHEA: 0
ABDOMINAL PAIN: 0

## 2020-04-14 NOTE — PROGRESS NOTES
2020    TELEHEALTH EVALUATION -- Audio/Visual (During UNMIG-09 public health emergency)    HPI: video visit    Garry Zhu (:  1940) has requested an audio/video evaluation for the following concern(s):  He is using Auto CPAP 5-20  centimeters of H2O with heated humidity. He is using CPAP for about  7-9  hours every night. He is using CPAP with Full face   Mask. He said  sleep is restful with the CPAP use. He is compliant with CPAP therapy and benefiting with CPAP use. No snoring with CPAP use. No complaint of daytime sleepiness or tiredness with CPAP use. He denies taking naps. No sleepiness with driving. He denies difficulty falling asleep or staying asleep. He is taking melatonn       Patient is using symbicort  160-4.5  2 puff , nebulizer albuterol and albuterol HFA prn   C/o shortness  of breath with exertion. Occasional Wheezing   Cough with  White Sputum  No Hemoptysis  No Chest tightness   No Chest pain with radiation  or pleuritic pain  No Fever or chills. No Rhinorrhea and postnasal drip. Review of Systems   Constitutional: Negative for chills, diaphoresis, fatigue and fever. HENT: Negative for congestion, mouth sores, nosebleeds, postnasal drip, rhinorrhea, sneezing, sore throat and voice change. Eyes: Negative for itching and visual disturbance. Respiratory: Positive for cough, shortness of breath and wheezing. Negative for chest tightness. Cardiovascular: Negative. Negative for chest pain, palpitations and leg swelling. Gastrointestinal: Negative for abdominal pain, diarrhea, nausea and vomiting. Genitourinary: Negative for difficulty urinating and hematuria. Musculoskeletal: Negative for arthralgias, joint swelling and myalgias. Skin: Negative for rash. Allergic/Immunologic: Negative for environmental allergies. Neurological: Negative for dizziness, tremors, weakness and headaches.    Psychiatric/Behavioral: Negative for behavioral problems and Take 1 tablet by mouth Daily  Patient taking differently: Take 137 mcg by mouth Daily   Pooja Mckeon MD   Famotidine (PEPCID AC PO) Take by mouth daily as needed   Historical Provider, MD   budesonide-formoterol (SYMBICORT) 160-4.5 MCG/ACT AERO Inhale 2 puffs into the lungs 2 times daily. Historical Provider, MD       Social History     Tobacco Use    Smoking status: Former Smoker     Packs/day: 1.00     Years: 40.00     Pack years: 40.00     Types: Cigarettes     Start date: 2000     Last attempt to quit: 2000     Years since quittin.2    Smokeless tobacco: Former User    Tobacco comment: quit    Substance Use Topics    Alcohol use: Yes     Alcohol/week: 0.0 standard drinks     Comment: rare social    Drug use: No            PHYSICAL EXAMINATION:  [ INSTRUCTIONS:  \"[x]\" Indicates a positive item  \"[]\" Indicates a negative item  -- DELETE ALL ITEMS NOT EXAMINED]  Vital Signs: (As obtained by patient/caregiver or practitioner observation)    Blood pressure-  Heart rate-    Respiratory rate-    Temperature-  Pulse oximetry-     Constitutional: [x] Appears well-developed and well-nourished [x] No apparent distress      [] Abnormal-   Mental status  [] Alert and awake  [] Oriented to person/place/time []Able to follow commands      Eyes:  EOM    [x]  Normal  [] Abnormal-  Sclera  [x]  Normal  [] Abnormal -         Discharge []  None visible  [] Abnormal -    HENT:   [x] Normocephalic, atraumatic.   [] Abnormal   [] Mouth/Throat: Mucous membranes are moist.     External Ears [] Normal  [] Abnormal-     Neck: [x] No visualized mass     Pulmonary/Chest: [x] Respiratory effort normal.  [x] No visualized signs of difficulty breathing or respiratory distress        [] Abnormal-      Musculoskeletal:   [] Normal gait with no signs of ataxia         [] Normal range of motion of neck        [] Abnormal-       Neurological:        [x] No Facial Asymmetry (Cranial nerve 7 motor function) (limited exam Supplemental Appropriations Act, this Virtual Visit was conducted with patient's (and/or legal guardian's) consent, to reduce the patient's risk of exposure to COVID-19 and provide necessary medical care. The patient (and/or legal guardian) has also been advised to contact this office for worsening conditions or problems, and seek emergency medical treatment and/or call 911 if deemed necessary. Services were provided through a video synchronous discussion virtually to substitute for in-person clinic visit. Patient and provider were located at their individual homes. --Janet Chacon MD on 4/14/2020 at 2:32 PM    An electronic signature was used to authenticate this note.

## 2020-06-04 ENCOUNTER — VIRTUAL VISIT (OUTPATIENT)
Dept: UROLOGY | Age: 80
End: 2020-06-04
Payer: MEDICARE

## 2020-06-04 PROCEDURE — 99441 PR PHYS/QHP TELEPHONE EVALUATION 5-10 MIN: CPT | Performed by: UROLOGY

## 2020-06-04 NOTE — PROGRESS NOTES
[facility] EVALUATION NOTE                                                 H&P                                                                                                                                                 Reason for Visit  Hematospermia, BPH with obstruction    History of Present Illness  Patient undergoing virtual telephone visit  Patient is at home  Dr Any Wells at [facility] office  History of hematospermia  History of elevated PSAs  Patient has had 3 separate biopsies at the [facility] of his prostate which no evidence of malignancy  Patient has been on tamsulosin and finasteride since those biopsies  Most recent PSA is unchanged and stable        Urologic Review of Systems/Symptoms  Denies hematuria  Denies dysuria  Denies incontinence  Denies flank pain  Other Urologic: Denies any further hematospermia    Review of Systems  Head and neck: No issues/reviewed  Cardiac: No recent issues/reviewed  Pulmonary: No issues/reviewed  Gastrointestinal: No issues/reviewed  Neurologic: No recent issues/reviewed  Extremities: No issues/reviewed  Lymphatics: No lymphadenopathy no change  Genitourinary: See above  Skin: No issues/reviewed  Hospitalization: None recent  Meds reviewed continues to be on anticoagulation  All 14 categories of Review of Systems otherwise reviewed no other findings reported.     Past Medical History:   Diagnosis Date    Abnormality of gait and mobility     Acute sinusitis     Anxiety     Aspiration into respiratory tract 11/10/2017    Aspiration pneumonia (HCC)     Bilateral pulmonary embolism (HCC) 1/6/2018    BPH (benign prostatic hyperplasia)     COPD (chronic obstructive pulmonary disease) ([facility] 12/5/2013    Debility     Elevated CK 12/30/2013    Foraminal stenosis of lumbosacral region 6/7/2016    GERD (gastroesophageal reflux disease) 12/11/2014    History of asthma 1/13/2014    HTN (hypertension) 1/13/2014    past braden / no current meds    Hx of blood clots 01/2018    DVT  ( unsure which leg but both were swollen ) -> PE -> thus Xarelto    Hyperlipidemia     meds > 10 yrs    Hypothyroidism     Insomnia     Lower extremity weakness 1/24/2014    On home oxygen therapy     2L at night    Osteoarthritis of spine with radiculopathy, lumbar region 6/7/2016    Papillary thyroid carcinoma ([facility] 12/2006    no trx to follow    Positive D dimer 12/5/2013    Sleep apnea 1/24/2014    Type 2 diabetes mellitus ([facility]     diet control  / no meds    Vitamin D deficiency      Past Surgical History:   Procedure Laterality Date    BRONCHOSCOPY N/A 11/11/2017    BRONCHOSCOPY FIBEROPTIC performed [facility] Jazz [facility]      ENDOSCOPY, COLON, DIAGNOSTIC      EYE SURGERY      phaco with IOL OU    HERNIA REPAIR  [id number]    repair umbilical hernia    KNEE SURGERY Left 1970    [place] ARTHROSCOPY SHOULDER SURGICAL BICEPS TENODESIS Right 9/18/2018    RIGHT SHOULDER ARTHROSCOPY SUBACROMIAL DECOMPRESS WITH POSSIBLE BICEPS TENODESIS ARTHROSCOPIC FRANSISCA C- ARM ARTHREX BICEPS TENODESIS [facility] CHAIR CASE #1 1 HOUR performed by Devi Hansen MD at [address] [place] [address] Right 11/14/2017    RIGHT THORACOTOMY WEDGE RESECTION FOR FOREIGN BODY AND FOB DOUBLE LUMEN (1ST CASE) performed by Gage Oliver MD at [address] [place] [address]  2006    cancer / no chemo or radiation     Social History     Socioeconomic History    Marital status:       Spouse name: None    Number of children: 2    Years of education: None    Highest education level: None   Occupational History    Occupation: retired   Social Needs    Financial resource strain: None    Food insecurity     Worry: None     Inability: None    Transportation needs     Medical: None     Non-medical: None   Tobacco Use    Smoking status: Former Smoker     Packs/day: 1.00     Years: 40.00     Pack years: 40.00     Types: Cigarettes     Start date: 6/1/2000     Last Other Mother 80        Old Age    Stroke Father 45    No Known Problems Sister     Psoriasis Daughter     No Known Problems Son      Current Outpatient Medications   Medication Sig Dispense Refill    alfuzosin (UROXATRAL) 10 MG extended release tablet Take by mouth      amitriptyline (ELAVIL) 50 MG tablet Take by mouth      albuterol (PROVENTIL) (2.5 MG/3ML) 0.083% nebulizer solution Take 3 mLs by nebulization every 6 hours as needed for Wheezing 120 each 3    rivaroxaban (XARELTO) 10 MG TABS tablet Take 1 tablet by mouth daily (with breakfast) 30 tablet 6    Respiratory Therapy Supplies OK New CPAP mask and supplies 1 Device 0    aspirin 81 MG EC tablet Take 1 tablet by mouth daily 30 tablet 3    psyllium (METAMUCIL) 58.12 % PACK packet Take 1 packet by mouth daily as needed (constipation) 30 packet 0    vitamin D (CHOLECALCIFEROL) 1000 UNIT TABS tablet Take 2 tablets by mouth daily 60 tablet 1    pravastatin (PRAVACHOL) 20 MG tablet Take 20 mg by mouth every evening      clonazePAM (KLONOPIN) 0.5 MG tablet Take 0.5 mg by mouth nightly as needed. .      CPAP Machine MISC by Does not apply route Auto CPAP  5-20 cm 1 each 0    acetaminophen (TYLENOL) 500 MG tablet Take 500 mg by mouth every 6 hours as needed for Pain      albuterol sulfate  (90 Base) MCG/ACT inhaler Inhale 2 puffs into the lungs every 4 hours as needed for Wheezing      magnesium 30 MG tablet Take 250 mg by mouth daily      docusate sodium (COLACE) 100 MG capsule Take 100 mg by mouth daily      mirtazapine (REMERON) 15 MG tablet Take 15 mg by mouth nightly      finasteride (PROSCAR) 5 MG tablet Take 1 tablet by mouth daily 30 tablet 3    tamsulosin (FLOMAX) 0.4 MG capsule Take 1 capsule by mouth nightly 30 capsule 3    levothyroxine (SYNTHROID) 125 MCG tablet Take 1 tablet by mouth Daily (Patient taking differently: Take 137 mcg by mouth Daily ) 30 tablet 3    Famotidine (PEPCID AC PO) Take by mouth daily as needed

## 2020-06-23 ENCOUNTER — VIRTUAL VISIT (OUTPATIENT)
Dept: PULMONOLOGY | Age: 80
End: 2020-06-23
Payer: MEDICARE

## 2020-06-23 PROCEDURE — 99214 OFFICE O/P EST MOD 30 MIN: CPT | Performed by: INTERNAL MEDICINE

## 2020-06-23 PROCEDURE — 1123F ACP DISCUSS/DSCN MKR DOCD: CPT | Performed by: INTERNAL MEDICINE

## 2020-06-23 PROCEDURE — 4040F PNEUMOC VAC/ADMIN/RCVD: CPT | Performed by: INTERNAL MEDICINE

## 2020-06-23 PROCEDURE — G8428 CUR MEDS NOT DOCUMENT: HCPCS | Performed by: INTERNAL MEDICINE

## 2020-06-23 ASSESSMENT — ENCOUNTER SYMPTOMS
SORE THROAT: 0
RHINORRHEA: 0
EYE ITCHING: 0
VOICE CHANGE: 0
WHEEZING: 1
CHEST TIGHTNESS: 0
NAUSEA: 0
COUGH: 1
SHORTNESS OF BREATH: 1
VOMITING: 0
DIARRHEA: 0
ABDOMINAL PAIN: 0

## 2020-09-23 ENCOUNTER — VIRTUAL VISIT (OUTPATIENT)
Dept: PULMONOLOGY | Age: 80
End: 2020-09-23
Payer: MEDICARE

## 2020-09-23 PROCEDURE — 1123F ACP DISCUSS/DSCN MKR DOCD: CPT | Performed by: INTERNAL MEDICINE

## 2020-09-23 PROCEDURE — 99214 OFFICE O/P EST MOD 30 MIN: CPT | Performed by: INTERNAL MEDICINE

## 2020-09-23 PROCEDURE — 4040F PNEUMOC VAC/ADMIN/RCVD: CPT | Performed by: INTERNAL MEDICINE

## 2020-09-23 PROCEDURE — G8428 CUR MEDS NOT DOCUMENT: HCPCS | Performed by: INTERNAL MEDICINE

## 2020-09-23 ASSESSMENT — ENCOUNTER SYMPTOMS
VOMITING: 0
CHEST TIGHTNESS: 0
COUGH: 0
WHEEZING: 1
EYE ITCHING: 0
SORE THROAT: 0
RHINORRHEA: 0
DIARRHEA: 0
ABDOMINAL PAIN: 0
NAUSEA: 0
SHORTNESS OF BREATH: 1
VOICE CHANGE: 0

## 2020-09-23 NOTE — PROGRESS NOTES
2020    TELEHEALTH EVALUATION -- Audio/Visual (During CXOXA-29 public health emergency)    HPI:    Karina Broderick (:  1940) has requested an audio/video evaluation for the following concern(s):    Patient is using   symbicort  160-4.5  2 puff ,nebulizer albuterol and albuterol HFA prn .   C/o shortness of breath  with exertion. Occasional Wheezing   occasional Cough clear   Sputum  No Hemoptysis  No Chest tightness   No Chest pain with radiation  or pleuritic pain  No Fever or chills. No Rhinorrhea and postnasal drip. He is on clonazepam , remeron. He is using auto CPAP with 5-20   centimeters of H2O with heated humidity. He is using CPAP for about   8 -9  hours every night. He is using CPAP with   Full face Mask. He said  sleep is restful with the CPAP use. He is compliant with CPAP therapy and benefiting with CPAP use. No snoring with CPAP use  No complaint of daytime sleepiness or tiredness with CPAP use. He denies taking naps. No sleepiness with driving. He denies difficulty falling asleep or staying asleep. Review of Systems   Constitutional: Negative for chills, diaphoresis, fatigue and fever. HENT: Negative for congestion, mouth sores, nosebleeds, postnasal drip, rhinorrhea, sneezing, sore throat and voice change. Eyes: Negative for itching and visual disturbance. Respiratory: Positive for shortness of breath and wheezing. Negative for cough and chest tightness. Cardiovascular: Negative. Negative for chest pain, palpitations and leg swelling. Gastrointestinal: Negative for abdominal pain, diarrhea, nausea and vomiting. Genitourinary: Negative for difficulty urinating and hematuria. Musculoskeletal: Negative for arthralgias, joint swelling and myalgias. Skin: Negative for rash. Allergic/Immunologic: Negative for environmental allergies. Neurological: Negative for dizziness, tremors, weakness and headaches.    Psychiatric/Behavioral: Negative for behavioral problems and sleep disturbance. Prior to Visit Medications    Medication Sig Taking? Authorizing Provider   alfuzosin (UROXATRAL) 10 MG extended release tablet Take by mouth  Historical Provider, MD   amitriptyline (ELAVIL) 50 MG tablet Take by mouth  Historical Provider, MD   albuterol (PROVENTIL) (2.5 MG/3ML) 0.083% nebulizer solution Take 3 mLs by nebulization every 6 hours as needed for Wheezing  Balwinder Hendrickson MD   rivaroxaban (XARELTO) 10 MG TABS tablet Take 1 tablet by mouth daily (with breakfast)  Chadwick Goldstein DO   Respiratory Therapy Supplies [place] New CPAP mask and supplies  Balwinder Hendrickson MD   aspirin 81 MG EC tablet Take 1 tablet by mouth daily  Kymberly Alejandra MD   psyllium (METAMUCIL) 58.12 % PACK packet Take 1 packet by mouth daily as needed (constipation)  Kymberly Alejandra MD   vitamin D (CHOLECALCIFEROL) 1000 UNIT TABS tablet Take 2 tablets by mouth daily  Catrachita Garcia DO   pravastatin (PRAVACHOL) 20 MG tablet Take 20 mg by mouth every evening  Historical Provider, MD   clonazePAM (KLONOPIN) 0.5 MG tablet Take 0.5 mg by mouth nightly as needed. Denise Bolden   Historical Provider, MD   CPAP Machine MISC by Does not apply route Auto CPAP  5-20 cm  Balwinder Hendrickson MD   acetaminophen (TYLENOL) 500 MG tablet Take 500 mg by mouth every 6 hours as needed for Pain  Historical Provider, MD   albuterol sulfate  (90 Base) MCG/ACT inhaler Inhale 2 puffs into the lungs every 4 hours as needed for Wheezing  Historical Provider, MD   magnesium 30 MG tablet Take 250 mg by mouth daily  Historical Provider, MD   docusate sodium (COLACE) 100 MG capsule Take 100 mg by mouth daily  Historical Provider, MD   mirtazapine (REMERON) 15 MG tablet Take 15 mg by mouth nightly  Historical Provider, MD   finasteride (PROSCAR) 5 MG tablet Take 1 tablet by mouth daily  Gayle Link MD   tamsulosin (FLOMAX) 0.4 MG capsule Take 1 capsule by mouth nightly  Gayle Link MD   levothyroxine (SYNTHROID) 125 MCG tablet Take 1 tablet by mouth Daily  Patient taking differently: Take 137 mcg by mouth Daily   Antonette Garcia MD   Famotidine (PEPCID AC PO) Take by mouth daily as needed   Historical Provider, MD   budesonide-formoterol (SYMBICORT) 160-4.5 MCG/ACT AERO Inhale 2 puffs into the lungs 2 times daily. Historical Provider, MD       Social History     Tobacco Use    Smoking status: Former Smoker     Packs/day: 1.00     Years: 40.00     Pack years: 40.00     Types: Cigarettes     Start date: 2000     Last attempt to quit: 2000     Years since quittin.7    Smokeless tobacco: Former User    Tobacco comment: quit    Substance Use Topics    Alcohol use: Yes     Alcohol/week: 0.0 standard drinks     Comment: rare social    Drug use: No        Allergies   Allergen Reactions    Pantoprazole        PHYSICAL EXAMINATION:  [ INSTRUCTIONS:  \"[x]\" Indicates a positive item  \"[]\" Indicates a negative item  -- DELETE ALL ITEMS NOT EXAMINED]  Vital Signs: (As obtained by patient/caregiver or practitioner observation)    Blood pressure-  Heart rate-    Respiratory rate-    Temperature-  Pulse oximetry-     Constitutional: [x] Appears well-developed and well-nourished [x] No apparent distress      [] Abnormal-   Mental status  [x] Alert and awake  [x] Oriented to person/place/time [x]Able to follow commands      Eyes:  EOM    [x]  Normal  [] Abnormal-  Sclera  []  Normal  [] Abnormal -         Discharge []  None visible  [] Abnormal -    HENT:   [x] Normocephalic, atraumatic.   [] Abnormal   [] Mouth/Throat: Mucous membranes are moist.     External Ears [] Normal  [] Abnormal-     Neck: [] No visualized mass     Pulmonary/Chest: [x] Respiratory effort normal.  [x] No visualized signs of difficulty breathing or respiratory distress        [] Abnormal-      Musculoskeletal:   [] Normal gait with no signs of ataxia         [] Normal range of motion of neck        [] Abnormal- Neurological:        [x] No Facial Asymmetry (Cranial nerve 7 motor function) (limited exam to video visit)          [] No gaze palsy        [] Abnormal-         Skin:        [x] No significant exanthematous lesions or discoloration noted on facial skin         [] Abnormal-            Psychiatric:       [x] Normal Affect [x] No Hallucinations        [] Abnormal-     Other pertinent observable physical exam findings-     ASSESSMENT/PLAN:  1. Chronic obstructive pulmonary disease, unspecified COPD type ([address]  He  is using   symbicort  160-4.5  2 puff ,nebulizer albuterol and albuterol HFA prn .   C/o shortness of breath  with exertion. Occasional Wheezing . occasional Cough clear Sputum. Continue bronchodilator therapy as before    2. GEORGES (obstructive sleep apnea)  He is using auto CPAP with 5-20   centimeters of H2O with heated humidity. He is using CPAP for about   8 -9  hours every night. He is using CPAP with   Full face Mask. He said  sleep is restful with the CPAP use. He is compliant with CPAP therapy and benefiting with CPAP use. No snoring with CPAP use . continue CPAP therapy as before. 3. Obesity (BMI 30-39. 9)  Patient patient is advised try to lose weight. obesity related risk explained to the patient , Suggested weight control approaches, including dietary changes , exercise, behavioral modification. 4. Bilateral pulmonary embolism ([address]  He is on Xarelto    5. On home oxygen therapy  He has oxygen at home which he is using PRN basis. Return in about 3 months (around 12/23/2020) for COPD, georges, hypoxia on O2. Justus Castillo is a 67 y.o. male being evaluated by a Virtual Visit (video visit) encounter to address concerns as mentioned above. A caregiver was present when appropriate. Due to this being a TeleHealth encounter (During XBWVW-00 public health emergency), evaluation of the following organ systems was limited: Vitals/Constitutional/EENT/Resp/CV/GI//MS/Neuro/Skin/Heme-Lymph-Imm. Pursuant to the emergency declaration under the [address] [facility], [address] [place] [address] authority and the [employer] Act, this Virtual Visit was conducted with patient's (and/or legal guardian's) consent, to reduce the patient's risk of exposure to COVID-19 and provide necessary medical care. The patient (and/or legal guardian) has also been advised to contact this office for worsening conditions or problems, and seek emergency medical treatment and/or call 911 if deemed necessary. Patient identification was verified at the start of the visit: Yes    Total time spent on this encounter: 26 min    Services were provided through a video synchronous discussion virtually to substitute for in-person clinic visit. Patient and provider were located at their individual homes. --Fabrice Parekh MD on 9/23/2020 at 4:19 PM    An electronic signature was used to authenticate this note.

## 2020-10-19 ENCOUNTER — HOSPITAL ENCOUNTER (OUTPATIENT)
Dept: LAB | Age: 80
Discharge: HOME | End: 2020-10-19
Payer: MEDICARE

## 2020-10-21 LAB
SARS-COV-2: NOT DETECTED
SOURCE: NORMAL

## 2020-11-05 RX ORDER — ALBUTEROL SULFATE 2.5 MG/3ML
SOLUTION RESPIRATORY (INHALATION)
Qty: 360 ML | Refills: 3 | Status: SHIPPED | OUTPATIENT
Start: 2020-11-05 | End: 2022-01-21 | Stop reason: SDUPTHER

## 2020-12-23 ENCOUNTER — VIRTUAL VISIT (OUTPATIENT)
Dept: PULMONOLOGY | Age: 80
End: 2020-12-23
Payer: MEDICARE

## 2020-12-23 DIAGNOSIS — E66.9 OBESITY (BMI 30-39.9): ICD-10-CM

## 2020-12-23 DIAGNOSIS — G47.33 OSA (OBSTRUCTIVE SLEEP APNEA): ICD-10-CM

## 2020-12-23 DIAGNOSIS — I26.99 BILATERAL PULMONARY EMBOLISM (HCC): ICD-10-CM

## 2020-12-23 DIAGNOSIS — J44.9 CHRONIC OBSTRUCTIVE PULMONARY DISEASE, UNSPECIFIED COPD TYPE (HCC): Primary | ICD-10-CM

## 2020-12-23 DIAGNOSIS — Z99.81 ON HOME OXYGEN THERAPY: ICD-10-CM

## 2020-12-23 PROCEDURE — 99443 PR PHYS/QHP TELEPHONE EVALUATION 21-30 MIN: CPT | Performed by: INTERNAL MEDICINE

## 2020-12-23 ASSESSMENT — ENCOUNTER SYMPTOMS
ABDOMINAL PAIN: 0
WHEEZING: 0
VOICE CHANGE: 0
NAUSEA: 0
CHEST TIGHTNESS: 0
DIARRHEA: 0
VOMITING: 0
COUGH: 1
EYE ITCHING: 0
SORE THROAT: 0
RHINORRHEA: 0
SHORTNESS OF BREATH: 1

## 2020-12-23 NOTE — PROGRESS NOTES
Subjective:     Ayanna Putnam is a 79 y.o. male who complains today of:     Chief Complaint   Patient presents with    Follow-up       HPI  He is not  using Auto CPAP with 5-20   centimeters of H2O with heated humidity. He said he is not using for last 2-3 month. He is sleeping ok without it . He is on 2 lit O2 prn   He said he is planning to start using it after 1st of the new year. He said he was having anxiety , he is started on Zoloft    He is using symbicort  160-4.5 mcg   2 puff ,nebulizer albuterol and albuterol HFA prn .   He is on 2 lit O2 prn bases. C/o shortness of breath  with exertion. No  Wheezing   C/o Cough with white Sputum  No Hemoptysis  No Chest tightness   No Chest pain with radiation  or pleuritic pain  No Fever or chills. No Rhinorrhea and postnasal drip.     Allergies:  Pantoprazole  Past Medical History:   Diagnosis Date    Abnormality of gait and mobility     Acute sinusitis     Anxiety     Aspiration into respiratory tract 11/10/2017    Aspiration pneumonia (HCC)     Bilateral pulmonary embolism (HCC) 1/6/2018    BPH (benign prostatic hyperplasia)     COPD (chronic obstructive pulmonary disease) ([address] 12/5/2013    Debility     Elevated CK 12/30/2013    Foraminal stenosis of lumbosacral region 6/7/2016    GERD (gastroesophageal reflux disease) 12/11/2014    History of asthma 1/13/2014    HTN (hypertension) 1/13/2014    past braden / no current meds    Hx of blood clots 01/2018    DVT  ( unsure which leg but both were swollen ) -> PE -> thus Xarelto    Hyperlipidemia     meds > 10 yrs    Hypothyroidism     Insomnia     Lower extremity weakness 1/24/2014    On home oxygen therapy     2L at night    Osteoarthritis of spine with radiculopathy, lumbar region 6/7/2016    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    no trx to follow    Positive D dimer 12/5/2013    Sleep apnea 1/24/2014    Type 2 diabetes mellitus (HCC)     diet control  / no meds    Vitamin D deficiency      Past Surgical History:   Procedure Laterality Date    BRONCHOSCOPY N/A 2017    BRONCHOSCOPY FIBEROPTIC performed by Suzanna Toth MD at [facility] COLONOSCOPY      ENDOSCOPY, COLON, DIAGNOSTIC      EYE SURGERY      phaco with IOL OU    HERNIA REPAIR  [id number]    repair umbilical hernia    KNEE SURGERY Left 1970    CA ARTHROSCOPY SHOULDER SURGICAL BICEPS TENODESIS Right 2018    RIGHT SHOULDER ARTHROSCOPY SUBACROMIAL DECOMPRESS WITH POSSIBLE BICEPS TENODESIS ARTHROSCOPIC FRANSISCA C- ARM ARTHREX BICEPS [facility]ENODESIS [facility] performed by Salma Stringer MD at [address] [place] [address] Right 2017    RIGHT THORACOTOMY WEDGE RESECTION FOR FOREIGN BODY AND FOB DOUBLE LUMEN (1ST CASE) performed by Silva Lim MD at [address] [place] [address]      cancer / no chemo or radiation     Family History   Problem Relation Age of Onset    Other Mother 80        Old Age    Stroke Father 45    No Known Problems Sister     Psoriasis Daughter     No Known Problems Son      Social History     Socioeconomic History    Marital status:      Spouse name: Not on file    Number of children: 2    Years of education: Not on file    Highest education level: Not on file   Occupational History    Occupation: retired   Social Needs    Financial resource strain: Not on file    Food insecurity     Worry: Not on file     Inability: Not on file   Quietyme needs     Medical: Not on file     Non-medical: Not on file   Tobacco Use    Smoking status: Former Smoker     Packs/day: 1.00     Years: 40.00     Pack years: 40.00     Types: Cigarettes     Start date: 2000     Quit date: 2000     Years since quittin.9    Smokeless tobacco: Former User    Tobacco comment: quit    Substance and Sexual Activity    Alcohol use:  Yes     Alcohol/week: 0.0 standard drinks     Comment: rare social    Drug use: No    Sexual activity: Not on file   Lifestyle    Physical activity     Days per week: Not on file     Minutes per session: Not on file    Stress: Not on file   Relationships    Social connections     Talks on phone: Not on file     Gets together: Not on file     Attends Anabaptism service: Not on file     Active member of club or organization: Not on file     Attends meetings of clubs or organizations: Not on file     Relationship status: Not on file    Intimate partner violence     Fear of current or ex partner: Not on file     Emotionally abused: Not on file     Physically abused: Not on file     Forced sexual activity: Not on file   Other Topics Concern    Not on file   Social History Narrative    The patient lives alone in a one story home with one step to enter the home. The bedroom and bathroom are on the first floor. His social support includes his children. He has a wheeled walker, rollator, cane and dressing assistive device at home. He was receiving home health care services prior to admission to include PT, OT and RN. He does not have history of falls prior to admission. He was independent with mobility with a wheeled walker as needed prior to admission. He was independent with self care prior to admission. His goal is to get stronger and return home. LIVES AT HOME ALONE. HE HAS A ROLLATOR HE USES. HIS SON LIVES IN [place] AND IS AVAILABLE IF HE NEEDS HIM. Type of Home: House    Home Layout: One level    Home Access: Level entry    Home Equipment: Cane, 4 wheeled walker    ADL Assistance: Independent    Homemaking Assistance: Independent    Ambulation Assistance: Independent    Transfer Assistance: Independent    Additional Comments: son and dtr can assist upon DC home with IADLs         Review of Systems   Constitutional: Negative for chills, diaphoresis, fatigue and fever. HENT: Negative for congestion, mouth sores, nosebleeds, postnasal drip, rhinorrhea, sneezing, sore throat and voice change.     Eyes: Negative for itching and visual disturbance. Respiratory: Positive for cough and shortness of breath. Negative for chest tightness and wheezing. Cardiovascular: Negative. Negative for chest pain, palpitations and leg swelling. Gastrointestinal: Negative for abdominal pain, diarrhea, nausea and vomiting. Genitourinary: Negative for difficulty urinating and hematuria. Musculoskeletal: Negative for arthralgias, joint swelling and myalgias. Skin: Negative for rash. Allergic/Immunologic: Negative for environmental allergies. Neurological: Negative for dizziness, tremors, weakness and headaches. Psychiatric/Behavioral: Positive for sleep disturbance. Negative for behavioral problems.         :   There were no vitals filed for this visit. Wt Readings from Last 3 Encounters:   01/10/20 225 lb (102.1 kg)   12/10/19 225 lb (102.1 kg)   11/18/19 221 lb (100.2 kg)         Physical Exam  Phone visit    Current Outpatient Medications   Medication Sig Dispense Refill    albuterol (PROVENTIL) (2.5 MG/3ML) 0.083% nebulizer solution Use 1 vial via nebulizer every 6 hours as needed for Wheezing 360 mL 3    alfuzosin (UROXATRAL) 10 MG extended release tablet Take by mouth      amitriptyline (ELAVIL) 50 MG tablet Take by mouth      rivaroxaban (XARELTO) 10 MG TABS tablet Take 1 tablet by mouth daily (with breakfast) 30 tablet 6    Respiratory Therapy Supplies OK New CPAP mask and supplies 1 Device 0    aspirin 81 MG EC tablet Take 1 tablet by mouth daily 30 tablet 3    psyllium (METAMUCIL) 58.12 % PACK packet Take 1 packet by mouth daily as needed (constipation) 30 packet 0    vitamin D (CHOLECALCIFEROL) 1000 UNIT TABS tablet Take 2 tablets by mouth daily 60 tablet 1    pravastatin (PRAVACHOL) 20 MG tablet Take 20 mg by mouth every evening      clonazePAM (KLONOPIN) 0.5 MG tablet Take 0.5 mg by mouth nightly as needed. .      CPAP Machine MISC by Does not apply route Auto CPAP  5-20 cm 1 each 0    acetaminophen (TYLENOL) 500 MG tablet Take 500 mg by mouth every 6 hours as needed for Pain      albuterol sulfate  (90 Base) MCG/ACT inhaler Inhale 2 puffs into the lungs every 4 hours as needed for Wheezing      magnesium 30 MG tablet Take 250 mg by mouth daily      docusate sodium (COLACE) 100 MG capsule Take 100 mg by mouth daily      mirtazapine (REMERON) 15 MG tablet Take 15 mg by mouth nightly      finasteride (PROSCAR) 5 MG tablet Take 1 tablet by mouth daily 30 tablet 3    tamsulosin (FLOMAX) 0.4 MG capsule Take 1 capsule by mouth nightly 30 capsule 3    levothyroxine (SYNTHROID) 125 MCG tablet Take 1 tablet by mouth Daily (Patient taking differently: Take 137 mcg by mouth Daily ) 30 tablet 3    Famotidine (PEPCID AC PO) Take by mouth daily as needed       budesonide-formoterol (SYMBICORT) 160-4.5 MCG/ACT AERO Inhale 2 puffs into the lungs 2 times daily. No current facility-administered medications for this visit. Results for orders placed during the hospital encounter of 12/14/18   XR CHEST STANDARD (2 VW)    Narrative EXAMINATION: Chest x-ray, 2 view    HISTORY: Hypoxia. Shortness of breath. TECHNIQUE: AP and lateral views of the chest    COMPARISON: CT thorax from December 14, 2018    FINDINGS:     Cardiomediastinal silhouette is within normal limits. Chronic mild elevation of the right hemidiaphragm. Postsurgical changes of the right lung base with right lung base atelectasis versus scarring. Left lung base subsegmental atelectasis versus   scarring. No pneumothorax, pleural effusion, or focal consolidation. Degenerative changes of the thoracic spine.       Impression Postsurgical changes of the right lung base with scarring and/or atelectasis.   ]  Results for orders placed during the hospital encounter of 07/17/19   XR CHEST PORTABLE    Narrative Portable chest radiograph    History: Cough with shortness of breath    Technique: AP portable view of the chest obtained. Comparison: CT chest from December 14, 2018 and chest radiograph from December 17, 2018    Findings:    Atherosclerotic calcification of the thoracic aorta. The cardiomediastinal silhouette is within normal limits. No pneumothorax, pleural effusion, or focal consolidation. Postsurgical changes of the right lower lobe with adjacent atelectasis and scarring. Left lung base subsegmental atelectasis versus scarring. Osseous structures of the thorax appear intact. Advanced right and moderate left shoulder osteoarthritis. Impression No acute intrathoracic process or significant interval change when compared to radiographs of December 17, 2018.   ]  No results found for this or any previous visit.]  No results found for this or any previous visit.]  Results for orders placed during the hospital encounter of 11/10/17   CT Chest W Contrast    Narrative EXAMINATION:  CT CHEST W CONTRAST    CLINICAL HISTORY:  ACUTE RESP ILLNESS, >37YEARS OLD  status post bronchoscopy to retrieve aspirated dental amalgam, unsuccessful. COMPARISONS:  12/2/2013    TECHNIQUE:  Spiral scans with 75 mL Isovue-370 IV. Multiplanar 2-D reconstructions. Axial 3-D 10 x 3 mm MIP reconstructions were performed. All CT scans at this facility use dose modulation, iterative reconstruction, and/or weight based dosing when   appropriate to reduce radiation dose to as low as reasonably achievable. FINDINGS:  The known aspirated filling is identified within the distal segmental bronchus of the right lower lobe posterior basilar segment. Filling measures approximately 9 mm. There are mild emphysematous changes. There is mild scattered peripheral   reticularity without definite honeycombing. There are no consolidations or effusions. There is right upper lung perifissural nodularity consistent with small intrapulmonary lymph nodes.  There is a left upper lobe 4 mm nodule (series 2 image 21),   unchanged from prior examination of 12/2/2013, and therefore benign. There are no suspicious lung nodules. There is mild bilateral lower lobe cylindrical bronchiectasis. Cardiac size and pulmonary vascularity are normal. There is mild mediastinal lipomatosis. There is mild thickening or trace fluid in the anterior pericardium, decreased compared to prior examination 12/2/2013. There are coronary artery calcifications. There are aortic calcifications. There is no evidence of aneurysm or dissection. There are borderline in size lymph nodes in the mediastinum anterior to the left mainstem bronchus and in the right hilum, unchanged from prior examination of 12/2/2013. There is no thoracic adenopathy. The esophagus is unremarkable. There is spondylosis. There are no acute or suspicious bone lesions. Scans of the subdiaphragmatic regions demonstrate a 6 mm oval metallic density in the gastric fundus, presumably representing a swallowed filling. There are stable small cysts in the liver. There are partially visualized renal cysts. Impression ASPIRATED FILLING IN RIGHT LOWER LOBE POSTERIOR BASILAR SEGMENTAL BRONCHUS. MILD EMPHYSEMA AND LOWER LOBE CYLINDRICAL BRONCHIECTASIS. ADDITIONAL FINDINGS AS ABOVE.     ]    Assessment/Plan:     1. Chronic obstructive pulmonary disease, unspecified COPD type ([address]  He is using symbicort  160-4.5 mcg   2 puff ,nebulizer albuterol and albuterol HFA prn .   He is on 2 lit O2 prn bases. C/o shortness of breath  with exertion. No  Wheezing . C/o Cough with white Sputum. Continue O2 and bronchodilator therapy as before continue bronchodilator therapy as before    2. GEORGES (obstructive sleep apnea)  He is not using Auto CPAP with 5-20 centimeters of H2O with heated humidity for last 2 to 3 months. He is sleeping ok without it . He is on 2 lit O2 with sleep and as needed. he said he was not having anxiety episode but he started on Zoloft and he said he is planning to start using it after 1st of the new year.      3. Obesity (BMI 30-39. 9)  He is advised try to lose weight. obesity related risk explained to the patient ,  Current weight:    Lbs. BMI:  There is no height or weight on file to calculate BMI. Suggested weight control approaches, including dietary changes , exercise, behavioral modification. 4. Bilateral pulmonary embolism ([address]  He is on chronic anticoagulation therapy with Xarelto    5. On home oxygen therapy  He is on 2 L O2 with sleep and as needed. Continue O2 to keep saturation 90% above. Patient notified that this is a billable service and has given verbal consent for telehealth services. Time spent with patient  22 minutes. Return in about 4 months (around 4/23/2021) for rachael, COPD, hypoxia on O2.       Shu Burns MD

## 2021-01-01 NOTE — PROGRESS NOTES
significant lower extremity edema or tenderness. Skin:   Intact right thoracotomy incision,  the small boil on the posterior aspect of his right thigh which was drained cultured and prescribed Betadine to dry sterile dressing  Rehabilitation:  Physical therapy: FIMS:  Bed Mobility: Scooting: Modified independent    Transfers: Sit to Stand: Stand by assistance  Stand to sit: Stand by assistance  Bed to Chair: Supervision  Stand Pivot Transfers: Supervision, Ambulation 1  Surface: level tile, carpet  Device: No Device  Other Apparatus: O2  Assistance: Contact guard assistance  Quality of Gait: Ataxic, inconsistent foot placement & SL (Harshad), NBOS, occasional Lat step strategy, Decreased Lat Displacement and rigid BUEs. Distance: 150 feet  Comments: Increased time to complete. SPO2 93%. L Knee Varus noted in stance. Increased gait tolerance , Stairs  # Steps : 8  Stairs Height: 6\"  Rails: Bilateral  Comment: non-reciprocal; VCs for BUE advancement to maintain COG>KIMMY. FIMS: Bed, Chair, Wheel Chair: 7 - Patient independently transfers  Walk: 2 - Maximal Assistance Requires up to Maximal Assistance AND requires assistance of one person to walk/operate wheelchair between  feet (Patient performs 25-49% of locomotion effort or goes between  feet)  Distance Walked: [address]: 0 - Activity Not Assessed/Does Not Occur  Distance Traveled in Wheel Chair: 0  Stairs: 2- Maximal Assistance Performs 25-49% of the effort to go up and down 4 to 6 stairs and requires the assistance of one person only,  , Assessment: Increased SOB noted after gait and stairs but SPO2 maintained 93% (2L). Dynamic bal continues to exhibit deficits in SLS.      Occupational therapy: FIMS:  Eatin - Patient feeds self  Groomin - Patient independent with all grooming tasks  Bathin - Able to bathe all 10 areas with device  Dressing-Upper: 6 - Independent with device/prosthesis  Dressing-Lower: 6 - Independent with dry sterile dressing after Betadine to the boil to the make sure it does not feed to his thoracotomy incision. Discontinue SATURNINO hose. 5. Severe Fatigue due to nutritional and hydration deficiency:   Vitamin D deficiency - supplement and recheck as outpatient. continue to monitor I&Os, calorie counts prn, dietary consult prn. Continue vitamin B vitamin D and CoQ10-lactose intolerance advised lactose free diet  6. Acute episodic insomnia with situational adjustment disorder:   Add scheduled Ambien, monitor for day time sedation -continue egg crate mattress  7. Falls risk elevated:  patient to use call light to get nursing assistance to get up, bed and chair alarm. 8. Elevated DVT risk: progressive activities in PT, discontinue SATURNINO hose due to rash, elevation and  Lovenox which is now on hold because of ongoing hemoptysis . 9. Complex discharge planning:  Discharge 12/1/17 home with home health care. Patient and family education is in progress. The patient is to follow-up with their family physician after discharge. We will continue to work on endurance and symptom control    Complex Active General Medical Issues that complicate care Assess & Plan:    1. Continued hemoptysis status post Aspiration pneumonia,  COPD (chronic obstructive pulmonary disease), on home oxygen therapy - Pulse oximeter checks, monitor vital signs, oxygen prn. Proventil, Duoneb,   2. HTN (hypertension),  Hyperlipidemia - continue blood pressure checks, adjust/add medications (Apresoline, Toprol). 3.   GERD (gastroesophageal reflux disease) - elevate head of bed. 4.   Hypothyroidism - Synthroid. 5.   Type 2 diabetes mellitus - Continue blood sugar checks, diet, adjust/add medications prn. No results for input(s): POCGLU in the last 72 hours. 6.    BPH (benign prostatic hyperplasia) - Flomax- bedtime to limit orthostatic, Proscar. Check postvoid residual.  7.    Anxiety and   Insomnia.  - emotional support, adjust/add medications (Desyrel, Remeron, Ativan). 8.    Aspiration into respiratory tract - recheck modified barium swallow-okay'd patient for thin liquid. Verbal results review patient okayed to be in regular incisions. 9.  Frequent bloody nose - I prescribed Afrin nasal spray and saline nasal spray. I have held the Lovenox. 10.  Boil draining of the right posterior thigh-culture and sensitivity percent after drainage on 11/23/17 and continue Betadine, egg crate mattress and dry sterile dressing. 11.  Right upper abdominal pain due to gas - I prescribed Lidoderm. This note transcribed by Nguyen Delgadillo on 11/29/17 at 11:26 a.m.         Karin Galloway D.O., PM&R     Attending    [address] [address]

## 2021-03-16 ENCOUNTER — OFFICE VISIT (OUTPATIENT)
Dept: UROLOGY | Age: 81
End: 2021-03-16
Payer: MEDICARE

## 2021-03-16 VITALS
HEIGHT: 72 IN | SYSTOLIC BLOOD PRESSURE: 118 MMHG | WEIGHT: 227 LBS | BODY MASS INDEX: 30.75 KG/M2 | HEART RATE: 57 BPM | DIASTOLIC BLOOD PRESSURE: 80 MMHG

## 2021-03-16 DIAGNOSIS — R31.9 HEMATURIA, UNSPECIFIED TYPE: Primary | ICD-10-CM

## 2021-03-16 DIAGNOSIS — R31.9 HEMATURIA, UNSPECIFIED TYPE: ICD-10-CM

## 2021-03-16 LAB
BILIRUBIN, POC: ABNORMAL
BLOOD URINE, POC: ABNORMAL
CLARITY, POC: CLEAR
COLOR, POC: YELLOW
GLUCOSE URINE, POC: ABNORMAL
KETONES, POC: ABNORMAL
LEUKOCYTE EST, POC: ABNORMAL
NITRITE, POC: ABNORMAL
PH, POC: 6.5
PROTEIN, POC: ABNORMAL
SPECIFIC GRAVITY, POC: 1.02
UROBILINOGEN, POC: 0.2

## 2021-03-16 PROCEDURE — 1123F ACP DISCUSS/DSCN MKR DOCD: CPT | Performed by: UROLOGY

## 2021-03-16 PROCEDURE — 99213 OFFICE O/P EST LOW 20 MIN: CPT | Performed by: UROLOGY

## 2021-03-16 PROCEDURE — G8417 CALC BMI ABV UP PARAM F/U: HCPCS | Performed by: UROLOGY

## 2021-03-16 PROCEDURE — 81003 URINALYSIS AUTO W/O SCOPE: CPT | Performed by: UROLOGY

## 2021-03-16 PROCEDURE — G8427 DOCREV CUR MEDS BY ELIG CLIN: HCPCS | Performed by: UROLOGY

## 2021-03-16 PROCEDURE — G8484 FLU IMMUNIZE NO ADMIN: HCPCS | Performed by: UROLOGY

## 2021-03-16 PROCEDURE — 1036F TOBACCO NON-USER: CPT | Performed by: UROLOGY

## 2021-03-16 PROCEDURE — 4040F PNEUMOC VAC/ADMIN/RCVD: CPT | Performed by: UROLOGY

## 2021-03-16 NOTE — PROGRESS NOTES
[facility] EVALUATION NOTE                                                 H&P                                                                                                                                                 Reason for Visit  Bloody discharge noted on underwear    History of Present Illness  78-year-old male with history of BPH was been on tamsulosin and finasteride for many years  Patient is also on Xarelto for anticoagulation  He was have bearing down to have a bowel movement due to constipation and noted a little blood at the front of his underwear afterwards  Denies hematuria grossly when he urinates      Urologic Review of Systems/Symptoms  BPH with obstruction    Review of Systems  Hospitalization: None recent  All 14 categories of Review of Systems otherwise reviewed no other findings reported.     Past Medical History:   Diagnosis Date    Abnormality of gait and mobility     Acute sinusitis     Anxiety     Aspiration into respiratory tract 11/10/2017    Aspiration pneumonia (HCC)     Bilateral pulmonary embolism (HCC) 1/6/2018    BPH (benign prostatic hyperplasia)     COPD (chronic obstructive pulmonary disease) ([address] 12/5/2013    Debility     Elevated CK 12/30/2013    Foraminal stenosis of lumbosacral region 6/7/2016    GERD (gastroesophageal reflux disease) 12/11/2014    History of asthma 1/13/2014    HTN (hypertension) 1/13/2014    past braden / no current meds    Hx of blood clots 01/2018    DVT  ( unsure which leg but both were swollen ) -> PE -> thus Xarelto    Hyperlipidemia     meds > 10 yrs    Hypothyroidism     Insomnia     Lower extremity weakness 1/24/2014    On home oxygen therapy     2L at night    Osteoarthritis of spine with radiculopathy, lumbar region 6/7/2016    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    no trx to follow    Positive D dimer 12/5/2013    Sleep apnea 1/24/2014    Type 2 diabetes mellitus (HCC)     diet control  / no meds    Vitamin D deficiency      Past Surgical History:   Procedure Laterality Date    BRONCHOSCOPY N/A 2017    BRONCHOSCOPY FIBEROPTIC performed by Codie Gonzalez MD at [facility] COLONOSCOPY      ENDOSCOPY, COLON, DIAGNOSTIC      EYE SURGERY      phaco with IOL OU    HERNIA REPAIR  [id number]    repair umbilical hernia    KNEE SURGERY Left     OH ARTHROSCOPY SHOULDER SURGICAL BICEPS TENODESIS Right 2018    RIGHT SHOULDER ARTHROSCOPY SUBACROMIAL DECOMPRESS WITH POSSIBLE BICEPS TENODESIS ARTHROSCOPIC FRANSISCA C- ARM ARTHREX BICEPS TENODESIS GALE Dickeyneo CHAIR CASE #1 1 HOUR performed by Herminia Alvarez MD at [address] [place] [address] Right 2017    RIGHT THORACOTOMY WEDGE RESECTION FOR FOREIGN BODY AND FOB DOUBLE LUMEN (1ST CASE) performed by Jaleesa Bacon MD at [address] [place] [address]      cancer / no chemo or radiation     Social History     Socioeconomic History    Marital status:      Spouse name: None    Number of children: 2    Years of education: None    Highest education level: None   Occupational History    Occupation: retired   Social Needs    Financial resource strain: None    Food insecurity     Worry: None     Inability: None    Transportation needs     Medical: None     Non-medical: None   Tobacco Use    Smoking status: Former Smoker     Packs/day: 1.00     Years: 40.00     Pack years: 40.00     Types: Cigarettes     Start date: 2000     Quit date:      Years since quittin.2    Smokeless tobacco: Former User    Tobacco comment: quit    Substance and Sexual Activity    Alcohol use:  Yes     Alcohol/week: 0.0 standard drinks     Comment: rare social    Drug use: No    Sexual activity: None   Lifestyle    Physical activity     Days per week: None     Minutes per session: None    Stress: None   Relationships    Social connections     Talks on phone: None     Gets together: None     Attends Hoahaoism service: None Active member of club or organization: None     Attends meetings of clubs or organizations: None     Relationship status: None    Intimate partner violence     Fear of current or ex partner: None     Emotionally abused: None     Physically abused: None     Forced sexual activity: None   Other Topics Concern    None   Social History Narrative    The patient lives alone in a one story home with one step to enter the home. The bedroom and bathroom are on the first floor. His social support includes his children. He has a wheeled walker, rollator, cane and dressing assistive device at home. He was receiving home health care services prior to admission to include PT, OT and RN. He does not have history of falls prior to admission. He was independent with mobility with a wheeled walker as needed prior to admission. He was independent with self care prior to admission. His goal is to get stronger and return home. LIVES AT HOME ALONE. HE HAS A ROLLATOR HE USES. HIS SON LIVES IN [place] AND IS AVAILABLE IF HE NEEDS HIM.      Type of Home: House    Home Layout: One level    Home Access: Level entry    Home Equipment: Cane, 4 wheeled walker    ADL Assistance: Independent    Homemaking Assistance: Independent    Ambulation Assistance: Independent    Transfer Assistance: Independent    Additional Comments: son and dtr can assist upon DC home with IADLs     Family History   Problem Relation Age of Onset    Other Mother 80        Old Age    Stroke Father 45    No Known Problems Sister     Psoriasis Daughter     No Known Problems Son      Current Outpatient Medications   Medication Sig Dispense Refill    sertraline (ZOLOFT) 50 MG tablet Take 50 mg by mouth daily      albuterol (PROVENTIL) (2.5 MG/3ML) 0.083% nebulizer solution Use 1 vial via nebulizer every 6 hours as needed for Wheezing 360 mL 3    alfuzosin (UROXATRAL) 10 MG extended release tablet Take by mouth      rivaroxaban (XARELTO) 10 MG TABS Final   12/28/2015 4.19 0.00 - 6.22 ng/mL Final   06/22/2015 4.39 0.00 - 6.22 ng/mL Final     Results for POC orders placed in visit on 03/16/21   POCT Urinalysis No Micro (Auto)   Result Value Ref Range    Color, UA yellow     Clarity, UA clear     Glucose, UA POC neg     Bilirubin, UA neg     Ketones, UA neg     Spec Grav, UA 1.020     Blood, UA POC small     pH, UA 6.5     Protein, UA POC neg     Urobilinogen, UA 0.2     Leukocytes, UA small     Nitrite, UA neg        Physical Exam  Vitals:    03/16/21 1259   BP: 118/80   Pulse: 57   Weight: 227 lb (103 kg)   Height: 6' (1.829 m)     Constitutional: Not in distress. Head and Neck: Unchanged  Cardiovascular: Normal rate, BP reviewed. Unchanged  Pulmonary/Chest: Normal respiratory effort not short of breath  Abdominal: Not distended. No abdominal discomfort  Urologic Exam  Urinalysis shows small blood small pyuria culture sent  Urologic exam otherwise unremarkable  Most recent PSA is 3.74. Assessment/Medical Necessity-Decision Making  Blood noted in front of his underwear after bearing down for a bowel movement  Pyuria microhematuria culture sent  Plan  Offered patient hematuria work-up including upper tract studies and cystoscopy  He will defer at this time  He will call me if he sees blood in his urine  Otherwise follow-up yearly as scheduled  Greater than 50% of 20 minutes spent consulting patient face-to-face  Orders Placed This Encounter   Procedures    Culture, Urine     Standing Status:   Future     Standing Expiration Date:   3/16/2022     Order Specific Question:   Specify (ex-cath, midstream, cysto, etc)? Answer:   m    POCT Urinalysis No Micro (Auto)     No orders of the defined types were placed in this encounter.     Gilson Noonan MD       Please note this report has been partially produced using speech recognition software  And may cause contain errors related to that system including grammar, punctuation and spelling as well as words and phrases that may seem inappropriate. If there are questions or concerns please feel free to contact me to clarify.

## 2021-03-18 LAB — URINE CULTURE, ROUTINE: NORMAL

## 2021-04-29 ENCOUNTER — OFFICE VISIT (OUTPATIENT)
Dept: PULMONOLOGY | Age: 81
End: 2021-04-29
Payer: MEDICARE

## 2021-04-29 VITALS
HEART RATE: 78 BPM | BODY MASS INDEX: 30.88 KG/M2 | TEMPERATURE: 97.5 F | DIASTOLIC BLOOD PRESSURE: 63 MMHG | OXYGEN SATURATION: 96 % | WEIGHT: 228 LBS | SYSTOLIC BLOOD PRESSURE: 98 MMHG | HEIGHT: 72 IN

## 2021-04-29 DIAGNOSIS — J44.9 CHRONIC OBSTRUCTIVE PULMONARY DISEASE, UNSPECIFIED COPD TYPE (HCC): Primary | ICD-10-CM

## 2021-04-29 DIAGNOSIS — G47.33 OSA (OBSTRUCTIVE SLEEP APNEA): ICD-10-CM

## 2021-04-29 DIAGNOSIS — E66.9 OBESITY (BMI 30-39.9): ICD-10-CM

## 2021-04-29 PROCEDURE — G8417 CALC BMI ABV UP PARAM F/U: HCPCS | Performed by: INTERNAL MEDICINE

## 2021-04-29 PROCEDURE — G8926 SPIRO NO PERF OR DOC: HCPCS | Performed by: INTERNAL MEDICINE

## 2021-04-29 PROCEDURE — 4040F PNEUMOC VAC/ADMIN/RCVD: CPT | Performed by: INTERNAL MEDICINE

## 2021-04-29 PROCEDURE — 3023F SPIROM DOC REV: CPT | Performed by: INTERNAL MEDICINE

## 2021-04-29 PROCEDURE — 1123F ACP DISCUSS/DSCN MKR DOCD: CPT | Performed by: INTERNAL MEDICINE

## 2021-04-29 PROCEDURE — 1036F TOBACCO NON-USER: CPT | Performed by: INTERNAL MEDICINE

## 2021-04-29 PROCEDURE — 99213 OFFICE O/P EST LOW 20 MIN: CPT | Performed by: INTERNAL MEDICINE

## 2021-04-29 PROCEDURE — G8427 DOCREV CUR MEDS BY ELIG CLIN: HCPCS | Performed by: INTERNAL MEDICINE

## 2021-04-29 ASSESSMENT — ENCOUNTER SYMPTOMS
ABDOMINAL PAIN: 0
SHORTNESS OF BREATH: 1
CHEST TIGHTNESS: 0
COUGH: 1
DIARRHEA: 0
WHEEZING: 0
VOICE CHANGE: 0
VOMITING: 0
EYE ITCHING: 0
SORE THROAT: 0
NAUSEA: 0
RHINORRHEA: 0

## 2021-04-29 NOTE — PROGRESS NOTES
Subjective:     Zan Castano is a 79 y.o. male who complains today of:     Chief Complaint   Patient presents with    Sleep Apnea     4 month f/u    COPD       HPI  He is using symbicort  160-4.5 mcg   2 puff ,nebulizer albuterol and albuterol HFA prn .   He is on 2 lit O2 prn bases. C/o shortness of breath , worse with exertion. Occasional Wheezing   Cough with  Sputum  No Hemoptysis  No Chest tightness   No Chest pain with radiation  or pleuritic pain  No  leg edema   No orthopnea  No Fever or chills. No Rhinorrhea and postnasal drip. He has Auto CPAP  But not using it.  He said he is sleeping better without it    Allergies:  Pantoprazole  Past Medical History:   Diagnosis Date    Abnormality of gait and mobility     Acute sinusitis     Anxiety     Aspiration into respiratory tract 11/10/2017    Aspiration pneumonia (HCC)     Bilateral pulmonary embolism (HCC) 1/6/2018    BPH (benign prostatic hyperplasia)     COPD (chronic obstructive pulmonary disease) ([facility] [address] 12/5/2013    Debility     Elevated CK 12/30/2013    Foraminal stenosis of lumbosacral region 6/7/2016    GERD (gastroesophageal reflux disease) 12/11/2014    History of asthma 1/13/2014    HTN (hypertension) 1/13/2014    past braden / no current meds    Hx of blood clots 01/2018    DVT  ( unsure which leg but both were swollen ) -> PE -> thus Xarelto    Hyperlipidemia     meds > 10 yrs    Hypothyroidism     Insomnia     Lower extremity weakness 1/24/2014    On home oxygen therapy     2L at night    Osteoarthritis of spine with radiculopathy, lumbar region 6/7/2016    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    no trx to follow    Positive D dimer 12/5/2013    Sleep apnea 1/24/2014    Type 2 diabetes mellitus ([address]     diet control  / no meds    Vitamin D deficiency      Past Surgical History:   Procedure Laterality Date    BRONCHOSCOPY N/A 11/11/2017    BRONCHOSCOPY [facility]IBEROPTIC performed [facility]Witney Hernandez [facility]  COLONOSCOPY      ENDOSCOPY, COLON, DIAGNOSTIC      EYE SURGERY      phaco with IOL OU    HERNIA REPAIR  [id number]    repair umbilical hernia    KNEE SURGERY Left 1970    [place] ARTHROSCOPY SHOULDER SURGICAL BICEPS TENODESIS Right 2018    RIGHT SHOULDER ARTHROSCOPY SUBACROMIAL DECOMPRESS WITH POSSIBLE BICEPS TENODESIS ARTHROSCOPIC FRANSISCA C- ARM ARTHREX BICEPS TENODESIS [facility] CHAIR CASE #1 1 HOUR performed by Eliseo Gray MD at [address] [place] [address] Right 2017    RIGHT THORACOTOMY WEDGE RESECTION FOR FOREIGN BODY AND FOB DOUBLE LUMEN (1ST CASE) performed by Rochelle Hernadez MD at [address] [place] [address]      cancer / no chemo or radiation     Family History   Problem Relation Age of Onset    Other Mother 80        Old Age    Stroke Father 45    No Known Problems Sister     Psoriasis Daughter     No Known Problems Son      Social History     Socioeconomic History    Marital status:      Spouse name: Not on file    Number of children: 2    Years of education: Not on file    Highest education level: Not on file   Occupational History    Occupation: retired   Social Needs    Financial resource strain: Not on file    Food insecurity     Worry: Not on file     Inability: Not on file   goCatch needs     Medical: Not on file     Non-medical: Not on file   Tobacco Use    Smoking status: Former Smoker     Packs/day: 1.00     Years: 40.00     Pack years: 40.00     Types: Cigarettes     Start date: 2000     Quit date:      Years since quittin.3    Smokeless tobacco: Former User    Tobacco comment: quit    Substance and Sexual Activity    Alcohol use:  Yes     Alcohol/week: 0.0 standard drinks     Comment: rare social    Drug use: No    Sexual activity: Not on file   Lifestyle    Physical activity     Days per week: Not on file     Minutes per session: Not on file    Stress: Not on file   Relationships    Social connections     Talks on phone: Not on file     Gets together: Not on file     Attends Evangelical service: Not on file     Active member of club or organization: Not on file     Attends meetings of clubs or organizations: Not on file     Relationship status: Not on file    Intimate partner violence     Fear of current or ex partner: Not on file     Emotionally abused: Not on file     Physically abused: Not on file     Forced sexual activity: Not on file   Other Topics Concern    Not on file   Social History Narrative    The patient lives alone in a one story home with one step to enter the home. The bedroom and bathroom are on the first floor. His social support includes his children. He has a wheeled walker, rollator, cane and dressing assistive device at home. He was receiving home health care services prior to admission to include PT, OT and RN. He does not have history of falls prior to admission. He was independent with mobility with a wheeled walker as needed prior to admission. He was independent with self care prior to admission. His goal is to get stronger and return home. LIVES AT HOME ALONE. HE HAS A ROLLATOR HE USES. HIS SON LIVES IN [place] AND IS AVAILABLE IF HE NEEDS HIM. Type of Home: House    Home Layout: One level    Home Access: Level entry    Home Equipment: Cane, 4 wheeled walker    ADL Assistance: Independent    Homemaking Assistance: Independent    Ambulation Assistance: Independent    Transfer Assistance: Independent    Additional Comments: son and dtr can assist upon DC home with IADLs         Review of Systems   Constitutional: Negative for chills, diaphoresis, fatigue and fever. HENT: Negative for congestion, mouth sores, nosebleeds, postnasal drip, rhinorrhea, sneezing, sore throat and voice change. Eyes: Negative for itching and visual disturbance. Respiratory: Positive for cough and shortness of breath. Negative for chest tightness and wheezing. Cardiovascular: Negative. Negative for chest pain, palpitations and leg swelling. Gastrointestinal: Negative for abdominal pain, diarrhea, nausea and vomiting. Genitourinary: Negative for difficulty urinating and hematuria. Musculoskeletal: Negative for arthralgias, joint swelling and myalgias. Skin: Negative for rash. Allergic/Immunologic: Negative for environmental allergies. Neurological: Negative for dizziness, tremors, weakness and headaches. Psychiatric/Behavioral: Negative for behavioral problems and sleep disturbance.         :     Vitals:    04/29/21 1514   BP: 98/63   Pulse: 78   Temp: 97.5 °F (36.4 °C)   SpO2: 96%   Weight: 228 lb (103.4 kg)   Height: 6' (1.829 m)     Wt Readings from Last 3 Encounters:   04/29/21 228 lb (103.4 kg)   03/16/21 227 lb (103 kg)   01/10/20 225 lb (102.1 kg)         Physical Exam  Constitutional:       Appearance: He is well-developed. HENT:      Head: Normocephalic and atraumatic. Nose: Nose normal.   Eyes:      Conjunctiva/sclera: Conjunctivae normal.      Pupils: Pupils are equal, round, and reactive to light. Neck:      Thyroid: No thyromegaly. Vascular: No JVD. Trachea: No tracheal deviation. Cardiovascular:      Rate and Rhythm: Normal rate and regular rhythm. Heart sounds: No murmur. No friction rub. No gallop. Pulmonary:      Effort: Pulmonary effort is normal. No respiratory distress. Breath sounds: Normal breath sounds. No wheezing or rales. Chest:      Chest wall: No tenderness. Abdominal:      General: There is no distension. Musculoskeletal: Normal range of motion. Lymphadenopathy:      Cervical: No cervical adenopathy. Skin:     General: Skin is warm and dry. Findings: No rash. Neurological:      Mental Status: He is alert and oriented to person, place, and time. Cranial Nerves: No cranial nerve deficit.    Psychiatric:         Behavior: Behavior normal.         Current Outpatient Medications Medication Sig Dispense Refill    sertraline (ZOLOFT) 50 MG tablet Take 50 mg by mouth daily      albuterol (PROVENTIL) (2.5 MG/3ML) 0.083% nebulizer solution Use 1 vial via nebulizer every 6 hours as needed for Wheezing 360 mL 3    alfuzosin (UROXATRAL) 10 MG extended release tablet Take by mouth      amitriptyline (ELAVIL) 50 MG tablet Take by mouth      rivaroxaban (XARELTO) 10 MG TABS tablet Take 1 tablet by mouth daily (with breakfast) 30 tablet 6    Respiratory Therapy Supplies OK New CPAP mask and supplies 1 Device 0    aspirin 81 MG EC tablet Take 1 tablet by mouth daily 30 tablet 3    psyllium (METAMUCIL) 58.12 % PACK packet Take 1 packet by mouth daily as needed (constipation) 30 packet 0    vitamin D (CHOLECALCIFEROL) 1000 UNIT TABS tablet Take 2 tablets by mouth daily 60 tablet 1    pravastatin (PRAVACHOL) 20 MG tablet Take 20 mg by mouth every evening      clonazePAM (KLONOPIN) 0.5 MG tablet Take 0.5 mg by mouth nightly as needed. .      CPAP Machine MISC by Does not apply route Auto CPAP  5-20 cm 1 each 0    acetaminophen (TYLENOL) 500 MG tablet Take 500 mg by mouth every 6 hours as needed for Pain      albuterol sulfate  (90 Base) MCG/ACT inhaler Inhale 2 puffs into the lungs every 4 hours as needed for Wheezing      magnesium 30 MG tablet Take 250 mg by mouth daily      docusate sodium (COLACE) 100 MG capsule Take 100 mg by mouth daily      mirtazapine (REMERON) 15 MG tablet Take 15 mg by mouth nightly      finasteride (PROSCAR) 5 MG tablet Take 1 tablet by mouth daily 30 tablet 3    tamsulosin (FLOMAX) 0.4 MG capsule Take 1 capsule by mouth nightly 30 capsule 3    levothyroxine (SYNTHROID) 125 MCG tablet Take 1 tablet by mouth Daily (Patient taking differently: Take 137 mcg by mouth Daily ) 30 tablet 3    Famotidine (PEPCID AC PO) Take by mouth daily as needed       budesonide-formoterol (SYMBICORT) 160-4.5 MCG/ACT AERO Inhale 2 puffs into the lungs 2 times daily. No current facility-administered medications for this visit. Results for orders placed during the hospital encounter of 12/14/18   XR CHEST STANDARD (2 VW)    Narrative EXAMINATION: Chest x-ray, 2 view    HISTORY: Hypoxia. Shortness of breath. TECHNIQUE: AP and lateral views of the chest    COMPARISON: CT thorax from December 14, 2018    FINDINGS:     Cardiomediastinal silhouette is within normal limits. Chronic mild elevation of the right hemidiaphragm. Postsurgical changes of the right lung base with right lung base atelectasis versus scarring. Left lung base subsegmental atelectasis versus   scarring. No pneumothorax, pleural effusion, or focal consolidation. Degenerative changes of the thoracic spine. Impression Postsurgical changes of the right lung base with scarring and/or atelectasis.   ]  Results for orders placed during the hospital encounter of 07/17/19   XR CHEST PORTABLE    Narrative Portable chest radiograph    History: Cough with shortness of breath    Technique: AP portable view of the chest obtained. Comparison: CT chest from December 14, 2018 and chest radiograph from December 17, 2018    Findings:    Atherosclerotic calcification of the thoracic aorta. The cardiomediastinal silhouette is within normal limits. No pneumothorax, pleural effusion, or focal consolidation. Postsurgical changes of the right lower lobe with adjacent atelectasis and scarring. Left lung base subsegmental atelectasis versus scarring. Osseous structures of the thorax appear intact. Advanced right and moderate left shoulder osteoarthritis.       Impression No acute intrathoracic process or significant interval change when compared to radiographs of December 17, 2018.   ]  No results found for this or any previous visit.]  No results found for this or any previous visit.]  Results for orders placed during the hospital encounter of 11/10/17   CT Chest W Contrast    Narrative EXAMINATION:  CT CHEST W CONTRAST    CLINICAL HISTORY:  ACUTE RESP ILLNESS, >37YEARS OLD  status post bronchoscopy to retrieve aspirated dental amalgam, unsuccessful. COMPARISONS:  12/2/2013    TECHNIQUE:  Spiral scans with 75 mL Isovue-370 IV. Multiplanar 2-D reconstructions. Axial 3-D 10 x 3 mm MIP reconstructions were performed. All CT scans at this facility use dose modulation, iterative reconstruction, and/or weight based dosing when   appropriate to reduce radiation dose to as low as reasonably achievable. FINDINGS:  The known aspirated filling is identified within the distal segmental bronchus of the right lower lobe posterior basilar segment. Filling measures approximately 9 mm. There are mild emphysematous changes. There is mild scattered peripheral   reticularity without definite honeycombing. There are no consolidations or effusions. There is right upper lung perifissural nodularity consistent with small intrapulmonary lymph nodes. There is a left upper lobe 4 mm nodule (series 2 image 21),   unchanged from prior examination of 12/2/2013, and therefore benign. There are no suspicious lung nodules. There is mild bilateral lower lobe cylindrical bronchiectasis. Cardiac size and pulmonary vascularity are normal. There is mild mediastinal lipomatosis. There is mild thickening or trace fluid in the anterior pericardium, decreased compared to prior examination 12/2/2013. There are coronary artery calcifications. There are aortic calcifications. There is no evidence of aneurysm or dissection. There are borderline in size lymph nodes in the mediastinum anterior to the left mainstem bronchus and in the right hilum, unchanged from prior examination of 12/2/2013. There is no thoracic adenopathy. The esophagus is unremarkable. There is spondylosis. There are no acute or suspicious bone lesions.     Scans of the subdiaphragmatic regions demonstrate a 6 mm oval metallic density in the gastric fundus, presumably representing a swallowed filling. There are stable small cysts in the liver. There are partially visualized renal cysts. Impression ASPIRATED FILLING IN RIGHT LOWER LOBE POSTERIOR BASILAR SEGMENTAL BRONCHUS. MILD EMPHYSEMA AND LOWER LOBE CYLINDRICAL BRONCHIECTASIS. ADDITIONAL FINDINGS AS ABOVE.     ]    Assessment/Plan:     1. Chronic obstructive pulmonary disease, unspecified COPD type ([address]  He is using symbicort  160-4.5 mcg   2 puff ,nebulizer albuterol and albuterol HFA prn . He is on 2 lit O2 prn bases. C/o shortness of breath , worse with exertion. Occasional Wheezing . Cough with  Sputum  Continue bronchodilator therapy as before    2. Obesity (BMI 30-39. 9)  He is advised try to lose weight. obesity related risk explained to the patient ,  Current weight:  228 lb (103.4 kg) Lbs. BMI:  Body mass index is 30.92 kg/m². Suggested weight control approaches, including dietary changes , exercise, behavioral modification. 3. GEORGES (obstructive sleep apnea)  He has Auto CPAP  But not using it. He said he is sleeping better without it      Return in about 4 months (around 8/29/2021) for COPD, georges.       Geno Benítez MD

## 2021-06-09 ENCOUNTER — TELEPHONE (OUTPATIENT)
Dept: UROLOGY | Age: 81
End: 2021-06-09

## 2021-06-09 DIAGNOSIS — R97.20 ELEVATED PSA: Primary | ICD-10-CM

## 2021-06-09 DIAGNOSIS — R97.20 ELEVATED PSA: ICD-10-CM

## 2021-06-09 LAB — PROSTATE SPECIFIC ANTIGEN: 4.62 NG/ML (ref 0–6.22)

## 2021-06-10 ENCOUNTER — OFFICE VISIT (OUTPATIENT)
Dept: UROLOGY | Age: 81
End: 2021-06-10
Payer: MEDICARE

## 2021-06-10 VITALS
BODY MASS INDEX: 30.75 KG/M2 | HEIGHT: 72 IN | SYSTOLIC BLOOD PRESSURE: 110 MMHG | DIASTOLIC BLOOD PRESSURE: 66 MMHG | WEIGHT: 227 LBS | HEART RATE: 126 BPM

## 2021-06-10 DIAGNOSIS — R97.20 ELEVATED PSA: Primary | ICD-10-CM

## 2021-06-10 PROCEDURE — 1123F ACP DISCUSS/DSCN MKR DOCD: CPT | Performed by: UROLOGY

## 2021-06-10 PROCEDURE — 4040F PNEUMOC VAC/ADMIN/RCVD: CPT | Performed by: UROLOGY

## 2021-06-10 PROCEDURE — G8417 CALC BMI ABV UP PARAM F/U: HCPCS | Performed by: UROLOGY

## 2021-06-10 PROCEDURE — 1036F TOBACCO NON-USER: CPT | Performed by: UROLOGY

## 2021-06-10 PROCEDURE — G8427 DOCREV CUR MEDS BY ELIG CLIN: HCPCS | Performed by: UROLOGY

## 2021-06-10 PROCEDURE — 99213 OFFICE O/P EST LOW 20 MIN: CPT | Performed by: UROLOGY

## 2021-06-10 RX ORDER — LEVOTHYROXINE SODIUM 137 UG/1
TABLET ORAL
COMMUNITY
Start: 2021-04-05

## 2021-06-10 NOTE — PROGRESS NOTES
[facility] EVALUATION NOTE                                                 H&P                                                                                                                                                 Reason for Visit  BPH with obstruction    History of Present Illness  80-year-old male with history of elevated PSA with negative prostate biopsies. Patient had 3 separate biopsies at [facility] which showed no evidence of malignancy. Patient has been on finasteride since with good control of his PSA. Current PSA is at 4.62 up by a fraction. Good control of voiding symptoms. Has mild postvoid dribbling. Urologic Review of Systems/Symptoms  Unchanged    Review of Systems  Hospitalization: None recent  All 14 categories of Review of Systems otherwise reviewed no other findings reported.   Review of systems unchanged  Past Medical History:   Diagnosis Date    Abnormality of gait and mobility     Acute sinusitis     Anxiety     Aspiration into respiratory tract 11/10/2017    Aspiration pneumonia (HCC)     Bilateral pulmonary embolism (HCC) 1/6/2018    BPH (benign prostatic hyperplasia)     COPD (chronic obstructive pulmonary disease) ([facility] [address] 12/5/2013    Debility     Elevated CK 12/30/2013    Foraminal stenosis of lumbosacral region 6/7/2016    GERD (gastroesophageal reflux disease) 12/11/2014    History of asthma 1/13/2014    HTN (hypertension) 1/13/2014    past braden / no current meds    Hx of blood clots 01/2018    DVT  ( unsure which leg but both were swollen ) -> PE -> thus Xarelto    Hyperlipidemia     meds > 10 yrs    Hypothyroidism     Insomnia     Lower extremity weakness 1/24/2014    On home oxygen therapy     2L at night    Osteoarthritis of spine with radiculopathy, lumbar region 6/7/2016    Papillary thyroid carcinoma ([address] 12/2006    no trx to follow    Positive D dimer 12/5/2013    Sleep apnea 1/24/2014    Type 2 diabetes mellitus (HCC)     diet control / no meds    Vitamin D deficiency      Past Surgical History:   Procedure Laterality Date    BRONCHOSCOPY N/A 2017    BRONCHOSCOPY FIBEROPTIC performed by Daymon Boxer, MD at [facility] COLONOSCOPY      ENDOSCOPY, COLON, DIAGNOSTIC      EYE SURGERY      phaco with IOL OU    HERNIA REPAIR  [id number]    repair umbilical hernia    KNEE SURGERY Left 1970    [facility] ARTHROS[facility] SHOULDER SURGICAL BICEPS TENODESIS Right 2018    RIGHT SHOULDER ARTHROSCOPY SUBACROMIAL DECOMPRESS WITH POSSIBLE BICEPS TENODESIS ARTHROSCOPIC FRANSISCA C- ARM ARTHREX BICEPS TENODESIS [facility] HOUR performed by Anita Puckett MD at [address] [place] [address] Right 2017    RIGHT THORACOTOMY WEDGE RESECTION FOR FOREIGN BODY AND FOB DOUBLE LUMEN (1ST CASE) performed by Julia Kohler MD at [address] [place] [address]      cancer / no chemo or radiation     Social History     Socioeconomic History    Marital status:      Spouse name: None    Number of children: 2    Years of education: None    Highest education level: None   Occupational History    Occupation: retired   Tobacco Use    Smoking status: Former Smoker     Packs/day: 1.00     Years: 40.00     Pack years: 40.00     Types: Cigarettes     Start date: 2000     Quit date:      Years since quittin.4    Smokeless tobacco: Former User    Tobacco comment: quit    Vaping Use    Vaping Use: Never used   Substance and Sexual Activity    Alcohol use: Yes     Alcohol/week: 0.0 standard drinks     Comment: rare social    Drug use: No    Sexual activity: None   Other Topics Concern    None   Social History Narrative    The patient lives alone in a one story home with one step to enter the home. The bedroom and bathroom are on the first floor. His social support includes his children. He has a wheeled walker, rollator, cane and dressing assistive device at home.   He was receiving home health care (ZOLOFT) 50 MG tablet Take 50 mg by mouth daily      albuterol (PROVENTIL) (2.5 MG/3ML) 0.083% nebulizer solution Use 1 vial via nebulizer every 6 hours as needed for Wheezing 360 mL 3    alfuzosin (UROXATRAL) 10 MG extended release tablet Take by mouth      rivaroxaban (XARELTO) 10 MG TABS tablet Take 1 tablet by mouth daily (with breakfast) 30 tablet 6    Respiratory Therapy Supplies OK New CPAP mask and supplies 1 Device 0    aspirin 81 MG EC tablet Take 1 tablet by mouth daily 30 tablet 3    vitamin D (CHOLECALCIFEROL) 1000 UNIT TABS tablet Take 2 tablets by mouth daily 60 tablet 1    pravastatin (PRAVACHOL) 20 MG tablet Take 20 mg by mouth every evening      clonazePAM (KLONOPIN) 0.5 MG tablet Take 0.5 mg by mouth nightly as needed. .      CPAP Machine MISC by Does not apply route Auto CPAP  5-20 cm 1 each 0    albuterol sulfate  (90 Base) MCG/ACT inhaler Inhale 2 puffs into the lungs every 4 hours as needed for Wheezing      magnesium 30 MG tablet Take 250 mg by mouth daily      docusate sodium (COLACE) 100 MG capsule Take 100 mg by mouth daily      mirtazapine (REMERON) 15 MG tablet Take 15 mg by mouth nightly      finasteride (PROSCAR) 5 MG tablet Take 1 tablet by mouth daily 30 tablet 3    tamsulosin (FLOMAX) 0.4 MG capsule Take 1 capsule by mouth nightly 30 capsule 3    Famotidine (PEPCID AC PO) Take by mouth daily as needed       budesonide-formoterol (SYMBICORT) 160-4.5 MCG/ACT AERO Inhale 2 puffs into the lungs 2 times daily.       amitriptyline (ELAVIL) 50 MG tablet Take by mouth (Patient not taking: Reported on 6/10/2021)      psyllium (METAMUCIL) 58.12 % PACK packet Take 1 packet by mouth daily as needed (constipation) (Patient not taking: Reported on 6/10/2021) 30 packet 0    acetaminophen (TYLENOL) 500 MG tablet Take 500 mg by mouth every 6 hours as needed for Pain (Patient not taking: Reported on 6/10/2021)       No current facility-administered medications for this

## 2021-06-14 ENCOUNTER — TELEPHONE (OUTPATIENT)
Dept: UROLOGY | Age: 81
End: 2021-06-14

## 2021-06-14 RX ORDER — SULFAMETHOXAZOLE AND TRIMETHOPRIM 800; 160 MG/1; MG/1
1 TABLET ORAL 2 TIMES DAILY
Qty: 6 TABLET | Refills: 0 | Status: SHIPPED | OUTPATIENT
Start: 2021-06-14 | End: 2021-06-17

## 2021-06-14 NOTE — TELEPHONE ENCOUNTER
Patient instructed to stop the Xarelto and aspirin for 3 days  Called in Bactrim DS 1 p.o. twice daily x3 days  Patient come in if hematuria continues  Ministerio Ahumada MD    ----- Message from [place] sent at 6/14/2021 12:34 PM EDT -----  Regarding: hematuria  Pt woke up this morning with gross hematuria. Pt states that there isn't any pain, burning, or frequency.  He is asking what he should do? [phone]

## 2021-07-08 ENCOUNTER — TELEPHONE (OUTPATIENT)
Dept: UROLOGY | Age: 81
End: 2021-07-08

## 2021-07-08 NOTE — TELEPHONE ENCOUNTER
----- Message from [place] sent at 7/8/2021  1:14 PM EDT -----  Regarding: hematuria  Pt has had micro hematuria, for the past 2-3 days. Pt doesn't have burning or frequency. Pt doesn't know if an antibiotic should be prescribed?  [phone]

## 2021-07-09 ENCOUNTER — NURSE ONLY (OUTPATIENT)
Dept: UROLOGY | Age: 81
End: 2021-07-09
Payer: MEDICARE

## 2021-07-09 ENCOUNTER — TELEPHONE (OUTPATIENT)
Dept: UROLOGY | Age: 81
End: 2021-07-09

## 2021-07-09 DIAGNOSIS — R31.0 GROSS HEMATURIA: Primary | ICD-10-CM

## 2021-07-09 DIAGNOSIS — R31.0 GROSS HEMATURIA: ICD-10-CM

## 2021-07-09 LAB
BILIRUBIN, POC: ABNORMAL
BLOOD URINE, POC: ABNORMAL
CLARITY, POC: CLEAR
COLOR, POC: ABNORMAL
GLUCOSE URINE, POC: ABNORMAL
KETONES, POC: ABNORMAL
LEUKOCYTE EST, POC: ABNORMAL
NITRITE, POC: ABNORMAL
PH, POC: 6
PROTEIN, POC: ABNORMAL
SPECIFIC GRAVITY, POC: 1.02
UROBILINOGEN, POC: 0.2

## 2021-07-09 PROCEDURE — 81003 URINALYSIS AUTO W/O SCOPE: CPT | Performed by: UROLOGY

## 2021-07-09 NOTE — TELEPHONE ENCOUNTER
Urine drop off for gross hematuria  [facility]  Pharm: [facility]   Phone: [phone]  Would you like a culture     7/9/2021  2:06 PM - Karis Allen, DRAKE    Component Collected Lab   Color, UA 07/09/2021  2:06 PM Unknown   red    Clarity, UA 07/09/2021  2:06 PM Unknown   clear    Glucose, UA POC 07/09/2021  2:06 PM Unknown   neg    Bilirubin, UA 07/09/2021  2:06 PM Unknown   neg    Ketones, UA 07/09/2021  2:06 PM Unknown   eng    Spec Grav, UA 07/09/2021  2:06 PM Unknown   1.020    Blood, UA POC 07/09/2021  2:06 PM Unknown   large    pH, UA 07/09/2021  2:06 PM Unknown   6.0    Protein, UA POC 07/09/2021  2:06 PM Unknown   100 mg/dL    Urobilinogen, UA 07/09/2021  2:06 PM Unknown   0.2    Leukocytes, UA 07/09/2021  2:06 PM Unknown   neg    Nitrite, UA 07/09/2021  2:06 PM Unknown   neg

## 2021-07-12 LAB — URINE CULTURE, ROUTINE: NORMAL

## 2021-08-04 ENCOUNTER — TELEPHONE (OUTPATIENT)
Dept: UROLOGY | Age: 81
End: 2021-08-04

## 2021-08-04 NOTE — TELEPHONE ENCOUNTER
Pt called stating he has blood in his urine again. He would like your recommendation. He states he stopped the blood thinners and it went away.  He started back on them and the blood came back

## 2021-08-06 ENCOUNTER — OFFICE VISIT (OUTPATIENT)
Dept: UROLOGY | Age: 81
End: 2021-08-06
Payer: MEDICARE

## 2021-08-06 VITALS
HEIGHT: 72 IN | SYSTOLIC BLOOD PRESSURE: 110 MMHG | WEIGHT: 220 LBS | BODY MASS INDEX: 29.8 KG/M2 | HEART RATE: 80 BPM | DIASTOLIC BLOOD PRESSURE: 74 MMHG

## 2021-08-06 DIAGNOSIS — R31.0 GROSS HEMATURIA: Primary | ICD-10-CM

## 2021-08-06 LAB
BILIRUBIN, POC: ABNORMAL
BLOOD URINE, POC: ABNORMAL
CLARITY, POC: CLEAR
COLOR, POC: YELLOW
GLUCOSE URINE, POC: ABNORMAL
KETONES, POC: ABNORMAL
LEUKOCYTE EST, POC: ABNORMAL
NITRITE, POC: ABNORMAL
PH, POC: 5.5
PROTEIN, POC: ABNORMAL
SPECIFIC GRAVITY, POC: 1.02
UROBILINOGEN, POC: 0.2

## 2021-08-06 PROCEDURE — 4040F PNEUMOC VAC/ADMIN/RCVD: CPT | Performed by: UROLOGY

## 2021-08-06 PROCEDURE — 1036F TOBACCO NON-USER: CPT | Performed by: UROLOGY

## 2021-08-06 PROCEDURE — G8417 CALC BMI ABV UP PARAM F/U: HCPCS | Performed by: UROLOGY

## 2021-08-06 PROCEDURE — 99214 OFFICE O/P EST MOD 30 MIN: CPT | Performed by: UROLOGY

## 2021-08-06 PROCEDURE — 81003 URINALYSIS AUTO W/O SCOPE: CPT | Performed by: UROLOGY

## 2021-08-06 PROCEDURE — 1123F ACP DISCUSS/DSCN MKR DOCD: CPT | Performed by: UROLOGY

## 2021-08-06 PROCEDURE — G8427 DOCREV CUR MEDS BY ELIG CLIN: HCPCS | Performed by: UROLOGY

## 2021-08-06 RX ORDER — ROSUVASTATIN CALCIUM 20 MG/1
TABLET, COATED ORAL
COMMUNITY
Start: 2021-08-05

## 2021-08-06 NOTE — PROGRESS NOTES
[facility] EVALUATION NOTE                                                 H&P                                                                                                                                                 Reason for Visit  Gross hematuria    History of Present Illness  81-year-old male who is on anticoagulation for history of pulmonary emboli  Patient has had intermittent gross hematuria in the past  Urine cultures have been negative  Patient is currently on Xarelto      Urologic Review of Systems/Symptoms  Denies voiding dysfunction    Review of Systems  Hospitalization: None recent  All 14 categories of Review of Systems otherwise reviewed no other findings reported.   No change in review of systems  Past Medical History:   Diagnosis Date    Abnormality of gait and mobility     Acute sinusitis     Anxiety     Aspiration into respiratory tract 11/10/2017    Aspiration pneumonia (HCC)     Bilateral pulmonary embolism (HCC) 1/6/2018    BPH (benign prostatic hyperplasia)     COPD (chronic obstructive pulmonary disease) ([facility] [address] 12/5/2013    Debility     Elevated CK 12/30/2013    Foraminal stenosis of lumbosacral region 6/7/2016    GERD (gastroesophageal reflux disease) 12/11/2014    History of asthma 1/13/2014    HTN (hypertension) 1/13/2014    past braden / no current meds    Hx of blood clots 01/2018    DVT  ( unsure which leg but both were swollen ) -> PE -> thus Xarelto    Hyperlipidemia     meds > 10 yrs    Hypothyroidism     Insomnia     Lower extremity weakness 1/24/2014    On home oxygen therapy     2L at night    Osteoarthritis of spine with radiculopathy, lumbar region 6/7/2016    Papillary thyroid carcinoma ([facility] [address] 12/2006    no trx to follow    Positive D dimer 12/5/2013    Sleep apnea 1/24/2014    Type 2 diabetes mellitus (HCC)     diet control  / no meds    Vitamin D deficiency      Past Surgical History:   Procedure Laterality Date    BRONCHOSCOPY N/A 2017    BRONCHOSCOPY FIBEROPTIC performed by Alicja Delarosa MD at [facility], COLON, DIAGNOSTIC      EYE SURGERY      phaco with IOL OU    HERNIA REPAIR  [id number]    repair umbilical hernia    KNEE SURGERY Left 1970    TX ARTHROSCOPY SHOULDER SURGICAL BICEPS TENODESIS Right 2018    RIGHT SHOULDER ARTHROSCOPY SUBACROMIAL DECOMPRESS WITH POSSIBLE BICEPS TENODESIS ARTHROSCOPIC FRANSISCA C- ARM ARTHREX BICEPS TENODESIS GALE Shay CHAIR CASE #1 1 HOUR performed by Ko Costa MD at [address] Right 2017    RIGHT THORACOTOMY WEDGE RESECTION FOR FOREIGN BODY AND FOB DOUBLE LUMEN (1ST CASE) performed by Victoria Rodriguez MD at [facility]      cancer / no chemo or radiation     Social History     Socioeconomic History    Marital status:      Spouse name: None    Number of children: 2    Years of education: None    Highest education level: None   Occupational History    Occupation: retired   Tobacco Use    Smoking status: Former Smoker     Packs/day: 1.00     Years: 40.00     Pack years: 40.00     Types: Cigarettes     Start date: 2000     Quit date:      Years since quittin.6    Smokeless tobacco: Former User    Tobacco comment: quit    Vaping Use    Vaping Use: Never used   Substance and Sexual Activity    Alcohol use: Yes     Alcohol/week: 0.0 standard drinks     Comment: rare social    Drug use: No    Sexual activity: None   Other Topics Concern    None   Social History Narrative    The patient lives alone in a one story home with one step to enter the home. The bedroom and bathroom are on the first floor. His social support includes his children. He has a wheeled walker, rollator, cane and dressing assistive device at home. He was receiving home health care services prior to admission to include PT, OT and RN. He does not have history of falls prior to admission.   He was independent with mobility with a wheeled walker as needed prior to admission. He was independent with self care prior to admission. His goal is to get stronger and return home. LIVES AT HOME ALONE. HE HAS A ROLLATOR HE USES. HIS SON LIVES IN [place] AND IS AVAILABLE IF HE NEEDS HIM. Type of Home: House    Home Layout: One level    Home Access: Level entry    Home Equipment: Cane, 4 wheeled walker    ADL Assistance: Independent    Homemaking Assistance: Independent    Ambulation Assistance: Independent    Transfer Assistance: Independent    Additional Comments: son and dtr can assist upon DC home with IADLs     Social Determinants of Health     Financial Resource Strain:     Difficulty of Paying Living Expenses:    Food Insecurity:     Worried About Running Out of Food in the Last Year:     [address] in the Last Year:    Transportation Needs:     Lack of Transportation (Medical):      Lack of Transportation (Non-Medical):    Physical Activity:     Days of Exercise per Week:     Minutes of Exercise per Session:    Stress:     Feeling of Stress :    Social Connections:     Frequency of Communication with Friends and Family:     Frequency of Social Gatherings with Friends and Family:     Attends [employer] Services:     Active Member of Clubs or Organizations:     Attends Club or Organization Meetings:     Marital Status:    Intimate Partner Violence:     Fear of Current or Ex-Partner:     Emotionally Abused:     Physically Abused:     Sexually Abused:      Family History   Problem Relation Age of Onset    Other Mother 80        Old Age    Stroke Father 45    No Known Problems Sister     Psoriasis Daughter     No Known Problems Son      Current Outpatient Medications   Medication Sig Dispense Refill    levothyroxine (SYNTHROID) 137 MCG tablet       sertraline (ZOLOFT) 50 MG tablet Take 50 mg by mouth daily      albuterol (PROVENTIL) (2.5 MG/3ML) 0.083% nebulizer solution Use 1 vial via nebulizer every 6 hours as needed for Wheezing 360 mL 3    alfuzosin (UROXATRAL) 10 MG extended release tablet Take by mouth      rivaroxaban (XARELTO) 10 MG TABS tablet Take 1 tablet by mouth daily (with breakfast) 30 tablet 6    Respiratory Therapy Supplies OK New CPAP mask and supplies 1 Device 0    aspirin 81 MG EC tablet Take 1 tablet by mouth daily 30 tablet 3    psyllium (METAMUCIL) 58.12 % PACK packet Take 1 packet by mouth daily as needed (constipation) 30 packet 0    vitamin D (CHOLECALCIFEROL) 1000 UNIT TABS tablet Take 2 tablets by mouth daily 60 tablet 1    pravastatin (PRAVACHOL) 20 MG tablet Take 20 mg by mouth every evening      clonazePAM (KLONOPIN) 0.5 MG tablet Take 0.5 mg by mouth nightly as needed. .      CPAP Machine MISC by Does not apply route Auto CPAP  5-20 cm 1 each 0    albuterol sulfate  (90 Base) MCG/ACT inhaler Inhale 2 puffs into the lungs every 4 hours as needed for Wheezing      magnesium 30 MG tablet Take 250 mg by mouth daily      mirtazapine (REMERON) 15 MG tablet Take 15 mg by mouth nightly      finasteride (PROSCAR) 5 MG tablet Take 1 tablet by mouth daily 30 tablet 3    tamsulosin (FLOMAX) 0.4 MG capsule Take 1 capsule by mouth nightly 30 capsule 3    budesonide-formoterol (SYMBICORT) 160-4.5 MCG/ACT AERO Inhale 2 puffs into the lungs 2 times daily.  rosuvastatin (CRESTOR) 20 MG tablet  (Patient not taking: Reported on 8/6/2021)      docusate sodium (COLACE) 100 MG capsule Take 100 mg by mouth daily (Patient not taking: Reported on 8/6/2021)      Famotidine (PEPCID AC PO) Take by mouth daily as needed  (Patient not taking: Reported on 8/6/2021)       No current facility-administered medications for this visit. Patient has no known allergies.   All reviewed and verified by Dr Milli Sepulveda on today's visit    PSA   Date Value Ref Range Status   06/09/2021 4.62 0.00 - 6.22 ng/mL Final   03/03/2020 3.74 0.00 - 6.22 ng/mL Final 03/19/2019 3.39 0.00 - 6.22 ng/mL Final   01/05/2017 3.56 0.00 - 6.22 ng/mL Final   12/28/2015 4.19 0.00 - 6.22 ng/mL Final     Results for POC orders placed in visit on 08/06/21   POCT Urinalysis No Micro (Auto)   Result Value Ref Range    Color, UA yellow     Clarity, UA clear     Glucose, UA POC neg     Bilirubin, UA neg     Ketones, UA neg     Spec Grav, UA 1.020     Blood, UA POC large     pH, UA 5.5     Protein, UA POC neg     Urobilinogen, UA 0.2     Leukocytes, UA trace     Nitrite, UA neg        Physical Exam  Vitals:    08/06/21 1024   BP: 110/74   Pulse: 80   Weight: 220 lb (99.8 kg)   Height: 6' (1.829 m)     Constitutional: Not in distress. Cardiovascular: Normal rate, BP reviewed. Normal rhythm  Pulmonary/Chest: Normal respiratory effort not short of breath  Abdominal: Not distended. Urologic Exam  Urinalysis shows large blood  Physical exam otherwise noncontributory. .  Assessment/Medical Necessity-Decision Making  Gross hematuria  History of an enlarged prostate  Urine cultures negative  Plan  CT urogram  Office cystoscopy  Urine culture  Hold Eliquis through the weekend if hematuria continues  Greater than 50% of 30 minutes spent consulting patient face-to-face  Orders Placed This Encounter   Procedures    Culture, Urine     Standing Status:   Future     Standing Expiration Date:   8/6/2022     Order Specific Question:   Specify (ex-cath, midstream, cysto, etc)?      Answer:   m    CT ABDOMEN PELVIS W WO CONTRAST Additional Contrast? None     Standing Status:   Future     Standing Expiration Date:   8/6/2022     Order Specific Question:   Additional Contrast?     Answer:   None     Order Specific Question:   Reason for exam:     Answer:   gross hematuria    XR ABDOMEN (KUB) (SINGLE AP VIEW)     Standing Status:   Future     Standing Expiration Date:   8/6/2022    XR ABDOMEN (KUB) (SINGLE AP VIEW)     Standing Status:   Future     Standing Expiration Date:   8/6/2022    POCT Urinalysis No Micro (Auto)     No orders of the defined types were placed in this encounter. Lakia Suarez MD       Please note this report has been partially produced using speech recognition software  And may cause contain errors related to that system including grammar, punctuation and spelling as well as words and phrases that may seem inappropriate. If there are questions or concerns please feel free to contact me to clarify.

## 2021-08-13 ENCOUNTER — HOSPITAL ENCOUNTER (OUTPATIENT)
Dept: CT IMAGING | Age: 81
Discharge: HOME | End: 2021-08-15
Payer: MEDICARE

## 2021-08-13 ENCOUNTER — HOSPITAL ENCOUNTER (OUTPATIENT)
Dept: GENERAL RADIOLOGY | Age: 81
Discharge: HOME | End: 2021-08-15
Payer: MEDICARE

## 2021-08-13 DIAGNOSIS — R31.0 GROSS HEMATURIA: ICD-10-CM

## 2021-08-13 PROCEDURE — 74018 RADEX ABDOMEN 1 VIEW: CPT

## 2021-08-13 PROCEDURE — 74178 CT ABD&PLV WO CNTR FLWD CNTR: CPT

## 2021-08-13 PROCEDURE — 6360000004 HC RX CONTRAST MEDICATION: Performed by: UROLOGY

## 2021-08-13 RX ORDER — SODIUM CHLORIDE 0.9 % (FLUSH) 0.9 %
10 SYRINGE (ML) INJECTION ONCE
Status: DISCONTINUED | OUTPATIENT
Start: 2021-08-13 | End: 2021-08-16 | Stop reason: HOSPADM

## 2021-08-13 RX ADMIN — IOPAMIDOL 100 ML: 755 INJECTION, SOLUTION INTRAVENOUS at 12:31

## 2021-08-20 ENCOUNTER — PROCEDURE VISIT (OUTPATIENT)
Dept: UROLOGY | Age: 81
End: 2021-08-20
Payer: MEDICARE

## 2021-08-20 VITALS
SYSTOLIC BLOOD PRESSURE: 110 MMHG | HEART RATE: 93 BPM | DIASTOLIC BLOOD PRESSURE: 80 MMHG | BODY MASS INDEX: 30.07 KG/M2 | HEIGHT: 72 IN | WEIGHT: 222 LBS

## 2021-08-20 DIAGNOSIS — R31.0 GROSS HEMATURIA: Primary | ICD-10-CM

## 2021-08-20 PROCEDURE — 81003 URINALYSIS AUTO W/O SCOPE: CPT | Performed by: UROLOGY

## 2021-08-20 PROCEDURE — 99999 PR OFFICE/OUTPT VISIT,PROCEDURE ONLY: CPT | Performed by: UROLOGY

## 2021-08-20 PROCEDURE — 52000 CYSTOURETHROSCOPY: CPT | Performed by: UROLOGY

## 2021-08-20 RX ORDER — SULFAMETHOXAZOLE AND TRIMETHOPRIM 800; 160 MG/1; MG/1
1 TABLET ORAL ONCE
Qty: 1 TABLET | Refills: 0 | COMMUNITY
Start: 2021-08-20 | End: 2021-08-20

## 2021-08-20 NOTE — PROGRESS NOTES
Urology  Solitary episode of gross hematuria while on blood thinners  CT scan shows bilateral renal calculi none of them obstructing  History of renal calculi that is being watched for some time  History of BPH with obstruction on tamsulosin and finasteride  Bilateral renal cysts      Procedure note  Flexible cystoscopy/local anesthetic/premedicated with antibiotic  Normal penile urethra  Large prostate with large median bar  Large lateral lobes  Large varicosities on the surface of the prostate were noted on retroflexion of the scope upon the bladder neck  No active bleeding  Mild trabeculation multiple small diverticula indicative of long-term outlet obstruction  Patient currently voiding without difficulty  Most likely source of blood from prostatic varices/or passage of a small calculus although patient did not have renal colic  Resume anticoagulation 6/23/2021  Follow-up as scheduled  Matt Min MD

## 2021-08-30 ENCOUNTER — OFFICE VISIT (OUTPATIENT)
Dept: PULMONOLOGY | Age: 81
End: 2021-08-30
Payer: MEDICARE

## 2021-08-30 VITALS
SYSTOLIC BLOOD PRESSURE: 122 MMHG | WEIGHT: 224 LBS | TEMPERATURE: 98.2 F | HEIGHT: 72 IN | OXYGEN SATURATION: 94 % | DIASTOLIC BLOOD PRESSURE: 75 MMHG | BODY MASS INDEX: 30.34 KG/M2 | HEART RATE: 71 BPM

## 2021-08-30 DIAGNOSIS — E66.9 OBESITY (BMI 30-39.9): ICD-10-CM

## 2021-08-30 DIAGNOSIS — I26.99 BILATERAL PULMONARY EMBOLISM (HCC): ICD-10-CM

## 2021-08-30 DIAGNOSIS — Z99.81 ON HOME OXYGEN THERAPY: ICD-10-CM

## 2021-08-30 DIAGNOSIS — G47.33 OSA (OBSTRUCTIVE SLEEP APNEA): ICD-10-CM

## 2021-08-30 DIAGNOSIS — J44.9 CHRONIC OBSTRUCTIVE PULMONARY DISEASE, UNSPECIFIED COPD TYPE (HCC): Primary | ICD-10-CM

## 2021-08-30 PROCEDURE — 99214 OFFICE O/P EST MOD 30 MIN: CPT | Performed by: INTERNAL MEDICINE

## 2021-08-30 PROCEDURE — G8427 DOCREV CUR MEDS BY ELIG CLIN: HCPCS | Performed by: INTERNAL MEDICINE

## 2021-08-30 PROCEDURE — G8417 CALC BMI ABV UP PARAM F/U: HCPCS | Performed by: INTERNAL MEDICINE

## 2021-08-30 PROCEDURE — G8926 SPIRO NO PERF OR DOC: HCPCS | Performed by: INTERNAL MEDICINE

## 2021-08-30 PROCEDURE — 1036F TOBACCO NON-USER: CPT | Performed by: INTERNAL MEDICINE

## 2021-08-30 PROCEDURE — 4040F PNEUMOC VAC/ADMIN/RCVD: CPT | Performed by: INTERNAL MEDICINE

## 2021-08-30 PROCEDURE — 3023F SPIROM DOC REV: CPT | Performed by: INTERNAL MEDICINE

## 2021-08-30 PROCEDURE — 1123F ACP DISCUSS/DSCN MKR DOCD: CPT | Performed by: INTERNAL MEDICINE

## 2021-08-30 ASSESSMENT — ENCOUNTER SYMPTOMS
RHINORRHEA: 0
DIARRHEA: 0
CHEST TIGHTNESS: 0
EYE ITCHING: 0
WHEEZING: 1
ABDOMINAL PAIN: 0
COUGH: 1
VOICE CHANGE: 0
SORE THROAT: 0
SHORTNESS OF BREATH: 1
VOMITING: 0
NAUSEA: 0

## 2021-08-30 NOTE — PROGRESS NOTES
Subjective:     Reba Gallo is a 79 y.o. male who complains today of:     Chief Complaint   Patient presents with    COPD     4 month f/u    Sleep Apnea       HPI  He is using symbicort  160-4.5 mcg   2 puff  But not using it due to cost ,nebulizer albuterol and albuterol HFA prn .   C/o shortness of breath with exertion. Off and off  Wheezing. C/o Cough with  Clear Sputum. No Hemoptysis. No Chest tightness. No Chest pain with radiation  or pleuritic pain. No  leg edema. No orthopnea. No Fever or chills. No Rhinorrhea and postnasal drip.     He has Auto CPAP  but not using it. He said he is sleeping better without it  He has O2 but does not use daily , only prn bases    Allergies:  Patient has no known allergies.   Past Medical History:   Diagnosis Date    Abnormality of gait and mobility     Acute sinusitis     Anxiety     Aspiration into respiratory tract 11/10/2017    Aspiration pneumonia (HCC)     Bilateral pulmonary embolism (HCC) 1/6/2018    BPH (benign prostatic hyperplasia)     COPD (chronic obstructive pulmonary disease) ([facility] [address] 12/5/2013    Debility     Elevated CK 12/30/2013    Foraminal stenosis of lumbosacral region 6/7/2016    GERD (gastroesophageal reflux disease) 12/11/2014    History of asthma 1/13/2014    HTN (hypertension) 1/13/2014    past braden / no current meds    Hx of blood clots 01/2018    DVT  ( unsure which leg but both were swollen ) -> PE -> thus Xarelto    Hyperlipidemia     meds > 10 yrs    Hypothyroidism     Insomnia     Lower extremity weakness 1/24/2014    On home oxygen therapy     2L at night    Osteoarthritis of spine with radiculopathy, lumbar region 6/7/2016    Papillary thyroid carcinoma ([facility] [address] 12/2006    no trx to follow    Positive D dimer 12/5/2013    Sleep apnea 1/24/2014    Type 2 diabetes mellitus (HCC)     diet control  / no meds    Vitamin D deficiency      Past Surgical History:   Procedure Laterality Date    BRONCHOSCOPY N/A 2017    BRONCHOSCOPY FIBEROPTIC performed by Thierry Shah MD at [facility] COLONOSCOPY      ENDOSCOPY, COLON, DIAGNOSTIC      EYE SURGERY      phaco with IOL OU    HERNIA REPAIR  [id number]    repair umbilical hernia    KNEE SURGERY Left 1970    MI ARTHROSCOPY SHOULDER SURGICAL BICEPS TENODESIS Right 2018    RIGHT SHOULDER ARTHROSCOPY SUBACROMIAL DECOMPRESS WITH POSSIBLE BICEPS TENODESIS ARTHROSCOPIC FRANSISCA C- ARM ARTHREX BICEPS TENODESIS GALE Shay CHAIR CASE #1 1 HOUR performed by Ana Pino MD at [address] [place] [address] Right 2017    RIGHT THORACOTOMY WEDGE RESECTION FOR FOREIGN BODY AND FOB DOUBLE LUMEN (1ST CASE) performed by Christy Lowry MD at [address] [place] [address]  2006    cancer / no chemo or radiation     Family History   Problem Relation Age of Onset    Other Mother 80        Old Age    Stroke Father 45    No Known Problems Sister     Psoriasis Daughter     No Known Problems Son      Social History     Socioeconomic History    Marital status:      Spouse name: Not on file    Number of children: 2    Years of education: Not on file    Highest education level: Not on file   Occupational History    Occupation: retired   Tobacco Use    Smoking status: Former Smoker     Packs/day: 1.00     Years: 40.00     Pack years: 40.00     Types: Cigarettes     Start date: 2000     Quit date: 2000     Years since quittin.6    Smokeless tobacco: Former User    Tobacco comment: quit    Vaping Use    Vaping Use: Never used   Substance and Sexual Activity    Alcohol use: Yes     Alcohol/week: 0.0 standard drinks     Comment: rare social    Drug use: No    Sexual activity: Not on file   Other Topics Concern    Not on file   Social History Narrative    The patient lives alone in a one story home with one step to enter the home. The bedroom and bathroom are on the first floor. His social support includes his children.   He has a wheeled walker, rollator, cane and dressing assistive device at home. He was receiving home health care services prior to admission to include PT, OT and RN. He does not have history of falls prior to admission. He was independent with mobility with a wheeled walker as needed prior to admission. He was independent with self care prior to admission. His goal is to get stronger and return home. LIVES AT HOME ALONE. HE HAS A ROLLATOR HE USES. HIS SON LIVES IN [place] AND IS AVAILABLE IF HE NEEDS HIM. Type of Home: House    Home Layout: One level    Home Access: Level entry    Home Equipment: Cane, 4 wheeled walker    ADL Assistance: Independent    Homemaking Assistance: Independent    Ambulation Assistance: Independent    Transfer Assistance: Independent    Additional Comments: son and dtr can assist upon DC home with IADLs     Social Determinants of Health     Financial Resource Strain:     Difficulty of Paying Living Expenses:    Food Insecurity:     Worried About Running Out of Food in the Last Year:     [address] in the Last Year:    Transportation Needs:     Lack of Transportation (Medical):  Lack of Transportation (Non-Medical):    Physical Activity:     Days of Exercise per Week:     Minutes of Exercise per Session:    Stress:     Feeling of Stress :    Social Connections:     Frequency of Communication with Friends and Family:     Frequency of Social Gatherings with Friends and Family:     Attends Oriental orthodox Services:     Active Member of Clubs or Organizations:     Attends Club or Organization Meetings:     Marital Status:    Intimate Partner Violence:     Fear of Current or Ex-Partner:     Emotionally Abused:     Physically Abused:     Sexually Abused:          Review of Systems   Constitutional: Negative for chills, diaphoresis, fatigue and fever.    HENT: Negative for congestion, mouth sores, nosebleeds, postnasal drip, rhinorrhea, sneezing, sore throat and voice change. Eyes: Negative for itching and visual disturbance. Respiratory: Positive for cough, shortness of breath and wheezing. Negative for chest tightness. Cardiovascular: Negative. Negative for chest pain, palpitations and leg swelling. Gastrointestinal: Negative for abdominal pain, diarrhea, nausea and vomiting. Genitourinary: Negative for difficulty urinating and hematuria. Musculoskeletal: Negative for arthralgias, joint swelling and myalgias. Skin: Negative for rash. Allergic/Immunologic: Negative for environmental allergies. Neurological: Negative for dizziness, tremors, weakness and headaches. Psychiatric/Behavioral: Negative for behavioral problems and sleep disturbance.         :     Vitals:    08/30/21 1359   BP: 122/75   Pulse: 71   Temp: 98.2 °F (36.8 °C)   SpO2: 94%   Weight: 224 lb (101.6 kg)   Height: 6' (1.829 m)     Wt Readings from Last 3 Encounters:   08/30/21 224 lb (101.6 kg)   08/20/21 222 lb (100.7 kg)   08/06/21 220 lb (99.8 kg)         Physical Exam  Constitutional:       Appearance: He is well-developed. HENT:      Head: Normocephalic and atraumatic. Nose: Nose normal.   Eyes:      Conjunctiva/sclera: Conjunctivae normal.      Pupils: Pupils are equal, round, and reactive to light. Neck:      Thyroid: No thyromegaly. Vascular: No JVD. Trachea: No tracheal deviation. Cardiovascular:      Rate and Rhythm: Normal rate and regular rhythm. Heart sounds: No murmur heard. No friction rub. No gallop. Pulmonary:      Effort: Pulmonary effort is normal. No respiratory distress. Breath sounds: Rales (bilateral ) present. No wheezing. Chest:      Chest wall: No tenderness. Abdominal:      General: There is no distension. Musculoskeletal:         General: Normal range of motion. Lymphadenopathy:      Cervical: No cervical adenopathy. Skin:     General: Skin is warm and dry. Findings: No rash.    Neurological:      Mental Status: He is alert and oriented to person, place, and time. Cranial Nerves: No cranial nerve deficit. Psychiatric:         Behavior: Behavior normal.         Current Outpatient Medications   Medication Sig Dispense Refill    rosuvastatin (CRESTOR) 20 MG tablet       levothyroxine (SYNTHROID) 137 MCG tablet       sertraline (ZOLOFT) 50 MG tablet Take 50 mg by mouth daily      albuterol (PROVENTIL) (2.5 MG/3ML) 0.083% nebulizer solution Use 1 vial via nebulizer every 6 hours as needed for Wheezing 360 mL 3    alfuzosin (UROXATRAL) 10 MG extended release tablet Take by mouth      rivaroxaban (XARELTO) 10 MG TABS tablet Take 1 tablet by mouth daily (with breakfast) 30 tablet 6    Respiratory Therapy Supplies OK New CPAP mask and supplies 1 Device 0    aspirin 81 MG EC tablet Take 1 tablet by mouth daily 30 tablet 3    psyllium (METAMUCIL) 58.12 % PACK packet Take 1 packet by mouth daily as needed (constipation) 30 packet 0    vitamin D (CHOLECALCIFEROL) 1000 UNIT TABS tablet Take 2 tablets by mouth daily 60 tablet 1    pravastatin (PRAVACHOL) 20 MG tablet Take 20 mg by mouth every evening      clonazePAM (KLONOPIN) 0.5 MG tablet Take 0.5 mg by mouth nightly as needed. .      CPAP Machine MISC by Does not apply route Auto CPAP  5-20 cm 1 each 0    albuterol sulfate  (90 Base) MCG/ACT inhaler Inhale 2 puffs into the lungs every 4 hours as needed for Wheezing      magnesium 30 MG tablet Take 250 mg by mouth daily      docusate sodium (COLACE) 100 MG capsule Take 100 mg by mouth daily       mirtazapine (REMERON) 15 MG tablet Take 15 mg by mouth nightly      finasteride (PROSCAR) 5 MG tablet Take 1 tablet by mouth daily 30 tablet 3    tamsulosin (FLOMAX) 0.4 MG capsule Take 1 capsule by mouth nightly 30 capsule 3    Famotidine (PEPCID AC PO) Take by mouth daily as needed       budesonide-formoterol (SYMBICORT) 160-4.5 MCG/ACT AERO Inhale 2 puffs into the lungs 2 times daily.        No current facility-administered medications for this visit. Results for orders placed during the hospital encounter of 12/14/18    XR CHEST STANDARD (2 VW)    Narrative  EXAMINATION: Chest x-ray, 2 view    HISTORY: Hypoxia. Shortness of breath. TECHNIQUE: AP and lateral views of the chest    COMPARISON: CT thorax from December 14, 2018    FINDINGS:    Cardiomediastinal silhouette is within normal limits. Chronic mild elevation of the right hemidiaphragm. Postsurgical changes of the right lung base with right lung base atelectasis versus scarring. Left lung base subsegmental atelectasis versus  scarring. No pneumothorax, pleural effusion, or focal consolidation. Degenerative changes of the thoracic spine. Impression  Postsurgical changes of the right lung base with scarring and/or atelectasis. Results for orders placed during the hospital encounter of 08/23/18    XR CHEST STANDARD (2 VW)    Narrative  EXAMINATION: XR CHEST (2 VW)    CLINICAL HISTORY: A34.991 Pre-operative clearance ICD10    COMPARISONS: May 16, 2018. January 8, 2018. FINDINGS: Frontal and lateral views the chest were obtained. There is unchanged extensive scarring at the right base. There are surgical staples at the operative site in the right lower lobe. There is no sign of pleural effusion. There is no acute  process. Small scarring of the left is stable. The chest wall is unremarkable. The mediastinum is not widened or shifted. Calcified aorta is not dilated. CONCLUSION: SCARRING AT RIGHT LUNG BASE      Results for orders placed during the hospital encounter of 05/16/18    XR CHEST STANDARD (2 VW)    Narrative  EXAMINATION: XR CHEST (2 VW)    CLINICAL HISTORY: 27-year-old with a history of chronic obstructive pulmonary disease and pulmonary bulla. He is status post right lung surgery    COMPARISONS: Chest x-ray 11/29/2017 and 1/6/2018    FINDINGS: Frontal and lateral views the chest were obtained.  Surgical clips are again demonstrated in the infrahilar right lung region. There is right lung volume loss with an elevated right hemidiaphragm and blunting of the costophrenic angle. Differential considerations  include small pleural effusion versus pleural thickening. Patchy opacity is demonstrated in the right lung base which may reflect postoperative scarring. However with this degree of lung disease it would be difficult to  exclude a superimposed acute infiltrate. The large bulla seen on the most recent chest x-ray is not imaged on the current study. Right upper lung zone is clear. Cardiac silhouette is within normal limits in size. There is slight unfolding thoracic aorta with atherosclerotic calcification in the aortic arch. Mediastinal contours are stable. Pulmonary vascularity is not distended. Left lung is clear. No plain film  signs of left pleural effusion or pneumothoraces. Bony thorax is stable. Impression  POSTOPERATIVE CHANGES IN THE RIGHT LUNG BASE WITH VOLUME LOSS. QUESTION POSTOPERATIVE RIGHT LUNG BASE SCARRING. HOWEVER IT WOULD BE DIFFICULT TO EXCLUDE AN ACUTE SUPERIMPOSED INFILTRATE IN THIS REGION. RIGHT PLEURAL THICKENING VERSUS SMALL  PLEURAL EFFUSION.  ]  Results for orders placed during the hospital encounter of 07/17/19    XR CHEST PORTABLE    Narrative  Portable chest radiograph    History: Cough with shortness of breath    Technique: AP portable view of the chest obtained. Comparison: CT chest from December 14, 2018 and chest radiograph from December 17, 2018    Findings:    Atherosclerotic calcification of the thoracic aorta. The cardiomediastinal silhouette is within normal limits. No pneumothorax, pleural effusion, or focal consolidation. Postsurgical changes of the right lower lobe with adjacent atelectasis and scarring. Left lung base subsegmental atelectasis versus scarring. Osseous structures of the thorax appear intact. Advanced right and moderate left shoulder osteoarthritis.     Impression  No acute intrathoracic process or significant interval change when compared to radiographs of December 17, 2018. Results for orders placed during the hospital encounter of 01/06/18    XR CHEST PORTABLE    Narrative  EXAMINATION: Portable AP ERECT view of the chest.    CLINICAL HISTORY: Cough and sob . COMPARISONS: 12/15/2017    FINDINGS: Normal cardiac silhouette. There are atherosclerotic calcifications in the aortic arch. The right costophrenic angle is blunted secondary to a small to moderate size pleural effusion, similar to last exam. There are metallic clips in the right  lung base. There is infiltrate/atelectasis in the right lung base, unchanged. Mild left basilar atelectasis or scarring, unchanged. No pneumothorax. There are mild degenerative changes in spine. Impression  RIGHT-SIDED PLEURAL EFFUSION WITH RIGHT BASILAR INFILTRATE/ATELECTASIS, UNCHANGED. MINIMAL LEFT BASILAR ATELECTASIS, UNCHANGED. Results for orders placed during the hospital encounter of 11/10/17    XR Chest Portable    Narrative  XR CHEST PORTABLE : 11/19/2017    CLINICAL HISTORY:  monitor pneumothorax . COMPARISON: Two-view chest 11/18/2017. TECHNIQUE: A portable upright AP radiograph of the chest was obtained. FINDINGS:    Postoperative change from previous subtotal right pneumonectomy appears unchanged with mild probable scarring and/or atelectasis of the right lung base. A possible trace right pneumothorax and trace right pleural effusion appear unchanged. Minimal probable atelectasis of the left lung base is again noted. There is no other significant changes identified.     Impression  STABLE POSTOPERATIVE CHEST COMPARED TO 11/18/2017.  ]  No results found for this or any previous visit.  ]  No results found for this or any previous visit.  ]  Results for orders placed during the hospital encounter of 11/10/17    CT Chest W Contrast    Narrative  EXAMINATION:  CT CHEST W CONTRAST    CLINICAL HISTORY:  ACUTE RESP ILLNESS, >37YEARS OLD  status post bronchoscopy to retrieve aspirated dental amalgam, unsuccessful. COMPARISONS:  12/2/2013    TECHNIQUE:  Spiral scans with 75 mL Isovue-370 IV. Multiplanar 2-D reconstructions. Axial 3-D 10 x 3 mm MIP reconstructions were performed. All CT scans at this facility use dose modulation, iterative reconstruction, and/or weight based dosing when  appropriate to reduce radiation dose to as low as reasonably achievable. FINDINGS:  The known aspirated filling is identified within the distal segmental bronchus of the right lower lobe posterior basilar segment. Filling measures approximately 9 mm. There are mild emphysematous changes. There is mild scattered peripheral  reticularity without definite honeycombing. There are no consolidations or effusions. There is right upper lung perifissural nodularity consistent with small intrapulmonary lymph nodes. There is a left upper lobe 4 mm nodule (series 2 image 21),  unchanged from prior examination of 12/2/2013, and therefore benign. There are no suspicious lung nodules. There is mild bilateral lower lobe cylindrical bronchiectasis. Cardiac size and pulmonary vascularity are normal. There is mild mediastinal lipomatosis. There is mild thickening or trace fluid in the anterior pericardium, decreased compared to prior examination 12/2/2013. There are coronary artery calcifications. There are aortic calcifications. There is no evidence of aneurysm or dissection. There are borderline in size lymph nodes in the mediastinum anterior to the left mainstem bronchus and in the right hilum, unchanged from prior examination of 12/2/2013. There is no thoracic adenopathy. The esophagus is unremarkable. There is spondylosis. There are no acute or suspicious bone lesions. Scans of the subdiaphragmatic regions demonstrate a 6 mm oval metallic density in the gastric fundus, presumably representing a swallowed filling.  There are stable small

## 2021-12-30 ENCOUNTER — TELEPHONE (OUTPATIENT)
Dept: PULMONOLOGY | Age: 81
End: 2021-12-30

## 2021-12-30 RX ORDER — DOXYCYCLINE HYCLATE 100 MG
100 TABLET ORAL 2 TIMES DAILY
Qty: 20 TABLET | Refills: 0 | Status: SHIPPED | OUTPATIENT
Start: 2021-12-30 | End: 2021-12-31

## 2021-12-30 NOTE — TELEPHONE ENCOUNTER
PT CALLED IN TO THE OFFICE REQUESTING AN ANTIBIOTIC OF URI. I ADVISED PT TO CONTACT HIS PCP.       PLEASE SEND TO LOCAL PHARM        LOV-8/30/21        NOV-2/15/22        PLEASE ADVISE

## 2021-12-31 ENCOUNTER — APPOINTMENT (OUTPATIENT)
Dept: GENERAL RADIOLOGY | Age: 81
DRG: 192 | End: 2021-12-31
Payer: MEDICARE

## 2021-12-31 ENCOUNTER — HOSPITAL ENCOUNTER (INPATIENT)
Age: 81
LOS: 3 days | Discharge: HOME HEALTH | DRG: 192 | End: 2022-01-03
Attending: EMERGENCY MEDICINE | Admitting: INTERNAL MEDICINE
Payer: MEDICARE

## 2021-12-31 DIAGNOSIS — J44.1 COPD EXACERBATION (HCC): Primary | ICD-10-CM

## 2021-12-31 DIAGNOSIS — J40 BRONCHITIS: ICD-10-CM

## 2021-12-31 LAB
ALBUMIN SERPL-MCNC: 2.7 G/DL (ref 3.5–4.6)
ALP BLD-CCNC: 67 U/L (ref 35–104)
ALT SERPL-CCNC: 19 U/L (ref 0–41)
ANION GAP SERPL CALCULATED.3IONS-SCNC: 11 MEQ/L (ref 9–15)
AST SERPL-CCNC: 25 U/L (ref 0–40)
BACTERIA: NEGATIVE /HPF
BASOPHILS ABSOLUTE: 0 K/UL (ref 0–0.1)
BASOPHILS RELATIVE PERCENT: 0.3 % (ref 0.2–1.2)
BILIRUB SERPL-MCNC: 0.3 MG/DL (ref 0.2–0.7)
BILIRUBIN URINE: NEGATIVE
BLOOD, URINE: NORMAL
BUN BLDV-MCNC: 14 MG/DL (ref 8–23)
CALCIUM SERPL-MCNC: 8.6 MG/DL (ref 8.5–9.9)
CHLORIDE BLD-SCNC: 103 MEQ/L (ref 95–107)
CLARITY: NORMAL
CO2: 25 MEQ/L (ref 20–31)
COLOR: YELLOW
CREAT SERPL-MCNC: 0.96 MG/DL (ref 0.7–1.2)
EKG ATRIAL RATE: 65 BPM
EKG P AXIS: 26 DEGREES
EKG P-R INTERVAL: 168 MS
EKG Q-T INTERVAL: 420 MS
EKG QRS DURATION: 90 MS
EKG QTC CALCULATION (BAZETT): 436 MS
EKG R AXIS: -19 DEGREES
EKG T AXIS: 35 DEGREES
EKG VENTRICULAR RATE: 65 BPM
EOSINOPHILS ABSOLUTE: 0.2 K/UL (ref 0–0.5)
EOSINOPHILS RELATIVE PERCENT: 3.1 % (ref 0.8–7)
EPITHELIAL CELLS, UA: ABNORMAL /HPF
GFR AFRICAN AMERICAN: >60
GFR NON-AFRICAN AMERICAN: >60
GLOBULIN: 4.6 G/DL (ref 2.3–3.5)
GLUCOSE BLD-MCNC: 102 MG/DL (ref 70–99)
GLUCOSE URINE: NEGATIVE MG/DL
HCT VFR BLD CALC: 42.5 % (ref 42–52)
HEMOGLOBIN: 13.3 G/DL (ref 13.7–17.5)
IMMATURE GRANULOCYTES #: 0.1 K/UL
IMMATURE GRANULOCYTES %: 0.7 %
INR BLD: 1.6
KETONES, URINE: NEGATIVE MG/DL
LACTIC ACID: 1.3 MMOL/L (ref 0.5–2.2)
LEUKOCYTE ESTERASE, URINE: NEGATIVE
LYMPHOCYTES ABSOLUTE: 1 K/UL (ref 1.3–3.6)
LYMPHOCYTES RELATIVE PERCENT: 13.2 %
MAGNESIUM: 2.2 MG/DL (ref 1.7–2.4)
MCH RBC QN AUTO: 29.1 PG (ref 25.7–32.2)
MCHC RBC AUTO-ENTMCNC: 31.3 % (ref 32.3–36.5)
MCV RBC AUTO: 93 FL (ref 79–92.2)
MONOCYTES ABSOLUTE: 0.5 K/UL (ref 0.3–0.8)
MONOCYTES RELATIVE PERCENT: 6.5 % (ref 5.3–12.2)
NEUTROPHILS ABSOLUTE: 5.7 K/UL (ref 1.8–5.4)
NEUTROPHILS RELATIVE PERCENT: 76.2 % (ref 34–67.9)
NITRITE, URINE: NEGATIVE
PDW BLD-RTO: 13.6 % (ref 11.6–14.4)
PH UA: 7 (ref 5–9)
PLATELET # BLD: 182 K/UL (ref 163–337)
POTASSIUM SERPL-SCNC: 4 MEQ/L (ref 3.4–4.9)
PRO-BNP: 382 PG/ML
PROTEIN UA: NEGATIVE MG/DL
PROTHROMBIN TIME: 18.6 SEC (ref 12.3–14.9)
RBC # BLD: 4.57 M/UL (ref 4.63–6.08)
RBC UA: >100 /HPF (ref 0–2)
SARS-COV-2, NAAT: NOT DETECTED
SODIUM BLD-SCNC: 139 MEQ/L (ref 135–144)
SPECIFIC GRAVITY UA: 1.01 (ref 1–1.03)
TOTAL PROTEIN: 7.3 G/DL (ref 6.3–8)
TROPONIN: 0.03 NG/ML (ref 0–0.01)
TROPONIN: <0.01 NG/ML (ref 0–0.01)
URINE REFLEX TO CULTURE: NORMAL
UROBILINOGEN, URINE: 0.2 E.U./DL
WBC # BLD: 7.4 K/UL (ref 4.2–9)
WBC UA: ABNORMAL /HPF (ref 0–5)

## 2021-12-31 PROCEDURE — 1210000000 HC MED SURG R&B

## 2021-12-31 PROCEDURE — 83605 ASSAY OF LACTIC ACID: CPT

## 2021-12-31 PROCEDURE — 99285 EMERGENCY DEPT VISIT HI MDM: CPT

## 2021-12-31 PROCEDURE — 84484 ASSAY OF TROPONIN QUANT: CPT

## 2021-12-31 PROCEDURE — 6370000000 HC RX 637 (ALT 250 FOR IP): Performed by: INTERNAL MEDICINE

## 2021-12-31 PROCEDURE — 81001 URINALYSIS AUTO W/SCOPE: CPT

## 2021-12-31 PROCEDURE — 71045 X-RAY EXAM CHEST 1 VIEW: CPT

## 2021-12-31 PROCEDURE — 96365 THER/PROPH/DIAG IV INF INIT: CPT

## 2021-12-31 PROCEDURE — 87635 SARS-COV-2 COVID-19 AMP PRB: CPT

## 2021-12-31 PROCEDURE — 2500000003 HC RX 250 WO HCPCS: Performed by: INTERNAL MEDICINE

## 2021-12-31 PROCEDURE — 85610 PROTHROMBIN TIME: CPT

## 2021-12-31 PROCEDURE — 93005 ELECTROCARDIOGRAM TRACING: CPT

## 2021-12-31 PROCEDURE — 6360000002 HC RX W HCPCS: Performed by: INTERNAL MEDICINE

## 2021-12-31 PROCEDURE — 36415 COLL VENOUS BLD VENIPUNCTURE: CPT

## 2021-12-31 PROCEDURE — 2580000003 HC RX 258: Performed by: INTERNAL MEDICINE

## 2021-12-31 PROCEDURE — 6370000000 HC RX 637 (ALT 250 FOR IP): Performed by: EMERGENCY MEDICINE

## 2021-12-31 PROCEDURE — 83735 ASSAY OF MAGNESIUM: CPT

## 2021-12-31 PROCEDURE — 93010 ELECTROCARDIOGRAM REPORT: CPT | Performed by: INTERNAL MEDICINE

## 2021-12-31 PROCEDURE — 87040 BLOOD CULTURE FOR BACTERIA: CPT

## 2021-12-31 PROCEDURE — 2580000003 HC RX 258: Performed by: EMERGENCY MEDICINE

## 2021-12-31 PROCEDURE — 80053 COMPREHEN METABOLIC PANEL: CPT

## 2021-12-31 PROCEDURE — 85025 COMPLETE CBC W/AUTO DIFF WBC: CPT

## 2021-12-31 PROCEDURE — 6360000002 HC RX W HCPCS: Performed by: EMERGENCY MEDICINE

## 2021-12-31 PROCEDURE — 83880 ASSAY OF NATRIURETIC PEPTIDE: CPT

## 2021-12-31 PROCEDURE — 94640 AIRWAY INHALATION TREATMENT: CPT

## 2021-12-31 PROCEDURE — 96374 THER/PROPH/DIAG INJ IV PUSH: CPT

## 2021-12-31 RX ORDER — TAMSULOSIN HYDROCHLORIDE 0.4 MG/1
0.4 CAPSULE ORAL NIGHTLY
Status: DISCONTINUED | OUTPATIENT
Start: 2021-12-31 | End: 2022-01-03 | Stop reason: HOSPADM

## 2021-12-31 RX ORDER — POLYETHYLENE GLYCOL 3350 17 G/17G
17 POWDER, FOR SOLUTION ORAL DAILY PRN
Status: DISCONTINUED | OUTPATIENT
Start: 2021-12-31 | End: 2022-01-03 | Stop reason: HOSPADM

## 2021-12-31 RX ORDER — VITAMIN B COMPLEX
2000 TABLET ORAL DAILY
Status: DISCONTINUED | OUTPATIENT
Start: 2021-12-31 | End: 2022-01-03 | Stop reason: HOSPADM

## 2021-12-31 RX ORDER — IPRATROPIUM BROMIDE AND ALBUTEROL SULFATE 2.5; .5 MG/3ML; MG/3ML
1 SOLUTION RESPIRATORY (INHALATION) ONCE
Status: COMPLETED | OUTPATIENT
Start: 2021-12-31 | End: 2021-12-31

## 2021-12-31 RX ORDER — ASPIRIN 81 MG/1
81 TABLET, CHEWABLE ORAL DAILY
Status: DISCONTINUED | OUTPATIENT
Start: 2021-12-31 | End: 2022-01-03 | Stop reason: HOSPADM

## 2021-12-31 RX ORDER — SODIUM CHLORIDE 0.9 % (FLUSH) 0.9 %
3 SYRINGE (ML) INJECTION EVERY 8 HOURS
Status: DISCONTINUED | OUTPATIENT
Start: 2021-12-31 | End: 2022-01-03 | Stop reason: HOSPADM

## 2021-12-31 RX ORDER — ROSUVASTATIN CALCIUM 5 MG/1
10 TABLET, COATED ORAL NIGHTLY
Status: DISCONTINUED | OUTPATIENT
Start: 2021-12-31 | End: 2022-01-03 | Stop reason: HOSPADM

## 2021-12-31 RX ORDER — METHYLPREDNISOLONE SODIUM SUCCINATE 40 MG/ML
40 INJECTION, POWDER, LYOPHILIZED, FOR SOLUTION INTRAMUSCULAR; INTRAVENOUS EVERY 12 HOURS
Status: DISCONTINUED | OUTPATIENT
Start: 2021-12-31 | End: 2022-01-03 | Stop reason: HOSPADM

## 2021-12-31 RX ORDER — SODIUM CHLORIDE 0.9 % (FLUSH) 0.9 %
10 SYRINGE (ML) INJECTION PRN
Status: DISCONTINUED | OUTPATIENT
Start: 2021-12-31 | End: 2022-01-03 | Stop reason: HOSPADM

## 2021-12-31 RX ORDER — METHYLPREDNISOLONE SODIUM SUCCINATE 125 MG/2ML
125 INJECTION, POWDER, LYOPHILIZED, FOR SOLUTION INTRAMUSCULAR; INTRAVENOUS ONCE
Status: COMPLETED | OUTPATIENT
Start: 2021-12-31 | End: 2021-12-31

## 2021-12-31 RX ORDER — CLONAZEPAM 0.5 MG/1
0.5 TABLET ORAL 2 TIMES DAILY PRN
Status: DISCONTINUED | OUTPATIENT
Start: 2021-12-31 | End: 2022-01-03 | Stop reason: HOSPADM

## 2021-12-31 RX ORDER — FINASTERIDE 5 MG/1
5 TABLET, FILM COATED ORAL DAILY
Status: DISCONTINUED | OUTPATIENT
Start: 2021-12-31 | End: 2022-01-03 | Stop reason: HOSPADM

## 2021-12-31 RX ORDER — SODIUM CHLORIDE 0.9 % (FLUSH) 0.9 %
10 SYRINGE (ML) INJECTION EVERY 12 HOURS SCHEDULED
Status: DISCONTINUED | OUTPATIENT
Start: 2021-12-31 | End: 2022-01-03 | Stop reason: HOSPADM

## 2021-12-31 RX ORDER — ONDANSETRON 2 MG/ML
4 INJECTION INTRAMUSCULAR; INTRAVENOUS EVERY 6 HOURS PRN
Status: DISCONTINUED | OUTPATIENT
Start: 2021-12-31 | End: 2022-01-03 | Stop reason: HOSPADM

## 2021-12-31 RX ORDER — PROMETHAZINE HYDROCHLORIDE 12.5 MG/1
12.5 TABLET ORAL EVERY 6 HOURS PRN
Status: DISCONTINUED | OUTPATIENT
Start: 2021-12-31 | End: 2022-01-03 | Stop reason: HOSPADM

## 2021-12-31 RX ORDER — CLOPIDOGREL BISULFATE 75 MG/1
75 TABLET ORAL ONCE
Status: COMPLETED | OUTPATIENT
Start: 2021-12-31 | End: 2021-12-31

## 2021-12-31 RX ORDER — IPRATROPIUM BROMIDE AND ALBUTEROL SULFATE 2.5; .5 MG/3ML; MG/3ML
1 SOLUTION RESPIRATORY (INHALATION) 3 TIMES DAILY
Status: DISCONTINUED | OUTPATIENT
Start: 2021-12-31 | End: 2021-12-31

## 2021-12-31 RX ORDER — IPRATROPIUM BROMIDE AND ALBUTEROL SULFATE 2.5; .5 MG/3ML; MG/3ML
1 SOLUTION RESPIRATORY (INHALATION) 3 TIMES DAILY
Status: DISCONTINUED | OUTPATIENT
Start: 2021-12-31 | End: 2022-01-03 | Stop reason: HOSPADM

## 2021-12-31 RX ORDER — SODIUM CHLORIDE 9 MG/ML
25 INJECTION, SOLUTION INTRAVENOUS PRN
Status: DISCONTINUED | OUTPATIENT
Start: 2021-12-31 | End: 2022-01-03 | Stop reason: HOSPADM

## 2021-12-31 RX ORDER — ALBUTEROL SULFATE 2.5 MG/3ML
2.5 SOLUTION RESPIRATORY (INHALATION) EVERY 4 HOURS PRN
Status: DISCONTINUED | OUTPATIENT
Start: 2021-12-31 | End: 2022-01-03 | Stop reason: HOSPADM

## 2021-12-31 RX ORDER — ACETAMINOPHEN 650 MG/1
650 SUPPOSITORY RECTAL EVERY 6 HOURS PRN
Status: DISCONTINUED | OUTPATIENT
Start: 2021-12-31 | End: 2022-01-03 | Stop reason: HOSPADM

## 2021-12-31 RX ORDER — GUAIFENESIN 600 MG/1
600 TABLET, EXTENDED RELEASE ORAL 2 TIMES DAILY PRN
Status: DISCONTINUED | OUTPATIENT
Start: 2021-12-31 | End: 2022-01-03 | Stop reason: HOSPADM

## 2021-12-31 RX ORDER — SODIUM CHLORIDE 9 MG/ML
INJECTION, SOLUTION INTRAVENOUS CONTINUOUS
Status: DISCONTINUED | OUTPATIENT
Start: 2021-12-31 | End: 2021-12-31

## 2021-12-31 RX ORDER — ACETAMINOPHEN 325 MG/1
650 TABLET ORAL EVERY 6 HOURS PRN
Status: DISCONTINUED | OUTPATIENT
Start: 2021-12-31 | End: 2022-01-03 | Stop reason: HOSPADM

## 2021-12-31 RX ORDER — MAGNESIUM 30 MG
250 TABLET ORAL DAILY
Status: DISCONTINUED | OUTPATIENT
Start: 2021-12-31 | End: 2021-12-31

## 2021-12-31 RX ORDER — ALBUTEROL SULFATE 90 UG/1
2 AEROSOL, METERED RESPIRATORY (INHALATION) ONCE
Status: DISCONTINUED | OUTPATIENT
Start: 2021-12-31 | End: 2021-12-31

## 2021-12-31 RX ADMIN — IPRATROPIUM BROMIDE AND ALBUTEROL SULFATE 1 AMPULE: .5; 2.5 SOLUTION RESPIRATORY (INHALATION) at 13:10

## 2021-12-31 RX ADMIN — METHYLPREDNISOLONE SODIUM SUCCINATE 125 MG: 125 INJECTION, POWDER, FOR SOLUTION INTRAMUSCULAR; INTRAVENOUS at 12:32

## 2021-12-31 RX ADMIN — METHYLPREDNISOLONE SODIUM SUCCINATE 40 MG: 40 INJECTION, POWDER, FOR SOLUTION INTRAMUSCULAR; INTRAVENOUS at 20:09

## 2021-12-31 RX ADMIN — ROSUVASTATIN CALCIUM 10 MG: 5 TABLET, COATED ORAL at 20:10

## 2021-12-31 RX ADMIN — CEFTRIAXONE 1000 MG: 1 INJECTION, POWDER, FOR SOLUTION INTRAMUSCULAR; INTRAVENOUS at 13:43

## 2021-12-31 RX ADMIN — Medication 3 ML: at 12:32

## 2021-12-31 RX ADMIN — Medication 10 ML: at 20:11

## 2021-12-31 RX ADMIN — ASPIRIN 81 MG 81 MG: 81 TABLET ORAL at 18:40

## 2021-12-31 RX ADMIN — SODIUM CHLORIDE 100 ML/HR: 9 INJECTION, SOLUTION INTRAVENOUS at 12:32

## 2021-12-31 RX ADMIN — SERTRALINE 50 MG: 50 TABLET, FILM COATED ORAL at 20:10

## 2021-12-31 RX ADMIN — DOXYCYCLINE 100 MG: 100 INJECTION, POWDER, LYOPHILIZED, FOR SOLUTION INTRAVENOUS at 18:39

## 2021-12-31 RX ADMIN — IPRATROPIUM BROMIDE AND ALBUTEROL SULFATE 1 AMPULE: .5; 2.5 SOLUTION RESPIRATORY (INHALATION) at 18:01

## 2021-12-31 RX ADMIN — NITROGLYCERIN 0.5 INCH: 20 OINTMENT TOPICAL at 18:40

## 2021-12-31 RX ADMIN — CLOPIDOGREL BISULFATE 75 MG: 75 TABLET ORAL at 18:40

## 2021-12-31 ASSESSMENT — PAIN SCALES - GENERAL
PAINLEVEL_OUTOF10: 0

## 2021-12-31 NOTE — ED TRIAGE NOTES
Pt c/o SOB x 3 weeks, unable to see pulmonologist yesterday, appt cancelled by .Pt now at ER, generalized weakness, productive cough with yellow phlegm. Pt has Hx of COPD and home O2 use. Pt changed into gown, monitor, O2 2L NC. Pt advised of plan of care during bedside exam by Sharon Shah.   Pt resp even, harsh cough, skin w/d, denies any c/o pain,LS diminished with lower lob wheezing

## 2021-12-31 NOTE — ED PROVIDER NOTES
[id number] [facility]  eMERGENCY dEPARTMENT eNCOUnter      Pt Name: Wilfredo Valiente  MRN: [id number]  [place] 1940  Date of evaluation: 12/31/2021  Provider: Johnnie Kamara MD    [address] [place] [address]       Chief Complaint   Patient presents with    Shortness of Breath     Pt c/o SOB x 3 weeks, Hx ofCOPD and home O2 use, weaker today, unable to be seen by  pulmonologist yesterday       HISTORY OF PRESENT ILLNESS   (Location/Symptom, Timing/Onset,Context/Setting, Quality, Duration, Modifying Factors, Severity)  Note limiting factors. Wilfredo Valiente is a [de-identified] y.o. male who presents to the emergency department patient is home oxygen history of COPD GERD hypertension hyper lipidemia pulmonary embolism patient is on chronically blood thinners take Xarelto denies really any chest tightness or chest pain heart in the ribs when he coughs has been increasing cough congestion going on 3 weeks time fatigue body ache patient received Covid vaccination as per patient cough is becoming productive yellowish in color so here for further evaluation his doctor canceled his appointments patient here to be checked out with his respiratory and other symptoms the last 3 weeks    HPI    NursingNotes were reviewed. REVIEW OF SYSTEMS    (2-9 systems for level 4, 10 or more for level 5)     Review of Systems   Constitutional: Positive for activity change, appetite change, diaphoresis and fatigue. Negative for fever. HENT: Positive for congestion. Negative for drooling, facial swelling, mouth sores, nosebleeds, sinus pressure, sore throat, trouble swallowing and voice change. Eyes: Negative for pain, discharge, redness and visual disturbance. Respiratory: Positive for cough, shortness of breath and wheezing. Negative for choking, chest tightness and stridor. Cardiovascular: Negative for chest pain, palpitations and leg swelling. Gastrointestinal: Negative for abdominal pain, blood in stool, constipation, diarrhea and vomiting. Endocrine: Negative for cold intolerance, polyphagia and polyuria. Genitourinary: Negative for dysuria, flank pain, frequency, genital sores and urgency. Musculoskeletal: Negative for back pain, joint swelling, neck pain and neck stiffness. Skin: Negative for pallor and rash. Neurological: Positive for weakness. Negative for tremors, seizures, syncope, numbness and headaches. Hematological: Negative for adenopathy. Does not bruise/bleed easily. Psychiatric/Behavioral: Negative for agitation, behavioral problems, hallucinations and sleep disturbance. The patient is not hyperactive. All other systems reviewed and are negative. Except as noted above the remainder of the review of systems was reviewed and negative.        PAST MEDICAL HISTORY     Past Medical History:   Diagnosis Date    Abnormality of gait and mobility     Acute sinusitis     Anxiety     Aspiration into respiratory tract 11/10/2017    Aspiration pneumonia (HCC)     Bilateral pulmonary embolism (HCC) 1/6/2018    BPH (benign prostatic hyperplasia)     COPD (chronic obstructive pulmonary disease) ([facility] [address] 12/5/2013    Debility     Elevated CK 12/30/2013    Foraminal stenosis of lumbosacral region 6/7/2016    GERD (gastroesophageal reflux disease) 12/11/2014    History of asthma 1/13/2014    HTN (hypertension) 1/13/2014    past braden / no current meds    Hx of blood clots 01/2018    DVT  ( unsure which leg but both were swollen ) -> PE -> thus Xarelto    Hyperlipidemia     meds > 10 yrs    Hypothyroidism     Insomnia     Lower extremity weakness 1/24/2014    On home oxygen therapy     2L at night    Osteoarthritis of spine with radiculopathy, lumbar region 6/7/2016    Papillary thyroid carcinoma ([facility] [address] 12/2006    no trx to follow    Positive D dimer 12/5/2013    Sleep apnea 1/24/2014    Type 2 diabetes mellitus (HCC)     diet control  / no meds    Vitamin D deficiency          SURGICALHISTORY       Past Surgical History:   Procedure Laterality Date    BRONCHOSCOPY N/A 11/11/2017    BRONCHOSCOPY FIBEROPTIC performed by Florence Anderson MD at [facility] COLONOSCOPY      ENDOSCOPY, COLON, DIAGNOSTIC      EYE SURGERY      phaco with IOL OU    HERNIA REPAIR  2578K    repair umbilical hernia    KNEE SURGERY Left 1970    MI ARTHROSCOPY SHOULDER SURGICAL BICEPS TENODESIS Right 9/18/2018    RIGHT SHOULDER ARTHROSCOPY SUBACROMIAL DECOMPRESS WITH POSSIBLE BICEPS TENODESIS ARTHROSCOPIC FRANSISCA C- ARM ARTHREX BICEPS TENODESIS TRAY [address] CASE #1 1 HOUR performed by Yancy Gastelum MD at [facility] Right 11/14/2017    RIGHT THORACOTOMY WEDGE RESECTION FOR FOREIGN BODY AND FOB DOUBLE LUMEN (1ST CASE) performed by Conchita Hdz MD at [facility]  2006    cancer / no chemo or radiation         CURRENT MEDICATIONS       Current Discharge Medication List      CONTINUE these medications which have NOT CHANGED    Details   rosuvastatin (CRESTOR) 20 MG tablet       levothyroxine (SYNTHROID) 137 MCG tablet       sertraline (ZOLOFT) 50 MG tablet Take 50 mg by mouth daily      albuterol (PROVENTIL) (2.5 MG/3ML) 0.083% nebulizer solution Use 1 vial via nebulizer every 6 hours as needed for Wheezing  Qty: 360 mL, Refills: 3    Associated Diagnoses: Chronic obstructive pulmonary disease, unspecified (HCC)      alfuzosin (UROXATRAL) 10 MG extended release tablet Take by mouth      rivaroxaban (XARELTO) 10 MG TABS tablet Take 1 tablet by mouth daily (with breakfast)  Qty: 30 tablet, Refills: 6    Associated Diagnoses: Acute deep vein thrombosis (DVT) of femoral vein of left lower extremity ([address]  Current use of long term anticoagulation      psyllium (METAMUCIL) 58.12 % PACK packet Take 1 packet by mouth daily as needed (constipation)  Qty: 30 packet, Refills: 0      vitamin D (CHOLECALCIFEROL) 1000 UNIT TABS tablet Take 2 tablets by mouth daily  Qty: 60 tablet, Refills: 1 clonazePAM (KLONOPIN) 0.5 MG tablet Take 0.5 mg by mouth nightly as needed. [place] albuterol sulfate  (90 Base) MCG/ACT inhaler Inhale 2 puffs into the lungs every 4 hours as needed for Wheezing      magnesium 30 MG tablet Take 250 mg by mouth daily      mirtazapine (REMERON) 15 MG tablet Take 15 mg by mouth nightly      finasteride (PROSCAR) 5 MG tablet Take 1 tablet by mouth daily  Qty: 30 tablet, Refills: 3      tamsulosin (FLOMAX) 0.4 MG capsule Take 1 capsule by mouth nightly  Qty: 30 capsule, Refills: 3      Famotidine (PEPCID AC PO) Take by mouth daily as needed       budesonide-formoterol (SYMBICORT) 160-4.5 MCG/ACT AERO Inhale 2 puffs into the lungs 2 times daily. Respiratory Therapy Supplies OK New CPAP mask and supplies  Qty: 1 Device, Refills: 0      aspirin 81 MG EC tablet Take 1 tablet by mouth daily  Qty: 30 tablet, Refills: 3      pravastatin (PRAVACHOL) 20 MG tablet Take 20 mg by mouth every evening      CPAP Machine MISC by Does not apply route Auto CPAP  5-20 cm  Qty: 1 each, Refills: 0      docusate sodium (COLACE) 100 MG capsule Take 100 mg by mouth daily              ALLERGIES     Patient has no known allergies. FAMILY HISTORY       Family History   Problem Relation Age of Onset    Other Mother 80        Old Age    Stroke Father 45    No Known Problems Sister     Psoriasis Daughter     No Known Problems Son           SOCIAL HISTORY       Social History     Socioeconomic History    Marital status:       Spouse name: None    Number of children: 2    Years of education: None    Highest education level: None   Occupational History    Occupation: retired   Tobacco Use    Smoking status: Former Smoker     Packs/day: 1.00     Years: 40.00     Pack years: 40.00     Types: Cigarettes     Start date: 2000     Quit date:      Years since quittin.0    Smokeless tobacco: Former User    Tobacco comment: quit    Vaping Use    Vaping Use: Never used Substance and Sexual Activity    Alcohol use: Yes     Alcohol/week: 0.0 standard drinks     Comment: rare social    Drug use: No    Sexual activity: None   Other Topics Concern    None   Social History Narrative    The patient lives alone in a one story home with one step to enter the home. The bedroom and bathroom are on the first floor. His social support includes his children. He has a wheeled walker, rollator, cane and dressing assistive device at home. He was receiving home health care services prior to admission to include PT, OT and RN. He does not have history of falls prior to admission. He was independent with mobility with a wheeled walker as needed prior to admission. He was independent with self care prior to admission. His goal is to get stronger and return home. LIVES AT HOME ALONE. HE HAS A ROLLATOR HE USES. HIS SON LIVES IN [place] AND IS AVAILABLE IF HE NEEDS HIM. Type of Home: House    Home Layout: One level    Home Access: Level entry    Home Equipment: Cane, 4 wheeled walker    ADL Assistance: Independent    Homemaking Assistance: Independent    Ambulation Assistance: Independent    Transfer Assistance: Independent    Additional Comments: son and dtr can assist upon DC home with IADLs     Social Determinants of Health     Financial Resource Strain:     Difficulty of Paying Living Expenses: Not on file   Food Insecurity:     Worried About [address] in the Last Year: Not on file    Gibran of Food in the Last Year: Not on file   Transportation Needs:     Lack of Transportation (Medical): Not on file    Lack of Transportation (Non-Medical):  Not on file   Physical Activity:     Days of Exercise per Week: Not on file    Minutes of Exercise per Session: Not on file   Stress:     Feeling of Stress : Not on file   Social Connections:     Frequency of Communication with Friends and Family: Not on file    Frequency of Social Gatherings with Friends and Family: Not on file    Attends [employer] Services: Not on file    Active Member of Clubs or Organizations: Not on file    Attends Club or Organization Meetings: Not on file    Marital Status: Not on file   Intimate Partner Violence:     Fear of Current or Ex-Partner: Not on file    Emotionally Abused: Not on file    Physically Abused: Not on file    Sexually Abused: Not on file   Housing Stability:     Unable to Pay for Housing in the Last Year: Not on file    Number of [place] in the Last Year: Not on file    Unstable Housing in the Last Year: Not on file       SCREENINGS      @FLOW([id number])@      PHYSICAL EXAM    (up to 7 for level 4, 8 or more for level 5)     ED Triage Vitals   BP Temp Temp src Pulse Resp SpO2 Height Weight   -- -- -- -- -- -- -- --       Physical Exam  Vitals reviewed. Constitutional:       General: He is not in acute distress. Appearance: He is not toxic-appearing. Interventions: He is not intubated. HENT:      Head: Normocephalic and atraumatic. Mouth/Throat:      Pharynx: No pharyngeal swelling. Neck:      Vascular: No hepatojugular reflux. Trachea: No tracheal deviation. Cardiovascular:      Rate and Rhythm: Normal rate and regular rhythm. Pulmonary:      Effort: Pulmonary effort is normal. No tachypnea, bradypnea, accessory muscle usage or respiratory distress. He is not intubated. Breath sounds: Examination of the right-lower field reveals wheezing. Examination of the left-lower field reveals wheezing. Wheezing present. No rales. Chest:      Chest wall: No mass, deformity, tenderness, crepitus or edema. There is no dullness to percussion. Musculoskeletal:      Cervical back: Normal range of motion. Skin:     Capillary Refill: Capillary refill takes less than 2 seconds. Findings: No ecchymosis or rash. Neurological:      General: No focal deficit present. Mental Status: He is alert. DIAGNOSTIC RESULTS     EKG:  All EKG's are interpreted by the Emergency Department Physician who either signs or Co-signsthis chart in the absence of a cardiologist.        RADIOLOGY:   Rafat Martinez such as CT, Ultrasound and MRI are read by the radiologist. Plain radiographic images are visualized and preliminarily interpreted by the emergency physician with the below findings:        Interpretation per the Radiologist below, if available at the time ofthis note:    XR CHEST PORTABLE   Final Result   No acute cardiopulmonary process.              ED BEDSIDE ULTRASOUND:   Performed by ED Physician - none    LABS:  Labs Reviewed   PROTIME-INR - Abnormal; Notable for the following components:       Result Value    Protime 18.6 (*)     All other components within normal limits   CBC WITH AUTO DIFFERENTIAL - Abnormal; Notable for the following components:    RBC 4.57 (*)     Hemoglobin 13.3 (*)     MCV 93.0 (*)     MCHC 31.3 (*)     Neutrophils % 76.2 (*)     Neutrophils Absolute 5.7 (*)     Lymphocytes Absolute 1.0 (*)     All other components within normal limits   TROPONIN - Abnormal; Notable for the following components:    Troponin 0.027 (*)     All other components within normal limits    Narrative:     ALEXI PONCEQUETA tel. [phone],  Chemistry results called to and read back by maribell, 12/31/2021 13:10, by  Kevon Fleischer   COMPREHENSIVE METABOLIC PANEL - Abnormal; Notable for the following components:    Glucose 102 (*)     Albumin 2.7 (*)     Globulin 4.6 (*)     All other components within normal limits   MICROSCOPIC URINALYSIS - Abnormal; Notable for the following components:    RBC, UA >100 (*)     All other components within normal limits   COVID-19, RAPID   CULTURE, BLOOD 1   CULTURE, BLOOD 2   BRAIN NATRIURETIC PEPTIDE    Narrative:     Darron Fink  ELINOR tel. [phone],  Chemistry results called to and read back by maribell, 12/31/2021 13:10, by  Kevon Fleischer   LACTIC ACID, PLASMA   MAGNESIUM   URINE RT REFLEX TO CULTURE   TROPONIN       All other labs were within normal range or not returned as of this dictation. EMERGENCY DEPARTMENT COURSE and DIFFERENTIAL DIAGNOSIS/MDM:   Vitals:    Vitals:    12/31/21 1157 12/31/21 1336 12/31/21 1512   BP: 137/86 (!) 138/90 (!) 145/88   Pulse: 65 77 85   Resp: 20 18 18   Temp: 98.2 °F (36.8 °C)  97.7 °F (36.5 °C)   TempSrc: Oral  Oral   SpO2: 96% 95% 98%   Weight: 220 lb (99.8 kg)  207 lb 12.8 oz (94.3 kg)   Height: 6' (1.829 m)  6' (1.829 m)           MDM  Number of Diagnoses or Management Options  Bronchitis  COPD exacerbation (HCC)  Diagnosis management comments: Patient ex-smoker history of blood clots and pulmonary embolism takes Xarelto on a long-term basis patient denies any chest tightness or chest pain increasing cough congestion heart negative for any cough productive cough which is starting to yellowish tired weak fatigued unable to see pulmonologist so decided to come here to be checked out increasing weakness patient does take home oxygen at home despite that he is short of breath EKG performed normal sinus rhythm rate of 65/min RI interval 168 ms QRS duration 99 ms and QT interval 420 ms no ischemia no ST elevation of PVC on EKG Case discussed with patient family and patient both agreed with the plan to stay in the hospital for further improvement of COPD and short of breath       Amount and/or Complexity of Data Reviewed  Clinical lab tests: ordered and reviewed  Tests in the radiology section of CPT®: ordered and reviewed        CRITICAL CARE TIME   Total Critical Care time was  minutes, excluding separately reportableprocedures. There was a high probability of clinicallysignificant/life threatening deterioration in the patient's condition which required my urgent intervention. CONSULTS:  IP CONSULT TO CARDIOLOGY    PROCEDURES:  Unless otherwise noted below, none     Procedures    FINAL IMPRESSION      1. COPD exacerbation ([address]    2.  Bronchitis          DISPOSITION/PLAN   DISPOSITION PATIENT REFERRED TO:  No follow-up provider specified.     DISCHARGE MEDICATIONS:  Current Discharge Medication List             (Please note that portions of this note were completed with a voice recognition program.  Efforts were made to edit the dictations but occasionally words are mis-transcribed.)    Iain Anguiano MD (electronically signed)  Attending Emergency Physician       Iain Anguiano MD  12/31/21 1061

## 2021-12-31 NOTE — ED NOTES
Bed: 08  Expected date: 12/31/21  Expected time:   Means of arrival:   Comments:  Light on in room      Latisha Tristan Fazal  12/31/21 3856

## 2021-12-31 NOTE — ED NOTES
Patient assigned bed 220. Nursing supervisor aware patient is ready to be taken to the floor.       Beni Velasquez  12/31/21 5082

## 2022-01-01 LAB
ANION GAP SERPL CALCULATED.3IONS-SCNC: 15 MEQ/L (ref 9–15)
BUN BLDV-MCNC: 18 MG/DL (ref 8–23)
CALCIUM SERPL-MCNC: 8.4 MG/DL (ref 8.5–9.9)
CHLORIDE BLD-SCNC: 104 MEQ/L (ref 95–107)
CO2: 21 MEQ/L (ref 20–31)
CREAT SERPL-MCNC: 0.97 MG/DL (ref 0.7–1.2)
GFR AFRICAN AMERICAN: >60
GFR NON-AFRICAN AMERICAN: >60
GLUCOSE BLD-MCNC: 156 MG/DL (ref 70–99)
HCT VFR BLD CALC: 40.1 % (ref 42–52)
HEMOGLOBIN: 12.6 G/DL (ref 13.7–17.5)
MCH RBC QN AUTO: 28.8 PG (ref 25.7–32.2)
MCHC RBC AUTO-ENTMCNC: 31.4 % (ref 32.3–36.5)
MCV RBC AUTO: 91.6 FL (ref 79–92.2)
PDW BLD-RTO: 13.5 % (ref 11.6–14.4)
PLATELET # BLD: 199 K/UL (ref 163–337)
POTASSIUM REFLEX MAGNESIUM: 4.1 MEQ/L (ref 3.4–4.9)
RBC # BLD: 4.38 M/UL (ref 4.63–6.08)
SODIUM BLD-SCNC: 140 MEQ/L (ref 135–144)
TROPONIN: <0.01 NG/ML (ref 0–0.01)
WBC # BLD: 8.1 K/UL (ref 4.2–9)

## 2022-01-01 PROCEDURE — 1210000000 HC MED SURG R&B

## 2022-01-01 PROCEDURE — 2580000003 HC RX 258: Performed by: EMERGENCY MEDICINE

## 2022-01-01 PROCEDURE — 80048 BASIC METABOLIC PNL TOTAL CA: CPT

## 2022-01-01 PROCEDURE — 93005 ELECTROCARDIOGRAM TRACING: CPT

## 2022-01-01 PROCEDURE — 6360000002 HC RX W HCPCS: Performed by: INTERNAL MEDICINE

## 2022-01-01 PROCEDURE — 84484 ASSAY OF TROPONIN QUANT: CPT

## 2022-01-01 PROCEDURE — 85027 COMPLETE CBC AUTOMATED: CPT

## 2022-01-01 PROCEDURE — 94640 AIRWAY INHALATION TREATMENT: CPT

## 2022-01-01 PROCEDURE — 2580000003 HC RX 258: Performed by: INTERNAL MEDICINE

## 2022-01-01 PROCEDURE — 6370000000 HC RX 637 (ALT 250 FOR IP): Performed by: INTERNAL MEDICINE

## 2022-01-01 PROCEDURE — 36415 COLL VENOUS BLD VENIPUNCTURE: CPT

## 2022-01-01 PROCEDURE — 2500000003 HC RX 250 WO HCPCS: Performed by: INTERNAL MEDICINE

## 2022-01-01 RX ADMIN — METHYLPREDNISOLONE SODIUM SUCCINATE 40 MG: 40 INJECTION, POWDER, FOR SOLUTION INTRAMUSCULAR; INTRAVENOUS at 09:59

## 2022-01-01 RX ADMIN — Medication 10 ML: at 19:48

## 2022-01-01 RX ADMIN — TAMSULOSIN HYDROCHLORIDE 0.4 MG: 0.4 CAPSULE ORAL at 19:46

## 2022-01-01 RX ADMIN — IPRATROPIUM BROMIDE AND ALBUTEROL SULFATE 1 AMPULE: .5; 2.5 SOLUTION RESPIRATORY (INHALATION) at 17:53

## 2022-01-01 RX ADMIN — NITROGLYCERIN 0.5 INCH: 20 OINTMENT TOPICAL at 04:44

## 2022-01-01 RX ADMIN — ROSUVASTATIN CALCIUM 10 MG: 5 TABLET, COATED ORAL at 19:46

## 2022-01-01 RX ADMIN — IPRATROPIUM BROMIDE AND ALBUTEROL SULFATE 1 AMPULE: .5; 2.5 SOLUTION RESPIRATORY (INHALATION) at 05:41

## 2022-01-01 RX ADMIN — RIVAROXABAN 10 MG: 10 TABLET, FILM COATED ORAL at 09:58

## 2022-01-01 RX ADMIN — ASPIRIN 81 MG 81 MG: 81 TABLET ORAL at 09:58

## 2022-01-01 RX ADMIN — CLONAZEPAM 0.5 MG: 0.5 TABLET ORAL at 19:54

## 2022-01-01 RX ADMIN — DOXYCYCLINE 100 MG: 100 INJECTION, POWDER, LYOPHILIZED, FOR SOLUTION INTRAVENOUS at 04:23

## 2022-01-01 RX ADMIN — LEVOTHYROXINE SODIUM 137 MCG: 0.03 TABLET ORAL at 05:39

## 2022-01-01 RX ADMIN — FINASTERIDE 5 MG: 5 TABLET, FILM COATED ORAL at 09:59

## 2022-01-01 RX ADMIN — Medication 3 ML: at 04:49

## 2022-01-01 RX ADMIN — DOXYCYCLINE 100 MG: 100 INJECTION, POWDER, LYOPHILIZED, FOR SOLUTION INTRAVENOUS at 17:01

## 2022-01-01 RX ADMIN — SERTRALINE 50 MG: 50 TABLET, FILM COATED ORAL at 19:46

## 2022-01-01 RX ADMIN — Medication 2000 UNITS: at 09:58

## 2022-01-01 RX ADMIN — MAGNESIUM OXIDE 200 MG: 400 TABLET ORAL at 09:58

## 2022-01-01 RX ADMIN — METHYLPREDNISOLONE SODIUM SUCCINATE 40 MG: 40 INJECTION, POWDER, FOR SOLUTION INTRAMUSCULAR; INTRAVENOUS at 19:46

## 2022-01-01 ASSESSMENT — PAIN SCALES - GENERAL: PAINLEVEL_OUTOF10: 0

## 2022-01-01 NOTE — H&P
History and Physical    Admit Date: 12/31/2021  PCP: Susi Proter MD    CHIEF COMPLAINT:   Chief Complaint   Patient presents with    Shortness of Breath     Pt c/o SOB x 3 weeks, Hx ofCOPD and home O2 use, weaker today, unable to be seen by  pulmonologist yesterday        HISTORY OF PRESENT ILLNESS:    The patient is a [de-identified] y.o. male with a past medical history of COPD, GEOR[place]ES, on as needed oxygen at home, HTN, history of CPAP but not using it regularly, GERD, HTN, prior history of DVT/PE on Xarelto, Hypothyroidism Osteoarthritis presents with dyspnea. Has been having dyspnea for the last couple of weeks with worsened yesterday and felt weaker so he went to the emergency room at [facility].  He was found to have COPD exacerbation admitted for further management. His laboratory analysis show mild elevated troponin with EKG not suggestive of acute ischemic changes.     Past Medical History:        Diagnosis Date    Abnormality of gait and mobility     Acute sinusitis     Anxiety     Aspiration into respiratory tract 11/10/2017    Aspiration pneumonia (HCC)     Bilateral pulmonary embolism (HCC) 1/6/2018    BPH (benign prostatic hyperplasia)     COPD (chronic obstructive pulmonary disease) ([address] 12/5/2013    Debility     Elevated CK 12/30/2013    Foraminal stenosis of lumbosacral region 6/7/2016    GERD (gastroesophageal reflux disease) 12/11/2014    History of asthma 1/13/2014    HTN (hypertension) 1/13/2014    past braden / no current meds    Hx of blood clots 01/2018    DVT  ( unsure which leg but both were swollen ) -> PE -> thus Xarelto    Hyperlipidemia     meds > 10 yrs    Hypothyroidism     Insomnia     Lower extremity weakness 1/24/2014    On home oxygen therapy     2L at night    Osteoarthritis of spine with radiculopathy, lumbar region 6/7/2016    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    no trx to follow    Positive D dimer 12/5/2013    Sleep apnea 1/24/2014    Type 2 diabetes mellitus ([facility]     diet control  / no meds    Vitamin D deficiency        Past Surgical History:        Procedure Laterality Date    BRONCHOSCOPY N/A 2017    BRONCHOSCOPY FIBEROPTIC performed by [facility], DI[facility]NOSTIC      EYE SURGERY      phaco with IOL OU    HERNIA REPAIR  [id number]    repair umbilical hernia    KNEE SURGERY Left     KS ARTHROSCOPY SHOULDER SURGICAL BICEPS TENODESIS Right 2018    RIGHT SHOULDER ARTHROSCOPY SUBACROMIAL DECOMPRESS WITH POSSIBLE BICEPS TENODESIS ARTHROSCOPIC FRANSISCA C- ARM ARTHREX BICEPS TENODESIS GALE Shay CHAIR CASE #1 1 HOUR performed by Angeli Caldwell MD at [facility] Right 2017    RIGHT THORACOTOMY WEDGE RESECTION FOR FOREIGN BODY AND FOB DOUBLE LUMEN (1ST CASE) performed by Dina Trujillo MD at [address] [place] [address]      cancer / no chemo or radiation       Social History:   Social History     Socioeconomic History    Marital status:      Spouse name: Not on file    Number of children: 2    Years of education: Not on file    Highest education level: Not on file   Occupational History    Occupation: retired   Tobacco Use    Smoking status: Former Smoker     Packs/day: 1.00     Years: 40.00     Pack years: 40.00     Types: Cigarettes     Start date: 2000     Quit date:      Years since quittin.0    Smokeless tobacco: Former User    Tobacco comment: quit    Vaping Use    Vaping Use: Never used   Substance and Sexual Activity    Alcohol use: Yes     Alcohol/week: 0.0 standard drinks     Comment: rare social    Drug use: No    Sexual activity: Not on file   Other Topics Concern    Not on file   Social History Narrative    The patient lives alone in a one story home with one step to enter the home. The bedroom and bathroom are on the first floor. His social support includes his children.   He has a wheeled walker, rollator, cane and dressing assistive device at home. He was receiving home health care services prior to admission to include PT, OT and RN. He does not have history of falls prior to admission. He was independent with mobility with a wheeled walker as needed prior to admission. He was independent with self care prior to admission. His goal is to get stronger and return home. LIVES AT HOME ALONE. HE HAS A ROLLATOR HE USES. HIS SON LIVES IN [place] AND IS AVAILABLE IF HE NEEDS HIM. Type of Home: House    Home Layout: One level    Home Access: Level entry    Home Equipment: Cane, 4 wheeled walker    ADL Assistance: Independent    Homemaking Assistance: Independent    Ambulation Assistance: Independent    Transfer Assistance: Independent    Additional Comments: son and dtr can assist upon DC home with IADLs     Social Determinants of Health     Financial Resource Strain:     Difficulty of Paying Living Expenses: Not on file   Food Insecurity:     Worried About [address] [employer] in the Last Year: Not on file    Gibran of Food in the Last Year: Not on file   Transportation Needs:     Lack of Transportation (Medical): Not on file    Lack of Transportation (Non-Medical):  Not on file   Physical Activity:     Days of Exercise per Week: Not on file    Minutes of Exercise per Session: Not on file   Stress:     Feeling of Stress : Not on file   Social Connections:     Frequency of Communication with Friends and Family: Not on file    Frequency of Social Gatherings with Friends and Family: Not on file    Attends Episcopal Services: Not on file    Active Member of Clubs or Organizations: Not on file    Attends Club or Organization Meetings: Not on file    Marital Status: Not on file   Intimate Partner Violence:     Fear of Current or Ex-Partner: Not on file    Emotionally Abused: Not on file    Physically Abused: Not on file    Sexually Abused: Not on file   Housing Stability:  Unable to Pay for Housing in the Last Year: Not on file    Number of Places Lived in the Last Year: Not on file    Unstable Housing in the Last Year: Not on file       Family History:   Family History   Problem Relation Age of Onset    Other Mother 80        Old Age    Stroke Father 45    No Known Problems Sister     Psoriasis Daughter     No Known Problems Son        Medications Prior to Admission:    Prior to Admission medications    Medication Sig Start Date End Date Taking? Authorizing Provider   rosuvastatin (CRESTOR) 20 MG tablet  8/5/21  Yes Historical Provider, MD   levothyroxine (SYNTHROID) 137 MCG tablet  4/5/21  Yes Historical Provider, MD   sertraline (ZOLOFT) 50 MG tablet Take 50 mg by mouth daily Pt takes at night   Yes Historical Provider, MD   albuterol (PROVENTIL) (2.5 MG/3ML) 0.083% nebulizer solution Use 1 vial via nebulizer every 6 hours as needed for Wheezing 11/5/20  Yes Christine Bravo MD   alfuzosin (UROXATRAL) 10 MG extended release tablet Take by mouth 3/18/09  Yes Historical Provider, MD   rivaroxaban (XARELTO) 10 MG TABS tablet Take 1 tablet by mouth daily (with breakfast) 5/10/19  Yes Jon Licona, DO   psyllium (METAMUCIL) 58.12 % PACK packet Take 1 packet by mouth daily as needed (constipation) 12/17/18  Yes Titus Wilson MD   vitamin D (CHOLECALCIFEROL) 1000 UNIT TABS tablet Take 2 tablets by mouth daily 9/27/18  Yes Catrachita Garcia, DO   clonazePAM (KLONOPIN) 0.5 MG tablet Take 0.5 mg by mouth nightly as needed. .   Yes Historical Provider, MD   albuterol sulfate  (90 Base) MCG/ACT inhaler Inhale 2 puffs into the lungs every 4 hours as needed for Wheezing   Yes Historical Provider, MD   magnesium 30 MG tablet Take 250 mg by mouth daily   Yes Historical Provider, MD   mirtazapine (REMERON) 15 MG tablet Take 15 mg by mouth nightly   Yes Historical Provider, MD   finasteride (PROSCAR) 5 MG tablet Take 1 tablet by mouth daily 12/5/17  Yes Chris Crum MD tamsulosin (FLOMAX) 0.4 MG capsule Take 1 capsule by mouth nightly 17  Yes Shree Dryer MD Esha   budesonide-formoterol (SYMBICORT) 160-4.5 MCG/ACT AERO Inhale 2 puffs into the lungs 2 times daily. Yes Historical Provider, MD   Respiratory Therapy Supplies OK New CPAP mask and supplies 3/27/19   Yossi Rincon MD   aspirin 81 MG EC tablet Take 1 tablet by mouth daily 18   Marlene Gamino MD   pravastatin (PRAVACHOL) 20 MG tablet Take 20 mg by mouth every evening    Historical Provider, MD   CPAP Machine MISC by Does not apply route Auto CPAP  5-20 cm 18   Yossi Rincon MD   docusate sodium (COLACE) 100 MG capsule Take 100 mg by mouth daily     Historical Provider, MD   Famotidine (PEPCID AC PO) Take by mouth daily as needed     Historical Provider, MD       Allergies:  Patient has no known allergies. REVIEW OF SYSTEMS:  All systems reviewed and negative except for what is in HPI . PHYSICAL EXAM:  Vitals:  /62   Pulse 84   Temp 97 °F (36.1 °C)   Resp 16   Ht 6' (1.829 m)   Wt 207 lb 12.8 oz (94.3 kg)   SpO2 94%   BMI 28.18 kg/m²   BMI Classification: Overweight (BMI 25.0-29. 9)  Pulse Ox: SpO2  Av.8 %  Min: 94 %  Max: 98 %  Supplemental O2: O2 Flow Rate (L/min): 2 L/min    CONSTITUTIONAL:  awake, alert, cooperative, no apparent distress, and appears stated age  HEENT: Normocephalic, PERRLA  NECK: no JVD, no LAD  HEART: RRR  LUNGS: Decreased air entry bilaterally. No expiratory wheezing noted.   ABDOMEN: soft/NT/ND, positive BS  MUSCULOSKELETAL: negative for edema, +2 pulses  SKIN: intact without rash or jaundice  NEURO:  CN intact and no focal deficits    DATA:  Recent Results (from the past 24 hour(s))   Protime-INR    Collection Time: 21 12:40 PM   Result Value Ref Range    Protime 18.6 (H) 12.3 - 14.9 sec    INR 1.6    Brain Natriuretic Peptide    Collection Time: 21 12:40 PM   Result Value Ref Range    Pro- pg/mL   CBC Auto Differential Collection Time: 12/31/21 12:40 PM   Result Value Ref Range    WBC 7.4 4.2 - 9.0 K/uL    RBC 4.57 (L) 4.63 - 6.08 M/uL    Hemoglobin 13.3 (L) 13.7 - 17.5 g/dL    Hematocrit 42.5 42.0 - 52.0 %    MCV 93.0 (H) 79.0 - 92.2 fL    MCH 29.1 25.7 - 32.2 pg    MCHC 31.3 (L) 32.3 - 36.5 %    RDW 13.6 11.6 - 14.4 %    Platelets 439 606 - 126 K/uL    Neutrophils % 76.2 (H) 34.0 - 67.9 %    Immature Granulocytes % 0.7 %    Lymphocytes % 13.2 %    Monocytes % 6.5 5.3 - 12.2 %    Eosinophils % 3.1 0.8 - 7.0 %    Basophils % 0.3 0.2 - 1.2 %    Neutrophils Absolute 5.7 (H) 1.8 - 5.4 K/uL    Immature Granulocytes # 0.1 K/uL    Lymphocytes Absolute 1.0 (L) 1.3 - 3.6 K/uL    Monocytes Absolute 0.5 0.3 - 0.8 K/uL    Eosinophils Absolute 0.2 0.0 - 0.5 K/uL    Basophils Absolute 0.0 0.0 - 0.1 K/uL   Lactic Acid, Plasma    Collection Time: 12/31/21 12:40 PM   Result Value Ref Range    Lactic Acid 1.3 0.5 - 2.2 mmol/L   Magnesium    Collection Time: 12/31/21 12:40 PM   Result Value Ref Range    Magnesium 2.2 1.7 - 2.4 mg/dL   Troponin    Collection Time: 12/31/21 12:40 PM   Result Value Ref Range    Troponin 0.027 (HH) 0.000 - 0.010 ng/mL   Comprehensive Metabolic Panel    Collection Time: 12/31/21 12:40 PM   Result Value Ref Range    Sodium 139 135 - 144 mEq/L    Potassium 4.0 3.4 - 4.9 mEq/L    Chloride 103 95 - 107 mEq/L    CO2 25 20 - 31 mEq/L    Anion Gap 11 9 - 15 mEq/L    Glucose 102 (H) 70 - 99 mg/dL    BUN 14 8 - 23 mg/dL    CREATININE 0.96 0.70 - 1.20 mg/dL    GFR Non-African American >60.0 >60    GFR  >60.0 >60    Calcium 8.6 8.5 - 9.9 mg/dL    Total Protein 7.3 6.3 - 8.0 g/dL    Albumin 2.7 (L) 3.5 - 4.6 g/dL    Total Bilirubin 0.3 0.2 - 0.7 mg/dL    Alkaline Phosphatase 67 35 - 104 U/L    ALT 19 0 - 41 U/L    AST 25 0 - 40 U/L    Globulin 4.6 (H) 2.3 - 3.5 g/dL   COVID-19, Rapid    Collection Time: 12/31/21  1:02 PM    Specimen: Nasopharyngeal Swab   Result Value Ref Range    SARS-CoV-2, NAAT Not Detected Not Detected   Urine Reflex to Culture    Collection Time: 12/31/21  1:34 PM    Specimen: Urine, clean catch   Result Value Ref Range    Color, UA Yellow Straw/Yellow    Clarity, UA Cloudy Clear    Glucose, Ur Negative Negative mg/dL    Bilirubin Urine Negative Negative    Ketones, Urine Negative Negative mg/dL    Specific Gravity, UA 1.015 1.005 - 1.030    Blood, Urine Large Negative    pH, UA 7.0 5.0 - 9.0    Protein, UA Negative Negative mg/dL    Urobilinogen, Urine 0.2 <2.0 E.U./dL    Nitrite, Urine Negative Negative    Leukocyte Esterase, Urine Negative Negative    Urine Reflex to Culture Not Indicated    Microscopic Urinalysis    Collection Time: 12/31/21  1:34 PM   Result Value Ref Range    WBC, UA 0-2 0 - 5 /HPF    RBC, UA >100 (A) 0 - 2 /HPF    Epithelial Cells, UA 0-2 /HPF    Bacteria, UA Negative Negative /HPF   Troponin    Collection Time: 12/31/21  9:22 PM   Result Value Ref Range    Troponin <0.010 0.000 - 0.010 ng/mL   Basic Metabolic Panel w/ Reflex to MG    Collection Time: 01/01/22  6:41 AM   Result Value Ref Range    Sodium 140 135 - 144 mEq/L    Potassium reflex Magnesium 4.1 3.4 - 4.9 mEq/L    Chloride 104 95 - 107 mEq/L    CO2 21 20 - 31 mEq/L    Anion Gap 15 9 - 15 mEq/L    Glucose 156 (H) 70 - 99 mg/dL    BUN 18 8 - 23 mg/dL    CREATININE 0.97 0.70 - 1.20 mg/dL    GFR Non-African American >60.0 >60    GFR  >60.0 >60    Calcium 8.4 (L) 8.5 - 9.9 mg/dL   CBC    Collection Time: 01/01/22  6:41 AM   Result Value Ref Range    WBC 8.1 4.2 - 9.0 K/uL    RBC 4.38 (L) 4.63 - 6.08 M/uL    Hemoglobin 12.6 (L) 13.7 - 17.5 g/dL    Hematocrit 40.1 (L) 42.0 - 52.0 %    MCV 91.6 79.0 - 92.2 fL    MCH 28.8 25.7 - 32.2 pg    MCHC 31.4 (L) 32.3 - 36.5 %    RDW 13.5 11.6 - 14.4 %    Platelets 780 438 - 821 K/uL   Troponin    Collection Time: 01/01/22  6:41 AM   Result Value Ref Range    Troponin <0.010 0.000 - 0.010 ng/mL   EKG 12 Lead    Collection Time: 01/01/22  6:44 AM   Result Value Ref Range    Ventricular Rate 86 BPM    Atrial Rate 86 BPM    P-R Interval 168 ms    QRS Duration 92 ms    Q-T Interval 396 ms    QTc Calculation (Bazett) 473 ms    P Axis 34 degrees    R Axis -14 degrees    T Axis 38 degrees           ASSESSMENT AND PLAN:    COPD exacerbation  Elevated troponin-no chest pain  Hypertension  Hyperlipidemia  History of PE/DVT on Xarelto  Anxiety  Hypothyroidism  BPH    Plan  1. We will resume the patient on scheduled inhaled treatments in addition to IV steroids for today  2. Cardiologist been consulted by my colleague for elevated troponin, I reviewed the EKG. Will defer further management to cardiologist.  Patient is already on statin, aspirin, monitor on telemetry  3. Home medication resumed as appropriate  4. Patient seems anxious, will continue his home Zoloft, instructed to follow-up with his psychiatrist as outpatient. 5. DVT prophylaxis-already on Xarelto  6. Full code  7. Regular diet  8.  Anticipate discharge in the next 24 to 48 hours         Code status: Full Code    Electronically signed by Henry Kang DO on 1/1/22 at 12:24 PM EST

## 2022-01-01 NOTE — PROGRESS NOTES
Patient was discussed with cardiologist Dr. Nick Khan. Patient will need outpatient follow up with cardiologist. No need for inpatient cardiac intervention based on cardiologist charts review and current pt presentation.

## 2022-01-02 LAB
ANION GAP SERPL CALCULATED.3IONS-SCNC: 12 MEQ/L (ref 9–15)
BASOPHILS ABSOLUTE: 0 K/UL (ref 0–0.1)
BASOPHILS RELATIVE PERCENT: 0.1 % (ref 0.2–1.2)
BUN BLDV-MCNC: 28 MG/DL (ref 8–23)
CALCIUM SERPL-MCNC: 8.4 MG/DL (ref 8.5–9.9)
CHLORIDE BLD-SCNC: 105 MEQ/L (ref 95–107)
CO2: 22 MEQ/L (ref 20–31)
CREAT SERPL-MCNC: 0.95 MG/DL (ref 0.7–1.2)
EOSINOPHILS ABSOLUTE: 0 K/UL (ref 0–0.5)
EOSINOPHILS RELATIVE PERCENT: 0 % (ref 0.8–7)
GFR AFRICAN AMERICAN: >60
GFR NON-AFRICAN AMERICAN: >60
GLUCOSE BLD-MCNC: 132 MG/DL (ref 70–99)
HCT VFR BLD CALC: 38.4 % (ref 42–52)
HEMOGLOBIN: 10.4 G/DL (ref 13.7–17.5)
IMMATURE GRANULOCYTES #: 0.1 K/UL
IMMATURE GRANULOCYTES %: 1.1 %
LYMPHOCYTES ABSOLUTE: 0.9 K/UL (ref 1.3–3.6)
LYMPHOCYTES RELATIVE PERCENT: 9.5 %
MACROCYTES: ABNORMAL
MCH RBC QN AUTO: 29.1 PG (ref 25.7–32.2)
MCHC RBC AUTO-ENTMCNC: 27.1 % (ref 32.3–36.5)
MCV RBC AUTO: 107.6 FL (ref 79–92.2)
MONOCYTES ABSOLUTE: 0.3 K/UL (ref 0.3–0.8)
MONOCYTES RELATIVE PERCENT: 2.7 % (ref 5.3–12.2)
NEUTROPHILS ABSOLUTE: 8.4 K/UL (ref 1.8–5.4)
NEUTROPHILS RELATIVE PERCENT: 86.6 % (ref 34–67.9)
PDW BLD-RTO: 14.6 % (ref 11.6–14.4)
PLATELET # BLD: 165 K/UL (ref 163–337)
PLATELET SLIDE REVIEW: ADEQUATE
POTASSIUM SERPL-SCNC: 4.3 MEQ/L (ref 3.4–4.9)
RBC # BLD: 3.57 M/UL (ref 4.63–6.08)
SLIDE REVIEW: ABNORMAL
SODIUM BLD-SCNC: 139 MEQ/L (ref 135–144)
WBC # BLD: 9.7 K/UL (ref 4.2–9)

## 2022-01-02 PROCEDURE — 6360000002 HC RX W HCPCS: Performed by: INTERNAL MEDICINE

## 2022-01-02 PROCEDURE — 85025 COMPLETE CBC W/AUTO DIFF WBC: CPT

## 2022-01-02 PROCEDURE — 97530 THERAPEUTIC ACTIVITIES: CPT

## 2022-01-02 PROCEDURE — 97116 GAIT TRAINING THERAPY: CPT

## 2022-01-02 PROCEDURE — 80048 BASIC METABOLIC PNL TOTAL CA: CPT

## 2022-01-02 PROCEDURE — 2580000003 HC RX 258: Performed by: EMERGENCY MEDICINE

## 2022-01-02 PROCEDURE — 97162 PT EVAL MOD COMPLEX 30 MIN: CPT

## 2022-01-02 PROCEDURE — 6370000000 HC RX 637 (ALT 250 FOR IP): Performed by: INTERNAL MEDICINE

## 2022-01-02 PROCEDURE — 36415 COLL VENOUS BLD VENIPUNCTURE: CPT

## 2022-01-02 PROCEDURE — 1210000000 HC MED SURG R&B

## 2022-01-02 PROCEDURE — 97535 SELF CARE MNGMENT TRAINING: CPT

## 2022-01-02 PROCEDURE — 94640 AIRWAY INHALATION TREATMENT: CPT

## 2022-01-02 PROCEDURE — 2700000000 HC OXYGEN THERAPY PER DAY

## 2022-01-02 PROCEDURE — 97166 OT EVAL MOD COMPLEX 45 MIN: CPT

## 2022-01-02 PROCEDURE — 2500000003 HC RX 250 WO HCPCS: Performed by: INTERNAL MEDICINE

## 2022-01-02 PROCEDURE — 2580000003 HC RX 258: Performed by: INTERNAL MEDICINE

## 2022-01-02 RX ADMIN — METHYLPREDNISOLONE SODIUM SUCCINATE 40 MG: 40 INJECTION, POWDER, FOR SOLUTION INTRAMUSCULAR; INTRAVENOUS at 20:32

## 2022-01-02 RX ADMIN — DOXYCYCLINE 100 MG: 100 INJECTION, POWDER, LYOPHILIZED, FOR SOLUTION INTRAVENOUS at 16:32

## 2022-01-02 RX ADMIN — Medication 3 ML: at 04:59

## 2022-01-02 RX ADMIN — IPRATROPIUM BROMIDE AND ALBUTEROL SULFATE 1 AMPULE: .5; 2.5 SOLUTION RESPIRATORY (INHALATION) at 18:14

## 2022-01-02 RX ADMIN — MAGNESIUM OXIDE 200 MG: 400 TABLET ORAL at 07:39

## 2022-01-02 RX ADMIN — ASPIRIN 81 MG 81 MG: 81 TABLET ORAL at 07:39

## 2022-01-02 RX ADMIN — Medication 2000 UNITS: at 07:39

## 2022-01-02 RX ADMIN — METHYLPREDNISOLONE SODIUM SUCCINATE 40 MG: 40 INJECTION, POWDER, FOR SOLUTION INTRAMUSCULAR; INTRAVENOUS at 07:39

## 2022-01-02 RX ADMIN — IPRATROPIUM BROMIDE AND ALBUTEROL SULFATE 1 AMPULE: .5; 2.5 SOLUTION RESPIRATORY (INHALATION) at 05:04

## 2022-01-02 RX ADMIN — LEVOTHYROXINE SODIUM 137 MCG: 0.03 TABLET ORAL at 05:00

## 2022-01-02 RX ADMIN — SERTRALINE 50 MG: 50 TABLET, FILM COATED ORAL at 20:32

## 2022-01-02 RX ADMIN — CLONAZEPAM 0.5 MG: 0.5 TABLET ORAL at 20:44

## 2022-01-02 RX ADMIN — DOXYCYCLINE 100 MG: 100 INJECTION, POWDER, LYOPHILIZED, FOR SOLUTION INTRAVENOUS at 04:48

## 2022-01-02 RX ADMIN — Medication 10 ML: at 20:35

## 2022-01-02 RX ADMIN — ROSUVASTATIN CALCIUM 10 MG: 5 TABLET, COATED ORAL at 20:32

## 2022-01-02 RX ADMIN — RIVAROXABAN 10 MG: 10 TABLET, FILM COATED ORAL at 07:40

## 2022-01-02 RX ADMIN — FINASTERIDE 5 MG: 5 TABLET, FILM COATED ORAL at 07:39

## 2022-01-02 ASSESSMENT — PAIN SCALES - GENERAL
PAINLEVEL_OUTOF10: 0
PAINLEVEL_OUTOF10: 0

## 2022-01-02 NOTE — PROGRESS NOTES
Physical Therapy    Facility/Department: [facility] MED SURG UNIT  Initial Assessment    NAME: Pierce Carvajal  : 1940  MRN: [id number]    Date of Service: 2022    Discharge Recommendations:  Home with Home health PT        Assessment   Body structures, Functions, Activity limitations: Decreased functional mobility ; Decreased endurance  Assessment: Pt needs encourgagement to use )2 as directed at home and to use C-Pap that he has at home  Treatment Diagnosis: Decreased functional mobity  Specific instructions for Next Treatment: bed mobility from flat bed witout HR  Prognosis: Good  Decision Making: High Complexity  Patient Education: Encouraged pt to use C-Pap and O2 at home as directed by doctors, and respiratory therapists. Pt emcourgaged t discuss issues he has with using O2 and c-pap with respiratory services  Barriers to Learning: [place]  REQUIRES PT FOLLOW UP: Yes  Activity Tolerance  Activity Tolerance: Patient Tolerated treatment well  Activity Tolerance: 2 Sat fell with physical activity, recovered after sitting less than 2 miutes       Patient Diagnosis(es): The primary encounter diagnosis was COPD exacerbation ([address] A diagnosis of Bronchitis was also pertinent to this visit. has a past medical history of Abnormality of gait and mobility, Acute sinusitis, Anxiety, Aspiration into respiratory tract, Aspiration pneumonia (HCC), Bilateral pulmonary embolism (HCC), BPH (benign prostatic hyperplasia), COPD (chronic obstructive pulmonary disease) (HCC), Debility, Elevated CK, Foraminal stenosis of lumbosacral region, GERD (gastroesophageal reflux disease), History of asthma, HTN (hypertension), Hx of blood clots, Hyperlipidemia, Hypothyroidism, Insomnia, Lower extremity weakness, On home oxygen therapy, Osteoarthritis of spine with radiculopathy, lumbar region, Papillary thyroid carcinoma ([address] Positive D dimer, Sleep apnea, Type 2 diabetes mellitus ([address] and Vitamin D deficiency.    has a past surgical history that includes knee surgery (Left, 1970); Thyroidectomy (2006); bronchoscopy (N/A, 11/11/2017); thoracotomy (Right, 11/14/2017); Colonoscopy; Endoscopy, colon, diagnostic; eye surgery; hernia repair (1990s); and pr arthroscopy shoulder surgical biceps tenodesis (Right, 9/18/2018). Restrictions  Restrictions/Precautions  Restrictions/Precautions: General Precautions  Required Braces or Orthoses?: No  Vision/Hearing  Vision Exceptions: Wears glasses for reading  Hearing: Exceptions to [facility]  Hearing Exceptions: Hard of hearing/hearing concerns;Bilateral hearing aid     Subjective  General  Patient assessed for rehabilitation services?: Yes  Response To Previous Treatment: Not applicable  Family / Caregiver Present: No  Referring Practitioner: Loretta Bell  Referral Date : 01/02/22  Diagnosis: COPD  Follows Commands: Within Functional Limits  Other (Comment): [facility]  General Comment  Comments: Beenusing O2 at  home PRN at 2L (mainly at night).   Subjective  Subjective: States he has anxiety  and when occurs 02 level seems to be dropping  Pain Screening  Patient Currently in Pain: Denies  Pain Assessment  Pain Assessment: 0-10  Pain Level: 0  Vital Signs  Patient Currently in Pain: Denies       Orientation  Orientation  Overall Orientation Status: Within Normal Limits  Social/Functional History  Social/Functional History  Lives With: Alone  Type of Home: House  Home Layout: One level  Home Access: Stairs to enter without rails  Entrance Stairs - Number of Steps: 1  Bathroom Shower/Tub: Tub/Shower unit,Shower chair without back  Bathroom Toilet: Handicap height  Bathroom Equipment: Grab bars in shower,Shower chair,Hand-held shower  Bathroom Accessibility: Walker accessible  Home Equipment: 4 wheeled walker,Rolling walker,Oxygen (was using 2L O2 at night)  Receives Help From: Family (dtr lives a mile down the road)  ADL Assistance: [address]: Independent  Homemaking Responsibilities: Yes (Gets Meals on Wheels)  Ambulation Assistance: Independent (with Rollator)  Transfer Assistance: Independent  Active : Yes  Mode of Transportation: SUV  Occupation: Retired  Type of occupation: [employer]  Leisure & Hobbies: Watch and clock repairs  Cognition        Objective     Observation/Palpation  Observation: 2.5L O2 via NC- upped to 3L for ambulation- O2 sats dec'd 85% and 90% after each walk with ~30sec-1min recovery to 92% and above. Telemetry monitor, IV LUE.     AROM RLE (degrees)  RLE AROM: WFL  AROM LLE (degrees)  LLE AROM : WFL  Strength LLE  Strength LLE: WFL  Tone RLE  RLE Tone: Normotonic  Tone LLE  LLE Tone: Normotonic  Motor Control  Gross Motor?: WFL  Sensation  Overall Sensation Status: WFL  Bed mobility  Rolling to Left: Modified independent (HOB raised and used bed rail)  Rolling to Right: Modified independent (HOB and rails)  Supine to Sit: Modified independent (using hand rail and HOB raised)  Sit to Supine: Unable to assess  Transfers  Sit to Stand: Stand by assistance  Stand to sit: Stand by assistance  Ambulation  Ambulation?: Yes  WB Status: no known restrictions  Ambulation 1  Surface: level tile  Device: Rollator  Other Apparatus: O2 (3L)  Assistance: Stand by assistance  Quality of Gait: FF at hips  Distance: 170' x2  Stairs/Curb  Stairs?: No     Balance  Posture: Fair  Sitting - Static: Good  Sitting - Dynamic: Good  Standing - Static: Good  Standing - Dynamic: Good        Plan   Plan  Times per week: 5-7  Times per day:  (1-2)  Plan weeks: 1  Specific instructions for Next Treatment: bed mobility from flat bed witout HR  Current Treatment Recommendations: Strengthening,Gait Training,Functional Mobility Training  Plan Comment: Possible d/c in 24-48 hours  Safety Devices  Type of devices: Gait belt,Call light within reach,Left in bed  Restraints  Initially in place: No    G-Code       OutComes Score                                                  AM-PAC Score             Goals  Short term goals  Time Frame for Short term goals: 3-4 days  Short term goal 1: Indep bed mobility from flat bed out left side without hand rails  Short term goal 2:  Indep amb with rollator =>than 200' with O2 at 2-3L  Short term goal 3: Perform balance and strengthening ex while maintaining O2 level  Long term goals  Time Frame for Long term goals : Same as STG  Patient Goals   Patient goals : Return home       Therapy Time   Individual Concurrent Group Co-treatment   Time In  1000         Time Out  [facility] [place], [id number]

## 2022-01-02 NOTE — PROGRESS NOTES
4.1 4.3    105   CO2 21 22   BUN 18 28*   CREATININE 0.97 0.95   GLUCOSE 156* 132*     Recent Labs     12/31/21  1240   AST 25   ALT 19   BILITOT 0.3   ALKPHOS 67       Current Facility-Administered Medications   Medication Dose Route Frequency Provider Last Rate Last Admin    sodium chloride flush 0.9 % injection 3 mL  3 mL IntraVENous Ramila Orozco MD   3 mL at 01/02/22 0459    sodium chloride flush 0.9 % injection 10 mL  10 mL IntraVENous 2 times per day Pat Ocampo MD   10 mL at 01/01/22 1948    sodium chloride flush 0.9 % injection 10 mL  10 mL IntraVENous PRN Oriana Royal MD        0.9 % sodium chloride infusion  25 mL IntraVENous PRN Oriana Royal MD        promethazine (PHENERGAN) tablet 12.5 mg  12.5 mg Oral Q6H PRN Oriana Royal MD        Or    ondansetron (ZOFRAN) injection 4 mg  4 mg IntraVENous Q6H PRN Oriana Royal MD        polyethylene glycol (GLYCOLAX) packet 17 g  17 g Oral Daily PRN Pat Ocampo MD        acetaminophen (TYLENOL) tablet 650 mg  650 mg Oral Q6H PRN Oriana Royal MD        Or    acetaminophen (TYLENOL) suppository 650 mg  650 mg Rectal Q6H PRN Pat Ocampo MD        aspirin chewable tablet 81 mg  81 mg Oral Daily Oriana Royal MD   81 mg at 01/02/22 0739    albuterol (PROVENTIL) nebulizer solution 2.5 mg  2.5 mg Nebulization Q4H PRN Oriana Royal MD        methylPREDNISolone sodium (SOLU-MEDROL) injection 40 mg  40 mg IntraVENous Q12H Oriana Royal MD   40 mg at 01/02/22 0739    guaiFENesin (MUCINEX) extended release tablet 600 mg  600 mg Oral BID PRN Pat Ocampo MD        doxycycline (VIBRAMYCIN) 100 mg in dextrose 5 % 100 mL IVPB  100 mg IntraVENous Q12H Pat Ocampo MD   Stopped at 01/02/22 0554    ipratropium-albuterol (DUONEB) nebulizer solution 1 ampule  1 ampule Inhalation TID Pat Ocampo MD   1 ampule at 01/02/22 0504    clonazePAM (KLONOPIN) tablet 0.5 mg  0.5 mg Oral BID PRN Oriana Royal MD   0.5 mg at 01/01/22 1954    finasteride (PROSCAR) tablet 5 mg  5 mg Oral Daily Oriana Royal MD   5 mg at 01/02/22 0739    levothyroxine (SYNTHROID) tablet 137 mcg  137 mcg Oral Daily Oriana Royal MD   137 mcg at 01/02/22 0500    tamsulosin (FLOMAX) capsule 0.4 mg  0.4 mg Oral Nightly Oriana Royal MD   0.4 mg at 01/01/22 1946    vitamin D (CHOLECALCIFEROL) tablet 2,000 Units  2,000 Units Oral Daily Pat Ocampo MD   2,000 Units at 01/02/22 0739    sertraline (ZOLOFT) tablet 50 mg  50 mg Oral Daily Oriana Royal MD   50 mg at 01/01/22 1946    rosuvastatin (CRESTOR) tablet 10 mg  10 mg Oral Nightly Oriana Royal MD   10 mg at 01/01/22 1946    rivaroxaban (XARELTO) tablet 10 mg  10 mg Oral Daily with breakfast Oriana Royal MD   10 mg at 01/02/22 0740    magnesium oxide (MAG-OX) tablet 200 mg  200 mg Oral Daily Pat Ocampo MD   200 mg at 01/02/22 3883       Additional work up or/and treatment plan may be added today or then after based on clinical progression. I am managing a portion of pt care. Some medical issues are handled by other specialists. Additional work up and treatment should be done in out pt setting by pt PCP and other out pt providers. In addition to examining and evaluating pt, I spent additional time explaining care, normaland abnormal findings, and treatment plan. All of pt questions were answered. Counseling, diet and education were provided. Case will be discussed with nursing staff when appropriate. Family will be updated if and when appropriate.        Electronically signed by Joel Grady DO on 1/2/2022 at 10:47 AM

## 2022-01-02 NOTE — PROGRESS NOTES
Occupational Therapy   Occupational Therapy Initial Assessment- Med  Date: 2022   Patient Name: Subhash Mcintosh  MRN: [id number]     : 1940    Date of Service: 2022    Discharge Recommendations:  Home with Home health OT,Home with assist PRN       Assessment   Performance deficits / Impairments: Decreased functional mobility ; Decreased ADL status; Decreased strength;Decreased safe awareness;Decreased endurance;Decreased balance;Decreased high-level IADLs  Assessment: Pt is an [de-identified] male PLOF lives home alone, was I/MI with ADL and IADL, whom [facility] 2* COPD exacerbation, Bronchitis. Pt demo's the above resulting perf def/impair and would benefit from OT skilled services to maximize strength/end and safety/I with ADL for safe d/c transition. Treatment Diagnosis: COPD exacerbation, Bronchitis  Prognosis: Good  History: multi comorb  Exam: 7 perf def/impair  OT Education: OT Role;Plan of Care;Transfer Training  Patient Education: PLB techniques  REQUIRES OT FOLLOW UP: Yes  Safety Devices  Safety Devices in place: Yes  Type of devices: All fall risk precautions in place           Patient Diagnosis(es): The primary encounter diagnosis was COPD exacerbation ([facility] A diagnosis of Bronchitis was also pertinent to this visit.      has a past medical history of Abnormality of gait and mobility, Acute sinusitis, Anxiety, Aspiration into respiratory tract, Aspiration pneumonia (HCC), Bilateral pulmonary embolism (HCC), BPH (benign prostatic hyperplasia), COPD (chronic obstructive pulmonary disease) (HCC), Debility, Elevated CK, Foraminal stenosis of lumbosacral region, GERD (gastroesophageal reflux disease), History of asthma, HTN (hypertension), Hx of blood clots, Hyperlipidemia, Hypothyroidism, Insomnia, Lower extremity weakness, On home oxygen therapy, Osteoarthritis of spine with radiculopathy, lumbar region, Papillary thyroid carcinoma ([facility] Positive D dimer, Sleep apnea, Type 2 diabetes mellitus ([facility] and Vitamin D deficiency. has a past surgical history that includes knee surgery (Left, 1970); Thyroidectomy (2006); bronchoscopy (N/A, 11/11/2017); thoracotomy (Right, 11/14/2017); Colonoscopy; Endoscopy, colon, diagnostic; eye surgery; hernia repair (1990s); and pr arthroscopy shoulder surgical biceps tenodesis (Right, 9/18/2018).     Treatment Diagnosis: COPD exacerbation, Bronchitis      Restrictions  Restrictions/Precautions  Restrictions/Precautions: General Precautions  Required Braces or Orthoses?: No    Subjective   General  Chart Reviewed: Yes  Patient assessed for rehabilitation services?: Yes  Family / Caregiver Present: No  Referring Practitioner: Dr. Mj Mcadams  Diagnosis: COPD exacerbation, Bronchitis  Subjective  Subjective: Pt agreeable to OT eval/tx  Patient Currently in Pain: Denies  Pain Assessment  Pain Assessment: 0-10  Pain Level: 0  Vital Signs  Patient Currently in Pain: Denies  Social/Functional History  Social/Functional History  Lives With: Alone  Type of Home: House  Home Layout: One level  Home Access: Stairs to enter without rails  Entrance Stairs - Number of Steps: 1  Bathroom Shower/Tub: Tub/Shower unit,Shower chair without back  Bathroom Toilet: Handicap height  Bathroom Equipment: Grab bars in shower,Shower chair,Hand-held shower  Bathroom Accessibility: Walker accessible  Home Equipment: 4 wheeled walker,Rolling walker,Oxygen (was using 2L O2 at night)  Receives Help From: Family (dtr lives a mile down the road)  ADL Assistance: Independent  Homemaking Assistance: Independent  Homemaking Responsibilities: Yes ([employer] on Wheels)  Ambulation Assistance: Independent (with Rollator)  Transfer Assistance: Independent  Active : Yes  Mode of Transportation: [facility]  Occupation: Retired  Type of occupation: [employer]  Leisure & Hobbies: Watch and clock repairs       Objective   Vision: Impaired  Vision Exceptions: Wears glasses for reading  Hearing: Exceptions to [employer] Exceptions: Hard of hearing/hearing concerns;Bilateral hearing aid    Orientation  Overall Orientation Status: Within Functional Limits  Observation/Palpation  Observation: 2.5L O2 via NC- upped to 3L for ambulation- O2 sats dec'd 85% and 90% after each walk with ~30sec-1min recovery to 92% and above. Telemetry monitor, VASU YANEZ. Functional Mobility  Functional - Mobility Device: 4-Wheeled Walker  Activity: To/from bathroom; To/From therapy gym  Assist Level: Stand by assistance  Functional Mobility Comments: O2 sats dec'd to 85% and 90% after fxl mobs with ~30sec to 1min recovery to 92% and above  Toilet Transfers  Toilet - Technique: Ambulating  Equipment Used: Grab bars  Toilet Transfer: Stand by assistance  ADL  Feeding: Independent  Grooming: Stand by assistance;Supervision (standing at sink to wash hands)  UE Bathing: Modified independent   LE Bathing: Stand by assistance  UE Dressing: Modified independent   LE Dressing: Minimal assistance  Toileting: Contact guard assistance  Tone RUE  RUE Tone: Normotonic  Tone LUE  LUE Tone: Normotonic  Coordination  Movements Are Fluid And Coordinated: No  Coordination and Movement description: Decreased speed;Right UE;Left UE (\"I have arthritis in my shoulders\")     Bed mobility  Rolling to Left: Modified independent  Rolling to Right: Modified independent  Sit to Supine: Supervision (HOB elevated, use of BR)  Transfers  Sit to stand: Stand by assistance  Stand to sit: Stand by assistance              Sensation  Overall Sensation Status: WFL        LUE AROM (degrees)  LUE AROM : WFL  Left Hand AROM (degrees)  Left Hand AROM: WFL  RUE AROM (degrees)  RUE AROM : WFL  Right Hand AROM (degrees)  Right Hand AROM: WFL  LUE Strength  Gross LUE Strength: Exceptions to [facility]  L Shoulder Flex: 4-/5  RUE Strength  Gross RUE Strength: Exceptions to [facility]  R Shoulder Flex: 4-/5     Hand Dominance  Hand Dominance: Right             Plan   Plan  Times per week: 4-7x/wk  Times per day: Daily  Plan weeks: 3-5 days- med pt  Current Treatment Recommendations: Strengthening,ROM,Balance Training,Functional Mobility Training,Endurance Training,Safety Education & Training,Patient/Caregiver Education & Training,Self-Care / ADL,Home Management Training            Goals  Short term goals  Time Frame for Short term goals: 3-5 days- med pt  Short term goal 1: Tolerate 25-30min BUE ther ex/act, with O2 sats 90% and above, to inc strength/end for ADL  Short term goal 2: Spv toileting  Short term goal 3: Spv LB dressing and bathing  Short term goal 4: Spv light homemaking tasks  Long term goals  Time Frame for Long term goals : Same as STGs  Long term goal 1: Same as STGs  Long term goal 2: Same as STGs  Patient Goals   Patient goals :  To return home       Therapy Time   Individual Concurrent Group Co-treatment   Time In  9:45         Time Out  10:40         Minutes  [address] [place] [address]

## 2022-01-02 NOTE — PROGRESS NOTES
2nd IMM reviewed with patient and he verbalized understanding. Signature obtained/copy to pt/original on paper chart.

## 2022-01-03 VITALS
HEIGHT: 72 IN | SYSTOLIC BLOOD PRESSURE: 144 MMHG | RESPIRATION RATE: 16 BRPM | OXYGEN SATURATION: 95 % | BODY MASS INDEX: 28.15 KG/M2 | DIASTOLIC BLOOD PRESSURE: 78 MMHG | WEIGHT: 207.8 LBS | HEART RATE: 54 BPM | TEMPERATURE: 97.7 F

## 2022-01-03 LAB
ANION GAP SERPL CALCULATED.3IONS-SCNC: 11 MEQ/L (ref 9–15)
BASOPHILS ABSOLUTE: 0 K/UL (ref 0–0.1)
BASOPHILS RELATIVE PERCENT: 0.2 % (ref 0.2–1.2)
BUN BLDV-MCNC: 31 MG/DL (ref 8–23)
CALCIUM SERPL-MCNC: 8.5 MG/DL (ref 8.5–9.9)
CHLORIDE BLD-SCNC: 104 MEQ/L (ref 95–107)
CO2: 23 MEQ/L (ref 20–31)
CREAT SERPL-MCNC: 0.96 MG/DL (ref 0.7–1.2)
EKG ATRIAL RATE: 86 BPM
EKG P AXIS: 34 DEGREES
EKG P-R INTERVAL: 168 MS
EKG Q-T INTERVAL: 396 MS
EKG QRS DURATION: 92 MS
EKG QTC CALCULATION (BAZETT): 473 MS
EKG R AXIS: -14 DEGREES
EKG T AXIS: 38 DEGREES
EKG VENTRICULAR RATE: 86 BPM
EOSINOPHILS ABSOLUTE: 0 K/UL (ref 0–0.5)
EOSINOPHILS RELATIVE PERCENT: 0 % (ref 0.8–7)
GFR AFRICAN AMERICAN: >60
GFR NON-AFRICAN AMERICAN: >60
GLUCOSE BLD-MCNC: 155 MG/DL (ref 70–99)
HCT VFR BLD CALC: 40.9 % (ref 42–52)
HEMOGLOBIN: 12.8 G/DL (ref 13.7–17.5)
IMMATURE GRANULOCYTES #: 0.2 K/UL
IMMATURE GRANULOCYTES %: 2 %
LYMPHOCYTES ABSOLUTE: 1.3 K/UL (ref 1.3–3.6)
LYMPHOCYTES RELATIVE PERCENT: 11.1 %
MCH RBC QN AUTO: 29.2 PG (ref 25.7–32.2)
MCHC RBC AUTO-ENTMCNC: 31.3 % (ref 32.3–36.5)
MCV RBC AUTO: 93.4 FL (ref 79–92.2)
MONOCYTES ABSOLUTE: 0.3 K/UL (ref 0.3–0.8)
MONOCYTES RELATIVE PERCENT: 3 % (ref 5.3–12.2)
NEUTROPHILS ABSOLUTE: 9.4 K/UL (ref 1.8–5.4)
NEUTROPHILS RELATIVE PERCENT: 83.7 % (ref 34–67.9)
PDW BLD-RTO: 13.9 % (ref 11.6–14.4)
PLATELET # BLD: 216 K/UL (ref 163–337)
POTASSIUM SERPL-SCNC: 4.7 MEQ/L (ref 3.4–4.9)
RBC # BLD: 4.38 M/UL (ref 4.63–6.08)
SODIUM BLD-SCNC: 138 MEQ/L (ref 135–144)
WBC # BLD: 11.2 K/UL (ref 4.2–9)

## 2022-01-03 PROCEDURE — 94640 AIRWAY INHALATION TREATMENT: CPT

## 2022-01-03 PROCEDURE — 6370000000 HC RX 637 (ALT 250 FOR IP): Performed by: INTERNAL MEDICINE

## 2022-01-03 PROCEDURE — 2500000003 HC RX 250 WO HCPCS: Performed by: INTERNAL MEDICINE

## 2022-01-03 PROCEDURE — 36415 COLL VENOUS BLD VENIPUNCTURE: CPT

## 2022-01-03 PROCEDURE — 85025 COMPLETE CBC W/AUTO DIFF WBC: CPT

## 2022-01-03 PROCEDURE — 80048 BASIC METABOLIC PNL TOTAL CA: CPT

## 2022-01-03 PROCEDURE — 6360000002 HC RX W HCPCS: Performed by: INTERNAL MEDICINE

## 2022-01-03 PROCEDURE — 93010 ELECTROCARDIOGRAM REPORT: CPT | Performed by: INTERNAL MEDICINE

## 2022-01-03 PROCEDURE — 97535 SELF CARE MNGMENT TRAINING: CPT

## 2022-01-03 PROCEDURE — 2580000003 HC RX 258: Performed by: INTERNAL MEDICINE

## 2022-01-03 RX ORDER — PREDNISONE 20 MG/1
20 TABLET ORAL 2 TIMES DAILY
Qty: 10 TABLET | Refills: 0 | Status: SHIPPED | OUTPATIENT
Start: 2022-01-03 | End: 2022-01-08

## 2022-01-03 RX ORDER — GUAIFENESIN 600 MG/1
600 TABLET, EXTENDED RELEASE ORAL 2 TIMES DAILY
Qty: 10 TABLET | Refills: 0 | Status: SHIPPED | OUTPATIENT
Start: 2022-01-03 | End: 2022-01-08

## 2022-01-03 RX ORDER — DOXYCYCLINE HYCLATE 100 MG
100 TABLET ORAL 2 TIMES DAILY
Qty: 10 TABLET | Refills: 0 | Status: SHIPPED | OUTPATIENT
Start: 2022-01-03 | End: 2022-01-08

## 2022-01-03 RX ADMIN — IPRATROPIUM BROMIDE AND ALBUTEROL SULFATE 1 AMPULE: .5; 2.5 SOLUTION RESPIRATORY (INHALATION) at 05:44

## 2022-01-03 RX ADMIN — LEVOTHYROXINE SODIUM 137 MCG: 0.03 TABLET ORAL at 06:29

## 2022-01-03 RX ADMIN — MAGNESIUM OXIDE 200 MG: 400 TABLET ORAL at 08:34

## 2022-01-03 RX ADMIN — RIVAROXABAN 10 MG: 10 TABLET, FILM COATED ORAL at 08:34

## 2022-01-03 RX ADMIN — METHYLPREDNISOLONE SODIUM SUCCINATE 40 MG: 40 INJECTION, POWDER, FOR SOLUTION INTRAMUSCULAR; INTRAVENOUS at 08:34

## 2022-01-03 RX ADMIN — Medication 2000 UNITS: at 08:33

## 2022-01-03 RX ADMIN — DOXYCYCLINE 100 MG: 100 INJECTION, POWDER, LYOPHILIZED, FOR SOLUTION INTRAVENOUS at 06:30

## 2022-01-03 RX ADMIN — FINASTERIDE 5 MG: 5 TABLET, FILM COATED ORAL at 08:35

## 2022-01-03 RX ADMIN — Medication 10 ML: at 08:34

## 2022-01-03 RX ADMIN — ASPIRIN 81 MG 81 MG: 81 TABLET ORAL at 08:35

## 2022-01-03 ASSESSMENT — PAIN SCALES - GENERAL: PAINLEVEL_OUTOF10: 0

## 2022-01-03 NOTE — FLOWSHEET NOTE
Dc instructions provided to patient and his daughter. Patient IV access removed with no complications. New medications and pharmacy reviewed with patient. All questions answered. Patient aware of follow up appointments. Daughter present for dc instructions. Patient to main entrance via wheelchair and home portable o2.  Electronically signed by Farideh Hidalgo RN on 1/3/2022 at 2:42 PM

## 2022-01-03 NOTE — DISCHARGE INSTR - COC
Continuity of Care Form    Patient Name: Snehal Guo   :  [id number]  MRN:  [id number]    Admit date:  2021  Discharge date:  1/3/21    Code Status Order: Full Code   Advance Directives:      Admitting Physician:  Arcadio Gautam DO  PCP: Haider Hastings MD    Discharging Nurse: [facility] Unit/Room#: 7432/5201-23  Discharging Unit Phone Number: [phone]    Emergency Contact:   Extended Emergency Contact Information  Primary Emergency Contact: Weston Ron   [facility] of [address] Phone: [phone]  Relation: Legal Guardian  Secondary Emergency Contact: Ryan Riggins [facility] of [address] Phone: [phone]  Relation: Child    Past Surgical History:  Past Surgical History:   Procedure Laterality Date    BRONCHOSCOPY N/A 2017    BRONCHOSCOPY FIBEROPTIC performed by Daymon Boxer, MD at [facility], COLON, [address] with IOL OU    HERNIA REPAIR  4611V    repair umbilical hernia    KNEE SURGERY Left 1970    RI ARTHROSCOPY SHOULDER SURGICAL BICEPS TENODESIS Right 2018    RIGHT SHOULDER ARTHROSCOPY SUBACROMIAL DECOMPRESS WITH POSSIBLE BICEPS TENODESIS ARTHROSCOPIC [place] C- ARM ARTHREX BICEPS TENODESIS TRAY [address] CASE [address] 1 HOUR performed by Anita Puckett MD at [place] [address] Right 2017    RIGHT THORACOTOMY WEDGE RESECTION FOR FOREIGN BODY AND FOB DOUBLE LUMEN (1ST CASE) performed by Julia Kohler MD at [address] [place] [address] DrDesirae      cancer / no chemo or radiation       Immunization History:   Immunization History   Administered Date(s) Administered    Influenza Virus Vaccine 2013, 09/15/2017    Influenza, Quadv, 6 mo and older, IM, PF (Flulaval, Fluarix) 2018    Pneumococcal Polysaccharide ([id number]) 2016       Active Problems:  Patient Active Problem List   Diagnosis Code    [facility] ([facility] J44.9 HTN (hypertension) I10    Hyperlipidemia E78.5    GERD (gastroesophageal reflux disease) K21.9    Hypothyroidism E03.9    Type 2 diabetes mellitus (HCC) E11.9    BPH (benign prostatic hyperplasia) N40.0    Anxiety F41.9    Insomnia G47.00    Elevated PSA R97.20    Osteoarthritis of spine with radiculopathy, 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    Foraminal stenosis of lumbosacral region M48.07    Vitamin D deficiency E55.9    Debility R53.81    On home oxygen therapy Z99.81    Hypoxemia R09.02    Bilateral pulmonary embolism (HCC) I26.99    Gait abnormality R26.9    Abnormality of gait and mobility due to hypoxemia secondary to bilateral pulmonary emboli, [facility] admit 01/12/18  R26.9    GEORGES (obstructive sleep apnea) G47.33    Current use of long term anticoagulation Z79.01    Past use of tobacco Z87.891    Arthritis of right shoulder region M19.011    Arthritis of shoulder M19.019    History of pulmonary embolism Z86.711    S/P thoracotomy, right Z98.890    Asthma J45.909    Thyroid cancer ([facility] C73    History of aspiration pneumonia Z87.01    Obesity (BMI 30-39. 9) E66.9    History of total left knee replacement Z96.652    Abnormality of gait and mobility due to Right Shoulder OA S/P Right Shoulder Arthroscopy Subacromial Decompression with Biceps. [facility] admit 09/19/18.  R26.9    Hx of long term use of blood thinners Z92.29    Primary osteoarthritis involving multiple joints M89.49    Pulmonary embolus (HCC) I26.99    Acute deep vein thrombosis (DVT) of femoral vein of left lower extremity ([facility]) I82.412    COPD exacerbation ([facility]) J44.1       Isolation/Infection:   Isolation            No Isolation          Patient Infection Status       Infection Onset Added Last Indicated Last Indicated By Review Planned Expiration Resolved Resolved By    None active    Resolved    COVID-19 (Rule Out) 12/31/21 12/31/21 12/31/21 COVID-19, Rapid (Ordered)   12/31/21 Rule-Out Test Resulted    COVID-19 (Rule Out) 10/19/20 10/19/20 10/19/20 Covid-19 Ambulatory (Ordered)   10/21/20 Rule-Out Test Resulted            Nurse Assessment:  Last Vital Signs: BP (!) 144/78   Pulse 54   Temp 97.7 °F (36.5 °C) (Oral)   Resp 16   Ht 6' (1.829 m)   Wt 207 lb 12.8 oz (94.3 kg)   SpO2 95%   BMI 28.18 kg/m²     Last documented pain score (0-10 scale): Pain Level: 0  Last Weight:   Wt Readings from Last 1 Encounters:   12/31/21 207 lb 12.8 oz (94.3 kg)     Mental Status:  oriented and alert    IV Access:  - None    Nursing Mobility/ADLs:  Walking   Assisted  Transfer  Assisted  Bathing  Assisted  Dressing  Independent  Toileting  Independent  Feeding  [address]  Independent  Med Delivery   whole    Wound Care Documentation and Therapy:        Elimination:  Continence: Bowel: Yes  Bladder: Yes  Urinary Catheter: None   Colostomy/Ileostomy/Ileal Conduit: No       Date of Last BM: 11/2/21    Intake/Output Summary (Last 24 hours) at 1/3/2022 1311  Last data filed at 1/3/2022 0916  Gross per 24 hour   Intake 240 ml   Output 1200 ml   Net -960 ml     I/O last 3 completed shifts: In: 480 [P.O.:360; I.V.:120]  Out: 800 [Urine:800]    Safety Concerns: At Risk for Falls    Impairments/Disabilities:      None    Nutrition Therapy:  Current Nutrition Therapy:   - Oral Diet:  General    Routes of Feeding: Oral  Liquids: Thin Liquids  Daily Fluid Restriction: no  Last Modified Barium Swallow with Video (Video Swallowing Test): not done    Treatments at the Time of Hospital Discharge:   Respiratory Treatments: YES see MAR  Oxygen Therapy:  is on oxygen at 2 L/min per nasal cannula.   Ventilator:    - No ventilator support    Rehab Therapies: Physical Therapy and Occupational Therapy  Weight Bearing Status/Restrictions: No weight bearing restirctions  Other Medical Equipment (for information only, NOT a DME order):  walker  Other Treatments: NA    Patient's personal belongings (please select all that are sent with patient):       RN SIGNATURE:  Electronically signed by Mary Jo Emery RN on 1/3/22 at 1:56 PM EST    CASE MANAGEMENT/SOCIAL WORK SECTION    Inpatient Status Date: 21    Readmission Risk Assessment Score:  Readmission Risk              Risk of Unplanned Readmission:  20           Discharging to Facility/ Agency   Name: [facility]   Address: [address]   Phone: [phone]  Fax: [phone]    / signature: Electronically signed by RODRICK Ennis on 1/3/2022 at 1:12 PM      PHYSICIAN SECTION    Prognosis: {Prognosis:6773744137}    Condition at Discharge: Juan F Eng Patient Condition:[id number]}    Rehab Potential (if transferring to Rehab): {Prognosis:7324961355}    Recommended Labs or Other Treatments After Discharge: ***    Physician Certification: I certify the above information and transfer of Garima Trinidad  is necessary for the continuing treatment of the diagnosis listed and that he requires {Admit to Appropriate Level of Care:34417} for {GREATER/LESS:206035062} 30 days.      Update Admission H&P: {CHP DME Changes in BOOU}    PHYSICIAN SIGNATURE:  {Esignature:[id number]}

## 2022-01-03 NOTE — PROGRESS NOTES
Occupational Therapy  Facility/Department: [facility] MED SURG UNIT  Daily Treatment Note  NAME: Snehal Guo  :   MRN: [id number]    Date of Service: 1/3/2022    Discharge Recommendations:  Home with Home health OT,Home with assist PRN       Assessment   Performance deficits / Impairments: Decreased functional mobility ; Decreased ADL status; Decreased strength;Decreased safe awareness;Decreased endurance;Decreased balance;Decreased high-level IADLs  Assessment: Pt was agreeable to ADL. Sink bath 2* pt with telemetry monitor on. Pt did well bathing self after setup at MI/Spv level. Pt also shaved his birch I'ly. Pt performed dressing tasks at Spv/MI level as well. Treatment Diagnosis: COPD exacerbation, Bronchitis  Prognosis: Good  REQUIRES OT FOLLOW UP: Yes  Safety Devices  Safety Devices in place: Yes  Type of devices: All fall risk precautions in place         Patient Diagnosis(es): The primary encounter diagnosis was COPD exacerbation ([facility] A diagnosis of Bronchitis was also pertinent to this visit. has a past medical history of Abnormality of gait and mobility, Acute sinusitis, Anxiety, Aspiration into respiratory tract, Aspiration pneumonia (HCC), Bilateral pulmonary embolism (HCC), BPH (benign prostatic hyperplasia), COPD (chronic obstructive pulmonary disease) (HCC), Debility, Elevated CK, Foraminal stenosis of lumbosacral region, GERD (gastroesophageal reflux disease), History of asthma, HTN (hypertension), Hx of blood clots, Hyperlipidemia, Hypothyroidism, Insomnia, Lower extremity weakness, On home oxygen therapy, Osteoarthritis of spine with radiculopathy, lumbar region, Papillary thyroid carcinoma ([address] Positive D dimer, Sleep apnea, Type 2 diabetes mellitus ([address] and Vitamin D deficiency. has a past surgical history that includes knee surgery (Left, ); Thyroidectomy (); bronchoscopy (N/A, 2017); thoracotomy (Right, 2017);  Colonoscopy; Endoscopy, colon, diagnostic; eye surgery; hernia repair (1990s); and pr arthroscopy shoulder surgical biceps tenodesis (Right, 9/18/2018).     Restrictions  Restrictions/Precautions  Restrictions/Precautions: General Precautions  Required Braces or Orthoses?: No     Subjective   General  Chart Reviewed: Yes  Patient assessed for rehabilitation services?: Yes  Family / Caregiver Present: No  Referring Practitioner: Dr. Jesus Fournier  Diagnosis: COPD exacerbation, Bronchitis  Subjective  Subjective: Pt agreeable to bathing  Pain Assessment  Pain Assessment: 0-10  Pain Level: 0  Vital Signs  Level of Consciousness: Alert (0)  Patient Currently in Pain: No  Oxygen Therapy  O2 Device: Nasal cannula     Objective    ADL  Grooming: Setup;Modified independent  (shaving seated at sink, comb hair, wash face)  UE Bathing: Modified independent   LE Bathing: Supervision  UE Dressing: Modified independent   LE Dressing: Setup;Supervision        Functional Mobility  Functional - Mobility Device: 4-Wheeled Walker  Activity: To/from bathroom  Assist Level: Supervision  Bed mobility  Supine to Sit: Modified independent (use of BR)  Sit to Supine: Modified independent (use of BR)  Transfers  Sit to stand: Supervision  Stand to sit: Supervision                      Plan   Plan  Times per week: 4-7x/wk  Times per day: Daily  Plan weeks: 3-5 days- med pt  Current Treatment Recommendations: Strengthening,ROM,Balance Training,Functional Mobility Training,Endurance Training,Safety Education & Training,Patient/Caregiver Education & Training,Self-Care / ADL,Home Management Training          Goals  Short term goals  Time Frame for Short term goals: 3-5 days- med pt  Short term goal 1: Tolerate 25-30min BUE ther ex/act, with O2 sats 90% and above, to inc strength/end for ADL  Short term goal 2: Spv toileting  Short term goal 3: Spv LB dressing and bathing  Short term goal 4: Spv light homemaking tasks  Long term goals  Time Frame for Long term goals : Same as STGs  Long term goal 1: Same as STGs  Long term goal 2: Same as STGs  Patient Goals   Patient goals :  To return home       Therapy Time   Individual Concurrent Group Co-treatment   Time In  11:15         Time Out  12:00         Minutes  [address] [place], [place]

## 2022-01-03 NOTE — DISCHARGE SUMMARY
Discharge Summary    Patient:  Dain Calvert  YOB: 1940    MRN: [id number]   Acct: [de-identified]    Primary Care Physician: Sixto Banuelos MD    Admit date:  12/31/2021    Discharge date:   01/03/22      Discharge Diagnoses:   <principal problem not specified>  Active Problems:    COPD exacerbation ([address]  Resolved Problems:    * No resolved hospital problems. *      Admitted for: [facility] Course: patient was admitted with dyspnea, acute respiratory failure. Was treated for COPD exacerbation and had marked improvement with IV atbs/steroids. After completing his acute stay will be DC home to complete PO atbs/steroids x 5 more days.  has home O2 to continue after DC     Consultants:      Discharge Medications:       Medication List      START taking these medications    doxycycline hyclate 100 MG tablet  Commonly known as: VIBRA-TABS  Take 1 tablet by mouth 2 times daily for 5 days     guaiFENesin 600 MG extended release tablet  Commonly known as: MUCINEX  Take 1 tablet by mouth 2 times daily for 5 days     predniSONE 20 MG tablet  Commonly known as: DELTASONE  Take 1 tablet by mouth 2 times daily for 5 days        CONTINUE taking these medications    * albuterol (2.5 MG/3ML) 0.083% nebulizer solution  Commonly known as: PROVENTIL  Use 1 vial via nebulizer every 6 hours as needed for Wheezing     * albuterol sulfate  (90 Base) MCG/ACT inhaler     alfuzosin 10 MG extended release tablet  Commonly known as: UROXATRAL     aspirin 81 MG EC tablet  Take 1 tablet by mouth daily     clonazePAM 0.5 MG tablet  Commonly known as: KLONOPIN     CPAP Machine Misc  by Does not apply route Auto CPAP  5-20 cm     docusate sodium 100 MG capsule  Commonly known as: COLACE     finasteride 5 MG tablet  Commonly known as: PROSCAR  Take 1 tablet by mouth daily     levothyroxine 137 MCG tablet  Commonly known as: SYNTHROID     magnesium 30 MG tablet     mirtazapine 15 MG tablet  Commonly known as: REMERON     PEPCID AC PO     psyllium 58.12 % Pack packet  Commonly known as: METAMUCIL  Take 1 packet by mouth daily as needed (constipation)     Respiratory Therapy Supplies [place]  New CPAP mask and supplies     rivaroxaban 10 MG Tabs tablet  Commonly known as: XARELTO  Take 1 tablet by mouth daily (with breakfast)     rosuvastatin 20 MG tablet  Commonly known as: CRESTOR     Symbicort 160-4.5 MCG/ACT Aero  Generic drug: budesonide-formoterol     tamsulosin 0.4 MG capsule  Commonly known as: FLOMAX  Take 1 capsule by mouth nightly     vitamin D 1000 UNIT Tabs tablet  Commonly known as: CHOLECALCIFEROL  Take 2 tablets by mouth daily     Zoloft 50 MG tablet  Generic drug: sertraline         * This list has 2 medication(s) that are the same as other medications prescribed for you. Read the directions carefully, and ask your doctor or other care provider to review them with you.             STOP taking these medications    pravastatin 20 MG tablet  Commonly known as: PRAVACHOL           Where to Get Your Medications      These medications were sent to [address] [place], [place] [id number] [place] [id number]    Phone: [phone]   · doxycycline hyclate 100 MG tablet  · guaiFENesin 600 MG extended release tablet  · predniSONE 20 MG tablet         Physical Exam:    Vitals:  Vitals:    01/02/22 1634 01/02/22 2030 01/02/22 2115 01/03/22 1113   BP: (!) 147/72 128/69  (!) 144/78   Pulse: 57 57  54   Resp:  18 18 16   Temp:    97.7 °F (36.5 °C)   TempSrc:    Oral   SpO2: 96% 96% 95% 95%   Weight:       Height:         Weight: Weight: 207 lb 12.8 oz (94.3 kg)     24 hour intake/output:    Intake/Output Summary (Last 24 hours) at 1/3/2022 1213  Last data filed at 1/3/2022 0916  Gross per 24 hour   Intake 240 ml   Output 1200 ml   Net -960 ml       General appearance - alert, well appearing, and in no distress  Chest - clear to auscultation, no wheezes, rales or rhonchi, symmetric air entry  Heart - normal rate, regular rhythm, normal S1, S2, no murmurs, rubs, clicks or gallops  Abdomen - soft, nontender, nondistended, no masses or organomegaly  Obese: No; Protuberant: No   Neurological - alert, oriented, normal speech, no focal findings or movement disorder noted  Extremities - peripheral pulses normal, no pedal edema, no clubbing or cyanosis  Skin - normal coloration and turgor, no rashes, no suspicious skin lesions noted        Radiology reports as per the Radiologist  Radiology: XR CHEST PORTABLE    Result Date: 12/31/2021  EXAMINATION:  CHEST RADIOGRAPH (PORTABLE SINGLE AP VIEW) Exam Date/Time:  12/31/2021 12:51 PM Clinical History:   sob/ copd Comparison:  Chest radiograph 9/2/2021  FINDINGS: Cardiomediastinal silhouette:  Stable heart size within normal limits. Lungs and pleura:  Redemonstrated eventration of the right hemidiaphragm with right lower lung zone hazy opacities, likely representing atelectasis. Otherwise, there are no focal consolidations, pleural effusions or pneumothorax. No acute cardiopulmonary process. Results for orders placed or performed during the hospital encounter of 12/31/21   Culture, Blood 1    Specimen: Blood   Result Value Ref Range    Blood Culture, Routine       No Growth to date. Any change in status will be called. Culture, Blood 2    Specimen: Blood   Result Value Ref Range    Culture, Blood 2       No Growth to date. Any change in status will be called.    COVID-19, Rapid    Specimen: Nasopharyngeal Swab   Result Value Ref Range    SARS-CoV-2, NAAT Not Detected Not Detected   Protime-INR   Result Value Ref Range    Protime 18.6 (H) 12.3 - 14.9 sec    INR 1.6    Brain Natriuretic Peptide   Result Value Ref Range    Pro- pg/mL   CBC Auto Differential   Result Value Ref Range    WBC 7.4 4.2 - 9.0 K/uL    RBC 4.57 (L) 4.63 - 6.08 M/uL    Hemoglobin 13.3 (L) 13.7 - 17.5 g/dL    Hematocrit 42.5 42.0 - 52.0 % 19 0 - 41 U/L    AST 25 0 - 40 U/L    Globulin 4.6 (H) 2.3 - 3.5 g/dL   Microscopic Urinalysis   Result Value Ref Range    WBC, UA 0-2 0 - 5 /HPF    RBC, UA >100 (A) 0 - 2 /HPF    Epithelial Cells, UA 0-2 /HPF    Bacteria, UA Negative Negative /HPF   Troponin   Result Value Ref Range    Troponin <0.010 0.000 - 0.010 ng/mL   Basic Metabolic Panel w/ Reflex to MG   Result Value Ref Range    Sodium 140 135 - 144 mEq/L    Potassium reflex Magnesium 4.1 3.4 - 4.9 mEq/L    Chloride 104 95 - 107 mEq/L    CO2 21 20 - 31 mEq/L    Anion Gap 15 9 - 15 mEq/L    Glucose 156 (H) 70 - 99 mg/dL    BUN 18 8 - 23 mg/dL    CREATININE 0.97 0.70 - 1.20 mg/dL    GFR Non-African American >60.0 >60    GFR  >60.0 >60    Calcium 8.4 (L) 8.5 - 9.9 mg/dL   CBC   Result Value Ref Range    WBC 8.1 4.2 - 9.0 K/uL    RBC 4.38 (L) 4.63 - 6.08 M/uL    Hemoglobin 12.6 (L) 13.7 - 17.5 g/dL    Hematocrit 40.1 (L) 42.0 - 52.0 %    MCV 91.6 79.0 - 92.2 fL    MCH 28.8 25.7 - 32.2 pg    MCHC 31.4 (L) 32.3 - 36.5 %    RDW 13.5 11.6 - 14.4 %    Platelets 690 091 - 284 K/uL   Troponin   Result Value Ref Range    Troponin <0.010 0.000 - 0.010 ng/mL   CBC Auto Differential   Result Value Ref Range    WBC 9.7 (H) 4.2 - 9.0 K/uL    RBC 3.57 (L) 4.63 - 6.08 M/uL    Hemoglobin 10.4 (L) 13.7 - 17.5 g/dL    Hematocrit 38.4 (L) 42.0 - 52.0 %    .6 (H) 79.0 - 92.2 fL    MCH 29.1 25.7 - 32.2 pg    MCHC 27.1 (L) 32.3 - 36.5 %    RDW 14.6 (H) 11.6 - 14.4 %    Platelets 899 156 - 014 K/uL    PLATELET SLIDE REVIEW Adequate     SLIDE REVIEW see below     Neutrophils % 86.6 (H) 34.0 - 67.9 %    Immature Granulocytes % 1.1 %    Lymphocytes % 9.5 %    Monocytes % 2.7 (L) 5.3 - 12.2 %    Eosinophils % 0.0 (L) 0.8 - 7.0 %    Basophils % 0.1 (L) 0.2 - 1.2 %    Neutrophils Absolute 8.4 (H) 1.8 - 5.4 K/uL    Immature Granulocytes # 0.1 K/uL    Lymphocytes Absolute 0.9 (L) 1.3 - 3.6 K/uL    Monocytes Absolute 0.3 0.3 - 0.8 K/uL    Eosinophils Absolute 0.0 0.0 - 0.5 K/uL    Basophils Absolute 0.0 0.0 - 0.1 K/uL    Macrocytes 1+    Basic Metabolic Panel   Result Value Ref Range    Sodium 139 135 - 144 mEq/L    Potassium 4.3 3.4 - 4.9 mEq/L    Chloride 105 95 - 107 mEq/L    CO2 22 20 - 31 mEq/L    Anion Gap 12 9 - 15 mEq/L    Glucose 132 (H) 70 - 99 mg/dL    BUN 28 (H) 8 - 23 mg/dL    CREATININE 0.95 0.70 - 1.20 mg/dL    GFR Non-African American >60.0 >60    GFR  >60.0 >60    Calcium 8.4 (L) 8.5 - 9.9 mg/dL   Basic Metabolic Panel   Result Value Ref Range    Sodium 138 135 - 144 mEq/L    Potassium 4.7 3.4 - 4.9 mEq/L    Chloride 104 95 - 107 mEq/L    CO2 23 20 - 31 mEq/L    Anion Gap 11 9 - 15 mEq/L    Glucose 155 (H) 70 - 99 mg/dL    BUN 31 (H) 8 - 23 mg/dL    CREATININE 0.96 0.70 - 1.20 mg/dL    GFR Non-African American >60.0 >60    GFR  >60.0 >60    Calcium 8.5 8.5 - 9.9 mg/dL   CBC Auto Differential   Result Value Ref Range    WBC 11.2 (H) 4.2 - 9.0 K/uL    RBC 4.38 (L) 4.63 - 6.08 M/uL    Hemoglobin 12.8 (L) 13.7 - 17.5 g/dL    Hematocrit 40.9 (L) 42.0 - 52.0 %    MCV 93.4 (H) 79.0 - 92.2 fL    MCH 29.2 25.7 - 32.2 pg    MCHC 31.3 (L) 32.3 - 36.5 %    RDW 13.9 11.6 - 14.4 %    Platelets 469 778 - 748 K/uL    Neutrophils % 83.7 (H) 34.0 - 67.9 %    Immature Granulocytes % 2.0 %    Lymphocytes % 11.1 %    Monocytes % 3.0 (L) 5.3 - 12.2 %    Eosinophils % 0.0 (L) 0.8 - 7.0 %    Basophils % 0.2 0.2 - 1.2 %    Neutrophils Absolute 9.4 (H) 1.8 - 5.4 K/uL    Immature Granulocytes # 0.2 K/uL    Lymphocytes Absolute 1.3 1.3 - 3.6 K/uL    Monocytes Absolute 0.3 0.3 - 0.8 K/uL    Eosinophils Absolute 0.0 0.0 - 0.5 K/uL    Basophils Absolute 0.0 0.0 - 0.1 K/uL   EKG 12 Lead - Chest Pain   Result Value Ref Range    Ventricular Rate 65 BPM    Atrial Rate 65 BPM    P-R Interval 168 ms    QRS Duration 90 ms    Q-T Interval 420 ms    QTc Calculation (Bazett) 436 ms    P Axis 26 degrees    R Axis -19 degrees    T Axis 35 degrees   EKG 12 Lead   Result Value Ref Range    Ventricular Rate 86 BPM    Atrial Rate 86 BPM    P-R Interval 168 ms    QRS Duration 92 ms    Q-T Interval 396 ms    QTc Calculation (Bazett) 473 ms    P Axis 34 degrees    R Axis -14 degrees    T Axis 38 degrees       Diet:  ADULT DIET;  Regular    Activity:  Activity as tolerated (Patient may move about with assist as indicated or with supervision.)    Follow-up:  in 1 weeks with Waqar Scott MD,      Disposition: home    Condition: Stable    Time Spent: 55 minutes    Electronically signed by Xavi Fenton MD on 1/3/2022 at 12:13 PM    Discharging Hospitalist

## 2022-01-03 NOTE — PROGRESS NOTES
[facility] Initial Discharge Assessment    Met with Patient to discuss discharge plan. PCP: Zuly Díaz MD                                  Date of Last Visit: \" two months ago\"    If no PCP, list provided? N/A    Discharge Planning    Living Arrangements: independently at home    Who do you live with? self    Who helps you with your care:  Patient reports being independent for ADLS and being an active . Daughter Kj Auguste is reported to be supportive and able to aid with care needs     If lives at home:     Do you have any barriers navigating in your home? no    Patient can perform ADL? Yes    Current Services (outpatient and in home) :  [employer] Whe[employer] ([address] office on Aging )    Dialysis: No    Is transportation available to get to your appointments? Yes    DME Equipment:  yes - rollator, shower bench and grab bars, oxygen    Respiratory equipment: PRN/HS Oxygen 2 Liters Liters    Respiratory provider:  yes - Medical Services      Pharmacy:  yes - [facility] to afford cost of medication: Yes    Does patient feel safe at home? Yes    Does patient identify any home going needs? Yes, [address]     Patient agreeable to [address]? Yes, [address]     Patient agreeable to SNF/Rehab? N/A    Other discharge needs identified? N/A- Patient identifies no further DME or help at home needs at this time beyond [address]     Was Freedom of Choice Provided? Yes    Initial Discharge Plan? (Note: please see concurrent daily documentation for any updates after initial note). Chart reviewed. Patient was admitted to [facility] with a diagnosis of CHF exacerbation. Patient evaluated patient PT/OT and therapies recommending [address] upon DC. This  met with patient to discuss DC planning and help at home needs. Upon visit patient is alert and oriented to person, place, and situation. Patient reports residing at home independently and being able to care for own ADLS. Patient reports that he is an active . Daughter Meera Domínguez is reported to be supportive and Deandra Meraz lives a mile down the road. \"  Patient reports no DME needs at this time. Patient reports that he does have home O2 through [employer] service company and receives meals on wheels. Patient reports that he had [facility] in the past and would be agreeable to have [facility] upon DC. [place] of choice provided. This  contacted [address] [place] [address] with new referral.  [facility] to follow patient upon DC. NARGIS completed. SS to continue to follow as needed while patient is at [facility] [place].     Electronically signed by RODRICK Ennis on 1/3/2022 at 1:04 PM

## 2022-01-06 LAB
BLOOD CULTURE, ROUTINE: NORMAL
CULTURE, BLOOD 2: NORMAL

## 2022-01-11 RX ORDER — ALBUTEROL SULFATE 90 UG/1
2 AEROSOL, METERED RESPIRATORY (INHALATION) EVERY 4 HOURS PRN
Qty: 1 EACH | Refills: 3 | Status: SHIPPED | OUTPATIENT
Start: 2022-01-11

## 2022-01-21 DIAGNOSIS — J44.9 CHRONIC OBSTRUCTIVE PULMONARY DISEASE, UNSPECIFIED (HCC): ICD-10-CM

## 2022-01-21 RX ORDER — ALBUTEROL SULFATE 2.5 MG/3ML
SOLUTION RESPIRATORY (INHALATION)
Qty: 360 ML | Refills: 3 | Status: SHIPPED | OUTPATIENT
Start: 2022-01-21

## 2022-02-15 ENCOUNTER — OFFICE VISIT (OUTPATIENT)
Dept: PULMONOLOGY | Age: 82
End: 2022-02-15
Payer: MEDICARE

## 2022-02-15 VITALS
DIASTOLIC BLOOD PRESSURE: 67 MMHG | BODY MASS INDEX: 29.39 KG/M2 | SYSTOLIC BLOOD PRESSURE: 108 MMHG | HEART RATE: 68 BPM | OXYGEN SATURATION: 99 % | WEIGHT: 217 LBS | HEIGHT: 72 IN | TEMPERATURE: 98 F

## 2022-02-15 DIAGNOSIS — Z99.81 ON HOME OXYGEN THERAPY: ICD-10-CM

## 2022-02-15 DIAGNOSIS — J44.9 CHRONIC OBSTRUCTIVE PULMONARY DISEASE, UNSPECIFIED COPD TYPE (HCC): Primary | ICD-10-CM

## 2022-02-15 DIAGNOSIS — E66.9 OBESITY (BMI 30-39.9): ICD-10-CM

## 2022-02-15 DIAGNOSIS — G47.33 OSA (OBSTRUCTIVE SLEEP APNEA): ICD-10-CM

## 2022-02-15 DIAGNOSIS — I26.99 BILATERAL PULMONARY EMBOLISM (HCC): ICD-10-CM

## 2022-02-15 PROCEDURE — 3023F SPIROM DOC REV: CPT | Performed by: INTERNAL MEDICINE

## 2022-02-15 PROCEDURE — G8417 CALC BMI ABV UP PARAM F/U: HCPCS | Performed by: INTERNAL MEDICINE

## 2022-02-15 PROCEDURE — 1036F TOBACCO NON-USER: CPT | Performed by: INTERNAL MEDICINE

## 2022-02-15 PROCEDURE — 4040F PNEUMOC VAC/ADMIN/RCVD: CPT | Performed by: INTERNAL MEDICINE

## 2022-02-15 PROCEDURE — 1123F ACP DISCUSS/DSCN MKR DOCD: CPT | Performed by: INTERNAL MEDICINE

## 2022-02-15 PROCEDURE — 99214 OFFICE O/P EST MOD 30 MIN: CPT | Performed by: INTERNAL MEDICINE

## 2022-02-15 PROCEDURE — G8484 FLU IMMUNIZE NO ADMIN: HCPCS | Performed by: INTERNAL MEDICINE

## 2022-02-15 PROCEDURE — G8428 CUR MEDS NOT DOCUMENT: HCPCS | Performed by: INTERNAL MEDICINE

## 2022-02-15 RX ORDER — BUDESONIDE AND FORMOTEROL FUMARATE DIHYDRATE 160; 4.5 UG/1; UG/1
2 AEROSOL RESPIRATORY (INHALATION) 2 TIMES DAILY
Qty: 3 EACH | Refills: 3 | Status: SHIPPED | OUTPATIENT
Start: 2022-02-15 | End: 2022-06-07 | Stop reason: SDUPTHER

## 2022-02-15 ASSESSMENT — ENCOUNTER SYMPTOMS
CHEST TIGHTNESS: 0
VOICE CHANGE: 0
NAUSEA: 0
VOMITING: 0
WHEEZING: 1
DIARRHEA: 0
COUGH: 1
ABDOMINAL PAIN: 0
RHINORRHEA: 0
SHORTNESS OF BREATH: 1
EYE ITCHING: 0
SORE THROAT: 0

## 2022-02-15 NOTE — PROGRESS NOTES
Subjective:     Angel Whitmore is a [de-identified] y.o. male who complains today of:     Chief Complaint   Patient presents with    COPD     4 month f/u    Sleep Apnea       HPI  He said he restarted using CPAP  5-15 cm  Again. He is using it for 7-8 hour. He is using CPAP for about   8 -9  hours every night. He is using CPAP with   Full face Mask. He said  sleep is restful with the CPAP use. He is compliant with CPAP therapy and benefiting with CPAP use. No complaint of daytime sleepiness or tiredness with CPAP use. He denies taking naps. No sleepiness with driving. I reviewed compliance report with patient regarding CPAP therapy. He is using  CPAP for 29  days out of 30 days  Average usage of days used is 7  hours and 21 min , average AHI 4.5  with CPAP use. He is using symbicort  160-4.5 mcg   2 puff  ,nebulizer albuterol and albuterol HFA prn .   C/o shortness of breath with exertion. Occasional Wheezing. C/o Cough with  Clear Sputum. No Hemoptysis. No Chest tightness. No Chest pain with radiation  or pleuritic pain. No  leg edema. No orthopnea. No Fever or chills. No Rhinorrhea and postnasal drip. Allergies:  Patient has no known allergies.   Past Medical History:   Diagnosis Date    Abnormality of gait and mobility     Acute sinusitis     Anxiety     Aspiration into respiratory tract 11/10/2017    Aspiration pneumonia (HCC)     Bilateral pulmonary embolism (HCC) 1/6/2018    BPH (benign prostatic hyperplasia)     COPD (chronic obstructive pulmonary disease) ([facility] [address] 12/5/2013    Debility     Elevated CK 12/30/2013    Foraminal stenosis of lumbosacral region 6/7/2016    GERD (gastroesophageal reflux disease) 12/11/2014    History of asthma 1/13/2014    HTN (hypertension) 1/13/2014    past braden / no current meds    Hx of blood clots 01/2018    DVT  ( unsure which leg but both were swollen ) -> PE -> thus Xarelto    Hyperlipidemia     meds > 10 yrs    Hypothyroidism  Insomnia     Lower extremity weakness 2014    On home oxygen therapy     2L at night    Osteoarthritis of spine with radiculopathy, lumbar region 2016    Papillary thyroid carcinoma ([facility] [address] 2006    no trx to follow    Positive D dimer 2013    Sleep apnea 2014    Type 2 diabetes mellitus ([facility] [address]     diet control  / no meds    Vitamin D deficiency      Past Surgical History:   Procedure Laterality Date    BRONCHOSCOPY N/A 2017    BRONCHOSCOPY FIBEROPTIC performed by Milus Goodell, MD at [address] [place] [address] COLONOSCOPY      ENDOSCOPY, COLON, DIAGNOSTIC      EYE SURGERY      phaco with IOL OU    HERNIA REPAIR  [id number]    repair umbilical hernia    KNEE SURGERY Left 1970    DC ARTHROSCOPY SHOULDER SURGICAL BICEPS TENODESIS Right 2018    RIGHT SHOULDER ARTHROSCOPY SUBACROMIAL DECOMPRESS WITH POSSIBLE BICEPS TENODESIS ARTHROSCOPIC FRANSISCA C- ARM ARTHREX BICEPS TENODESIS [facility] CHAIR CASE #1 1 HOUR performed by Joanne Mohr MD at [address] [place] [address] Right 2017    RIGHT THORACOTOMY WEDGE RESECTION FOR FOREIGN BODY AND FOB DOUBLE LUMEN (1ST CASE) performed by Denise Giordano MD at [address] [place] [address]      cancer / no chemo or radiation     Family History   Problem Relation Age of Onset    Other Mother 80        Old Age    Stroke Father 45    No Known Problems Sister     Psoriasis Daughter     No Known Problems Son      Social History     Socioeconomic History    Marital status:       Spouse name: Not on file    Number of children: 2    Years of education: Not on file    Highest education level: Not on file   Occupational History    Occupation: retired   Tobacco Use    Smoking status: Former Smoker     Packs/day: 1.00     Years: 40.00     Pack years: 40.00     Types: Cigarettes     Start date: 2000     Quit date:      Years since quittin.1    Smokeless tobacco: Former User    Tobacco comment: quit  Vaping Use    Vaping Use: Never used   Substance and Sexual Activity    Alcohol use: Yes     Alcohol/week: 0.0 standard drinks     Comment: rare social    Drug use: No    Sexual activity: Not on file   Other Topics Concern    Not on file   Social History Narrative    The patient lives alone in a one story home with one step to enter the home. The bedroom and bathroom are on the first floor. His social support includes his children. He has a wheeled walker, rollator, cane and dressing assistive device at home. He was receiving home health care services prior to admission to include PT, OT and RN. He does not have history of falls prior to admission. He was independent with mobility with a wheeled walker as needed prior to admission. He was independent with self care prior to admission. His goal is to get stronger and return home. LIVES AT HOME ALONE. HE HAS A ROLLATOR HE USES. HIS SON LIVES IN [place] AND IS AVAILABLE IF HE NEEDS HIM. Type of Home: House    Home Layout: One level    Home Access: Level entry    Home Equipment: Cane, 4 wheeled walker    ADL Assistance: Independent    Homemaking Assistance: Independent    Ambulation Assistance: Independent    Transfer Assistance: Independent    Additional Comments: son and dtr can assist upon DC home with IADLs     Social Determinants of Health     Financial Resource Strain:     Difficulty of Paying Living Expenses: Not on file   Food Insecurity:     Worried About [address] in the Last Year: Not on file    Gibran of Food in the Last Year: Not on file   Transportation Needs:     Lack of Transportation (Medical): Not on file    Lack of Transportation (Non-Medical):  Not on file   Physical Activity:     Days of Exercise per Week: Not on file    Minutes of Exercise per Session: Not on file   Stress:     Feeling of Stress : Not on file   Social Connections:     Frequency of Communication with Friends and Family: Not on file Normocephalic and atraumatic. Nose: Nose normal.   Eyes:      Conjunctiva/sclera: Conjunctivae normal.      Pupils: Pupils are equal, round, and reactive to light. Neck:      Thyroid: No thyromegaly. Vascular: No JVD. Trachea: No tracheal deviation. Cardiovascular:      Rate and Rhythm: Normal rate and regular rhythm. Heart sounds: No murmur heard. No friction rub. No gallop. Pulmonary:      Effort: Pulmonary effort is normal. No respiratory distress. Breath sounds: Normal breath sounds. No wheezing or rales. Chest:      Chest wall: No tenderness. Abdominal:      General: There is no distension. Musculoskeletal:         General: Normal range of motion. Lymphadenopathy:      Cervical: No cervical adenopathy. Skin:     General: Skin is warm and dry. Findings: No rash. Neurological:      Mental Status: He is alert and oriented to person, place, and time. Cranial Nerves: No cranial nerve deficit.    Psychiatric:         Behavior: Behavior normal.         Current Outpatient Medications   Medication Sig Dispense Refill    budesonide-formoterol (SYMBICORT) 160-4.5 MCG/ACT AERO Inhale 2 puffs into the lungs 2 times daily 3 each 3    albuterol (PROVENTIL) (2.5 MG/3ML) 0.083% nebulizer solution Use 1 vial via nebulizer every 6 hours as needed for Wheezing 360 mL 3    albuterol sulfate  (90 Base) MCG/ACT inhaler Inhale 2 puffs into the lungs every 4 hours as needed for Wheezing 1 each 3    rosuvastatin (CRESTOR) 20 MG tablet       levothyroxine (SYNTHROID) 137 MCG tablet       sertraline (ZOLOFT) 50 MG tablet Take 50 mg by mouth daily Pt takes at night      alfuzosin (UROXATRAL) 10 MG extended release tablet Take by mouth      rivaroxaban (XARELTO) 10 MG TABS tablet Take 1 tablet by mouth daily (with breakfast) 30 tablet 6    Respiratory Therapy Supplies OK New CPAP mask and supplies 1 Device 0    aspirin 81 MG EC tablet Take 1 tablet by mouth daily 30 silhouette is within normal limits. Chronic mild elevation of the right hemidiaphragm. Postsurgical changes of the right lung base with right lung base atelectasis versus scarring. Left lung base subsegmental atelectasis versus  scarring. No pneumothorax, pleural effusion, or focal consolidation. Degenerative changes of the thoracic spine. Impression  Postsurgical changes of the right lung base with scarring and/or atelectasis. Results for orders placed during the hospital encounter of 08/23/18    XR CHEST STANDARD (2 VW)    Narrative  EXAMINATION: XR CHEST (2 VW)    CLINICAL HISTORY: B95.287 Pre-operative clearance ICD10    COMPARISONS: May 16, 2018. January 8, 2018. FINDINGS: Frontal and lateral views the chest were obtained. There is unchanged extensive scarring at the right base. There are surgical staples at the operative site in the right lower lobe. There is no sign of pleural effusion. There is no acute  process. Small scarring of the left is stable. The chest wall is unremarkable. The mediastinum is not widened or shifted. Calcified aorta is not dilated. CONCLUSION: SCARRING AT RIGHT LUNG BASE  ]  Results for orders placed during the hospital encounter of 12/31/21    XR CHEST PORTABLE    Narrative  EXAMINATION:  CHEST RADIOGRAPH (PORTABLE SINGLE AP VIEW)    Exam Date/Time:  12/31/2021 12:51 PM    Clinical History:   sob/ copd    Comparison:  Chest radiograph 9/2/2021      FINDINGS:  Cardiomediastinal silhouette:  Stable heart size within normal limits. Lungs and pleura:  Redemonstrated eventration of the right hemidiaphragm with right lower lung zone hazy opacities, likely representing atelectasis. Otherwise, there are no focal consolidations, pleural effusions or pneumothorax. Impression  No acute cardiopulmonary process.       Results for orders placed during the hospital encounter of 07/17/19    XR CHEST PORTABLE    Narrative  Portable chest radiograph    History: Cough with shortness of breath    Technique: AP portable view of the chest obtained. Comparison: CT chest from December 14, 2018 and chest radiograph from December 17, 2018    Findings:    Atherosclerotic calcification of the thoracic aorta. The cardiomediastinal silhouette is within normal limits. No pneumothorax, pleural effusion, or focal consolidation. Postsurgical changes of the right lower lobe with adjacent atelectasis and scarring. Left lung base subsegmental atelectasis versus scarring. Osseous structures of the thorax appear intact. Advanced right and moderate left shoulder osteoarthritis. Impression  No acute intrathoracic process or significant interval change when compared to radiographs of December 17, 2018. Results for orders placed during the hospital encounter of 01/06/18    XR CHEST PORTABLE    Narrative  EXAMINATION: Portable AP ERECT view of the chest.    CLINICAL HISTORY: Cough and sob . COMPARISONS: 12/15/2017    FINDINGS: Normal cardiac silhouette. There are atherosclerotic calcifications in the aortic arch. The right costophrenic angle is blunted secondary to a small to moderate size pleural effusion, similar to last exam. There are metallic clips in the right  lung base. There is infiltrate/atelectasis in the right lung base, unchanged. Mild left basilar atelectasis or scarring, unchanged. No pneumothorax. There are mild degenerative changes in spine. Impression  RIGHT-SIDED PLEURAL EFFUSION WITH RIGHT BASILAR INFILTRATE/ATELECTASIS, UNCHANGED. MINIMAL LEFT BASILAR ATELECTASIS, UNCHANGED.  ]  No results found for this or any previous visit.  ]  No results found for this or any previous visit.  ]  Results for orders placed during the hospital encounter of 11/10/17    CT Chest W Contrast    Narrative  EXAMINATION:  CT CHEST W CONTRAST    CLINICAL HISTORY:  ACUTE RESP ILLNESS, >37YEARS OLD  status post bronchoscopy to retrieve aspirated dental amalgam, unsuccessful.     COMPARISONS: 12/2/2013    TECHNIQUE:  Spiral scans with 75 mL Isovue-370 IV. Multiplanar 2-D reconstructions. Axial 3-D 10 x 3 mm MIP reconstructions were performed. All CT scans at this facility use dose modulation, iterative reconstruction, and/or weight based dosing when  appropriate to reduce radiation dose to as low as reasonably achievable. FINDINGS:  The known aspirated filling is identified within the distal segmental bronchus of the right lower lobe posterior basilar segment. Filling measures approximately 9 mm. There are mild emphysematous changes. There is mild scattered peripheral  reticularity without definite honeycombing. There are no consolidations or effusions. There is right upper lung perifissural nodularity consistent with small intrapulmonary lymph nodes. There is a left upper lobe 4 mm nodule (series 2 image 21),  unchanged from prior examination of 12/2/2013, and therefore benign. There are no suspicious lung nodules. There is mild bilateral lower lobe cylindrical bronchiectasis. Cardiac size and pulmonary vascularity are normal. There is mild mediastinal lipomatosis. There is mild thickening or trace fluid in the anterior pericardium, decreased compared to prior examination 12/2/2013. There are coronary artery calcifications. There are aortic calcifications. There is no evidence of aneurysm or dissection. There are borderline in size lymph nodes in the mediastinum anterior to the left mainstem bronchus and in the right hilum, unchanged from prior examination of 12/2/2013. There is no thoracic adenopathy. The esophagus is unremarkable. There is spondylosis. There are no acute or suspicious bone lesions. Scans of the subdiaphragmatic regions demonstrate a 6 mm oval metallic density in the gastric fundus, presumably representing a swallowed filling. There are stable small cysts in the liver. There are partially visualized renal cysts.     Impression  ASPIRATED FILLING IN RIGHT LOWER LOBE POSTERIOR BASILAR SEGMENTAL BRONCHUS. MILD EMPHYSEMA AND LOWER LOBE CYLINDRICAL BRONCHIECTASIS. ADDITIONAL FINDINGS AS ABOVE.  ]    Assessment/Plan:     1. Chronic obstructive pulmonary disease, unspecified COPD type ([address]  He is using symbicort  160-4.5 mcg   2 puff  ,nebulizer albuterol and albuterol HFA prn .   C/o shortness of breath with exertion. Occasional Wheezing. C/o Cough with  Clear Sputum. Continue same    2. Obesity (BMI 30-39. 9)  He is advised try to lose weight. obesity related risk explained to the patient ,  Current weight:  217 lb (98.4 kg) Lbs. BMI:  Body mass index is 29.43 kg/m². Suggested weight control approaches, including dietary changes , exercise, behavioral modification. 3. GEORGES (obstructive sleep apnea)  He said he restarted using CPAP  5-15 cm  Again. He is using it for 7-8 hour. He is using CPAP for about   8 -9  hours every night. He is using CPAP with   Full face Mask. He said  sleep is restful with the CPAP use. He is compliant with CPAP therapy and benefiting with CPAP use. Continue CPAP therapy as before    I reviewed compliance report with patient regarding CPAP therapy. He is using  CPAP for 29  days out of 30 days  Average usage of days used is 7  hours and 21 min , average AHI 4.5  with CPAP use. 4. Bilateral pulmonary embolism ([address]  He is on chronic anticoagulation therapy. Return in about 4 months (around 6/15/2022) for georges, COPD.       Becka Benítez MD

## 2022-02-25 ENCOUNTER — TELEPHONE (OUTPATIENT)
Dept: PULMONOLOGY | Age: 82
End: 2022-02-25

## 2022-02-25 DIAGNOSIS — J20.9 ACUTE BRONCHITIS, UNSPECIFIED ORGANISM: Primary | ICD-10-CM

## 2022-02-25 RX ORDER — DOXYCYCLINE HYCLATE 100 MG
100 TABLET ORAL 2 TIMES DAILY
Qty: 20 TABLET | Refills: 0 | Status: SHIPPED | OUTPATIENT
Start: 2022-02-25 | End: 2022-03-07

## 2022-02-25 NOTE — TELEPHONE ENCOUNTER
PATIENT STATES HE HAS A CHEST COLD, COUGHING UP GREEN MUCOUS. CAN YOU CALL HIM IN AN ANTIBIOTIC TO [facility] IN [place].      LOV: 2/15/22    NOV:  6/16/22

## 2022-06-07 ENCOUNTER — APPOINTMENT (OUTPATIENT)
Dept: GENERAL RADIOLOGY | Age: 82
DRG: 194 | End: 2022-06-07
Payer: MEDICARE

## 2022-06-07 ENCOUNTER — HOSPITAL ENCOUNTER (INPATIENT)
Age: 82
LOS: 7 days | Discharge: HOME HEALTH | DRG: 194 | End: 2022-06-14
Attending: STUDENT IN AN ORGANIZED HEALTH CARE EDUCATION/TRAINING PROGRAM | Admitting: INTERNAL MEDICINE
Payer: MEDICARE

## 2022-06-07 ENCOUNTER — OFFICE VISIT (OUTPATIENT)
Dept: PULMONOLOGY | Age: 82
End: 2022-06-07
Payer: MEDICARE

## 2022-06-07 VITALS
OXYGEN SATURATION: 94 % | SYSTOLIC BLOOD PRESSURE: 124 MMHG | BODY MASS INDEX: 28.99 KG/M2 | HEIGHT: 72 IN | HEART RATE: 94 BPM | WEIGHT: 214 LBS | DIASTOLIC BLOOD PRESSURE: 74 MMHG

## 2022-06-07 DIAGNOSIS — R77.8 ELEVATED TROPONIN: ICD-10-CM

## 2022-06-07 DIAGNOSIS — Z99.81 ON HOME OXYGEN THERAPY: ICD-10-CM

## 2022-06-07 DIAGNOSIS — J20.9 ACUTE BRONCHITIS, UNSPECIFIED ORGANISM: ICD-10-CM

## 2022-06-07 DIAGNOSIS — R06.02 SHORTNESS OF BREATH: Primary | ICD-10-CM

## 2022-06-07 DIAGNOSIS — J44.9 CHRONIC OBSTRUCTIVE PULMONARY DISEASE, UNSPECIFIED COPD TYPE (HCC): ICD-10-CM

## 2022-06-07 DIAGNOSIS — G47.33 OSA (OBSTRUCTIVE SLEEP APNEA): ICD-10-CM

## 2022-06-07 DIAGNOSIS — J18.9 PNEUMONIA OF BOTH LOWER LOBES DUE TO INFECTIOUS ORGANISM: ICD-10-CM

## 2022-06-07 DIAGNOSIS — J90 BILATERAL PLEURAL EFFUSION: Primary | ICD-10-CM

## 2022-06-07 LAB
ALBUMIN SERPL-MCNC: 3.3 G/DL (ref 3.5–4.6)
ALP BLD-CCNC: 71 U/L (ref 35–104)
ALT SERPL-CCNC: 13 U/L (ref 0–41)
ANION GAP SERPL CALCULATED.3IONS-SCNC: 11 MEQ/L (ref 9–15)
AST SERPL-CCNC: 16 U/L (ref 0–40)
BASOPHILS ABSOLUTE: 0.1 K/UL (ref 0–0.2)
BASOPHILS RELATIVE PERCENT: 0.7 %
BILIRUB SERPL-MCNC: 0.3 MG/DL (ref 0.2–0.7)
BUN BLDV-MCNC: 20 MG/DL (ref 8–23)
C-REACTIVE PROTEIN: 10.5 MG/L (ref 0–5)
CALCIUM SERPL-MCNC: 9.4 MG/DL (ref 8.5–9.9)
CHLORIDE BLD-SCNC: 104 MEQ/L (ref 95–107)
CO2: 23 MEQ/L (ref 20–31)
CREAT SERPL-MCNC: 1.32 MG/DL (ref 0.7–1.2)
D DIMER: 0.34 MG/L FEU (ref 0–0.5)
EOSINOPHILS ABSOLUTE: 0.4 K/UL (ref 0–0.7)
EOSINOPHILS RELATIVE PERCENT: 4.9 %
GFR AFRICAN AMERICAN: >60
GFR NON-AFRICAN AMERICAN: 52.1
GLOBULIN: 4.2 G/DL (ref 2.3–3.5)
GLUCOSE BLD-MCNC: 105 MG/DL (ref 70–99)
HCT VFR BLD CALC: 45.6 % (ref 42–52)
HEMOGLOBIN: 14.8 G/DL (ref 14–18)
INFLUENZA A BY PCR: NEGATIVE
INFLUENZA B BY PCR: NEGATIVE
LYMPHOCYTES ABSOLUTE: 1.3 K/UL (ref 1–4.8)
LYMPHOCYTES RELATIVE PERCENT: 15.6 %
MAGNESIUM: 2.2 MG/DL (ref 1.7–2.4)
MCH RBC QN AUTO: 28.4 PG (ref 27–31.3)
MCHC RBC AUTO-ENTMCNC: 32.5 % (ref 33–37)
MCV RBC AUTO: 87.2 FL (ref 80–100)
MONOCYTES ABSOLUTE: 0.5 K/UL (ref 0.2–0.8)
MONOCYTES RELATIVE PERCENT: 6.3 %
NEUTROPHILS ABSOLUTE: 6 K/UL (ref 1.4–6.5)
NEUTROPHILS RELATIVE PERCENT: 72.5 %
PDW BLD-RTO: 15.5 % (ref 11.5–14.5)
PLATELET # BLD: 136 K/UL (ref 130–400)
POTASSIUM SERPL-SCNC: 4.2 MEQ/L (ref 3.4–4.9)
PRO-BNP: 141 PG/ML
PROCALCITONIN: 0.08 NG/ML (ref 0–0.15)
RBC # BLD: 5.23 M/UL (ref 4.7–6.1)
SARS-COV-2, NAAT: NOT DETECTED
SODIUM BLD-SCNC: 138 MEQ/L (ref 135–144)
TOTAL PROTEIN: 7.5 G/DL (ref 6.3–8)
TROPONIN: 0.02 NG/ML (ref 0–0.01)
WBC # BLD: 8.2 K/UL (ref 4.8–10.8)

## 2022-06-07 PROCEDURE — 85025 COMPLETE CBC W/AUTO DIFF WBC: CPT

## 2022-06-07 PROCEDURE — 86140 C-REACTIVE PROTEIN: CPT

## 2022-06-07 PROCEDURE — 1123F ACP DISCUSS/DSCN MKR DOCD: CPT | Performed by: INTERNAL MEDICINE

## 2022-06-07 PROCEDURE — 71046 X-RAY EXAM CHEST 2 VIEWS: CPT

## 2022-06-07 PROCEDURE — 1036F TOBACCO NON-USER: CPT | Performed by: INTERNAL MEDICINE

## 2022-06-07 PROCEDURE — 87502 INFLUENZA DNA AMP PROBE: CPT

## 2022-06-07 PROCEDURE — 99215 OFFICE O/P EST HI 40 MIN: CPT | Performed by: INTERNAL MEDICINE

## 2022-06-07 PROCEDURE — 84145 PROCALCITONIN (PCT): CPT

## 2022-06-07 PROCEDURE — 6360000002 HC RX W HCPCS: Performed by: NURSE PRACTITIONER

## 2022-06-07 PROCEDURE — 3023F SPIROM DOC REV: CPT | Performed by: INTERNAL MEDICINE

## 2022-06-07 PROCEDURE — G8427 DOCREV CUR MEDS BY ELIG CLIN: HCPCS | Performed by: INTERNAL MEDICINE

## 2022-06-07 PROCEDURE — 83880 ASSAY OF NATRIURETIC PEPTIDE: CPT

## 2022-06-07 PROCEDURE — 96365 THER/PROPH/DIAG IV INF INIT: CPT

## 2022-06-07 PROCEDURE — 93005 ELECTROCARDIOGRAM TRACING: CPT | Performed by: STUDENT IN AN ORGANIZED HEALTH CARE EDUCATION/TRAINING PROGRAM

## 2022-06-07 PROCEDURE — 87635 SARS-COV-2 COVID-19 AMP PRB: CPT

## 2022-06-07 PROCEDURE — G8417 CALC BMI ABV UP PARAM F/U: HCPCS | Performed by: INTERNAL MEDICINE

## 2022-06-07 PROCEDURE — 1210000000 HC MED SURG R&B

## 2022-06-07 PROCEDURE — 84484 ASSAY OF TROPONIN QUANT: CPT

## 2022-06-07 PROCEDURE — 80053 COMPREHEN METABOLIC PANEL: CPT

## 2022-06-07 PROCEDURE — 87040 BLOOD CULTURE FOR BACTERIA: CPT

## 2022-06-07 PROCEDURE — 85379 FIBRIN DEGRADATION QUANT: CPT

## 2022-06-07 PROCEDURE — 83735 ASSAY OF MAGNESIUM: CPT

## 2022-06-07 PROCEDURE — 99285 EMERGENCY DEPT VISIT HI MDM: CPT

## 2022-06-07 PROCEDURE — 36415 COLL VENOUS BLD VENIPUNCTURE: CPT

## 2022-06-07 PROCEDURE — 2580000003 HC RX 258: Performed by: STUDENT IN AN ORGANIZED HEALTH CARE EDUCATION/TRAINING PROGRAM

## 2022-06-07 PROCEDURE — 6360000002 HC RX W HCPCS: Performed by: STUDENT IN AN ORGANIZED HEALTH CARE EDUCATION/TRAINING PROGRAM

## 2022-06-07 PROCEDURE — 2580000003 HC RX 258: Performed by: NURSE PRACTITIONER

## 2022-06-07 RX ORDER — ONDANSETRON 4 MG/1
4 TABLET, ORALLY DISINTEGRATING ORAL EVERY 8 HOURS PRN
Status: DISCONTINUED | OUTPATIENT
Start: 2022-06-07 | End: 2022-06-14 | Stop reason: HOSPADM

## 2022-06-07 RX ORDER — ACETAMINOPHEN 325 MG/1
650 TABLET ORAL EVERY 6 HOURS PRN
Status: DISCONTINUED | OUTPATIENT
Start: 2022-06-07 | End: 2022-06-14 | Stop reason: HOSPADM

## 2022-06-07 RX ORDER — BUDESONIDE AND FORMOTEROL FUMARATE DIHYDRATE 160; 4.5 UG/1; UG/1
2 AEROSOL RESPIRATORY (INHALATION) 2 TIMES DAILY
Qty: 1 EACH | Refills: 3 | Status: SHIPPED | OUTPATIENT
Start: 2022-06-07

## 2022-06-07 RX ORDER — SODIUM CHLORIDE 0.9 % (FLUSH) 0.9 %
5-40 SYRINGE (ML) INJECTION EVERY 12 HOURS SCHEDULED
Status: DISCONTINUED | OUTPATIENT
Start: 2022-06-07 | End: 2022-06-14 | Stop reason: HOSPADM

## 2022-06-07 RX ORDER — SODIUM CHLORIDE 9 MG/ML
INJECTION, SOLUTION INTRAVENOUS PRN
Status: DISCONTINUED | OUTPATIENT
Start: 2022-06-07 | End: 2022-06-14 | Stop reason: HOSPADM

## 2022-06-07 RX ORDER — ACETAMINOPHEN 650 MG/1
650 SUPPOSITORY RECTAL EVERY 6 HOURS PRN
Status: DISCONTINUED | OUTPATIENT
Start: 2022-06-07 | End: 2022-06-14 | Stop reason: HOSPADM

## 2022-06-07 RX ORDER — ONDANSETRON 2 MG/ML
4 INJECTION INTRAMUSCULAR; INTRAVENOUS EVERY 6 HOURS PRN
Status: DISCONTINUED | OUTPATIENT
Start: 2022-06-07 | End: 2022-06-14 | Stop reason: HOSPADM

## 2022-06-07 RX ORDER — SODIUM CHLORIDE 0.9 % (FLUSH) 0.9 %
5-40 SYRINGE (ML) INJECTION PRN
Status: DISCONTINUED | OUTPATIENT
Start: 2022-06-07 | End: 2022-06-14 | Stop reason: HOSPADM

## 2022-06-07 RX ORDER — POLYETHYLENE GLYCOL 3350 17 G/17G
17 POWDER, FOR SOLUTION ORAL DAILY PRN
Status: DISCONTINUED | OUTPATIENT
Start: 2022-06-07 | End: 2022-06-14 | Stop reason: HOSPADM

## 2022-06-07 RX ADMIN — SODIUM CHLORIDE, PRESERVATIVE FREE 10 ML: 5 INJECTION INTRAVENOUS at 21:06

## 2022-06-07 RX ADMIN — CEFTRIAXONE SODIUM 1000 MG: 1 INJECTION, POWDER, FOR SOLUTION INTRAMUSCULAR; INTRAVENOUS at 20:25

## 2022-06-07 RX ADMIN — AZITHROMYCIN MONOHYDRATE 500 MG: 500 INJECTION, POWDER, LYOPHILIZED, FOR SOLUTION INTRAVENOUS at 21:09

## 2022-06-07 RX ADMIN — PIPERACILLIN AND TAZOBACTAM 3375 MG: 3; .375 INJECTION, POWDER, LYOPHILIZED, FOR SOLUTION INTRAVENOUS at 16:01

## 2022-06-07 ASSESSMENT — ENCOUNTER SYMPTOMS
PHOTOPHOBIA: 0
NAUSEA: 0
COUGH: 1
COUGH: 1
EYE ITCHING: 0
TROUBLE SWALLOWING: 0
DIARRHEA: 0
WHEEZING: 0
RHINORRHEA: 0
ABDOMINAL PAIN: 0
SHORTNESS OF BREATH: 1
CHEST TIGHTNESS: 0
CHEST TIGHTNESS: 0
ABDOMINAL PAIN: 0
SINUS PRESSURE: 0
SORE THROAT: 0
BACK PAIN: 0
SHORTNESS OF BREATH: 1
RHINORRHEA: 0
VOMITING: 0
VOMITING: 0
WHEEZING: 1
DIARRHEA: 0
VOICE CHANGE: 0
NAUSEA: 0
SORE THROAT: 0

## 2022-06-07 ASSESSMENT — PAIN - FUNCTIONAL ASSESSMENT
PAIN_FUNCTIONAL_ASSESSMENT: NONE - DENIES PAIN
PAIN_FUNCTIONAL_ASSESSMENT: NONE - DENIES PAIN

## 2022-06-07 ASSESSMENT — PAIN SCALES - GENERAL: PAINLEVEL_OUTOF10: 0

## 2022-06-07 NOTE — ED PROVIDER NOTES
[facility] ED  eMERGENCY dEPARTMENT eNCOUnter      Pt Name: Ami Johnson  MRN: [id number]  [place] 1940  Date of evaluation: 6/7/2022  Provider: Venita Aguilar, [address] [place] [address]       Chief Complaint   Patient presents with    Shortness of Breath         HISTORY OF PRESENT ILLNESS   (Location/Symptom, Timing/Onset,Context/Setting, Quality, Duration, Modifying Factors, Severity)  Note limiting factors. Ami Johnson is a [de-identified] y.o. male who presents to the emergency department with c/o increasing shortness of breath. Patient was at his pulmonologist office and sent to the ER for further evaluation of the rales bilateral lung bases. Patient has decrease in energy. Decrease in appetite. Denies fever. Just does not \"feel well. \"    Patient denies any chest pain, passing out episodes. Shortness of breath is worse when he exerts himself. Symptoms have been progressively getting worse over the last couple of weeks. The history is provided by the patient. NursingNotes were reviewed. REVIEW OF SYSTEMS    (2-9 systems for level 4, 10 or more for level 5)     Review of Systems   Constitutional: Positive for activity change, appetite change and fatigue. Negative for chills, fever and unexpected weight change. HENT: Negative for drooling, ear pain, nosebleeds, sinus pressure and trouble swallowing. Respiratory: Positive for cough and shortness of breath. Negative for chest tightness. Cardiovascular: Negative for chest pain and leg swelling. Gastrointestinal: Negative for abdominal pain, diarrhea and vomiting. Endocrine: Negative for polydipsia and polyphagia. Genitourinary: Negative for dysuria, flank pain and frequency. Musculoskeletal: Negative for back pain and myalgias. Skin: Negative for pallor and rash. Neurological: Negative for syncope, weakness and headaches. Hematological: Does not bruise/bleed easily.    All other systems reviewed and are negative. Except as noted above the remainder of the review of systems was reviewed and negative.        PAST MEDICAL HISTORY     Past Medical History:   Diagnosis Date    Abnormality of gait and mobility     Acute sinusitis     Anxiety     Aspiration into respiratory tract 11/10/2017    Aspiration pneumonia (HCC)     Bilateral pulmonary embolism ([address] 1/6/2018    BPH (benign prostatic hyperplasia)     COPD (chronic obstructive pulmonary disease) ([address] 12/5/2013    Debility     Elevated CK 12/30/2013    Foraminal stenosis of lumbosacral region 6/7/2016    GERD (gastroesophageal reflux disease) 12/11/2014    History of asthma 1/13/2014    HTN (hypertension) 1/13/2014    past braden / no current meds    Hx of blood clots 01/2018    DVT  ( unsure which leg but both were swollen ) -> PE -> thus Xarelto    Hyperlipidemia     meds > 10 yrs    Hypothyroidism     Insomnia     Lower extremity weakness 1/24/2014    On home oxygen therapy     2L at night    Osteoarthritis of spine with radiculopathy, lumbar region 6/7/2016    Papillary thyroid carcinoma ([facility] 12/2006    no trx to follow    Positive D dimer 12/5/2013    Sleep apnea 1/24/2014    Type 2 diabetes mellitus ([address]     diet control  / no meds    Vitamin D deficiency          SURGICALHISTORY       Past Surgical History:   Procedure Laterality Date    BRONCHOSCOPY N/A 11/11/2017    BRONCHOSCOPY FIBEROPTIC performed by Fabiola Zheng MD at [address] [facility] COLONOSCOPY      ENDOSCOPY, COLON, DIAGNOSTIC      EYE SURGERY      phaco with IOL OU    HERNIA REPAIR  2483G    repair umbilical hernia    KNEE SURGERY Left 1970    CT ARTHROSCOPY SHOULDER SURGICAL BICEPS TENODESIS Right 9/18/2018    RIGHT SHOULDER ARTHROSCOPY SUBACROMIAL DECOMPRESS WITH POSSIBLE BICEPS TENODESIS ARTHROSCOPIC FRANSISCA C- ARM ARTHREX [facility]ICEPS [facility]ES[facility] performed by Pritesh Haynes MD at [facility] Right 11/14/2017    RIGHT THORACOTOMY WEDGE RESECTION FOR FOREIGN BODY AND FOB DOUBLE LUMEN (1ST CASE) performed by Clifford Leung MD at [facility]  2006    cancer / no chemo or radiation         CURRENT MEDICATIONS       Previous Medications    ALBUTEROL (PROVENTIL) (2.5 MG/3ML) 0.083% NEBULIZER SOLUTION    Use 1 vial via nebulizer every 6 hours as needed for Wheezing    ALBUTEROL SULFATE  (90 BASE) MCG/ACT INHALER    Inhale 2 puffs into the lungs every 4 hours as needed for Wheezing    ALFUZOSIN (UROXATRAL) 10 MG EXTENDED RELEASE TABLET    Take by mouth    ASPIRIN 81 MG EC TABLET    Take 1 tablet by mouth daily    BUDESONIDE-FORMOTEROL (SYMBICORT) 160-4.5 MCG/ACT AERO    Inhale 2 puffs into the lungs 2 times daily    CLONAZEPAM (KLONOPIN) 0.5 MG TABLET    Take 0.5 mg by mouth nightly as needed. .    CPAP MACHINE MISC    by Does not apply route Auto CPAP  5-20 cm    DOCUSATE SODIUM (COLACE) 100 MG CAPSULE    Take 100 mg by mouth daily     FAMOTIDINE (PEPCID AC PO)    Take by mouth daily as needed     FINASTERIDE (PROSCAR) 5 MG TABLET    Take 1 tablet by mouth daily    LEVOTHYROXINE (SYNTHROID) 137 MCG TABLET        MAGNESIUM 30 MG TABLET    Take 250 mg by mouth daily    MIRTAZAPINE (REMERON) 15 MG TABLET    Take 15 mg by mouth nightly    PSYLLIUM (METAMUCIL) 58.12 % PACK PACKET    Take 1 packet by mouth daily as needed (constipation)    RESPIRATORY THERAPY SUPPLIES OK    New CPAP mask and supplies    RIVAROXABAN (XARELTO) 10 MG TABS TABLET    Take 1 tablet by mouth daily (with breakfast)    ROSUVASTATIN (CRESTOR) 20 MG TABLET        SERTRALINE (ZOLOFT) 50 MG TABLET    Take 50 mg by mouth daily Pt takes at night    TAMSULOSIN (FLOMAX) 0.4 MG CAPSULE    Take 1 capsule by mouth nightly    VITAMIN D (CHOLECALCIFEROL) 1000 UNIT TABS TABLET    Take 2 tablets by mouth daily       ALLERGIES     Patient has no known allergies.     FAMILY HISTORY       Family History   Problem Relation Age of Onset    Other Mother 80        Old Age    Stroke Father 45    No Known Problems Sister     Psoriasis Daughter     No Known Problems Son           SOCIAL HISTORY       Social History     Socioeconomic History    Marital status:      Spouse name: None    Number of children: 2    Years of education: None    Highest education level: None   Occupational History    Occupation: retired   Tobacco Use    Smoking status: Former Smoker     Packs/day: 1.00     Years: 40.00     Pack years: 40.00     Types: Cigarettes     Start date: 2000     Quit date:      Years since quittin.4    Smokeless tobacco: Former User    Tobacco comment: quit    Vaping Use    Vaping Use: Never used   Substance and Sexual Activity    Alcohol use: Yes     Alcohol/week: 0.0 standard drinks     Comment: rare social    Drug use: No    Sexual activity: None   Other Topics Concern    None   Social History Narrative    The patient lives alone in a one story home with one step to enter the home. The bedroom and bathroom are on the first floor. His social support includes his children. He has a wheeled walker, rollator, cane and dressing assistive device at home. He was receiving home health care services prior to admission to include PT, OT and RN. He does not have history of falls prior to admission. He was independent with mobility with a wheeled walker as needed prior to admission. He was independent with self care prior to admission. His goal is to get stronger and return home. LIVES AT HOME ALONE. HE HAS A ROLLATOR HE USES. HIS SON LIVES IN [place] AND IS AVAILABLE IF HE NEEDS HIM.      Type of Home: House    Home Layout: One level    Home Access: Level entry    Home Equipment: Cane, 4 wheeled walker    ADL Assistance: Independent    Homemaking Assistance: Independent    Ambulation Assistance: Independent    Transfer Assistance: Independent    Additional Comments: son and dtr can assist upon DC home with IADLs     Social Determinants of Health     Financial Resource Strain:     Difficulty of Paying Living Expenses: Not on file   Food Insecurity:     Worried About Running Out of Food in the Last Year: Not on file    Gibran of Food in the Last Year: Not on file   Transportation Needs:     Lack of Transportation (Medical): Not on file    Lack of Transportation (Non-Medical): Not on file   Physical Activity:     Days of Exercise per Week: Not on file    Minutes of Exercise per Session: Not on file   Stress:     Feeling of Stress : Not on file   Social Connections:     Frequency of Communication with Friends and Family: Not on file    Frequency of Social Gatherings with Friends and Family: Not on file    Attends Baptism Services: Not on file    Active Member of [address] [place] [address] [employer] [place] [employer] or Organizations: Not on file    Attends Club or Organization Meetings: Not on file    Marital Status: Not on file   Intimate Partner Violence:     Fear of Current or Ex-Partner: Not on file    Emotionally Abused: Not on file    Physically Abused: Not on file    Sexually Abused: Not on file   Housing Stability:     Unable to Pay for Housing in the Last Year: Not on file    Number of [place] in the Last Year: Not on file    Unstable Housing in the Last Year: Not on file       SCREENINGS    Mariya Coma Scale  Eye Opening: Spontaneous  Best Verbal Response: Oriented  Best Motor Response: Obeys commands  Mariya Coma Scale Score: 15 @FLOW([id number])@      PHYSICAL EXAM    (up to 7 for level 4, 8 or more for level 5)     ED Triage Vitals [06/07/22 1405]   BP Temp Temp src Heart Rate Resp SpO2 Height Weight   131/71 97.9 °F (36.6 °C) -- 84 16 94 % 6' (1.829 m) 220 lb (99.8 kg)       Physical Exam  Vitals and nursing note reviewed. Constitutional:       General: He is awake. He is in acute distress. Appearance: Normal appearance. He is well-developed and normal weight. He is ill-appearing.  He is not toxic-appearing or diaphoretic. Comments: No photophobia. No phonophobia. HENT:      Head: Normocephalic and atraumatic. No Valentine's sign. Right Ear: External ear normal.      Left Ear: External ear normal.      Nose: Nose normal. No congestion or rhinorrhea. Mouth/Throat:      Mouth: Mucous membranes are moist.      Pharynx: Oropharynx is clear. No oropharyngeal exudate or posterior oropharyngeal erythema. Eyes:      General: No scleral icterus. Right eye: No foreign body or discharge. Left eye: No discharge. Extraocular Movements: Extraocular movements intact. Conjunctiva/sclera: Conjunctivae normal.      Left eye: No exudate. Pupils: Pupils are equal, round, and reactive to light. Neck:      Vascular: No JVD. Trachea: No tracheal deviation. Comments: No meningismus. Cardiovascular:      Rate and Rhythm: Normal rate and regular rhythm. Heart sounds: Normal heart sounds. Heart sounds not distant. No murmur heard. No friction rub. No gallop. Pulmonary:      Effort: Pulmonary effort is normal. No tachypnea, accessory muscle usage, prolonged expiration or respiratory distress. Breath sounds: No stridor. Examination of the right-upper field reveals decreased breath sounds. Examination of the left-upper field reveals decreased breath sounds. Examination of the right-lower field reveals rhonchi and rales. Examination of the left-lower field reveals rhonchi and rales. Decreased breath sounds, rhonchi and rales present. No wheezing. Chest:      Chest wall: No tenderness. Abdominal:      General: Abdomen is flat. Bowel sounds are normal. There is no distension. Palpations: Abdomen is soft. There is no mass. Tenderness: There is no abdominal tenderness. There is no right CVA tenderness, left CVA tenderness, guarding or rebound. Hernia: No hernia is present. Musculoskeletal:         General: No swelling, tenderness, deformity or signs of injury. Normal range of motion. Cervical back: Normal range of motion and neck supple. No rigidity. Lymphadenopathy:      Head:      Right side of head: No submental adenopathy. Left side of head: No submental adenopathy. Skin:     General: Skin is warm and dry. Capillary Refill: Capillary refill takes less than 2 seconds. Coloration: Skin is not jaundiced or pale. Findings: No bruising, erythema, lesion or rash. Neurological:      General: No focal deficit present. Mental Status: He is alert and oriented to person, place, and time. Mental status is at baseline. Cranial Nerves: No cranial nerve deficit. Sensory: No sensory deficit. Motor: No weakness. Coordination: Coordination normal.      Deep Tendon Reflexes: Reflexes are normal and symmetric. Psychiatric:         Mood and Affect: Mood normal.         Behavior: Behavior normal. Behavior is cooperative. Thought Content: Thought content normal.         Judgment: Judgment normal.         DIAGNOSTIC RESULTS     EKG: All EKG's are interpreted by the Emergency Department Physician who either signs or Co-signsthis chart in the absence of a cardiologist.    EKG: Normal sinus rhythm at 74 bpm.  Normal axis. Normal ST segments. QT intervals 400 ms. No PVCs. RADIOLOGY:   Non-plain filmimages such as CT, Ultrasound and MRI are read by the radiologist. Plain radiographic images are visualized and preliminarily interpreted by the emergency physician with the below findings:        Interpretation per the Radiologist below, if available at the time ofthis note:    XR CHEST (2 VW)   Final Result      Mild bibasilar pulmonary infiltrates, more on the right side. Stable elevated right hemidiaphragm.             ED BEDSIDE ULTRASOUND:   Performed by ED Physician - none    LABS:  Labs Reviewed   CBC WITH AUTO DIFFERENTIAL - Abnormal; Notable for the following components:       Result Value    MCHC 32.5 (*)     RDW 15.5 (*)     All other components within normal limits   COMPREHENSIVE METABOLIC PANEL - Abnormal; Notable for the following components:    Glucose 105 (*)     CREATININE 1.32 (*)     GFR Non- 52.1 (*)     Albumin 3.3 (*)     Globulin 4.2 (*)     All other components within normal limits   TROPONIN - Abnormal; Notable for the following components:    Troponin 0.016 (*)     All other components within normal limits    Narrative:     Svitlanatracey Nicholas tel. [phone],  TROP results called to and read back by Abril Uribe, 06/07/2022 16:59, by  Dalia Mtz   C-REACTIVE PROTEIN - Abnormal; Notable for the following components:    CRP 10.5 (*)     All other components within normal limits   COVID-19, RAPID   RAPID INFLUENZA A/B ANTIGENS   CULTURE, BLOOD 2   CULTURE, BLOOD 1   D-DIMER, QUANTITATIVE   BRAIN NATRIURETIC PEPTIDE   PROCALCITONIN   MAGNESIUM    Narrative:     Edd Pedraza  [facility] tel. [phone],  TROP results called to and read back by Abril Uribe, 06/07/2022 16:59, by  Dalia Mtz       All other labs were within normal range or not returned as of this dictation. EMERGENCY DEPARTMENT COURSE and DIFFERENTIAL DIAGNOSIS/MDM:   Vitals:    Vitals:    06/07/22 1405 06/07/22 1602   BP: 131/71 (!) 142/82   Pulse: 84 76   Resp: 16 17   Temp: 97.9 °F (36.6 °C)    SpO2: 94%    Weight: 220 lb (99.8 kg)    Height: 6' (1.829 m)            MDM  Patient has worsening bilateral infiltrates. Patient does not quite meet criteria for pneumonia with Pro-Idris, and does not quite meet criteria of CHF with a low BNP. The ER physician feels the patient likely has one of them. Elevated curb 65. Dose of IV antibiotic given. On reexam the findings were discussed with the patient. CONSULTS:  None    PROCEDURES:  Unless otherwise noted below, none     Procedures    FINAL IMPRESSION      1. Bilateral pleural effusion    2. Elevated troponin    3.  Pneumonia of both lower lobes due to infectious organism          DISPOSITION/PLAN DISPOSITION Admitted 06/07/2022 05:38:47 PM      PATIENT REFERRED TO:  No follow-up provider specified.     DISCHARGE MEDICATIONS:  New Prescriptions    No medications on file          (Please note that portions of this note were completed with a voice recognition program.  Efforts were made to edit the dictations but occasionally words are mis-transcribed.)    Claudetta Pallas, DO (electronically signed)  Attending Emergency Physician          Claudetta Pallas, DO  06/07/22 [id number]

## 2022-06-07 NOTE — PROGRESS NOTES
Subjective:     Cj Moser is a [de-identified] y.o. male who complains today of:     Chief Complaint   Patient presents with    COPD     4 month f/u   patient is feeling very SOB x 3 or 4 days       HPI  He said he is more shortness in last 3-4 days. Very weak. Can not take deep breath . He is using symbicort  160-4.5 mcg   2 puff  ,nebulizer albuterol and albuterol HFA prn .   C/o shortness of breath with any exertion which is more than usual .   C/o Wheezing. C/o Cough with  yellow  Sputum. No Hemoptysis. No Chest tightness. No Chest pain with radiation  or pleuritic pain. No  leg edema. No orthopnea. No Fever or chills. No Rhinorrhea and postnasal drip. He is on Xarelto 10 mg daily     He said he restarted using CPAP  5-15 cm and he was using it 4 month ago but then stop using after last visit  and he was using O2 with sleep prn bases. Allergies:  Patient has no known allergies.   Past Medical History:   Diagnosis Date    Abnormality of gait and mobility     Acute sinusitis     Anxiety     Aspiration into respiratory tract 11/10/2017    Aspiration pneumonia (HCC)     Bilateral pulmonary embolism (HCC) 1/6/2018    BPH (benign prostatic hyperplasia)     COPD (chronic obstructive pulmonary disease) ([address] 12/5/2013    Debility     Elevated CK 12/30/2013    Foraminal stenosis of lumbosacral region 6/7/2016    GERD (gastroesophageal reflux disease) 12/11/2014    History of asthma 1/13/2014    HTN (hypertension) 1/13/2014    past braden / no current meds    Hx of blood clots 01/2018    DVT  ( unsure which leg but both were swollen ) -> PE -> thus Xarelto    Hyperlipidemia     meds > 10 yrs    Hypothyroidism     Insomnia     Lower extremity weakness 1/24/2014    On home oxygen therapy     2L at night    Osteoarthritis of spine with radiculopathy, lumbar region 6/7/2016    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    no trx to follow    Positive D dimer 12/5/2013    Sleep apnea 1/24/2014  Type 2 diabetes mellitus (HCC)     diet control  / no meds    Vitamin D deficiency      Past Surgical History:   Procedure Laterality Date    BRONCHOSCOPY N/A 2017    BRONCHOSCOPY FIBEROPTIC performed by Guzman Lisa MD at [facility] COLONOSCOPY      ENDOSCOPY, COLON, DIAGNOSTIC      EYE SURGERY      phaco with IOL OU    HERNIA REPAIR  [id number]    repair umbilical hernia    KNEE SURGERY Left     MT ARTHROSCOPY SHOULDER SURGICAL BICEPS TENODESIS Right 2018    RIGHT SHOULDER ARTHROSCOPY SUBACROMIAL DECOMPRESS WITH POSSIBLE BICEPS TENODESIS ARTHROSCOPIC FRANSISCA C- ARM ARTHREX BICEPS TENODESIS GALE Shay CHAIR CASE #1 1 HOUR performed by Che Nichols MD at [address] [place] [address] Right 2017    RIGHT THORACOTOMY WEDGE RESECTION FOR FOREIGN BODY AND FOB DOUBLE LUMEN (1ST CASE) performed by Michelle Hidalgo MD at [address] [place] [address]      cancer / no chemo or radiation     Family History   Problem Relation Age of Onset    Other Mother 80        Old Age    Stroke Father 45    No Known Problems Sister     Psoriasis Daughter     No Known Problems Son      Social History     Socioeconomic History    Marital status:      Spouse name: Not on file    Number of children: 2    Years of education: Not on file    Highest education level: Not on file   Occupational History    Occupation: retired   Tobacco Use    Smoking status: Former Smoker     Packs/day: 1.00     Years: 40.00     Pack years: 40.00     Types: Cigarettes     Start date: 2000     Quit date:      Years since quittin.4    Smokeless tobacco: Former User    Tobacco comment: quit    Vaping Use    Vaping Use: Never used   Substance and Sexual Activity    Alcohol use:  Yes     Alcohol/week: 0.0 standard drinks     Comment: rare social    Drug use: No    Sexual activity: Not on file   Other Topics Concern    Not on file   Social History Narrative    The patient lives alone in a one story home with one step to enter the home. The bedroom and bathroom are on the first floor. His social support includes his children. He has a wheeled walker, rollator, cane and dressing assistive device at home. He was receiving home health care services prior to admission to include PT, OT and RN. He does not have history of falls prior to admission. He was independent with mobility with a wheeled walker as needed prior to admission. He was independent with self care prior to admission. His goal is to get stronger and return home. LIVES AT HOME ALONE. HE HAS A ROLLATOR HE USES. HIS SON LIVES IN [place] AND IS AVAILABLE IF HE NEEDS HIM. Type of Home: House    Home Layout: One level    Home Access: Level entry    Home Equipment: Cane, 4 wheeled walker    ADL Assistance: Independent    Homemaking Assistance: Independent    Ambulation Assistance: Independent    Transfer Assistance: Independent    Additional Comments: son and dtr can assist upon DC home with IADLs     Social Determinants of Health     Financial Resource Strain:     Difficulty of Paying Living Expenses: Not on file   Food Insecurity:     Worried About [employer] in the Last Year: Not on file    Gibran of Food in the Last Year: Not on file   Transportation Needs:     Lack of Transportation (Medical): Not on file    Lack of Transportation (Non-Medical):  Not on file   Physical Activity:     Days of Exercise per Week: Not on file    Minutes of Exercise per Session: Not on file   Stress:     Feeling of Stress : Not on file   Social Connections:     Frequency of Communication with Friends and Family: Not on file    Frequency of Social Gatherings with Friends and Family: Not on file    Attends Caodaism Services: Not on file    Active Member of Clubs or Organizations: Not on file    Attends Club or Organization Meetings: Not on file    Marital Status: Not on file   Intimate Partner Violence:  Fear of Current or Ex-Partner: Not on file    Emotionally Abused: Not on file    Physically Abused: Not on file    Sexually Abused: Not on file   Housing Stability:     Unable to Pay for Housing in the Last Year: Not on file    Number of Places Lived in the Last Year: Not on file    Unstable Housing in the Last Year: Not on file         Review of Systems   Constitutional: Negative for chills, diaphoresis, fatigue and fever. HENT: Negative for congestion, mouth sores, nosebleeds, postnasal drip, rhinorrhea, sneezing, sore throat and voice change. Eyes: Negative for itching and visual disturbance. Respiratory: Positive for cough, shortness of breath and wheezing. Negative for chest tightness. Cardiovascular: Negative. Negative for chest pain, palpitations and leg swelling. Gastrointestinal: Negative for abdominal pain, diarrhea, nausea and vomiting. Genitourinary: Negative for difficulty urinating and hematuria. Musculoskeletal: Negative for arthralgias, joint swelling and myalgias. Skin: Negative for rash. Allergic/Immunologic: Negative for environmental allergies. Neurological: Negative for dizziness, tremors, weakness and headaches. Psychiatric/Behavioral: Negative for behavioral problems and sleep disturbance.         :     Vitals:    06/07/22 1311   BP: 124/74   Pulse: 94   SpO2: 94%   Weight: 214 lb (97.1 kg)   Height: 6' (1.829 m)     Wt Readings from Last 3 Encounters:   06/07/22 214 lb (97.1 kg)   02/15/22 217 lb (98.4 kg)   12/31/21 207 lb 12.8 oz (94.3 kg)         Physical Exam  Constitutional:       Appearance: He is well-developed. HENT:      Head: Normocephalic and atraumatic. Nose: Nose normal.   Eyes:      Conjunctiva/sclera: Conjunctivae normal.      Pupils: Pupils are equal, round, and reactive to light. Neck:      Thyroid: No thyromegaly. Vascular: No JVD. Trachea: No tracheal deviation.    Cardiovascular:      Rate and Rhythm: Normal rate and nightly as needed. .      CPAP Machine MISC by Does not apply route Auto CPAP  5-20 cm 1 each 0    magnesium 30 MG tablet Take 250 mg by mouth daily      docusate sodium (COLACE) 100 MG capsule Take 100 mg by mouth daily       mirtazapine (REMERON) 15 MG tablet Take 15 mg by mouth nightly      finasteride (PROSCAR) 5 MG tablet Take 1 tablet by mouth daily 30 tablet 3    tamsulosin (FLOMAX) 0.4 MG capsule Take 1 capsule by mouth nightly 30 capsule 3    Famotidine (PEPCID AC PO) Take by mouth daily as needed        No current facility-administered medications for this visit. Results for orders placed in visit on 09/02/21    XR CHEST STANDARD (2 VW)    Narrative  DIAGNOSIS:J44.9 Chronic obstructive pulmonary disease, unspecified COPD type ([facility] ICD10    COMPARISON: July 17, 2019    TECHNIQUE: PA and lateral chest    FINDINGS: Postsurgical changes are again seen in the right lower lobe. There is likely mild atelectasis or scarring is well. Mild atelectasis is seen in the left base. Cardiac silhouette is within normal limits. Calcifications are seen in the thoracic  aorta. Motion artifact is seen on the lateral projection. Degenerative changes are seen in the dorsal spine. Degenerative changes are also seen in the visualized portion of the shoulders. Impression  IMPRESSION:  No evidence of acute cardiopulmonary process      Results for orders placed during the hospital encounter of 12/14/18    XR CHEST STANDARD (2 VW)    Narrative  EXAMINATION: Chest x-ray, 2 view    HISTORY: Hypoxia. Shortness of breath. TECHNIQUE: AP and lateral views of the chest    COMPARISON: CT thorax from December 14, 2018    FINDINGS:    Cardiomediastinal silhouette is within normal limits. Chronic mild elevation of the right hemidiaphragm. Postsurgical changes of the right lung base with right lung base atelectasis versus scarring. Left lung base subsegmental atelectasis versus  scarring.  No pneumothorax, pleural effusion, or focal consolidation. Degenerative changes of the thoracic spine. Impression  Postsurgical changes of the right lung base with scarring and/or atelectasis. Results for orders placed during the hospital encounter of 08/23/18    XR CHEST STANDARD (2 VW)    Narrative  EXAMINATION: XR CHEST (2 VW)    CLINICAL HISTORY: O25.488 Pre-operative clearance ICD10    COMPARISONS: May 16, 2018. January 8, 2018. FINDINGS: Frontal and lateral views the chest were obtained. There is unchanged extensive scarring at the right base. There are surgical staples at the operative site in the right lower lobe. There is no sign of pleural effusion. There is no acute  process. Small scarring of the left is stable. The chest wall is unremarkable. The mediastinum is not widened or shifted. Calcified aorta is not dilated. CONCLUSION: SCARRING AT RIGHT LUNG BASE  ]  Results for orders placed during the hospital encounter of 12/31/21    XR CHEST PORTABLE    Narrative  EXAMINATION:  CHEST RADIOGRAPH (PORTABLE SINGLE AP VIEW)    Exam Date/Time:  12/31/2021 12:51 PM    Clinical History:   sob/ copd    Comparison:  Chest radiograph 9/2/2021      FINDINGS:  Cardiomediastinal silhouette:  Stable heart size within normal limits. Lungs and pleura:  Redemonstrated eventration of the right hemidiaphragm with right lower lung zone hazy opacities, likely representing atelectasis. Otherwise, there are no focal consolidations, pleural effusions or pneumothorax. Impression  No acute cardiopulmonary process. Results for orders placed during the hospital encounter of 07/17/19    XR CHEST PORTABLE    Narrative  Portable chest radiograph    History: Cough with shortness of breath    Technique: AP portable view of the chest obtained. Comparison: CT chest from December 14, 2018 and chest radiograph from December 17, 2018    Findings:    Atherosclerotic calcification of the thoracic aorta.  The cardiomediastinal silhouette is within normal limits. No pneumothorax, pleural effusion, or focal consolidation. Postsurgical changes of the right lower lobe with adjacent atelectasis and scarring. Left lung base subsegmental atelectasis versus scarring. Osseous structures of the thorax appear intact. Advanced right and moderate left shoulder osteoarthritis. Impression  No acute intrathoracic process or significant interval change when compared to radiographs of December 17, 2018. Results for orders placed during the hospital encounter of 01/06/18    XR CHEST PORTABLE    Narrative  EXAMINATION: Portable AP ERECT view of the chest.    CLINICAL HISTORY: Cough and sob . COMPARISONS: 12/15/2017    FINDINGS: Normal cardiac silhouette. There are atherosclerotic calcifications in the aortic arch. The right costophrenic angle is blunted secondary to a small to moderate size pleural effusion, similar to last exam. There are metallic clips in the right  lung base. There is infiltrate/atelectasis in the right lung base, unchanged. Mild left basilar atelectasis or scarring, unchanged. No pneumothorax. There are mild degenerative changes in spine. Impression  RIGHT-SIDED PLEURAL EFFUSION WITH RIGHT BASILAR INFILTRATE/ATELECTASIS, UNCHANGED. MINIMAL LEFT BASILAR ATELECTASIS, UNCHANGED.  ]  No results found for this or any previous visit.  ]  No results found for this or any previous visit.  ]  Results for orders placed during the hospital encounter of 11/10/17    CT Chest W Contrast    Narrative  EXAMINATION:  CT CHEST W CONTRAST    CLINICAL HISTORY:  ACUTE RESP ILLNESS, >37YEARS OLD  status post bronchoscopy to retrieve aspirated dental amalgam, unsuccessful. COMPARISONS:  12/2/2013    TECHNIQUE:  Spiral scans with 75 mL Isovue-370 IV. Multiplanar 2-D reconstructions. Axial 3-D 10 x 3 mm MIP reconstructions were performed.  All CT scans at this facility use dose modulation, iterative reconstruction, and/or weight based dosing when  appropriate to reduce radiation dose to as low as reasonably achievable. FINDINGS:  The known aspirated filling is identified within the distal segmental bronchus of the right lower lobe posterior basilar segment. Filling measures approximately 9 mm. There are mild emphysematous changes. There is mild scattered peripheral  reticularity without definite honeycombing. There are no consolidations or effusions. There is right upper lung perifissural nodularity consistent with small intrapulmonary lymph nodes. There is a left upper lobe 4 mm nodule (series 2 image 21),  unchanged from prior examination of 12/2/2013, and therefore benign. There are no suspicious lung nodules. There is mild bilateral lower lobe cylindrical bronchiectasis. Cardiac size and pulmonary vascularity are normal. There is mild mediastinal lipomatosis. There is mild thickening or trace fluid in the anterior pericardium, decreased compared to prior examination 12/2/2013. There are coronary artery calcifications. There are aortic calcifications. There is no evidence of aneurysm or dissection. There are borderline in size lymph nodes in the mediastinum anterior to the left mainstem bronchus and in the right hilum, unchanged from prior examination of 12/2/2013. There is no thoracic adenopathy. The esophagus is unremarkable. There is spondylosis. There are no acute or suspicious bone lesions. Scans of the subdiaphragmatic regions demonstrate a 6 mm oval metallic density in the gastric fundus, presumably representing a swallowed filling. There are stable small cysts in the liver. There are partially visualized renal cysts. Impression  ASPIRATED FILLING IN RIGHT LOWER LOBE POSTERIOR BASILAR SEGMENTAL BRONCHUS. MILD EMPHYSEMA AND LOWER LOBE CYLINDRICAL BRONCHIECTASIS. ADDITIONAL FINDINGS AS ABOVE.  ]    Assessment/Plan:     1. Shortness of breath  He said he is more shortness in last 3-4 days. Very weak. Can not take deep breath .   He is using symbicort  160-4.5 mcg   2 puff  ,nebulizer albuterol and albuterol HFA prn .   C/o shortness of breath with any exertion which is more than usual . C/o Wheezing. C/o Cough with  yellow  Sputum. Likely he may have pneumonia since he is having bibasilar rales more on right side. patient is very weak and can not walk few step , will send to ER for further evaluation     2. Acute bronchitis, unspecified organism r/o pneumonia  He is having cough with yellow mucus, likely bronchitis and pneumonia    3. Chronic obstructive pulmonary disease, unspecified COPD type ([facility]  He is using symbicort  160-4.5 mcg   2 puff  ,nebulizer albuterol and albuterol HFA prn .  Continue same    - budesonide-formoterol (SYMBICORT) 160-4.5 MCG/ACT AERO; Inhale 2 puffs into the lungs 2 times daily  Dispense: 1 each; Refill: 3    4. GEORGES (obstructive sleep apnea)  He said he restarted using CPAP  5-15 cm and he was using it 4 month ago but then stop using after last visit  and he was using O2 with sleep prn bases. 5. On home oxygen therapy  He said he was using oxygen only as needed basis but now is using all the time and he is still very short of breath. Currently on 3 L O2 via nasal cannula O2 saturation is 94%. Return in about 4 weeks (around 7/5/2022) for COPD, georges, hypoxia on O2.       Evie Davis MD

## 2022-06-07 NOTE — H&P
[place] [address] MEDICINE    HISTORY AND PHYSICAL EXAM    PATIENT NAME:  Demarco Hopkins    MRN:  [id number]  SERVICE DATE:  6/7/2022   SERVICE TIME:  6:46 PM    Primary Care Physician: Marsa Ormond, MD         SUBJECTIVE  CHIEF COMPLAINT:  Shortness of Breath       HPI: Patient being admitted for pneumonia. Patient is a pleasant, alert and oriented x1 58-year-old male. Patient reports for the past 3 days he has been experiencing shortness of breath and a cough that is productive in the morning but then becomes more dry throughout the day. Patient reports that he feels mild fatigue. Patient reports that he has been eating well. Patient denies any fever, chest pain, abdominal pain, nausea, vomiting, diarrhea, or dysuria. Patient reports that he has been taking all of his medications as directed. Patient has a history of COPD and has used his scheduled inhalers and as needed nebulizers which seem to be only minimally effective he said. Otherwise, patient's past medical history includes history of PE on [address] [place] [address], HLD, GERD, BPH, hypothyroid, and GEORGES. However, patient readily admits that he is not compliant with his CPAP device.   Patient denies use of tobacco, alcohol, or illicit drugs    PAST MEDICAL HISTORY:    Past Medical History:   Diagnosis Date    Abnormality of gait and mobility     Acute sinusitis     Anxiety     Aspiration into respiratory tract 11/10/2017    Aspiration pneumonia (HCC)     Bilateral pulmonary embolism (HCC) 1/6/2018    BPH (benign prostatic hyperplasia)     [facility] 12/5/2013    Debility     Elevated CK 12/30/2013    Foraminal stenosis of lumbosacral region 6/7/2016    GERD (gastroesophageal reflux disease) 12/11/2014    History of asthma 1/13/2014    HTN (hypertension) 1/13/2014    past braden / no current meds    Hx of blood clots 01/2018    DVT  ( unsure which leg but both were swollen ) -> PE -> thus Xarelto    Hyperlipidemia     meds > 10 yrs    Hypothyroidism     Insomnia     Lower extremity weakness 1/24/2014    On home oxygen therapy     2L at night    Osteoarthritis of spine with radiculopathy, lumbar region 6/7/2016    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    no trx to follow    Positive D dimer 12/5/2013    Sleep apnea 1/24/2014    Type 2 diabetes mellitus (HCC)     diet control  / no meds    Vitamin D deficiency      PAST SURGICAL HISTORY:    Past Surgical History:   Procedure Laterality Date    BRONCHOSCOPY N/A 11/11/2017    BRONCHOSCOPY FIBEROPTIC performed by Yulia Núñez MD at [facility] COLONOSCOPY      ENDOSCOPY, COLON, DIAGNOSTIC      EYE SURGERY      phaco with IOL OU    HERNIA REPAIR  [id number]    repair umbilical hernia    KNEE SURGERY Left 1970    ND ARTHROSCOPY SHOULDER SURGICAL BICEPS TENODESIS Right 9/18/2018    RIGHT SHOULDER ARTHROSCOPY SUBACROMIAL DECOMPRESS WITH POSSIBLE BICEPS TENODESIS ARTHROSCOPIC FRANSISCA C- ARM ARTHREX BICEPS TENODESIS TRAY [facility] CHAIR CASE #1 1 HOUR performed by Anjel Aragon MD at [address] [place] [address] Right 11/14/2017    RIGHT THORACOTOMY WEDGE RESECTION FOR FOREIGN BODY AND FOB DOUBLE LUMEN (1ST CASE) performed by Kris Couch MD at [address] [place] [address]  2006    cancer / no chemo or radiation     FAMILY HISTORY:    Family History   Problem Relation Age of Onset    Other Mother 80        Old Age    Stroke Father 45    No Known Problems Sister     Psoriasis Daughter     No Known Problems Son      SOCIAL HISTORY:    Social History     Socioeconomic History    Marital status:       Spouse name: Not on file    Number of children: 2    Years of education: Not on file    Highest education level: Not on file   Occupational History    Occupation: retired   Tobacco Use    Smoking status: Former Smoker     Packs/day: 1.00     Years: 40.00     Pack years: 40.00     Types: Cigarettes     Start date: 6/1/2000 Quit date:      Years since quittin.4    Smokeless tobacco: Former User    Tobacco comment: quit    Vaping Use    Vaping Use: Never used   Substance and Sexual Activity    Alcohol use: Yes     Alcohol/week: 0.0 standard drinks     Comment: rare social    Drug use: No    Sexual activity: Not on file   Other Topics Concern    Not on file   Social History Narrative    The patient lives alone in a one story home with one step to enter the home. The bedroom and bathroom are on the first floor. His social support includes his children. He has a wheeled walker, rollator, cane and dressing assistive device at home. He was receiving home health care services prior to admission to include PT, OT and RN. He does not have history of falls prior to admission. He was independent with mobility with a wheeled walker as needed prior to admission. He was independent with self care prior to admission. His goal is to get stronger and return home. LIVES AT HOME ALONE. HE HAS A ROLLATOR HE USES. HIS SON LIVES IN [place] AND IS AVAILABLE IF HE NEEDS HIM. Type of Home: House    Home Layout: One level    Home Access: Level entry    Home Equipment: Cane, 4 wheeled walker    ADL Assistance: Independent    Homemaking Assistance: Independent    Ambulation Assistance: Independent    Transfer Assistance: Independent    Additional Comments: son and dtr can assist upon DC home with IADLs     Social Determinants of Health     Financial Resource Strain:     Difficulty of Paying Living Expenses: Not on file   Food Insecurity:     Worried About [address] in the Last Year: Not on file    Gibran of Food in the Last Year: Not on file   Transportation Needs:     Lack of Transportation (Medical): Not on file    Lack of Transportation (Non-Medical):  Not on file   Physical Activity:     Days of Exercise per Week: Not on file    Minutes of Exercise per Session: Not on file   Stress:     Feeling of Stress : Not on file   Social Connections:     Frequency of Communication with Friends and Family: Not on file    Frequency of Social Gatherings with Friends and Family: Not on file    Attends Anabaptist Services: Not on file    Active Member of Clubs or Organizations: Not on file    Attends Club or Organization Meetings: Not on file    Marital Status: Not on file   Intimate Partner Violence:     Fear of Current or Ex-Partner: Not on file    Emotionally Abused: Not on file    Physically Abused: Not on file    Sexually Abused: Not on file   Housing Stability:     Unable to Pay for Housing in the Last Year: Not on file    Number of [place] in the Last Year: Not on file    Unstable Housing in the Last Year: Not on file     MEDICATIONS:   Prior to Admission medications    Medication Sig Start Date End Date Taking?  Authorizing Provider   budesonide-formoterol (SYMBICORT) 160-4.5 MCG/ACT AERO Inhale 2 puffs into the lungs 2 times daily 6/7/22   Ady Zhong MD   albuterol (PROVENTIL) (2.5 MG/3ML) 0.083% nebulizer solution Use 1 vial via nebulizer every 6 hours as needed for Wheezing 1/21/22   Emmanolizet Zhong MD   albuterol sulfate  (90 Base) MCG/ACT inhaler Inhale 2 puffs into the lungs every 4 hours as needed for Wheezing 1/11/22   Ady Zhong MD   rosuvastatin (CRESTOR) 20 MG tablet  8/5/21   Historical Provider, MD   levothyroxine (SYNTHROID) 137 MCG tablet  4/5/21   Historical Provider, MD   sertraline (ZOLOFT) 50 MG tablet Take 50 mg by mouth daily Pt takes at night    Historical Provider, MD   alfuzosin (UROXATRAL) 10 MG extended release tablet Take by mouth 3/18/09   Historical Provider, MD   rivaroxaban (XARELTO) 10 MG TABS tablet Take 1 tablet by mouth daily (with breakfast) 5/10/19   Tiburcio Mccollum DO   Respiratory Therapy Supplies [place] [facility] CPAP mask and supplies 3/27/19   Emmanolizet Zhong MD   aspirin 81 MG EC tablet Take 1 tablet by mouth daily 12/17/18   Shireen Owen MD psyllium (METAMUCIL) 58.12 % PACK packet Take 1 packet by mouth daily as needed (constipation) 12/17/18   Susan Parra MD   vitamin D (CHOLECALCIFEROL) 1000 UNIT TABS tablet Take 2 tablets by mouth daily 9/27/18   Catrachita Garcia DO   clonazePAM (KLONOPIN) 0.5 MG tablet Take 0.5 mg by mouth nightly as needed. [place] Historical Provider, MD   CPAP Machine MISC by Does not apply route Auto CPAP  5-20 cm 9/5/18   Luis Lim MD   magnesium 30 MG tablet Take 250 mg by mouth daily    Historical Provider, MD   docusate sodium (COLACE) 100 MG capsule Take 100 mg by mouth daily     Historical Provider, MD   mirtazapine (REMERON) 15 MG tablet Take 15 mg by mouth nightly    Historical Provider, MD   finasteride (PROSCAR) 5 MG tablet Take 1 tablet by mouth daily 12/5/17   Regino Balbuena MD   tamsulosin [facility]) 0.4 MG capsule Take 1 capsule by mouth nightly 12/5/17   Migue Akhtar MD   Famotidine (PEPCID AC PO) Take by mouth daily as needed     Historical Provider, MD       ALLERGIES: Patient has no known allergies. REVIEW OF SYSTEM:   Review of Systems   Constitutional: Positive for fatigue. Negative for activity change, chills and fever. HENT: Negative for congestion, ear pain, rhinorrhea and sore throat. Eyes: Negative for photophobia and visual disturbance. Respiratory: Positive for cough and shortness of breath. Negative for wheezing. Cardiovascular: Negative for chest pain. Gastrointestinal: Negative for abdominal pain, diarrhea, nausea and vomiting. Endocrine: Negative for polydipsia, polyphagia and polyuria. Genitourinary: Negative for dysuria, flank pain, hematuria and urgency. Musculoskeletal: Negative for back pain, myalgias and neck stiffness. Skin: Negative for rash and wound. Allergic/Immunologic: Negative for immunocompromised state. Neurological: Negative for dizziness and headaches.    Psychiatric/Behavioral: Negative for behavioral problems and confusion. OBJECTIVE  PHYSICAL EXAM: BP (!) 142/82   Pulse 76   Temp 97.9 °F (36.6 °C)   Resp 17   Ht 6' (1.829 m)   Wt 220 lb (99.8 kg)   SpO2 94%   BMI 29.84 kg/m²     Physical Exam  Vitals and nursing note reviewed. Constitutional:       Appearance: He is well-developed. HENT:      Head: Normocephalic and atraumatic. Nose: Nose normal.   Eyes:      Pupils: Pupils are equal, round, and reactive to light. Cardiovascular:      Rate and Rhythm: Normal rate and regular rhythm. Pulses: Normal pulses. Heart sounds: Normal heart sounds. Pulmonary:      Effort: Pulmonary effort is normal. No respiratory distress. Breath sounds: Examination of the right-lower field reveals rales. Examination of the left-lower field reveals rales. Wheezing ( End expiratory wheeze) and rales (mild) present. Abdominal:      General: Bowel sounds are normal.      Palpations: Abdomen is soft. There is no mass. Tenderness: There is no abdominal tenderness. Musculoskeletal:         General: Normal range of motion. Cervical back: Normal range of motion. Right lower leg: No edema. Left lower leg: No edema. Lymphadenopathy:      Cervical: No cervical adenopathy. Skin:     General: Skin is warm and dry. Capillary Refill: Capillary refill takes less than 2 seconds. Neurological:      Mental Status: He is alert and oriented to person, place, and time. Deep Tendon Reflexes: Reflexes normal.   Psychiatric:         Thought Content: Thought content normal.         DATA:     Diagnostic tests reviewed for today's visit:    Most recent labs and imaging results reviewed.      LABS:    Recent Results (from the past 24 hour(s))   CBC with Auto Differential    Collection Time: 06/07/22  2:15 PM   Result Value Ref Range    WBC 8.2 4.8 - 10.8 K/uL    RBC 5.23 4.70 - 6.10 M/uL    Hemoglobin 14.8 14.0 - 18.0 g/dL    Hematocrit 45.6 42.0 - 52.0 %    MCV 87.2 80.0 - 100.0 fL    MCH 28.4 27.0 - 31.3 pg    MCHC 32.5 (L) 33.0 - 37.0 %    RDW 15.5 (H) 11.5 - 14.5 %    Platelets 434 148 - 403 K/uL    Neutrophils % 72.5 %    Lymphocytes % 15.6 %    Monocytes % 6.3 %    Eosinophils % 4.9 %    Basophils % 0.7 %    Neutrophils Absolute 6.0 1.4 - 6.5 K/uL    Lymphocytes Absolute 1.3 1.0 - 4.8 K/uL    Monocytes Absolute 0.5 0.2 - 0.8 K/uL    Eosinophils Absolute 0.4 0.0 - 0.7 K/uL    Basophils Absolute 0.1 0.0 - 0.2 K/uL   Comprehensive Metabolic Panel    Collection Time: 06/07/22  2:15 PM   Result Value Ref Range    Sodium 138 135 - 144 mEq/L    Potassium 4.2 3.4 - 4.9 mEq/L    Chloride 104 95 - 107 mEq/L    CO2 23 20 - 31 mEq/L    Anion Gap 11 9 - 15 mEq/L    Glucose 105 (H) 70 - 99 mg/dL    BUN 20 8 - 23 mg/dL    CREATININE 1.32 (H) 0.70 - 1.20 mg/dL    GFR Non-African American 52.1 (L) >60    GFR  >60.0 >60    Calcium 9.4 8.5 - 9.9 mg/dL    Total Protein 7.5 6.3 - 8.0 g/dL    Albumin 3.3 (L) 3.5 - 4.6 g/dL    Total Bilirubin 0.3 0.2 - 0.7 mg/dL    Alkaline Phosphatase 71 35 - 104 U/L    ALT 13 0 - 41 U/L    AST 16 0 - 40 U/L    Globulin 4.2 (H) 2.3 - 3.5 g/dL   Brain Natriuretic Peptide    Collection Time: 06/07/22  2:15 PM   Result Value Ref Range    Pro- pg/mL   Procalcitonin    Collection Time: 06/07/22  2:15 PM   Result Value Ref Range    Procalcitonin 0.08 0.00 - 0.15 ng/mL   COVID-19, Rapid    Collection Time: 06/07/22  2:23 PM    Specimen: Nasopharyngeal Swab   Result Value Ref Range    SARS-CoV-2, NAAT Not Detected Not Detected   Rapid Influenza A/B Antigens    Collection Time: 06/07/22  2:23 PM    Specimen: Nasopharyngeal   Result Value Ref Range    Influenza A by PCR Negative     Influenza B by PCR Negative    D-Dimer, Quantitative    Collection Time: 06/07/22  2:24 PM   Result Value Ref Range    D-Dimer, Quant 0.34 0.00 - 0.50 mg/L FEU   Troponin    Collection Time: 06/07/22  3:45 PM   Result Value Ref Range    Troponin 0.016 (HH) 0.000 - 0.010 ng/mL   Magnesium Collection Time: 06/07/22  3:45 PM   Result Value Ref Range    Magnesium 2.2 1.7 - 2.4 mg/dL   C-Reactive Protein    Collection Time: 06/07/22  3:45 PM   Result Value Ref Range    CRP 10.5 (H) 0.0 - 5.0 mg/L   EKG 12 Lead    Collection Time: 06/07/22  6:19 PM   Result Value Ref Range    Ventricular Rate 74 BPM    Atrial Rate 74 BPM    P-R Interval 196 ms    QRS Duration 92 ms    Q-T Interval 400 ms    QTc Calculation (Bazett) 444 ms    P Axis 77 degrees    R Axis -15 degrees    T Axis 37 degrees       IMAGING:   XR CHEST (2 VW)    Result Date: 6/7/2022  Mild bibasilar pulmonary infiltrates, more on the right side. Stable elevated right hemidiaphragm. VTE Prophylaxis: OAC     ASSESSMENT AND PLAN    Principal Problem:  Pneumonia: SOB and cough x3 days. White count 8.2. Influenza and COVID-negative. D-dimer & procal negative. VSS. Patient treated with Zosyn in ED. Blood culture sent x2. Will continue azithromycin and Rocephin. We will administer DuoNebs. We will utilize incentive spirometer and Mucinex DM. We will monitor CBC daily. We will follow blood cultures. BRAD: Creatinine 1.32. Baseline appears to be less than 1. We will administer gentle IV fluids. We will monitor CBC daily. We will avoid nephrotoxic agents whenever possible. Elevated troponin: Troponin 0.016. EKG sinus, no ST elevation. Likely 2/2 BRAD. We will cycle troponin. We will repeat EKG. We will keep patient on telemetry monitoring. Active problems:  COPD: Patient on scheduled inhalers and as needed nebulizers  We will resume home meds. History of PE: Patient on [address] [place] [address]. We will resume home meds. Hypothyroidism: PT on home meds to control. Will resume home meds, as tolerated. HLD: PT on home meds to control. Will resume home meds, as tolerated. GERD: PT on home meds to control. Will resume home meds, as tolerated. BPH: PT on home meds to control. Will resume home meds, as tolerated.     Plan of care discussed with: patient    SIGNATURE: CINDY Rangel - CNP  DATE: June 7, 2022  TIME: 6:46 PM     Ana Gardiner MD - supervising physician

## 2022-06-07 NOTE — ED TRIAGE NOTES
Pt c/o increasing sob. Pt has hx of COPD. Pt is A&OX4, skin intact, afebrile, breaths are equal and unlabored.

## 2022-06-08 LAB
ALBUMIN SERPL-MCNC: 3 G/DL (ref 3.5–4.6)
ALP BLD-CCNC: 58 U/L (ref 35–104)
ALT SERPL-CCNC: 10 U/L (ref 0–41)
ANION GAP SERPL CALCULATED.3IONS-SCNC: 12 MEQ/L (ref 9–15)
AST SERPL-CCNC: 13 U/L (ref 0–40)
BASOPHILS ABSOLUTE: 0 K/UL (ref 0–0.2)
BASOPHILS RELATIVE PERCENT: 0.3 %
BILIRUB SERPL-MCNC: 0.4 MG/DL (ref 0.2–0.7)
BUN BLDV-MCNC: 19 MG/DL (ref 8–23)
CALCIUM SERPL-MCNC: 8.5 MG/DL (ref 8.5–9.9)
CHLORIDE BLD-SCNC: 105 MEQ/L (ref 95–107)
CO2: 24 MEQ/L (ref 20–31)
CREAT SERPL-MCNC: 1.29 MG/DL (ref 0.7–1.2)
EOSINOPHILS ABSOLUTE: 0.6 K/UL (ref 0–0.7)
EOSINOPHILS RELATIVE PERCENT: 7.5 %
GFR AFRICAN AMERICAN: >60
GFR NON-AFRICAN AMERICAN: 53.5
GLOBULIN: 3.4 G/DL (ref 2.3–3.5)
GLUCOSE BLD-MCNC: 106 MG/DL (ref 70–99)
HCT VFR BLD CALC: 40.8 % (ref 42–52)
HEMOGLOBIN: 13.2 G/DL (ref 14–18)
LYMPHOCYTES ABSOLUTE: 1.8 K/UL (ref 1–4.8)
LYMPHOCYTES RELATIVE PERCENT: 23.5 %
MAGNESIUM: 2.1 MG/DL (ref 1.7–2.4)
MCH RBC QN AUTO: 28.4 PG (ref 27–31.3)
MCHC RBC AUTO-ENTMCNC: 32.5 % (ref 33–37)
MCV RBC AUTO: 87.5 FL (ref 80–100)
MONOCYTES ABSOLUTE: 0.6 K/UL (ref 0.2–0.8)
MONOCYTES RELATIVE PERCENT: 7.6 %
NEUTROPHILS ABSOLUTE: 4.7 K/UL (ref 1.4–6.5)
NEUTROPHILS RELATIVE PERCENT: 61.1 %
PDW BLD-RTO: 15.7 % (ref 11.5–14.5)
PLATELET # BLD: 134 K/UL (ref 130–400)
POTASSIUM SERPL-SCNC: 4.3 MEQ/L (ref 3.4–4.9)
RBC # BLD: 4.66 M/UL (ref 4.7–6.1)
SODIUM BLD-SCNC: 141 MEQ/L (ref 135–144)
TOTAL PROTEIN: 6.4 G/DL (ref 6.3–8)
TROPONIN: 0.02 NG/ML (ref 0–0.01)
TROPONIN: 0.02 NG/ML (ref 0–0.01)
WBC # BLD: 7.7 K/UL (ref 4.8–10.8)

## 2022-06-08 PROCEDURE — 94761 N-INVAS EAR/PLS OXIMETRY MLT: CPT

## 2022-06-08 PROCEDURE — 6360000002 HC RX W HCPCS: Performed by: NURSE PRACTITIONER

## 2022-06-08 PROCEDURE — 2580000003 HC RX 258: Performed by: NURSE PRACTITIONER

## 2022-06-08 PROCEDURE — 6370000000 HC RX 637 (ALT 250 FOR IP): Performed by: NURSE PRACTITIONER

## 2022-06-08 PROCEDURE — 97162 PT EVAL MOD COMPLEX 30 MIN: CPT

## 2022-06-08 PROCEDURE — 1210000000 HC MED SURG R&B

## 2022-06-08 PROCEDURE — 80053 COMPREHEN METABOLIC PANEL: CPT

## 2022-06-08 PROCEDURE — 99223 1ST HOSP IP/OBS HIGH 75: CPT | Performed by: INTERNAL MEDICINE

## 2022-06-08 PROCEDURE — 36415 COLL VENOUS BLD VENIPUNCTURE: CPT

## 2022-06-08 PROCEDURE — 87449 NOS EACH ORGANISM AG IA: CPT

## 2022-06-08 PROCEDURE — 94664 DEMO&/EVAL PT USE INHALER: CPT

## 2022-06-08 PROCEDURE — 85025 COMPLETE CBC W/AUTO DIFF WBC: CPT

## 2022-06-08 PROCEDURE — 83735 ASSAY OF MAGNESIUM: CPT

## 2022-06-08 PROCEDURE — 87070 CULTURE OTHR SPECIMN AEROBIC: CPT

## 2022-06-08 PROCEDURE — 94667 MNPJ CHEST WALL 1ST: CPT

## 2022-06-08 PROCEDURE — 94640 AIRWAY INHALATION TREATMENT: CPT

## 2022-06-08 PROCEDURE — 84484 ASSAY OF TROPONIN QUANT: CPT

## 2022-06-08 RX ORDER — ASPIRIN 81 MG/1
81 TABLET ORAL DAILY
Status: DISCONTINUED | OUTPATIENT
Start: 2022-06-08 | End: 2022-06-14 | Stop reason: HOSPADM

## 2022-06-08 RX ORDER — BUDESONIDE AND FORMOTEROL FUMARATE DIHYDRATE 160; 4.5 UG/1; UG/1
2 AEROSOL RESPIRATORY (INHALATION) 2 TIMES DAILY
Status: DISCONTINUED | OUTPATIENT
Start: 2022-06-08 | End: 2022-06-14 | Stop reason: HOSPADM

## 2022-06-08 RX ORDER — TAMSULOSIN HYDROCHLORIDE 0.4 MG/1
0.4 CAPSULE ORAL DAILY
Status: DISCONTINUED | OUTPATIENT
Start: 2022-06-08 | End: 2022-06-14 | Stop reason: HOSPADM

## 2022-06-08 RX ORDER — ALBUTEROL SULFATE 90 UG/1
2 AEROSOL, METERED RESPIRATORY (INHALATION) EVERY 4 HOURS PRN
Status: DISCONTINUED | OUTPATIENT
Start: 2022-06-08 | End: 2022-06-14 | Stop reason: HOSPADM

## 2022-06-08 RX ORDER — FINASTERIDE 5 MG/1
5 TABLET, FILM COATED ORAL DAILY
Status: DISCONTINUED | OUTPATIENT
Start: 2022-06-08 | End: 2022-06-14 | Stop reason: HOSPADM

## 2022-06-08 RX ORDER — ROSUVASTATIN CALCIUM 20 MG/1
20 TABLET, COATED ORAL NIGHTLY
Status: DISCONTINUED | OUTPATIENT
Start: 2022-06-08 | End: 2022-06-14 | Stop reason: HOSPADM

## 2022-06-08 RX ORDER — MIRTAZAPINE 15 MG/1
15 TABLET, FILM COATED ORAL NIGHTLY
Status: DISCONTINUED | OUTPATIENT
Start: 2022-06-08 | End: 2022-06-14 | Stop reason: HOSPADM

## 2022-06-08 RX ORDER — VITAMIN B COMPLEX
2000 TABLET ORAL DAILY
Status: DISCONTINUED | OUTPATIENT
Start: 2022-06-08 | End: 2022-06-14 | Stop reason: HOSPADM

## 2022-06-08 RX ORDER — CALCIUM CARBONATE 200(500)MG
500 TABLET,CHEWABLE ORAL 3 TIMES DAILY PRN
Status: DISCONTINUED | OUTPATIENT
Start: 2022-06-08 | End: 2022-06-14 | Stop reason: HOSPADM

## 2022-06-08 RX ADMIN — SERTRALINE HYDROCHLORIDE 50 MG: 50 TABLET ORAL at 09:52

## 2022-06-08 RX ADMIN — ASPIRIN 81 MG: 81 TABLET, COATED ORAL at 09:53

## 2022-06-08 RX ADMIN — SODIUM CHLORIDE, PRESERVATIVE FREE 10 ML: 5 INJECTION INTRAVENOUS at 20:15

## 2022-06-08 RX ADMIN — CEFTRIAXONE SODIUM 1000 MG: 1 INJECTION, POWDER, FOR SOLUTION INTRAMUSCULAR; INTRAVENOUS at 20:18

## 2022-06-08 RX ADMIN — RIVAROXABAN 10 MG: 10 TABLET, FILM COATED ORAL at 09:52

## 2022-06-08 RX ADMIN — MIRTAZAPINE 15 MG: 15 TABLET, FILM COATED ORAL at 20:14

## 2022-06-08 RX ADMIN — AZITHROMYCIN MONOHYDRATE 500 MG: 500 INJECTION, POWDER, LYOPHILIZED, FOR SOLUTION INTRAVENOUS at 20:55

## 2022-06-08 RX ADMIN — BUDESONIDE AND FORMOTEROL FUMARATE DIHYDRATE 2 PUFF: 160; 4.5 AEROSOL RESPIRATORY (INHALATION) at 07:30

## 2022-06-08 RX ADMIN — BUDESONIDE AND FORMOTEROL FUMARATE DIHYDRATE 2 PUFF: 160; 4.5 AEROSOL RESPIRATORY (INHALATION) at 21:14

## 2022-06-08 RX ADMIN — TAMSULOSIN HYDROCHLORIDE 0.4 MG: 0.4 CAPSULE ORAL at 09:52

## 2022-06-08 RX ADMIN — LEVOTHYROXINE SODIUM 137 MCG: 0.03 TABLET ORAL at 06:06

## 2022-06-08 RX ADMIN — FINASTERIDE 5 MG: 5 TABLET, FILM COATED ORAL at 09:53

## 2022-06-08 RX ADMIN — Medication 2000 UNITS: at 09:52

## 2022-06-08 RX ADMIN — ROSUVASTATIN CALCIUM 20 MG: 20 TABLET, FILM COATED ORAL at 20:14

## 2022-06-08 NOTE — CONSULTS
Pulmonary Medicine  Consult Note      Reason for consultation: Pneumonia    Consulting physician: Dr. Bernice Stevens:      This is a 81-year-old male has been feeling sick for last 3 days with increased shortness of breath and feeling weak and fatigue. He has been having cough with yellow mucus in the morning but then become dry. Patient does report that he has been eating well no fever. No chest pain. No abdominal pain. No nausea, vomiting or diarrhea. Patient has history of COPD and he was been using his nebulizer and inhaler but only minimally effective. He has a history of PE on oral anticoagulation therapy, hyperlipidemia, GERD, BPH, hypothyroidism and obstructive sleep apnea. He was using CPAP but stopped using it. He denies any history of smoking alcohol or illicit drug use. He was seen in office and sent to ER. Patient was evaluated in ER and admitted for further care.     Past Medical History:        Diagnosis Date    Abnormality of gait and mobility     Acute sinusitis     Anxiety     Aspiration into respiratory tract 11/10/2017    Aspiration pneumonia (HCC)     Bilateral pulmonary embolism (HCC) 1/6/2018    BPH (benign prostatic hyperplasia)     [facility] 12/5/2013    Debility     Elevated CK 12/30/2013    Foraminal stenosis of lumbosacral region 6/7/2016    GERD (gastroesophageal reflux disease) 12/11/2014    History of asthma 1/13/2014    HTN (hypertension) 1/13/2014    past braden / no current meds    Hx of blood clots 01/2018    DVT  ( unsure which leg but both were swollen ) -> PE -> thus Xarelto    Hyperlipidemia     meds > 10 yrs    Hypothyroidism     Insomnia     Lower extremity weakness 1/24/2014    On home oxygen therapy     2L at night    Osteoarthritis of spine with radiculopathy, lumbar region 6/7/2016    Papillary thyroid carcinoma ([facility] 12/2006    no trx to follow    Positive D dimer 12/5/2013    Sleep apnea 1/24/2014    Type 2 diabetes mellitus ([facility]     diet control  / no meds    Vitamin D deficiency        Past Surgical History:        Procedure Laterality Date    BRONCHOSCOPY N/A 11/11/2017    BRONCHOSCOPY FIBEROPTIC performed by Eliseo Monk MD at [facility] COLONOSCOPY      ENDOSCOPY, COLON, DIAGNOSTIC      EYE SURGERY      phaco with IOL OU    HERNIA REPAIR  [id number]    repair umbilical hernia    KNEE SURGERY Left 1970    AL ARTHROSCOPY SHOULDER SURGICAL BICEPS TENODESIS Right 9/18/2018    RIGHT SHOULDER ARTHROSCOPY SUBACROMIAL DECOMPRESS WITH POSSIBLE BICEPS TENODESIS ARTHROSCOPIC FRANSISCA C- ARM ARTHREX BICEPS TENODESIS [facility] CHAIR CASE #1 1 HOUR performed by Valeen Boeck, MD at [facility] Right 11/14/2017    RIGHT THORACOTOMY WEDGE RESECTION FOR FOREIGN BODY AND FOB DOUBLE LUMEN (1ST CASE) performed by Nola Ramos MD at [address] [place] [address]  2006    cancer / no chemo or radiation       Social History:     reports that he quit smoking about 22 years ago. His smoking use included cigarettes. He started smoking about 22 years ago. He has a 40.00 pack-year smoking history. He has quit using smokeless tobacco. He reports current alcohol use. He reports that he does not use drugs. Family History:       Problem Relation Age of Onset    Other Mother 80        Old Age    Stroke Father 45    No Known Problems Sister     Psoriasis Daughter     No Known Problems Son        Allergies:  Patient has no known allergies.         MEDICATIONS during current hospitalization:    Continuous Infusions:   sodium chloride         Scheduled Meds:   tamsulosin  0.4 mg Oral Daily    aspirin  81 mg Oral Daily    budesonide-formoterol  2 puff Inhalation BID    finasteride  5 mg Oral Daily    levothyroxine  137 mcg Oral Daily    mirtazapine  15 mg Oral Nightly    rivaroxaban  10 mg Oral Daily with breakfast    rosuvastatin  20 mg Oral Nightly    sertraline  50 mg Oral Daily    Vitamin D  2,000 Units Oral Daily    sodium chloride flush  5-40 mL IntraVENous 2 times per day    azithromycin  500 mg IntraVENous Q24H    cefTRIAXone (ROCEPHIN) IV  1,000 mg IntraVENous Q24H       PRN Meds:albuterol sulfate HFA, calcium carbonate, sodium chloride flush, sodium chloride, ondansetron **OR** ondansetron, polyethylene glycol, acetaminophen **OR** acetaminophen, dextromethorphan-guaiFENesin    REVIEW OF SYSTEMS:  As in history of present illness  Other 14 point review of system is negative. PHYSICAL EXAM:    Vitals:  BP (!) 153/83   Pulse 57   Temp 98.2 °F (36.8 °C) (Oral)   Resp 20   Ht 6' (1.829 m)   Wt 220 lb (99.8 kg)   SpO2 97%   BMI 29.84 kg/m²   General: Alert, awake, Oriented x3  .comfortable in bed, No distress. Head: Atraumatic , Normocephalic   Eyes: PERRL. No sclera icterus. No conjunctival injection. No discharge   ENT: No nasal  discharge. Pharynx clear. Neck:  Trachea midline. No thyromegaly, no JVD, No cervical adenopathy. Chest : Bilaterally symmetrical ,Normal effort,  No accessory muscle use  Lung : Diminished BS bilateraly. Bibasilar Rales. Few scattered wheezing. No rhonchi. Heart[de-identified] Normal  rate. Regular rhythm. No mumur ,  Rub or gallop  ABD: Non-tender. Non-distended. No masses. No organmegaly. Normal bowel sounds. No hernia. Extremities: No pitting in both lower leg , No Cyanosis ,No clubbing  Neuro: no cranial nerve abnormality, normal reflex and sensation, no focal weakness   Skin: Warm and dry. No erythema rash on exposed extremities. Data Review  Recent Labs     06/07/22  1415 06/08/22  0536   WBC 8.2 7.7   HGB 14.8 13.2*   HCT 45.6 40.8*    134      Recent Labs     06/07/22  1415 06/08/22  0536    141   K 4.2 4.3    105   CO2 23 24   BUN 20 19   CREATININE 1.32* 1.29*   GLUCOSE 105* 106*       MV Settings:           ABGs: No results for input(s): PHART, EZK3PEQ, PO2ART, XZR6IBL, BEART, V2JLWNEV, UDI9GNO in the last 72 hours. O2 Device: Nasal cannula  O2 Flow Rate (L/min):  (3l)  Lab Results   Component Value Date    LACTA 1.3 12/31/2021    LACTA 2.1 12/14/2018    LACTA 1.2 11/11/2017       Radiology  XR CHEST (2 VW)    Result Date: 6/7/2022  Indication: Shortness of breath. EXAM: X-ray of the chest PA and lateral views. COMPARISON: 12/31/2021 exam. FINDINGS: Mild bibasilar pulmonary infiltrates, more on the right side. Stable elevated right hemidiaphragm. Normal cardiac silhouette. No pleural effusions. Normal hilar shortness. Central mediastinum. Mild bibasilar pulmonary infiltrates, more on the right side. Stable elevated right hemidiaphragm. Assessment and  plan:     1. Bibasilar pneumonia  2. COPD without exacerbation  3. Acute kidney injury  4. Elevated troponin  5. GEORGES noncompliant with CPAP  6. Hyperythyroidism  7. Hyperlipidemia  8. GERD    Patient is very weak. He was having cough with yellow mucus. Chest x-ray showing bibasilar infiltration. Even though patient does not have leukocytosis it is reasonable to treat him as pneumonia. He is on IV Rocephin and Zithromax. He has history of PE currently on Xarelto. He has acute kidney injury. He is on Symbicort 2 puff twice daily. Albuterol HFA as needed. Currently on 3 L O2 via nasal cannula and O2 saturation is 97%. Continue present treatment plan. If remain afebrile antibiotic can be changed to p.o. We will follow    Thank you for this consult    NOTE: This report was transcribed using voice recognition software. Every effort was made to ensure accuracy; however, inadvertent computerized transcription errors may be present.     Electronically signed by Elisha Gupta MD, FCCP on 6/8/2022 at 5:28 PM

## 2022-06-08 NOTE — PROGRESS NOTES
pack years  []   Pulmonary Disorder  (acute or chronic)  [x]   Severe or Chronic w/ Exacerbation  []     Surgical Status No [x]   Surgeries     General []   Surgery Lower []   Abdominal Thoracic or []   Upper Abdominal Thoracic with  PulmonaryDisorder  []     Chest X-ray Clear/Not  Ordered     []  Chronic Changes  Results Pending  []  Infiltrates, atelectasis, pleural effusion, or edema  [x]  Infiltrates in more than one lobe []  Infiltrate + Atelectasis, &/or pleural effusion  []    Respiratory Pattern Regular,  RR = 12-20 [x]  Increased,  RR = 21-25 []  STEWART, irregular,  or RR = 26-30 []  Decreased FEV1  or RR = 31-35 []  Severe SOB, use  of accessory muscles, or RR ? 35  []    Mental Status Alert, oriented,  Cooperative [x]  Confused but Follows commands []  Lethargic or unable to follow commands []  Obtunded  []  Comatose  []    Breath Sounds Clear to  auscultation  [x]  Decreased unilaterally or  in bases only []  Decreased  bilaterally  []  Crackles or intermittent wheezes []  Wheezes []    Cough Strong, Spontan., & nonproductive [x]  Strong,  spontaneous, &  productive []  Weak,  Nonproductive []  Weak, productive or  with wheezes []  No spontaneous  cough or may require suctioning []    Level of Activity Ambulatory [x]  Ambulatory w/ Assist  []  Non-ambulatory []  Paraplegic []  Quadriplegic []    Total    Score:___5____     Triage Score:_____5___      Tri       Triage:     1. (>20) Freq: Q3    2. (16-20) Freq: Q4   3. (11-15) Freq: QID & Albuterol Q2 PRN    4. (6-10) Freq: TID & Albuterol Q2 PRN    5. (0-5) Freq Q4prn

## 2022-06-08 NOTE — CARE COORDINATION
Met with patient. Definition of pneumonia discussed. Causes of different types of pneumonia reviewed. Symptoms discussed and pt does understand that they may have some or all of these symptoms. Testing to diagnose pneumonia reviewed as well as possible treatments. Importance of avoiding infections discussed including: taking medication as directed, washing hands, disposing of used tissues, getting the pneumonia and flu vaccines, and avoiding others who are ill. Importance of not smoking and to avoid others who may be smoking around patient stressed. Pneumonia Zones also reviewed. \"Green\" zone is the goal, \"Yellow\" zone means to call the doctor, and \"Red\" zone means to call the doctor ASAP or call 911. Copies of Pneumonia booklet and Zone Pamphlet given to patient. Offer for patient to express any concerns or questions. Pt denies having further questions at this time.     Electronically signed by Ana Godinez RN on 6/8/2022 at 10:18 AM

## 2022-06-08 NOTE — PROGRESS NOTES
Hospitalist Progress Note      PCP: Brooklyn Echeverria MD    Date of Admission: 6/7/2022    Chief Complaint:    Chief Complaint   Patient presents with    Shortness of Breath     Subjective:  Patient denies fevers, chills, sweats; breathing is improving; abnle to bring up sputum now. 12 point ROS negative other than mentioned above     Medications:  Reviewed    Infusion Medications    sodium chloride       Scheduled Medications    tamsulosin  0.4 mg Oral Daily    aspirin  81 mg Oral Daily    budesonide-formoterol  2 puff Inhalation BID    finasteride  5 mg Oral Daily    levothyroxine  137 mcg Oral Daily    mirtazapine  15 mg Oral Nightly    rivaroxaban  10 mg Oral Daily with breakfast    rosuvastatin  20 mg Oral Nightly    sertraline  50 mg Oral Daily    Vitamin D  2,000 Units Oral Daily    sodium chloride flush  5-40 mL IntraVENous 2 times per day    azithromycin  500 mg IntraVENous Q24H    cefTRIAXone (ROCEPHIN) IV  1,000 mg IntraVENous Q24H     PRN Meds: albuterol sulfate HFA, calcium carbonate, sodium chloride flush, sodium chloride, ondansetron **OR** ondansetron, polyethylene glycol, acetaminophen **OR** acetaminophen, dextromethorphan-guaiFENesin      Intake/Output Summary (Last 24 hours) at 6/8/2022 1345  Last data filed at 6/8/2022 0947  Gross per 24 hour   Intake 19.66 ml   Output 300 ml   Net -280.34 ml       Exam:    BP (!) 153/83   Pulse 57   Temp 98.2 °F (36.8 °C) (Oral)   Resp 20   Ht 6' (1.829 m)   Wt 220 lb (99.8 kg)   SpO2 97%   BMI 29.84 kg/m²     General appearance: No apparent distress, appears stated age and cooperative. HEENT: Conjunctivae/corneas clear. Neck:  Trachea midline. Respiratory:  Basilar rhonchi  Cardiovascular: Regular rate and rhythm . Abdomen: Soft, non-tender, non-distended with normal bowel sounds. Musculoskeletal: No clubbing, cyanosis or edema bilaterally.  .  Neuro: Non Focal.   Capillary Refill: Brisk,< 3 seconds   Peripheral Pulses: +2 palpable, equal bilaterally     Labs:   Recent Labs     06/07/22  1415 06/08/22  0536   WBC 8.2 7.7   HGB 14.8 13.2*   HCT 45.6 40.8*    134     Recent Labs     06/07/22  1415 06/08/22  0536    141   K 4.2 4.3    105   CO2 23 24   BUN 20 19   CREATININE 1.32* 1.29*   CALCIUM 9.4 8.5     Recent Labs     06/07/22  1415 06/08/22  0536   AST 16 13   ALT 13 10   BILITOT 0.3 0.4   ALKPHOS 71 58     No results for input(s): INR in the last 72 hours. Recent Labs     06/07/22  1545 06/08/22  0011 06/08/22  0536   TROPONINI 0.016* 0.019* 0.018*       Urinalysis:      Lab Results   Component Value Date    NITRU Negative 12/31/2021    WBCUA 0-2 12/31/2021    BACTERIA Negative 12/31/2021    RBCUA >100 12/31/2021    BLOODU Large 12/31/2021    SPECGRAV 1.015 12/31/2021    GLUCOSEU Negative 12/31/2021     Radiology:  XR CHEST (2 VW)   Final Result      Mild bibasilar pulmonary infiltrates, more on the right side. Stable elevated right hemidiaphragm. Assessment/Plan:    Pneumonia: Improving with CTX + Azithromycin; pulm consult for their opinon; vest therapy; sputum culture; legionella urine antigen ordered.     BRAD: Continue gentle IVF     Elevated troponin: Troponin 0.016. EKG sinus, no ST elevation. Likely 2/2 BRAD   We will keep patient on telemetry monitoring.     Active problems:  COPD: Patient on scheduled inhalers and as needed nebulizers  Continue home meds. History of PE: Patient on [address] [place] [address]. Continue home meds. Hypothyroidism: PT on home meds to control. Continue home meds, as tolerated. HLD: PT on home meds to control. Continue home meds, as tolerated. GERD: PT on home meds to control. Continue home meds, as tolerated. BPH: PT on home meds to control. Continue home meds, as tolerated.     Active Hospital Problems    Diagnosis Date Noted    Pneumonia [J18.9] 06/07/2022     Priority: Medium     Additional work up or/and treatment plan may be added today or then after based on clinical progression. I am managing a portion of pt care. Some medical issues are handled by other specialists. Additional work up and treatment should be done in out pt setting by pt PCP and other out pt providers. In addition to examining and evaluating pt, I spent additional time explaining care, normal and abnormal findings, and treatment plan. All of pt questions were answered. Counseling, diet and education were  provided. Case will be discussed with nursing staff when appropriate. Family will be updated if and when appropriate. Diet: ADULT DIET;  Regular; 5 carb choices (75 gm/meal)    Code Status: Full Code    PT/OT Eval   Electronically signed by Rosalba Lopez MD on 6/8/2022 at 1:45 PM

## 2022-06-08 NOTE — PROGRESS NOTES
Physical Therapy Med Surg Initial Assessment  [facility]Department[facility]  [id number]       NAME: Dixie Hughes  :  ([de-identified] y.o.)  MRN: [id number]  CODE STATUS: Full Code    Date of Service: 2022    Patient Diagnosis(es): Pneumonia [J18.9]  Elevated troponin [R77.8]  Bilateral pleural effusion [J90]  Pneumonia of both lower lobes due to infectious organism [J18.9]   Chief Complaint   Patient presents with    Shortness of Breath     Patient Active Problem List    Diagnosis Date Noted    Acute bronchitis 2022    Pneumonia 2022    COPD exacerbation ([address] 2021    Acute deep vein thrombosis (DVT) of femoral vein of left lower extremity ([address] 05/10/2019    Pulmonary embolus ([address] 2018    Primary osteoarthritis involving multiple joints 2018    Hx of long term use of blood thinners 2018    Arthritis of shoulder 2018    History of pulmonary embolism 2018    S/P thoracotomy, right 2018    Asthma 2018    Thyroid cancer ([address] 2018    History of aspiration pneumonia 2018    Obesity (BMI 30-39.9) 2018    History of total left knee replacement 2018    Abnormality of gait and mobility due to Right Shoulder OA S/P Right Shoulder Arthroscopy Subacromial Decompression with Biceps.   [facility] admit 18. 2018    Arthritis of right shoulder region 2018    Past use of tobacco 2018    Current use of long term anticoagulation 2018    Gait abnormality 2018    Abnormality of gait and mobility due to hypoxemia secondary to bilateral pulmonary emboli, [facility] admit 18      Bilateral pulmonary embolism ([address] 2018    Hypoxemia     Vitamin D deficiency     Debility     On home oxygen therapy     Osteoarthritis of spine with radiculopathy, lumbar region 2016    Foraminal stenosis of lumbosacral region 2016    Elevated PSA 2016    Anxiety     Insomnia     Hypothyroidism     Type 2 diabetes mellitus (HCC)     BPH (benign prostatic hyperplasia)     GERD (gastroesophageal reflux disease) 12/11/2014    GEORGES (obstructive sleep apnea) 01/24/2014    HTN (hypertension) 01/13/2014    Hyperlipidemia 01/13/2014    Chronic obstructive pulmonary disease ([facility] 12/05/2013        Past Medical History:   Diagnosis Date    Abnormality of gait and mobility     Acute sinusitis     Anxiety     Aspiration into respiratory tract 11/10/2017    Aspiration pneumonia (HCC)     Bilateral pulmonary embolism (HCC) 1/6/2018    BPH (benign prostatic hyperplasia)     COPD (chronic obstructive pulmonary disease) ([address] 12/5/2013    Debility     Elevated CK 12/30/2013    Foraminal stenosis of lumbosacral region 6/7/2016    GERD (gastroesophageal reflux disease) 12/11/2014    History of asthma 1/13/2014    HTN (hypertension) 1/13/2014    past braden / no current meds    Hx of blood clots 01/2018    DVT  ( unsure which leg but both were swollen ) -> PE -> thus Xarelto    Hyperlipidemia     meds > 10 yrs    Hypothyroidism     Insomnia     Lower extremity weakness 1/24/2014    On home oxygen therapy     2L at night    Osteoarthritis of spine with radiculopathy, lumbar region 6/7/2016    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    no trx to follow    Positive D dimer 12/5/2013    Sleep apnea 1/24/2014    Type 2 diabetes mellitus ([facility]     diet control  / no meds    Vitamin D deficiency      Past Surgical History:   Procedure Laterality Date    BRONCHOSCOPY N/A 11/11/2017    BRONCHOSCOPY FIBEROPTIC performed by Randal Ham MD at [address] [place] [address] COLONOSCOPY      ENDOSCOPY, COLON, DIAGNOSTIC      EYE SURGERY      phaco with IOL OU    HERNIA REPAIR  3051Q    repair umbilical hernia    KNEE SURGERY Left 1970    ME ARTHROSCOPY SHOULDER SURGICAL BICEPS TENODESIS Right 9/18/2018    RIGHT SHOULDER ARTHROSCOPY SUBACROMIAL DECOMPRESS WITH POSSIBLE BICEPS TENODESIS ARTHROSCOPIC FRANSISCA C- ARM ARTHREX BICEPS TENODESIS GALE Shay CHAIR CASE #1 1 HOUR performed by Ken Fraire MD at [facility] Right 11/14/2017    RIGHT THORACOTOMY WEDGE RESECTION FOR FOREIGN BODY AND FOB DOUBLE LUMEN (1ST CASE) performed by Elaine Whitten MD at [address] [place] [address]  2006    cancer / no chemo or radiation       Chart Reviewed: Yes  Family / Caregiver Present: No  General Comment  Comments: Pt resting in bed - agreeable to PT evaluation    Restrictions:  Restrictions/Precautions: Fall Risk     SUBJECTIVE:   Subjective: \"They haven't been letting me get up. \"    Pain  Pain: Denies pre and post session pain. Prior Level of Function:  Social/Functional History  Lives With: Alone  Type of Home: House  Home Layout: One level  Home Access: Stairs to enter with rails  Entrance Stairs - Number of Steps: 3  Home Equipment: Cane,Walker, rolling  Has the patient had two or more falls in the past year or any fall with injury in the past year?: No  ADL Assistance: Independent  Homemaking Assistance: Independent  Ambulation Assistance: Independent ([facility] in home/short distances; rollator for long walks within community)  Transfer Assistance: Independent  Active : Yes    OBJECTIVE:   Vision  Vision: Within Functional Limits  Hearing: Within functional limits    Cognition:  Overall Orientation Status: Within Functional Limits  Follows Commands: Within Functional Limits    Observation/Palpation  Observation: No acute distress noted. Pt pleasant and motivated. 2L O2 via NC. Removed for assessment.  Pt maintains 92%SPO2 on RA.    ROM:  RLE PROM: WFL  LLE PROM: WFL    Strength:  Strength RLE  Strength RLE: WFL  Comment: Grossly 3-/5 hips  Strength LLE  Strength LLE: WFL  Comment: Grossly 3-/5 hips    Neuro:  Balance  Sitting - Static: Good  Sitting - Dynamic: Good  Standing - Static: Fair;+  Standing - Dynamic: Fair;+                      Tone: Normal    Sensation: Intact    Bed mobility  Rolling to Right: Stand by assistance;Supervision  Supine to Sit: Stand by assistance;Supervision  Sit to Supine: Stand by assistance;Supervision  Bed Mobility Comments: increased effort to complete    Transfers  Sit to Stand: Stand by assistance  Stand to sit: Stand by assistance  Comment: increased time to complete. momenum utilized for power. Ambulation  Surface: level tile  Device: Rolling Walker  Assistance: Stand by assistance  Quality of Gait: NBOS with increased lateral instability, however self corrects. Safe management of [employer]. Distance: 15ft X 2 with turn                   Activity Tolerance  Activity Tolerance: Patient limited by endurance  Activity Tolerance Comments: Pt desat to 85% SPO2 with ambulation on RA. Recoveres within 1min once at rest and suppletmental O2 reapplied. Patient Education  Education Given To: Patient  Education Provided: Role of Therapy;Plan of Care  Education Method: Verbal  Education Outcome: Verbalized understanding       ASSESSMENT:   Body Structures, Functions, Activity Limitations Requiring Skilled Therapeutic Intervention: Decreased functional mobility ; Decreased ADL status; Decreased endurance;Decreased balance  Decision Making: Medium Complexity  History: Med  Exam: Med  Clinical Presentation: Med    Therapy Prognosis: Good  Barriers to Learning: none         DISCHARGE RECOMMENDATIONS:  Discharge Recommendations: Continue to assess pending progress,Patient would benefit from continued therapy after discharge    Assessment: Continued PT indicated to progress mobility and facilitate DC at highest level of indep and safety. Anticipate goal achievement once pulmonary function improved.   Requires PT Follow-Up: Yes      PLAN OF CARE:  Plan  Plan: 1 time a day 3-6 times a week  Current Treatment Recommendations: Strengthening,Balance training,ROM,Functional mobility training,Transfer training,Endurance training,Gait training,Stair training,Neuromuscular re-education,Home exercise program,Safety education & training,Patient/Caregiver education & training,Equipment evaluation, education, & procurement    Safety Devices  Type of Devices: All fall risk precautions in place    Goals:  Long Term Goals  Long term goal 1: Pt to complete bed mobility with indep  Long term goal 2: Pt to complete transfers with indep  Long term goal 3: Pt to ambulate 50-150ft with LRD and indep  Long term goal 4: Pt to manage 3 steps with HR and indep    AMPAC (6 CLICK) BASIC MOBILITY  AM-PAC Inpatient Mobility Raw Score : 18     Therapy Time:   Individual   Time In 1322   Time Out 1332   Minutes 10           Joy Yang, 06/08/22 at 1:46 PM         Definitions for assistance levels  Independent = pt does not require any physical supervision or assistance from another person for activity completion. Device may be needed.   Stand by assistance = pt requires verbal cues or instructions from another person, close to but not touching, to perform the activity  Minimal assistance= pt performs 75% or more of the activity; assistance is required to complete the activity  Moderate assistance= pt performs 50% of the activity; assistance is required to complete the activity  Maximal assistance = pt performs 25% of the activity; assistance is required to complete the activity  Dependent = pt requires total physical assistance to accomplish the task

## 2022-06-09 LAB
ALBUMIN SERPL-MCNC: 3.2 G/DL (ref 3.5–4.6)
ALP BLD-CCNC: 59 U/L (ref 35–104)
ALT SERPL-CCNC: 11 U/L (ref 0–41)
ANION GAP SERPL CALCULATED.3IONS-SCNC: 13 MEQ/L (ref 9–15)
AST SERPL-CCNC: 15 U/L (ref 0–40)
BASOPHILS ABSOLUTE: 0 K/UL (ref 0–0.2)
BASOPHILS RELATIVE PERCENT: 0.3 %
BILIRUB SERPL-MCNC: 0.4 MG/DL (ref 0.2–0.7)
BUN BLDV-MCNC: 21 MG/DL (ref 8–23)
CALCIUM SERPL-MCNC: 8.9 MG/DL (ref 8.5–9.9)
CHLORIDE BLD-SCNC: 104 MEQ/L (ref 95–107)
CO2: 24 MEQ/L (ref 20–31)
CREAT SERPL-MCNC: 1.33 MG/DL (ref 0.7–1.2)
EOSINOPHILS ABSOLUTE: 0.7 K/UL (ref 0–0.7)
EOSINOPHILS RELATIVE PERCENT: 8.6 %
GFR AFRICAN AMERICAN: >60
GFR NON-AFRICAN AMERICAN: 51.7
GLOBULIN: 3.3 G/DL (ref 2.3–3.5)
GLUCOSE BLD-MCNC: 103 MG/DL (ref 70–99)
HCT VFR BLD CALC: 42.4 % (ref 42–52)
HEMOGLOBIN: 13.6 G/DL (ref 14–18)
LYMPHOCYTES ABSOLUTE: 1.7 K/UL (ref 1–4.8)
LYMPHOCYTES RELATIVE PERCENT: 21.5 %
MAGNESIUM: 2.3 MG/DL (ref 1.7–2.4)
MCH RBC QN AUTO: 28.2 PG (ref 27–31.3)
MCHC RBC AUTO-ENTMCNC: 32 % (ref 33–37)
MCV RBC AUTO: 88 FL (ref 80–100)
MONOCYTES ABSOLUTE: 0.6 K/UL (ref 0.2–0.8)
MONOCYTES RELATIVE PERCENT: 8 %
NEUTROPHILS ABSOLUTE: 4.8 K/UL (ref 1.4–6.5)
NEUTROPHILS RELATIVE PERCENT: 61.6 %
PDW BLD-RTO: 15.7 % (ref 11.5–14.5)
PLATELET # BLD: 127 K/UL (ref 130–400)
POTASSIUM SERPL-SCNC: 4.2 MEQ/L (ref 3.4–4.9)
RBC # BLD: 4.82 M/UL (ref 4.7–6.1)
SODIUM BLD-SCNC: 141 MEQ/L (ref 135–144)
TOTAL PROTEIN: 6.5 G/DL (ref 6.3–8)
WBC # BLD: 7.7 K/UL (ref 4.8–10.8)

## 2022-06-09 PROCEDURE — 36415 COLL VENOUS BLD VENIPUNCTURE: CPT

## 2022-06-09 PROCEDURE — 94760 N-INVAS EAR/PLS OXIMETRY 1: CPT

## 2022-06-09 PROCEDURE — 83735 ASSAY OF MAGNESIUM: CPT

## 2022-06-09 PROCEDURE — 97116 GAIT TRAINING THERAPY: CPT

## 2022-06-09 PROCEDURE — 6370000000 HC RX 637 (ALT 250 FOR IP): Performed by: NURSE PRACTITIONER

## 2022-06-09 PROCEDURE — 94761 N-INVAS EAR/PLS OXIMETRY MLT: CPT

## 2022-06-09 PROCEDURE — 85025 COMPLETE CBC W/AUTO DIFF WBC: CPT

## 2022-06-09 PROCEDURE — 2580000003 HC RX 258: Performed by: NURSE PRACTITIONER

## 2022-06-09 PROCEDURE — 2700000000 HC OXYGEN THERAPY PER DAY

## 2022-06-09 PROCEDURE — 99232 SBSQ HOSP IP/OBS MODERATE 35: CPT | Performed by: INTERNAL MEDICINE

## 2022-06-09 PROCEDURE — 80053 COMPREHEN METABOLIC PANEL: CPT

## 2022-06-09 PROCEDURE — 1210000000 HC MED SURG R&B

## 2022-06-09 PROCEDURE — 94640 AIRWAY INHALATION TREATMENT: CPT

## 2022-06-09 PROCEDURE — 6370000000 HC RX 637 (ALT 250 FOR IP): Performed by: INTERNAL MEDICINE

## 2022-06-09 PROCEDURE — 97165 OT EVAL LOW COMPLEX 30 MIN: CPT

## 2022-06-09 RX ORDER — CEFUROXIME AXETIL 500 MG/1
500 TABLET ORAL DAILY
Status: DISCONTINUED | OUTPATIENT
Start: 2022-06-09 | End: 2022-06-09

## 2022-06-09 RX ORDER — CEFUROXIME AXETIL 500 MG/1
500 TABLET ORAL EVERY 12 HOURS SCHEDULED
Status: DISCONTINUED | OUTPATIENT
Start: 2022-06-09 | End: 2022-06-14 | Stop reason: HOSPADM

## 2022-06-09 RX ORDER — CEFUROXIME AXETIL 500 MG/1
500 TABLET ORAL EVERY 12 HOURS SCHEDULED
Status: DISCONTINUED | OUTPATIENT
Start: 2022-06-09 | End: 2022-06-09

## 2022-06-09 RX ADMIN — SODIUM CHLORIDE, PRESERVATIVE FREE 10 ML: 5 INJECTION INTRAVENOUS at 22:30

## 2022-06-09 RX ADMIN — CEFUROXIME AXETIL 500 MG: 500 TABLET ORAL at 22:30

## 2022-06-09 RX ADMIN — ASPIRIN 81 MG: 81 TABLET, COATED ORAL at 08:43

## 2022-06-09 RX ADMIN — SODIUM CHLORIDE, PRESERVATIVE FREE 10 ML: 5 INJECTION INTRAVENOUS at 08:43

## 2022-06-09 RX ADMIN — FINASTERIDE 5 MG: 5 TABLET, FILM COATED ORAL at 08:43

## 2022-06-09 RX ADMIN — MIRTAZAPINE 15 MG: 15 TABLET, FILM COATED ORAL at 22:30

## 2022-06-09 RX ADMIN — TAMSULOSIN HYDROCHLORIDE 0.4 MG: 0.4 CAPSULE ORAL at 08:43

## 2022-06-09 RX ADMIN — SERTRALINE HYDROCHLORIDE 50 MG: 50 TABLET ORAL at 08:43

## 2022-06-09 RX ADMIN — ROSUVASTATIN CALCIUM 20 MG: 20 TABLET, FILM COATED ORAL at 22:30

## 2022-06-09 RX ADMIN — POLYETHYLENE GLYCOL 3350 17 G: 17 POWDER, FOR SOLUTION ORAL at 16:48

## 2022-06-09 RX ADMIN — Medication 2000 UNITS: at 08:43

## 2022-06-09 RX ADMIN — RIVAROXABAN 10 MG: 10 TABLET, FILM COATED ORAL at 08:43

## 2022-06-09 RX ADMIN — LEVOTHYROXINE SODIUM 137 MCG: 0.03 TABLET ORAL at 06:43

## 2022-06-09 RX ADMIN — BUDESONIDE AND FORMOTEROL FUMARATE DIHYDRATE 2 PUFF: 160; 4.5 AEROSOL RESPIRATORY (INHALATION) at 07:33

## 2022-06-09 RX ADMIN — BUDESONIDE AND FORMOTEROL FUMARATE DIHYDRATE 2 PUFF: 160; 4.5 AEROSOL RESPIRATORY (INHALATION) at 20:47

## 2022-06-09 ASSESSMENT — PAIN SCALES - GENERAL: PAINLEVEL_OUTOF10: 0

## 2022-06-09 NOTE — PROGRESS NOTES
Anxiety     Insomnia     Hypothyroidism     Type 2 diabetes mellitus (HCC)     BPH (benign prostatic hyperplasia)     GERD (gastroesophageal reflux disease) 12/11/2014    GEORGES (obstructive sleep apnea) 01/24/2014    HTN (hypertension) 01/13/2014    Hyperlipidemia 01/13/2014    Chronic obstructive pulmonary disease ([facility] 12/05/2013        Past Medical History:   Diagnosis Date    Abnormality of gait and mobility     Acute sinusitis     Anxiety     Aspiration into respiratory tract 11/10/2017    Aspiration pneumonia (HCC)     Bilateral pulmonary embolism (HCC) 1/6/2018    BPH (benign prostatic hyperplasia)     COPD (chronic obstructive pulmonary disease) ([address] 12/5/2013    Debility     Elevated CK 12/30/2013    Foraminal stenosis of lumbosacral region 6/7/2016    GERD (gastroesophageal reflux disease) 12/11/2014    History of asthma 1/13/2014    HTN (hypertension) 1/13/2014    past braden / no current meds    Hx of blood clots 01/2018    DVT  ( unsure which leg but both were swollen ) -> PE -> thus Xarelto    Hyperlipidemia     meds > 10 yrs    Hypothyroidism     Insomnia     Lower extremity weakness 1/24/2014    On home oxygen therapy     2L at night    Osteoarthritis of spine with radiculopathy, lumbar region 6/7/2016    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    no trx to follow    Positive D dimer 12/5/2013    Sleep apnea 1/24/2014    Type 2 diabetes mellitus ([facility]     diet control  / no meds    Vitamin D deficiency      Past Surgical History:   Procedure Laterality Date    BRONCHOSCOPY N/A 11/11/2017    BRONCHOSCOPY FIBEROPTIC performed by Fabiola Zheng MD at [address] [place] [address] COLONOSCOPY      ENDOSCOPY, COLON, DIAGNOSTIC      EYE SURGERY      phaco with IOL OU    HERNIA REPAIR  [id number]    repair umbilical hernia    KNEE SURGERY Left 1970    MA ARTHROSCOPY SHOULDER SURGICAL BICEPS TENODESIS Right 9/18/2018    RIGHT SHOULDER ARTHROSCOPY SUBACROMIAL DECOMPRESS WITH POSSIBLE BICEPS TENODESIS ARTHROSCOPIC FRANSISCA C- ARM ARTHREX BICEPS [facility]ESIS [facility] performed by Celi Akers MD at [facility] Right 11/14/2017    RIGHT THORACOTOMY WEDGE RESECTION FOR FOREIGN BODY AND FOB DOUBLE LUMEN (1ST CASE) performed by Oral Lawson MD at [address] [place] [address]  2006    cancer / no chemo or radiation            Restrictions:  Restrictions/Precautions: Fall Risk    SUBJECTIVE:   Subjective: \"I'm doing alright. Just have this nose bleed. \"    Pain  Pain: Denies pre and post session pain. OBJECTIVE:     Bed Mobility Training  Bed Mobility Training: Yes  Supine to Sit: Stand-by assistance  Scooting: Stand-by assistance    Transfer Training  Transfer Training: Yes  Interventions: Verbal cues (hand placement)  Sit to Stand: Stand-by assistance  Stand to Sit: Stand-by assistance  Bed to Chair: Stand-by assistance (vc's for sequencing)    Gait Training: Yes  Overall Level of Assistance: Contact-guard assistance  Distance (ft): 80 Feet (40' x 2)  Assistive Device: Walker, rollator  Interventions: Verbal cues (upright posture, breathing technique)  Speed/Isabell: Slow  Step Length: Left shortened;Right shortened  Gait Abnormalities:  (ff posture)    PT Exercises  A/AROM Exercises: AP, LAQ, GS x10 ea, education on performing throughout day      ASSESSMENT   Assessment: Pt demonstrates an improvement in strength secondary to increasing overall ambulatory distance. Pt SpO2 dropping post ambulation 85%, recovers quickly with seated RB and O2 on. Improved gait fluidity demonstrated with rollator vs [employer]. Good motivation and participation displayed throughout.      Discharge Recommendations:  Continue to assess pending progress,Patient would benefit from continued therapy after discharge         Goals  Long Term Goals  Long term goal 1: Pt to complete bed mobility with indep  Long term goal 2: Pt to complete transfers with indep  Long term goal 3: Pt to ambulate 50-150ft with LRD and indep  Long term goal 4: Pt to manage 3 steps with HR and indep    PLAN    Plan: 1 time a day 3-6 times a week  Plan Comment: Cont. POC  Safety Devices  Type of Devices: All fall risk precautions in place,Call light within reach,Chair alarm in place,Left in chair     [facility] (6 CLICK) Gabe Pugh 28 Inpatient Mobility Raw Score : 19     Therapy Time   Individual   Time In 0858   Time Out 0916   Minutes 18      BM/Trsf: 7  Gait: 8  There ex: 3       Katie Spencer PTA, 06/09/22 at 9:26 AM         Definitions for assistance levels  Independent = pt does not require any physical supervision or assistance from another person for activity completion. Device may be needed.   Stand by assistance = pt requires verbal cues or instructions from another person, close to but not touching, to perform the activity  Minimal assistance= pt performs 75% or more of the activity; assistance is required to complete the activity  Moderate assistance= pt performs 50% of the activity; assistance is required to complete the activity  Maximal assistance = pt performs 25% of the activity; assistance is required to complete the activity  Dependent = pt requires total physical assistance to accomplish the task

## 2022-06-09 NOTE — CARE COORDINATION
[facility] [place] Case Management Initial Discharge Assessment    Met with Patient to discuss discharge plan. PCP: Perry Justice MD                                Date of Last Visit: LAST February     VA Patient: No        VA Notified: no    If no PCP, list provided? N/A    Discharge Planning    Living Arrangements: independently at home    Who do you live with? ALONE     Who helps you with your care:  Self. SON AND DTR LIVE NEAR BY AND ARE ABLE TO ASSIST WHEN NEEDED. If lives at home:     Do you have any barriers navigating in your home? yes - COUPLE OF STEPS     Patient can perform ADL? Yes    Current Services (outpatient and in home) :  Meals on Wheels ([facility] )    Dialysis: No    Is transportation available to get to your appointments? Yes    DME Equipment:  yes - WALKER, ROLATOR, WHEELCHAIR, PORTABLE SHOWER BENCH     Respiratory equipment: PRN/HS Oxygen 2L NC  AS NEEDED  [employer]. HAS PORTABILITY PORTABLE O2 CONCENTRATOR     Respiratory provider:  yes - MEDICAL SERVICES      Pharmacy:  yes -  [facility]   Consult with Medication Assistance Program?  No      Patient agreeable to [place] [id number]? Yes, [facility]     Patient agreeable to SNF/Rehab? Declined    Other discharge needs identified? N/A    Does Patient Have a High-Risk for Readmission Diagnosis (CHF, PN, MI, COPD)? No    Initial Discharge Plan? MET W/PT TO DISCUSS DISCHARGE PLAN AND ASSESS NEEDS. PT RESIDES AT HOME ALONE. DTR AND SON LIVE NEAR BY AND ARE ABLE TO ASSIST AS NEEDED. PT HAS O2 THROUGH MEDICAL SERVICE. PT IS OPEN TO [address] IF INDICATED. PT DENIES FURTHER NEEDS AT THIS TIME. CM/LSW TO FOLLOW COURSE. /(Note: please see concurrent daily documentation for any updates after initial note).       Readmission Risk              Risk of Unplanned Readmission:  19         Electronically signed by Bridgett Bolaños RN on 6/9/2022 at 3:03 PM

## 2022-06-09 NOTE — PROGRESS NOTES
Hospitalist Progress Note      PCP: Edil Coates MD    Date of Admission: 6/7/2022    Chief Complaint:    Chief Complaint   Patient presents with    Shortness of Breath     Subjective:  Patient denies fevers, chills, sweats; breathing is improving. 12 point ROS negative other than mentioned above     Medications:  Reviewed    Infusion Medications    sodium chloride       Scheduled Medications    cefUROXime  500 mg Oral 2 times per day    tamsulosin  0.4 mg Oral Daily    aspirin  81 mg Oral Daily    budesonide-formoterol  2 puff Inhalation BID    finasteride  5 mg Oral Daily    levothyroxine  137 mcg Oral Daily    mirtazapine  15 mg Oral Nightly    rivaroxaban  10 mg Oral Daily with breakfast    rosuvastatin  20 mg Oral Nightly    sertraline  50 mg Oral Daily    Vitamin D  2,000 Units Oral Daily    sodium chloride flush  5-40 mL IntraVENous 2 times per day     PRN Meds: albuterol sulfate HFA, calcium carbonate, sodium chloride flush, sodium chloride, ondansetron **OR** ondansetron, polyethylene glycol, acetaminophen **OR** acetaminophen, dextromethorphan-guaiFENesin      Intake/Output Summary (Last 24 hours) at 6/9/2022 1344  Last data filed at 6/9/2022 0334  Gross per 24 hour   Intake --   Output 1000 ml   Net -1000 ml       Exam:    BP (!) 96/59   Pulse 63   Temp 98 °F (36.7 °C) (Oral)   Resp 16   Ht 6' (1.829 m)   Wt 209 lb 8 oz (95 kg)   SpO2 97%   BMI 28.41 kg/m²     General appearance: No apparent distress, appears stated age and cooperative. HEENT: Conjunctivae/corneas clear. Neck:  Trachea midline. Respiratory:  Basilar rhonchi  Cardiovascular: Regular rate and rhythm . Abdomen: Soft, non-tender, non-distended with normal bowel sounds. Musculoskeletal: No clubbing, cyanosis or edema bilaterally.  .  Neuro: Non Focal.   Capillary Refill: Brisk,< 3 seconds   Peripheral Pulses: +2 palpable, equal bilaterally     Labs:   Recent Labs     06/07/22  1415 06/08/22  0536 06/09/22  0604 WBC 8.2 7.7 7.7   HGB 14.8 13.2* 13.6*   HCT 45.6 40.8* 42.4    134 127*     Recent Labs     06/07/22  1415 06/08/22  0536 06/09/22  0604    141 141   K 4.2 4.3 4.2    105 104   CO2 23 24 24   BUN 20 19 21   CREATININE 1.32* 1.29* 1.33*   CALCIUM 9.4 8.5 8.9     Recent Labs     06/07/22  1415 06/08/22  0536 06/09/22  0604   AST 16 13 15   ALT 13 10 11   BILITOT 0.3 0.4 0.4   ALKPHOS 71 58 59     No results for input(s): INR in the last 72 hours. Recent Labs     06/07/22  1545 06/08/22  0011 06/08/22  0536   TROPONINI 0.016* 0.019* 0.018*       Urinalysis:      Lab Results   Component Value Date    NITRU Negative 12/31/2021    WBCUA 0-2 12/31/2021    BACTERIA Negative 12/31/2021    RBCUA >100 12/31/2021    BLOODU Large 12/31/2021    SPECGRAV 1.015 12/31/2021    GLUCOSEU Negative 12/31/2021     Radiology:  XR CHEST (2 VW)   Final Result      Mild bibasilar pulmonary infiltrates, more on the right side. Stable elevated right hemidiaphragm. Assessment/Plan:    Pneumonia: Improving with CTX + Azithromycin; pulm consult for their opinon; vest therapy; sputum culture; legionella urine antigen ordered.     BRAD: Continue gentle IVF     Elevated troponin: Troponin 0.016. EKG sinus, no ST elevation. Likely 2/2 BRAD   We will keep patient on telemetry monitoring.     Active problems:  COPD: Patient on scheduled inhalers and as needed nebulizers  Continue home meds. History of PE: Patient on [facility]. Continue home meds. Hypothyroidism: PT on home meds to control. Continue home meds, as tolerated. HLD: PT on home meds to control. Continue home meds, as tolerated. GERD: PT on home meds to control. Continue home meds, as tolerated. BPH: PT on home meds to control. Continue home meds, as tolerated.     Active Hospital Problems    Diagnosis Date Noted    Pneumonia [J18.9] 06/07/2022     Priority: Medium     Additional work up or/and treatment plan may be added today or then after based on clinical progression. I am managing a portion of pt care. Some medical issues are handled by other specialists. Additional work up and treatment should be done in out pt setting by pt PCP and other out pt providers. In addition to examining and evaluating pt, I spent additional time explaining care, normal and abnormal findings, and treatment plan. All of pt questions were answered. Counseling, diet and education were  provided. Case will be discussed with nursing staff when appropriate. Family will be updated if and when appropriate. Diet: ADULT DIET;  Regular; 5 carb choices (75 gm/meal)    Code Status: Full Code    PT/OT Eval   Electronically signed by Ekaterina Fields MD on 6/9/2022 at 1:44 PM

## 2022-06-09 NOTE — PROGRESS NOTES
[facility]     NAME: Andrew Chamberlain  :  (39 y.o.)  MRN: [id number]  CODE STATUS: Full Code  Room: O709/C265-59    Date of Service: 2022    Patient Diagnosis(es): Pneumonia [J18.9]  Elevated troponin [R77.8]  Bilateral pleural effusion [J90]  Pneumonia of both lower lobes due to infectious organism [J18.9]   Patient Active Problem List    Diagnosis Date Noted    Acute bronchitis 2022    Pneumonia 2022    COPD exacerbation ([facility] 2021    Acute deep vein thrombosis (DVT) of femoral vein of left lower extremity ([facility] 05/10/2019    Pulmonary embolus ([facility] 2018    Primary osteoarthritis involving multiple joints 2018    Hx of long term use of blood thinners 2018    Arthritis of shoulder 2018    History of pulmonary embolism 2018    S/P thoracotomy, right 2018    Asthma 2018    Thyroid cancer ([facility] 2018    History of aspiration pneumonia 2018    Obesity (BMI 30-39.9) 2018    History of total left knee replacement 2018    Abnormality of gait and mobility due to Right Shoulder OA S/P Right Shoulder Arthroscopy Subacromial Decompression with Biceps.   [facility] admit 18. 2018    Arthritis of right shoulder region 2018    Past use of tobacco 2018    Current use of long term anticoagulation 2018    Gait abnormality 2018    Abnormality of gait and mobility due to hypoxemia secondary to bilateral pulmonary emboli, [facility] admit 18      Bilateral pulmonary embolism (HCC) 2018    Hypoxemia     Vitamin D deficiency     Debility     On home oxygen therapy     Osteoarthritis of spine with radiculopathy, lumbar region 2016    Foraminal stenosis of lumbosacral region 2016    Elevated PSA 2016    Anxiety     Insomnia     Hypothyroidism     Type 2 diabetes mellitus (HCC)     BPH (benign prostatic hyperplasia)     GERD (gastroesophageal reflux disease) 12/11/2014    GEORGES (obstructive sleep apnea) 01/24/2014    HTN (hypertension) 01/13/2014    Hyperlipidemia 01/13/2014    Chronic obstructive pulmonary disease ([address] 12/05/2013        Past Medical History:   Diagnosis Date    Abnormality of gait and mobility     Acute sinusitis     Anxiety     Aspiration into respiratory tract 11/10/2017    Aspiration pneumonia (HCC)     Bilateral pulmonary embolism ([address] 1/6/2018    BPH (benign prostatic hyperplasia)     COPD (chronic obstructive pulmonary disease) ([address] 12/5/2013    Debility     Elevated CK 12/30/2013    Foraminal stenosis of lumbosacral region 6/7/2016    GERD (gastroesophageal reflux disease) 12/11/2014    History of asthma 1/13/2014    HTN (hypertension) 1/13/2014    past braden / no current meds    Hx of blood clots 01/2018    DVT  ( unsure which leg but both were swollen ) -> PE -> thus Xarelto    Hyperlipidemia     meds > 10 yrs    Hypothyroidism     Insomnia     Lower extremity weakness 1/24/2014    On home oxygen therapy     2L at night    Osteoarthritis of spine with radiculopathy, lumbar region 6/7/2016    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    no trx to follow    Positive D dimer 12/5/2013    Sleep apnea 1/24/2014    Type 2 diabetes mellitus ([address]     diet control  / no meds    Vitamin D deficiency      Past Surgical History:   Procedure Laterality Date    BRONCHOSCOPY N/A 11/11/2017    BRONCHOSCOPY FIBEROPTIC performed by Arley Stephenson MD at [address] [place] [address] COLONOSCOPY      ENDOSCOPY, COLON, DIAGNOSTIC      EYE SURGERY      phaco with IOL OU    HERNIA REPAIR  [address]    repair umbilical hernia    KNEE SURGERY Left 1970    LA ARTHROSCOPY SHOULDER SURGICAL BICEPS TENODESIS Right 9/18/2018    RIGHT SHOULDER ARTHROSCOPY SUBACROMIAL DECOMPRESS WITH POSSIBLE BICEPS TENODESIS ARTHROSCOPIC FRANSISCA C- ARM ARTHREX BICEPS TENODESIS [facility] oriented x4    Vision and Hearing Status:  Vision  Vision Exceptions: Wears glasses for reading  Hearing  Hearing: Exceptions to [facility]  Hearing Exceptions: Bilateral hearing aid        GROSS ASSESSMENT AROM/PROM:  AROM: Within functional limits  PROM: Within functional limits    UE STRENGTH:  Strength: Within functional limits    UE COORDINATION:  Coordination: Within functional limits    UE TONE:  Tone: Normal    UE SENSATION:  Sensation: Intact    Hand Dominance:  Hand Dominance  Hand Dominance: Right    ADL Status:  ADL  Feeding: Independent  Grooming: Modified independent ; Increased time to complete  Grooming Skilled Clinical Factors: SOB  UE Bathing: Modified independent ; Increased time to complete  UE Bathing Skilled Clinical Factors: SOB  LE Bathing: Modified independent ; Increased time to complete  LE Bathing Skilled Clinical Factors: SOB  UE Dressing: Modified independent ; Increased time to complete  UE Dressing Skilled Clinical Factors: SOB  LE Dressing: Modified independent ; Increased time to complete  LE Dressing Skilled Clinical Factors: SOB  Toileting: Modified independent ; Increased time to complete  Toileting Skilled Clinical Factors: SOB  Additional Comments: simulated ADLs EOB, pt coughing and SOB, O2 NC intact. Functional Mobility:  Patient ambulated to bedside chair with Bed rail at SBA level.      Bed Mobility  Bed mobility  Supine to Sit: Stand by assistance;Supervision  Sit to Supine: Stand by assistance;Supervision  Bed Mobility Comments: increased effort to complete    Seated and Standing Balance:  Balance  Sitting: Intact  Standing: Intact    Functional Endurance:   low endurance during functional movements    D/C Recommendations:  OT D/C RECOMMENDATIONS  REQUIRES OT FOLLOW-UP: Yes    OT Education:   Patient Education  Education Given To: Patient  Education Provided: Energy Conservation;Role of Therapy  Education Outcome: Verbalized understanding    OT Follow Up:  OT D/C RECOMMENDATIONS  REQUIRES OT FOLLOW-UP: Yes       Assessment/Discharge Disposition:  Assessment: Pt is [de-identified] y/o male with above impairments. Pt utilzing 2L O2 NC during assessment with continued demo of SOB during activity. Pt requring extra time to complete tasks secondary to low endurance. Pt would benefit from OT services to improve on situation with energy conservation techniques during self care and ambulatory tasks, to increase independence and endurance for safe d/c.   Performance deficits / Impairments: Decreased endurance,Decreased balance,Decreased functional mobility ,Decreased ADL status  Prognosis: Good  Discharge Recommendations: Continue to assess pending progress  History: 25  Exam: 4    [facility] Self care Score   How much help for putting on and taking off regular lower body clothing?: A Little  How much help for Bathing?: A Little  How much help for Toileting?: None  How much help for putting on and taking off regular upper body clothing?: None  How much help for taking care of personal grooming?: None  How much help for eating meals?: None  AM-[facility] Inpatient Daily Activity Raw Score: 22  AM-PAC Inpatient ADL T-Scale Score : 47.1  ADL Inpatient CMS 0-100% Score: 25.8    Therapy key for assistance levels    Independent/Mod I = Pt. is able to perform task with no assistance but may require a device   Stand by assistance = Pt. does not perform task at an independent level but does not need physical assistance, requires verbal cues  Minimal, Moderate, Maximal Assistance = Pt. requires physical assistance (25%, 50%, 75% assist from helper) for task but is able to actively participate in task   Dependent = Pt. requires total assistance with task and is not able to actively participate with task completion     Plan:  Plan  Times per Week: 1-4x  Plan Weeks: length of acute stay  Current Treatment Recommendations: Patient/Caregiver education & training,Safety education & training,Endurance training,Self-Care / ADL  Plan Comment: continue POC    Goals:   Patient will:    - Improve functional endurance to tolerate/complete 15 mins of ADL's  - Be Independent in UB ADLs   - Be Independent in LB ADLs  - Improve B UE strength and endurance to good in order to participate in self-care activities as projected. - Access appropriate D/C site with as few architectural barriers as possible.     Patient Goal:    To not cough   Discussed and agreed upon: Yes Comments:       Therapy Time:   Individual   Time In 1110   Time Out 1120   Minutes 10       Electronically signed by:    Kimo Almaraz OT Electronically signed by Kimo Almaraz OT on 6/9/2022 at 11:51 AM,   6/9/2022, 11:51 AM

## 2022-06-09 NOTE — PROGRESS NOTES
INPATIENT PROGRESS NOTES    PATIENT NAME: Michael Agee  MRN: [id number]  SERVICE DATE:  June 9, 2022   SERVICE TIME:  1:04 PM      PRIMARY SERVICE: Pulmonary Disease    CHIEF COMPLAIN: Shortness of breath      INTERVAL HPI: Patient seen and examined at bedside, Interval Notes, orders reviewed. Nursing notes noted  Patient is feeling better. Cough is much less today. He is also feeling more stronger today. No fever or chills. No chest pain. No nausea vomiting diarrhea or abdominal pain. On 3 L O2 via nasal cannula his O2 saturation is 97%. OBJECTIVE    Body mass index is 28.41 kg/m². PHYSICAL EXAM:  Vitals:  BP (!) 96/59   Pulse 63   Temp 98 °F (36.7 °C) (Oral)   Resp 16   Ht 6' (1.829 m)   Wt 209 lb 8 oz (95 kg)   SpO2 97%   BMI 28.41 kg/m²   General: Alert, awake . comfortable in bed, No distress. Head: Atraumatic , Normocephalic   Eyes: PERRL. No sclera icterus. No conjunctival injection. No discharge   ENT: No nasal  discharge. Pharynx clear. Neck:  Trachea midline. No thyromegaly, no JVD, No cervical adenopathy. Chest : Bilaterally symmetrical ,Normal effort,  No accessory muscle use  Lung : . Fair BS bilateral, decreased BS at bases. Bibasilar Rales. No wheezing. No rhonchi. Heart[de-identified] Normal  rate. Regular rhythm. No mumur ,  Rub or gallop  ABD: Non-tender. Non-distended. No masses. No organmegaly. Normal bowel sounds. No hernia.   Ext : No Pitting both leg , No Cyanosis No clubbing  Neuro: no focal weakness          DATA:   Recent Labs     06/08/22  0536 06/09/22  0604   WBC 7.7 7.7   HGB 13.2* 13.6*   HCT 40.8* 42.4   MCV 87.5 88.0    127*     Recent Labs     06/08/22 0536 06/08/22 0536 06/09/22 0604     --  141   K 4.3  --  4.2     --  104   CO2 24  --  24   BUN 19  --  21   CREATININE 1.29*  --  1.33*   GLUCOSE 106*  --  103*   CALCIUM 8.5  --  8.9   PROT 6.4  --  6.5   LABALBU 3.0*  --  3.2*   BILITOT 0.4  --  0.4   ALKPHOS 58  --  59   AST 13  --  15   ALT 10   < > 11   LABGLOM 53.5*   < > 51.7*   GFRAA >60.0   < > >60.0   GLOB 3.4   < > 3.3    < > = values in this interval not displayed. MV Settings:          No results for input(s): PHART, NFS6RDU, PO2ART, XDS4DSJ, BEART, O7UGCSHO in the last 72 hours. O2 Device: Nasal cannula  O2 Flow Rate (L/min): 3 L/min    ADULT DIET; Regular; 5 carb choices (75 gm/meal)     MEDICATIONS during current hospitalization:    Continuous Infusions:   sodium chloride         Scheduled Meds:   tamsulosin  0.4 mg Oral Daily    aspirin  81 mg Oral Daily    budesonide-formoterol  2 puff Inhalation BID    finasteride  5 mg Oral Daily    levothyroxine  137 mcg Oral Daily    mirtazapine  15 mg Oral Nightly    rivaroxaban  10 mg Oral Daily with breakfast    rosuvastatin  20 mg Oral Nightly    sertraline  50 mg Oral Daily    Vitamin D  2,000 Units Oral Daily    sodium chloride flush  5-40 mL IntraVENous 2 times per day    azithromycin  500 mg IntraVENous Q24H    cefTRIAXone (ROCEPHIN) IV  1,000 mg IntraVENous Q24H       PRN Meds:albuterol sulfate HFA, calcium carbonate, sodium chloride flush, sodium chloride, ondansetron **OR** ondansetron, polyethylene glycol, acetaminophen **OR** acetaminophen, dextromethorphan-guaiFENesin    Radiology  XR CHEST (2 VW)    Result Date: 6/7/2022  Indication: Shortness of breath. EXAM: X-ray of the chest PA and lateral views. COMPARISON: 12/31/2021 exam. FINDINGS: Mild bibasilar pulmonary infiltrates, more on the right side. Stable elevated right hemidiaphragm. Normal cardiac silhouette. No pleural effusions. Normal hilar shortness. Central mediastinum. Mild bibasilar pulmonary infiltrates, more on the right side. Stable elevated right hemidiaphragm. IMPRESSION AND SUGGESTION:  1. Bibasilar pneumonia  2. COPD without exacerbation  3. Acute kidney injury  4. Elevated troponin  5. GEORGES noncompliant with CPAP  6. Hyperythyroidism  7. Hyperlipidemia  8.  GERD    Patient is on IV Rocephin and Zithromax. He does not have leukocytosis. He is afebrile. Change antibiotic to p.o. Ceftin 500 mg twice daily. Chest x-ray shows bibasilar pulmonary infiltration more on the right side, stable elevated right hemidiaphragm. Likely discharge tomorrow with follow-up in office with me in 2-week        NOTE: This report was transcribed using voice recognition software. Every effort was made to ensure accuracy; however, inadvertent computerized transcription errors may be present.       Electronically signed by Niecy Carrion MD, FCCP on 6/9/2022 at 1:04 PM

## 2022-06-09 NOTE — PLAN OF CARE
Problem: Pain  Goal: Verbalizes/displays adequate comfort level or baseline comfort level  Outcome: Progressing     Problem: Chronic Conditions and Co-morbidities  Goal: Patient's chronic conditions and co-morbidity symptoms are monitored and maintained or improved  Outcome: Progressing     Problem: Safety - Adult  Goal: Free from fall injury  Outcome: Progressing     Problem: ABCDS Injury Assessment  Goal: Absence of physical injury  Outcome: Progressing     Problem: Respiratory - Adult  Goal: Achieves optimal ventilation and oxygenation  Outcome: Progressing     Problem: Infection - Adult  Goal: Absence of infection at discharge  Outcome: Progressing  Goal: Absence of infection during hospitalization  Outcome: Progressing  Goal: Absence of fever/infection during anticipated neutropenic period  Outcome: Progressing     Problem: Metabolic/Fluid and Electrolytes - Adult  Goal: Electrolytes maintained within normal limits  Outcome: Progressing  Goal: Hemodynamic stability and optimal renal function maintained  Outcome: Progressing  Goal: Glucose maintained within prescribed range  Outcome: Progressing

## 2022-06-09 NOTE — PROGRESS NOTES
Pharmacy Note - Renal dose adjustment made per P/T protocol    Original order:  Cefuroxime 500mg daily    Estimated Creatinine Clearance: 53 mL/min (A) (based on SCr of 1.33 mg/dL (H)). Recent Labs     06/07/22  1415 06/07/22  1415 06/08/22  0536 06/08/22  0536 06/09/22  0604   BUN 20  --  19  --  21   CREATININE 1.32*   < > 1.29*   < > 1.33*      < > 134   < > 127*    < > = values in this interval not displayed. Renally adjusted order:  Cefuroxime 500mg BID    Please call pharmacy with any questions.     Thank you,  Earnestine Loyola, [facility]  6/9/2022 1:17 PM

## 2022-06-10 LAB
ALBUMIN SERPL-MCNC: 3 G/DL (ref 3.5–4.6)
ALP BLD-CCNC: 60 U/L (ref 35–104)
ALT SERPL-CCNC: 14 U/L (ref 0–41)
ANION GAP SERPL CALCULATED.3IONS-SCNC: 11 MEQ/L (ref 9–15)
AST SERPL-CCNC: 18 U/L (ref 0–40)
BASOPHILS ABSOLUTE: 0 K/UL (ref 0–0.2)
BASOPHILS RELATIVE PERCENT: 0.6 %
BILIRUB SERPL-MCNC: 0.5 MG/DL (ref 0.2–0.7)
BUN BLDV-MCNC: 23 MG/DL (ref 8–23)
CALCIUM SERPL-MCNC: 8.6 MG/DL (ref 8.5–9.9)
CHLORIDE BLD-SCNC: 104 MEQ/L (ref 95–107)
CO2: 26 MEQ/L (ref 20–31)
CREAT SERPL-MCNC: 1.38 MG/DL (ref 0.7–1.2)
EKG ATRIAL RATE: 74 BPM
EKG P AXIS: 77 DEGREES
EKG P-R INTERVAL: 196 MS
EKG Q-T INTERVAL: 400 MS
EKG QRS DURATION: 92 MS
EKG QTC CALCULATION (BAZETT): 444 MS
EKG R AXIS: -15 DEGREES
EKG T AXIS: 37 DEGREES
EKG VENTRICULAR RATE: 74 BPM
EOSINOPHILS ABSOLUTE: 0.6 K/UL (ref 0–0.7)
EOSINOPHILS RELATIVE PERCENT: 9.1 %
GFR AFRICAN AMERICAN: 59.9
GFR NON-AFRICAN AMERICAN: 49.5
GLOBULIN: 3.3 G/DL (ref 2.3–3.5)
GLUCOSE BLD-MCNC: 96 MG/DL (ref 70–99)
HCT VFR BLD CALC: 39.4 % (ref 42–52)
HEMOGLOBIN: 13.1 G/DL (ref 14–18)
LYMPHOCYTES ABSOLUTE: 1.6 K/UL (ref 1–4.8)
LYMPHOCYTES RELATIVE PERCENT: 23.2 %
MAGNESIUM: 2.1 MG/DL (ref 1.7–2.4)
MCH RBC QN AUTO: 28.7 PG (ref 27–31.3)
MCHC RBC AUTO-ENTMCNC: 33.3 % (ref 33–37)
MCV RBC AUTO: 86.3 FL (ref 80–100)
MONOCYTES ABSOLUTE: 0.5 K/UL (ref 0.2–0.8)
MONOCYTES RELATIVE PERCENT: 7.7 %
NEUTROPHILS ABSOLUTE: 4.1 K/UL (ref 1.4–6.5)
NEUTROPHILS RELATIVE PERCENT: 59.4 %
PDW BLD-RTO: 15.7 % (ref 11.5–14.5)
PLATELET # BLD: 127 K/UL (ref 130–400)
POTASSIUM SERPL-SCNC: 4 MEQ/L (ref 3.4–4.9)
RBC # BLD: 4.57 M/UL (ref 4.7–6.1)
SODIUM BLD-SCNC: 141 MEQ/L (ref 135–144)
TOTAL PROTEIN: 6.3 G/DL (ref 6.3–8)
WBC # BLD: 7 K/UL (ref 4.8–10.8)

## 2022-06-10 PROCEDURE — 36415 COLL VENOUS BLD VENIPUNCTURE: CPT

## 2022-06-10 PROCEDURE — 1210000000 HC MED SURG R&B

## 2022-06-10 PROCEDURE — 94668 MNPJ CHEST WALL SBSQ: CPT

## 2022-06-10 PROCEDURE — 94640 AIRWAY INHALATION TREATMENT: CPT

## 2022-06-10 PROCEDURE — 80053 COMPREHEN METABOLIC PANEL: CPT

## 2022-06-10 PROCEDURE — 2700000000 HC OXYGEN THERAPY PER DAY

## 2022-06-10 PROCEDURE — 2580000003 HC RX 258: Performed by: INTERNAL MEDICINE

## 2022-06-10 PROCEDURE — 6370000000 HC RX 637 (ALT 250 FOR IP): Performed by: INTERNAL MEDICINE

## 2022-06-10 PROCEDURE — 99232 SBSQ HOSP IP/OBS MODERATE 35: CPT | Performed by: INTERNAL MEDICINE

## 2022-06-10 PROCEDURE — 94761 N-INVAS EAR/PLS OXIMETRY MLT: CPT

## 2022-06-10 PROCEDURE — 93010 ELECTROCARDIOGRAM REPORT: CPT | Performed by: INTERNAL MEDICINE

## 2022-06-10 PROCEDURE — 85025 COMPLETE CBC W/AUTO DIFF WBC: CPT

## 2022-06-10 PROCEDURE — 83735 ASSAY OF MAGNESIUM: CPT

## 2022-06-10 PROCEDURE — 6370000000 HC RX 637 (ALT 250 FOR IP): Performed by: NURSE PRACTITIONER

## 2022-06-10 PROCEDURE — 97110 THERAPEUTIC EXERCISES: CPT

## 2022-06-10 PROCEDURE — 97116 GAIT TRAINING THERAPY: CPT

## 2022-06-10 RX ORDER — SODIUM CHLORIDE, SODIUM LACTATE, POTASSIUM CHLORIDE, CALCIUM CHLORIDE 600; 310; 30; 20 MG/100ML; MG/100ML; MG/100ML; MG/100ML
INJECTION, SOLUTION INTRAVENOUS CONTINUOUS
Status: DISCONTINUED | OUTPATIENT
Start: 2022-06-10 | End: 2022-06-11

## 2022-06-10 RX ADMIN — Medication 2000 UNITS: at 08:06

## 2022-06-10 RX ADMIN — SERTRALINE HYDROCHLORIDE 50 MG: 50 TABLET ORAL at 08:06

## 2022-06-10 RX ADMIN — BUDESONIDE AND FORMOTEROL FUMARATE DIHYDRATE 2 PUFF: 160; 4.5 AEROSOL RESPIRATORY (INHALATION) at 06:52

## 2022-06-10 RX ADMIN — MIRTAZAPINE 15 MG: 15 TABLET, FILM COATED ORAL at 21:27

## 2022-06-10 RX ADMIN — FINASTERIDE 5 MG: 5 TABLET, FILM COATED ORAL at 08:06

## 2022-06-10 RX ADMIN — ROSUVASTATIN CALCIUM 20 MG: 20 TABLET, FILM COATED ORAL at 21:27

## 2022-06-10 RX ADMIN — RIVAROXABAN 10 MG: 10 TABLET, FILM COATED ORAL at 08:06

## 2022-06-10 RX ADMIN — ASPIRIN 81 MG: 81 TABLET, COATED ORAL at 08:06

## 2022-06-10 RX ADMIN — SODIUM CHLORIDE, POTASSIUM CHLORIDE, SODIUM LACTATE AND CALCIUM CHLORIDE: 600; 310; 30; 20 INJECTION, SOLUTION INTRAVENOUS at 14:10

## 2022-06-10 RX ADMIN — CEFUROXIME AXETIL 500 MG: 500 TABLET ORAL at 21:27

## 2022-06-10 RX ADMIN — LEVOTHYROXINE SODIUM 137 MCG: 0.03 TABLET ORAL at 05:13

## 2022-06-10 RX ADMIN — TAMSULOSIN HYDROCHLORIDE 0.4 MG: 0.4 CAPSULE ORAL at 08:06

## 2022-06-10 RX ADMIN — GUAIFENESIN, DEXTROMETHORPHAN HBR 1 TABLET: 600; 30 TABLET ORAL at 08:12

## 2022-06-10 RX ADMIN — CEFUROXIME AXETIL 500 MG: 500 TABLET ORAL at 08:06

## 2022-06-10 RX ADMIN — BUDESONIDE AND FORMOTEROL FUMARATE DIHYDRATE 2 PUFF: 160; 4.5 AEROSOL RESPIRATORY (INHALATION) at 19:20

## 2022-06-10 NOTE — PROGRESS NOTES
Hospitalist Progress Note      PCP: Marsa Ormond, MD    Date of Admission: 6/7/2022    Chief Complaint:    Chief Complaint   Patient presents with    Shortness of Breath     Subjective:  Patient denies fevers, chills, sweats; breathing is improving but still feels SOB. 12 point ROS negative other than mentioned above     Medications:  Reviewed    Infusion Medications    sodium chloride       Scheduled Medications    cefUROXime  500 mg Oral 2 times per day    tamsulosin  0.4 mg Oral Daily    aspirin  81 mg Oral Daily    budesonide-formoterol  2 puff Inhalation BID    finasteride  5 mg Oral Daily    levothyroxine  137 mcg Oral Daily    mirtazapine  15 mg Oral Nightly    rivaroxaban  10 mg Oral Daily with breakfast    rosuvastatin  20 mg Oral Nightly    sertraline  50 mg Oral Daily    Vitamin D  2,000 Units Oral Daily    sodium chloride flush  5-40 mL IntraVENous 2 times per day     PRN Meds: albuterol sulfate HFA, calcium carbonate, sodium chloride flush, sodium chloride, ondansetron **OR** ondansetron, polyethylene glycol, acetaminophen **OR** acetaminophen, dextromethorphan-guaiFENesin      Intake/Output Summary (Last 24 hours) at 6/10/2022 1354  Last data filed at 6/10/2022 0539  Gross per 24 hour   Intake 500 ml   Output 1150 ml   Net -650 ml       Exam:    /77   Pulse 62   Temp 97.7 °F (36.5 °C) (Oral)   Resp 18   Ht 6' (1.829 m)   Wt 209 lb 8 oz (95 kg)   SpO2 91%   BMI 28.41 kg/m²     General appearance: No apparent distress, appears stated age and cooperative. HEENT: Conjunctivae/corneas clear. Neck:  Trachea midline. Respiratory:  Basilar rhonchi  Cardiovascular: Regular rate and rhythm . Abdomen: Soft, non-tender, non-distended with normal bowel sounds. Musculoskeletal: No clubbing, cyanosis or edema bilaterally.  .  Neuro: Non Focal.   Capillary Refill: Brisk,< 3 seconds   Peripheral Pulses: +2 palpable, equal bilaterally     Labs:   Recent Labs     06/08/22  0593 06/09/22  0604 06/10/22  0634   WBC 7.7 7.7 7.0   HGB 13.2* 13.6* 13.1*   HCT 40.8* 42.4 39.4*    127* 127*     Recent Labs     06/08/22  0536 06/09/22  0604 06/10/22  0634    141 141   K 4.3 4.2 4.0    104 104   CO2 24 24 26   BUN 19 21 23   CREATININE 1.29* 1.33* 1.38*   CALCIUM 8.5 8.9 8.6     Recent Labs     06/08/22  0536 06/09/22  0604 06/10/22  0634   AST 13 15 18   ALT 10 11 14   BILITOT 0.4 0.4 0.5   ALKPHOS 58 59 60     No results for input(s): INR in the last 72 hours. Recent Labs     06/07/22  1545 06/08/22  0011 06/08/22  0536   TROPONINI 0.016* 0.019* 0.018*       Urinalysis:      Lab Results   Component Value Date    NITRU Negative 12/31/2021    WBCUA 0-2 12/31/2021    BACTERIA Negative 12/31/2021    RBCUA >100 12/31/2021    BLOODU Large 12/31/2021    SPECGRAV 1.015 12/31/2021    GLUCOSEU Negative 12/31/2021     Radiology:  XR CHEST (2 VW)   Final Result      Mild bibasilar pulmonary infiltrates, more on the right side. Stable elevated right hemidiaphragm. Assessment/Plan:    Pneumonia: Improving abx per pulm ;continue current management.     BRAD: Continue gentle IVF; consult nephrology     Elevated troponin: Troponin 0.016. EKG sinus, no ST elevation. Likely 2/2 BRAD   We will keep patient on telemetry monitoring.     Active problems:  COPD: Patient on scheduled inhalers and as needed nebulizers  Continue home meds. History of PE: Patient on [facility] [place] [place]. Continue home meds. Hypothyroidism: PT on home meds to control. Continue home meds, as tolerated. HLD: PT on home meds to control. Continue home meds, as tolerated. GERD: PT on home meds to control. Continue home meds, as tolerated. BPH: PT on home meds to control. Continue home meds, as tolerated. Active Hospital Problems    Diagnosis Date Noted    Pneumonia [J18.9] 06/07/2022     Priority: Medium     Additional work up or/and treatment plan may be added today or then after based on clinical progression.  I am managing a portion of pt care. Some medical issues are handled by other specialists. Additional work up and treatment should be done in out pt setting by pt PCP and other out pt providers. In addition to examining and evaluating pt, I spent additional time explaining care, normal and abnormal findings, and treatment plan. All of pt questions were answered. Counseling, diet and education were  provided. Case will be discussed with nursing staff when appropriate. Family will be updated if and when appropriate. Diet: ADULT DIET;  Regular; 5 carb choices (75 gm/meal)    Code Status: Full Code    PT/OT Eval   Electronically signed by Tuan Tanner MD on 6/10/2022 at 1:54 PM

## 2022-06-10 NOTE — PROGRESS NOTES
06/10/22     From: HOME, HAS NEBS/2L HS/PRN.  PORTABLE O2 CONCEN 2L NC    Admit: C/O SOB, FATIGUE    PMH: BPH, COPD, ASTHMA, PE ([place]se Letters), DM, SLEEP AP    Anticipated Discharge Disposition: PENDING PT/OT    Patient Mobility or PT/OT ordered: YES  Consults: PULM    Covid result &/or vacc status: NOT VAC, NOT TESTED  TROPS+   BC___    Barriers to Discharge:  WAITING ON BLOOD CULTURE  IV SALLIE, IV ZITH    Assessments: CMI DCCOP COMPLETED

## 2022-06-10 NOTE — PROGRESS NOTES
INPATIENT PROGRESS NOTES    PATIENT NAME: Rosanna Sánchez  MRN: [id number]  SERVICE DATE:  Julia 10, 2022   SERVICE TIME:  1:09 PM      PRIMARY SERVICE: Pulmonary Disease    CHIEF COMPLAIN: Shortness of breath      INTERVAL HPI: Patient seen and examined at bedside, Interval Notes, orders reviewed. Nursing notes noted  Patient is still coughing. He said he was coughing all night and coughed up copious amount of sputum. He said he is overall feeling better but still very weak. On 3 L O2 his O2 saturation is 91%   No fever or chills. No chest pain. No nausea vomiting diarrhea or abdominal pain. OBJECTIVE    Body mass index is 28.41 kg/m². PHYSICAL EXAM:  Vitals:  /77   Pulse 62   Temp 97.7 °F (36.5 °C) (Oral)   Resp 18   Ht 6' (1.829 m)   Wt 209 lb 8 oz (95 kg)   SpO2 91%   BMI 28.41 kg/m²   General: Alert, awake . comfortable in bed, No distress. Head: Atraumatic , Normocephalic   Eyes: PERRL. No sclera icterus. No conjunctival injection. No discharge   ENT: No nasal  discharge. Pharynx clear. Neck:  Trachea midline. No thyromegaly, no JVD, No cervical adenopathy. Chest : Bilaterally symmetrical ,Normal effort,  No accessory muscle use  Lung : . Fair BS bilateral, decreased BS at bases. Bibasilar Rales. No wheezing. No rhonchi. Heart[de-identified] Normal  rate. Regular rhythm. No mumur ,  Rub or gallop  ABD: Non-tender. Non-distended. No masses. No organmegaly. Normal bowel sounds. No hernia.   Ext : No Pitting both leg , No Cyanosis No clubbing  Neuro: no focal weakness          DATA:   Recent Labs     06/09/22  0604 06/10/22  0634   WBC 7.7 7.0   HGB 13.6* 13.1*   HCT 42.4 39.4*   MCV 88.0 86.3   * 127*     Recent Labs     06/09/22  0604 06/09/22  0604 06/10/22  0634     --  141   K 4.2  --  4.0     --  104   CO2 24  --  26   BUN 21  --  23   CREATININE 1.33*  --  1.38*   GLUCOSE 103*  --  96   CALCIUM 8.9  --  8.6   PROT 6.5  --  6.3   LABALBU 3.2*  --  3.0*   BILITOT 0.4  -- 0. 5   ALKPHOS 59  --  60   AST 15  --  18   ALT 11   < > 14   LABGLOM 51.7*   < > 49.5*   GFRAA >60.0   < > 59.9*   GLOB 3.3   < > 3.3    < > = values in this interval not displayed. MV Settings:          No results for input(s): PHART, UGX2SVQ, PO2ART, ZCI8LDU, BEART, K4QZYOJS in the last 72 hours. O2 Device: Nasal cannula  O2 Flow Rate (L/min): 3 L/min    ADULT DIET; Regular; 5 carb choices (75 gm/meal)     MEDICATIONS during current hospitalization:    Continuous Infusions:   sodium chloride         Scheduled Meds:   cefUROXime  500 mg Oral 2 times per day    tamsulosin  0.4 mg Oral Daily    aspirin  81 mg Oral Daily    budesonide-formoterol  2 puff Inhalation BID    finasteride  5 mg Oral Daily    levothyroxine  137 mcg Oral Daily    mirtazapine  15 mg Oral Nightly    rivaroxaban  10 mg Oral Daily with breakfast    rosuvastatin  20 mg Oral Nightly    sertraline  50 mg Oral Daily    Vitamin D  2,000 Units Oral Daily    sodium chloride flush  5-40 mL IntraVENous 2 times per day       PRN Meds:albuterol sulfate HFA, calcium carbonate, sodium chloride flush, sodium chloride, ondansetron **OR** ondansetron, polyethylene glycol, acetaminophen **OR** acetaminophen, dextromethorphan-guaiFENesin    Radiology  XR CHEST (2 VW)    Result Date: 6/7/2022  Indication: Shortness of breath. EXAM: X-ray of the chest PA and lateral views. COMPARISON: 12/31/2021 exam. FINDINGS: Mild bibasilar pulmonary infiltrates, more on the right side. Stable elevated right hemidiaphragm. Normal cardiac silhouette. No pleural effusions. Normal hilar shortness. Central mediastinum. Mild bibasilar pulmonary infiltrates, more on the right side. Stable elevated right hemidiaphragm. IMPRESSION AND SUGGESTION:  1. Bibasilar pneumonia  2. COPD without exacerbation  3. Acute kidney injury  4. Elevated troponin  5. GEORGES noncompliant with CPAP  6. Hyperythyroidism  7. Hyperlipidemia  8. GERD    Patient is on on p.o.  Ceftin 500 mg twice daily. Chest x-ray shows bibasilar pulmonary infiltration more on the right side, stable elevated right hemidiaphragm. He is feeling better but is still weak and coughing spell and would like to stay until tomorrow. He lives alone likely discharge tomorrow with follow-up in office with me in 2-week      NOTE: This report was transcribed using voice recognition software. Every effort was made to ensure accuracy; however, inadvertent computerized transcription errors may be present.       Electronically signed by Ady Zhong MD, FCCP on 6/10/2022 at 1:09 PM

## 2022-06-10 NOTE — PROGRESS NOTES
Physical Therapy Med Surg Daily Treatment Note  Facility/Department: [facility]  Room: [facility]/L442-17       NAME: Edwar Redding  :  ([de-identified] y.o.)  MRN: [id number]  CODE STATUS: Full Code    Date of Service: 6/10/2022    Patient Diagnosis(es): Pneumonia [J18.9]  Elevated troponin [R77.8]  Bilateral pleural effusion [J90]  Pneumonia of both lower lobes due to infectious organism [J18.9]   Chief Complaint   Patient presents with    Shortness of Breath     Patient Active Problem List    Diagnosis Date Noted    Acute bronchitis 2022    Pneumonia 2022    COPD exacerbation ([facility] [address] 2021    Acute deep vein thrombosis (DVT) of femoral vein of left lower extremity ([address] 05/10/2019    Pulmonary embolus ([address] 2018    Primary osteoarthritis involving multiple joints 2018    Hx of long term use of blood thinners 2018    Arthritis of shoulder 2018    History of pulmonary embolism 2018    S/P thoracotomy, right 2018    Asthma 2018    Thyroid cancer ([address] 2018    History of aspiration pneumonia 2018    Obesity (BMI 30-39.9) 2018    History of total left knee replacement 2018    Abnormality of gait and mobility due to Right Shoulder OA S/P Right Shoulder Arthroscopy Subacromial Decompression with Biceps.   [facility] admit [facility]. 2018    Arthritis of right shoulder region 2018    Past use of tobacco 2018    Current use of long term anticoagulation 2018    Gait abnormality 2018    Abnormality of gait and mobility due to hypoxemia secondary to bilateral pulmonary emboli, [facility] admit 18      Bilateral pulmonary embolism ([address] 2018    Hypoxemia     Vitamin D deficiency     Debility     On home oxygen therapy     Osteoarthritis of spine with radiculopathy, lumbar region 2016    Foraminal stenosis of lumbosacral region 2016    Elevated PSA 2016    Anxiety     Insomnia     Hypothyroidism     Type 2 diabetes mellitus (HCC)     BPH (benign prostatic hyperplasia)     GERD (gastroesophageal reflux disease) 12/11/2014    GEORGES (obstructive sleep apnea) 01/24/2014    HTN (hypertension) 01/13/2014    Hyperlipidemia 01/13/2014    Chronic obstructive pulmonary disease ([facility] 12/05/2013        Past Medical History:   Diagnosis Date    Abnormality of gait and mobility     Acute sinusitis     Anxiety     Aspiration into respiratory tract 11/10/2017    Aspiration pneumonia (HCC)     Bilateral pulmonary embolism (HCC) 1/6/2018    BPH (benign prostatic hyperplasia)     COPD (chronic obstructive pulmonary disease) ([address] 12/5/2013    Debility     Elevated CK 12/30/2013    Foraminal stenosis of lumbosacral region 6/7/2016    GERD (gastroesophageal reflux disease) 12/11/2014    History of asthma 1/13/2014    HTN (hypertension) 1/13/2014    past braden / no current meds    Hx of blood clots 01/2018    DVT  ( unsure which leg but both were swollen ) -> PE -> thus Xarelto    Hyperlipidemia     meds > 10 yrs    Hypothyroidism     Insomnia     Lower extremity weakness 1/24/2014    On home oxygen therapy     2L at night    Osteoarthritis of spine with radiculopathy, lumbar region 6/7/2016    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    no trx to follow    Positive D dimer 12/5/2013    Sleep apnea 1/24/2014    Type 2 diabetes mellitus ([facility]     diet control  / no meds    Vitamin D deficiency      Past Surgical History:   Procedure Laterality Date    BRONCHOSCOPY N/A 11/11/2017    BRONCHOSCOPY FIBEROPTIC performed by Drew Saunders MD at [address] [place] [address] COLONOSCOPY      ENDOSCOPY, COLON, DIAGNOSTIC      EYE SURGERY      phaco with IOL OU    HERNIA REPAIR  8968C    repair umbilical hernia    KNEE SURGERY Left 1970    ID ARTHROSCOPY SHOULDER SURGICAL BICEPS TENODESIS Right 9/18/2018    RIGHT SHOULDER ARTHROSCOPY SUBACROMIAL DECOMPRESS WITH POSSIBLE BICEPS TENODESIS ARTHROSCOPIC FRANSISCA C- ARM AR[facility]REX [facility] by Irina Mulligan MD at [facility] Right 11/14/2017    RIGHT THORACOTOMY WEDGE RESECTION FOR FOREIGN BODY AND FOB DOUBLE LUMEN (1ST CASE) performed by Naman Dorado MD at [address] [place] [address]  2006    cancer / no chemo or radiation       Chart Reviewed: Yes  Patient assessed for rehabilitation services?: Yes  Family / Caregiver Present: No    Restrictions:  Restrictions/Precautions: Fall Risk    SUBJECTIVE:   Subjective: Patient resting in bed. Agreeable to tx. Response To Previous Treatment: Patient with no complaints from previous session. Pain  Denies pain     OBJECTIVE:        Bed mobility  Rolling to Right: Supervision  Supine to Sit: Supervision  Sit to Supine: Supervision  Scooting: Supervision  Bed Mobility Comments: HOB slightly elevated. Patient utilizes bed rail. Transfers  Sit to Stand: Stand by assistance;Supervision  Stand to sit: Stand by assistance;Supervision  Comment: repeated STS x5 - patient demonstrates good safety    Ambulation  Surface: level tile  Device: Rollator   Assistance: Stand by assistance;Contact guard assistance  Quality of Gait: Flexed posture, NBOS, decreased lucy, no LOB  Distance: 125 feet, 50' feet x2    Stairs/Curb  Stairs?: No (Patient declines need for stair training. Feels confident to ascend 1 step into home.)       ASSESSMENT   Body Structures, Functions, Activity Limitations Requiring Skilled Therapeutic Intervention: Decreased functional mobility ; Decreased ADL status; Decreased endurance;Decreased balance  Assessment: Patient c/o mild exertion however denies SOB post ambulation. Continues to demonstrate O2 desaturation post activity. SPO2 82% post 1st trial of ambulation, 86% post 2nd trial on 2 liters. Patient recovers quickly with cues for purse lip breathing.  Requires verbal cues for management and safety of rollator. Therapy Prognosis: Good  Barriers to Learning: none     Discharge Recommendations:  Continue to assess pending progress,Patient would benefit from continued therapy after discharge         Goals  Long Term Goals  Long term goal 1: Pt to complete bed mobility with indep  Long term goal 2: Pt to complete transfers with indep  Long term goal 3: Pt to ambulate 50-150ft with LRD and indep  Long term goal 4: Pt to manage 3 steps with HR and indep    PLAN    Plan: 1 time a day 3-6 times a week  Safety Devices  Type of Devices: All fall risk precautions in place,Left in chair,Chair alarm in place     AMPAC (6 CLICK) BASIC MOBILITY  AM-PAC Inpatient Mobility Raw Score : 19     Therapy Time   Individual   Time In 1315   Time Out 1340   Minutes 25     Timed Code Treatment Minutes: 25 Minutes   tr 10  Gt [address] Melanie Mcmanus, PTA, 06/10/22 at 1:48 PM         Definitions for assistance levels  Independent = pt does not require any physical supervision or assistance from another person for activity completion. Device may be needed.   Stand by assistance = pt requires verbal cues or instructions from another person, close to but not touching, to perform the activity  Minimal assistance= pt performs 75% or more of the activity; assistance is required to complete the activity  Moderate assistance= pt performs 50% of the activity; assistance is required to complete the activity  Maximal assistance = pt performs 25% of the activity; assistance is required to complete the activity  Dependent = pt requires total physical assistance to accomplish the task

## 2022-06-11 LAB
ALBUMIN SERPL-MCNC: 2.9 G/DL (ref 3.5–4.6)
ALP BLD-CCNC: 58 U/L (ref 35–104)
ALT SERPL-CCNC: 16 U/L (ref 0–41)
ANION GAP SERPL CALCULATED.3IONS-SCNC: 11 MEQ/L (ref 9–15)
AST SERPL-CCNC: 20 U/L (ref 0–40)
BACTERIA: NEGATIVE /HPF
BASOPHILS ABSOLUTE: 0 K/UL (ref 0–0.2)
BASOPHILS RELATIVE PERCENT: 0.4 %
BILIRUB SERPL-MCNC: 0.5 MG/DL (ref 0.2–0.7)
BILIRUBIN URINE: NEGATIVE
BLOOD, URINE: ABNORMAL
BUN BLDV-MCNC: 25 MG/DL (ref 8–23)
CALCIUM SERPL-MCNC: 8.7 MG/DL (ref 8.5–9.9)
CHLORIDE BLD-SCNC: 106 MEQ/L (ref 95–107)
CLARITY: CLEAR
CO2: 26 MEQ/L (ref 20–31)
COLOR: YELLOW
CREAT SERPL-MCNC: 1.37 MG/DL (ref 0.7–1.2)
CULTURE, RESPIRATORY: ABNORMAL
CULTURE, RESPIRATORY: ABNORMAL
EOSINOPHILS ABSOLUTE: 0.6 K/UL (ref 0–0.7)
EOSINOPHILS RELATIVE PERCENT: 9.4 %
EPITHELIAL CELLS, UA: ABNORMAL /HPF (ref 0–5)
GFR AFRICAN AMERICAN: >60
GFR NON-AFRICAN AMERICAN: 49.9
GLOBULIN: 3.4 G/DL (ref 2.3–3.5)
GLUCOSE BLD-MCNC: 92 MG/DL (ref 70–99)
GLUCOSE URINE: NEGATIVE MG/DL
HCT VFR BLD CALC: 41 % (ref 42–52)
HEMOGLOBIN: 13.7 G/DL (ref 14–18)
HYALINE CASTS: ABNORMAL /HPF (ref 0–5)
KETONES, URINE: NEGATIVE MG/DL
L. PNEUMOPHILA SEROGP 1 UR AG: NEGATIVE
LEUKOCYTE ESTERASE, URINE: NEGATIVE
LYMPHOCYTES ABSOLUTE: 1.5 K/UL (ref 1–4.8)
LYMPHOCYTES RELATIVE PERCENT: 25.9 %
MAGNESIUM: 2.2 MG/DL (ref 1.7–2.4)
MCH RBC QN AUTO: 28.8 PG (ref 27–31.3)
MCHC RBC AUTO-ENTMCNC: 33.3 % (ref 33–37)
MCV RBC AUTO: 86.4 FL (ref 80–100)
MONOCYTES ABSOLUTE: 0.5 K/UL (ref 0.2–0.8)
MONOCYTES RELATIVE PERCENT: 7.8 %
NEUTROPHILS ABSOLUTE: 3.4 K/UL (ref 1.4–6.5)
NEUTROPHILS RELATIVE PERCENT: 56.5 %
NITRITE, URINE: NEGATIVE
ORGANISM: ABNORMAL
PDW BLD-RTO: 15.6 % (ref 11.5–14.5)
PH UA: 7.5 (ref 5–9)
PLATELET # BLD: 123 K/UL (ref 130–400)
POTASSIUM SERPL-SCNC: 4.1 MEQ/L (ref 3.4–4.9)
PROTEIN UA: NEGATIVE MG/DL
RBC # BLD: 4.75 M/UL (ref 4.7–6.1)
RBC UA: ABNORMAL /HPF (ref 0–5)
SODIUM BLD-SCNC: 143 MEQ/L (ref 135–144)
SPECIFIC GRAVITY UA: 1.01 (ref 1–1.03)
TOTAL PROTEIN: 6.3 G/DL (ref 6.3–8)
UROBILINOGEN, URINE: 0.2 E.U./DL
WBC # BLD: 6 K/UL (ref 4.8–10.8)
WBC UA: ABNORMAL /HPF (ref 0–5)

## 2022-06-11 PROCEDURE — 6370000000 HC RX 637 (ALT 250 FOR IP): Performed by: NURSE PRACTITIONER

## 2022-06-11 PROCEDURE — 6370000000 HC RX 637 (ALT 250 FOR IP): Performed by: INTERNAL MEDICINE

## 2022-06-11 PROCEDURE — 36415 COLL VENOUS BLD VENIPUNCTURE: CPT

## 2022-06-11 PROCEDURE — 2700000000 HC OXYGEN THERAPY PER DAY

## 2022-06-11 PROCEDURE — 94640 AIRWAY INHALATION TREATMENT: CPT

## 2022-06-11 PROCEDURE — 80053 COMPREHEN METABOLIC PANEL: CPT

## 2022-06-11 PROCEDURE — 99232 SBSQ HOSP IP/OBS MODERATE 35: CPT | Performed by: INTERNAL MEDICINE

## 2022-06-11 PROCEDURE — 85025 COMPLETE CBC W/AUTO DIFF WBC: CPT

## 2022-06-11 PROCEDURE — 83735 ASSAY OF MAGNESIUM: CPT

## 2022-06-11 PROCEDURE — 94761 N-INVAS EAR/PLS OXIMETRY MLT: CPT

## 2022-06-11 PROCEDURE — 81001 URINALYSIS AUTO W/SCOPE: CPT

## 2022-06-11 PROCEDURE — 1210000000 HC MED SURG R&B

## 2022-06-11 PROCEDURE — 2580000003 HC RX 258: Performed by: NURSE PRACTITIONER

## 2022-06-11 RX ADMIN — CEFUROXIME AXETIL 500 MG: 500 TABLET ORAL at 10:13

## 2022-06-11 RX ADMIN — SODIUM CHLORIDE, PRESERVATIVE FREE 10 ML: 5 INJECTION INTRAVENOUS at 21:21

## 2022-06-11 RX ADMIN — SODIUM CHLORIDE, PRESERVATIVE FREE 10 ML: 5 INJECTION INTRAVENOUS at 10:15

## 2022-06-11 RX ADMIN — SERTRALINE HYDROCHLORIDE 50 MG: 50 TABLET ORAL at 10:14

## 2022-06-11 RX ADMIN — RIVAROXABAN 10 MG: 10 TABLET, FILM COATED ORAL at 10:18

## 2022-06-11 RX ADMIN — TAMSULOSIN HYDROCHLORIDE 0.4 MG: 0.4 CAPSULE ORAL at 10:14

## 2022-06-11 RX ADMIN — LEVOTHYROXINE SODIUM 137 MCG: 0.03 TABLET ORAL at 07:01

## 2022-06-11 RX ADMIN — BUDESONIDE AND FORMOTEROL FUMARATE DIHYDRATE 2 PUFF: 160; 4.5 AEROSOL RESPIRATORY (INHALATION) at 19:26

## 2022-06-11 RX ADMIN — ASPIRIN 81 MG: 81 TABLET, COATED ORAL at 10:13

## 2022-06-11 RX ADMIN — BUDESONIDE AND FORMOTEROL FUMARATE DIHYDRATE 2 PUFF: 160; 4.5 AEROSOL RESPIRATORY (INHALATION) at 07:52

## 2022-06-11 RX ADMIN — ROSUVASTATIN CALCIUM 20 MG: 20 TABLET, FILM COATED ORAL at 21:20

## 2022-06-11 RX ADMIN — CEFUROXIME AXETIL 500 MG: 500 TABLET ORAL at 21:20

## 2022-06-11 RX ADMIN — Medication 2000 UNITS: at 10:13

## 2022-06-11 RX ADMIN — FINASTERIDE 5 MG: 5 TABLET, FILM COATED ORAL at 10:13

## 2022-06-11 RX ADMIN — MIRTAZAPINE 15 MG: 15 TABLET, FILM COATED ORAL at 21:20

## 2022-06-11 NOTE — PROGRESS NOTES
Hospitalist Progress Note      PCP: Harsha Ba MD    Date of Admission: 6/7/2022    Chief Complaint:    Chief Complaint   Patient presents with    Shortness of Breath     Subjective:  Patient denies fevers, chills, sweats; breathing is improving. 12 point ROS negative other than mentioned above     Medications:  Reviewed    Infusion Medications    sodium chloride       Scheduled Medications    cefUROXime  500 mg Oral 2 times per day    tamsulosin  0.4 mg Oral Daily    aspirin  81 mg Oral Daily    budesonide-formoterol  2 puff Inhalation BID    finasteride  5 mg Oral Daily    levothyroxine  137 mcg Oral Daily    mirtazapine  15 mg Oral Nightly    rivaroxaban  10 mg Oral Daily with breakfast    rosuvastatin  20 mg Oral Nightly    sertraline  50 mg Oral Daily    Vitamin D  2,000 Units Oral Daily    sodium chloride flush  5-40 mL IntraVENous 2 times per day     PRN Meds: albuterol sulfate HFA, calcium carbonate, sodium chloride flush, sodium chloride, ondansetron **OR** ondansetron, polyethylene glycol, acetaminophen **OR** acetaminophen, dextromethorphan-guaiFENesin      Intake/Output Summary (Last 24 hours) at 6/11/2022 1420  Last data filed at 6/11/2022 1407  Gross per 24 hour   Intake --   Output 475 ml   Net -475 ml       Exam:    /80   Pulse 55   Temp 98.6 °F (37 °C) (Oral)   Resp 16   Ht 6' (1.829 m)   Wt 209 lb 8 oz (95 kg)   SpO2 94%   BMI 28.41 kg/m²     General appearance: No apparent distress, appears stated age and cooperative. HEENT: Conjunctivae/corneas clear. Neck:  Trachea midline. Respiratory:  Clear bilateral breath sounds  Cardiovascular: Regular rate and rhythm . Abdomen: Soft, non-tender, non-distended with normal bowel sounds. Musculoskeletal: No clubbing, cyanosis or edema bilaterally.  .  Neuro: Non Focal.   Capillary Refill: Brisk,< 3 seconds   Peripheral Pulses: +2 palpable, equal bilaterally     Labs:   Recent Labs     06/09/22  0604 06/10/22  9757 06/11/22  0519   WBC 7.7 7.0 6.0   HGB 13.6* 13.1* 13.7*   HCT 42.4 39.4* 41.0*   * 127* 123*     Recent Labs     06/09/22  0604 06/10/22  0634 06/11/22  0519    141 143   K 4.2 4.0 4.1    104 106   CO2 24 26 26   BUN 21 23 25*   CREATININE 1.33* 1.38* 1.37*   CALCIUM 8.9 8.6 8.7     Recent Labs     06/09/22  0604 06/10/22  0634 06/11/22  0519   AST 15 18 20   ALT 11 14 16   BILITOT 0.4 0.5 0.5   ALKPHOS 59 60 58     No results for input(s): INR in the last 72 hours. No results for input(s): Carli Woodard in the last 72 hours. Urinalysis:      Lab Results   Component Value Date    NITRU Negative 12/31/2021    WBCUA 0-2 12/31/2021    BACTERIA Negative 12/31/2021    RBCUA >100 12/31/2021    BLOODU Large 12/31/2021    SPECGRAV 1.015 12/31/2021    GLUCOSEU Negative 12/31/2021     Radiology:  XR CHEST (2 VW)   Final Result      Mild bibasilar pulmonary infiltrates, more on the right side. Stable elevated right hemidiaphragm. US RETROPERITONEAL LIMITED    (Results Pending)     Assessment/Plan:    Pneumonia: Improving abx per pulm ;continue current management.; anticipate discharge tomorrow     BRAD: Per nephrology; outpatient follow up     Elevated troponin: Troponin 0.016. EKG sinus, no ST elevation. Likely 2/2 BRAD   We will keep patient on telemetry monitoring.     Active problems:  COPD: Patient on scheduled inhalers and as needed nebulizers  Continue home meds. History of PE: Patient on [address]. Continue home meds. Hypothyroidism: PT on home meds to control. Continue home meds, as tolerated. HLD: PT on home meds to control. Continue home meds, as tolerated. GERD: PT on home meds to control. Continue home meds, as tolerated. BPH: PT on home meds to control. Continue home meds, as tolerated.     Active Hospital Problems    Diagnosis Date Noted    Pneumonia [J18.9] 06/07/2022     Priority: Medium     Additional work up or/and treatment plan may be added today or then after based on clinical progression. I am managing a portion of pt care. Some medical issues are handled by other specialists. Additional work up and treatment should be done in out pt setting by pt PCP and other out pt providers. In addition to examining and evaluating pt, I spent additional time explaining care, normal and abnormal findings, and treatment plan. All of pt questions were answered. Counseling, diet and education were  provided. Case will be discussed with nursing staff when appropriate. Family will be updated if and when appropriate. Diet: ADULT DIET;  Regular; 5 carb choices (75 gm/meal)    Code Status: Full Code    PT/OT Eval   Electronically signed by Pablo Sanchez MD on 6/11/2022 at 2:20 PM

## 2022-06-11 NOTE — PROGRESS NOTES
Pt assessment complete. Pt assisted at bedside with urinal, slightly SOB with exertion, pt reports it is greatly improved. Pt denies SOB with rest.  Pt is alert and oriented. Currently on 3L NC. Call light in reach. Bed alarm on. HS snack given.

## 2022-06-11 NOTE — CONSULTS
[facility]  Consult Note           Reason for Consult: BRAD  Requesting Physician:  Dr. Cho    Chief Complaint:  SOB  History Obtained From:  patient, electronic medical record    History of Present Ilness:    [de-identified] y.o. male with history s/f HTN, HLD, hypothyroidism, DVT/PE, GERD, COPD, BPH and T2DM wh opresented for SOB and cough. Also on CPAP for GEORGES however had not been compliant w/ it as outpatient. Currently being managed for PNA. Nephrology consulted for BRAD. Scr has been stable in the 1.2-1.3 range w/ eGFR high 40s/low 50s. Pt admitted 6/7. Was slightly hypotensive on 6/9. Notably Scr was 1.4 in 4/22, similar to current values. Was however 0.9 in 1/22. No recent contrast use. Not on any nephrotoxic agents.      Past Medical History:        Diagnosis Date    Abnormality of gait and mobility     Acute sinusitis     Anxiety     Aspiration into respiratory tract 11/10/2017    Aspiration pneumonia (HCC)     Bilateral pulmonary embolism (HCC) 1/6/2018    BPH (benign prostatic hyperplasia)     COPD (chronic obstructive pulmonary disease) ([address] 12/5/2013    Debility     Elevated CK 12/30/2013    Foraminal stenosis of lumbosacral region 6/7/2016    GERD (gastroesophageal reflux disease) 12/11/2014    History of asthma 1/13/2014    HTN (hypertension) 1/13/2014    past braden / no current meds    Hx of blood clots 01/2018    DVT  ( unsure which leg but both were swollen ) -> PE -> thus Xarelto    Hyperlipidemia     meds > 10 yrs    Hypothyroidism     Insomnia     Lower extremity weakness 1/24/2014    On home oxygen therapy     2L at night    Osteoarthritis of spine with radiculopathy, lumbar region 6/7/2016    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    no trx to follow    Positive D dimer 12/5/2013    Sleep apnea 1/24/2014    Type 2 diabetes mellitus (HCC)     diet control  / no meds    Vitamin D deficiency        Past Surgical History:        Procedure Laterality Date    BRONCHOSCOPY N/A 11/11/2017    BRONCHOSCOPY FIBEROPTIC performed by Myranda Michael MD at [facility], COLON, DIAGNOSTIC      EYE SURGERY      phaco with IOL OU    HERNIA REPAIR  [id number]    repair umbilical hernia    KNEE SURGERY Left 1970    SD ARTHROSCOPY SHOULDER SURGICAL BICEPS TENODESIS Right 9/18/2018    RIGHT SHOULDER ARTHROSCOPY SUBACROMIAL DECOMPRESS WITH POSSIBLE BICEPS TENODESIS ARTHROSCOPIC FRANSISCA C- ARM ARTHREX BICEPS [facility]ESIS [facility] performed by Irina Mulligan MD at [facility] Right 11/14/2017    RIGHT THORACOTOMY WEDGE RESECTION FOR FOREIGN BODY AND FOB DOUBLE LUMEN (1ST CASE) performed by Naman Dorado MD at [facility]  2006    cancer / no chemo or radiation       Home Medications:    No current facility-administered medications on file prior to encounter.      Current Outpatient Medications on File Prior to Encounter   Medication Sig Dispense Refill    budesonide-formoterol (SYMBICORT) 160-4.5 MCG/ACT AERO Inhale 2 puffs into the lungs 2 times daily 1 each 3    albuterol (PROVENTIL) (2.5 MG/3ML) 0.083% nebulizer solution Use 1 vial via nebulizer every 6 hours as needed for Wheezing 360 mL 3    albuterol sulfate  (90 Base) MCG/ACT inhaler Inhale 2 puffs into the lungs every 4 hours as needed for Wheezing (Patient not taking: Reported on 6/7/2022) 1 each 3    rosuvastatin (CRESTOR) 20 MG tablet       levothyroxine (SYNTHROID) 137 MCG tablet       sertraline (ZOLOFT) 50 MG tablet Take 50 mg by mouth daily Pt takes at night      alfuzosin (UROXATRAL) 10 MG extended release tablet Take by mouth      rivaroxaban (XARELTO) 10 MG TABS tablet Take 1 tablet by mouth daily (with breakfast) 30 tablet 6    Respiratory Therapy Supplies OK New CPAP mask and supplies 1 Device 0    aspirin 81 MG EC tablet Take 1 tablet by mouth daily 30 tablet 3    vitamin D (CHOLECALCIFEROL) 1000 UNIT TABS tablet Take 2 tablets by mouth daily 60 tablet 1    clonazePAM (KLONOPIN) 0.5 MG tablet Take 0.5 mg by mouth nightly as needed. .      CPAP Machine MISC by Does not apply route Auto CPAP  5-20 cm (Patient not taking: Reported on 2022) 1 each 0    magnesium 30 MG tablet Take 250 mg by mouth daily      mirtazapine (REMERON) 15 MG tablet Take 15 mg by mouth nightly      finasteride (PROSCAR) 5 MG tablet Take 1 tablet by mouth daily 30 tablet 3    tamsulosin (FLOMAX) 0.4 MG capsule Take 1 capsule by mouth nightly (Patient taking differently: Take 0.4 mg by mouth daily ) 30 capsule 3       Allergies:  Patient has no known allergies. Social History:    Social History     Socioeconomic History    Marital status:      Spouse name: Not on file    Number of children: 2    Years of education: Not on file    Highest education level: Not on file   Occupational History    Occupation: retired   Tobacco Use    Smoking status: Former Smoker     Packs/day: 1.00     Years: 40.00     Pack years: 40.00     Types: Cigarettes     Start date: 2000     Quit date:      Years since quittin.4    Smokeless tobacco: Former User    Tobacco comment: quit    Vaping Use    Vaping Use: Never used   Substance and Sexual Activity    Alcohol use: Yes     Alcohol/week: 0.0 standard drinks     Comment: rare social    Drug use: No    Sexual activity: Not on file   Other Topics Concern    Not on file   Social History Narrative    The patient lives alone in a one story home with one step to enter the home. The bedroom and bathroom are on the first floor. His social support includes his children. He has a wheeled walker, rollator, cane and dressing assistive device at home. He was receiving home health care services prior to admission to include PT, OT and RN. He does not have history of falls prior to admission.   He was independent with mobility with a wheeled walker as needed prior to admission. He was independent with self care prior to admission. His goal is to get stronger and return home. LIVES AT HOME ALONE. HE HAS A ROLLATOR HE USES. HIS SON LIVES IN [place] AND IS AVAILABLE IF HE NEEDS HIM. Type of Home: House    Home Layout: One level    Home Access: Level entry    Home Equipment: Cane, 4 wheeled walker    ADL Assistance: Independent    Homemaking Assistance: Independent    Ambulation Assistance: Independent    Transfer Assistance: Independent    Additional Comments: son and dtr can assist upon DC home with IADLs     Social Determinants of Health     Financial Resource Strain:     Difficulty of Paying Living Expenses: Not on file   Food Insecurity:     Worried About [address] [employer] in the Last Year: Not on file    Gibran of Food in the Last Year: Not on file   Transportation Needs:     Lack of Transportation (Medical): Not on file    Lack of Transportation (Non-Medical):  Not on file   Physical Activity:     Days of Exercise per Week: Not on file    Minutes of Exercise per Session: Not on file   Stress:     Feeling of Stress : Not on file   Social Connections:     Frequency of Communication with Friends and Family: Not on file    Frequency of Social Gatherings with Friends and Family: Not on file    Attends Mu-ism Services: Not on file    Active Member of [address] [place] [address] [employer] or Organizations: Not on file    Attends Club or Organization Meetings: Not on file    Marital Status: Not on file   Intimate Partner Violence:     Fear of Current or Ex-Partner: Not on file    Emotionally Abused: Not on file    Physically Abused: Not on file    Sexually Abused: Not on file   Housing Stability:     Unable to Pay for Housing in the Last Year: Not on file    Number of [place] in the Last Year: Not on file    Unstable Housing in the Last Year: Not on file       Family History:   Family History   Problem Relation Age of Onset    Other Mother 80        Old Age    Stroke Father 45    No Known Problems Sister     Psoriasis Daughter     No Known Problems Son        Review of Systems:   Positives in bold  Constitutional: fever, chills, fatigue, malaise   HENT:  rhinorrhea, sinus pain, sore throat, epistaxis    Eyes:  photophobia, visual disturbance, eye redness  Respiratory: shortness of breath, cough, hemoptysis    Cardiovascular: chest pain, palpitations, orthopnea, leg swelling   Gastrointestinal: abdominal pain, nausea, vomiting, diarrhea, constipation   Endocrine: polydipsia, polyphagia, cold intolerance, heat intolerance  Genitourinary: dysuria, flank pain, frequency, urgency   Hematologic: easy bruising, easy bleeding  Musculoskeletal: back pain, neck pain, myalgias, arthalgias  Neurological: syncope, lightheadedness, dizziness, seizures, tremors, weakness  Psychiatric/Behavioral: anxiety, depression, hallucinations  Skin: rash, itching    Physical exam:   Constitutional:  VITALS:  /80   Pulse 55   Temp 98.6 °F (37 °C) (Oral)   Resp 16   Ht 6' (1.829 m)   Wt 209 lb 8 oz (95 kg)   SpO2 94%   BMI 28.41 kg/m²     General: alert, in no apparent distress  HEENT: normocephalic, atraumatic, anicteric  Neck: supple, no mass  Lungs: non-labored respirations, clear to auscultation bilaterally  Heart: regular rate and rhythm, no murmurs or rubs  Abdomen: soft, non-tender, non-distended  MSK: no joint swelling or tenderness  Ext: no cyanosis, no peripheral edema  Neuro: alert and oriented, no gross abnormalities  Psych: normal mood and affect    Data/  CBC:   Lab Results   Component Value Date    WBC 6.0 06/11/2022    RBC 4.75 06/11/2022    HGB 13.7 06/11/2022    HCT 41.0 06/11/2022    MCV 86.4 06/11/2022    MCH 28.8 06/11/2022    MCHC 33.3 06/11/2022    RDW 15.6 06/11/2022     06/11/2022    MPV 10.7 07/23/2015     BMP:    Lab Results   Component Value Date     06/11/2022    K 4.1 06/11/2022    K 4.1 01/01/2022     06/11/2022    CO2 26 06/11/2022 BUN 25 06/11/2022    LABALBU 2.9 06/11/2022    CREATININE 1.37 06/11/2022    CALCIUM 8.7 06/11/2022    GFRAA >60.0 06/11/2022    LABGLOM 49.9 06/11/2022    GLUCOSE 92 06/11/2022    GLUCOSE 94 02/20/2012     XR CHEST (2 VW)    Result Date: 6/7/2022  Indication: Shortness of breath. EXAM: X-ray of the chest PA and lateral views. COMPARISON: 12/31/2021 exam. FINDINGS: Mild bibasilar pulmonary infiltrates, more on the right side. Stable elevated right hemidiaphragm. Normal cardiac silhouette. No pleural effusions. Normal hilar shortness. Central mediastinum. Mild bibasilar pulmonary infiltrates, more on the right side. Stable elevated right hemidiaphragm. Assessment:  [de-identified] y.o. male with history s/f HTN, HLD, hypothyroidism, DVT/PE, GERD, COPD, BPH and T2DM wh opresented for SOB and cough    1. Partially recovered BRAD vs. CKD stage III: baseline Scr ~0.9 as far as 1/22, however Scr up to 1.4 in 4/22 and now remains close to the same, stable in the 1.2-1.3 range w/ eGFR high 40s/low 50s, mildly hypotensive on 6/9, no contrast administered, not on any nephrotoxic medications   2. PNA  3. Hypoalbuminemia  4. HTN    Plan:  - no need for IVFs, unlikely to affect renal function at this time  - checking urinalysis to assess for proteinuria, hematuria   - ok w/ current medications  - checking bone mineral labs  - if remains inpatient will obtain renal U/S otherwise can be done as outpatient  - should f/u w/in 1-2 weeks of discharge    Thank you for the consultation. Will continue to follow  Please do not hesitate to call with questions.     Shahzad Bacon MD, MD

## 2022-06-11 NOTE — PROGRESS NOTES
INPATIENT PROGRESS NOTES    PATIENT NAME: Simi Zhou  MRN: [id number]  SERVICE DATE:  2022   SERVICE TIME:  2:12 PM      PRIMARY SERVICE: Pulmonary Disease    CHIEF COMPLAINTS: Pneumonia COPD    INTERVAL HPI: Patient seen and examined at bedside, Interval Notes, orders reviewed. Nursing notes noted    Patient report feeling better, he did have cough yesterday none so far today, no chest pain, he is not short of breath, he slept well, no nausea no vomiting, no worsening edema, he is on 2 L O2 saturation 93%    Review of system:     GI Abdominal pain No  Skin Rash No    Social History     Tobacco Use    Smoking status: Former Smoker     Packs/day: 1.00     Years: 40.00     Pack years: 40.00     Types: Cigarettes     Start date: 2000     Quit date:      Years since quittin.4    Smokeless tobacco: Former User    Tobacco comment: quit    Substance Use Topics    Alcohol use: Yes     Alcohol/week: 0.0 standard drinks     Comment: rare social         Problem Relation Age of Onset    Other Mother 80        Old Age    Stroke Father 45    No Known Problems Sister     Psoriasis Daughter     No Known Problems Son          OBJECTIVE    Body mass index is 28.41 kg/m². PHYSICAL EXAM:  Vitals:  /80   Pulse 55   Temp 98.6 °F (37 °C) (Oral)   Resp 16   Ht 6' (1.829 m)   Wt 209 lb 8 oz (95 kg)   SpO2 94%   BMI 28.41 kg/m²     General: alert, cooperative, no distress  Head: normocephalic, atraumatic  Eyes:No gross abnormalities. ENT:  MMM no lesions  Neck:  supple and no masses  Chest : clear to auscultation bilaterally- no wheezes, rales or rhonchi, normal air movement, no respiratory distress  Heart[de-identified] Heart sounds are normal.  Regular rate and rhythm without murmur, gallop or rub. ABD:  symmetric, soft, non-tender, no guarding or rebound  Musculoskeletal : no cyanosis, no clubbing and no edema  Neuro:  Grossly normal  Skin: No rashes or nodules noted.   Lymph node:  no cervical nodes  Urology: No [place]   Psychiatric: appropriate    DATA:   Recent Labs     06/10/22  0634 06/11/22  0519   WBC 7.0 6.0   HGB 13.1* 13.7*   HCT 39.4* 41.0*   MCV 86.3 86.4   * 123*     Recent Labs     06/10/22  0634 06/10/22  0634 06/11/22  0519     --  143   K 4.0  --  4.1     --  106   CO2 26  --  26   BUN 23  --  25*   CREATININE 1.38*  --  1.37*   GLUCOSE 96  --  92   CALCIUM 8.6  --  8.7   PROT 6.3  --  6.3   LABALBU 3.0*  --  2.9*   BILITOT 0.5  --  0.5   ALKPHOS 60  --  58   AST 18  --  20   ALT 14   < > 16   LABGLOM 49.5*   < > 49.9*   GFRAA 59.9*   < > >60.0   GLOB 3.3   < > 3.4    < > = values in this interval not displayed. MV Settings:          No results for input(s): PHART, BBE7DLA, PO2ART, LGD7ESM, BEART, O4VSZLWL in the last 72 hours. O2 Device: None (Room air)  O2 Flow Rate (L/min): 2 L/min (decreased to room air with 2L on stand by)    ADULT DIET; Regular; 5 carb choices (75 gm/meal)     MEDICATIONS during current hospitalization:    Continuous Infusions:   sodium chloride         Scheduled Meds:   cefUROXime  500 mg Oral 2 times per day    tamsulosin  0.4 mg Oral Daily    aspirin  81 mg Oral Daily    budesonide-formoterol  2 puff Inhalation BID    finasteride  5 mg Oral Daily    levothyroxine  137 mcg Oral Daily    mirtazapine  15 mg Oral Nightly    rivaroxaban  10 mg Oral Daily with breakfast    rosuvastatin  20 mg Oral Nightly    sertraline  50 mg Oral Daily    Vitamin D  2,000 Units Oral Daily    sodium chloride flush  5-40 mL IntraVENous 2 times per day       PRN Meds:albuterol sulfate HFA, calcium carbonate, sodium chloride flush, sodium chloride, ondansetron **OR** ondansetron, polyethylene glycol, acetaminophen **OR** acetaminophen, dextromethorphan-guaiFENesin    Radiology  XR CHEST (2 VW)    Result Date: 6/7/2022  Indication: Shortness of breath. EXAM: X-ray of the chest PA and lateral views.  COMPARISON: 12/31/2021 exam. FINDINGS: Mild bibasilar pulmonary infiltrates, more on the right side. Stable elevated right hemidiaphragm. Normal cardiac silhouette. No pleural effusions. Normal hilar shortness. Central mediastinum. Mild bibasilar pulmonary infiltrates, more on the right side. Stable elevated right hemidiaphragm.             IMPRESSION AND SUGGESTION:  Patient is at risk due to   · COPD, currently no signs of exacerbation  · GEORGES, noncompliant with CPAP  · Bibasilar pneumonia  · GERD  Recommendation  · Continue current inhalers,  ·  continue current antibiotics,  · Procalcitonin tomorrow  · Continue cardioprotective medications  · O2 to keep sat 90 to 92%, wean as tolerated    Electronically signed by Jamee Rainey MD,  FCCP   on 6/11/2022 at 2:12 PM

## 2022-06-11 NOTE — FLOWSHEET NOTE
Pt up to chair today, periods of anxiety that he acknowledges. Pt was doing well on Room air most of the day. Ordered urinalysis sent, tele discontinued and sent back. Pt requested I chk SPO2 and he was 88-89% on RA, placed back on 2L N/C, resting in bed awaiting transport for ultrasound.  No needs at this time, cont to monitor Electronically signed by Natalya Matos RN on 6/11/2022 at 5:07 PM

## 2022-06-12 ENCOUNTER — APPOINTMENT (OUTPATIENT)
Dept: ULTRASOUND IMAGING | Age: 82
DRG: 194 | End: 2022-06-12
Payer: MEDICARE

## 2022-06-12 LAB
ALBUMIN SERPL-MCNC: 3.3 G/DL (ref 3.5–4.6)
ALP BLD-CCNC: 61 U/L (ref 35–104)
ALT SERPL-CCNC: 14 U/L (ref 0–41)
ANION GAP SERPL CALCULATED.3IONS-SCNC: 11 MEQ/L (ref 9–15)
AST SERPL-CCNC: 16 U/L (ref 0–40)
BASOPHILS ABSOLUTE: 0.1 K/UL (ref 0–0.2)
BASOPHILS RELATIVE PERCENT: 0.9 %
BILIRUB SERPL-MCNC: 0.4 MG/DL (ref 0.2–0.7)
BLOOD CULTURE, ROUTINE: NORMAL
BUN BLDV-MCNC: 26 MG/DL (ref 8–23)
CALCIUM SERPL-MCNC: 8.7 MG/DL (ref 8.5–9.9)
CHLORIDE BLD-SCNC: 106 MEQ/L (ref 95–107)
CO2: 26 MEQ/L (ref 20–31)
CREAT SERPL-MCNC: 1.31 MG/DL (ref 0.7–1.2)
CULTURE, BLOOD 2: NORMAL
EOSINOPHILS ABSOLUTE: 0.6 K/UL (ref 0–0.7)
EOSINOPHILS RELATIVE PERCENT: 7.7 %
GFR AFRICAN AMERICAN: >60
GFR NON-AFRICAN AMERICAN: 52.6
GLOBULIN: 3.3 G/DL (ref 2.3–3.5)
GLUCOSE BLD-MCNC: 102 MG/DL (ref 70–99)
HCT VFR BLD CALC: 42.8 % (ref 42–52)
HEMOGLOBIN: 13.8 G/DL (ref 14–18)
LYMPHOCYTES ABSOLUTE: 1.9 K/UL (ref 1–4.8)
LYMPHOCYTES RELATIVE PERCENT: 23.5 %
MAGNESIUM: 2.2 MG/DL (ref 1.7–2.4)
MCH RBC QN AUTO: 28.3 PG (ref 27–31.3)
MCHC RBC AUTO-ENTMCNC: 32.3 % (ref 33–37)
MCV RBC AUTO: 87.5 FL (ref 80–100)
MONOCYTES ABSOLUTE: 0.6 K/UL (ref 0.2–0.8)
MONOCYTES RELATIVE PERCENT: 7.4 %
NEUTROPHILS ABSOLUTE: 4.9 K/UL (ref 1.4–6.5)
NEUTROPHILS RELATIVE PERCENT: 60.5 %
PARATHYROID HORMONE INTACT: 30.9 PG/ML (ref 15–65)
PDW BLD-RTO: 15.6 % (ref 11.5–14.5)
PLATELET # BLD: 126 K/UL (ref 130–400)
POTASSIUM SERPL-SCNC: 4.6 MEQ/L (ref 3.4–4.9)
RBC # BLD: 4.89 M/UL (ref 4.7–6.1)
SODIUM BLD-SCNC: 143 MEQ/L (ref 135–144)
TOTAL PROTEIN: 6.6 G/DL (ref 6.3–8)
WBC # BLD: 8.2 K/UL (ref 4.8–10.8)

## 2022-06-12 PROCEDURE — 80053 COMPREHEN METABOLIC PANEL: CPT

## 2022-06-12 PROCEDURE — 94640 AIRWAY INHALATION TREATMENT: CPT

## 2022-06-12 PROCEDURE — 36415 COLL VENOUS BLD VENIPUNCTURE: CPT

## 2022-06-12 PROCEDURE — 83970 ASSAY OF PARATHORMONE: CPT

## 2022-06-12 PROCEDURE — 1210000000 HC MED SURG R&B

## 2022-06-12 PROCEDURE — 82306 VITAMIN D 25 HYDROXY: CPT

## 2022-06-12 PROCEDURE — 99232 SBSQ HOSP IP/OBS MODERATE 35: CPT | Performed by: INTERNAL MEDICINE

## 2022-06-12 PROCEDURE — 6370000000 HC RX 637 (ALT 250 FOR IP): Performed by: INTERNAL MEDICINE

## 2022-06-12 PROCEDURE — 6370000000 HC RX 637 (ALT 250 FOR IP): Performed by: NURSE PRACTITIONER

## 2022-06-12 PROCEDURE — 94761 N-INVAS EAR/PLS OXIMETRY MLT: CPT

## 2022-06-12 PROCEDURE — 2700000000 HC OXYGEN THERAPY PER DAY

## 2022-06-12 PROCEDURE — 2580000003 HC RX 258: Performed by: NURSE PRACTITIONER

## 2022-06-12 PROCEDURE — 83735 ASSAY OF MAGNESIUM: CPT

## 2022-06-12 PROCEDURE — 76775 US EXAM ABDO BACK WALL LIM: CPT

## 2022-06-12 PROCEDURE — 85025 COMPLETE CBC W/AUTO DIFF WBC: CPT

## 2022-06-12 RX ADMIN — TAMSULOSIN HYDROCHLORIDE 0.4 MG: 0.4 CAPSULE ORAL at 08:07

## 2022-06-12 RX ADMIN — FINASTERIDE 5 MG: 5 TABLET, FILM COATED ORAL at 08:07

## 2022-06-12 RX ADMIN — BUDESONIDE AND FORMOTEROL FUMARATE DIHYDRATE 2 PUFF: 160; 4.5 AEROSOL RESPIRATORY (INHALATION) at 19:20

## 2022-06-12 RX ADMIN — SERTRALINE HYDROCHLORIDE 50 MG: 50 TABLET ORAL at 08:07

## 2022-06-12 RX ADMIN — SODIUM CHLORIDE, PRESERVATIVE FREE 10 ML: 5 INJECTION INTRAVENOUS at 11:18

## 2022-06-12 RX ADMIN — BUDESONIDE AND FORMOTEROL FUMARATE DIHYDRATE 2 PUFF: 160; 4.5 AEROSOL RESPIRATORY (INHALATION) at 07:11

## 2022-06-12 RX ADMIN — ROSUVASTATIN CALCIUM 20 MG: 20 TABLET, FILM COATED ORAL at 22:47

## 2022-06-12 RX ADMIN — RIVAROXABAN 10 MG: 10 TABLET, FILM COATED ORAL at 08:14

## 2022-06-12 RX ADMIN — CEFUROXIME AXETIL 500 MG: 500 TABLET ORAL at 21:03

## 2022-06-12 RX ADMIN — MIRTAZAPINE 15 MG: 15 TABLET, FILM COATED ORAL at 21:04

## 2022-06-12 RX ADMIN — LEVOTHYROXINE SODIUM 137 MCG: 0.03 TABLET ORAL at 07:09

## 2022-06-12 RX ADMIN — Medication 2000 UNITS: at 08:06

## 2022-06-12 RX ADMIN — CEFUROXIME AXETIL 500 MG: 500 TABLET ORAL at 08:06

## 2022-06-12 RX ADMIN — SODIUM CHLORIDE, PRESERVATIVE FREE 10 ML: 5 INJECTION INTRAVENOUS at 21:04

## 2022-06-12 RX ADMIN — ASPIRIN 81 MG: 81 TABLET, COATED ORAL at 08:07

## 2022-06-12 RX ADMIN — GUAIFENESIN, DEXTROMETHORPHAN HBR 1 TABLET: 600; 30 TABLET ORAL at 08:06

## 2022-06-12 ASSESSMENT — PAIN SCALES - GENERAL: PAINLEVEL_OUTOF10: 0

## 2022-06-12 NOTE — PROGRESS NOTES
06/12/22 1114   Resting (Room Air)   SpO2 88   Resting (On O2)   SpO2 94   O2 Device Nasal cannula   O2 Flow Rate (l/min) 2 l/min   During Walk (On O2)   SpO2 90   O2 Device Nasal cannula   O2 Flow Rate (l/min) 2 l/min   Need Additional O2 Flow Rate Rows No   O2 Flow Rate (l/min) 3 l/min   O2 Saturation 94   After Walk   SpO2 94   O2 Flow Rate (l/min) 2 l/min   Does the Patient Qualify for Home O2 Yes   Liter Flow at Rest 2   Liter Flow on Exertion 3   Does the Patient Need Portable Oxygen Tanks Yes

## 2022-06-12 NOTE — PROGRESS NOTES
Nephrology Progress Note    Assessment:  [de-identified] y.o. male with history s/f HTN, HLD, hypothyroidism, DVT/PE, GERD, COPD, BPH and T2DM wh opresented for SOB and cough     1. Partially recovered BRAD vs. CKD stage III: baseline Scr ~0.9 as far as 1/22, however Scr up to 1.4 in 4/22 and now remains close to the same, stable in the 1.2-1.3 range w/ eGFR high 40s/low 50s, mildly hypotensive on 6/9, no contrast administered, not on any nephrotoxic medications, renal   2. PNA  3. Hypoalbuminemia  4. HTN     Plan:  - no need for IVFs, unlikely to affect renal function at this time  - checking urinalysis to assess for proteinuria, hematuria   - ok w/ current medications  - bone mineral labs pending  - should f/u w/in 1-2 weeks of discharge, ok to discharge from renal standpoint once medically cleared       Patient Active Problem List:     Chronic obstructive pulmonary disease ([facility] [address]     HTN (hypertension)     Hyperlipidemia     GERD (gastroesophageal reflux disease)     Hypothyroidism     Type 2 diabetes mellitus (HCC)     BPH (benign prostatic hyperplasia)     Anxiety     Insomnia     Elevated PSA     Osteoarthritis of spine with radiculopathy, lumbar region     Foraminal stenosis of lumbosacral region     Vitamin D deficiency     Debility     On home oxygen therapy     Hypoxemia     Bilateral pulmonary embolism (HCC)     Gait abnormality     Abnormality of gait and mobility due to hypoxemia secondary to bilateral pulmonary emboli, [facility] admit 01/12/18      GEORGES (obstructive sleep apnea)     Current use of long term anticoagulation     Past use of tobacco     Arthritis of right shoulder region     Arthritis of shoulder     History of pulmonary embolism     S/P thoracotomy, right     Asthma     Thyroid cancer (HCC)     History of aspiration pneumonia     Obesity (BMI 30-39. 9)     History of total left knee replacement     Abnormality of gait and mobility due to Right Shoulder OA S/P Right Shoulder Arthroscopy Subacromial Decompression with Biceps. [facility] admit 09/19/18. Hx of long term use of blood thinners     Primary osteoarthritis involving multiple joints     Pulmonary embolus (HCC)     Acute deep vein thrombosis (DVT) of femoral vein of left lower extremity (HCC)     COPD exacerbation (HCC)     Acute bronchitis     Pneumonia      Subjective:  Admit Date: 6/7/2022    Interval History: function stable, renal U/S w/o hydronephrosis       Medications:  Scheduled Meds:   cefUROXime  500 mg Oral 2 times per day    tamsulosin  0.4 mg Oral Daily    aspirin  81 mg Oral Daily    budesonide-formoterol  2 puff Inhalation BID    finasteride  5 mg Oral Daily    levothyroxine  137 mcg Oral Daily    mirtazapine  15 mg Oral Nightly    rivaroxaban  10 mg Oral Daily with breakfast    rosuvastatin  20 mg Oral Nightly    sertraline  50 mg Oral Daily    Vitamin D  2,000 Units Oral Daily    sodium chloride flush  5-40 mL IntraVENous 2 times per day     Continuous Infusions:   sodium chloride         CBC:   Recent Labs     06/11/22 0519 06/12/22 0518   WBC 6.0 8.2   HGB 13.7* 13.8*   * 126*     CMP:    Recent Labs     06/10/22  0634 06/11/22 0519 06/12/22 0518    143 143   K 4.0 4.1 4.6    106 106   CO2 26 26 26   BUN 23 25* 26*   CREATININE 1.38* 1.37* 1.31*   GLUCOSE 96 92 102*   CALCIUM 8.6 8.7 8.7   LABGLOM 49.5* 49.9* 52.6*     Troponin: No results for input(s): TROPONINI in the last 72 hours. BNP: No results for input(s): BNP in the last 72 hours. INR: No results for input(s): INR in the last 72 hours. Lipids: No results for input(s): CHOL, LDLDIRECT, TRIG, HDL, AMYLASE, LIPASE in the last 72 hours. Liver:   Recent Labs     06/12/22 0518   AST 16   ALT 14   ALKPHOS 61   PROT 6.6   LABALBU 3.3*   BILITOT 0.4     Iron:  No results for input(s): IRONS, FERRITIN in the last 72 hours. Invalid input(s): LABIRONS  Urinalysis: No results for input(s): UA in the last 72 hours.     Objective:  Vitals: /75   Pulse 67   Temp 98.8 °F (37.1 °C) (Oral)   Resp 16   Ht 6' (1.829 m)   Wt 209 lb 8 oz (95 kg)   SpO2 94%   BMI 28.41 kg/m²    Wt Readings from Last 3 Encounters:   06/09/22 209 lb 8 oz (95 kg)   06/07/22 214 lb (97.1 kg)   02/15/22 217 lb (98.4 kg)      24HR INTAKE/OUTPUT:      Intake/Output Summary (Last 24 hours) at 6/12/2022 1459  Last data filed at 6/12/2022 0711  Gross per 24 hour   Intake --   Output 1200 ml   Net -1200 ml       General: alert, in no apparent distress  HEENT: normocephalic, atraumatic, anicteric  Lungs: non-labored respirations, clear to auscultation bilaterally  Heart: regular rate and rhythm, no murmurs or rubs  Abdomen: soft, non-tender, non-distended  Ext: no cyanosis, no peripheral edema  Neuro: alert and oriented, no gross abnormalities      Electronically signed by Belinda Michael MD, MD

## 2022-06-12 NOTE — PROGRESS NOTES
Hospitalist Progress Note      PCP: Perry Justice MD    Date of Admission: 6/7/2022    Chief Complaint:    Chief Complaint   Patient presents with    Shortness of Breath     Subjective:  Patient denies fevers, chills, sweats; breathing is improving. 12 point ROS negative other than mentioned above     Medications:  Reviewed    Infusion Medications    sodium chloride       Scheduled Medications    cefUROXime  500 mg Oral 2 times per day    tamsulosin  0.4 mg Oral Daily    aspirin  81 mg Oral Daily    budesonide-formoterol  2 puff Inhalation BID    finasteride  5 mg Oral Daily    levothyroxine  137 mcg Oral Daily    mirtazapine  15 mg Oral Nightly    rivaroxaban  10 mg Oral Daily with breakfast    rosuvastatin  20 mg Oral Nightly    sertraline  50 mg Oral Daily    Vitamin D  2,000 Units Oral Daily    sodium chloride flush  5-40 mL IntraVENous 2 times per day     PRN Meds: albuterol sulfate HFA, calcium carbonate, sodium chloride flush, sodium chloride, ondansetron **OR** ondansetron, polyethylene glycol, acetaminophen **OR** acetaminophen, dextromethorphan-guaiFENesin      Intake/Output Summary (Last 24 hours) at 6/12/2022 1442  Last data filed at 6/12/2022 0711  Gross per 24 hour   Intake --   Output 1200 ml   Net -1200 ml       Exam:    /75   Pulse 67   Temp 98.8 °F (37.1 °C) (Oral)   Resp 16   Ht 6' (1.829 m)   Wt 209 lb 8 oz (95 kg)   SpO2 94%   BMI 28.41 kg/m²     General appearance: No apparent distress, appears stated age and cooperative. HEENT: Conjunctivae/corneas clear. Neck:  Trachea midline. Respiratory:  Clear bilateral breath sounds  Cardiovascular: Regular rate and rhythm . Abdomen: Soft, non-tender, non-distended with normal bowel sounds. Musculoskeletal: No clubbing, cyanosis or edema bilaterally.  .  Neuro: Non Focal.   Capillary Refill: Brisk,< 3 seconds   Peripheral Pulses: +2 palpable, equal bilaterally     Labs:   Recent Labs     06/10/22  0634 06/11/22  0519 06/12/22 0518   WBC 7.0 6.0 8.2   HGB 13.1* 13.7* 13.8*   HCT 39.4* 41.0* 42.8   * 123* 126*     Recent Labs     06/10/22  0634 06/11/22 0519 06/12/22 0518    143 143   K 4.0 4.1 4.6    106 106   CO2 26 26 26   BUN 23 25* 26*   CREATININE 1.38* 1.37* 1.31*   CALCIUM 8.6 8.7 8.7     Recent Labs     06/10/22  0634 06/11/22 0519 06/12/22 0518   AST 18 20 16   ALT 14 16 14   BILITOT 0.5 0.5 0.4   ALKPHOS 60 58 61     No results for input(s): INR in the last 72 hours. No results for input(s): Kizzy Liane in the last 72 hours. Urinalysis:      Lab Results   Component Value Date    NITRU Negative 06/11/2022    WBCUA 0-2 06/11/2022    BACTERIA Negative 06/11/2022    RBCUA  06/11/2022    BLOODU MODERATE 06/11/2022    SPECGRAV 1.013 06/11/2022    GLUCOSEU Negative 06/11/2022     Radiology:  US RETROPERITONEAL LIMITED   Final Result      No hydronephrosis. Multiple bilateral simple cysts. XR CHEST (2 VW)   Final Result      Mild bibasilar pulmonary infiltrates, more on the right side. Stable elevated right hemidiaphragm. Assessment/Plan:    Pneumonia: Improving abx per pulm plan for 7 days ;continue current management.; anticipate discharge tomorrow     BRAD: Per nephrology; outpatient follow up     Elevated troponin: Troponin 0.016. EKG sinus, no ST elevation. Likely 2/2 BRAD   We will keep patient on telemetry monitoring.     Active problems:  COPD: Patient on scheduled inhalers and as needed nebulizers  Continue home meds. History of PE: Patient on [address]. Continue home meds. Hypothyroidism: PT on home meds to control. Continue home meds, as tolerated. HLD: PT on home meds to control. Continue home meds, as tolerated. GERD: PT on home meds to control. Continue home meds, as tolerated. BPH: PT on home meds to control. Continue home meds, as tolerated.     Active Hospital Problems    Diagnosis Date Noted    Pneumonia [J18.9] 06/07/2022     Priority: Medium     Additional work up or/and treatment plan may be added today or then after based on clinical progression. I am managing a portion of pt care. Some medical issues are handled by other specialists. Additional work up and treatment should be done in out pt setting by pt PCP and other out pt providers. In addition to examining and evaluating pt, I spent additional time explaining care, normal and abnormal findings, and treatment plan. All of pt questions were answered. Counseling, diet and education were  provided. Case will be discussed with nursing staff when appropriate. Family will be updated if and when appropriate. Diet: ADULT DIET;  Regular; 5 carb choices (75 gm/meal)    Code Status: Full Code    PT/OT Eval   Electronically signed by Desire Cifuentes MD on 6/12/2022 at 2:42 PM

## 2022-06-12 NOTE — CARE COORDINATION
Patient plans to d/c home tomorrow. Patient does have home o2 w/ portibility prn and hs. Monitor for any hhc needs prior to d/c, patient is agreeable to [facility]. Will need orders.

## 2022-06-12 NOTE — PROGRESS NOTES
INPATIENT PROGRESS NOTES    PATIENT NAME: Maryjane Smith  MRN: [id number]  SERVICE DATE:  2022   SERVICE TIME:  12:58 PM      PRIMARY SERVICE: Pulmonary Disease    CHIEF COMPLAINTS: Pneumonia COPD    INTERVAL HPI: Patient seen and examined at bedside, Interval Notes, orders reviewed. Nursing notes noted    Feeling good, minimal coughing, no chest pain, no shortness of breath, he is on 2 L O2 saturation 94%, no nausea no vomiting    Review of system:     GI Abdominal pain No  Skin Rash No    Social History     Tobacco Use    Smoking status: Former Smoker     Packs/day: 1.00     Years: 40.00     Pack years: 40.00     Types: Cigarettes     Start date: 2000     Quit date:      Years since quittin.4    Smokeless tobacco: Former User    Tobacco comment: quit    Substance Use Topics    Alcohol use: Yes     Alcohol/week: 0.0 standard drinks     Comment: rare social         Problem Relation Age of Onset    Other Mother 80        Old Age    Stroke Father 45    No Known Problems Sister     Psoriasis Daughter     No Known Problems Son          OBJECTIVE    Body mass index is 28.41 kg/m². PHYSICAL EXAM:  Vitals:  /75   Pulse 67   Temp 98.8 °F (37.1 °C) (Oral)   Resp 16   Ht 6' (1.829 m)   Wt 209 lb 8 oz (95 kg)   SpO2 94%   BMI 28.41 kg/m²     General: alert, cooperative, no distress  Head: normocephalic, atraumatic  Eyes:No gross abnormalities. ENT:  MMM no lesions  Neck:  supple and no masses  Chest : Good air movement, minimal rales at the right base, no wheezing, nontender, tympanic  Heart[de-identified] Heart sounds are normal.  Regular rate and rhythm without murmur, gallop or rub. ABD:  symmetric, soft, non-tender, no guarding or rebound  Musculoskeletal : no cyanosis, no clubbing and no edema  Neuro:  Grossly normal  Skin: No rashes or nodules noted.   Lymph node:  no cervical nodes  Urology: No Crawford   Psychiatric: appropriate    DATA:   Recent Labs     22  0519 22  0518 WBC 6.0 8.2   HGB 13.7* 13.8*   HCT 41.0* 42.8   MCV 86.4 87.5   * 126*     Recent Labs     06/11/22  0519 06/11/22  0519 06/12/22  0518     --  143   K 4.1  --  4.6     --  106   CO2 26  --  26   BUN 25*  --  26*   CREATININE 1.37*  --  1.31*   GLUCOSE 92  --  102*   CALCIUM 8.7  --  8.7   PROT 6.3  --  6.6   LABALBU 2.9*  --  3.3*   BILITOT 0.5  --  0.4   ALKPHOS 58  --  61   AST 20  --  16   ALT 16   < > 14   LABGLOM 49.9*   < > 52.6*   GFRAA >60.0   < > >60.0   GLOB 3.4   < > 3.3    < > = values in this interval not displayed. MV Settings:          No results for input(s): PHART, BVS4FNJ, PO2ART, SNK6FZV, BEART, B0IFIYTH in the last 72 hours. O2 Device: Nasal cannula  O2 Flow Rate (L/min): 2 L/min    ADULT DIET; Regular; 5 carb choices (75 gm/meal)     MEDICATIONS during current hospitalization:    Continuous Infusions:   sodium chloride         Scheduled Meds:   cefUROXime  500 mg Oral 2 times per day    tamsulosin  0.4 mg Oral Daily    aspirin  81 mg Oral Daily    budesonide-formoterol  2 puff Inhalation BID    finasteride  5 mg Oral Daily    levothyroxine  137 mcg Oral Daily    mirtazapine  15 mg Oral Nightly    rivaroxaban  10 mg Oral Daily with breakfast    rosuvastatin  20 mg Oral Nightly    sertraline  50 mg Oral Daily    Vitamin D  2,000 Units Oral Daily    sodium chloride flush  5-40 mL IntraVENous 2 times per day       PRN Meds:albuterol sulfate HFA, calcium carbonate, sodium chloride flush, sodium chloride, ondansetron **OR** ondansetron, polyethylene glycol, acetaminophen **OR** acetaminophen, dextromethorphan-guaiFENesin    Radiology  XR CHEST (2 VW)    Result Date: 6/7/2022  Indication: Shortness of breath. EXAM: X-ray of the chest PA and lateral views. COMPARISON: 12/31/2021 exam. FINDINGS: Mild bibasilar pulmonary infiltrates, more on the right side. Stable elevated right hemidiaphragm. Normal cardiac silhouette. No pleural effusions.  Normal hilar shortness. Central mediastinum. Mild bibasilar pulmonary infiltrates, more on the right side. Stable elevated right hemidiaphragm.             IMPRESSION AND SUGGESTION:  Patient is at risk due to   · COPD, currently no signs of exacerbation  · GEORGES, noncompliant with CPAP  · Bibasilar pneumonia  · GERD  Recommendation  · Continue current inhalers,  · Continue antibiotic, complete 7 days of treatment  · Continue cardioprotective medications  · O2 to keep sat 90 to 92%, wean as tolerated    Electronically signed by Mukund Elder MD,  FCCP   on 6/12/2022 at 12:58 PM

## 2022-06-13 LAB
ALBUMIN SERPL-MCNC: 3.2 G/DL (ref 3.5–4.6)
ALP BLD-CCNC: 57 U/L (ref 35–104)
ALT SERPL-CCNC: 14 U/L (ref 0–41)
ANION GAP SERPL CALCULATED.3IONS-SCNC: 8 MEQ/L (ref 9–15)
AST SERPL-CCNC: 17 U/L (ref 0–40)
BASOPHILS ABSOLUTE: 0 K/UL (ref 0–0.2)
BASOPHILS RELATIVE PERCENT: 0.3 %
BILIRUB SERPL-MCNC: 0.4 MG/DL (ref 0.2–0.7)
BUN BLDV-MCNC: 27 MG/DL (ref 8–23)
CALCIUM SERPL-MCNC: 8.9 MG/DL (ref 8.5–9.9)
CHLORIDE BLD-SCNC: 106 MEQ/L (ref 95–107)
CO2: 28 MEQ/L (ref 20–31)
CREAT SERPL-MCNC: 1.3 MG/DL (ref 0.7–1.2)
EOSINOPHILS ABSOLUTE: 0.5 K/UL (ref 0–0.7)
EOSINOPHILS RELATIVE PERCENT: 7.4 %
GFR AFRICAN AMERICAN: >60
GFR NON-AFRICAN AMERICAN: 53
GLOBULIN: 3.4 G/DL (ref 2.3–3.5)
GLUCOSE BLD-MCNC: 90 MG/DL (ref 70–99)
HCT VFR BLD CALC: 42.7 % (ref 42–52)
HEMOGLOBIN: 13.8 G/DL (ref 14–18)
LYMPHOCYTES ABSOLUTE: 1.9 K/UL (ref 1–4.8)
LYMPHOCYTES RELATIVE PERCENT: 26.4 %
MAGNESIUM: 2.2 MG/DL (ref 1.7–2.4)
MCH RBC QN AUTO: 28.4 PG (ref 27–31.3)
MCHC RBC AUTO-ENTMCNC: 32.4 % (ref 33–37)
MCV RBC AUTO: 87.8 FL (ref 80–100)
MONOCYTES ABSOLUTE: 0.6 K/UL (ref 0.2–0.8)
MONOCYTES RELATIVE PERCENT: 8 %
NEUTROPHILS ABSOLUTE: 4.2 K/UL (ref 1.4–6.5)
NEUTROPHILS RELATIVE PERCENT: 57.9 %
PDW BLD-RTO: 15.9 % (ref 11.5–14.5)
PLATELET # BLD: 131 K/UL (ref 130–400)
POTASSIUM SERPL-SCNC: 4.4 MEQ/L (ref 3.4–4.9)
RBC # BLD: 4.86 M/UL (ref 4.7–6.1)
SODIUM BLD-SCNC: 142 MEQ/L (ref 135–144)
TOTAL PROTEIN: 6.6 G/DL (ref 6.3–8)
VITAMIN D 25-HYDROXY: 38.4 NG/ML
WBC # BLD: 7.2 K/UL (ref 4.8–10.8)

## 2022-06-13 PROCEDURE — 97116 GAIT TRAINING THERAPY: CPT

## 2022-06-13 PROCEDURE — 2700000000 HC OXYGEN THERAPY PER DAY

## 2022-06-13 PROCEDURE — 2580000003 HC RX 258: Performed by: NURSE PRACTITIONER

## 2022-06-13 PROCEDURE — 36415 COLL VENOUS BLD VENIPUNCTURE: CPT

## 2022-06-13 PROCEDURE — 80053 COMPREHEN METABOLIC PANEL: CPT

## 2022-06-13 PROCEDURE — 6370000000 HC RX 637 (ALT 250 FOR IP): Performed by: FAMILY MEDICINE

## 2022-06-13 PROCEDURE — 6370000000 HC RX 637 (ALT 250 FOR IP): Performed by: NURSE PRACTITIONER

## 2022-06-13 PROCEDURE — 6370000000 HC RX 637 (ALT 250 FOR IP): Performed by: INTERNAL MEDICINE

## 2022-06-13 PROCEDURE — 83735 ASSAY OF MAGNESIUM: CPT

## 2022-06-13 PROCEDURE — 94761 N-INVAS EAR/PLS OXIMETRY MLT: CPT

## 2022-06-13 PROCEDURE — 99232 SBSQ HOSP IP/OBS MODERATE 35: CPT | Performed by: INTERNAL MEDICINE

## 2022-06-13 PROCEDURE — 94640 AIRWAY INHALATION TREATMENT: CPT

## 2022-06-13 PROCEDURE — 1210000000 HC MED SURG R&B

## 2022-06-13 PROCEDURE — 85025 COMPLETE CBC W/AUTO DIFF WBC: CPT

## 2022-06-13 RX ORDER — CEFUROXIME AXETIL 500 MG/1
500 TABLET ORAL EVERY 12 HOURS SCHEDULED
Qty: 6 TABLET | Refills: 0 | Status: SHIPPED | OUTPATIENT
Start: 2022-06-13 | End: 2022-06-16

## 2022-06-13 RX ADMIN — RIVAROXABAN 10 MG: 10 TABLET, FILM COATED ORAL at 09:34

## 2022-06-13 RX ADMIN — NYSTATIN 500000 UNITS: 100000 SUSPENSION ORAL at 21:17

## 2022-06-13 RX ADMIN — Medication 2000 UNITS: at 09:30

## 2022-06-13 RX ADMIN — FINASTERIDE 5 MG: 5 TABLET, FILM COATED ORAL at 09:29

## 2022-06-13 RX ADMIN — ROSUVASTATIN CALCIUM 20 MG: 20 TABLET, FILM COATED ORAL at 21:17

## 2022-06-13 RX ADMIN — SERTRALINE HYDROCHLORIDE 50 MG: 50 TABLET ORAL at 09:29

## 2022-06-13 RX ADMIN — SODIUM CHLORIDE, PRESERVATIVE FREE 10 ML: 5 INJECTION INTRAVENOUS at 21:17

## 2022-06-13 RX ADMIN — CEFUROXIME AXETIL 500 MG: 500 TABLET ORAL at 09:30

## 2022-06-13 RX ADMIN — NYSTATIN 500000 UNITS: 100000 SUSPENSION ORAL at 17:55

## 2022-06-13 RX ADMIN — CEFUROXIME AXETIL 500 MG: 500 TABLET ORAL at 21:17

## 2022-06-13 RX ADMIN — BUDESONIDE AND FORMOTEROL FUMARATE DIHYDRATE 2 PUFF: 160; 4.5 AEROSOL RESPIRATORY (INHALATION) at 19:15

## 2022-06-13 RX ADMIN — BUDESONIDE AND FORMOTEROL FUMARATE DIHYDRATE 2 PUFF: 160; 4.5 AEROSOL RESPIRATORY (INHALATION) at 07:14

## 2022-06-13 RX ADMIN — TAMSULOSIN HYDROCHLORIDE 0.4 MG: 0.4 CAPSULE ORAL at 09:30

## 2022-06-13 RX ADMIN — LEVOTHYROXINE SODIUM 137 MCG: 0.03 TABLET ORAL at 06:15

## 2022-06-13 RX ADMIN — SODIUM CHLORIDE, PRESERVATIVE FREE 10 ML: 5 INJECTION INTRAVENOUS at 09:40

## 2022-06-13 RX ADMIN — MIRTAZAPINE 15 MG: 15 TABLET, FILM COATED ORAL at 21:17

## 2022-06-13 RX ADMIN — ASPIRIN 81 MG: 81 TABLET, COATED ORAL at 09:30

## 2022-06-13 NOTE — PROGRESS NOTES
Nephrology Progress Note    Assessment:  [de-identified] y.o. male with history s/f HTN, HLD, hypothyroidism, DVT/PE, GERD, COPD, BPH and T2DM wh opresented for SOB and cough     1. Partially recovered BRAD vs. CKD stage III: baseline Scr ~0.9 as far as 1/22, however Scr up to 1.4 in 4/22 and now remains close to the same, stable in the 1.2-1.3 range w/ eGFR high 40s/low 50s, mildly hypotensive on 6/9, no contrast administered, not on any nephrotoxic medications, UA w/ hematuria, no proteinuria   2. PNA: on abx, pulmonology onboard   3. Hypoalbuminemia  4. HTN     Plan:  - ok w/ current medications  - should f/u in 3-4 weeks of discharge, ok to discharge from renal standpoint once medically cleared   - will sign off      Patient Active Problem List:     Chronic obstructive pulmonary disease ([facility] [address]     HTN (hypertension)     Hyperlipidemia     GERD (gastroesophageal reflux disease)     Hypothyroidism     Type 2 diabetes mellitus (HCC)     BPH (benign prostatic hyperplasia)     Anxiety     Insomnia     Elevated PSA     Osteoarthritis of spine with radiculopathy, lumbar region     Foraminal stenosis of lumbosacral region     Vitamin D deficiency     Debility     On home oxygen therapy     Hypoxemia     Bilateral pulmonary embolism (HCC)     Gait abnormality     Abnormality of gait and mobility due to hypoxemia secondary to bilateral pulmonary emboli, [facility] admit 01/12/18      GEORGES (obstructive sleep apnea)     Current use of long term anticoagulation     Past use of tobacco     Arthritis of right shoulder region     Arthritis of shoulder     History of pulmonary embolism     S/P thoracotomy, right     Asthma     Thyroid cancer (HCC)     History of aspiration pneumonia     Obesity (BMI 30-39. 9)     History of total left knee replacement     Abnormality of gait and mobility due to Right Shoulder OA S/P Right Shoulder Arthroscopy Subacromial Decompression with Biceps. [facility] admit 09/19/18.      Hx of long term use of blood thinners     Primary osteoarthritis involving multiple joints     Pulmonary embolus (HCC)     Acute deep vein thrombosis (DVT) of femoral vein of left lower extremity (HCC)     COPD exacerbation (HCC)     Acute bronchitis     Pneumonia      Subjective:  Admit Date: 6/7/2022    Interval History: function stable, no acute overnight events      Medications:  Scheduled Meds:   cefUROXime  500 mg Oral 2 times per day    tamsulosin  0.4 mg Oral Daily    aspirin  81 mg Oral Daily    budesonide-formoterol  2 puff Inhalation BID    finasteride  5 mg Oral Daily    levothyroxine  137 mcg Oral Daily    mirtazapine  15 mg Oral Nightly    rivaroxaban  10 mg Oral Daily with breakfast    rosuvastatin  20 mg Oral Nightly    sertraline  50 mg Oral Daily    Vitamin D  2,000 Units Oral Daily    sodium chloride flush  5-40 mL IntraVENous 2 times per day     Continuous Infusions:   sodium chloride         CBC:   Recent Labs     06/12/22  0518 06/13/22  0600   WBC 8.2 7.2   HGB 13.8* 13.8*   * 131     CMP:    Recent Labs     06/11/22  0519 06/12/22  0518 06/13/22  0600    143 142   K 4.1 4.6 4.4    106 106   CO2 26 26 28   BUN 25* 26* 27*   CREATININE 1.37* 1.31* 1.30*   GLUCOSE 92 102* 90   CALCIUM 8.7 8.7 8.9   LABGLOM 49.9* 52.6* 53.0*     Troponin: No results for input(s): TROPONINI in the last 72 hours. BNP: No results for input(s): BNP in the last 72 hours. INR: No results for input(s): INR in the last 72 hours. Lipids: No results for input(s): CHOL, LDLDIRECT, TRIG, HDL, AMYLASE, LIPASE in the last 72 hours. Liver:   Recent Labs     06/13/22  0600   AST 17   ALT 14   ALKPHOS 57   PROT 6.6   LABALBU 3.2*   BILITOT 0.4     Iron:  No results for input(s): IRONS, FERRITIN in the last 72 hours. Invalid input(s): LABIRONS  Urinalysis: No results for input(s): UA in the last 72 hours.     Objective:  Vitals: /75   Pulse 69   Temp 98.2 °F (36.8 °C) (Oral)   Resp 17   Ht 6' (1.829 m)   Wt 208 lb 12.8 oz (94.7 kg)   SpO2 94%   BMI 28.32 kg/m²    Wt Readings from Last 3 Encounters:   06/13/22 208 lb 12.8 oz (94.7 kg)   06/07/22 214 lb (97.1 kg)   02/15/22 217 lb (98.4 kg)      24HR INTAKE/OUTPUT:      Intake/Output Summary (Last 24 hours) at 6/13/2022 1236  Last data filed at 6/13/2022 0610  Gross per 24 hour   Intake 480 ml   Output 1350 ml   Net -870 ml       General: alert, in no apparent distress  HEENT: normocephalic, atraumatic, anicteric  Lungs: non-labored respirations, clear to auscultation bilaterally  Heart: regular rate and rhythm, no murmurs or rubs  Abdomen: soft, non-tender, non-distended  Ext: no cyanosis, no peripheral edema  Neuro: alert and oriented, no gross abnormalities      Electronically signed by Vanessa Win MD, MD

## 2022-06-13 NOTE — PROGRESS NOTES
INPATIENT PROGRESS NOTES    PATIENT NAME: Michael Agee  MRN: [id number]  SERVICE DATE:  June 13, 2022   SERVICE TIME:  11:48 AM      PRIMARY SERVICE: Pulmonary Disease    CHIEF COMPLAIN: Shortness of breath      INTERVAL HPI: Patient seen and examined at bedside, Interval Notes, orders reviewed. Nursing notes noted  Patient is feeling better going home today. Minimal coughing. On 2 L O2 his O2 saturation is 94%   No fever or chills. No chest pain. No nausea vomiting diarrhea or abdominal pain. OBJECTIVE    Body mass index is 28.32 kg/m². PHYSICAL EXAM:  Vitals:  /75   Pulse 69   Temp 98.2 °F (36.8 °C) (Oral)   Resp 17   Ht 6' (1.829 m)   Wt 208 lb 12.8 oz (94.7 kg)   SpO2 94%   BMI 28.32 kg/m²   General: Alert, awake . comfortable in bed, No distress. Head: Atraumatic , Normocephalic   Eyes: PERRL. No sclera icterus. No conjunctival injection. No discharge   ENT: No nasal  discharge. Pharynx clear. Neck:  Trachea midline. No thyromegaly, no JVD, No cervical adenopathy. Chest : Bilaterally symmetrical ,Normal effort,  No accessory muscle use  Lung : . Fair BS bilateral, decreased BS at bases. Bibasilar Rales. No wheezing. No rhonchi. Heart[de-identified] Normal  rate. Regular rhythm. No mumur ,  Rub or gallop  ABD: Non-tender. Non-distended. No masses. No organmegaly. Normal bowel sounds. No hernia.   Ext : No Pitting both leg , No Cyanosis No clubbing  Neuro: no focal weakness          DATA:   Recent Labs     06/12/22  0518 06/13/22  0600   WBC 8.2 7.2   HGB 13.8* 13.8*   HCT 42.8 42.7   MCV 87.5 87.8   * 131     Recent Labs     06/12/22 0518 06/12/22 0518 06/13/22  0600     --  142   K 4.6  --  4.4     --  106   CO2 26  --  28   BUN 26*  --  27*   CREATININE 1.31*  --  1.30*   GLUCOSE 102*  --  90   CALCIUM 8.7  --  8.9   PROT 6.6  --  6.6   LABALBU 3.3*  --  3.2*   BILITOT 0.4  --  0.4   ALKPHOS 61  --  57   AST 16  --  17   ALT 14   < > 14   LABGLOM 52.6*   < > 53.0*   GFRAA stable elevated right hemidiaphragm. Okay to discharge follow-up in office with me in 2-week      NOTE: This report was transcribed using voice recognition software. Every effort was made to ensure accuracy; however, inadvertent computerized transcription errors may be present.       Electronically signed by [id number] Jacky Wallace Se, MD, FCCP on 6/13/2022 at 11:48 AM

## 2022-06-13 NOTE — DISCHARGE SUMMARY
Physician Discharge Summary     Patient ID:  Rogerio Howard  [id number]  [de-identified] y.o.  1940    Admit date: 6/7/2022    Discharge date : 06/13/22     Admitting Physician: Scott Fountain MD     Discharge Physician: Lazara Kimbrough MD     Admission Diagnoses: Pneumonia [J18.9]  Elevated troponin [R77.8]  Bilateral pleural effusion [J90]  Pneumonia of both lower lobes due to infectious organism [J18.9]    Discharge Diagnoses: Acute pneumonia, bibasilar; BRAD, elevated troponin    Admission Condition: fair    Discharged Condition: good    Hospital Course:      Pneumonia: Improving abx per pulm plan for 7 days ;continue current management.; anticipate discharge tomorrow     BRAD: Per nephrology; outpatient follow up     Elevated troponin: Troponin 0.016.  EKG sinus, no ST elevation.  Likely 2/2 BRAD   We will keep patient on telemetry monitoring.     Active problems:  COPD: Patient on scheduled inhalers and as needed nebulizers  Continue home meds. History of PE: Patient on [facility]. Continue home meds. Hypothyroidism: PT on home meds to control. Continue home meds, as tolerated. HLD: PT on home meds to control. Continue home meds, as tolerated. GERD: PT on home meds to control. Continue home meds, as tolerated. BPH: PT on home meds to control. Continue home meds, as tolerated.     Consults: pulmonary/intensive care and nephrology    Significant Diagnostic Studies: as below    Discharge Exam:  /75   Pulse 69   Temp 98.2 °F (36.8 °C) (Oral)   Resp 17   Ht 6' (1.829 m)   Wt 208 lb 12.8 oz (94.7 kg)   SpO2 94%   BMI 28.32 kg/m²   General appearance: alert, appears stated age and cooperative  Lungs: clear to auscultation bilaterally  Heart: regular rate and rhythm, S1, S2 normal, no murmur, click, rub or gallop  Abdomen: soft, non-tender; bowel sounds normal; no masses,  no organomegaly  Extremities: extremities normal, atraumatic, no cyanosis or edema  Skin: Skin color, texture, turgor normal. No rashes or lesions    Labs:   Recent Labs     06/11/22  0519 06/12/22  0518 06/13/22  0600   WBC 6.0 8.2 7.2   HGB 13.7* 13.8* 13.8*   HCT 41.0* 42.8 42.7   * 126* 131     Recent Labs     06/11/22  0519 06/12/22  0518 06/13/22  0600    143 142   K 4.1 4.6 4.4    106 106   CO2 26 26 28   BUN 25* 26* 27*   CREATININE 1.37* 1.31* 1.30*   CALCIUM 8.7 8.7 8.9     Recent Labs     06/11/22  0519 06/12/22  0518 06/13/22  0600   AST 20 16 17   ALT 16 14 14   BILITOT 0.5 0.4 0.4   ALKPHOS 58 61 57     No results for input(s): INR in the last 72 hours. No results for input(s): Zettie Binet in the last 72 hours. Urinalysis:   Lab Results   Component Value Date    NITRU Negative 06/11/2022    WBCUA 0-2 06/11/2022    BACTERIA Negative 06/11/2022    RBCUA  06/11/2022    BLOODU MODERATE 06/11/2022    SPECGRAV 1.013 06/11/2022    GLUCOSEU Negative 06/11/2022       Radiology:   Most recent    Chest CT      WITH CONTRAST:Results for orders placed during the hospital encounter of 11/10/17    CT Chest W Contrast    Narrative  EXAMINATION:  CT CHEST W CONTRAST    CLINICAL HISTORY:  ACUTE RESP ILLNESS, >37YEARS OLD  status post bronchoscopy to retrieve aspirated dental amalgam, unsuccessful. COMPARISONS:  12/2/2013    TECHNIQUE:  Spiral scans with 75 mL Isovue-370 IV. Multiplanar 2-D reconstructions. Axial 3-D 10 x 3 mm MIP reconstructions were performed. All CT scans at this facility use dose modulation, iterative reconstruction, and/or weight based dosing when  appropriate to reduce radiation dose to as low as reasonably achievable. FINDINGS:  The known aspirated filling is identified within the distal segmental bronchus of the right lower lobe posterior basilar segment. Filling measures approximately 9 mm. There are mild emphysematous changes. There is mild scattered peripheral  reticularity without definite honeycombing. There are no consolidations or effusions.  There is right upper lung perifissural nodularity consistent with small intrapulmonary lymph nodes. There is a left upper lobe 4 mm nodule (series 2 image 21),  unchanged from prior examination of 12/2/2013, and therefore benign. There are no suspicious lung nodules. There is mild bilateral lower lobe cylindrical bronchiectasis. Cardiac size and pulmonary vascularity are normal. There is mild mediastinal lipomatosis. There is mild thickening or trace fluid in the anterior pericardium, decreased compared to prior examination 12/2/2013. There are coronary artery calcifications. There are aortic calcifications. There is no evidence of aneurysm or dissection. There are borderline in size lymph nodes in the mediastinum anterior to the left mainstem bronchus and in the right hilum, unchanged from prior examination of 12/2/2013. There is no thoracic adenopathy. The esophagus is unremarkable. There is spondylosis. There are no acute or suspicious bone lesions. Scans of the subdiaphragmatic regions demonstrate a 6 mm oval metallic density in the gastric fundus, presumably representing a swallowed filling. There are stable small cysts in the liver. There are partially visualized renal cysts. Impression  ASPIRATED FILLING IN RIGHT LOWER LOBE POSTERIOR BASILAR SEGMENTAL BRONCHUS. MILD EMPHYSEMA AND LOWER LOBE CYLINDRICAL BRONCHIECTASIS. ADDITIONAL FINDINGS AS ABOVE. WITHOUT CONTRAST: No results found for this or any previous visit. CXR      2-view: Results for orders placed during the hospital encounter of 06/07/22    XR CHEST (2 VW)    Narrative  Indication: Shortness of breath. EXAM: X-ray of the chest PA and lateral views. COMPARISON: 12/31/2021 exam.    FINDINGS:    Mild bibasilar pulmonary infiltrates, more on the right side. Stable elevated right hemidiaphragm. Normal cardiac silhouette. No pleural effusions. Normal hilar shortness. Central mediastinum.     Impression  Mild bibasilar pulmonary infiltrates, more on the right side.    Stable elevated right hemidiaphragm. Results for orders placed in visit on 09/02/21    XR CHEST STANDARD (2 VW)    Narrative  DIAGNOSIS:J44.9 Chronic obstructive pulmonary disease, unspecified COPD type ([facility] [address] ICD10    COMPARISON: July 17, 2019    TECHNIQUE: PA and lateral chest    FINDINGS: Postsurgical changes are again seen in the right lower lobe. There is likely mild atelectasis or scarring is well. Mild atelectasis is seen in the left base. Cardiac silhouette is within normal limits. Calcifications are seen in the thoracic  aorta. Motion artifact is seen on the lateral projection. Degenerative changes are seen in the dorsal spine. Degenerative changes are also seen in the visualized portion of the shoulders. Impression  IMPRESSION:  No evidence of acute cardiopulmonary process       Portable: Results for orders placed during the hospital encounter of 12/31/21    XR CHEST PORTABLE    Narrative  EXAMINATION:  CHEST RADIOGRAPH (PORTABLE SINGLE AP VIEW)    Exam Date/Time:  12/31/2021 12:51 PM    Clinical History:   sob/ copd    Comparison:  Chest radiograph 9/2/2021      FINDINGS:  Cardiomediastinal silhouette:  Stable heart size within normal limits. Lungs and pleura:  Redemonstrated eventration of the right hemidiaphragm with right lower lung zone hazy opacities, likely representing atelectasis. Otherwise, there are no focal consolidations, pleural effusions or pneumothorax. Impression  No acute cardiopulmonary process. Echo No results found for this or any previous visit. Disposition: home    In process/preliminary results:  Outstanding Order Results     No orders found from 5/9/2022 to 6/8/2022.           Patient Instructions:   Current Discharge Medication List      START taking these medications    Details   cefUROXime (CEFTIN) 500 MG tablet Take 1 tablet by mouth every 12 hours for 6 doses  Qty: 6 tablet, Refills: 0      nystatin (MYCOSTATIN) 904114 UNIT/ML suspension Take 5 mLs by mouth 4 times daily  Qty: 1 each, Refills: 1         CONTINUE these medications which have NOT CHANGED    Details   budesonide-formoterol (SYMBICORT) 160-4.5 MCG/ACT AERO Inhale 2 puffs into the lungs 2 times daily  Qty: 1 each, Refills: 3    Associated Diagnoses: Chronic obstructive pulmonary disease, unspecified COPD type (HCC)      albuterol (PROVENTIL) (2.5 MG/3ML) 0.083% nebulizer solution Use 1 vial via nebulizer every 6 hours as needed for Wheezing  Qty: 360 mL, Refills: 3    Associated Diagnoses: Chronic obstructive pulmonary disease, unspecified (HCC)      albuterol sulfate  (90 Base) MCG/ACT inhaler Inhale 2 puffs into the lungs every 4 hours as needed for Wheezing  Qty: 1 each, Refills: 3      rosuvastatin (CRESTOR) 20 MG tablet       levothyroxine (SYNTHROID) 137 MCG tablet       sertraline (ZOLOFT) 50 MG tablet Take 50 mg by mouth daily Pt takes at night      alfuzosin (UROXATRAL) 10 MG extended release tablet Take by mouth      rivaroxaban (XARELTO) 10 MG TABS tablet Take 1 tablet by mouth daily (with breakfast)  Qty: 30 tablet, Refills: 6    Associated Diagnoses: Acute deep vein thrombosis (DVT) of femoral vein of left lower extremity ([address] Current use of long term anticoagulation      Respiratory Therapy Supplies OK New CPAP mask and supplies  Qty: 1 Device, Refills: 0      aspirin 81 MG EC tablet Take 1 tablet by mouth daily  Qty: 30 tablet, Refills: 3      vitamin D (CHOLECALCIFEROL) 1000 UNIT TABS tablet Take 2 tablets by mouth daily  Qty: 60 tablet, Refills: 1      clonazePAM (KLONOPIN) 0.5 MG tablet Take 0.5 mg by mouth nightly as needed. .      CPAP Machine MISC by Does not apply route Auto CPAP  5-20 cm  Qty: 1 each, Refills: 0      magnesium 30 MG tablet Take 250 mg by mouth daily      mirtazapine (REMERON) 15 MG tablet Take 15 mg by mouth nightly      finasteride (PROSCAR) 5 MG tablet Take 1 tablet by mouth daily  Qty: 30 tablet, Refills: 3      tamsulosin (FLOMAX) 0.4 MG capsule Take 1 capsule by mouth nightly  Qty: 30 capsule, Refills: 3         STOP taking these medications       psyllium (METAMUCIL) 58.12 % PACK packet Comments:   Reason for Stopping:         docusate sodium (COLACE) 100 MG capsule Comments:   Reason for Stopping:         Famotidine (PEPCID AC PO) Comments:   Reason for Stopping:             Activity: activity as tolerated  Diet: regular diet  Wound Care: none needed    Follow-up with PCP 1-2 weeks.     DC time 35 minutes    Signed:  Electronically signed by Darron Jameson MD on 6/13/2022 at 3:35 PM

## 2022-06-13 NOTE — PROGRESS NOTES
Physical Therapy Med Surg Daily Treatment Note  [facility]Department[facility]       NAME: Dixie Hughes  :  ([de-identified] y.o.)  MRN: [id number]  CODE STATUS: Full Code    Date of Service: 2022    Patient Diagnosis(es): Pneumonia [J18.9]  Elevated troponin [R77.8]  Bilateral pleural effusion [J90]  Pneumonia of both lower lobes due to infectious organism [J18.9]   Chief Complaint   Patient presents with    Shortness of Breath     Patient Active Problem List    Diagnosis Date Noted    Acute bronchitis 2022    Pneumonia 2022    COPD exacerbation ([address] 2021    Acute deep vein thrombosis (DVT) of femoral vein of left lower extremity ([address] 05/10/2019    Pulmonary embolus ([address] 2018    Primary osteoarthritis involving multiple joints 2018    Hx of long term use of blood thinners 2018    Arthritis of shoulder 2018    History of pulmonary embolism 2018    S/P thoracotomy, right 2018    Asthma 2018    Thyroid cancer ([address] 2018    History of aspiration pneumonia 2018    Obesity (BMI 30-39.9) 2018    History of total left knee replacement 2018    Abnormality of gait and mobility due to Right Shoulder OA S/P Right Shoulder Arthroscopy Subacromial Decompression with Biceps.   [facility] admit 18. 2018    Arthritis of right shoulder region 2018    Past use of tobacco 2018    Current use of long term anticoagulation 2018    Gait abnormality 2018    Abnormality of gait and mobility due to hypoxemia secondary to bilateral pulmonary emboli, [facility] admit 18      Bilateral pulmonary embolism ([address] 2018    Hypoxemia     Vitamin D deficiency     Debility     On home oxygen therapy     Osteoarthritis of spine with radiculopathy, lumbar region 2016    Foraminal stenosis of lumbosacral region 2016    Elevated PSA 2016    Anxiety     Insomnia     Hypothyroidism     Type 2 diabetes mellitus (HCC)     BPH (benign prostatic hyperplasia)     GERD (gastroesophageal reflux disease) 12/11/2014    GEORGES (obstructive sleep apnea) 01/24/2014    HTN (hypertension) 01/13/2014    Hyperlipidemia 01/13/2014    Chronic obstructive pulmonary disease ([facility] [address] 12/05/2013        Past Medical History:   Diagnosis Date    Abnormality of gait and mobility     Acute sinusitis     Anxiety     Aspiration into respiratory tract 11/10/2017    Aspiration pneumonia (HCC)     Bilateral pulmonary embolism (HCC) 1/6/2018    BPH (benign prostatic hyperplasia)     COPD (chronic obstructive pulmonary disease) ([address] 12/5/2013    Debility     Elevated CK 12/30/2013    Foraminal stenosis of lumbosacral region 6/7/2016    GERD (gastroesophageal reflux disease) 12/11/2014    History of asthma 1/13/2014    HTN (hypertension) 1/13/2014    past braden / no current meds    Hx of blood clots 01/2018    DVT  ( unsure which leg but both were swollen ) -> PE -> thus Xarelto    Hyperlipidemia     meds > 10 yrs    Hypothyroidism     Insomnia     Lower extremity weakness 1/24/2014    On home oxygen therapy     2L at night    Osteoarthritis of spine with radiculopathy, lumbar region 6/7/2016    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    no trx to follow    Positive D dimer 12/5/2013    Sleep apnea 1/24/2014    Type 2 diabetes mellitus ([facility] [address]     diet control  / no meds    Vitamin D deficiency      Past Surgical History:   Procedure Laterality Date    BRONCHOSCOPY N/A 11/11/2017    BRONCHOSCOPY FIBEROPTIC performed by Farhana Samaniego MD at [address] [place] [address] COLONOSCOPY      ENDOSCOPY, COLON, DIAGNOSTIC      EYE SURGERY      phaco with IOL OU    HERNIA REPAIR  [id number]    repair umbilical hernia    KNEE SURGERY Left 1970    DE ARTHROSCOPY SHOULDER SURGICAL BICEPS TENODESIS Right 9/18/2018    RIGHT SHOULDER ARTHROSCOPY SUBACROMIAL DECOMPRESS WITH POSSIBLE BICEPS TENODESIS ARTHROSCOPIC FRANSISCA C- ARM ARTHREX BICEPS [facility] performed by Anjel Aragon MD at [facility] Right 11/14/2017    RIGHT THORACOTOMY WEDGE RESECTION FOR FOREIGN BODY AND FOB DOUBLE LUMEN (1ST CASE) performed by Kris Couch MD at [address] [place] [address]  2006    cancer / no chemo or radiation       Chart Reviewed: Yes  Family / Caregiver Present: No  General Comment  Comments: Pt resting in bed - agreeable to PT tx    Restrictions:  Restrictions/Precautions: Fall Risk    SUBJECTIVE:   Subjective: \"I think I'm going home soon. \"    Pain    Denies pre and post session pain. OBJECTIVE:        Bed mobility  Rolling to Right: Independent  Supine to Sit: Independent  Sit to Supine: Independent  Scooting: Independent    Transfers  Sit to Stand: Supervision;Modified independent  Stand to sit: Modified independent  Bed to Chair: Modified independent  Comment: Requires 2 attempts to complete STS with additional momentum. No LOB noted. Ambulation  Surface: level tile  Device: Rollator  Other Apparatus: O2 (2L O2)  Assistance: Supervision;Modified Independent  Quality of Gait: Mild flexed posture with NBOS. Maintains consistent lucy throughout  Distance: 50ft X 2  Comments: Desaturation to 84% on 2L O2 following ambulation activities. PT Exercises  Exercise Treatment: 20sec marching at rollator X 3. Pt desats each trial to 85-86%SPO2, however recovers within 1min once seated and resting. Activity Tolerance  Activity Tolerance: Patient limited by endurance  Activity Tolerance Comments: Pt still desaturating on 2L O2 with ambulatory activities. Case management notified.     Patient Education  Education Given To: Patient  Education Provided: Role of Therapy;Plan of Care  Education Method: Verbal  Education Outcome: Verbalized understanding     ASSESSMENT   Assessment: Pt lowered to 2L O2, which is baseline for him. However pt continues to desat with ambulatory activities. Case management notified of O2 concerns for pt's DC plan. Discharge Recommendations:  Continue to assess pending progress,Patient would benefit from continued therapy after discharge         Goals  Long Term Goals  Long term goal 1: Pt to complete bed mobility with indep  Long term goal 2: Pt to complete transfers with indep  Long term goal 3: Pt to ambulate 50-150ft with LRD and indep  Long term goal 4: Pt to manage 3 steps with HR and indep    PLAN    Plan: 1 time a day 3-6 times a week  Safety Devices  Type of Devices: All fall risk precautions in place     [facility] (6 CLICK) BASIC MOBILITY  AM-PAC Inpatient Mobility Raw Score : 21     Therapy Time   Individual   Time In 1032   Time Out 1044   Minutes 12     Timed Code Treatment Minutes:  (8min gait; 4min therex)       Sarah Garcia PT, 06/13/22 at 11:11 AM         Definitions for assistance levels  Independent = pt does not require any physical supervision or assistance from another person for activity completion. Device may be needed.   Stand by assistance = pt requires verbal cues or instructions from another person, close to but not touching, to perform the activity  Minimal assistance= pt performs 75% or more of the activity; assistance is required to complete the activity  Moderate assistance= pt performs 50% of the activity; assistance is required to complete the activity  Maximal assistance = pt performs 25% of the activity; assistance is required to complete the activity  Dependent = pt requires total physical assistance to accomplish the task

## 2022-06-13 NOTE — PROGRESS NOTES
CLINICAL PHARMACY NOTE: MEDS TO BEDS    Total # of Prescriptions Filled: 2   The following medications were delivered to the patient:  · Cefuroxime 500mg tab  · Nystatin 99646agsp/ml susp    Additional Documentation:

## 2022-06-14 VITALS
HEART RATE: 59 BPM | RESPIRATION RATE: 18 BRPM | BODY MASS INDEX: 28.28 KG/M2 | OXYGEN SATURATION: 92 % | SYSTOLIC BLOOD PRESSURE: 121 MMHG | HEIGHT: 72 IN | TEMPERATURE: 98.8 F | DIASTOLIC BLOOD PRESSURE: 82 MMHG | WEIGHT: 208.8 LBS

## 2022-06-14 LAB
ALBUMIN SERPL-MCNC: 3.4 G/DL (ref 3.5–4.6)
ALP BLD-CCNC: 59 U/L (ref 35–104)
ALT SERPL-CCNC: 14 U/L (ref 0–41)
ANION GAP SERPL CALCULATED.3IONS-SCNC: 10 MEQ/L (ref 9–15)
AST SERPL-CCNC: 18 U/L (ref 0–40)
BASOPHILS ABSOLUTE: 0 K/UL (ref 0–0.2)
BASOPHILS RELATIVE PERCENT: 0.4 %
BILIRUB SERPL-MCNC: 0.4 MG/DL (ref 0.2–0.7)
BUN BLDV-MCNC: 32 MG/DL (ref 8–23)
CALCIUM SERPL-MCNC: 9.1 MG/DL (ref 8.5–9.9)
CHLORIDE BLD-SCNC: 102 MEQ/L (ref 95–107)
CO2: 28 MEQ/L (ref 20–31)
CREAT SERPL-MCNC: 1.49 MG/DL (ref 0.7–1.2)
EOSINOPHILS ABSOLUTE: 0.6 K/UL (ref 0–0.7)
EOSINOPHILS RELATIVE PERCENT: 6.5 %
GFR AFRICAN AMERICAN: 54.8
GFR NON-AFRICAN AMERICAN: 45.3
GLOBULIN: 3.4 G/DL (ref 2.3–3.5)
GLUCOSE BLD-MCNC: 102 MG/DL (ref 70–99)
HCT VFR BLD CALC: 41 % (ref 42–52)
HEMOGLOBIN: 13.7 G/DL (ref 14–18)
LYMPHOCYTES ABSOLUTE: 2.2 K/UL (ref 1–4.8)
LYMPHOCYTES RELATIVE PERCENT: 26.3 %
MAGNESIUM: 2.3 MG/DL (ref 1.7–2.4)
MCH RBC QN AUTO: 29 PG (ref 27–31.3)
MCHC RBC AUTO-ENTMCNC: 33.4 % (ref 33–37)
MCV RBC AUTO: 86.8 FL (ref 80–100)
MONOCYTES ABSOLUTE: 0.7 K/UL (ref 0.2–0.8)
MONOCYTES RELATIVE PERCENT: 7.9 %
NEUTROPHILS ABSOLUTE: 5 K/UL (ref 1.4–6.5)
NEUTROPHILS RELATIVE PERCENT: 58.9 %
PDW BLD-RTO: 15.9 % (ref 11.5–14.5)
PLATELET # BLD: 144 K/UL (ref 130–400)
POTASSIUM SERPL-SCNC: 4.6 MEQ/L (ref 3.4–4.9)
RBC # BLD: 4.72 M/UL (ref 4.7–6.1)
SODIUM BLD-SCNC: 140 MEQ/L (ref 135–144)
TOTAL PROTEIN: 6.8 G/DL (ref 6.3–8)
WBC # BLD: 8.5 K/UL (ref 4.8–10.8)

## 2022-06-14 PROCEDURE — 6370000000 HC RX 637 (ALT 250 FOR IP): Performed by: NURSE PRACTITIONER

## 2022-06-14 PROCEDURE — 83735 ASSAY OF MAGNESIUM: CPT

## 2022-06-14 PROCEDURE — 6370000000 HC RX 637 (ALT 250 FOR IP): Performed by: INTERNAL MEDICINE

## 2022-06-14 PROCEDURE — 94761 N-INVAS EAR/PLS OXIMETRY MLT: CPT

## 2022-06-14 PROCEDURE — 94640 AIRWAY INHALATION TREATMENT: CPT

## 2022-06-14 PROCEDURE — 85025 COMPLETE CBC W/AUTO DIFF WBC: CPT

## 2022-06-14 PROCEDURE — 36415 COLL VENOUS BLD VENIPUNCTURE: CPT

## 2022-06-14 PROCEDURE — 80053 COMPREHEN METABOLIC PANEL: CPT

## 2022-06-14 PROCEDURE — 6370000000 HC RX 637 (ALT 250 FOR IP): Performed by: FAMILY MEDICINE

## 2022-06-14 PROCEDURE — 2700000000 HC OXYGEN THERAPY PER DAY

## 2022-06-14 RX ADMIN — BUDESONIDE AND FORMOTEROL FUMARATE DIHYDRATE 2 PUFF: 160; 4.5 AEROSOL RESPIRATORY (INHALATION) at 07:13

## 2022-06-14 RX ADMIN — TAMSULOSIN HYDROCHLORIDE 0.4 MG: 0.4 CAPSULE ORAL at 10:26

## 2022-06-14 RX ADMIN — Medication 2000 UNITS: at 10:25

## 2022-06-14 RX ADMIN — LEVOTHYROXINE SODIUM 137 MCG: 0.03 TABLET ORAL at 06:18

## 2022-06-14 RX ADMIN — FINASTERIDE 5 MG: 5 TABLET, FILM COATED ORAL at 10:26

## 2022-06-14 RX ADMIN — CEFUROXIME AXETIL 500 MG: 500 TABLET ORAL at 10:26

## 2022-06-14 RX ADMIN — SERTRALINE HYDROCHLORIDE 50 MG: 50 TABLET ORAL at 10:26

## 2022-06-14 RX ADMIN — NYSTATIN 500000 UNITS: 100000 SUSPENSION ORAL at 10:26

## 2022-06-14 RX ADMIN — ASPIRIN 81 MG: 81 TABLET, COATED ORAL at 10:26

## 2022-06-14 RX ADMIN — RIVAROXABAN 10 MG: 10 TABLET, FILM COATED ORAL at 10:28

## 2022-06-14 NOTE — CARE COORDINATION
Met with patient at bedside. Patient agreeable with d/c home. Has portable 02 tank for d/c. 02 services set up through [facility]. Patient denies any needs for [facility] initially. Informed patient that if he feels he needs HHC once he gets home, he may ask his PCP for an order. Verbalized understanding. Second IMM reviewed with patient. Patient verbalizes understanding. Copy placed in chart. All questions answered.

## 2022-06-14 NOTE — PROGRESS NOTES
Pt alert and oriented, VSS, set to be discharged this am. RN will continue to monitor.  Dee Parkinson RN

## 2022-06-15 NOTE — PROGRESS NOTES
Physical Therapy  Facility/Department: [facility] L147/L247-12  Physical Therapy Discharge      NAME: Andrew Chamberlain    :  ([de-identified] y.o.)  MRN: [id number]    Account: [de-identified]  Gender: male      Patient has been discharged from acute care hospital. DC patient from current PT program.      Electronically signed by Hu San PT on 6/15/22 at 4:51 PM EDT

## 2022-06-30 ENCOUNTER — APPOINTMENT (OUTPATIENT)
Dept: GENERAL RADIOLOGY | Age: 82
End: 2022-06-30
Payer: MEDICARE

## 2022-06-30 ENCOUNTER — HOSPITAL ENCOUNTER (EMERGENCY)
Age: 82
Discharge: HOME | End: 2022-06-30
Attending: EMERGENCY MEDICINE
Payer: MEDICARE

## 2022-06-30 VITALS
DIASTOLIC BLOOD PRESSURE: 85 MMHG | RESPIRATION RATE: 18 BRPM | TEMPERATURE: 97.5 F | OXYGEN SATURATION: 90 % | BODY MASS INDEX: 29.8 KG/M2 | SYSTOLIC BLOOD PRESSURE: 136 MMHG | HEART RATE: 81 BPM | WEIGHT: 220 LBS | HEIGHT: 72 IN

## 2022-06-30 DIAGNOSIS — K59.00 CONSTIPATION, UNSPECIFIED CONSTIPATION TYPE: Primary | ICD-10-CM

## 2022-06-30 PROCEDURE — 74018 RADEX ABDOMEN 1 VIEW: CPT

## 2022-06-30 PROCEDURE — 99283 EMERGENCY DEPT VISIT LOW MDM: CPT

## 2022-06-30 ASSESSMENT — PAIN DESCRIPTION - PAIN TYPE: TYPE: ACUTE PAIN

## 2022-06-30 ASSESSMENT — PAIN DESCRIPTION - FREQUENCY: FREQUENCY: CONTINUOUS

## 2022-06-30 ASSESSMENT — PAIN DESCRIPTION - LOCATION: LOCATION: RECTUM

## 2022-06-30 ASSESSMENT — PAIN DESCRIPTION - DESCRIPTORS: DESCRIPTORS: PRESSURE

## 2022-06-30 ASSESSMENT — PAIN SCALES - GENERAL: PAINLEVEL_OUTOF10: 7

## 2022-06-30 NOTE — ED NOTES
Pt is given d/c instructions, no scripts. Pt voiced understanding of d/c instructions without further questions.       Luis Pozo RN  06/30/22 [id number]

## 2022-06-30 NOTE — ED TRIAGE NOTES
Pt arrives to ED, from home, via personal vehicle. Pt presents with constipation, stating, \"I went yesterday, but felt like I wasn't empty. I knew I was going to have a time with it this morning, and I did. I can't get it to the launching pad! \"

## 2022-06-30 NOTE — ED PROVIDER NOTES
definitive mass is seen. There are calcific densities in the right upper quadrant which probably represent renal calculi. There is significant    degenerative change of the lumbar spine.           Impression   POSSIBLE RENAL CALCULI. Patient did not have any urinary symptoms no flank pain no abdominal pain so I did not feel the possible renal calculi was necessary to pursue with lab work and CAT scan. Discussed with patient need to return for any worsening or changes or persistence to his symptoms. Patient was given soapsuds enema with a large bowel movement and stated that he felt much much better. Encouraged to continue taking MiraLAX as needed. Clinical impression;  1) constipation    Disposition/plan; patient discharged home in stable condition given discharge instructions on constipation. Patient to return for any worsening or changes to symptoms. Otherwise continue MiraLAX.      Salma Grant DO  06/30/22 [id number]

## 2022-07-06 ENCOUNTER — TELEMEDICINE (OUTPATIENT)
Dept: PULMONOLOGY | Age: 82
End: 2022-07-06
Payer: MEDICARE

## 2022-07-06 DIAGNOSIS — I26.99 BILATERAL PULMONARY EMBOLISM (HCC): ICD-10-CM

## 2022-07-06 DIAGNOSIS — Z99.81 ON HOME OXYGEN THERAPY: ICD-10-CM

## 2022-07-06 DIAGNOSIS — G47.33 OSA (OBSTRUCTIVE SLEEP APNEA): ICD-10-CM

## 2022-07-06 DIAGNOSIS — J44.9 CHRONIC OBSTRUCTIVE PULMONARY DISEASE, UNSPECIFIED COPD TYPE (HCC): Primary | ICD-10-CM

## 2022-07-06 PROCEDURE — G8417 CALC BMI ABV UP PARAM F/U: HCPCS | Performed by: INTERNAL MEDICINE

## 2022-07-06 PROCEDURE — 3023F SPIROM DOC REV: CPT | Performed by: INTERNAL MEDICINE

## 2022-07-06 PROCEDURE — 1123F ACP DISCUSS/DSCN MKR DOCD: CPT | Performed by: INTERNAL MEDICINE

## 2022-07-06 PROCEDURE — G8427 DOCREV CUR MEDS BY ELIG CLIN: HCPCS | Performed by: INTERNAL MEDICINE

## 2022-07-06 PROCEDURE — 1111F DSCHRG MED/CURRENT MED MERGE: CPT | Performed by: INTERNAL MEDICINE

## 2022-07-06 PROCEDURE — 99214 OFFICE O/P EST MOD 30 MIN: CPT | Performed by: INTERNAL MEDICINE

## 2022-07-06 PROCEDURE — 1036F TOBACCO NON-USER: CPT | Performed by: INTERNAL MEDICINE

## 2022-07-06 ASSESSMENT — ENCOUNTER SYMPTOMS
NAUSEA: 0
WHEEZING: 1
COUGH: 1
SHORTNESS OF BREATH: 1
DIARRHEA: 0
ABDOMINAL PAIN: 0
RHINORRHEA: 0
VOMITING: 0
VOICE CHANGE: 0
EYE ITCHING: 0
SORE THROAT: 0
CHEST TIGHTNESS: 0

## 2022-07-06 NOTE — PROGRESS NOTES
Subjective:     Seng Sales is a [de-identified] y.o. male who complains today of:     Chief Complaint   Patient presents with    Shortness of Breath     follow up    COPD       HPI  He was in hospital  And treated for pneumonia. He is doing better now   He is using symbicort  160-4.5 mcg  2 puff , nebulizer albuterol and albuterol HFA prn .   C/o shortness of breath with any exertion . C/o Occasional Wheezing. C/o Cough with  white Sputum. No Hemoptysis. No Chest tightness. No Chest pain with radiation  or pleuritic pain. No  leg edema. No orthopnea. No Fever or chills. No Rhinorrhea and postnasal drip. He is on Xarelto 10 mg daily      He said he restarted using CPAP  5-15 cm  and he said restart using   he was using O2 with sleep prn bases. Allergies:  Patient has no known allergies.   Past Medical History:   Diagnosis Date    Abnormality of gait and mobility     Acute sinusitis     Anxiety     Aspiration into respiratory tract 11/10/2017    Aspiration pneumonia (HCC)     Bilateral pulmonary embolism (HCC) 1/6/2018    BPH (benign prostatic hyperplasia)     COPD (chronic obstructive pulmonary disease) ([facility] 12/5/2013    Debility     Elevated CK 12/30/2013    Foraminal stenosis of lumbosacral region 6/7/2016    GERD (gastroesophageal reflux disease) 12/11/2014    History of asthma 1/13/2014    HTN (hypertension) 1/13/2014    past braden / no current meds    Hx of blood clots 01/2018    DVT  ( unsure which leg but both were swollen ) -> PE -> thus Xarelto    Hyperlipidemia     meds > 10 yrs    Hypothyroidism     Insomnia     Lower extremity weakness 1/24/2014    On home oxygen therapy     2L at night    Osteoarthritis of spine with radiculopathy, lumbar region 6/7/2016    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    no trx to follow    Positive D dimer 12/5/2013    Sleep apnea 1/24/2014    Type 2 diabetes mellitus (HCC)     diet control  / no meds    Vitamin D deficiency      Past Surgical History:   Procedure Laterality Date    BRONCHOSCOPY N/A 2017    BRONCHOSCOPY FIBEROPTIC performed by Ignacio Ashley MD at [facility] COLONOSCOPY      ENDOSCOPY, COLON, DIAGNOSTIC      EYE SURGERY      phaco with IOL OU    HERNIA REPAIR  9616A    repair umbilical hernia    KNEE SURGERY Left 1970    [place] ARTHROSCOPY SHOULDER SURGICAL BICEPS TENODESIS Right 2018    RIGHT SHOULDER ARTHROSCOPY SUBACROMIAL DECOMPRESS WITH POSSIBLE BICEPS TENODESIS ARTHROSCOPIC FRANSISCA C- ARM ARTHREX BICEPS TENODESIS [facility] HOUR performed by Franky Casillas MD at [address] [place] [address] Right 2017    RIGHT THORACOTOMY WEDGE RESECTION FOR FOREIGN BODY AND FOB DOUBLE LUMEN (1ST CASE) performed by Jo Ann Fam MD at [address] [place] [address]  2006    cancer / no chemo or radiation     Family History   Problem Relation Age of Onset    Other Mother 80        Old Age    Stroke Father 45    No Known Problems Sister     Psoriasis Daughter     No Known Problems Son      Social History     Socioeconomic History    Marital status:      Spouse name: Not on file    Number of children: 2    Years of education: Not on file    Highest education level: Not on file   Occupational History    Occupation: retired   Tobacco Use    Smoking status: Former Smoker     Packs/day: 1.00     Years: 40.00     Pack years: 40.00     Types: Cigarettes     Start date: 2000     Quit date:      Years since quittin.5    Smokeless tobacco: Former User    Tobacco comment: quit    Vaping Use    Vaping Use: Never used   Substance and Sexual Activity    Alcohol use: Yes     Alcohol/week: 0.0 standard drinks     Comment: rare social    Drug use: No    Sexual activity: Not on file   Other Topics Concern    Not on file   Social History Narrative    The patient lives alone in a one story home with one step to enter the home.   The bedroom and bathroom are on the first floor. His social support includes his children. He has a wheeled walker, rollator, cane and dressing assistive device at home. He was receiving home health care services prior to admission to include PT, OT and RN. He does not have history of falls prior to admission. He was independent with mobility with a wheeled walker as needed prior to admission. He was independent with self care prior to admission. His goal is to get stronger and return home. LIVES AT HOME ALONE. HE HAS A ROLLATOR HE USES. HIS SON LIVES IN [place] AND IS AVAILABLE IF HE NEEDS HIM. Type of Home: House    Home Layout: One level    Home Access: Level entry    Home Equipment: Cane, 4 wheeled walker    ADL Assistance: Independent    Homemaking Assistance: Independent    Ambulation Assistance: Independent    Transfer Assistance: Independent    Additional Comments: son and dtr can assist upon DC home with IADLs     Social Determinants of Health     Financial Resource Strain:     Difficulty of Paying Living Expenses: Not on file   Food Insecurity:     Worried About [address] in the Last Year: Not on file    Gibran of Food in the Last Year: Not on file   Transportation Needs:     Lack of Transportation (Medical): Not on file    Lack of Transportation (Non-Medical):  Not on file   Physical Activity:     Days of Exercise per Week: Not on file    Minutes of Exercise per Session: Not on file   Stress:     Feeling of Stress : Not on file   Social Connections:     Frequency of Communication with Friends and Family: Not on file    Frequency of Social Gatherings with Friends and Family: Not on file    Attends Druze Services: Not on file    Active Member of Clubs or Organizations: Not on file    Attends Club or Organization Meetings: Not on file    Marital Status: Not on file   Intimate Partner Violence:     Fear of Current or Ex-Partner: Not on file    Emotionally Abused: Not on file    Physically Abused: Not on file    Sexually Abused: Not on file   Housing Stability:     Unable to Pay for Housing in the Last Year: Not on file    Number of Places Lived in the Last Year: Not on file    Unstable Housing in the Last Year: Not on file         Review of Systems   Constitutional: Negative for chills, diaphoresis, fatigue and fever. HENT: Negative for congestion, mouth sores, nosebleeds, postnasal drip, rhinorrhea, sneezing, sore throat and voice change. Eyes: Negative for itching and visual disturbance. Respiratory: Positive for cough, shortness of breath and wheezing. Negative for chest tightness. Cardiovascular: Negative. Negative for chest pain, palpitations and leg swelling. Gastrointestinal: Negative for abdominal pain, diarrhea, nausea and vomiting. Genitourinary: Negative for difficulty urinating and hematuria. Musculoskeletal: Negative for arthralgias, joint swelling and myalgias. Skin: Negative for rash. Allergic/Immunologic: Negative for environmental allergies. Neurological: Negative for dizziness, tremors, weakness and headaches. Psychiatric/Behavioral: Positive for sleep disturbance. Negative for behavioral problems.         :   There were no vitals filed for this visit.   Wt Readings from Last 3 Encounters:   06/30/22 220 lb (99.8 kg)   06/13/22 208 lb 12.8 oz (94.7 kg)   06/07/22 214 lb (97.1 kg)         Physical Exam phone visit     Current Outpatient Medications   Medication Sig Dispense Refill    nystatin (MYCOSTATIN) 959934 UNIT/ML suspension Take 5 mLs by mouth 4 times daily 1 each 1    budesonide-formoterol (SYMBICORT) 160-4.5 MCG/ACT AERO Inhale 2 puffs into the lungs 2 times daily 1 each 3    albuterol (PROVENTIL) (2.5 MG/3ML) 0.083% nebulizer solution Use 1 vial via nebulizer every 6 hours as needed for Wheezing 360 mL 3    rosuvastatin (CRESTOR) 20 MG tablet       levothyroxine (SYNTHROID) 137 MCG tablet       sertraline (ZOLOFT) 50 MG tablet Take 50 mg by mouth daily Pt takes at night      alfuzosin (UROXATRAL) 10 MG extended release tablet Take by mouth      rivaroxaban (XARELTO) 10 MG TABS tablet Take 1 tablet by mouth daily (with breakfast) 30 tablet 6    Respiratory Therapy Supplies [employer] New CPAP mask and supplies 1 Device 0    aspirin 81 MG EC tablet Take 1 tablet by mouth daily 30 tablet 3    vitamin D (CHOLECALCIFEROL) 1000 UNIT TABS tablet Take 2 tablets by mouth daily 60 tablet 1    clonazePAM (KLONOPIN) 0.5 MG tablet Take 0.5 mg by mouth nightly as needed. .      CPAP Machine MISC by Does not apply route Auto CPAP  5-20 cm 1 each 0    magnesium 30 MG tablet Take 250 mg by mouth daily      mirtazapine (REMERON) 15 MG tablet Take 15 mg by mouth nightly      finasteride (PROSCAR) 5 MG tablet Take 1 tablet by mouth daily 30 tablet 3    tamsulosin (FLOMAX) 0.4 MG capsule Take 1 capsule by mouth nightly (Patient taking differently: Take 0.4 mg by mouth daily ) 30 capsule 3    albuterol sulfate  (90 Base) MCG/ACT inhaler Inhale 2 puffs into the lungs every 4 hours as needed for Wheezing (Patient not taking: Reported on 7/6/2022) 1 each 3     No current facility-administered medications for this visit. Results for orders placed during the hospital encounter of 06/07/22    XR CHEST (2 VW)    Narrative  Indication: Shortness of breath. EXAM: X-ray of the chest PA and lateral views. COMPARISON: 12/31/2021 exam.    FINDINGS:    Mild bibasilar pulmonary infiltrates, more on the right side. Stable elevated right hemidiaphragm. Normal cardiac silhouette. No pleural effusions. Normal hilar shortness. Central mediastinum. Impression  Mild bibasilar pulmonary infiltrates, more on the right side. Stable elevated right hemidiaphragm.       Results for orders placed in visit on 09/02/21    XR CHEST STANDARD (2 VW)    Narrative  DIAGNOSIS:J44.9 Chronic obstructive pulmonary disease, unspecified COPD type ([facility] ICD10    COMPARISON: July 17, 2019    TECHNIQUE: PA and lateral chest    FINDINGS: Postsurgical changes are again seen in the right lower lobe. There is likely mild atelectasis or scarring is well. Mild atelectasis is seen in the left base. Cardiac silhouette is within normal limits. Calcifications are seen in the thoracic  aorta. Motion artifact is seen on the lateral projection. Degenerative changes are seen in the dorsal spine. Degenerative changes are also seen in the visualized portion of the shoulders. Impression  IMPRESSION:  No evidence of acute cardiopulmonary process      Results for orders placed during the hospital encounter of 12/14/18    XR CHEST STANDARD (2 VW)    Narrative  EXAMINATION: Chest x-ray, 2 view    HISTORY: Hypoxia. Shortness of breath. TECHNIQUE: AP and lateral views of the chest    COMPARISON: CT thorax from December 14, 2018    FINDINGS:    Cardiomediastinal silhouette is within normal limits. Chronic mild elevation of the right hemidiaphragm. Postsurgical changes of the right lung base with right lung base atelectasis versus scarring. Left lung base subsegmental atelectasis versus  scarring. No pneumothorax, pleural effusion, or focal consolidation. Degenerative changes of the thoracic spine. Impression  Postsurgical changes of the right lung base with scarring and/or atelectasis.  ]  Results for orders placed during the hospital encounter of 12/31/21    XR CHEST PORTABLE    Narrative  EXAMINATION:  CHEST RADIOGRAPH (PORTABLE SINGLE AP VIEW)    Exam Date/Time:  12/31/2021 12:51 PM    Clinical History:   sob/ copd    Comparison:  Chest radiograph 9/2/2021      FINDINGS:  Cardiomediastinal silhouette:  Stable heart size within normal limits. Lungs and pleura:  Redemonstrated eventration of the right hemidiaphragm with right lower lung zone hazy opacities, likely representing atelectasis. Otherwise, there are no focal consolidations, pleural effusions or pneumothorax.     Impression  No acute cardiopulmonary process. Results for orders placed during the hospital encounter of 07/17/19    XR CHEST PORTABLE    Narrative  Portable chest radiograph    History: Cough with shortness of breath    Technique: AP portable view of the chest obtained. Comparison: CT chest from December 14, 2018 and chest radiograph from December 17, 2018    Findings:    Atherosclerotic calcification of the thoracic aorta. The cardiomediastinal silhouette is within normal limits. No pneumothorax, pleural effusion, or focal consolidation. Postsurgical changes of the right lower lobe with adjacent atelectasis and scarring. Left lung base subsegmental atelectasis versus scarring. Osseous structures of the thorax appear intact. Advanced right and moderate left shoulder osteoarthritis. Impression  No acute intrathoracic process or significant interval change when compared to radiographs of December 17, 2018. Results for orders placed during the hospital encounter of 01/06/18    XR CHEST PORTABLE    Narrative  EXAMINATION: Portable AP ERECT view of the chest.    CLINICAL HISTORY: Cough and sob . COMPARISONS: 12/15/2017    FINDINGS: Normal cardiac silhouette. There are atherosclerotic calcifications in the aortic arch. The right costophrenic angle is blunted secondary to a small to moderate size pleural effusion, similar to last exam. There are metallic clips in the right  lung base. There is infiltrate/atelectasis in the right lung base, unchanged. Mild left basilar atelectasis or scarring, unchanged. No pneumothorax. There are mild degenerative changes in spine. Impression  RIGHT-SIDED PLEURAL EFFUSION WITH RIGHT BASILAR INFILTRATE/ATELECTASIS, UNCHANGED. MINIMAL LEFT BASILAR ATELECTASIS, UNCHANGED.     Results for orders placed during the hospital encounter of 11/10/17    CT Chest W Contrast    Narrative  EXAMINATION:  CT CHEST W CONTRAST    CLINICAL HISTORY:  ACUTE RESP ILLNESS, >37YEARS OLD  status post bronchoscopy to retrieve aspirated dental amalgam, unsuccessful. COMPARISONS:  12/2/2013    TECHNIQUE:  Spiral scans with 75 mL Isovue-370 IV. Multiplanar 2-D reconstructions. Axial 3-D 10 x 3 mm MIP reconstructions were performed. All CT scans at this facility use dose modulation, iterative reconstruction, and/or weight based dosing when  appropriate to reduce radiation dose to as low as reasonably achievable. FINDINGS:  The known aspirated filling is identified within the distal segmental bronchus of the right lower lobe posterior basilar segment. Filling measures approximately 9 mm. There are mild emphysematous changes. There is mild scattered peripheral  reticularity without definite honeycombing. There are no consolidations or effusions. There is right upper lung perifissural nodularity consistent with small intrapulmonary lymph nodes. There is a left upper lobe 4 mm nodule (series 2 image 21),  unchanged from prior examination of 12/2/2013, and therefore benign. There are no suspicious lung nodules. There is mild bilateral lower lobe cylindrical bronchiectasis. Cardiac size and pulmonary vascularity are normal. There is mild mediastinal lipomatosis. There is mild thickening or trace fluid in the anterior pericardium, decreased compared to prior examination 12/2/2013. There are coronary artery calcifications. There are aortic calcifications. There is no evidence of aneurysm or dissection. There are borderline in size lymph nodes in the mediastinum anterior to the left mainstem bronchus and in the right hilum, unchanged from prior examination of 12/2/2013. There is no thoracic adenopathy. The esophagus is unremarkable. There is spondylosis. There are no acute or suspicious bone lesions. Scans of the subdiaphragmatic regions demonstrate a 6 mm oval metallic density in the gastric fundus, presumably representing a swallowed filling. There are stable small cysts in the liver.  There are partially visualized renal cysts. Impression  ASPIRATED FILLING IN RIGHT LOWER LOBE POSTERIOR BASILAR SEGMENTAL BRONCHUS. MILD EMPHYSEMA AND LOWER LOBE CYLINDRICAL BRONCHIECTASIS. ADDITIONAL FINDINGS AS ABOVE.  ]    Assessment/Plan:     1. Chronic obstructive pulmonary disease, unspecified COPD type ([address]  He was in hospital  And treated for pneumonia. He is doing better now. He is using symbicort  160-4.5 mcg  2 puff , nebulizer albuterol and albuterol HFA prn . C/o shortness of breath with any exertion . C/o Occasional Wheezing. C/o Cough with  white Sputum. 2. GEORGES (obstructive sleep apnea)  He said he restarted using CPAP  5-15 cm  and he said restart using   he was using O2 with sleep advised to restart using CPAP therapy    3. Bilateral pulmonary embolism (HCC)  He is on Xarelto 10 mg daily     4. On home oxygen therapy  Continue O2 to keep saturation 90% or above. Return in about 4 months (around 11/6/2022) for georges, hypoxia on O2, COPD.       Juan Crooks MD

## 2022-11-06 ENCOUNTER — HOSPITAL ENCOUNTER (EMERGENCY)
Age: 82
Discharge: HOME | End: 2022-11-06
Attending: EMERGENCY MEDICINE
Payer: MEDICARE

## 2022-11-06 ENCOUNTER — APPOINTMENT (OUTPATIENT)
Dept: GENERAL RADIOLOGY | Age: 82
End: 2022-11-06
Payer: MEDICARE

## 2022-11-06 ENCOUNTER — APPOINTMENT (OUTPATIENT)
Dept: CT IMAGING | Age: 82
End: 2022-11-06
Payer: MEDICARE

## 2022-11-06 VITALS
WEIGHT: 225 LBS | RESPIRATION RATE: 20 BRPM | HEART RATE: 88 BPM | TEMPERATURE: 97.4 F | OXYGEN SATURATION: 92 % | BODY MASS INDEX: 30.52 KG/M2 | SYSTOLIC BLOOD PRESSURE: 122 MMHG | DIASTOLIC BLOOD PRESSURE: 88 MMHG

## 2022-11-06 DIAGNOSIS — R31.9 HEMATURIA, UNSPECIFIED TYPE: ICD-10-CM

## 2022-11-06 DIAGNOSIS — R10.31 RIGHT LOWER QUADRANT ABDOMINAL PAIN: Primary | ICD-10-CM

## 2022-11-06 DIAGNOSIS — J44.9 CHRONIC OBSTRUCTIVE PULMONARY DISEASE, UNSPECIFIED COPD TYPE (HCC): ICD-10-CM

## 2022-11-06 LAB
ANION GAP SERPL CALCULATED.3IONS-SCNC: 13 MEQ/L (ref 9–15)
APTT: 53.1 SEC (ref 24.4–36.8)
BACTERIA: NEGATIVE /HPF
BASOPHILS ABSOLUTE: 0 K/UL (ref 0–0.1)
BASOPHILS RELATIVE PERCENT: 0.3 % (ref 0.2–1.2)
BILIRUBIN URINE: NEGATIVE
BLOOD, URINE: ABNORMAL
BUN BLDV-MCNC: 25 MG/DL (ref 8–23)
CALCIUM SERPL-MCNC: 9.1 MG/DL (ref 8.5–9.9)
CHLORIDE BLD-SCNC: 104 MEQ/L (ref 95–107)
CLARITY: CLEAR
CO2: 25 MEQ/L (ref 20–31)
COLOR: YELLOW
CREAT SERPL-MCNC: 1.83 MG/DL (ref 0.7–1.2)
EOSINOPHILS ABSOLUTE: 0.1 K/UL (ref 0–0.5)
EOSINOPHILS RELATIVE PERCENT: 1.2 % (ref 0.8–7)
EPITHELIAL CELLS, UA: ABNORMAL /HPF
GFR SERPL CREATININE-BSD FRML MDRD: 36.6 ML/MIN/{1.73_M2}
GLUCOSE BLD-MCNC: 127 MG/DL (ref 70–99)
GLUCOSE URINE: NEGATIVE MG/DL
HCT VFR BLD CALC: 50.3 % (ref 42–52)
HEMOGLOBIN: 16.1 G/DL (ref 13.7–17.5)
IMMATURE GRANULOCYTES #: 0.1 K/UL
IMMATURE GRANULOCYTES %: 0.4 %
INR BLD: 1
KETONES, URINE: NEGATIVE MG/DL
LEUKOCYTE ESTERASE, URINE: NEGATIVE
LYMPHOCYTES ABSOLUTE: 1.4 K/UL (ref 1.3–3.6)
LYMPHOCYTES RELATIVE PERCENT: 12.3 %
MCH RBC QN AUTO: 28.8 PG (ref 25.7–32.2)
MCHC RBC AUTO-ENTMCNC: 32 % (ref 32.3–36.5)
MCV RBC AUTO: 89.8 FL (ref 79–92.2)
MONOCYTES ABSOLUTE: 0.5 K/UL (ref 0.3–0.8)
MONOCYTES RELATIVE PERCENT: 4.8 % (ref 5.3–12.2)
NEUTROPHILS ABSOLUTE: 9.1 K/UL (ref 1.8–5.4)
NEUTROPHILS RELATIVE PERCENT: 81 % (ref 34–67.9)
NITRITE, URINE: NEGATIVE
PDW BLD-RTO: 14.5 % (ref 11.6–14.4)
PH UA: 7 (ref 5–9)
PLATELET # BLD: 133 K/UL (ref 163–337)
POTASSIUM SERPL-SCNC: 4.8 MEQ/L (ref 3.4–4.9)
PROTEIN UA: 30 MG/DL
PROTHROMBIN TIME: 13.9 SEC (ref 12.3–14.9)
RBC # BLD: 5.6 M/UL (ref 4.63–6.08)
RBC UA: ABNORMAL /HPF (ref 0–2)
SODIUM BLD-SCNC: 142 MEQ/L (ref 135–144)
SPECIFIC GRAVITY UA: 1.02 (ref 1–1.03)
URINE REFLEX TO CULTURE: ABNORMAL
UROBILINOGEN, URINE: 0.2 E.U./DL
WBC # BLD: 11.2 K/UL (ref 4.2–9)
WBC UA: ABNORMAL /HPF (ref 0–5)

## 2022-11-06 PROCEDURE — 2580000003 HC RX 258: Performed by: EMERGENCY MEDICINE

## 2022-11-06 PROCEDURE — 96374 THER/PROPH/DIAG INJ IV PUSH: CPT

## 2022-11-06 PROCEDURE — 74176 CT ABD & PELVIS W/O CONTRAST: CPT

## 2022-11-06 PROCEDURE — 6360000002 HC RX W HCPCS: Performed by: EMERGENCY MEDICINE

## 2022-11-06 PROCEDURE — 71045 X-RAY EXAM CHEST 1 VIEW: CPT

## 2022-11-06 PROCEDURE — 80048 BASIC METABOLIC PNL TOTAL CA: CPT

## 2022-11-06 PROCEDURE — 94640 AIRWAY INHALATION TREATMENT: CPT

## 2022-11-06 PROCEDURE — 85730 THROMBOPLASTIN TIME PARTIAL: CPT

## 2022-11-06 PROCEDURE — 96375 TX/PRO/DX INJ NEW DRUG ADDON: CPT

## 2022-11-06 PROCEDURE — 99284 EMERGENCY DEPT VISIT MOD MDM: CPT

## 2022-11-06 PROCEDURE — 81001 URINALYSIS AUTO W/SCOPE: CPT

## 2022-11-06 PROCEDURE — 85610 PROTHROMBIN TIME: CPT

## 2022-11-06 PROCEDURE — 85025 COMPLETE CBC W/AUTO DIFF WBC: CPT

## 2022-11-06 PROCEDURE — 36415 COLL VENOUS BLD VENIPUNCTURE: CPT

## 2022-11-06 RX ORDER — ALBUTEROL SULFATE 2.5 MG/3ML
2.5 SOLUTION RESPIRATORY (INHALATION) EVERY 6 HOURS PRN
Status: DISCONTINUED | OUTPATIENT
Start: 2022-11-06 | End: 2022-11-06 | Stop reason: HOSPADM

## 2022-11-06 RX ORDER — MORPHINE SULFATE 2 MG/ML
2 INJECTION, SOLUTION INTRAMUSCULAR; INTRAVENOUS ONCE
Status: COMPLETED | OUTPATIENT
Start: 2022-11-06 | End: 2022-11-06

## 2022-11-06 RX ORDER — DEXAMETHASONE SODIUM PHOSPHATE 10 MG/ML
6 INJECTION, SOLUTION INTRAMUSCULAR; INTRAVENOUS ONCE
Status: COMPLETED | OUTPATIENT
Start: 2022-11-06 | End: 2022-11-06

## 2022-11-06 RX ORDER — 0.9 % SODIUM CHLORIDE 0.9 %
500 INTRAVENOUS SOLUTION INTRAVENOUS ONCE
Status: COMPLETED | OUTPATIENT
Start: 2022-11-06 | End: 2022-11-06

## 2022-11-06 RX ORDER — ONDANSETRON 4 MG/1
4 TABLET, ORALLY DISINTEGRATING ORAL EVERY 4 HOURS PRN
Qty: 8 TABLET | Refills: 0 | Status: SHIPPED | OUTPATIENT
Start: 2022-11-06

## 2022-11-06 RX ORDER — HYDROCODONE BITARTRATE AND ACETAMINOPHEN 5; 325 MG/1; MG/1
1 TABLET ORAL EVERY 6 HOURS PRN
Qty: 12 TABLET | Refills: 0 | Status: SHIPPED | OUTPATIENT
Start: 2022-11-06 | End: 2022-11-09

## 2022-11-06 RX ORDER — SULFAMETHOXAZOLE AND TRIMETHOPRIM 800; 160 MG/1; MG/1
1 TABLET ORAL 2 TIMES DAILY
Qty: 10 TABLET | Refills: 0 | Status: SHIPPED | OUTPATIENT
Start: 2022-11-06 | End: 2022-11-11

## 2022-11-06 RX ORDER — ONDANSETRON 2 MG/ML
4 INJECTION INTRAMUSCULAR; INTRAVENOUS ONCE
Status: COMPLETED | OUTPATIENT
Start: 2022-11-06 | End: 2022-11-06

## 2022-11-06 RX ADMIN — ALBUTEROL SULFATE 2.5 MG: 2.5 SOLUTION RESPIRATORY (INHALATION) at 17:26

## 2022-11-06 RX ADMIN — ONDANSETRON 4 MG: 2 INJECTION INTRAMUSCULAR; INTRAVENOUS at 17:08

## 2022-11-06 RX ADMIN — SODIUM CHLORIDE 500 ML: 9 INJECTION, SOLUTION INTRAVENOUS at 17:08

## 2022-11-06 RX ADMIN — DEXAMETHASONE SODIUM PHOSPHATE 6 MG: 10 INJECTION INTRAMUSCULAR; INTRAVENOUS at 17:09

## 2022-11-06 RX ADMIN — MORPHINE SULFATE 2 MG: 2 INJECTION, SOLUTION INTRAMUSCULAR; INTRAVENOUS at 17:09

## 2022-11-06 ASSESSMENT — PAIN DESCRIPTION - PAIN TYPE: TYPE: ACUTE PAIN

## 2022-11-06 ASSESSMENT — PAIN - FUNCTIONAL ASSESSMENT: PAIN_FUNCTIONAL_ASSESSMENT: 0-10

## 2022-11-06 ASSESSMENT — PAIN DESCRIPTION - LOCATION: LOCATION: ABDOMEN

## 2022-11-06 ASSESSMENT — PAIN SCALES - GENERAL: PAINLEVEL_OUTOF10: 3

## 2022-11-06 ASSESSMENT — PAIN DESCRIPTION - DESCRIPTORS: DESCRIPTORS: ACHING

## 2022-11-06 ASSESSMENT — PAIN DESCRIPTION - ORIENTATION: ORIENTATION: LOWER;RIGHT

## 2022-11-06 NOTE — ED TRIAGE NOTES
Right lower abdominal pain x1 day. Denies n/v/d. Patient reports hematuria x2 days. A/0 x3, ambulatory, resps even and unlabored on room air.

## 2022-11-06 NOTE — ED PROVIDER NOTES
[facility] ED  EMERGENCY DEPARTMENT ENCOUNTER      Pt Name: Yfn Rowland  MRN: [id number]  [place] 1940  Date of evaluation: 11/6/2022  Provider: Vega Mccord DO    CHIEF COMPLAINT       Chief Complaint   Patient presents with    Abdominal Pain    Hematuria     Chief complaint: Abdominal pain and hematuria  History of chief complaint: This 29-year-old gentleman presents the emergency department complaining of noticing  blood in his urine yesterday. Patient states he awoke this morning with intense sharp stabbing pain in the right lower quadrant patient states that it has lessened through the day but somewhat moving more towards midline. Patient states the pain does radiate slightly around to the right low back no pain down into the testicle. Patient reports ongoing hematuria. Patient states he has seen a urologist in the past had a bladder scope and he did mention to them that he had kidney stones at that time. Patient denies any fevers or chills no nausea vomiting or diarrhea. Patient is on anticoagulation Xarelto with a history of previous DVT and PE. Patient denies any chest pain palpitations states he does have chronic shortness of breath with COPD and previous pulmonary embolism not new or different. States he does have home oxygen to use as needed, states he does use it at night. Patient denies any sore throat cold or congestion type symptoms reports some chronic cough with his COPD not new or different. Nursing Notes were reviewed. REVIEW OF SYSTEMS    (2-9 systems for level 4, 10 or more for level 5)     Review of Systems   Constitutional:  Negative for chills, diaphoresis and fever. HENT:  Negative for congestion, sinus pain and sore throat. Eyes:  Negative for discharge and redness. Respiratory:  Positive for cough. Negative for shortness of breath. Cardiovascular:  Negative for chest pain, palpitations and leg swelling.    Gastrointestinal:  Negative for abdominal pain, diarrhea, nausea and vomiting. Genitourinary:  Negative for difficulty urinating, dysuria, flank pain and frequency. Musculoskeletal:  Negative for back pain and neck pain. Skin:  Negative for color change and rash. Neurological:  Negative for dizziness, weakness, numbness and headaches. Hematological:  Negative for adenopathy. Except as noted above the remainder of the review of systems was reviewed and negative.        PAST MEDICAL HISTORY     Past Medical History:   Diagnosis Date    Abnormality of gait and mobility     Acute sinusitis     Anxiety     Aspiration into respiratory tract 11/10/2017    Aspiration pneumonia (HCC)     Bilateral pulmonary embolism (HCC) 1/6/2018    BPH (benign prostatic hyperplasia)     COPD (chronic obstructive pulmonary disease) ([address] 12/5/2013    Debility     Elevated CK 12/30/2013    Foraminal stenosis of lumbosacral region 6/7/2016    GERD (gastroesophageal reflux disease) 12/11/2014    History of asthma 1/13/2014    HTN (hypertension) 1/13/2014    past braden / no current meds    Hx of blood clots 01/2018    DVT  ( unsure which leg but both were swollen ) -> PE -> thus Xarelto    Hyperlipidemia     meds > 10 yrs    Hypothyroidism     Insomnia     Lower extremity weakness 1/24/2014    On home oxygen therapy     2L at night    Osteoarthritis of spine with radiculopathy, lumbar region 6/7/2016    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    no trx to follow    Positive D dimer 12/5/2013    Sleep apnea 1/24/2014    Type 2 diabetes mellitus ([address]     diet control  / no meds    Vitamin D deficiency          SURGICAL HISTORY       Past Surgical History:   Procedure Laterality Date    BRONCHOSCOPY N/A 11/11/2017    BRONCHOSCOPY FIBEROPTIC performed by Silva Camarillo MD at [facility], DIAGNOSTIC      EYE SURGERY      phaco with IOL OU    HERNIA REPAIR  1872X    repair umbilical hernia    KNEE SURGERY Left 1970    [place] ARTHROSCOPY SHOULDER SURGICAL BICEPS TENODESIS Right 9/18/2018    RIGHT SHOULDER ARTHROSCOPY SUBACROMIAL DECOMPRESS WITH POSSIBLE BICEPS TENODESIS ARTHROSCOPIC FRANSISCA C- ARM ARTHREX BICEPS TENODESIS TRAY [address] CASE #1 1 HOUR performed by Garcia Garcia MD at [address] [place] [address] Right 11/14/2017    RIGHT THORACOTOMY WEDGE RESECTION FOR FOREIGN BODY AND FOB DOUBLE LUMEN (1ST CASE) performed by Riaz Manriquez MD at [facility]  2006    cancer / no chemo or radiation         CURRENT MEDICATIONS       Previous Medications    ALBUTEROL (PROVENTIL) (2.5 MG/3ML) 0.083% NEBULIZER SOLUTION    Use 1 vial via nebulizer every 6 hours as needed for Wheezing    ALBUTEROL SULFATE  (90 BASE) MCG/ACT INHALER    Inhale 2 puffs into the lungs every 4 hours as needed for Wheezing    ALFUZOSIN (UROXATRAL) 10 MG EXTENDED RELEASE TABLET    Take by mouth    ASPIRIN 81 MG EC TABLET    Take 1 tablet by mouth daily    BUDESONIDE-FORMOTEROL (SYMBICORT) 160-4.5 MCG/ACT AERO    Inhale 2 puffs into the lungs 2 times daily    CLONAZEPAM (KLONOPIN) 0.5 MG TABLET    Take 0.5 mg by mouth nightly as needed. .    CPAP MACHINE MISC    by Does not apply route Auto CPAP  5-20 cm    FINASTERIDE (PROSCAR) 5 MG TABLET    Take 1 tablet by mouth daily    LEVOTHYROXINE (SYNTHROID) 137 MCG TABLET        MAGNESIUM 30 MG TABLET    Take 250 mg by mouth daily    MIRTAZAPINE (REMERON) 15 MG TABLET    Take 15 mg by mouth nightly    NYSTATIN (MYCOSTATIN) 619200 UNIT/ML SUSPENSION    Take 5 mLs by mouth 4 times daily    RESPIRATORY THERAPY SUPPLIES OK    New CPAP mask and supplies    RIVAROXABAN (XARELTO) 10 MG TABS TABLET    Take 1 tablet by mouth daily (with breakfast)    ROSUVASTATIN (CRESTOR) 20 MG TABLET        SERTRALINE (ZOLOFT) 50 MG TABLET    Take 50 mg by mouth daily Pt takes at night    TAMSULOSIN (FLOMAX) 0.4 MG CAPSULE    Take 1 capsule by mouth nightly    VITAMIN D (CHOLECALCIFEROL) 1000 UNIT TABS TABLET    Take 2 tablets by mouth daily       ALLERGIES     Patient has no known allergies. FAMILY HISTORY       Family History   Problem Relation Age of Onset    Other Mother 80        Old Age    Stroke Father 45    No Known Problems Sister     Psoriasis Daughter     No Known Problems Son           SOCIAL HISTORY       Social History     Socioeconomic History    Marital status:      Spouse name: None    Number of children: 2    Years of education: None    Highest education level: None   Occupational History    Occupation: retired   Tobacco Use    Smoking status: Former     Packs/day: 1.00     Years: 40.00     Pack years: 40.00     Types: Cigarettes     Start date: 2000     Quit date: 2000     Years since quittin.9    Smokeless tobacco: Former    Tobacco comments:     quit    Vaping Use    Vaping Use: Never used   Substance and Sexual Activity    Alcohol use: Not Currently     Comment: rare social    Drug use: No   Social History Narrative    The patient lives alone in a one story home with one step to enter the home. The bedroom and bathroom are on the first floor. His social support includes his children. He has a wheeled walker, rollator, cane and dressing assistive device at home. He was receiving home health care services prior to admission to include PT, OT and RN. He does not have history of falls prior to admission. He was independent with mobility with a wheeled walker as needed prior to admission. He was independent with self care prior to admission. His goal is to get stronger and return home. LIVES AT HOME ALONE. HE HAS A ROLLATOR HE USES. HIS SON LIVES IN [place] AND IS AVAILABLE IF HE NEEDS HIM.      Type of Home: House    Home Layout: One level    Home Access: Level entry    Home Equipment: Cane, 4 wheeled walker    ADL Assistance: [address] [place] [address] Avenue: Independent    Ambulation Assistance: Independent    Transfer Assistance: Independent    Additional Comments: son and dtr can assist upon DC home with IADLs           PHYSICAL EXAM    (up to 7 for level 4, 8 or more for level 5)     ED Triage Vitals [11/06/22 1651]   BP Temp Temp Source Heart Rate Resp SpO2 Height Weight   (!) 148/98 97.4 °F (36.3 °C) Oral 94 17 90 % -- 225 lb (102.1 kg)       Physical Exam  General appearance: Patient is awake alert interactive appropriate nontoxic in no acute distress  Head is atraumatic normocephalic  Eyes pupils are equal and reactive sclera white conjunctive are pink  Oral pharyngeal cavity is pink with good moisture, no exudates or ulcerations no asymmetry, the airway is widely patent  Neck: Supple no meningeal signs no adenopathy no JVD  Heart: Regular rate and rhythm no gross murmurs rubs or clicks  Lungs: Breath sounds are coarse with some scattered expiratory wheeze no respiratory distress no use of accessory muscles no conversational dyspnea. Initial pulse ox at 90% on room air   abdomen: Soft with mild tenderness to palpation over the right lower quadrant, there are good bowel sounds no rebound rigidity or guarding no firm or pulsatile masses, no gross distention, femoral pulses full and symmetric  Back: Nontender to palpation no costovertebral angle tenderness no overlying rash ecchymosis or abrasion  Skin: Warm and dry without rashes  Lower extremities: No edema or calf tenderness or asymmetry.     DIAGNOSTIC RESULTS       RADIOLOGY:   Non-plain film images such as CT, Ultrasound and MRI are read by the radiologist. Plain radiographic images are visualized and preliminarily interpreted by the emergency physician with the below findings:    Interpretation per the Radiologist below, if available at the time of this note:    XR CHEST PORTABLE    (Results Pending)   CT ABDOMEN PELVIS WO CONTRAST Additional Contrast? None    (Results Pending)   Chest x-ray 1 view interpreted by ED physician indication COPD: Heart mediastinum are within normal limits the lung fields reveal chronic fibrotic appearing changes are no acute consolidations gross vascular congestion or pneumothorax appreciated. Findings do appear similar when compared to previous EKG of 6/7/2022. Official radiology report is pending    LABS:  Labs Reviewed   CBC WITH AUTO DIFFERENTIAL - Abnormal; Notable for the following components:       Result Value    WBC 11.2 (*)     MCHC 32.0 (*)     RDW 14.5 (*)     Platelets 280 (*)     Neutrophils % 81.0 (*)     Monocytes % 4.8 (*)     Neutrophils Absolute 9.1 (*)     All other components within normal limits   BASIC METABOLIC PANEL - Abnormal; Notable for the following components:    Glucose 127 (*)     BUN 25 (*)     Creatinine 1.83 (*)     Est, Glom Filt Rate 36.6 (*)     All other components within normal limits   APTT - Abnormal; Notable for the following components:    aPTT 53.1 (*)     All other components within normal limits   PROTIME-INR   URINALYSIS WITH REFLEX TO CULTURE   Laboratory review: CBC reveals a leukocytosis at 11.2 BMP reveals renal insufficiency with a BUN/creatinine of 25 and 1.8 which is stable from previous coagulation profile is normal    All other labs were within normal range or not returned as of this dictation. EMERGENCY DEPARTMENT COURSE and DIFFERENTIAL DIAGNOSIS/MDM:   Vitals:    Vitals:    11/06/22 1651   BP: (!) 148/98   Pulse: 94   Resp: 17   Temp: 97.4 °F (36.3 °C)   TempSrc: Oral   SpO2: 90%   Weight: 225 lb (102.1 kg)     Treatment and course: Patient had an IV established blood work was sent normal saline 500 mL IV bolus was given Decadron 6 mg IV was given as a one-time dose along with morphine 2 mg IV Zofran 4 mg IV. Patient was given albuterol nebulized.   Patient resting comfortably feeling improved unable to urinate at this time for a sample, repeat abdominal examination does reveal persisting tenderness more over the suprapubic area no gross palpable bladder fullness or distention. Patient agreeable to urine cath, Crawford cath ordered for sample and to measure output may DC if gross retention    I am suspecting underlying kidney stone, CT scan of the abdomen and pelvis and urinalysis results are pending. Coordination of care: Case was discussed with the oncoming ER physician Dr. Christiano Ba to follow-up on the CT and urinalysis results, certainly to intervene and alter treatment disposition as appropriate if other unanticipated findings otherwise patient will be set for discharge home with kidney stone instructions    FINAL IMPRESSION      1. Right lower quadrant abdominal pain    2. Hematuria, unspecified type    3. Chronic obstructive pulmonary disease, unspecified COPD type ([facility]          DISPOSITION/PLAN   DISPOSITION    I did prepare home-going instructions for anticipated diagnosis of kidney stone a home-going prescription for Vicodin No. 12, empiric Bactrim No. 10 and Zofran No. 8 I did discuss with the patient and family the plan and discussed home care with increasing fluid, or home oxygen as directed for COPD. Return if increasing abdominal pain persistent vomiting fever any difficulty breathing or chest pain. Plan will be for for a follow-up with urology Dr. Mary Dempesy in the next 2 to 3 days    PATIENT REFERRED TO:  Slema Montiel MD  [address] [place] [address] [phone]    In 2 days      Yuli Cedeño MD  [address]  [phone]    In 2 days      DISCHARGE MEDICATIONS:  New Prescriptions    HYDROCODONE-ACETAMINOPHEN (NORCO) 5-325 MG PER TABLET    Take 1 tablet by mouth every 6 hours as needed for Pain for up to 3 days. Intended supply: 3 days.  Take lowest dose possible to manage pain    ONDANSETRON (ZOFRAN ODT) 4 MG DISINTEGRATING TABLET    Take 1 tablet by mouth every 4 hours as needed for Nausea or Vomiting    SULFAMETHOXAZOLE-TRIMETHOPRIM (BACTRIM DS) 800-160 MG PER TABLET    Take 1 tablet by mouth 2 times daily for 5 days     Controlled Substances Monitoring:     No flowsheet data found.     (Please note that portions of this note were completed with a voice recognition program.  Efforts were made to edit the dictations but occasionally words are mis-transcribed.)    Belén Mcgowan, DO (electronically signed)  Attending Emergency Physician

## 2022-11-07 NOTE — ED PROVIDER NOTES
Addendum: (Dr. Mellisa Phillip)  results returned prior to my leaving U/A blood, CT kidney stone, patient feeling well, results discussed. Patient discharged with family as planned. Zion Salas DO  11/06/22 [address] ED  EMERGENCY DEPARTMENT ENCOUNTER      Pt Name: Mike Bacon  MRN: [id number]  [place] 1940  Date of evaluation: 11/6/2022  Provider: Zion Salas DO    My original dictation was apparently not saved with computer issues and shutting down. My final addendum above was saved, chart was reviewed dictated to the best of my recollection on 11/9/2022    CHIEF COMPLAINT       Chief Complaint   Patient presents with    Abdominal Pain    Hematuria     Chief complaint: Right lower quadrant abdominal pain  History of chief complaint: This 60-year-old gentleman presents the emergency department complaining of onset with a sharp stabbing right lower quadrant abdominal pain and associated hematuria no fevers chills nausea vomiting diarrhea weak or dizzy feeling. Patient denies any chest pain palpitation short of breath cough cold or congestion. Patient reports chronic COPD no other significant complaint. Nursing Notes were reviewed. REVIEW OF SYSTEMS    (2-9 systems for level 4, 10 or more for level 5)     Review of Systems   Constitutional:  Negative for diaphoresis and fever. HENT:  Negative for congestion and sore throat. Eyes:  Negative for redness and visual disturbance. Respiratory:  Negative for cough and shortness of breath. Cardiovascular:  Negative for chest pain and palpitations. Gastrointestinal:  Positive for abdominal pain. Negative for diarrhea, nausea and vomiting. Genitourinary:  Positive for hematuria. Negative for dysuria and flank pain. Musculoskeletal:  Negative for back pain and neck pain. Skin:  Negative for color change and rash. Neurological:  Negative for dizziness, weakness and numbness. Hematological:  Negative for adenopathy. Except as noted above the remainder of the review of systems was reviewed and negative.        PAST MEDICAL HISTORY     Past Medical History:   Diagnosis Date    Abnormality of gait and mobility     Acute sinusitis     Anxiety     Aspiration into respiratory tract 11/10/2017    Aspiration pneumonia (HCC)     Bilateral pulmonary embolism ([address] 1/6/2018    BPH (benign prostatic hyperplasia)     COPD (chronic obstructive pulmonary disease) ([address] 12/5/2013    Debility     Elevated CK 12/30/2013    Foraminal stenosis of lumbosacral region 6/7/2016    GERD (gastroesophageal reflux disease) 12/11/2014    History of asthma 1/13/2014    HTN (hypertension) 1/13/2014    past braden / no current meds    Hx of blood clots 01/2018    DVT  ( unsure which leg but both were swollen ) -> PE -> thus Xarelto    Hyperlipidemia     meds > 10 yrs    Hypothyroidism     Insomnia     Lower extremity weakness 1/24/2014    On home oxygen therapy     2L at night    Osteoarthritis of spine with radiculopathy, lumbar region 6/7/2016    Papillary thyroid carcinoma ([facility] 12/2006    no trx to follow    Positive D dimer 12/5/2013    Sleep apnea 1/24/2014    Type 2 diabetes mellitus ([facility]     diet control  / no meds    Vitamin D deficiency          SURGICAL HISTORY       Past Surgical History:   Procedure Laterality Date    BRONCHOSCOPY N/A 11/11/2017    BRONCHOSCOPY FIBEROPTIC performed by Padmini Bravo MD at [address] [place] [address], DIAGNOSTIC      EYE SURGERY      phaco with IOL OU    HERNIA REPAIR  [id number]    repair umbilical hernia    KNEE SURGERY Left 1970    MS ARTHROSCOPY SHOULDER SURGICAL BICEPS TENODESIS Right 9/18/2018    RIGHT SHOULDER ARTHROSCOPY SUBACROMIAL DECOMPRESS WITH POSSIBLE BICEPS TENODESIS ARTHROSCOPIC FRANSISCA C- ARM ARTHREX BICEPS TENODESIS GALE Shay CHAIR CASE #1 1 HOUR performed by Addis Ramos MD at [facility] Right 11/14/2017    RIGHT THORACOTOMY WEDGE RESECTION FOR FOREIGN BODY AND FOB DOUBLE LUMEN (1ST CASE) [facility] [place]  [facility]    cancer / no chemo or radiation         CURRENT MEDICATIONS       Discharge Medication List as of 11/6/2022  7:30 PM        CONTINUE these medications which have NOT CHANGED    Details   nystatin (MYCOSTATIN) 454579 UNIT/ML suspension Take 5 mLs by mouth 4 times daily, Oral, 4 TIMES DAILY Starting Mon 6/13/2022, Disp-1 each, R-1, Normal      budesonide-formoterol (SYMBICORT) 160-4.5 MCG/ACT AERO Inhale 2 puffs into the lungs 2 times daily, Disp-1 each, R-3Normal      albuterol (PROVENTIL) (2.5 MG/3ML) 0.083% nebulizer solution Use 1 vial via nebulizer every 6 hours as needed for Wheezing, Disp-360 mL, R-3Normal      albuterol sulfate  (90 Base) MCG/ACT inhaler Inhale 2 puffs into the lungs every 4 hours as needed for Wheezing, Disp-1 each, R-3Normal      rosuvastatin (CRESTOR) 20 MG tablet Historical Med      levothyroxine (SYNTHROID) 137 MCG tablet Historical Med      sertraline (ZOLOFT) 50 MG tablet Take 50 mg by mouth daily Pt takes at nightHistorical Med      alfuzosin (UROXATRAL) 10 MG extended release tablet Take by mouthHistorical Med      rivaroxaban (XARELTO) 10 MG TABS tablet Take 1 tablet by mouth daily (with breakfast), Disp-30 tablet, R-6Normal      Respiratory Therapy Supplies [place] Disp-1 Device, R-0, PrintNew CPAP mask and supplies      aspirin 81 MG EC tablet Take 1 tablet by mouth daily, Disp-30 tablet, R-3Normal      vitamin D (CHOLECALCIFEROL) 1000 UNIT TABS tablet Take 2 tablets by mouth daily, Disp-60 tablet, R-1Normal      clonazePAM (KLONOPIN) 0.5 MG tablet Take 0.5 mg by mouth nightly as needed. Brittany Hatfield Historical Med      CPAP Machine MISC Starting Wed 9/5/2018, Disp-1 each, R-0, PrintAuto CPAP  5-20 cm      magnesium 30 MG tablet Take 250 mg by mouth dailyHistorical Med      mirtazapine (REMERON) 15 MG tablet Take 15 mg by mouth nightlyHistorical Med finasteride (PROSCAR) 5 MG tablet Take 1 tablet by mouth daily, Disp-30 tablet, R-3Print      tamsulosin (FLOMAX) 0.4 MG capsule Take 1 capsule by mouth nightly, Disp-30 capsule, R-3Print             ALLERGIES     Patient has no known allergies. FAMILY HISTORY       Family History   Problem Relation Age of Onset    Other Mother 80        Old Age    Stroke Father 45    No Known Problems Sister     Psoriasis Daughter     No Known Problems Son           SOCIAL HISTORY       Social History     Socioeconomic History    Marital status:      Spouse name: None    Number of children: 2    Years of education: None    Highest education level: None   Occupational History    Occupation: retired   Tobacco Use    Smoking status: Former     Packs/day: 1.00     Years: 40.00     Pack years: 40.00     Types: Cigarettes     Start date: 2000     Quit date: 2000     Years since quittin.9    Smokeless tobacco: Former    Tobacco comments:     quit    Vaping Use    Vaping Use: Never used   Substance and Sexual Activity    Alcohol use: Not Currently     Comment: rare social    Drug use: No   Social History Narrative    The patient lives alone in a one story home with one step to enter the home. The bedroom and bathroom are on the first floor. His social support includes his children. He has a wheeled walker, rollator, cane and dressing assistive device at home. He was receiving home health care services prior to admission to include PT, OT and RN. He does not have history of falls prior to admission. He was independent with mobility with a wheeled walker as needed prior to admission. He was independent with self care prior to admission. His goal is to get stronger and return home. LIVES AT HOME ALONE. HE HAS A ROLLATOR HE USES. HIS SON LIVES IN [place] AND IS AVAILABLE IF HE NEEDS HIM.      Type of Home: House    Home Layout: One level    Home Access: Level entry    Home Equipment: Cane, 4 wheeled walker    ADL Assistance: Independent    Homemaking Assistance: Independent    Ambulation Assistance: Independent    Transfer Assistance: Independent    Additional Comments: son and dtr can assist upon DC home with IADLs       PHYSICAL EXAM    (up to 7 for level 4, 8 or more for level 5)     ED Triage Vitals [11/06/22 1651]   BP Temp Temp Source Heart Rate Resp SpO2 Height Weight   (!) 148/98 97.4 °F (36.3 °C) Oral 94 17 90 % -- 225 lb (102.1 kg)       Physical Exam  General appearance: Patient is awake alert interactive appropriate nontoxic in no acute distress, pleasant, joking well-appearing  Eyes pupils are equal and reactive sclera white conjunctive are pink  Oral pharyngeal cavity is pink with good moisture, no exudates or ulcerations no asymmetry, the airway is widely patent  Neck: Supple  no adenopathy no JVD  Heart: Regular rate and rhythm no gross murmurs rubs or clicks  Lungs: Breath sounds are coarse with scattered end expiratory wheeze no rales or rhonchi no respiratory distress. No use of accessory muscles no conversational dyspnea. No shortness of breath patient reports chronic COPD. Pulse ox 93% on room air bedside   abdomen: Soft with mild tenderness to palpation over the right lower quadrant, there are good bowel sounds no rebound rigidity or guarding no firm or pulsatile masses, no gross distention, femoral pulses full and symmetric  Back: Nontender to palpation no costovertebral angle tenderness  Skin: Warm and dry without rashes  Lower extremities: No edema or calf tenderness or asymmetry.     DIAGNOSTIC RESULTS       RADIOLOGY:   Non-plain film images such as CT, Ultrasound and MRI are read by the radiologist. Plain radiographic images are visualized and preliminarily interpreted by the emergency physician with the below findings:    Interpretation per the Radiologist below, if available at the time of this note:    CT ABDOMEN PELVIS WO CONTRAST Additional Contrast? None   Final Result   5 x 6 mm moderately obstructing right proximal ureteral stone with   perinephric stranding and distension seen in the renal collecting system. Large left-sided distal ureteral stone at the left UVJ with no substantial   left-sided hydronephrosis. XR CHEST PORTABLE   Final Result   No acute process. LABS:  Labs Reviewed   CBC WITH AUTO DIFFERENTIAL - Abnormal; Notable for the following components:       Result Value    WBC 11.2 (*)     MCHC 32.0 (*)     RDW 14.5 (*)     Platelets 693 (*)     Neutrophils % 81.0 (*)     Monocytes % 4.8 (*)     Neutrophils Absolute 9.1 (*)     All other components within normal limits   URINALYSIS WITH REFLEX TO CULTURE - Abnormal; Notable for the following components:    Protein, UA 30 (*)     All other components within normal limits   BASIC METABOLIC PANEL - Abnormal; Notable for the following components:    Glucose 127 (*)     BUN 25 (*)     Creatinine 1.83 (*)     Est, Glom Filt Rate 36.6 (*)     All other components within normal limits   APTT - Abnormal; Notable for the following components:    aPTT 53.1 (*)     All other components within normal limits   MICROSCOPIC URINALYSIS - Abnormal; Notable for the following components:    RBC, UA  (*)     All other components within normal limits   PROTIME-INR       All other labs were within normal range or not returned as of this dictation. EMERGENCY DEPARTMENT COURSE and DIFFERENTIAL DIAGNOSIS/MDM:   Vitals:    Vitals:    11/06/22 1651 11/06/22 1958   BP: (!) 148/98 122/88   Pulse: 94 88   Resp: 17 20   Temp: 97.4 °F (36.3 °C)    TempSrc: Oral    SpO2: 90% 92%   Weight: 225 lb (102.1 kg)      Treatment and course: Patient had an IV established normal saline 500 mL IV bolus was given along with Decadron 6 mg IV Zofran 4 mg IV morphine 2 mg IV. Albuterol nebulizer was given.   On repeat assessment patient resting comfortably no distress repeat auscultation of the lungs reveals good air movement no active wheezes rales or rhonchi repeat abdominal examination remains benign. I did discuss with patient suspect kidney stone official CT report is pending plan for discharge home with continued COPD care, antibiotic, fluids and pain medication was discussed. Case was discussed with the oncoming physician to follow-up on CT results however it did return prior to my leaving the emergency department and patient was discharged as planned below    FINAL IMPRESSION      1. Right lower quadrant abdominal pain    2. Hematuria, unspecified type    3. Chronic obstructive pulmonary disease, unspecified COPD type [facility])          DISPOSITION/PLAN   DISPOSITION Decision To Discharge 11/06/2022 07:59:26 PM  Patient was set up for discharge home home-going prescriptions for Vicodin and Zofran and Bactrim were prepared. Plan was discussed with the patient with instructions to increase fluids monitor closely and return if any change or worsening recurrent pain fever, any chest pain or difficulty breathing persistent vomiting not keeping medicines down. Patient was given follow-up with primary physician as well as urology in the next 2 days    PATIENT REFERRED TO:  Cristela Tian MD  [address] [place] [address] [phone]    In 2 days      Rebekah Garcia MD  [address] [phone]    In 2 days        DISCHARGE MEDICATIONS:  Discharge Medication List as of 11/6/2022  7:30 PM        START taking these medications    Details   HYDROcodone-acetaminophen (NORCO) 5-325 MG per tablet Take 1 tablet by mouth every 6 hours as needed for Pain for up to 3 days. Intended supply: 3 days.  Take lowest dose possible to manage pain, Disp-12 tablet, R-0Print      sulfamethoxazole-trimethoprim (BACTRIM DS) 800-160 MG per tablet Take 1 tablet by mouth 2 times daily for 5 days, Disp-10 tablet, R-0Print      ondansetron (ZOFRAN ODT) 4 MG disintegrating tablet Take 1 tablet by mouth every 4 hours as needed for Nausea or Vomiting, Disp-8 tablet, R-0Print           Controlled Substances Monitoring:     No flowsheet data found.     (Please note that portions of this note were completed with a voice recognition program.  Efforts were made to edit the dictations but occasionally words are mis-transcribed.)    James Carlton DO (electronically signed)  Attending Emergency Physician             James Carlton DO  11/09/22 1959

## 2022-11-08 ENCOUNTER — OFFICE VISIT (OUTPATIENT)
Dept: PULMONOLOGY | Age: 82
End: 2022-11-08
Payer: MEDICARE

## 2022-11-08 VITALS
BODY MASS INDEX: 31.87 KG/M2 | DIASTOLIC BLOOD PRESSURE: 80 MMHG | OXYGEN SATURATION: 96 % | SYSTOLIC BLOOD PRESSURE: 128 MMHG | WEIGHT: 235 LBS | HEART RATE: 91 BPM

## 2022-11-08 DIAGNOSIS — J44.9 CHRONIC OBSTRUCTIVE PULMONARY DISEASE, UNSPECIFIED COPD TYPE (HCC): Primary | ICD-10-CM

## 2022-11-08 DIAGNOSIS — I26.99 BILATERAL PULMONARY EMBOLISM (HCC): ICD-10-CM

## 2022-11-08 DIAGNOSIS — Z99.81 ON HOME OXYGEN THERAPY: ICD-10-CM

## 2022-11-08 DIAGNOSIS — G47.33 OSA (OBSTRUCTIVE SLEEP APNEA): ICD-10-CM

## 2022-11-08 DIAGNOSIS — N20.0 KIDNEY STONE: Primary | ICD-10-CM

## 2022-11-08 DIAGNOSIS — R06.02 SHORTNESS OF BREATH: ICD-10-CM

## 2022-11-08 PROCEDURE — 99214 OFFICE O/P EST MOD 30 MIN: CPT | Performed by: INTERNAL MEDICINE

## 2022-11-08 PROCEDURE — G8484 FLU IMMUNIZE NO ADMIN: HCPCS | Performed by: INTERNAL MEDICINE

## 2022-11-08 PROCEDURE — 1036F TOBACCO NON-USER: CPT | Performed by: INTERNAL MEDICINE

## 2022-11-08 PROCEDURE — 3078F DIAST BP <80 MM HG: CPT | Performed by: INTERNAL MEDICINE

## 2022-11-08 PROCEDURE — 1123F ACP DISCUSS/DSCN MKR DOCD: CPT | Performed by: INTERNAL MEDICINE

## 2022-11-08 PROCEDURE — 3023F SPIROM DOC REV: CPT | Performed by: INTERNAL MEDICINE

## 2022-11-08 PROCEDURE — 3074F SYST BP LT 130 MM HG: CPT | Performed by: INTERNAL MEDICINE

## 2022-11-08 PROCEDURE — G8417 CALC BMI ABV UP PARAM F/U: HCPCS | Performed by: INTERNAL MEDICINE

## 2022-11-08 PROCEDURE — G8427 DOCREV CUR MEDS BY ELIG CLIN: HCPCS | Performed by: INTERNAL MEDICINE

## 2022-11-08 RX ORDER — PRAVASTATIN SODIUM 40 MG
20 TABLET ORAL
COMMUNITY
Start: 2014-05-09 | End: 2022-11-11 | Stop reason: ALTCHOICE

## 2022-11-08 ASSESSMENT — ENCOUNTER SYMPTOMS
VOMITING: 0
WHEEZING: 1
CHEST TIGHTNESS: 0
COUGH: 1
DIARRHEA: 0
VOICE CHANGE: 0
ABDOMINAL PAIN: 0
SORE THROAT: 0
RHINORRHEA: 0
SHORTNESS OF BREATH: 1
NAUSEA: 0
EYE ITCHING: 0

## 2022-11-08 NOTE — PROGRESS NOTES
Subjective:     David Mathew is a [de-identified] y.o. male who complains today of:     Chief Complaint   Patient presents with    Follow-up     4m f/u     COPD    Sleep Apnea       HPI  He is using O2 but not every night   He is using symbicort  160-4.5 mcg  2 puff , nebulizer albuterol and albuterol HFA prn .   C/o shortness of breath with any exertion . C/o Wheezing. C/o Cough with thick  white Sputum. No Hemoptysis. No Chest tightness. No Chest pain with radiation  or pleuritic pain. No  leg edema. No orthopnea. No Fever or chills. No Rhinorrhea and postnasal drip. He is on Xarelto 10 mg daily      He said he is not  using CPAP  5-15 cm  and he said restart using   he was using O2 with sleep prn bases. Allergies:  Patient has no known allergies.   Past Medical History:   Diagnosis Date    Abnormality of gait and mobility     Acute sinusitis     Anxiety     Aspiration into respiratory tract 11/10/2017    Aspiration pneumonia (HCC)     Bilateral pulmonary embolism ([address] 1/6/2018    BPH (benign prostatic hyperplasia)     COPD (chronic obstructive pulmonary disease) ([address] 12/5/2013    Debility     Elevated CK 12/30/2013    Foraminal stenosis of lumbosacral region 6/7/2016    GERD (gastroesophageal reflux disease) 12/11/2014    History of asthma 1/13/2014    HTN (hypertension) 1/13/2014    past braden / no current meds    Hx of blood clots 01/2018    DVT  ( unsure which leg but both were swollen ) -> PE -> thus Xarelto    Hyperlipidemia     meds > 10 yrs    Hypothyroidism     Insomnia     Lower extremity weakness 1/24/2014    On home oxygen therapy     2L at night    Osteoarthritis of spine with radiculopathy, lumbar region 6/7/2016    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    no trx to follow    Positive D dimer 12/5/2013    Sleep apnea 1/24/2014    Type 2 diabetes mellitus ([address]     diet control  / no meds    Vitamin D deficiency      Past Surgical History:   Procedure Laterality Date    BRONCHOSCOPY N/A 2017    BRONCHOSCOPY FIBEROPTIC performed by Amanda Ardon MD at [address] [facility], COLON, DIAGNOSTIC      EYE SURGERY      phaco with IOL OU    HERNIA REPAIR  [id number]    repair umbilical hernia    KNEE SURGERY Left 1970    [facility] ARTHROSCOPY SHOULDER SURGICAL BICEPS TENODESIS Right 2018    RIGHT SHOULDER ARTHROSCOPY SUBACROMIAL DECOMPRESS WITH POSSIBLE BICEPS TENODESIS ARTHROSCOPIC [facility]1 [facility] HOUR performed by Lady Duque MD at [facility] [address] Right 2017    RIGHT THORACOTOMY WEDGE RESECTION FOR FOREIGN BODY AND FOB DOUBLE LUMEN (1ST CASE) [facility]      cancer / no chemo or radiation     Family History   Problem Relation Age of Onset    Other Mother 80        Old Age    Stroke Father 45    No Known Problems Sister     Psoriasis Daughter     No Known Problems Son      Social History     Socioeconomic History    Marital status:      Spouse name: Not on file    Number of children: 2    Years of education: Not on file    Highest education level: Not on file   Occupational History    Occupation: retired   Tobacco Use    Smoking status: Former     Packs/day: 1.00     Years: 40.00     Pack years: 40.00     Types: Cigarettes     Start date: 2000     Quit date: 2000     Years since quittin.9    Smokeless tobacco: Former    Tobacco comments:     quit    Vaping Use    Vaping Use: Never used   Substance and Sexual Activity    Alcohol use: Not Currently     Comment: rare social    Drug use: No    Sexual activity: Not on file   Other Topics Concern    Not on file   Social History Narrative    The patient lives alone in a one story home with one step to enter the home. The bedroom and bathroom are on the first floor. His social support includes his children.   He has a wheeled walker, rollator, cane and dressing assistive device at home. He was receiving home health care services prior to admission to include PT, OT and RN. He does not have history of falls prior to admission. He was independent with mobility with a wheeled walker as needed prior to admission. He was independent with self care prior to admission. His goal is to get stronger and return home. LIVES AT HOME ALONE. HE HAS A ROLLATOR HE USES. HIS SON LIVES IN [place] AND IS AVAILABLE IF HE NEEDS HIM. Type of Home: House    Home Layout: One level    Home Access: Level entry    Home Equipment: Cane, 4 wheeled walker    ADL Assistance: Independent    Homemaking Assistance: Independent    Ambulation Assistance: Independent    Transfer Assistance: Independent    Additional Comments: son and dtr can assist upon DC home with IADLs     Social Determinants of Health     Financial Resource Strain: Not on file   Food Insecurity: Not on file   Transportation Needs: Not on file   Physical Activity: Not on file   Stress: Not on file   Social Connections: Not on file   Intimate Partner Violence: Not on file   Housing Stability: Not on file         Review of Systems   Constitutional:  Negative for chills, diaphoresis, fatigue and fever. HENT:  Negative for congestion, mouth sores, nosebleeds, postnasal drip, rhinorrhea, sneezing, sore throat and voice change. Eyes:  Negative for itching and visual disturbance. Respiratory:  Positive for cough, shortness of breath and wheezing. Negative for chest tightness. Cardiovascular: Negative. Negative for chest pain, palpitations and leg swelling. Gastrointestinal:  Negative for abdominal pain, diarrhea, nausea and vomiting. Genitourinary:  Negative for difficulty urinating and hematuria. Musculoskeletal:  Negative for arthralgias, joint swelling and myalgias. Skin:  Negative for rash. Allergic/Immunologic: Negative for environmental allergies.    Neurological:  Negative for dizziness, tremors, weakness and headaches. Psychiatric/Behavioral:  Negative for behavioral problems and sleep disturbance.        :     Vitals:    11/08/22 1349   BP: 128/80   Site: Right Upper Arm   Position: Sitting   Cuff Size: Medium Adult   Pulse: 91   SpO2: 96%   Weight: 235 lb (106.6 kg)     Wt Readings from Last 3 Encounters:   11/08/22 235 lb (106.6 kg)   11/06/22 225 lb (102.1 kg)   06/30/22 220 lb (99.8 kg)         Physical Exam  Constitutional:       Appearance: He is well-developed. He is obese. HENT:      Head: Normocephalic and atraumatic. Nose: Nose normal.   Eyes:      Conjunctiva/sclera: Conjunctivae normal.      Pupils: Pupils are equal, round, and reactive to light. Neck:      Thyroid: No thyromegaly. Vascular: No JVD. Trachea: No tracheal deviation. Cardiovascular:      Rate and Rhythm: Normal rate and regular rhythm. Heart sounds: No murmur heard. No friction rub. No gallop. Pulmonary:      Effort: Pulmonary effort is normal. No respiratory distress. Breath sounds: Rales (bibasilar) present. No wheezing. Comments: diminished Breath sound bilaterally. Chest:      Chest wall: No tenderness. Abdominal:      General: There is no distension. Musculoskeletal:         General: Normal range of motion. Lymphadenopathy:      Cervical: No cervical adenopathy. Skin:     General: Skin is warm and dry. Findings: No rash. Neurological:      Mental Status: He is alert and oriented to person, place, and time. Cranial Nerves: No cranial nerve deficit. Psychiatric:         Behavior: Behavior normal.       Current Outpatient Medications   Medication Sig Dispense Refill    pravastatin (PRAVACHOL) 40 MG tablet 20 mg      docusate sodium (COLACE) 50 MG capsule 50 mg      HYDROcodone-acetaminophen (NORCO) 5-325 MG per tablet Take 1 tablet by mouth every 6 hours as needed for Pain for up to 3 days. Intended supply: 3 days.  Take lowest dose possible to manage pain 12 tablet 0 sulfamethoxazole-trimethoprim (BACTRIM DS) 800-160 MG per tablet Take 1 tablet by mouth 2 times daily for 5 days 10 tablet 0    ondansetron (ZOFRAN ODT) 4 MG disintegrating tablet Take 1 tablet by mouth every 4 hours as needed for Nausea or Vomiting 8 tablet 0    nystatin (MYCOSTATIN) 128566 UNIT/ML suspension Take 5 mLs by mouth 4 times daily 1 each 1    budesonide-formoterol (SYMBICORT) 160-4.5 MCG/ACT AERO Inhale 2 puffs into the lungs 2 times daily 1 each 3    albuterol (PROVENTIL) (2.5 MG/3ML) 0.083% nebulizer solution Use 1 vial via nebulizer every 6 hours as needed for Wheezing 360 mL 3    albuterol sulfate  (90 Base) MCG/ACT inhaler Inhale 2 puffs into the lungs every 4 hours as needed for Wheezing 1 each 3    rosuvastatin (CRESTOR) 20 MG tablet       levothyroxine (SYNTHROID) 137 MCG tablet       sertraline (ZOLOFT) 50 MG tablet Take 50 mg by mouth daily Pt takes at night      alfuzosin (UROXATRAL) 10 MG extended release tablet Take by mouth      rivaroxaban (XARELTO) 10 MG TABS tablet Take 1 tablet by mouth daily (with breakfast) 30 tablet 6    Respiratory Therapy Supplies OK New CPAP mask and supplies 1 Device 0    aspirin 81 MG EC tablet Take 1 tablet by mouth daily 30 tablet 3    vitamin D (CHOLECALCIFEROL) 1000 UNIT TABS tablet Take 2 tablets by mouth daily 60 tablet 1    clonazePAM (KLONOPIN) 0.5 MG tablet Take 0.5 mg by mouth nightly as needed. .      CPAP Machine MISC by Does not apply route Auto CPAP  5-20 cm 1 each 0    magnesium 30 MG tablet Take 250 mg by mouth daily      mirtazapine (REMERON) 15 MG tablet Take 15 mg by mouth nightly      finasteride (PROSCAR) 5 MG tablet Take 1 tablet by mouth daily 30 tablet 3    tamsulosin (FLOMAX) 0.4 MG capsule Take 1 capsule by mouth nightly (Patient taking differently: Take 0.4 mg by mouth daily) 30 capsule 3     No current facility-administered medications for this visit.        Results for orders placed during the hospital encounter of 06/07/22    XR CHEST (2 VW)    Narrative  Indication: Shortness of breath. EXAM: X-ray of the chest PA and lateral views. COMPARISON: 12/31/2021 exam.    FINDINGS:    Mild bibasilar pulmonary infiltrates, more on the right side. Stable elevated right hemidiaphragm. Normal cardiac silhouette. No pleural effusions. Normal hilar shortness. Central mediastinum. Impression  Mild bibasilar pulmonary infiltrates, more on the right side. Stable elevated right hemidiaphragm. Results for orders placed in visit on 09/02/21    XR CHEST STANDARD (2 VW)    Narrative  DIAGNOSIS:J44.9 Chronic obstructive pulmonary disease, unspecified COPD type ([facility] ICD10    COMPARISON: July 17, 2019    TECHNIQUE: PA and lateral chest    FINDINGS: Postsurgical changes are again seen in the right lower lobe. There is likely mild atelectasis or scarring is well. Mild atelectasis is seen in the left base. Cardiac silhouette is within normal limits. Calcifications are seen in the thoracic  aorta. Motion artifact is seen on the lateral projection. Degenerative changes are seen in the dorsal spine. Degenerative changes are also seen in the visualized portion of the shoulders. Impression  IMPRESSION:  No evidence of acute cardiopulmonary process      Results for orders placed during the hospital encounter of 12/14/18    XR CHEST STANDARD (2 VW)    Narrative  EXAMINATION: Chest x-ray, 2 view    HISTORY: Hypoxia. Shortness of breath. TECHNIQUE: AP and lateral views of the chest    COMPARISON: CT thorax from December 14, 2018    FINDINGS:    Cardiomediastinal silhouette is within normal limits. Chronic mild elevation of the right hemidiaphragm. Postsurgical changes of the right lung base with right lung base atelectasis versus scarring. Left lung base subsegmental atelectasis versus  scarring. No pneumothorax, pleural effusion, or focal consolidation. Degenerative changes of the thoracic spine.     Impression  Postsurgical changes of the right lung base with scarring and/or atelectasis.  ]  Results for orders placed during the hospital encounter of 11/06/22    XR CHEST PORTABLE    Narrative  EXAMINATION:  ONE XRAY VIEW OF THE CHEST    11/6/2022 5:35 pm    COMPARISON:  06/07/2022    HISTORY:  ORDERING SYSTEM PROVIDED HISTORY: copd  TECHNOLOGIST PROVIDED HISTORY:  Reason for exam:->copd  What reading provider will be dictating this exam?->CRC    FINDINGS:  The lungs are without acute focal process. There is no effusion or  pneumothorax. The cardiomediastinal silhouette is without acute process. The  osseous structures are without acute process. Impression  No acute process. Results for orders placed during the hospital encounter of 12/31/21    XR CHEST PORTABLE    Narrative  EXAMINATION:  CHEST RADIOGRAPH (PORTABLE SINGLE AP VIEW)    Exam Date/Time:  12/31/2021 12:51 PM    Clinical History:   sob/ copd    Comparison:  Chest radiograph 9/2/2021      FINDINGS:  Cardiomediastinal silhouette:  Stable heart size within normal limits. Lungs and pleura:  Redemonstrated eventration of the right hemidiaphragm with right lower lung zone hazy opacities, likely representing atelectasis. Otherwise, there are no focal consolidations, pleural effusions or pneumothorax. Impression  No acute cardiopulmonary process. Results for orders placed during the hospital encounter of 07/17/19    XR CHEST PORTABLE    Narrative  Portable chest radiograph    History: Cough with shortness of breath    Technique: AP portable view of the chest obtained. Comparison: CT chest from December 14, 2018 and chest radiograph from December 17, 2018    Findings:    Atherosclerotic calcification of the thoracic aorta. The cardiomediastinal silhouette is within normal limits. No pneumothorax, pleural effusion, or focal consolidation. Postsurgical changes of the right lower lobe with adjacent atelectasis and scarring.   Left lung base subsegmental atelectasis versus scarring. Osseous structures of the thorax appear intact. Advanced right and moderate left shoulder osteoarthritis. Impression  No acute intrathoracic process or significant interval change when compared to radiographs of December 17, 2018.  ]  No results found for this or any previous visit.  ]  No results found for this or any previous visit.  ]  Results for orders placed during the hospital encounter of 11/10/17    CT Chest W Contrast    Narrative  EXAMINATION:  CT CHEST W CONTRAST    CLINICAL HISTORY:  ACUTE RESP ILLNESS, >37YEARS OLD  status post bronchoscopy to retrieve aspirated dental amalgam, unsuccessful. COMPARISONS:  12/2/2013    TECHNIQUE:  Spiral scans with 75 mL Isovue-370 IV. Multiplanar 2-D reconstructions. Axial 3-D 10 x 3 mm MIP reconstructions were performed. All CT scans at this facility use dose modulation, iterative reconstruction, and/or weight based dosing when  appropriate to reduce radiation dose to as low as reasonably achievable. FINDINGS:  The known aspirated filling is identified within the distal segmental bronchus of the right lower lobe posterior basilar segment. Filling measures approximately 9 mm. There are mild emphysematous changes. There is mild scattered peripheral  reticularity without definite honeycombing. There are no consolidations or effusions. There is right upper lung perifissural nodularity consistent with small intrapulmonary lymph nodes. There is a left upper lobe 4 mm nodule (series 2 image 21),  unchanged from prior examination of 12/2/2013, and therefore benign. There are no suspicious lung nodules. There is mild bilateral lower lobe cylindrical bronchiectasis. Cardiac size and pulmonary vascularity are normal. There is mild mediastinal lipomatosis. There is mild thickening or trace fluid in the anterior pericardium, decreased compared to prior examination 12/2/2013. There are coronary artery calcifications. There are aortic calcifications.  There is no evidence of aneurysm or dissection. There are borderline in size lymph nodes in the mediastinum anterior to the left mainstem bronchus and in the right hilum, unchanged from prior examination of 12/2/2013. There is no thoracic adenopathy. The esophagus is unremarkable. There is spondylosis. There are no acute or suspicious bone lesions. Scans of the subdiaphragmatic regions demonstrate a 6 mm oval metallic density in the gastric fundus, presumably representing a swallowed filling. There are stable small cysts in the liver. There are partially visualized renal cysts. Impression  ASPIRATED FILLING IN RIGHT LOWER LOBE POSTERIOR BASILAR SEGMENTAL BRONCHUS. MILD EMPHYSEMA AND LOWER LOBE CYLINDRICAL BRONCHIECTASIS. ADDITIONAL FINDINGS AS ABOVE. Assessment/Plan:     1. Chronic obstructive pulmonary disease, unspecified COPD type ([address]  He is using O2 but not every night . He is using symbicort  160-4.5 mcg  2 puff , nebulizer albuterol and albuterol HFA prn . C/o shortness of breath with any exertion . C/o Wheezing. C/o Cough with thick  white Sputum. 2. Bilateral pulmonary embolism ([address]  He is on Xarelto 10 mg daily     3. GEORGES (obstructive sleep apnea)  He said he is not  using CPAP  5-15 cm  and he said restart using   he was using O2 with sleep prn bases. 4. On home oxygen therapy  Continue O2 to keep saturation 90% or above. 5. Shortness of breath  He is having chronic exertional shortness of breath which is unchanged. O2 and bronchodilator as before. Return in about 4 months (around 3/8/2023) for COPD, hypoxia on O2, georges.       Naz Gan MD

## 2022-11-09 ASSESSMENT — ENCOUNTER SYMPTOMS
ABDOMINAL PAIN: 1
EYE REDNESS: 0
COLOR CHANGE: 0
VOMITING: 0
DIARRHEA: 0
BACK PAIN: 0
NAUSEA: 0
SHORTNESS OF BREATH: 0
COUGH: 0
SORE THROAT: 0

## 2022-11-11 ENCOUNTER — OFFICE VISIT (OUTPATIENT)
Dept: UROLOGY | Age: 82
End: 2022-11-11
Payer: MEDICARE

## 2022-11-11 ENCOUNTER — HOSPITAL ENCOUNTER (OUTPATIENT)
Dept: GENERAL RADIOLOGY | Age: 82
Discharge: HOME | End: 2022-11-13
Payer: MEDICARE

## 2022-11-11 VITALS
SYSTOLIC BLOOD PRESSURE: 130 MMHG | HEART RATE: 79 BPM | OXYGEN SATURATION: 95 % | BODY MASS INDEX: 31.83 KG/M2 | WEIGHT: 235 LBS | DIASTOLIC BLOOD PRESSURE: 72 MMHG | HEIGHT: 72 IN

## 2022-11-11 DIAGNOSIS — N20.0 KIDNEY STONE: Primary | ICD-10-CM

## 2022-11-11 DIAGNOSIS — N20.0 KIDNEY STONE: ICD-10-CM

## 2022-11-11 PROCEDURE — G8427 DOCREV CUR MEDS BY ELIG CLIN: HCPCS | Performed by: UROLOGY

## 2022-11-11 PROCEDURE — 1123F ACP DISCUSS/DSCN MKR DOCD: CPT | Performed by: UROLOGY

## 2022-11-11 PROCEDURE — G8417 CALC BMI ABV UP PARAM F/U: HCPCS | Performed by: UROLOGY

## 2022-11-11 PROCEDURE — 74018 RADEX ABDOMEN 1 VIEW: CPT

## 2022-11-11 PROCEDURE — 1036F TOBACCO NON-USER: CPT | Performed by: UROLOGY

## 2022-11-11 PROCEDURE — 99214 OFFICE O/P EST MOD 30 MIN: CPT | Performed by: UROLOGY

## 2022-11-11 PROCEDURE — 3074F SYST BP LT 130 MM HG: CPT | Performed by: UROLOGY

## 2022-11-11 PROCEDURE — G8484 FLU IMMUNIZE NO ADMIN: HCPCS | Performed by: UROLOGY

## 2022-11-11 PROCEDURE — 3078F DIAST BP <80 MM HG: CPT | Performed by: UROLOGY

## 2022-11-11 NOTE — PROGRESS NOTES
[facility] EVALUATION NOTE                                                 H&P          Note:  Assessment and plan  4 mm right mid ureteral calculus  Patient currently asymptomatic  No obvious stone on KUB  Questionable distal ureteral stone  Patient on tamsulosin  Continue propulsive therapy  Follow-up next week if pain recurs      The note below is complete evaluation of patient on follow-up/consultation                                                                                                                                                 Reason for Visit  Right-sided abdominal pain    History of Present Illness  43-year-old male with history of nephrolithiasis  Bilateral renal cysts  Evaluated for abdominal pain on the right  CT scan showed a 4 mm stone mid ureter  Today's KUB shows no evidence of stone in the mid ureter  Question of ureteral calculus versus phlebolith on today's KUB  Patient asymptomatic  Denies frequency      Urologic Review of Systems/Symptoms  No issues with voiding denies hematuria    Review of Systems  Hospitalization: Recent evaluation for abdominal pain  All 14 categories of Review of Systems otherwise reviewed no other findings reported.   History of renal cysts  Past Medical History:   Diagnosis Date    Abnormality of gait and mobility     Acute sinusitis     Anxiety     Aspiration into respiratory tract 11/10/2017    Aspiration pneumonia ([address]     Bilateral pulmonary embolism ([address] 1/6/2018    BPH (benign prostatic hyperplasia)     COPD (chronic obstructive pulmonary disease) ([address] 12/5/2013    Debility     Elevated CK 12/30/2013    Foraminal stenosis of lumbosacral region 6/7/2016    GERD (gastroesophageal reflux disease) 12/11/2014    History of asthma 1/13/2014    HTN (hypertension) 1/13/2014    past braden / no current meds    Hx of blood clots 01/2018    DVT  ( unsure which leg but both were swollen ) -> PE -> thus Xarelto    Hyperlipidemia     meds > 10 yrs Hypothyroidism     Insomnia     Lower extremity weakness 2014    On home oxygen therapy     2L at night    Osteoarthritis of spine with radiculopathy, lumbar region 2016    Papillary thyroid carcinoma ([facility] 2006    no trx to follow    Positive D dimer 2013    Sleep apnea 2014    Type 2 diabetes mellitus ([facility]     diet control  / no meds    Vitamin D deficiency      Past Surgical History:   Procedure Laterality Date    BRONCHOSCOPY N/A 2017    BRONCHOSCOPY FIBEROPTIC performed by James Mathias MD at [address], [place], [address] with IOL OU    HERNIA REPAIR  [id number]    repair umbilical hernia    KNEE SURGERY Left 1970    MN ARTHROSCOPY SHOULDER SURGICAL BICEPS TENODESIS Right 2018    RIGHT SHOULDER ARTHROSCOPY SUBACROMIAL DECOMPRESS WITH POSSIBLE BICEPS TENODESIS ARTHROSCOPIC FRANSISCA C- ARM ARTHREX BICEPS TENODESIS TRAY [facility] CHAIR CASE #1 1 HOUR performed by Kevin Crawford MD at [facility] [address] Right 2017    RIGHT THORACOTOMY WEDGE RESECTION FOR FOREIGN BODY AND FOB DOUBLE LUMEN (1ST CASE) performed by Hernan Sethi MD at [address]      cancer / no chemo or radiation     Social History     Socioeconomic History    Marital status:       Spouse name: None    Number of children: 2    Years of education: None    Highest education level: None   Occupational History    Occupation: retired   Tobacco Use    Smoking status: Former     Packs/day: 1.00     Years: 40.00     Pack years: 40.00     Types: Cigarettes     Start date: 2000     Quit date: 2000     Years since quittin.9    Smokeless tobacco: Former    Tobacco comments:     quit    Vaping Use    Vaping Use: Never used   Substance and Sexual Activity    Alcohol use: Not Currently     Comment: rare social    Drug use: No   Social History Narrative    The patient lives alone in a one story home with one step to enter the home. The bedroom and bathroom are on the first floor. His social support includes his children. He has a wheeled walker, rollator, cane and dressing assistive device at home. He was receiving home health care services prior to admission to include PT, OT and RN. He does not have history of falls prior to admission. He was independent with mobility with a wheeled walker as needed prior to admission. He was independent with self care prior to admission. His goal is to get stronger and return home. LIVES AT HOME ALONE. HE HAS A ROLLATOR HE USES. HIS SON LIVES IN [place] AND IS AVAILABLE IF HE NEEDS HIM.      Type of Home: House    Home Layout: One level    Home Access: Level entry    Home Equipment: Cane, 4 wheeled walker    ADL Assistance: Independent    Homemaking Assistance: Independent    Ambulation Assistance: Independent    Transfer Assistance: Independent    Additional Comments: son and dtr can assist upon DC home with IADLs     Family History   Problem Relation Age of Onset    Other Mother 80        Old Age    Stroke Father 45    No Known Problems Sister     Psoriasis Daughter     No Known Problems Son      Current Outpatient Medications   Medication Sig Dispense Refill    sulfamethoxazole-trimethoprim (BACTRIM DS) 800-160 MG per tablet Take 1 tablet by mouth 2 times daily for 5 days 10 tablet 0    ondansetron (ZOFRAN ODT) 4 MG disintegrating tablet Take 1 tablet by mouth every 4 hours as needed for Nausea or Vomiting 8 tablet 0    budesonide-formoterol (SYMBICORT) 160-4.5 MCG/ACT AERO Inhale 2 puffs into the lungs 2 times daily 1 each 3    albuterol (PROVENTIL) (2.5 MG/3ML) 0.083% nebulizer solution Use 1 vial via nebulizer every 6 hours as needed for Wheezing 360 mL 3    albuterol sulfate  (90 Base) MCG/ACT inhaler Inhale 2 puffs into the lungs every 4 hours as needed for Wheezing 1 each 3    rosuvastatin (CRESTOR) 20 MG tablet       levothyroxine (SYNTHROID) 137 MCG tablet       sertraline (ZOLOFT) 50 MG tablet Take 50 mg by mouth daily Pt takes at night      alfuzosin (UROXATRAL) 10 MG extended release tablet Take by mouth      rivaroxaban (XARELTO) 10 MG TABS tablet Take 1 tablet by mouth daily (with breakfast) 30 tablet 6    Respiratory Therapy Supplies OK New CPAP mask and supplies 1 Device 0    aspirin 81 MG EC tablet Take 1 tablet by mouth daily 30 tablet 3    vitamin D (CHOLECALCIFEROL) 1000 UNIT TABS tablet Take 2 tablets by mouth daily 60 tablet 1    clonazePAM (KLONOPIN) 0.5 MG tablet Take 0.5 mg by mouth nightly as needed. .      CPAP Machine MISC by Does not apply route Auto CPAP  5-20 cm 1 each 0    magnesium 30 MG tablet Take 250 mg by mouth daily      mirtazapine (REMERON) 15 MG tablet Take 15 mg by mouth nightly      finasteride (PROSCAR) 5 MG tablet Take 1 tablet by mouth daily 30 tablet 3    tamsulosin (FLOMAX) 0.4 MG capsule Take 1 capsule by mouth nightly (Patient taking differently: Take 0.4 mg by mouth daily) 30 capsule 3     No current facility-administered medications for this visit. Patient has no known allergies. All reviewed and verified by Dr Arturo Bowman on today's visit    PSA   Date Value Ref Range Status   06/09/2021 4.62 0.00 - 6.22 ng/mL Final   03/03/2020 3.74 0.00 - 6.22 ng/mL Final   03/19/2019 3.39 0.00 - 6.22 ng/mL Final   01/05/2017 3.56 0.00 - 6.22 ng/mL Final   12/28/2015 4.19 0.00 - 6.22 ng/mL Final     No results found for this visit on 11/11/22. Physical Exam  Vitals:    11/11/22 0856   BP: 130/72   Pulse: 79   SpO2: 95%   Weight: 235 lb (106.6 kg)   Height: 6' (1.829 m)     Constitutional: Not in distress.   Urologic Exam  CT KUB reviewed  Additional findings  None  Remainder the physical exam is normal  Assessment/Medical Necessity-Decision Making  4 mm stone mid ureter right patient asymptomatic he may have passed a stone  Question of phlebolith versus a ureteral stone on the left with no evidence of left-sided hydronephrosis  Bilateral renal cysts  Plan  No plans on intervention at this time  Patient will call if he experiences pain again which may require repeat of CT  Otherwise as needed  Greater than 50% of 30 minutes spent consulting patient face-to-face  No orders of the defined types were placed in this encounter. No orders of the defined types were placed in this encounter. Chan Preciado MD       Please note this report has been partially produced using speech recognition software  And may cause contain errors related to that system including grammar, punctuation and spelling as well as words and phrases that may seem inappropriate. If there are questions or concerns please feel free to contact me to clarify.

## 2022-12-30 NOTE — CONSULTS
Consult Note    Reason for Consult:  Medical management     Requesting Physician:  Titus Uriarte MD    HISTORY OF PRESENT ILLNESS:    The patient is a 69 y.o. male who presents with right shoulder osteoarthritis, s/p arthroscopic decompression. Pt states he has been having worsening pain in right shoulder in the last 6 months, has had steroid injection to shoulder and takes acetaminophen for pain control with no pain relieve. Surgery was uneventful postop dressing CDI with right immobilizer in place. He denies any Nausea or vomiting. Pt also states that he tripped and fell yesterday when he presented to the hospital for admission for his surgery, he has a closed laceration above left eye brow. He denies any HA, dizziness  or discomfort. He has a hx of HTN and DM but not on any  medication because he was having low b/p's in the past and antihypertensive medication was discontinued, BG was also controlled after he lost weight.          Past Medical History:   Diagnosis Date    Abnormality of gait and mobility     Acute sinusitis     Anxiety     Aspiration into respiratory tract 11/10/2017    Aspiration pneumonia (HCC)     BPH (benign prostatic hyperplasia)     COPD (chronic obstructive pulmonary disease) ([facility] 12/5/2013    Debility     Elevated CK 12/30/2013    Foraminal stenosis of lumbosacral region 6/7/2016    GERD (gastroesophageal reflux disease) 12/11/2014    History of asthma 1/13/2014    HTN (hypertension) 1/13/2014    past braden / no current meds    Hx of blood clots 01/2018    DVT  ( unsure which leg but both were swollen ) -> PE -> thus Xarelto    Hyperlipidemia     meds > 10 yrs    Hypothyroidism     Insomnia     Lower extremity weakness 1/24/2014    On home oxygen therapy     2L at night    Osteoarthritis of spine with radiculopathy, lumbar region 6/7/2016    Papillary thyroid carcinoma ([facility] 12/2006    no trx to follow    Positive D dimer 12/5/2013    Sleep apnea 1/24/2014    Type 2 diabetes mellitus ([facility]     diet control  / no meds    Vitamin D deficiency        Past Surgical History:   Procedure Laterality Date    BRONCHOSCOPY N/A 11/11/2017    BRONCHOSCOPY FIBEROPTIC performed by Flakita Limon MD at [address] [place] [address] COLONOSCOPY      ENDOSCOPY, COLON, DIAGNOSTIC      EYE SURGERY      phaco with IOL OU    HERNIA REPAIR  [id number]    repair umbilical hernia    KNEE SURGERY Left 1970    THORACOTOMY Right 11/14/2017    RIGHT THORACOTOMY WEDGE RESECTION FOR FOREIGN BODY AND FOB DOUBLE LUMEN (1ST CASE) performed by Froylan Aguirre MD at [address] [place] [address]  2006    cancer / no chemo or radiation       Prior to Admission medications    Medication Sig Start Date End Date Taking? Authorizing Provider   pravastatin (PRAVACHOL) 20 MG tablet Take 20 mg by mouth every evening   Yes Historical Provider, MD   albuterol (ACCUNEB) 1.25 MG/3ML nebulizer solution Inhale 1 ampule into the lungs every 6 hours as needed   Yes Historical Provider, MD   melatonin 3 MG TABS tablet Take 10 mg by mouth nightly as needed   Yes Historical Provider, MD   magnesium 30 MG tablet Take 250 mg by mouth daily   Yes Historical Provider, MD   docusate sodium (COLACE) 100 MG capsule Take 100 mg by mouth daily   Yes Historical Provider, MD   mirtazapine (REMERON) 15 MG tablet Take 15 mg by mouth nightly   Yes Historical Provider, MD   finasteride (PROSCAR) 5 MG tablet Take 1 tablet by mouth daily 12/5/17  Yes Ana Pond MD   tamsulosin [facility]) 0.4 MG capsule Take 1 capsule by mouth nightly 12/5/17  Yes Ana Pond MD   levothyroxine (SYNTHROID) 125 MCG tablet Take 1 tablet by mouth Daily  Patient taking differently: Take 137 mcg by mouth Daily  12/5/17  Yes Ana Pond MD   Famotidine (PEPCID AC PO) Take by mouth daily as needed    Yes Historical Provider, MD   budesonide-formoterol (SYMBICORT) 160-4.5 MCG/ACT AERO Inhale 2 puffs into the lungs 2 times daily.    Yes Historical Provider, SpO2: 99% 99% 99% 99%   Weight:       Height:         General appearance: No apparent distress, appears stated age and cooperative. HEENT: Pupils equal, round, and reactive to light. Conjunctivae/corneas clear. Neck: Supple, with full range of motion. No jugular venous distention. Trachea midline. Respiratory:  Normal respiratory effort. Clear to auscultation, bilaterally without Rales/Wheezes/Rhonchi. Cardiovascular: Regular rate and rhythm with normal S1/S2 without murmurs, rubs or gallops. Abdomen: Soft, non-tender, non-distended with normal bowel sounds. Musculoskeletal: No clubbing, cyanosis or edema bilaterally. Skin: Skin color, texture, turgor normal.  No rashes or lesions. Neuro: Non Focal. Symetrical motor and tone. Nl Comprehension, Alert,awake and oriented. NL CN. Symetrical tone and reflexes.   Psychiatric: Alert and oriented, thought content appropriate, normal insight  Capillary Refill: Brisk,< 3 seconds   Peripheral Pulses: +2 palpable, equal bilaterally     Labs:  Recent Results (from the past 24 hour(s))   POCT Glucose    Collection Time: 09/18/18 12:00 AM   Result Value Ref Range    Glucose 86 mg/dL   POCT Glucose    Collection Time: 09/18/18  7:42 AM   Result Value Ref Range    POC Glucose 86 60 - 115 mg/dl    Performed on ACCU-CHEK    POCT Glucose    Collection Time: 09/18/18 11:09 AM   Result Value Ref Range    POC Glucose 129 (H) 60 - 115 mg/dl    Performed on ACCU-CHEK      Assessment/Plan:  Right shoulder osteoarthritis : s/p arthroscopic  decompression, pain control, f/u with orthopedic surgery , cbc in am to check for post op anemia     HTN: start daily amlodipine and titrate per b/p reading,  monitor b/p closely , add PRN hydralazine     HLD: resume daily statins     Hypothyroid: resume daily levothyroxine     COPD : pt asymptomatic, resume daily inhalers and  bronchodilators as needed     BPH: resume daily finasteride     Hx DVT: xarelto on hold d/t surgery, will resume per No

## 2023-03-06 ENCOUNTER — OFFICE VISIT (OUTPATIENT)
Dept: PULMONOLOGY | Age: 83
End: 2023-03-06

## 2023-03-06 VITALS
BODY MASS INDEX: 31.87 KG/M2 | HEART RATE: 50 BPM | DIASTOLIC BLOOD PRESSURE: 78 MMHG | OXYGEN SATURATION: 93 % | SYSTOLIC BLOOD PRESSURE: 132 MMHG | WEIGHT: 235 LBS

## 2023-03-06 DIAGNOSIS — J20.9 ACUTE BRONCHITIS, UNSPECIFIED ORGANISM: ICD-10-CM

## 2023-03-06 DIAGNOSIS — G47.33 OSA (OBSTRUCTIVE SLEEP APNEA): ICD-10-CM

## 2023-03-06 DIAGNOSIS — I26.99 BILATERAL PULMONARY EMBOLISM (HCC): ICD-10-CM

## 2023-03-06 DIAGNOSIS — E66.9 OBESITY (BMI 30-39.9): ICD-10-CM

## 2023-03-06 DIAGNOSIS — Z99.81 ON HOME OXYGEN THERAPY: ICD-10-CM

## 2023-03-06 DIAGNOSIS — J44.9 CHRONIC OBSTRUCTIVE PULMONARY DISEASE, UNSPECIFIED COPD TYPE (HCC): Primary | ICD-10-CM

## 2023-03-06 RX ORDER — PREDNISONE 10 MG/1
TABLET ORAL
Qty: 40 TABLET | Refills: 0 | Status: SHIPPED | OUTPATIENT
Start: 2023-03-06

## 2023-03-06 RX ORDER — DOXYCYCLINE 100 MG/1
CAPSULE ORAL
COMMUNITY
Start: 2023-03-02

## 2023-03-06 ASSESSMENT — ENCOUNTER SYMPTOMS
EYE ITCHING: 0
ABDOMINAL PAIN: 0
SORE THROAT: 0
VOMITING: 0
RHINORRHEA: 0
DIARRHEA: 0
WHEEZING: 1
CHEST TIGHTNESS: 0
SHORTNESS OF BREATH: 1
COUGH: 1
NAUSEA: 0
VOICE CHANGE: 0

## 2023-03-06 NOTE — PROGRESS NOTES
Subjective:     Alan Robb is a 81 y.o. male who complains today of:     Chief Complaint   Patient presents with    Follow-up     Complains of a lot mucus       HPI  He is using O2 but not every night and with ambulation. He is using symbicort  160-4.5 mcg  2 puff , nebulizer albuterol and albuterol HFA prn.   C/o shortness of breath with any exertion . C/o Wheezing. C/o Cough with thick  white Sputum. He is started on prednisone and antibiotics with doxy. No Hemoptysis. No Chest tightness. No Chest pain with radiation  or pleuritic pain. No  leg edema. No orthopnea. No Fever or chills. No Rhinorrhea and postnasal drip. He is on Xarelto 10 mg daily   He said he is not  using CPAP  5-15 cm, he is using off and on. Allergies:  Patient has no known allergies.   Past Medical History:   Diagnosis Date    Abnormality of gait and mobility     Acute sinusitis     Anxiety     Aspiration into respiratory tract 11/10/2017    Aspiration pneumonia (HCC)     Bilateral pulmonary embolism ([address] 1/6/2018    BPH (benign prostatic hyperplasia)     COPD (chronic obstructive pulmonary disease) ([address] 12/5/2013    Debility     Elevated CK 12/30/2013    Foraminal stenosis of lumbosacral region 6/7/2016    GERD (gastroesophageal reflux disease) 12/11/2014    History of asthma 1/13/2014    HTN (hypertension) 1/13/2014    past braden / no current meds    Hx of blood clots 01/2018    DVT  ( unsure which leg but both were swollen ) -> PE -> thus Xarelto    Hyperlipidemia     meds > 10 yrs    Hypothyroidism     Insomnia     Lower extremity weakness 1/24/2014    On home oxygen therapy     2L at night    Osteoarthritis of spine with radiculopathy, lumbar region 6/7/2016    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    no trx to follow    Positive D dimer 12/5/2013    Sleep apnea 1/24/2014    Type 2 diabetes mellitus ([address]     diet control  / no meds    Vitamin D deficiency      Past Surgical History:   Procedure Laterality Date BRONCHOSCOPY N/A 2017    BRONCHOSCOPY FIBEROPTIC performed by Gena Sanders MD at [address] [facility] [place], COLON, DIAGNOSTIC      EYE SURGERY      phaco with IOL OU    HERNIA REPAIR  [id number]    repair umbilical hernia    KNEE SURGERY Left 1970    WA ARTHROSCOPY SHOULDER SURGICAL BICEPS TENODESIS Right 2018    RIGHT SHOULDER ARTHROSCOPY SUBACROMIAL DECOMPRESS WITH POSSIBLE BICEPS TENODESIS ARTHROSCOPIC FRANSISCA C- ARM ARTHREX BICEPS TENODESIS [facility] CASE #1 1 HOUR performed by Kathrine Vidales MD at [facility] [address] Right 2017    RIGHT THORACOTOMY WEDGE RESECTION FOR FOREIGN BODY AND FOB DOUBLE LUMEN (1ST CASE) [facility] Cummins [facility]       cancer / no chemo or radiation     Family History   Problem Relation Age of Onset    Other Mother 80        Old Age    Stroke Father 45    No Known Problems Sister     Psoriasis Daughter     No Known Problems Son      Social History     Socioeconomic History    Marital status:      Spouse name: Not on file    Number of children: 2    Years of education: Not on file    Highest education level: Not on file   Occupational History    Occupation: retired   Tobacco Use    Smoking status: Former     Packs/day: 1.00     Years: 40.00     Pack years: 40.00     Types: Cigarettes     Start date: 2000     Quit date: 2000     Years since quittin.2    Smokeless tobacco: Former    Tobacco comments:     quit    Vaping Use    Vaping Use: Never used   Substance and Sexual Activity    Alcohol use: Not Currently     Comment: rare social    Drug use: No    Sexual activity: Not on file   Other Topics Concern    Not on file   Social History Narrative    The patient lives alone in a one story home with one step to enter the home. The bedroom and bathroom are on the first floor. His social support includes his children.   He has a wheeled walker, rollator, cane and dressing assistive device at home. He was receiving home health care services prior to admission to include PT, OT and RN. He does not have history of falls prior to admission. He was independent with mobility with a wheeled walker as needed prior to admission. He was independent with self care prior to admission. His goal is to get stronger and return home. LIVES AT HOME ALONE. HE HAS A ROLLATOR HE USES. HIS SON LIVES IN [place] AND IS AVAILABLE IF HE NEEDS HIM. Type of Home: House    Home Layout: One level    Home Access: Level entry    Home Equipment: Cane, 4 wheeled walker    ADL Assistance: Independent    Homemaking Assistance: Independent    Ambulation Assistance: Independent    Transfer Assistance: Independent    Additional Comments: son and dtr can assist upon DC home with IADLs     Social Determinants of Health     Financial Resource Strain: Not on file   Food Insecurity: Not on file   Transportation Needs: Not on file   Physical Activity: Not on file   Stress: Not on file   Social Connections: Not on file   Intimate Partner Violence: Not on file   Housing Stability: Not on file         Review of Systems   Constitutional:  Negative for chills, diaphoresis, fatigue and fever. HENT:  Negative for congestion, mouth sores, nosebleeds, postnasal drip, rhinorrhea, sneezing, sore throat and voice change. Eyes:  Negative for itching and visual disturbance. Respiratory:  Positive for cough, shortness of breath and wheezing. Negative for chest tightness. Cardiovascular: Negative. Negative for chest pain, palpitations and leg swelling. Gastrointestinal:  Negative for abdominal pain, diarrhea, nausea and vomiting. Genitourinary:  Negative for difficulty urinating and hematuria. Musculoskeletal:  Negative for arthralgias, joint swelling and myalgias. Skin:  Negative for rash. Allergic/Immunologic: Negative for environmental allergies.    Neurological:  Negative for dizziness, tremors, weakness and headaches. Psychiatric/Behavioral:  Negative for behavioral problems and sleep disturbance.        :     Vitals:    03/06/23 1338   BP: 132/78   Pulse: 50   SpO2: 93%   Weight: 235 lb (106.6 kg)     Wt Readings from Last 3 Encounters:   03/06/23 235 lb (106.6 kg)   11/11/22 235 lb (106.6 kg)   11/08/22 235 lb (106.6 kg)         Physical Exam  Constitutional:       Appearance: He is well-developed. HENT:      Head: Normocephalic and atraumatic. Nose: Nose normal.   Eyes:      Conjunctiva/sclera: Conjunctivae normal.      Pupils: Pupils are equal, round, and reactive to light. Neck:      Thyroid: No thyromegaly. Vascular: No JVD. Trachea: No tracheal deviation. Cardiovascular:      Rate and Rhythm: Normal rate and regular rhythm. Heart sounds: No murmur heard. No friction rub. No gallop. Pulmonary:      Effort: Pulmonary effort is normal. No respiratory distress. Breath sounds: Wheezing present. No rales. Chest:      Chest wall: No tenderness. Abdominal:      General: There is no distension. Musculoskeletal:         General: Normal range of motion. Lymphadenopathy:      Cervical: No cervical adenopathy. Skin:     General: Skin is warm and dry. Findings: No rash. Neurological:      Mental Status: He is alert and oriented to person, place, and time. Cranial Nerves: No cranial nerve deficit.    Psychiatric:         Behavior: Behavior normal.       Current Outpatient Medications   Medication Sig Dispense Refill    predniSONE (DELTASONE) 10 MG tablet 4 tablets daily for 4 days, 3 tablets daily for 4 days, 2 tablets daily for 4 days, then 1 tablet daily for 4 days 40 tablet 0    doxycycline monohydrate (MONODOX) 100 MG capsule TAKE 1 CAPSULE BY MOUTH ONCE DAILY FOR 10 DAYS      ondansetron (ZOFRAN ODT) 4 MG disintegrating tablet Take 1 tablet by mouth every 4 hours as needed for Nausea or Vomiting 8 tablet 0    budesonide-formoterol (SYMBICORT) 160-4.5 MCG/ACT AERO Inhale 2 puffs into the lungs 2 times daily 1 each 3    albuterol (PROVENTIL) (2.5 MG/3ML) 0.083% nebulizer solution Use 1 vial via nebulizer every 6 hours as needed for Wheezing 360 mL 3    albuterol sulfate  (90 Base) MCG/ACT inhaler Inhale 2 puffs into the lungs every 4 hours as needed for Wheezing 1 each 3    rosuvastatin (CRESTOR) 20 MG tablet       levothyroxine (SYNTHROID) 137 MCG tablet       sertraline (ZOLOFT) 50 MG tablet Take 50 mg by mouth daily Pt takes at night      alfuzosin (UROXATRAL) 10 MG extended release tablet Take by mouth      rivaroxaban (XARELTO) 10 MG TABS tablet Take 1 tablet by mouth daily (with breakfast) 30 tablet 6    Respiratory Therapy Supplies OK New CPAP mask and supplies 1 Device 0    aspirin 81 MG EC tablet Take 1 tablet by mouth daily 30 tablet 3    vitamin D (CHOLECALCIFEROL) 1000 UNIT TABS tablet Take 2 tablets by mouth daily 60 tablet 1    clonazePAM (KLONOPIN) 0.5 MG tablet Take 0.5 mg by mouth nightly as needed. .      CPAP Machine MISC by Does not apply route Auto CPAP  5-20 cm 1 each 0    magnesium 30 MG tablet Take 250 mg by mouth daily      mirtazapine (REMERON) 15 MG tablet Take 15 mg by mouth nightly      finasteride (PROSCAR) 5 MG tablet Take 1 tablet by mouth daily 30 tablet 3    tamsulosin (FLOMAX) 0.4 MG capsule Take 1 capsule by mouth nightly (Patient taking differently: Take 0.4 mg by mouth daily) 30 capsule 3     No current facility-administered medications for this visit. Results for orders placed during the hospital encounter of 06/07/22    XR CHEST (2 VW)    Narrative  Indication: Shortness of breath. EXAM: X-ray of the chest PA and lateral views. COMPARISON: 12/31/2021 exam.    FINDINGS:    Mild bibasilar pulmonary infiltrates, more on the right side. Stable elevated right hemidiaphragm. Normal cardiac silhouette. No pleural effusions. Normal hilar shortness. Central mediastinum.     Impression  Mild bibasilar pulmonary infiltrates, more on the right side. Stable elevated right hemidiaphragm. Results for orders placed in visit on 09/02/21    XR CHEST STANDARD (2 VW)    Narrative  DIAGNOSIS:J44.9 Chronic obstructive pulmonary disease, unspecified COPD type ([facility] ICD10    COMPARISON: July 17, 2019    TECHNIQUE: PA and lateral chest    FINDINGS: Postsurgical changes are again seen in the right lower lobe. There is likely mild atelectasis or scarring is well. Mild atelectasis is seen in the left base. Cardiac silhouette is within normal limits. Calcifications are seen in the thoracic  aorta. Motion artifact is seen on the lateral projection. Degenerative changes are seen in the dorsal spine. Degenerative changes are also seen in the visualized portion of the shoulders. Impression  IMPRESSION:  No evidence of acute cardiopulmonary process      Results for orders placed during the hospital encounter of 12/14/18    XR CHEST STANDARD (2 VW)    Narrative  EXAMINATION: Chest x-ray, 2 view    HISTORY: Hypoxia. Shortness of breath. TECHNIQUE: AP and lateral views of the chest    COMPARISON: CT thorax from December 14, 2018    FINDINGS:    Cardiomediastinal silhouette is within normal limits. Chronic mild elevation of the right hemidiaphragm. Postsurgical changes of the right lung base with right lung base atelectasis versus scarring. Left lung base subsegmental atelectasis versus  scarring. No pneumothorax, pleural effusion, or focal consolidation. Degenerative changes of the thoracic spine.     Impression  Postsurgical changes of the right lung base with scarring and/or atelectasis.  ]  Results for orders placed during the hospital encounter of 11/06/22    XR CHEST PORTABLE    Narrative  EXAMINATION:  ONE XRAY VIEW OF THE CHEST    11/6/2022 5:35 pm    COMPARISON:  06/07/2022    HISTORY:  ORDERING SYSTEM PROVIDED HISTORY: copd  TECHNOLOGIST PROVIDED HISTORY:  Reason for exam:->copd  What reading provider will be dictating this exam?->CRC    FINDINGS:  The lungs are without acute focal process. There is no effusion or  pneumothorax. The cardiomediastinal silhouette is without acute process. The  osseous structures are without acute process. Impression  No acute process. Results for orders placed during the hospital encounter of 12/31/21    XR CHEST PORTABLE    Narrative  EXAMINATION:  CHEST RADIOGRAPH (PORTABLE SINGLE AP VIEW)    Exam Date/Time:  12/31/2021 12:51 PM    Clinical History:   sob/ copd    Comparison:  Chest radiograph 9/2/2021      FINDINGS:  Cardiomediastinal silhouette:  Stable heart size within normal limits. Lungs and pleura:  Redemonstrated eventration of the right hemidiaphragm with right lower lung zone hazy opacities, likely representing atelectasis. Otherwise, there are no focal consolidations, pleural effusions or pneumothorax. Impression  No acute cardiopulmonary process. Results for orders placed during the hospital encounter of 07/17/19    XR CHEST PORTABLE    Narrative  Portable chest radiograph    History: Cough with shortness of breath    Technique: AP portable view of the chest obtained. Comparison: CT chest from December 14, 2018 and chest radiograph from December 17, 2018    Findings:    Atherosclerotic calcification of the thoracic aorta. The cardiomediastinal silhouette is within normal limits. No pneumothorax, pleural effusion, or focal consolidation. Postsurgical changes of the right lower lobe with adjacent atelectasis and scarring. Left lung base subsegmental atelectasis versus scarring. Osseous structures of the thorax appear intact. Advanced right and moderate left shoulder osteoarthritis.     Impression  No acute intrathoracic process or significant interval change when compared to radiographs of December 17, 2018.  ]  No results found for this or any previous visit.  ]  No results found for this or any previous visit.  ]  Results for orders placed during the hospital encounter of 11/10/17    CT Chest W Contrast    Narrative  EXAMINATION:  CT CHEST W CONTRAST    CLINICAL HISTORY:  ACUTE RESP ILLNESS, >37YEARS OLD  status post bronchoscopy to retrieve aspirated dental amalgam, unsuccessful. COMPARISONS:  12/2/2013    TECHNIQUE:  Spiral scans with 75 mL Isovue-370 IV. Multiplanar 2-D reconstructions. Axial 3-D 10 x 3 mm MIP reconstructions were performed. All CT scans at this facility use dose modulation, iterative reconstruction, and/or weight based dosing when  appropriate to reduce radiation dose to as low as reasonably achievable. FINDINGS:  The known aspirated filling is identified within the distal segmental bronchus of the right lower lobe posterior basilar segment. Filling measures approximately 9 mm. There are mild emphysematous changes. There is mild scattered peripheral  reticularity without definite honeycombing. There are no consolidations or effusions. There is right upper lung perifissural nodularity consistent with small intrapulmonary lymph nodes. There is a left upper lobe 4 mm nodule (series 2 image 21),  unchanged from prior examination of 12/2/2013, and therefore benign. There are no suspicious lung nodules. There is mild bilateral lower lobe cylindrical bronchiectasis. Cardiac size and pulmonary vascularity are normal. There is mild mediastinal lipomatosis. There is mild thickening or trace fluid in the anterior pericardium, decreased compared to prior examination 12/2/2013. There are coronary artery calcifications. There are aortic calcifications. There is no evidence of aneurysm or dissection. There are borderline in size lymph nodes in the mediastinum anterior to the left mainstem bronchus and in the right hilum, unchanged from prior examination of 12/2/2013. There is no thoracic adenopathy. The esophagus is unremarkable. There is spondylosis. There are no acute or suspicious bone lesions.     Scans of the subdiaphragmatic regions demonstrate a 6 mm oval metallic density in the gastric fundus, presumably representing a swallowed filling. There are stable small cysts in the liver. There are partially visualized renal cysts. Impression  ASPIRATED FILLING IN RIGHT LOWER LOBE POSTERIOR BASILAR SEGMENTAL BRONCHUS. MILD EMPHYSEMA AND LOWER LOBE CYLINDRICAL BRONCHIECTASIS. ADDITIONAL FINDINGS AS ABOVE.  ]    Assessment/Plan:     1. Chronic obstructive pulmonary disease, unspecified COPD type ([address]  He is using O2 but not every night and with ambulation. He is using symbicort  160-4.5 mcg    2 puff , nebulizer albuterol and albuterol HFA prn.  C/o shortness of breath with any exertion. C/o Wheezing. C/o Cough with thick white sputum. He is started on prednisone and antibiotics with doxy. 2. Bilateral pulmonary embolism ([address]  He is on Xarelto 10 mg daily     3. GEORGES (obstructive sleep apnea)  He said he is not  using CPAP  5-15 cm, he is using off and on.    4. On home oxygen therapy  2 L O2 during sleep and prn during daytime. He said he is not using oxygen every night. Advised to use O2 to keep saturation 92% or above. 5. Acute bronchitis, unspecified organism r/o pneumonia  - XR CHEST STANDARD (2 VW); Future  - predniSONE (DELTASONE) 10 MG tablet; 4 tablets daily for 4 days, 3 tablets daily for 4 days, 2 tablets daily for 4 days, then 1 tablet daily for 4 days  Dispense: 40 tablet; Refill: 0    6. Obesity (BMI 30-39. 9)  He is advised try to lose weight. obesity related risk explained to the patient ,  Current weight:  235 lb (106.6 kg) Lbs. BMI:  Body mass index is 31.87 kg/m². Suggested weight control approaches, including dietary changes , exercise, behavioral modification. Return in about 4 weeks (around 4/3/2023) for hypoxia on O2, COPD.       Bridgette Fung MD

## 2023-04-04 ENCOUNTER — OFFICE VISIT (OUTPATIENT)
Dept: PULMONOLOGY | Age: 83
End: 2023-04-04
Payer: MEDICARE

## 2023-04-04 VITALS
OXYGEN SATURATION: 93 % | BODY MASS INDEX: 32.55 KG/M2 | SYSTOLIC BLOOD PRESSURE: 132 MMHG | HEART RATE: 84 BPM | DIASTOLIC BLOOD PRESSURE: 80 MMHG | TEMPERATURE: 97.3 F | WEIGHT: 240 LBS

## 2023-04-04 DIAGNOSIS — Z99.81 ON HOME OXYGEN THERAPY: ICD-10-CM

## 2023-04-04 DIAGNOSIS — J44.9 CHRONIC OBSTRUCTIVE PULMONARY DISEASE, UNSPECIFIED COPD TYPE (HCC): Primary | ICD-10-CM

## 2023-04-04 DIAGNOSIS — I26.99 BILATERAL PULMONARY EMBOLISM (HCC): ICD-10-CM

## 2023-04-04 DIAGNOSIS — G47.33 OSA (OBSTRUCTIVE SLEEP APNEA): ICD-10-CM

## 2023-04-04 DIAGNOSIS — E66.9 OBESITY (BMI 30-39.9): ICD-10-CM

## 2023-04-04 PROCEDURE — 3023F SPIROM DOC REV: CPT | Performed by: INTERNAL MEDICINE

## 2023-04-04 PROCEDURE — G8427 DOCREV CUR MEDS BY ELIG CLIN: HCPCS | Performed by: INTERNAL MEDICINE

## 2023-04-04 PROCEDURE — 1036F TOBACCO NON-USER: CPT | Performed by: INTERNAL MEDICINE

## 2023-04-04 PROCEDURE — 3074F SYST BP LT 130 MM HG: CPT | Performed by: INTERNAL MEDICINE

## 2023-04-04 PROCEDURE — G8417 CALC BMI ABV UP PARAM F/U: HCPCS | Performed by: INTERNAL MEDICINE

## 2023-04-04 PROCEDURE — 1123F ACP DISCUSS/DSCN MKR DOCD: CPT | Performed by: INTERNAL MEDICINE

## 2023-04-04 PROCEDURE — 99214 OFFICE O/P EST MOD 30 MIN: CPT | Performed by: INTERNAL MEDICINE

## 2023-04-04 PROCEDURE — 3078F DIAST BP <80 MM HG: CPT | Performed by: INTERNAL MEDICINE

## 2023-04-04 ASSESSMENT — ENCOUNTER SYMPTOMS
SORE THROAT: 0
WHEEZING: 1
EYE ITCHING: 0
ABDOMINAL PAIN: 0
DIARRHEA: 0
NAUSEA: 0
SHORTNESS OF BREATH: 1
VOMITING: 0
VOICE CHANGE: 0
CHEST TIGHTNESS: 0
COUGH: 1
RHINORRHEA: 0

## 2023-04-04 NOTE — PROGRESS NOTES
Subjective:     Cruz Tafoya is a 81 y.o. male who complains today of:     Chief Complaint   Patient presents with    Follow-up     4wk f/u for COPD, pt did not do CXR       HPI    He is using O2 but not every night ,He said he is not  using CPAP  5-15 cm not using it , waiting for new CPAP supply , then he is planning to start using it   He is using symbicort  160-4.5 mcg  2 puff , nebulizer albuterol and albuterol HFA prn .   C/o shortness of breath with any exertion . C/o Wheezing. He said he was doing well when he was on prednisone , he is off prednisone for last 2 weeks. C/o Cough with thick  white Sputum. No Hemoptysis. No Chest tightness. No Chest pain with radiation  or pleuritic pain. No  leg edema. No orthopnea. No Fever or chills. No Rhinorrhea and postnasal drip. He is on Xarelto 10 mg daily        Allergies:  Patient has no known allergies.   Past Medical History:   Diagnosis Date    Abnormality of gait and mobility     Acute sinusitis     Anxiety     Aspiration into respiratory tract 11/10/2017    Aspiration pneumonia (HCC)     Bilateral pulmonary embolism ([address] 1/6/2018    BPH (benign prostatic hyperplasia)     COPD (chronic obstructive pulmonary disease) ([address] 12/5/2013    Debility     Elevated CK 12/30/2013    Foraminal stenosis of lumbosacral region 6/7/2016    GERD (gastroesophageal reflux disease) 12/11/2014    History of asthma 1/13/2014    HTN (hypertension) 1/13/2014    past braden / no current meds    Hx of blood clots 01/2018    DVT  ( unsure which leg but both were swollen ) -> PE -> thus Xarelto    Hyperlipidemia     meds > 10 yrs    Hypothyroidism     Insomnia     Lower extremity weakness 1/24/2014    On home oxygen therapy     2L at night    Osteoarthritis of spine with radiculopathy, lumbar region 6/7/2016    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    no trx to follow    Positive D dimer 12/5/2013    Sleep apnea 1/24/2014    Type 2 diabetes mellitus ([address]     diet control  /

## 2023-05-09 DIAGNOSIS — J44.9 CHRONIC OBSTRUCTIVE PULMONARY DISEASE, UNSPECIFIED (HCC): ICD-10-CM

## 2023-05-11 RX ORDER — ALBUTEROL SULFATE 2.5 MG/3ML
SOLUTION RESPIRATORY (INHALATION)
Qty: 360 ML | Refills: 3 | Status: SHIPPED | OUTPATIENT
Start: 2023-05-11

## 2023-05-11 NOTE — TELEPHONE ENCOUNTER
Rx requested:  Requested Prescriptions     Pending Prescriptions Disp Refills    albuterol (PROVENTIL) (2.5 MG/3ML) 0.083% nebulizer solution [Pharmacy Med Name: albuterol sulfate 2.5 mg/3 mL (0.083 %) solution for nebulization] 360 mL 3     Sig: Use 1 vial via nebulizer every 6 hours as needed for Wheezing       Last Office Visit:   4/4/2023      Next Visit Date:  Future Appointments   Date Time Provider Stefanie Garcia   7/5/2023  2:45 PM Gerard Potts MD [facility]

## 2023-07-05 ENCOUNTER — OFFICE VISIT (OUTPATIENT)
Dept: PULMONOLOGY | Age: 83
End: 2023-07-05
Payer: MEDICARE

## 2023-07-05 VITALS
DIASTOLIC BLOOD PRESSURE: 74 MMHG | WEIGHT: 235 LBS | HEART RATE: 59 BPM | OXYGEN SATURATION: 92 % | SYSTOLIC BLOOD PRESSURE: 126 MMHG | TEMPERATURE: 97.3 F | BODY MASS INDEX: 31.87 KG/M2

## 2023-07-05 DIAGNOSIS — Z99.81 ON HOME OXYGEN THERAPY: ICD-10-CM

## 2023-07-05 DIAGNOSIS — G47.33 OSA (OBSTRUCTIVE SLEEP APNEA): ICD-10-CM

## 2023-07-05 DIAGNOSIS — J44.9 CHRONIC OBSTRUCTIVE PULMONARY DISEASE, UNSPECIFIED COPD TYPE (HCC): Primary | ICD-10-CM

## 2023-07-05 DIAGNOSIS — I26.99 BILATERAL PULMONARY EMBOLISM (HCC): ICD-10-CM

## 2023-07-05 PROCEDURE — 3023F SPIROM DOC REV: CPT | Performed by: INTERNAL MEDICINE

## 2023-07-05 PROCEDURE — 3078F DIAST BP <80 MM HG: CPT | Performed by: INTERNAL MEDICINE

## 2023-07-05 PROCEDURE — 1123F ACP DISCUSS/DSCN MKR DOCD: CPT | Performed by: INTERNAL MEDICINE

## 2023-07-05 PROCEDURE — G8427 DOCREV CUR MEDS BY ELIG CLIN: HCPCS | Performed by: INTERNAL MEDICINE

## 2023-07-05 PROCEDURE — 3074F SYST BP LT 130 MM HG: CPT | Performed by: INTERNAL MEDICINE

## 2023-07-05 PROCEDURE — 99213 OFFICE O/P EST LOW 20 MIN: CPT | Performed by: INTERNAL MEDICINE

## 2023-07-05 PROCEDURE — G8417 CALC BMI ABV UP PARAM F/U: HCPCS | Performed by: INTERNAL MEDICINE

## 2023-07-05 PROCEDURE — 1036F TOBACCO NON-USER: CPT | Performed by: INTERNAL MEDICINE

## 2023-07-13 ASSESSMENT — ENCOUNTER SYMPTOMS
WHEEZING: 1
COUGH: 1
SHORTNESS OF BREATH: 1

## 2023-11-06 ENCOUNTER — OFFICE VISIT (OUTPATIENT)
Dept: PULMONOLOGY | Age: 83
End: 2023-11-06
Payer: MEDICARE

## 2023-11-06 VITALS
OXYGEN SATURATION: 97 % | HEART RATE: 74 BPM | SYSTOLIC BLOOD PRESSURE: 100 MMHG | TEMPERATURE: 96.9 F | DIASTOLIC BLOOD PRESSURE: 62 MMHG | WEIGHT: 238 LBS | BODY MASS INDEX: 32.28 KG/M2

## 2023-11-06 DIAGNOSIS — I26.99 BILATERAL PULMONARY EMBOLISM (HCC): ICD-10-CM

## 2023-11-06 DIAGNOSIS — J44.9 CHRONIC OBSTRUCTIVE PULMONARY DISEASE, UNSPECIFIED COPD TYPE (HCC): Primary | ICD-10-CM

## 2023-11-06 DIAGNOSIS — Z99.81 ON HOME OXYGEN THERAPY: ICD-10-CM

## 2023-11-06 DIAGNOSIS — G47.33 OSA (OBSTRUCTIVE SLEEP APNEA): ICD-10-CM

## 2023-11-06 DIAGNOSIS — E66.9 OBESITY (BMI 30-39.9): ICD-10-CM

## 2023-11-06 PROCEDURE — 3078F DIAST BP <80 MM HG: CPT | Performed by: INTERNAL MEDICINE

## 2023-11-06 PROCEDURE — G8417 CALC BMI ABV UP PARAM F/U: HCPCS | Performed by: INTERNAL MEDICINE

## 2023-11-06 PROCEDURE — 1123F ACP DISCUSS/DSCN MKR DOCD: CPT | Performed by: INTERNAL MEDICINE

## 2023-11-06 PROCEDURE — G8427 DOCREV CUR MEDS BY ELIG CLIN: HCPCS | Performed by: INTERNAL MEDICINE

## 2023-11-06 PROCEDURE — 3074F SYST BP LT 130 MM HG: CPT | Performed by: INTERNAL MEDICINE

## 2023-11-06 PROCEDURE — 99214 OFFICE O/P EST MOD 30 MIN: CPT | Performed by: INTERNAL MEDICINE

## 2023-11-06 PROCEDURE — 1036F TOBACCO NON-USER: CPT | Performed by: INTERNAL MEDICINE

## 2023-11-06 PROCEDURE — G8484 FLU IMMUNIZE NO ADMIN: HCPCS | Performed by: INTERNAL MEDICINE

## 2023-11-06 PROCEDURE — 3023F SPIROM DOC REV: CPT | Performed by: INTERNAL MEDICINE

## 2023-11-06 ASSESSMENT — ENCOUNTER SYMPTOMS
SHORTNESS OF BREATH: 1
COUGH: 0
ABDOMINAL PAIN: 0
WHEEZING: 0
NAUSEA: 0
EYE ITCHING: 0
SORE THROAT: 0
CHEST TIGHTNESS: 0
VOMITING: 0
RHINORRHEA: 0
VOICE CHANGE: 0
DIARRHEA: 0

## 2023-11-06 NOTE — PROGRESS NOTES
Subjective:               Marge Collins is a 81 y.o. male who complains today of:     Chief Complaint   Patient presents with    Follow-up     4m f/u on COPD       HPI  He is using O2  almost every night .he is using with ambulation   He is using symbicort  160-4.5 mcg  2 puff , nebulizer albuterol and albuterol HFA prn . He is on Xarelto 10 mg daily   C/o shortness of breath with any exertion . C/o Wheezing. C/o Cough with thick white  Sputum. No Hemoptysis. No Chest tightness. No Chest pain with radiation  or pleuritic pain. No  leg edema. No orthopnea. No Fever or chills. No Rhinorrhea and postnasal drip. On Xarelto  10 mg daily. He said he is using CPAP but he want to try using it . Allergies:  Patient has no known allergies.   Past Medical History:   Diagnosis Date    Abnormality of gait and mobility     Acute sinusitis     Anxiety     Aspiration into respiratory tract 11/10/2017    Aspiration pneumonia (HCC)     Bilateral pulmonary embolism ([address]) 1/6/2018    BPH (benign prostatic hyperplasia)     COPD (chronic obstructive pulmonary disease) ([address]) 12/5/2013    Debility     Elevated CK 12/30/2013    Foraminal stenosis of lumbosacral region 6/7/2016    GERD (gastroesophageal reflux disease) 12/11/2014    History of asthma 1/13/2014    HTN (hypertension) 1/13/2014    past braden / no current meds    Hx of blood clots 01/2018    DVT  ( unsure which leg but both were swollen ) -> PE -> thus Xarelto    Hyperlipidemia     meds > 10 yrs    Hypothyroidism     Insomnia     Lower extremity weakness 1/24/2014    On home oxygen therapy     2L at night    Osteoarthritis of spine with radiculopathy, lumbar region 6/7/2016    Papillary thyroid carcinoma ([address]) 12/2006    no trx to follow    Positive D dimer 12/5/2013    Sleep apnea 1/24/2014    Type 2 diabetes mellitus ([address])     diet control  / no meds    Vitamin D deficiency      Past Surgical History:   Procedure Laterality Date    BRONCHOSCOPY N/A

## 2024-02-07 NOTE — PROGRESS NOTES
Subjective: The patient complains of moderate to severe acute  dyspnea on exertion, hemoptysis, nose bleeding partially relieved by  recent antibiotic treatments, PT, OT, high-dose oxygen therapy and exacerbated by  exertion and aspiration of foreign object. He can also complains of increased gaseous distention of his abdomen. I discussed this is likely due to lactose intolerance. He does notice that he gets gasy after milk and dairy products. He complains of STEWART in a.m.. He complains of severe GI distress relieved with BM. ROS x10: The patient also complains of severely impaired mobility and activities of daily living. Otherwise no new problems with vision, hearing, nose, mouth, throat, dermal, cardiovascular, GI, , pulmonary, musculoskeletal, psychiatric or neurological. See Rehab H&P on Rehab chart dated 11/22/17. Vital signs:  /72   Pulse 76   Temp 97 °F (36.1 °C) (Oral)   Resp 20   Ht 6' (1.829 m)   Wt 246 lb 4.1 oz (111.7 kg)   SpO2 95%   BMI 33.40 kg/m²   I/O:   PO/Intake:  fair PO intake, no problems observed or reported. Bowel/Bladder:  continent, no problems noted. General:  Patient is well developed, adequately nourished, non-obese and     well kempt. HEENT:    Hemoptysis, PERRLA, hearing intact to loud voice, external inspection of ear     and nose benign. Inspection of lips, tongue and gums benign  Musculoskeletal: No significant change in strength or tone. All joints stable. Inspection and palpation of digits and nails show no clubbing,       cyanosis or inflammatory conditions. Neuro/Psychiatric: Affect: flat but pleasant. Alert and oriented to person, place and     situation. No significant change in deep tendon reflexes or     sensation  Lungs:  Diminished CTA-B. Respiration effort is normal at rest.   STEWART in a.m. Heart:   S1 = S2, RRR. No loud murmurs.     Abdomen:  Distended with gas Soft, non-tender, no enlargement of liver or spleen. Extremities:  No significant lower extremity edema or tenderness. Skin:   Intact right thoracotomy incision,  the small boil on the posterior aspect of his right thigh which was drained cultured and prescribed Betadine to dry sterile dressing  Rehabilitation:  Physical therapy: FIMS:  Bed Mobility: Scooting: Modified independent    Transfers: Sit to Stand: Supervision  Stand to sit: Supervision  Bed to Chair: Supervision  Stand Pivot Transfers: Supervision, Ambulation 1  Surface: level tile, carpet  Device: No Device  Other Apparatus: O2  Assistance: Contact guard assistance  Quality of Gait: Ataxic, inconsistent foot placement & SL (Harshad), NBOS, occasional Lat step strategy, Decreased Lat Displacement and rigid BUEs. Distance: 100 feet x2  Comments: Increased time to complete. SPO2 93-94%. L Knee Varus noted in stance, Stairs  # Steps : 4  Stairs Height: 6\"  Rails: Bilateral  Comment: non-reciprocal; VCs for BUE advancement to maintain COG>KIMMY. FIMS: Bed, Chair, Wheel Chair: 5 - Requires setup/supervision/cues  Walk: 2 - Maximal Assistance Requires up to Maximal Assistance AND requires assistance of one person to walk/operate wheelchair between  feet (Patient performs 25-49% of locomotion effort or goes between  feet)  Distance Walked: [address]: 0 - Activity Not Assessed/Does Not Occur  Distance Traveled in Wheel Chair: 0  Stairs: 2- Maximal Assistance Performs 25-49% of the effort to go up and down 4 to 6 stairs and requires the assistance of one person only,  , Assessment: Increased SOB noted after gait and stairs but SPO2 maintained >93% (2L).      Occupational therapy: FIMS:  Eatin - Patient feeds self  Groomin - Patient independent with all grooming tasks  Bathin - Able to bathe all 10 areas with device  Dressing-Upper: 5 - Requires setup/supervision/cues and/or requires assist with presthesis/brace only  Dressing-Lower: 4 - Requires assist with penetration identified. 9 cine loops. One static image. 1.5 minutes fluoroscopy time. Previous extensive, complex labs, notes and diagnostics reviewed and analyzed. ALLERGIES:    Allergies as of 11/21/2017    (No Known Allergies)      (please also verify by checking [facility])    Complex Physical Medicine & Rehab Issues Assess & Plan:   1. Severe abnormality of gait and mobility and impaired self-care and ADL's secondary to progressive Debility secondary to aspiration pneumonia secondary to aspirating a dental female filling . Functional and medical status reassessed regarding patients ability to participate in therapies and patient found to be able to participate in acute intensive comprehensive inpatient rehabilitation program including PT/OT to improve balance, ambulation, ADLs, and to improve the P/AROM. Therapeutic modifications regarding activities in therapies, place, amount of time per day and intensity of therapy made daily. In bed therapies or bedside therapies prn.   2. Bowel severe constipation secondary to opiates and Bladder dysfunction:  frequent toileting, ambulate to bathroom with assistance, check post void residuals. Check for C.difficile x1 if >2 loose stools in 24 hours, continue bowel & bladder program.  Monitor bowel and bladder function. Lactinex 2 PO every AC. MOM prn, Brown Bomb prn, Glycerin suppository prn, enema prn. Severe constipation -  every other day Colace Mag Citrate. 3. Severe postop right thoracotomy incision pain generalized OA pain: reassess pain every shift and prior to and after each therapy session, give prn Tylenol and Percocet, modalities prn in therapy, Lidoderm, K-pad prn.   4. Skin healing thoracotomy incision as well as boil draining of the right posterior thigh and breakdown risk:  continue pressure relief program.  Daily skin exams and reports from nursing.   Patient was instructed not to use the urinal unless he washes his hands thoroughly and we will do dry sterile dressing after Betadine to the boil to the make sure it does not feed to his thoracotomy incision  5. Severe Fatigue due to nutritional and hydration deficiency:   Vitamin D deficiency - supplement and recheck as outpatient. continue to monitor I&Os, calorie counts prn, dietary consult prn. Continue vitamin B vitamin D and CoQ10-lactose intolerance advised lactose free diet  6. Acute episodic insomnia with situational adjustment disorder:   Add scheduled Ambien, monitor for day time sedation -continue egg crate mattress  7. Falls risk elevated:  patient to use call light to get nursing assistance to get up, bed and chair alarm. 8. Elevated DVT risk: progressive activities in PT, continue prophylaxis SATURNINO hose, elevation and  Lovenox which is now on hold because of ongoing hemoptysis . 9. Complex discharge planning:  Discharge 12/1/17 home with home health care. Patient and family education is in progress. The patient is to follow-up with their family physician after discharge. We will continue to work on endurance and symptom control    Complex Active General Medical Issues that complicate care Assess & Plan:    1. Continued hemoptysis status post Aspiration pneumonia,  COPD (chronic obstructive pulmonary disease), on home oxygen therapy - Pulse oximeter checks, monitor vital signs, oxygen prn. Proventil, Duoneb,   2. HTN (hypertension),  Hyperlipidemia - continue blood pressure checks, adjust/add medications (Apresoline, Toprol). 3.   GERD (gastroesophageal reflux disease) - elevate head of bed. 4.   Hypothyroidism - Synthroid. 5.   Type 2 diabetes mellitus - Continue blood sugar checks, diet, adjust/add medications prn. Recent Labs      11/26/17   0611   POCGLU  98   6. BPH (benign prostatic hyperplasia) - Flomax- bedtime to limit orthostatic, Proscar. Check postvoid residual.  7.   Anxiety and   Insomnia.  - emotional support, adjust/add medications (Desyrel, Remeron, chest discomfort/COUGH/PAIN

## 2024-03-06 ENCOUNTER — OFFICE VISIT (OUTPATIENT)
Dept: PULMONOLOGY | Age: 84
End: 2024-03-06
Payer: MEDICARE

## 2024-03-06 VITALS — HEART RATE: 67 BPM | OXYGEN SATURATION: 94 % | SYSTOLIC BLOOD PRESSURE: 112 MMHG | DIASTOLIC BLOOD PRESSURE: 64 MMHG

## 2024-03-06 DIAGNOSIS — I26.99 BILATERAL PULMONARY EMBOLISM (HCC): ICD-10-CM

## 2024-03-06 DIAGNOSIS — G47.33 OSA (OBSTRUCTIVE SLEEP APNEA): ICD-10-CM

## 2024-03-06 DIAGNOSIS — J44.9 CHRONIC OBSTRUCTIVE PULMONARY DISEASE, UNSPECIFIED COPD TYPE (HCC): Primary | ICD-10-CM

## 2024-03-06 DIAGNOSIS — E66.9 OBESITY (BMI 30-39.9): ICD-10-CM

## 2024-03-06 DIAGNOSIS — Z99.81 ON HOME OXYGEN THERAPY: ICD-10-CM

## 2024-03-06 PROCEDURE — 1036F TOBACCO NON-USER: CPT | Performed by: INTERNAL MEDICINE

## 2024-03-06 PROCEDURE — 3023F SPIROM DOC REV: CPT | Performed by: INTERNAL MEDICINE

## 2024-03-06 PROCEDURE — 3074F SYST BP LT 130 MM HG: CPT | Performed by: INTERNAL MEDICINE

## 2024-03-06 PROCEDURE — G8417 CALC BMI ABV UP PARAM F/U: HCPCS | Performed by: INTERNAL MEDICINE

## 2024-03-06 PROCEDURE — G8484 FLU IMMUNIZE NO ADMIN: HCPCS | Performed by: INTERNAL MEDICINE

## 2024-03-06 PROCEDURE — 1123F ACP DISCUSS/DSCN MKR DOCD: CPT | Performed by: INTERNAL MEDICINE

## 2024-03-06 PROCEDURE — G8427 DOCREV CUR MEDS BY ELIG CLIN: HCPCS | Performed by: INTERNAL MEDICINE

## 2024-03-06 PROCEDURE — 99214 OFFICE O/P EST MOD 30 MIN: CPT | Performed by: INTERNAL MEDICINE

## 2024-03-06 PROCEDURE — 3078F DIAST BP <80 MM HG: CPT | Performed by: INTERNAL MEDICINE

## 2024-03-06 NOTE — PROGRESS NOTES
December 17, 2018.  ]  No results found for this or any previous visit.  ]  No results found for this or any previous visit.  ]  Results for orders placed during the hospital encounter of 11/10/17    CT Chest W Contrast    Narrative  EXAMINATION:  CT CHEST W CONTRAST    CLINICAL HISTORY:  ACUTE RESP ILLNESS, >40 YEARS OLD  status post bronchoscopy to retrieve aspirated dental amalgam, unsuccessful.    COMPARISONS:  12/2/2013    TECHNIQUE:  Spiral scans with 75 mL Isovue-370 IV. Multiplanar 2-D reconstructions. Axial 3-D 10 x 3 mm MIP reconstructions were performed. All CT scans at this facility use dose modulation, iterative reconstruction, and/or weight based dosing when  appropriate to reduce radiation dose to as low as reasonably achievable.    FINDINGS:  The known aspirated filling is identified within the distal segmental bronchus of the right lower lobe posterior basilar segment. Filling measures approximately 9 mm. There are mild emphysematous changes. There is mild scattered peripheral  reticularity without definite honeycombing. There are no consolidations or effusions. There is right upper lung perifissural nodularity consistent with small intrapulmonary lymph nodes. There is a left upper lobe 4 mm nodule (series 2 image 21),  unchanged from prior examination of 12/2/2013, and therefore benign. There are no suspicious lung nodules. There is mild bilateral lower lobe cylindrical bronchiectasis.    Cardiac size and pulmonary vascularity are normal. There is mild mediastinal lipomatosis. There is mild thickening or trace fluid in the anterior pericardium, decreased compared to prior examination 12/2/2013. There are coronary artery calcifications.  There are aortic calcifications. There is no evidence of aneurysm or dissection. There are borderline in size lymph nodes in the mediastinum anterior to the left mainstem bronchus and in the right hilum, unchanged from prior examination of 12/2/2013.  There is no

## 2024-04-15 ENCOUNTER — TELEPHONE (OUTPATIENT)
Dept: PULMONOLOGY | Age: 84
End: 2024-04-15

## 2024-04-15 NOTE — TELEPHONE ENCOUNTER
Pt called in to the office because he needs a rx for a new cpap machine to be sent to [facility].       Please fax rx to [facility].

## 2024-04-16 DIAGNOSIS — G47.33 OSA (OBSTRUCTIVE SLEEP APNEA): Primary | ICD-10-CM

## 2024-06-15 DIAGNOSIS — J44.9 CHRONIC OBSTRUCTIVE PULMONARY DISEASE, UNSPECIFIED (HCC): ICD-10-CM

## 2024-06-16 ENCOUNTER — APPOINTMENT (OUTPATIENT)
Dept: CT IMAGING | Age: 84
End: 2024-06-16
Payer: MEDICARE

## 2024-06-16 ENCOUNTER — APPOINTMENT (OUTPATIENT)
Dept: GENERAL RADIOLOGY | Age: 84
End: 2024-06-16
Payer: MEDICARE

## 2024-06-16 ENCOUNTER — HOSPITAL ENCOUNTER (EMERGENCY)
Age: 84
Discharge: HOME | End: 2024-06-16
Payer: MEDICARE

## 2024-06-16 VITALS
SYSTOLIC BLOOD PRESSURE: 150 MMHG | TEMPERATURE: 98 F | WEIGHT: 235 LBS | OXYGEN SATURATION: 93 % | HEIGHT: 72 IN | DIASTOLIC BLOOD PRESSURE: 97 MMHG | HEART RATE: 73 BPM | RESPIRATION RATE: 20 BRPM | BODY MASS INDEX: 31.83 KG/M2

## 2024-06-16 DIAGNOSIS — R41.0 CONFUSION: Primary | ICD-10-CM

## 2024-06-16 LAB
ALBUMIN SERPL-MCNC: 3.5 G/DL (ref 3.5–4.6)
ALP SERPL-CCNC: 66 U/L (ref 35–104)
ALT SERPL-CCNC: 14 U/L (ref 0–41)
ANION GAP SERPL CALCULATED.3IONS-SCNC: 10 MEQ/L (ref 9–15)
AST SERPL-CCNC: 17 U/L (ref 0–40)
BASOPHILS # BLD: 0 K/UL (ref 0–0.1)
BASOPHILS NFR BLD: 0.6 % (ref 0.2–1.2)
BILIRUB SERPL-MCNC: 0.3 MG/DL (ref 0.2–0.7)
BILIRUB UR QL STRIP: NEGATIVE
BNP BLD-MCNC: 143 PG/ML
BUN SERPL-MCNC: 18 MG/DL (ref 8–23)
CALCIUM SERPL-MCNC: 8.9 MG/DL (ref 8.5–9.9)
CHLORIDE SERPL-SCNC: 104 MEQ/L (ref 95–107)
CLARITY UR: CLEAR
CO2 SERPL-SCNC: 27 MEQ/L (ref 20–31)
COLOR UR: YELLOW
CREAT SERPL-MCNC: 1.3 MG/DL (ref 0.7–1.2)
D DIMER PPP FEU-MCNC: 0.6 MG/L FEU (ref 0–0.5)
EOSINOPHIL # BLD: 0.6 K/UL (ref 0–0.5)
EOSINOPHIL NFR BLD: 8.1 % (ref 0.8–7)
ERYTHROCYTE [DISTWIDTH] IN BLOOD BY AUTOMATED COUNT: 15 % (ref 11.6–14.4)
GLOBULIN SER CALC-MCNC: 3.7 G/DL (ref 2.3–3.5)
GLUCOSE SERPL-MCNC: 97 MG/DL (ref 70–99)
GLUCOSE UR STRIP-MCNC: NEGATIVE MG/DL
HCT VFR BLD AUTO: 48.8 % (ref 42–52)
HGB BLD-MCNC: 15.6 G/DL (ref 13.7–17.5)
HGB UR QL STRIP: NEGATIVE
IMM GRANULOCYTES # BLD: 0 K/UL
IMM GRANULOCYTES NFR BLD: 0.4 %
INFLUENZA A BY PCR: NEGATIVE
INFLUENZA B BY PCR: NEGATIVE
KETONES UR STRIP-MCNC: NEGATIVE MG/DL
LACTATE BLDV-SCNC: 2 MMOL/L (ref 0.5–2.2)
LEUKOCYTE ESTERASE UR QL STRIP: NEGATIVE
LYMPHOCYTES # BLD: 1.7 K/UL (ref 1.3–3.6)
LYMPHOCYTES NFR BLD: 23.7 %
MCH RBC QN AUTO: 30.1 PG (ref 25.7–32.2)
MCHC RBC AUTO-ENTMCNC: 32 % (ref 32.3–36.5)
MCV RBC AUTO: 94 FL (ref 79–92.2)
MONOCYTES # BLD: 0.4 K/UL (ref 0.3–0.8)
MONOCYTES NFR BLD: 6.3 % (ref 5.3–12.2)
NEUTROPHILS # BLD: 4.2 K/UL (ref 1.8–5.4)
NEUTS SEG NFR BLD: 60.9 % (ref 34–67.9)
NITRITE UR QL STRIP: NEGATIVE
PH UR STRIP: 5.5 [PH] (ref 5–9)
PLATELET # BLD AUTO: 123 K/UL (ref 163–337)
PLATELET BLD QL SMEAR: ABNORMAL
POTASSIUM SERPL-SCNC: 4.5 MEQ/L (ref 3.4–4.9)
PROT SERPL-MCNC: 7.2 G/DL (ref 6.3–8)
PROT UR STRIP-MCNC: NEGATIVE MG/DL
RBC # BLD AUTO: 5.19 M/UL (ref 4.63–6.08)
SARS-COV-2 RDRP RESP QL NAA+PROBE: NOT DETECTED
SODIUM SERPL-SCNC: 141 MEQ/L (ref 135–144)
SP GR UR STRIP: 1.01 (ref 1–1.03)
TROPONIN, HIGH SENSITIVITY: 37 NG/L (ref 0–19)
TROPONIN, HIGH SENSITIVITY: 39 NG/L (ref 0–19)
URINE REFLEX TO CULTURE: NORMAL
UROBILINOGEN UR STRIP-ACNC: 0.2 E.U./DL
WBC # BLD AUTO: 7 K/UL (ref 4.2–9)

## 2024-06-16 PROCEDURE — 6360000004 HC RX CONTRAST MEDICATION: Performed by: NURSE PRACTITIONER

## 2024-06-16 PROCEDURE — 36415 COLL VENOUS BLD VENIPUNCTURE: CPT

## 2024-06-16 PROCEDURE — 83880 ASSAY OF NATRIURETIC PEPTIDE: CPT

## 2024-06-16 PROCEDURE — 84484 ASSAY OF TROPONIN QUANT: CPT

## 2024-06-16 PROCEDURE — 87502 INFLUENZA DNA AMP PROBE: CPT

## 2024-06-16 PROCEDURE — 2580000003 HC RX 258: Performed by: NURSE PRACTITIONER

## 2024-06-16 PROCEDURE — 71275 CT ANGIOGRAPHY CHEST: CPT

## 2024-06-16 PROCEDURE — 70450 CT HEAD/BRAIN W/O DYE: CPT

## 2024-06-16 PROCEDURE — 93005 ELECTROCARDIOGRAM TRACING: CPT

## 2024-06-16 PROCEDURE — 99285 EMERGENCY DEPT VISIT HI MDM: CPT

## 2024-06-16 PROCEDURE — 83605 ASSAY OF LACTIC ACID: CPT

## 2024-06-16 PROCEDURE — 85025 COMPLETE CBC W/AUTO DIFF WBC: CPT

## 2024-06-16 PROCEDURE — 80053 COMPREHEN METABOLIC PANEL: CPT

## 2024-06-16 PROCEDURE — 87635 SARS-COV-2 COVID-19 AMP PRB: CPT

## 2024-06-16 PROCEDURE — 85379 FIBRIN DEGRADATION QUANT: CPT

## 2024-06-16 PROCEDURE — 81003 URINALYSIS AUTO W/O SCOPE: CPT

## 2024-06-16 PROCEDURE — 71045 X-RAY EXAM CHEST 1 VIEW: CPT

## 2024-06-16 RX ORDER — 0.9 % SODIUM CHLORIDE 0.9 %
500 INTRAVENOUS SOLUTION INTRAVENOUS ONCE
Status: COMPLETED | OUTPATIENT
Start: 2024-06-16 | End: 2024-06-16

## 2024-06-16 RX ADMIN — SODIUM CHLORIDE 500 ML: 9 INJECTION, SOLUTION INTRAVENOUS at 18:44

## 2024-06-16 RX ADMIN — IOPAMIDOL 75 ML: 755 INJECTION, SOLUTION INTRAVENOUS at 19:43

## 2024-06-16 ASSESSMENT — LIFESTYLE VARIABLES
HOW MANY STANDARD DRINKS CONTAINING ALCOHOL DO YOU HAVE ON A TYPICAL DAY: PATIENT DOES NOT DRINK
HOW OFTEN DO YOU HAVE A DRINK CONTAINING ALCOHOL: NEVER

## 2024-06-16 ASSESSMENT — PAIN - FUNCTIONAL ASSESSMENT
PAIN_FUNCTIONAL_ASSESSMENT: NONE - DENIES PAIN

## 2024-06-16 NOTE — ED NOTES
0.60 d dimer critical lab. Kamla RESENDEZ aware. Electronically signed by Santa Randle RN on 6/16/2024 at 7:00 PM

## 2024-06-17 LAB
EKG ATRIAL RATE: 61 BPM
EKG P AXIS: 96 DEGREES
EKG P-R INTERVAL: 198 MS
EKG Q-T INTERVAL: 428 MS
EKG QRS DURATION: 90 MS
EKG QTC CALCULATION (BAZETT): 430 MS
EKG R AXIS: -10 DEGREES
EKG T AXIS: 54 DEGREES
EKG VENTRICULAR RATE: 61 BPM

## 2024-06-17 PROCEDURE — 93010 ELECTROCARDIOGRAM REPORT: CPT | Performed by: INTERNAL MEDICINE

## 2024-06-17 RX ORDER — ALBUTEROL SULFATE 2.5 MG/3ML
SOLUTION RESPIRATORY (INHALATION)
Qty: 360 ML | Refills: 3 | Status: SHIPPED | OUTPATIENT
Start: 2024-06-17

## 2024-06-17 NOTE — DISCHARGE INSTR - COC
Isolation            No Isolation          Patient Infection Status       None to display                     Nurse Assessment:  Last Vital Signs: BP (!) 150/97   Pulse 73   Temp 98 °F (36.7 °C) (Oral)   Resp 20   Ht 1.829 m (6')   Wt 106.6 kg (235 lb)   SpO2 93%   BMI 31.87 kg/m²     Last documented pain score (0-10 scale):    Last Weight:   Wt Readings from Last 1 Encounters:   06/16/24 106.6 kg (235 lb)     Mental Status:  {IP PT MENTAL STATUS:20030}    IV Access:  { NARGIS IV ACCESS:929516461}    Nursing Mobility/ADLs:  Walking   {CHP DME ADLs:562439778}  Transfer  {CHP DME ADLs:659346092}  Bathing  {CHP DME ADLs:323669312}  Dressing  {CHP DME ADLs:179950965}  Toileting  {CHP DME ADLs:886401782}  Feeding  {CHP DME ADLs:541208828}  Med Admin  {CHP DME ADLs:211635361}  Med Delivery   { NARGIS MED Delivery:841742803}    Wound Care Documentation and Therapy:        Elimination:  Continence:   Bowel: {YES / NO:19727}  Bladder: {YES / NO:19727}  Urinary Catheter: {Urinary Catheter:671376003}   Colostomy/Ileostomy/Ileal Conduit: {YES / NO:19727}       Date of Last BM: ***  No intake or output data in the 24 hours ending 06/16/24 2150  No intake/output data recorded.    Safety Concerns:     { NARGIS Safety Concerns:454188623}    Impairments/Disabilities:      { NARGIS Impairments/Disabilities:629142439}    Nutrition Therapy:  Current Nutrition Therapy:   { NARGIS Diet List:284382131}    Routes of Feeding: {CHP DME Other Feedings:453269988}  Liquids: {Slp liquid thickness:55200}  Daily Fluid Restriction: {CHP DME Yes amt example:097372919}  Last Modified Barium Swallow with Video (Video Swallowing Test): {Done Not Done Date:304088012}    Treatments at the Time of Hospital Discharge:   Respiratory Treatments: ***  Oxygen Therapy:  {Therapy; copd oxygen:67899}  Ventilator:    { CC Vent List:773570291}    Rehab Therapies: {THERAPEUTIC INTERVENTION:0812242961}  Weight Bearing Status/Restrictions: { CC Weight

## 2024-06-17 NOTE — TELEPHONE ENCOUNTER
Rx requested:  Requested Prescriptions     Pending Prescriptions Disp Refills    albuterol (PROVENTIL) (2.5 MG/3ML) 0.083% nebulizer solution [Pharmacy Med Name: albuterol sulfate 2.5 mg/3 mL (0.083 %) solution for nebulization] 360 mL 3     Sig: use 1 (ONE) vial via NEBULIZER EVERY 6 HOURS AS NEEDED FOR WHEEZING       Last Office Visit:   3/6/2024      Next Visit Date:  Future Appointments   Date Time Provider Department Center   7/9/2024  3:00 PM Luis Mclean MD [facility]

## 2024-06-17 NOTE — ED PROVIDER NOTES
Controlled Substances Monitoring:          No data to display                (Please note that portions of this note were completed with a voice recognition program.  Efforts were made to edit the dictations but occasionally words are mis-transcribed.)    CINDY Arango CNP (electronically signed)  Attending Emergency Physician           Kamla Neri APRN - CNP  06/17/24 [id number]

## 2024-07-09 ENCOUNTER — OFFICE VISIT (OUTPATIENT)
Dept: PULMONOLOGY | Age: 84
End: 2024-07-09
Payer: MEDICARE

## 2024-07-09 VITALS — SYSTOLIC BLOOD PRESSURE: 102 MMHG | DIASTOLIC BLOOD PRESSURE: 64 MMHG | OXYGEN SATURATION: 90 % | HEART RATE: 65 BPM

## 2024-07-09 DIAGNOSIS — J44.9 CHRONIC OBSTRUCTIVE PULMONARY DISEASE, UNSPECIFIED COPD TYPE (HCC): Primary | ICD-10-CM

## 2024-07-09 DIAGNOSIS — E66.9 OBESITY (BMI 30-39.9): ICD-10-CM

## 2024-07-09 DIAGNOSIS — Z99.81 ON HOME OXYGEN THERAPY: ICD-10-CM

## 2024-07-09 DIAGNOSIS — G47.33 OSA (OBSTRUCTIVE SLEEP APNEA): ICD-10-CM

## 2024-07-09 DIAGNOSIS — I26.99 BILATERAL PULMONARY EMBOLISM (HCC): ICD-10-CM

## 2024-07-09 PROCEDURE — 1123F ACP DISCUSS/DSCN MKR DOCD: CPT | Performed by: INTERNAL MEDICINE

## 2024-07-09 PROCEDURE — G8417 CALC BMI ABV UP PARAM F/U: HCPCS | Performed by: INTERNAL MEDICINE

## 2024-07-09 PROCEDURE — 99214 OFFICE O/P EST MOD 30 MIN: CPT | Performed by: INTERNAL MEDICINE

## 2024-07-09 PROCEDURE — 1036F TOBACCO NON-USER: CPT | Performed by: INTERNAL MEDICINE

## 2024-07-09 PROCEDURE — 3078F DIAST BP <80 MM HG: CPT | Performed by: INTERNAL MEDICINE

## 2024-07-09 PROCEDURE — G8427 DOCREV CUR MEDS BY ELIG CLIN: HCPCS | Performed by: INTERNAL MEDICINE

## 2024-07-09 PROCEDURE — 3023F SPIROM DOC REV: CPT | Performed by: INTERNAL MEDICINE

## 2024-07-09 PROCEDURE — 3074F SYST BP LT 130 MM HG: CPT | Performed by: INTERNAL MEDICINE

## 2024-07-09 NOTE — PROGRESS NOTES
mediastinum anterior to the left mainstem bronchus and in the right hilum, unchanged from prior examination of 12/2/2013.  There is no thoracic adenopathy. The esophagus is unremarkable. There is spondylosis. There are no acute or suspicious bone lesions.    Scans of the subdiaphragmatic regions demonstrate a 6 mm oval metallic density in the gastric fundus, presumably representing a swallowed filling. There are stable small cysts in the liver. There are partially visualized renal cysts.    Impression  ASPIRATED FILLING IN RIGHT LOWER LOBE POSTERIOR BASILAR SEGMENTAL BRONCHUS. MILD EMPHYSEMA AND LOWER LOBE CYLINDRICAL BRONCHIECTASIS. ADDITIONAL FINDINGS AS ABOVE.  ]    Assessment/Plan:     1. Chronic obstructive pulmonary disease, unspecified COPD type (HCC)  He is using O2 with sleep  and prn , now he is not using daily since he is feeling better. He is using symbicort  160-4.5 mcg  2 puff , nebulizer albuterol and albuterol HFA prn .  C/o shortness of breath with any exertion .C/o Wheezing.  C/o Cough with thick white Sputum. On Xarelto  10 mg daily.      2. Bilateral pulmonary embolism (HCC)   He is on Xarelto 10 mg daily.    3. GEORGES (obstructive sleep apnea)  He had dental work  and he was not able to use CPAP for 3 weeks , now using it again  with 5-15  centimeters of H2O with heated humidity.He is using CPAP for about  6  hours every night.He is using CPAP with  full face  Mask.He said  sleep is restful with the CPAP use.He is compliant with CPAP therapy and benefiting with CPAP use     I reviewed compliance report with patient regarding CPAP therapy. He is using  CPAP for 13 days out of 30 days.  Average usage of days used is 6 hours and 4 min , average AHI 10.8 with CPAP use    He had dental work  and he was not able to use CPAP for 3 weeks , now using it again with 5-15  centimeters of H2O with heated humidity.He is using CPAP for about  6  hours every night.He is using CPAP with  full face  Mask.He said

## 2024-11-11 ENCOUNTER — OFFICE VISIT (OUTPATIENT)
Dept: PULMONOLOGY | Age: 84
End: 2024-11-11

## 2024-11-11 VITALS
BODY MASS INDEX: 31.83 KG/M2 | HEART RATE: 60 BPM | HEIGHT: 72 IN | SYSTOLIC BLOOD PRESSURE: 108 MMHG | DIASTOLIC BLOOD PRESSURE: 68 MMHG | TEMPERATURE: 97.6 F | WEIGHT: 235 LBS | OXYGEN SATURATION: 92 %

## 2024-11-11 DIAGNOSIS — J44.9 CHRONIC OBSTRUCTIVE PULMONARY DISEASE, UNSPECIFIED COPD TYPE (HCC): ICD-10-CM

## 2024-11-11 DIAGNOSIS — I26.99 BILATERAL PULMONARY EMBOLISM (HCC): Primary | ICD-10-CM

## 2024-11-11 DIAGNOSIS — E66.9 OBESITY (BMI 30-39.9): ICD-10-CM

## 2024-11-11 DIAGNOSIS — G47.33 OSA (OBSTRUCTIVE SLEEP APNEA): ICD-10-CM

## 2024-11-11 DIAGNOSIS — Z99.81 ON HOME OXYGEN THERAPY: ICD-10-CM

## 2024-11-11 RX ORDER — BUDESONIDE AND FORMOTEROL FUMARATE DIHYDRATE 160; 4.5 UG/1; UG/1
2 AEROSOL RESPIRATORY (INHALATION) 2 TIMES DAILY
Qty: 1 EACH | Refills: 3 | Status: SHIPPED | OUTPATIENT
Start: 2024-11-11

## 2024-11-11 ASSESSMENT — ENCOUNTER SYMPTOMS
ABDOMINAL PAIN: 0
SHORTNESS OF BREATH: 1
SORE THROAT: 0
COUGH: 1
CHEST TIGHTNESS: 0
RHINORRHEA: 0
DIARRHEA: 0
VOICE CHANGE: 0
WHEEZING: 1
VOMITING: 0
EYE ITCHING: 0
NAUSEA: 0

## 2024-11-11 NOTE — PROGRESS NOTES
Subjective:             Nirav Ron is a 83 y.o. male who complains today of:     Chief Complaint   Patient presents with    Follow-up    COPD     Chest tightening , chest pain,  SOB w/ exertion        HPI  He is using O2 with sleep  and prn , now he is not using daily since he is feeling better.   He is using symbicort  160-4.5 mcg  2 puff , nebulizer albuterol and albuterol HFA prn .   He is on Xarelto 10 mg daily.  C/o shortness of breath with any exertion .  C/o Wheezing.  C/o Cough with thick white Sputum.   No Hemoptysis. No Chest tightness.   No Chest pain with radiation  or pleuritic pain.  No  leg edema. No orthopnea.No Fever or chills.   No Rhinorrhea and postnasal drip.On Xarelto 10 mg daily.      He had dental work  and he was not able to use CPAP for 3 weeks , now using it again   He is using CPAP with 5-15 centimeters of H2O with heated humidity.  He is using CPAP for about  6  hours every night.  He is using CPAP with  full face  Mask.  He said  sleep is restful with the CPAP use.  He is compliant with CPAP therapy and benefiting with CPAP use     I reviewed compliance report with patient regarding CPAP therapy. He is using  CPAP for 13 days out of 30 days.  Average usage of days used is 6 hours and 4 min , average AHI 10.8 with CPAP use    Allergies:  Patient has no known allergies.  Past Medical History:   Diagnosis Date    Abnormality of gait and mobility     Acute sinusitis     Anxiety     Aspiration into respiratory tract 11/10/2017    Aspiration pneumonia (HCC)     Bilateral pulmonary embolism (HCC) 1/6/2018    BPH (benign prostatic hyperplasia)     COPD (chronic obstructive pulmonary disease) (HCC) 12/5/2013    Debility     Elevated CK 12/30/2013    Foraminal stenosis of lumbosacral region 6/7/2016    GERD (gastroesophageal reflux disease) 12/11/2014    History of asthma 1/13/2014    HTN (hypertension) 1/13/2014    past braden / no current meds    Hx of blood clots 01/2018    DVT  ( unsure

## 2025-01-01 ENCOUNTER — APPOINTMENT (OUTPATIENT)
Dept: GENERAL RADIOLOGY | Age: 85
End: 2025-01-01
Payer: MEDICARE

## 2025-01-01 ENCOUNTER — HOSPITAL ENCOUNTER (INPATIENT)
Age: 85
LOS: 1 days | Discharge: ANOTHER HOSPITAL | End: 2025-01-03
Attending: EMERGENCY MEDICINE | Admitting: INTERNAL MEDICINE
Payer: MEDICARE

## 2025-01-01 ENCOUNTER — APPOINTMENT (OUTPATIENT)
Dept: CT IMAGING | Age: 85
End: 2025-01-01
Payer: MEDICARE

## 2025-01-01 DIAGNOSIS — N20.0 KIDNEY STONE: ICD-10-CM

## 2025-01-01 DIAGNOSIS — N17.9 ACUTE RENAL FAILURE, UNSPECIFIED ACUTE RENAL FAILURE TYPE (HCC): ICD-10-CM

## 2025-01-01 DIAGNOSIS — R09.02 HYPOXEMIA: ICD-10-CM

## 2025-01-01 DIAGNOSIS — J44.1 COPD EXACERBATION (HCC): Primary | ICD-10-CM

## 2025-01-01 LAB
ALBUMIN SERPL-MCNC: 3.4 G/DL (ref 3.5–4.6)
ALP SERPL-CCNC: 77 U/L (ref 35–104)
ALT SERPL-CCNC: 6 U/L (ref 0–41)
AMORPH SED URNS QL MICRO: ABNORMAL
ANION GAP SERPL CALCULATED.3IONS-SCNC: 10 MEQ/L (ref 9–15)
AST SERPL-CCNC: 12 U/L (ref 0–40)
BACTERIA URNS QL MICRO: NEGATIVE /HPF
BASOPHILS # BLD: 0 K/UL (ref 0–0.1)
BASOPHILS NFR BLD: 0.3 % (ref 0.2–1.2)
BILIRUB SERPL-MCNC: 0.3 MG/DL (ref 0.2–0.7)
BILIRUB UR QL STRIP: NEGATIVE
BNP BLD-MCNC: 541 PG/ML
BUN SERPL-MCNC: 54 MG/DL (ref 8–23)
CALCIUM SERPL-MCNC: 9.1 MG/DL (ref 8.5–9.9)
CHLORIDE SERPL-SCNC: 111 MEQ/L (ref 95–107)
CLARITY UR: CLEAR
CO2 SERPL-SCNC: 19 MEQ/L (ref 20–31)
COLOR UR: YELLOW
CREAT SERPL-MCNC: 4.02 MG/DL (ref 0.7–1.2)
EOSINOPHIL # BLD: 0.4 K/UL (ref 0–0.5)
EOSINOPHIL NFR BLD: 4.9 % (ref 0.8–7)
EPI CELLS #/AREA URNS HPF: ABNORMAL /HPF
ERYTHROCYTE [DISTWIDTH] IN BLOOD BY AUTOMATED COUNT: 14.5 % (ref 11.6–14.4)
GLOBULIN SER CALC-MCNC: 4.3 G/DL (ref 2.3–3.5)
GLUCOSE SERPL-MCNC: 121 MG/DL (ref 70–99)
GLUCOSE UR STRIP-MCNC: NEGATIVE MG/DL
HCT VFR BLD AUTO: 42.2 % (ref 42–52)
HGB BLD-MCNC: 13.6 G/DL (ref 13.7–17.5)
HGB UR QL STRIP: ABNORMAL
HYALINE CASTS #/AREA URNS LPF: ABNORMAL /LPF (ref 0–5)
IMM GRANULOCYTES # BLD: 0 K/UL
IMM GRANULOCYTES NFR BLD: 0.4 %
INFLUENZA A BY PCR: NEGATIVE
INFLUENZA B BY PCR: NEGATIVE
KETONES UR STRIP-MCNC: NEGATIVE MG/DL
LACTIC ACID, SEPSIS: 1.3 MMOL/L (ref 0.5–1.9)
LEUKOCYTE ESTERASE UR QL STRIP: ABNORMAL
LYMPHOCYTES # BLD: 1 K/UL (ref 1.3–3.6)
LYMPHOCYTES NFR BLD: 12.8 %
MAGNESIUM SERPL-MCNC: 2 MG/DL (ref 1.7–2.4)
MCH RBC QN AUTO: 30.2 PG (ref 25.7–32.2)
MCHC RBC AUTO-ENTMCNC: 32.2 % (ref 32.3–36.5)
MCV RBC AUTO: 93.6 FL (ref 79–92.2)
MONOCYTES # BLD: 0.6 K/UL (ref 0.3–0.8)
MONOCYTES NFR BLD: 6.9 % (ref 5.3–12.2)
NEUTROPHILS # BLD: 6 K/UL (ref 1.8–5.4)
NEUTS SEG NFR BLD: 74.7 % (ref 34–67.9)
NITRITE UR QL STRIP: NEGATIVE
PH UR STRIP: 5 [PH] (ref 5–9)
PLATELET # BLD AUTO: 126 K/UL (ref 163–337)
PLATELET BLD QL SMEAR: ABNORMAL
POTASSIUM SERPL-SCNC: 4.9 MEQ/L (ref 3.4–4.9)
PROT SERPL-MCNC: 7.7 G/DL (ref 6.3–8)
PROT UR STRIP-MCNC: 30 MG/DL
RBC # BLD AUTO: 4.51 M/UL (ref 4.63–6.08)
RBC #/AREA URNS HPF: ABNORMAL /HPF (ref 0–2)
SARS-COV-2 RDRP RESP QL NAA+PROBE: NOT DETECTED
SLIDE REVIEW: ABNORMAL
SODIUM SERPL-SCNC: 140 MEQ/L (ref 135–144)
SP GR UR STRIP: 1.02 (ref 1–1.03)
TROPONIN, HIGH SENSITIVITY: 53 NG/L (ref 0–19)
URINE REFLEX TO CULTURE: ABNORMAL
UROBILINOGEN UR STRIP-ACNC: 0.2 E.U./DL
WBC # BLD AUTO: 8 K/UL (ref 4.2–9)
WBC #/AREA URNS HPF: ABNORMAL /HPF (ref 0–5)

## 2025-01-01 PROCEDURE — 6360000002 HC RX W HCPCS: Performed by: EMERGENCY MEDICINE

## 2025-01-01 PROCEDURE — 74176 CT ABD & PELVIS W/O CONTRAST: CPT

## 2025-01-01 PROCEDURE — 6370000000 HC RX 637 (ALT 250 FOR IP): Performed by: EMERGENCY MEDICINE

## 2025-01-01 PROCEDURE — 84145 PROCALCITONIN (PCT): CPT

## 2025-01-01 PROCEDURE — 83605 ASSAY OF LACTIC ACID: CPT

## 2025-01-01 PROCEDURE — 81001 URINALYSIS AUTO W/SCOPE: CPT

## 2025-01-01 PROCEDURE — 99285 EMERGENCY DEPT VISIT HI MDM: CPT

## 2025-01-01 PROCEDURE — 83735 ASSAY OF MAGNESIUM: CPT

## 2025-01-01 PROCEDURE — 36415 COLL VENOUS BLD VENIPUNCTURE: CPT

## 2025-01-01 PROCEDURE — 84484 ASSAY OF TROPONIN QUANT: CPT

## 2025-01-01 PROCEDURE — 87502 INFLUENZA DNA AMP PROBE: CPT

## 2025-01-01 PROCEDURE — 80053 COMPREHEN METABOLIC PANEL: CPT

## 2025-01-01 PROCEDURE — 94640 AIRWAY INHALATION TREATMENT: CPT

## 2025-01-01 PROCEDURE — 87635 SARS-COV-2 COVID-19 AMP PRB: CPT

## 2025-01-01 PROCEDURE — 85025 COMPLETE CBC W/AUTO DIFF WBC: CPT

## 2025-01-01 PROCEDURE — 71045 X-RAY EXAM CHEST 1 VIEW: CPT

## 2025-01-01 PROCEDURE — 83880 ASSAY OF NATRIURETIC PEPTIDE: CPT

## 2025-01-01 PROCEDURE — 87040 BLOOD CULTURE FOR BACTERIA: CPT

## 2025-01-01 PROCEDURE — 93005 ELECTROCARDIOGRAM TRACING: CPT

## 2025-01-01 PROCEDURE — 2580000003 HC RX 258: Performed by: EMERGENCY MEDICINE

## 2025-01-01 RX ORDER — IPRATROPIUM BROMIDE AND ALBUTEROL SULFATE 2.5; .5 MG/3ML; MG/3ML
1 SOLUTION RESPIRATORY (INHALATION)
Status: COMPLETED | OUTPATIENT
Start: 2025-01-01 | End: 2025-01-01

## 2025-01-01 RX ORDER — METHYLPREDNISOLONE SODIUM SUCCINATE 125 MG/2ML
125 INJECTION INTRAMUSCULAR; INTRAVENOUS ONCE
Status: COMPLETED | OUTPATIENT
Start: 2025-01-01 | End: 2025-01-01

## 2025-01-01 RX ADMIN — AZITHROMYCIN MONOHYDRATE 500 MG: 500 INJECTION, POWDER, LYOPHILIZED, FOR SOLUTION INTRAVENOUS at 22:44

## 2025-01-01 RX ADMIN — IPRATROPIUM BROMIDE AND ALBUTEROL SULFATE 1 DOSE: 2.5; .5 SOLUTION RESPIRATORY (INHALATION) at 22:23

## 2025-01-01 RX ADMIN — METHYLPREDNISOLONE SODIUM SUCCINATE 125 MG: 125 INJECTION INTRAMUSCULAR; INTRAVENOUS at 22:44

## 2025-01-01 ASSESSMENT — ENCOUNTER SYMPTOMS
ABDOMINAL PAIN: 0
EYE DISCHARGE: 0
SORE THROAT: 0
SHORTNESS OF BREATH: 1
EYE REDNESS: 0
BACK PAIN: 0
NAUSEA: 0
VOMITING: 0
COUGH: 1
SINUS CONGESTION: 1
WHEEZING: 1

## 2025-01-01 ASSESSMENT — LIFESTYLE VARIABLES
HOW MANY STANDARD DRINKS CONTAINING ALCOHOL DO YOU HAVE ON A TYPICAL DAY: PATIENT DOES NOT DRINK
HOW OFTEN DO YOU HAVE A DRINK CONTAINING ALCOHOL: MONTHLY OR LESS

## 2025-01-02 PROBLEM — N17.9 AKI (ACUTE KIDNEY INJURY) (HCC): Status: ACTIVE | Noted: 2025-01-02

## 2025-01-02 LAB
ALBUMIN SERPL-MCNC: 3.1 G/DL (ref 3.5–4.6)
ALP SERPL-CCNC: 67 U/L (ref 35–104)
ALT SERPL-CCNC: 8 U/L (ref 0–41)
ANION GAP SERPL CALCULATED.3IONS-SCNC: 11 MEQ/L (ref 9–15)
AST SERPL-CCNC: 15 U/L (ref 0–40)
BASOPHILS # BLD: 0 K/UL (ref 0–0.1)
BASOPHILS NFR BLD: 0.1 % (ref 0.2–1.2)
BILIRUB SERPL-MCNC: <0.2 MG/DL (ref 0.2–0.7)
BUN SERPL-MCNC: 58 MG/DL (ref 8–23)
CALCIUM SERPL-MCNC: 8.3 MG/DL (ref 8.5–9.9)
CHLORIDE SERPL-SCNC: 109 MEQ/L (ref 95–107)
CO2 SERPL-SCNC: 17 MEQ/L (ref 20–31)
CREAT SERPL-MCNC: 3.81 MG/DL (ref 0.7–1.2)
EOSINOPHIL # BLD: 0 K/UL (ref 0–0.5)
EOSINOPHIL NFR BLD: 0 % (ref 0.8–7)
ERYTHROCYTE [DISTWIDTH] IN BLOOD BY AUTOMATED COUNT: 14.5 % (ref 11.6–14.4)
GLOBULIN SER CALC-MCNC: 3.8 G/DL (ref 2.3–3.5)
GLUCOSE SERPL-MCNC: 124 MG/DL (ref 70–99)
HCT VFR BLD AUTO: 40.1 % (ref 42–52)
HGB BLD-MCNC: 12.9 G/DL (ref 13.7–17.5)
IMM GRANULOCYTES # BLD: 0.1 K/UL
IMM GRANULOCYTES NFR BLD: 0.6 %
LYMPHOCYTES # BLD: 0.7 K/UL (ref 1.3–3.6)
LYMPHOCYTES NFR BLD: 7.3 %
MCH RBC QN AUTO: 29.9 PG (ref 25.7–32.2)
MCHC RBC AUTO-ENTMCNC: 32.2 % (ref 32.3–36.5)
MCV RBC AUTO: 93 FL (ref 79–92.2)
MONOCYTES # BLD: 0.5 K/UL (ref 0.3–0.8)
MONOCYTES NFR BLD: 4.6 % (ref 5.3–12.2)
NEUTROPHILS # BLD: 8.6 K/UL (ref 1.8–5.4)
NEUTS SEG NFR BLD: 87.4 % (ref 34–67.9)
PLATELET # BLD AUTO: 126 K/UL (ref 163–337)
POTASSIUM SERPL-SCNC: 4.9 MEQ/L (ref 3.4–4.9)
PROCALCITONIN SERPL IA-MCNC: 0.18 NG/ML (ref 0–0.15)
PROT SERPL-MCNC: 6.9 G/DL (ref 6.3–8)
RBC # BLD AUTO: 4.31 M/UL (ref 4.63–6.08)
RSV BY PCR: NEGATIVE
SODIUM SERPL-SCNC: 137 MEQ/L (ref 135–144)
TROPONIN, HIGH SENSITIVITY: 47 NG/L (ref 0–19)
WBC # BLD AUTO: 9.8 K/UL (ref 4.2–9)

## 2025-01-02 PROCEDURE — 6370000000 HC RX 637 (ALT 250 FOR IP): Performed by: INTERNAL MEDICINE

## 2025-01-02 PROCEDURE — 94664 DEMO&/EVAL PT USE INHALER: CPT

## 2025-01-02 PROCEDURE — 51702 INSERT TEMP BLADDER CATH: CPT

## 2025-01-02 PROCEDURE — 2580000003 HC RX 258: Performed by: INTERNAL MEDICINE

## 2025-01-02 PROCEDURE — 85025 COMPLETE CBC W/AUTO DIFF WBC: CPT

## 2025-01-02 PROCEDURE — 84484 ASSAY OF TROPONIN QUANT: CPT

## 2025-01-02 PROCEDURE — 87634 RSV DNA/RNA AMP PROBE: CPT

## 2025-01-02 PROCEDURE — 94640 AIRWAY INHALATION TREATMENT: CPT

## 2025-01-02 PROCEDURE — 1210000000 HC MED SURG R&B

## 2025-01-02 PROCEDURE — 6360000002 HC RX W HCPCS: Performed by: INTERNAL MEDICINE

## 2025-01-02 PROCEDURE — 80053 COMPREHEN METABOLIC PANEL: CPT

## 2025-01-02 PROCEDURE — 6370000000 HC RX 637 (ALT 250 FOR IP): Performed by: STUDENT IN AN ORGANIZED HEALTH CARE EDUCATION/TRAINING PROGRAM

## 2025-01-02 PROCEDURE — 36415 COLL VENOUS BLD VENIPUNCTURE: CPT

## 2025-01-02 PROCEDURE — 2580000003 HC RX 258: Performed by: STUDENT IN AN ORGANIZED HEALTH CARE EDUCATION/TRAINING PROGRAM

## 2025-01-02 PROCEDURE — 94760 N-INVAS EAR/PLS OXIMETRY 1: CPT

## 2025-01-02 PROCEDURE — 2700000000 HC OXYGEN THERAPY PER DAY

## 2025-01-02 RX ORDER — 0.9 % SODIUM CHLORIDE 0.9 %
1000 INTRAVENOUS SOLUTION INTRAVENOUS ONCE
Status: COMPLETED | OUTPATIENT
Start: 2025-01-02 | End: 2025-01-02

## 2025-01-02 RX ORDER — SODIUM CHLORIDE 0.9 % (FLUSH) 0.9 %
10 SYRINGE (ML) INJECTION PRN
Status: DISCONTINUED | OUTPATIENT
Start: 2025-01-02 | End: 2025-01-03 | Stop reason: HOSPADM

## 2025-01-02 RX ORDER — TAMSULOSIN HYDROCHLORIDE 0.4 MG/1
0.4 CAPSULE ORAL NIGHTLY
Status: DISCONTINUED | OUTPATIENT
Start: 2025-01-02 | End: 2025-01-03 | Stop reason: HOSPADM

## 2025-01-02 RX ORDER — IPRATROPIUM BROMIDE AND ALBUTEROL SULFATE 2.5; .5 MG/3ML; MG/3ML
1 SOLUTION RESPIRATORY (INHALATION) 3 TIMES DAILY
Status: DISCONTINUED | OUTPATIENT
Start: 2025-01-02 | End: 2025-01-03 | Stop reason: HOSPADM

## 2025-01-02 RX ORDER — ACETAMINOPHEN 650 MG/1
650 SUPPOSITORY RECTAL EVERY 6 HOURS PRN
Status: DISCONTINUED | OUTPATIENT
Start: 2025-01-02 | End: 2025-01-03 | Stop reason: HOSPADM

## 2025-01-02 RX ORDER — GUAIFENESIN 600 MG/1
600 TABLET, EXTENDED RELEASE ORAL 2 TIMES DAILY
Status: DISCONTINUED | OUTPATIENT
Start: 2025-01-02 | End: 2025-01-03 | Stop reason: HOSPADM

## 2025-01-02 RX ORDER — ONDANSETRON 2 MG/ML
4 INJECTION INTRAMUSCULAR; INTRAVENOUS EVERY 6 HOURS PRN
Status: DISCONTINUED | OUTPATIENT
Start: 2025-01-02 | End: 2025-01-03 | Stop reason: HOSPADM

## 2025-01-02 RX ORDER — ACETAMINOPHEN 325 MG/1
650 TABLET ORAL EVERY 6 HOURS PRN
Status: DISCONTINUED | OUTPATIENT
Start: 2025-01-02 | End: 2025-01-03 | Stop reason: HOSPADM

## 2025-01-02 RX ORDER — SODIUM CHLORIDE 9 MG/ML
INJECTION, SOLUTION INTRAVENOUS PRN
Status: DISCONTINUED | OUTPATIENT
Start: 2025-01-02 | End: 2025-01-03 | Stop reason: HOSPADM

## 2025-01-02 RX ORDER — SODIUM CHLORIDE 0.9 % (FLUSH) 0.9 %
10 SYRINGE (ML) INJECTION EVERY 12 HOURS SCHEDULED
Status: DISCONTINUED | OUTPATIENT
Start: 2025-01-02 | End: 2025-01-03 | Stop reason: HOSPADM

## 2025-01-02 RX ORDER — HEPARIN SODIUM 5000 [USP'U]/ML
5000 INJECTION, SOLUTION INTRAVENOUS; SUBCUTANEOUS EVERY 8 HOURS SCHEDULED
Status: DISCONTINUED | OUTPATIENT
Start: 2025-01-02 | End: 2025-01-03 | Stop reason: HOSPADM

## 2025-01-02 RX ORDER — FINASTERIDE 5 MG/1
5 TABLET, FILM COATED ORAL DAILY
Status: DISCONTINUED | OUTPATIENT
Start: 2025-01-03 | End: 2025-01-03 | Stop reason: HOSPADM

## 2025-01-02 RX ORDER — PROMETHAZINE HYDROCHLORIDE 12.5 MG/1
12.5 TABLET ORAL EVERY 6 HOURS PRN
Status: DISCONTINUED | OUTPATIENT
Start: 2025-01-02 | End: 2025-01-03 | Stop reason: HOSPADM

## 2025-01-02 RX ORDER — POLYETHYLENE GLYCOL 3350 17 G/17G
17 POWDER, FOR SOLUTION ORAL DAILY PRN
Status: DISCONTINUED | OUTPATIENT
Start: 2025-01-02 | End: 2025-01-03 | Stop reason: HOSPADM

## 2025-01-02 RX ORDER — SODIUM CHLORIDE 9 MG/ML
INJECTION, SOLUTION INTRAVENOUS CONTINUOUS
Status: DISPENSED | OUTPATIENT
Start: 2025-01-02 | End: 2025-01-03

## 2025-01-02 RX ADMIN — GUAIFENESIN 600 MG: 600 TABLET, EXTENDED RELEASE ORAL at 22:02

## 2025-01-02 RX ADMIN — SODIUM CHLORIDE 1000 ML: 9 INJECTION, SOLUTION INTRAVENOUS at 07:12

## 2025-01-02 RX ADMIN — SODIUM CHLORIDE: 9 INJECTION, SOLUTION INTRAVENOUS at 19:44

## 2025-01-02 RX ADMIN — HEPARIN SODIUM 5000 UNITS: 5000 INJECTION INTRAVENOUS; SUBCUTANEOUS at 21:30

## 2025-01-02 RX ADMIN — IPRATROPIUM BROMIDE AND ALBUTEROL SULFATE 1 DOSE: 2.5; .5 SOLUTION RESPIRATORY (INHALATION) at 21:21

## 2025-01-02 RX ADMIN — LEVOTHYROXINE SODIUM 125 MCG: 0.1 TABLET ORAL at 07:40

## 2025-01-02 RX ADMIN — PIPERACILLIN AND TAZOBACTAM 4500 MG: 4; .5 INJECTION, POWDER, LYOPHILIZED, FOR SOLUTION INTRAVENOUS at 19:45

## 2025-01-02 RX ADMIN — TAMSULOSIN HYDROCHLORIDE 0.4 MG: 0.4 CAPSULE ORAL at 21:24

## 2025-01-02 NOTE — ED NOTES
Pt resting in bed requested  that the bed be adjusted this writer did as requested.  Emptied 300 cc yellow urine

## 2025-01-02 NOTE — PROGRESS NOTES
1815 report received from ED, patient disconnected from telemtry, transferred to , noted gait is very unsteady, patient to room 218, assisted to bed, oriented to room and call light. Electronically signed by Margaret Abbott RN on 1/2/2025 at 6:27 PM

## 2025-01-02 NOTE — ED NOTES
[facility] does have urology and can accept a patient, awaiting hospitalist to call.

## 2025-01-02 NOTE — ED NOTES
SLEEP EVALUATION NOTE     Baldev Devine is a 79 year old male presenting for evaluation of: Consultation and Office Visit   .    Referring provider: Eva Mahan CNP  PCP: Kp Nichols MD       HPI     New patient, with past medical history of HTN, CAD, hypothyroidismis reporting to sleep center for establishment of care.  Per patient, he is here today for sleep study results.  He reports not having any issues with sleeping.    Bedtime: 2200  Awake time: 0630  Sleep position: Lateral   Sleep onset latency: 1 hour  Sleeping aid medications: No  Awakenings # and episodes of nocturia: 1-2 d/t nocturia     [x]Snoring  []Witnessed apneas  []Dyspneic arousal  [x]Morning dry mouth  []Morning headache    []Non-restorative sleep  []Excessive daytime sleepiness or tired during the day  []Naps    []Urge to move legs and/or uncomfortable tingling or burning in legs  []Cramping or jerking of the legs during sleep    []Cataplexy  []Sleep paralysis  []Hypnogognic or hypnopompnic hallucinations  []Sleep attacks    []Parasomnias:    STOP BANG SCREENING  STOP  S: Do you snore loudly?: Yes  T: Do you often feel tired, fatigued or sleepy during daytime?: No  O: Has anyone observed you stop breathing during your sleep?: No  P: Do you have or are you being treated for high blood pressure?: Yes  BANG  B: BMI more than 35 kg/m2?: No  A: Is age over 50 years old?: Yes  N: Neck circumfrence >16 inches (40 cm)?: No  G: Is gender Male?: Yes  Total Score  Stop Bang Total Score (out of 8): 4    PRIOR SLEEP STUDY DATE: [facility] 2/21/23   RESULTS: 1. AHI: 6.9                   2. DESATURATION DEBBY: 82%                 CURRENT CO-MORBIDITIES:    [x] HTN     [] AF/ARRHYTHMIA    [] HYPOTHYROIDISM   []OTHER:  [x] CAD     [] OBESITY                  [] INSOMNIA  [] CHF     [] DM                         [] STROKE/TIA          Milam Sleepiness Scale:     0 = would never doze  1 = slight chance of dozing  2 = moderate chance of dozing  3 =  Transfer order placed to initiate transfer to [facility] and consult with urology.    high chance of dozing    Situation     Chance of Dozing       1. Sitting and reading   1  2. Watching TV    1  3. Sitting, inactive in a public place       (e.g. a theatre or a meeting)  0    4. As a passenger in a car for an hour       without a break    0    5. Lying down to rest in the afternoon      when circumstances permit  0    6. Sitting and talking to someone  0    7. Sitting quietly after lunch without      Alcohol     0    8. In a car, while stopped for a few      Minutes in traffic    0      TOTAL 2      Past Medical History:     Past Medical History:   Diagnosis Date   • Aortic stenosis    • Coronary artery disease    • Essential (primary) hypertension    • GERD (gastroesophageal reflux disease)    • History of heart artery stent    • Impaired fasting glucose    • JORGE (obstructive sleep apnea)      Past Surgical History:   Procedure Laterality Date   • Aortic valve replacement     • Stent implant       Family History   Problem Relation Age of Onset   • Heart disease Mother    • Patient is unaware of any medical problems Father    • Diabetes Brother    • Hypertension Brother    • Mental retardation Son      Social History     Tobacco Use   • Smoking status: Never   • Smokeless tobacco: Never   Vaping Use   • Vaping Use: never used   Substance Use Topics   • Alcohol use: No   • Drug use: Never       ALLERGIES:  No Known Allergies  Current Outpatient Medications   Medication Sig   • atorvastatin (LIPITOR) 20 MG tablet Take 1 tablet by mouth daily.   • levothyroxine 25 MCG tablet Take 1 tablet by mouth daily.   • metoPROLOL succinate (TOPROL-XL) 25 MG 24 hr tablet TAKE 1 TABLET DAILY   • Multiple Vitamins-Minerals (PRESERVISION AREDS 2 PO) Take 1 tablet by mouth 2 times daily.    • Multiple Vitamin (MULTI-VITAMIN) tablet Take by mouth daily.   • aspirin 81 MG tablet Take 81 mg by mouth daily.     No current facility-administered medications for this visit.        Review of Systems:     Review of Systems    Constitutional: Negative.   HENT: Negative.    Eyes: Negative.    Cardiovascular: Negative.    Respiratory: Positive for snoring.    Endocrine: Negative.    Skin: Negative.    Musculoskeletal: Negative.    Gastrointestinal: Negative.    Genitourinary: Negative.    Neurological: Negative.    Psychiatric/Behavioral: Negative.         Physical Examination:     Vitals:    03/22/23 1437   BP: 116/73   BP Location: LUE - Left upper extremity   Patient Position: Sitting   Cuff Size: Regular   Pulse: 61   Resp: 16   Temp: 96 °F (35.6 °C)   Weight: 69.7 kg (153 lb 11.2 oz)   Height: 5' 7\" (1.702 m)     Body mass index is 24.07 kg/m².      Neck Circumference: 15 in  Nasal Passages: [x]Clear   [] Congested  Palate: Godwin [] I  [] II   [] III   [x] IV              Eyrtherna/edema [x]none []+1  []+2  []+3  []+4  Neck:  No enlarged lymph nodes  Gen: No acute distress. Pleasant and interactive.  Head:  Normocephalic and atraumatic.  Eyes: Conjunctiva and sclera normal.   Heart:  Normal rate and regular rhythm, no murmur.  Lungs:  Normal respiratory rate and effort, breath sounds equal.  Extremities:  No edema in lower extremities.   Skin: Warm and dry, no rash.  Musculoskeletal:  No joint swelling or tenderness  Psych:  Affect was appropriate to situation and mood was normal.  Neuro:  Alert and oriented, attention and recall preserved, cranial nerves intact, motor strength preserved, coordination intact, sensation preserved, reflexes symmetric, gait normal.     Assessment and Plan:     PROBLEMS ADDRESSED THIS VISIT:  Problem List Items Addressed This Visit        Cardiac and Vasculature    History of aortic stenosis    Atherosclerosis of native coronary artery of native heart without angina pectoris    Benign essential hypertension    Dyslipidemia, goal LDL below 70    History of aortic valve replacement with bioprosthetic valve    Bilateral carotid artery disease (CMD)   Other Visit Diagnoses     Obstructive sleep apnea  (adult) (pediatric)    -  Primary    Mild JORGE - treatment with positional therapy, not laying supine    At risk for sleep apnea               Comments: Reviewed sleep study in detail with patient and explained that we do have a mild Positional JORGE>  Discussed the nature of sleep apnea and the cardiovascular risk factors of not treating it.   Explained the different treatment options such as CPAP and MAD.  Patient denied wanting to start therapy at this time.  Positional therapy to be completed.  Advised the importance of not laying supine.  All questions and concerns were addressed and answered.  Kp Nichols MD received this note via [employer].    Patient Counseling:     • We reviewed the nature of sleep apnea, the elevated cardiovascular risks and other health consequences associated with this condition and reviewed treatment options in detail.  • The patient was cautioned not to drive while sleepy.    NONA Brooks

## 2025-01-02 NOTE — PROGRESS NOTES
Pharmacy Note - Extended Infusion Beta-Lactam Adjustment    Piperacillin/Tazobactam  2250mg Q12h  for treatment of Sepsis of unknown etiology, UTI. Per [facility] Extended Infusion Beta-Lactam Policy, piperacillin/tazobactam will be changed to 4500mg loading dose followed by 3375mg Q12h extended infusion    Estimated Creatinine Clearance: Estimated Creatinine Clearance: 17 mL/min (A) (based on SCr of 4.02 mg/dL (H)).    BMI: Body mass index is 31.59 kg/m².    Please call with any questions.    Thank you,    Lorraine Maldonado, [facility]

## 2025-01-02 NOTE — ED PROVIDER NOTES
Patient signed out to me at 7AM by Dr. Celestin. Initial evaluation and presentation as documented in their full ED note.   Nirav Ron is a 84 y.o. male with a PMH clinically significant for HTN, HLD, DM II, COPD, Obesity, GEORGES, DVT/PE on Xarelto, GERD, Nephrolithiasis, BPH, Hypothyroidism, Anxiety, and Tobacco Use initially presenting to the ED c/o worsening SOB, cough productive of yellow sputum and wheezing with initial onset 1 week ago. Mildly hypoxic upon arrival with hx of prior O2 requirements, but patient states he hasn't used any O2 for a long period of time.  Found to have large obstructing nephrolithiasis with hydroureteronephrosis and significant BRAD on CKD.  Patient stating intermittent pain with urination, but has not had anything recently.  Denying any abdominal pain, nausea or vomiting.  Also denying any CP, Hemoptysis, fevers or chills.  States he was otherwise feeling well. Has been taking AC regularly.  Plan: Transfer to Urology capable facility.  Given findings, consulted Urology, Dr. Washington, who was made understanding of the patient's condition in the ED and amenable to serving as consulting service. Recommending transfer to Tertiary care center given complexity of case. Discussed with IM at [facility] who was amenable to the POC. RSV ordered as requested by [facility] and negative. Updated regarding result. Will continue to monitor while in the ED and transfer when bed available.    ED Course as of 01/02/25 1153   Thu Jan 02, 2025   0746 Creatinine(!): 4.02 [NA]   0746 BUN,BUNPL(!): 54 [NA]   0746 Platelet Count(!): 126 [NA]   0746 WBC: 8.0 [NA]   0746 Hemoglobin Quant(!): 13.6 [NA]   0746 Lactic Acid, Sepsis: 1.3 [NA]   0908 Troponin, High Sensitivity(!): 47  Mildly decreased. Lower suspicion for ongoing ACS at this time. [NA]      ED Course User Index  [NA] Edward Pisano MD       Labs, EKG, and Imaging visualized and interpreted by myself as noted above in ED Course.   Pt was administered

## 2025-01-02 NOTE — ED PROVIDER NOTES
Chloride 111 (*)     CO2 19 (*)     Glucose 121 (*)     BUN 54 (*)     Creatinine 4.02 (*)     Est, Glom Filt Rate 14.1 (*)     Albumin 3.4 (*)     Globulin 4.3 (*)     All other components within normal limits   PROCALCITONIN - Abnormal; Notable for the following components:    Procalcitonin 0.18 (*)     All other components within normal limits   URINALYSIS WITH REFLEX TO CULTURE - Abnormal; Notable for the following components:    Protein, UA 30 (*)     All other components within normal limits   TROPONIN - Abnormal; Notable for the following components:    Troponin, High Sensitivity 53 (*)     All other components within normal limits    Narrative:     CALL  Ronald ALDRICH tel. [phone],  Chemistry results called to and read back by Cornel Greene, 01/01/2025  23:11, by LETTY   MICROSCOPIC URINALYSIS - Abnormal; Notable for the following components:    RBC, UA 5-10 (*)     All other components within normal limits   TROPONIN - Abnormal; Notable for the following components:    Troponin, High Sensitivity 47 (*)     All other components within normal limits   CBC WITH AUTO DIFFERENTIAL - Abnormal; Notable for the following components:    WBC 9.8 (*)     RBC 4.31 (*)     Hemoglobin 12.9 (*)     Hematocrit 40.1 (*)     MCV 93.0 (*)     MCHC 32.2 (*)     RDW 14.5 (*)     Platelets 126 (*)     Neutrophils % 87.4 (*)     Monocytes % 4.6 (*)     Eosinophils % 0.0 (*)     Basophils % 0.1 (*)     Neutrophils Absolute 8.6 (*)     Lymphocytes Absolute 0.7 (*)     All other components within normal limits   COMPREHENSIVE METABOLIC PANEL - Abnormal; Notable for the following components:    Chloride 109 (*)     CO2 17 (*)     Glucose 124 (*)     BUN 58 (*)     Creatinine 3.81 (*)     Est, Glom Filt Rate 15.0 (*)     Calcium 8.3 (*)     Albumin 3.1 (*)     Globulin 3.8 (*)     All other components within normal limits   COMPREHENSIVE METABOLIC PANEL W/ REFLEX TO MG FOR LOW K - Abnormal; Notable for the following components:     renal failure, unspecified acute renal failure type (HCC)    3. Hypoxemia    4. Kidney stone          DISPOSITION/PLAN   DISPOSITION Admitted 01/02/2025 05:44:09 PM      PATIENT REFERRED TO:  No follow-up provider specified.    DISCHARGE MEDICATIONS:  Current Discharge Medication List        Controlled Substances Monitoring:          No data to display                (Please note that portions of this note were completed with a voice recognition program.  Efforts were made to edit the dictations but occasionally words are mis-transcribed.)    ANGELIC MONACO DO (electronically signed)  Attending Emergency Physician           Angelic Monaco DO  01/03/25 2021

## 2025-01-02 NOTE — ED NOTES
Dr. Mandujano connected to [place]. [place] [facility] [place] [facility] Awaiting bed.

## 2025-01-02 NOTE — ED NOTES
TC unable to reach urology at [facility], per note neither ER or supervisor at [facility] have a urology schedule to see if there is anyone on call or if there is anyone scheduled to come in tomorrow. TC to page ARABELLA Bartlett and also [facility] hospitalist.

## 2025-01-02 NOTE — ED NOTES
PROCEDURE INFORMATION: 

Exam: MR Lumbar Spine Without Contrast 

Exam date and time: 6/29/2021 10:26 AM 

Age: 55 years old 

Clinical indication: Low back pain; Additional info: Disc degeneration, R/O 

hnp/stenosis 



TECHNIQUE: 

Imaging protocol: Multiplanar magnetic resonance images of the lumbar spine 

without intravenous contrast. 



COMPARISON: 

No relevant prior studies available. 



FINDINGS: 

Vertebrae: 2 mm of degenerative retrolisthesis of L3 on L4. No acute fracture 

seen. 

Spinal cord: The conus medullaris ends normally. 

Disc height loss and spondylosis is moderate at L3-L4 and L5-S1, mild 

elsewhere. Multilevel thoracolumbar endplate Schmorl's nodes. 

L1-L2: Mild disc bulge as well as mild to moderate facet arthropathy and 

ligamentum flavum buckling. The central spinal canal remains patent. The left 

lateral recess is narrowed near the left L2 nerve root. No significant 

foraminal stenoses. 

L2-L3: Mild diffuse disc bulge as well as moderate right and mild-to-moderate 

left facet arthropathy and ligamentum flavum buckling. The central spinal canal 

remains patent. Right lateral recess stenosis is mild. No significant neural 

foraminal stenoses. 

L3-L4: Slight retrolisthesis. Moderate diffuse disc osteophyte complex, facet 

arthropathy and ligamentum flavum buckling. No significant central spinal canal 

stenosis. The lateral recesses are narrowed near the L4 nerve roots. 

Mild-to-moderate bilateral neural foraminal stenoses. 

L4-L5: Mild diffuse disc bulge as well as marked facet arthropathy and 

ligamentum flavum buckling. The central spinal canal remains patent. The 

lateral recesses are narrowed near the L5 nerve roots. Mild-to-moderate 

bilateral neural foraminal stenoses. 

L5-S1: Marked diffuse disc osteophyte complex, facet arthropathy and ligamentum 

flavum buckling. The thecal sac is tapered by epidural fat. No evidence of S1 

nerve root impingement. Mild right neural foraminal stenosis, the exiting right 

L5 nerve root may contact far lateral disc osteophyte. 

Soft tissues: Nonspecific edema in the back subcutaneous fat, potentially 

dependent/positional. 



IMPRESSION: 

1. Moderate lumbar degenerative disc disease at L3-L4 and L5-S1.  No focal disc 

herniation identified.

2. Left lateral recess stenosis at L1-L2. 

3. Bilateral lateral recess stenoses at L3-L4. 

4. Bilateral lateral recess stenoses at L4-L5. 



Electronically signed by: Mimi Biggs On 06/30/2021  13:52:37 PM TC called connecting Dr. Mandujano to Dr. Chopra. Dr. Chopra was unaware that [facility] did not have nephrology over the holidays and cannot accept patient without speaking to them first. He states they should be in in the morning. Expect a call after 0800.

## 2025-01-02 NOTE — ED NOTES
Dr. Sands unable to accept patient without a schedule for urology. Supervisor states Dr. Washington is 'supposed' to be in tomorrow but there is no schedule proof. TC advised to reach out to all surrounding hospitals for availability of urology and beds.

## 2025-01-02 NOTE — ED NOTES
[facility] has no urology coverage, would have to look into other [facility] and bed space is tight.

## 2025-01-03 VITALS
HEART RATE: 83 BPM | WEIGHT: 233 LBS | TEMPERATURE: 97.7 F | DIASTOLIC BLOOD PRESSURE: 81 MMHG | BODY MASS INDEX: 31.56 KG/M2 | SYSTOLIC BLOOD PRESSURE: 144 MMHG | OXYGEN SATURATION: 97 % | RESPIRATION RATE: 20 BRPM | HEIGHT: 72 IN

## 2025-01-03 LAB
ALBUMIN SERPL-MCNC: 2.3 G/DL (ref 3.5–4.6)
ALP SERPL-CCNC: 48 U/L (ref 35–104)
ALT SERPL-CCNC: 6 U/L (ref 0–41)
ANION GAP SERPL CALCULATED.3IONS-SCNC: 13 MEQ/L (ref 9–15)
AST SERPL-CCNC: 14 U/L (ref 0–40)
BACTERIA BLD CULT ORG #2: NORMAL
BACTERIA BLD CULT: NORMAL
BASOPHILS # BLD: 0 K/UL (ref 0–0.1)
BASOPHILS NFR BLD: 0.1 % (ref 0.2–1.2)
BILIRUB SERPL-MCNC: <0.2 MG/DL (ref 0.2–0.7)
BUN SERPL-MCNC: 52 MG/DL (ref 8–23)
CALCIUM SERPL-MCNC: 7 MG/DL (ref 8.5–9.9)
CHLORIDE SERPL-SCNC: 115 MEQ/L (ref 95–107)
CO2 SERPL-SCNC: 16 MEQ/L (ref 20–31)
CREAT SERPL-MCNC: 3.3 MG/DL (ref 0.7–1.2)
EKG ATRIAL RATE: 97 BPM
EKG P AXIS: 23 DEGREES
EKG P-R INTERVAL: 168 MS
EKG Q-T INTERVAL: 358 MS
EKG QRS DURATION: 86 MS
EKG QTC CALCULATION (BAZETT): 454 MS
EKG R AXIS: -30 DEGREES
EKG T AXIS: 43 DEGREES
EKG VENTRICULAR RATE: 97 BPM
EOSINOPHIL # BLD: 0 K/UL (ref 0–0.5)
EOSINOPHIL NFR BLD: 0.2 % (ref 0.8–7)
ERYTHROCYTE [DISTWIDTH] IN BLOOD BY AUTOMATED COUNT: 14.6 % (ref 11.6–14.4)
GLOBULIN SER CALC-MCNC: 2.8 G/DL (ref 2.3–3.5)
GLUCOSE SERPL-MCNC: 125 MG/DL (ref 70–99)
HCT VFR BLD AUTO: 34.3 % (ref 42–52)
HGB BLD-MCNC: 10.9 G/DL (ref 13.7–17.5)
IMM GRANULOCYTES # BLD: 0 K/UL
IMM GRANULOCYTES NFR BLD: 0.5 %
LYMPHOCYTES # BLD: 0.9 K/UL (ref 1.3–3.6)
LYMPHOCYTES NFR BLD: 11.1 %
MCH RBC QN AUTO: 29.9 PG (ref 25.7–32.2)
MCHC RBC AUTO-ENTMCNC: 31.8 % (ref 32.3–36.5)
MCV RBC AUTO: 94.2 FL (ref 79–92.2)
MONOCYTES # BLD: 0.6 K/UL (ref 0.3–0.8)
MONOCYTES NFR BLD: 7.2 % (ref 5.3–12.2)
NEUTROPHILS # BLD: 6.6 K/UL (ref 1.8–5.4)
NEUTS SEG NFR BLD: 80.9 % (ref 34–67.9)
PLATELET # BLD AUTO: 118 K/UL (ref 163–337)
POTASSIUM SERPL-SCNC: 4 MEQ/L (ref 3.4–4.9)
PROT SERPL-MCNC: 5.1 G/DL (ref 6.3–8)
RBC # BLD AUTO: 3.64 M/UL (ref 4.63–6.08)
SODIUM SERPL-SCNC: 144 MEQ/L (ref 135–144)
WBC # BLD AUTO: 8.2 K/UL (ref 4.2–9)

## 2025-01-03 PROCEDURE — 36415 COLL VENOUS BLD VENIPUNCTURE: CPT

## 2025-01-03 PROCEDURE — 94760 N-INVAS EAR/PLS OXIMETRY 1: CPT

## 2025-01-03 PROCEDURE — 80053 COMPREHEN METABOLIC PANEL: CPT

## 2025-01-03 PROCEDURE — 6370000000 HC RX 637 (ALT 250 FOR IP): Performed by: INTERNAL MEDICINE

## 2025-01-03 PROCEDURE — 2700000000 HC OXYGEN THERAPY PER DAY

## 2025-01-03 PROCEDURE — 85025 COMPLETE CBC W/AUTO DIFF WBC: CPT

## 2025-01-03 PROCEDURE — 2580000003 HC RX 258: Performed by: INTERNAL MEDICINE

## 2025-01-03 PROCEDURE — 94640 AIRWAY INHALATION TREATMENT: CPT

## 2025-01-03 PROCEDURE — 6360000002 HC RX W HCPCS: Performed by: INTERNAL MEDICINE

## 2025-01-03 RX ORDER — LEVOTHYROXINE SODIUM 100 UG/1
100 TABLET ORAL DAILY
Status: DISCONTINUED | OUTPATIENT
Start: 2025-01-03 | End: 2025-01-03 | Stop reason: HOSPADM

## 2025-01-03 RX ORDER — POLYETHYLENE GLYCOL 3350 17 G/17G
17 POWDER, FOR SOLUTION ORAL DAILY PRN
OUTPATIENT
Start: 2025-01-03

## 2025-01-03 RX ORDER — ROSUVASTATIN CALCIUM 5 MG/1
5 TABLET, COATED ORAL NIGHTLY
Status: DISCONTINUED | OUTPATIENT
Start: 2025-01-03 | End: 2025-01-03 | Stop reason: HOSPADM

## 2025-01-03 RX ORDER — LEVOTHYROXINE SODIUM 100 UG/1
100 TABLET ORAL DAILY
OUTPATIENT
Start: 2025-01-04

## 2025-01-03 RX ORDER — SODIUM CHLORIDE 9 MG/ML
INJECTION, SOLUTION INTRAVENOUS CONTINUOUS
OUTPATIENT
Start: 2025-01-03

## 2025-01-03 RX ORDER — BUDESONIDE AND FORMOTEROL FUMARATE DIHYDRATE 160; 4.5 UG/1; UG/1
2 AEROSOL RESPIRATORY (INHALATION) 2 TIMES DAILY
OUTPATIENT
Start: 2025-01-03

## 2025-01-03 RX ORDER — SODIUM CHLORIDE 9 MG/ML
INJECTION, SOLUTION INTRAVENOUS CONTINUOUS
Status: DISCONTINUED | OUTPATIENT
Start: 2025-01-03 | End: 2025-01-03 | Stop reason: HOSPADM

## 2025-01-03 RX ORDER — TAMSULOSIN HYDROCHLORIDE 0.4 MG/1
0.4 CAPSULE ORAL NIGHTLY
OUTPATIENT
Start: 2025-01-03

## 2025-01-03 RX ORDER — ONDANSETRON 2 MG/ML
4 INJECTION INTRAMUSCULAR; INTRAVENOUS EVERY 6 HOURS PRN
OUTPATIENT
Start: 2025-01-03

## 2025-01-03 RX ORDER — GUAIFENESIN 600 MG/1
600 TABLET, EXTENDED RELEASE ORAL 2 TIMES DAILY
OUTPATIENT
Start: 2025-01-03

## 2025-01-03 RX ORDER — ROSUVASTATIN CALCIUM 5 MG/1
5 TABLET, COATED ORAL NIGHTLY
OUTPATIENT
Start: 2025-01-03

## 2025-01-03 RX ORDER — SODIUM CHLORIDE 0.9 % (FLUSH) 0.9 %
10 SYRINGE (ML) INJECTION EVERY 12 HOURS SCHEDULED
OUTPATIENT
Start: 2025-01-03

## 2025-01-03 RX ORDER — FINASTERIDE 5 MG/1
5 TABLET, FILM COATED ORAL DAILY
OUTPATIENT
Start: 2025-01-04

## 2025-01-03 RX ORDER — SODIUM CHLORIDE 9 MG/ML
INJECTION, SOLUTION INTRAVENOUS PRN
OUTPATIENT
Start: 2025-01-03

## 2025-01-03 RX ORDER — IPRATROPIUM BROMIDE AND ALBUTEROL SULFATE 2.5; .5 MG/3ML; MG/3ML
1 SOLUTION RESPIRATORY (INHALATION) 3 TIMES DAILY
OUTPATIENT
Start: 2025-01-03

## 2025-01-03 RX ORDER — PROMETHAZINE HYDROCHLORIDE 12.5 MG/1
12.5 TABLET ORAL EVERY 6 HOURS PRN
OUTPATIENT
Start: 2025-01-03

## 2025-01-03 RX ORDER — ACETAMINOPHEN 325 MG/1
650 TABLET ORAL EVERY 6 HOURS PRN
OUTPATIENT
Start: 2025-01-03

## 2025-01-03 RX ORDER — HEPARIN SODIUM 5000 [USP'U]/ML
5000 INJECTION, SOLUTION INTRAVENOUS; SUBCUTANEOUS EVERY 8 HOURS SCHEDULED
OUTPATIENT
Start: 2025-01-03

## 2025-01-03 RX ORDER — SODIUM CHLORIDE 0.9 % (FLUSH) 0.9 %
10 SYRINGE (ML) INJECTION PRN
OUTPATIENT
Start: 2025-01-03

## 2025-01-03 RX ORDER — BUDESONIDE AND FORMOTEROL FUMARATE DIHYDRATE 160; 4.5 UG/1; UG/1
2 AEROSOL RESPIRATORY (INHALATION) 2 TIMES DAILY
Status: DISCONTINUED | OUTPATIENT
Start: 2025-01-03 | End: 2025-01-03 | Stop reason: HOSPADM

## 2025-01-03 RX ORDER — ACETAMINOPHEN 650 MG/1
650 SUPPOSITORY RECTAL EVERY 6 HOURS PRN
OUTPATIENT
Start: 2025-01-03

## 2025-01-03 RX ADMIN — BUDESONIDE AND FORMOTEROL FUMARATE DIHYDRATE 2 PUFF: 160; 4.5 AEROSOL RESPIRATORY (INHALATION) at 06:20

## 2025-01-03 RX ADMIN — SODIUM CHLORIDE: 9 INJECTION, SOLUTION INTRAVENOUS at 12:58

## 2025-01-03 RX ADMIN — GUAIFENESIN 600 MG: 600 TABLET, EXTENDED RELEASE ORAL at 19:56

## 2025-01-03 RX ADMIN — SODIUM CHLORIDE: 9 INJECTION, SOLUTION INTRAVENOUS at 04:07

## 2025-01-03 RX ADMIN — HEPARIN SODIUM 5000 UNITS: 5000 INJECTION INTRAVENOUS; SUBCUTANEOUS at 15:35

## 2025-01-03 RX ADMIN — FINASTERIDE 5 MG: 5 TABLET, FILM COATED ORAL at 08:18

## 2025-01-03 RX ADMIN — IPRATROPIUM BROMIDE AND ALBUTEROL SULFATE 1 DOSE: 2.5; .5 SOLUTION RESPIRATORY (INHALATION) at 11:44

## 2025-01-03 RX ADMIN — PIPERACILLIN AND TAZOBACTAM 3375 MG: 3; .375 INJECTION, POWDER, LYOPHILIZED, FOR SOLUTION INTRAVENOUS at 03:10

## 2025-01-03 RX ADMIN — BUDESONIDE AND FORMOTEROL FUMARATE DIHYDRATE 2 PUFF: 160; 4.5 AEROSOL RESPIRATORY (INHALATION) at 18:09

## 2025-01-03 RX ADMIN — ROSUVASTATIN CALCIUM 5 MG: 5 TABLET, FILM COATED ORAL at 19:56

## 2025-01-03 RX ADMIN — GUAIFENESIN 600 MG: 600 TABLET, EXTENDED RELEASE ORAL at 08:18

## 2025-01-03 RX ADMIN — HEPARIN SODIUM 5000 UNITS: 5000 INJECTION INTRAVENOUS; SUBCUTANEOUS at 05:54

## 2025-01-03 RX ADMIN — PIPERACILLIN AND TAZOBACTAM 3375 MG: 3; .375 INJECTION, POWDER, LYOPHILIZED, FOR SOLUTION INTRAVENOUS at 15:37

## 2025-01-03 RX ADMIN — IPRATROPIUM BROMIDE AND ALBUTEROL SULFATE 1 DOSE: 2.5; .5 SOLUTION RESPIRATORY (INHALATION) at 06:19

## 2025-01-03 RX ADMIN — LEVOTHYROXINE SODIUM 100 MCG: 0.1 TABLET ORAL at 05:54

## 2025-01-03 RX ADMIN — SERTRALINE 50 MG: 50 TABLET, FILM COATED ORAL at 08:18

## 2025-01-03 RX ADMIN — TAMSULOSIN HYDROCHLORIDE 0.4 MG: 0.4 CAPSULE ORAL at 19:57

## 2025-01-03 RX ADMIN — IPRATROPIUM BROMIDE AND ALBUTEROL SULFATE 1 DOSE: 2.5; .5 SOLUTION RESPIRATORY (INHALATION) at 18:08

## 2025-01-03 ASSESSMENT — PAIN SCALES - GENERAL: PAINLEVEL_OUTOF10: 0

## 2025-01-03 NOTE — H&P
COLON, DIAGNOSTIC      EYE SURGERY      phaco with IOL OU    HERNIA REPAIR  1990s    repair umbilical hernia    KNEE SURGERY Left 1970    MI SURGICAL ARTHROSCOPY SHOULDER BICEPS TENODESIS Right 9/18/2018    RIGHT SHOULDER ARTHROSCOPY SUBACROMIAL DECOMPRESS WITH POSSIBLE BICEPS TENODESIS ARTHROSCOPIC FRANSISCA C- ARM ARTHREX BICEPS TENODESIS TRAY ARTHREX BICEPS BUTTON CARMEN FRAME BEACH CHAIR CASE #1 1 HOUR performed by Zachary Mabry MD at [facility] OR    THORACOTOMY Right 11/14/2017    RIGHT THORACOTOMY WEDGE RESECTION FOR FOREIGN BODY AND FOB DOUBLE LUMEN (1ST CASE) performed by Black Mckeon MD at [facility] OR    THYROIDECTOMY  2006    cancer / no chemo or radiation       Medications Prior to Admission:      Prior to Admission medications    Medication Sig Start Date End Date Taking? Authorizing Provider   budesonide-formoterol (SYMBICORT) 160-4.5 MCG/ACT AERO Inhale 2 puffs into the lungs 2 times daily 11/11/24  Yes Luis Mclean MD   albuterol (PROVENTIL) (2.5 MG/3ML) 0.083% nebulizer solution use 1 (ONE) vial via NEBULIZER EVERY 6 HOURS AS NEEDED FOR WHEEZING 6/17/24  Yes Luis Mclean MD   rosuvastatin (CRESTOR) 20 MG tablet  8/5/21  Yes Provider, MD Ludmila   levothyroxine (SYNTHROID) 137 MCG tablet  4/5/21  Yes Provider, Historical, MD   sertraline (ZOLOFT) 50 MG tablet Take 1 tablet by mouth daily Pt takes at night   Yes Provider, Historical, MD   alfuzosin (UROXATRAL) 10 MG extended release tablet Take by mouth 3/18/09  Yes Provider, Historical, MD   rivaroxaban (XARELTO) 10 MG TABS tablet Take 1 tablet by mouth daily (with breakfast) 5/10/19  Yes Jon Licona DO   aspirin 81 MG EC tablet Take 1 tablet by mouth daily 12/17/18  Yes Praveen Blackburn MD   vitamin D (CHOLECALCIFEROL) 1000 UNIT TABS tablet Take 2 tablets by mouth daily 9/27/18  Yes Catrachita Garcia DO   clonazePAM (KLONOPIN) 0.5 MG tablet Take 1 tablet by mouth nightly as needed.   Yes Provider, MD Ludmila   CPAP Machine MISC by Does      CT ABDOMEN PELVIS WO CONTRAST Additional Contrast? None   Final Result   1. Moderate to severe right hydroureteronephrosis with a 1.1 cm calculus in   the mid right ureter and an additional 8 mm calculus in the distal right   ureter just proximal to the right UVJ.   2. Irregular calculi noted in the dependent bladder adjacent to the right UVJ.   3. Mild circumferential bladder wall thickening. Correlate with urinalysis.   4. Prostatomegaly.   5. 3.6 cm infrarenal abdominal aortic aneurysm.      RECOMMENDATIONS:   For management of fusiform AAA:      3.5-3.9 cm AAA, recommend follow-up every 2 years.      Note: Recommend Vascular consultation if a fusiform AAA enlarges by >0.5 cm   in 6 months or >1 cm in 1 year or for a saccular AAA of any size.      References: J Am Frannie Radiol 2013; 10(10):789-794; J Vasc Surg. 2018; 67:2-77         XR CHEST PORTABLE   Final Result   Old residual postoperative changes in the right lung base with some loss of   volume.      No superimposed acute cardiopulmonary process.             ASSESSMENT:    Active Hospital Problems    Diagnosis Date Noted    BRAD (acute kidney injury) (HCC) [N17.9] 01/02/2025       PLAN:        DVT Prophylaxis:   Diet: ADULT DIET; Regular  Code Status: Full Code    PT/OT Eval Status:     Dispo -   R sided hydronephrosis/hydroureter due to large obstructive stone- IV hydration/pain meds and atbs initiated, awaiting transfer to [facility] for further treatment per urology service   BRAD due to above- continue hydration, BMP AM  History PE/DVT- holding xarelto in anticipating procedure per urology after transfer  Dyspnea/COPD- O2, baseline  Obesity with BMI 31%- supportive care  Macrocytis anemia- checking iron level, follow up with CBC AM   Patient admitted over night in anticipating transfer and not appropriate for care level at [facility]         TTS: 65mins where I focused more than 75% of my attention on rendering care, and planning treatment

## 2025-01-03 NOTE — PROGRESS NOTES
Transfer center called with an estimated  time of 1915 tonight with [facility] service.

## 2025-01-03 NOTE — PROGRESS NOTES
[facility] called with bed assignment. [facility] [address] [facility] Report number [phone]. Transport being set up and they will call back with time of arrival. Accepting physician Dr Maggie Morgan.

## 2025-01-03 NOTE — PROGRESS NOTES
Chart reviewed.  Patient presented to [facility] ED complaining of shortness of breath.  ED notes that patient was found to have large obstructing nephrolithiasis with hydroureteronephrosis and significant BRAD on CKD.  Patient's care discussed in daily quality rounds.  Patient was admitted to medical floor with plan for patient to transfer to [facility] pending bed availability.  [facility] RN House supervisor reports that transfer center has advised that [facility] pend is still pending.

## 2025-01-04 NOTE — PROGRESS NOTES
Report called to Britin SANDERS at [facility]. [facility] transporters given report and paperwork. Patient, belongings and rollator transported via stretcher.

## 2025-01-07 LAB
BACTERIA BLD CULT ORG #2: NORMAL
BACTERIA BLD CULT: NORMAL

## 2025-03-11 ENCOUNTER — APPOINTMENT (OUTPATIENT)
Dept: CT IMAGING | Age: 85
End: 2025-03-11
Payer: MEDICARE

## 2025-03-11 ENCOUNTER — HOSPITAL ENCOUNTER (EMERGENCY)
Age: 85
Discharge: HOME | End: 2025-03-11
Payer: MEDICARE

## 2025-03-11 VITALS
RESPIRATION RATE: 18 BRPM | OXYGEN SATURATION: 93 % | BODY MASS INDEX: 31.15 KG/M2 | HEIGHT: 72 IN | WEIGHT: 230 LBS | TEMPERATURE: 98 F | DIASTOLIC BLOOD PRESSURE: 73 MMHG | SYSTOLIC BLOOD PRESSURE: 132 MMHG | HEART RATE: 76 BPM

## 2025-03-11 DIAGNOSIS — N30.01 ACUTE CYSTITIS WITH HEMATURIA: ICD-10-CM

## 2025-03-11 DIAGNOSIS — R31.9 HEMATURIA, UNSPECIFIED TYPE: Primary | ICD-10-CM

## 2025-03-11 LAB
ANION GAP SERPL CALCULATED.3IONS-SCNC: 12 MEQ/L (ref 9–15)
BACTERIA URNS QL MICRO: ABNORMAL /HPF
BACTERIA URNS QL MICRO: ABNORMAL /HPF
BASOPHILS # BLD: 0 K/UL (ref 0–0.1)
BASOPHILS NFR BLD: 0.2 % (ref 0.2–1.2)
BILIRUB UR QL STRIP: NEGATIVE
BILIRUB UR QL STRIP: NEGATIVE
BUN SERPL-MCNC: 32 MG/DL (ref 8–23)
CALCIUM SERPL-MCNC: 9.3 MG/DL (ref 8.5–9.9)
CHLORIDE SERPL-SCNC: 103 MEQ/L (ref 95–107)
CLARITY UR: ABNORMAL
CLARITY UR: ABNORMAL
CO2 SERPL-SCNC: 24 MEQ/L (ref 20–31)
COLOR UR: ABNORMAL
COLOR UR: ABNORMAL
CREAT SERPL-MCNC: 2.2 MG/DL (ref 0.7–1.2)
EOSINOPHIL # BLD: 0.1 K/UL (ref 0–0.5)
EOSINOPHIL NFR BLD: 0.7 % (ref 0.8–7)
EPI CELLS #/AREA URNS HPF: ABNORMAL /HPF
EPI CELLS #/AREA URNS HPF: ABNORMAL /HPF
ERYTHROCYTE [DISTWIDTH] IN BLOOD BY AUTOMATED COUNT: 14.6 % (ref 11.6–14.4)
GLUCOSE SERPL-MCNC: 90 MG/DL (ref 70–99)
GLUCOSE UR STRIP-MCNC: NEGATIVE MG/DL
GLUCOSE UR STRIP-MCNC: NEGATIVE MG/DL
HCT VFR BLD AUTO: 40.3 % (ref 42–52)
HGB BLD-MCNC: 12.8 G/DL (ref 13.7–17.5)
HGB UR QL STRIP: ABNORMAL
HGB UR QL STRIP: ABNORMAL
IMM GRANULOCYTES # BLD: 0.1 K/UL
IMM GRANULOCYTES NFR BLD: 0.6 %
INFLUENZA A BY PCR: NEGATIVE
INFLUENZA B BY PCR: NEGATIVE
KETONES UR STRIP-MCNC: NEGATIVE MG/DL
KETONES UR STRIP-MCNC: NEGATIVE MG/DL
LACTATE BLDV-SCNC: 2.2 MMOL/L (ref 0.5–2.2)
LEUKOCYTE ESTERASE UR QL STRIP: ABNORMAL
LEUKOCYTE ESTERASE UR QL STRIP: ABNORMAL
LYMPHOCYTES # BLD: 1.1 K/UL (ref 1.3–3.6)
LYMPHOCYTES NFR BLD: 12.5 %
MCH RBC QN AUTO: 30.9 PG (ref 25.7–32.2)
MCHC RBC AUTO-ENTMCNC: 31.8 % (ref 32.3–36.5)
MCV RBC AUTO: 97.3 FL (ref 79–92.2)
MONOCYTES # BLD: 0.6 K/UL (ref 0.3–0.8)
MONOCYTES NFR BLD: 7.1 % (ref 5.3–12.2)
NEUTROPHILS # BLD: 6.8 K/UL (ref 1.8–5.4)
NEUTS SEG NFR BLD: 78.9 % (ref 34–67.9)
NITRITE UR QL STRIP: NEGATIVE
NITRITE UR QL STRIP: NEGATIVE
PH UR STRIP: 8.5 [PH] (ref 5–9)
PH UR STRIP: >=9 [PH] (ref 5–9)
PLATELET # BLD AUTO: 116 K/UL (ref 163–337)
PLATELET BLD QL SMEAR: ABNORMAL
POTASSIUM SERPL-SCNC: 4 MEQ/L (ref 3.4–4.9)
PROT UR STRIP-MCNC: >=300 MG/DL
PROT UR STRIP-MCNC: >=300 MG/DL
RBC # BLD AUTO: 4.14 M/UL (ref 4.63–6.08)
RBC #/AREA URNS HPF: >100 /HPF (ref 0–2)
RBC #/AREA URNS HPF: ABNORMAL /HPF (ref 0–2)
SARS-COV-2 RDRP RESP QL NAA+PROBE: NOT DETECTED
SLIDE REVIEW: ABNORMAL
SODIUM SERPL-SCNC: 139 MEQ/L (ref 135–144)
SP GR UR STRIP: 1.01 (ref 1–1.03)
SP GR UR STRIP: 1.02 (ref 1–1.03)
URINE REFLEX TO CULTURE: ABNORMAL
URINE REFLEX TO CULTURE: YES
UROBILINOGEN UR STRIP-ACNC: 0.2 E.U./DL
UROBILINOGEN UR STRIP-ACNC: 0.2 E.U./DL
WBC # BLD AUTO: 8.7 K/UL (ref 4.2–9)
WBC #/AREA URNS HPF: ABNORMAL /HPF (ref 0–5)
WBC #/AREA URNS HPF: ABNORMAL /HPF (ref 0–5)

## 2025-03-11 PROCEDURE — 87088 URINE BACTERIA CULTURE: CPT

## 2025-03-11 PROCEDURE — 36415 COLL VENOUS BLD VENIPUNCTURE: CPT

## 2025-03-11 PROCEDURE — 85025 COMPLETE CBC W/AUTO DIFF WBC: CPT

## 2025-03-11 PROCEDURE — 83605 ASSAY OF LACTIC ACID: CPT

## 2025-03-11 PROCEDURE — 99284 EMERGENCY DEPT VISIT MOD MDM: CPT

## 2025-03-11 PROCEDURE — 2580000003 HC RX 258

## 2025-03-11 PROCEDURE — 96365 THER/PROPH/DIAG IV INF INIT: CPT

## 2025-03-11 PROCEDURE — 87040 BLOOD CULTURE FOR BACTERIA: CPT

## 2025-03-11 PROCEDURE — 81001 URINALYSIS AUTO W/SCOPE: CPT

## 2025-03-11 PROCEDURE — 87186 SC STD MICRODIL/AGAR DIL: CPT

## 2025-03-11 PROCEDURE — 96361 HYDRATE IV INFUSION ADD-ON: CPT

## 2025-03-11 PROCEDURE — 87635 SARS-COV-2 COVID-19 AMP PRB: CPT

## 2025-03-11 PROCEDURE — 87086 URINE CULTURE/COLONY COUNT: CPT

## 2025-03-11 PROCEDURE — 6370000000 HC RX 637 (ALT 250 FOR IP)

## 2025-03-11 PROCEDURE — 6360000002 HC RX W HCPCS

## 2025-03-11 PROCEDURE — 74176 CT ABD & PELVIS W/O CONTRAST: CPT

## 2025-03-11 PROCEDURE — 96360 HYDRATION IV INFUSION INIT: CPT

## 2025-03-11 PROCEDURE — 80048 BASIC METABOLIC PNL TOTAL CA: CPT

## 2025-03-11 PROCEDURE — 87502 INFLUENZA DNA AMP PROBE: CPT

## 2025-03-11 RX ORDER — SULFAMETHOXAZOLE AND TRIMETHOPRIM 800; 160 MG/1; MG/1
1 TABLET ORAL 2 TIMES DAILY
Qty: 20 TABLET | Refills: 0 | Status: SHIPPED | OUTPATIENT
Start: 2025-03-11 | End: 2025-03-21

## 2025-03-11 RX ORDER — 0.9 % SODIUM CHLORIDE 0.9 %
500 INTRAVENOUS SOLUTION INTRAVENOUS ONCE
Status: COMPLETED | OUTPATIENT
Start: 2025-03-11 | End: 2025-03-11

## 2025-03-11 RX ORDER — ACETAMINOPHEN 325 MG/1
650 TABLET ORAL ONCE
Status: COMPLETED | OUTPATIENT
Start: 2025-03-11 | End: 2025-03-11

## 2025-03-11 RX ADMIN — ACETAMINOPHEN 650 MG: 325 TABLET ORAL at 20:57

## 2025-03-11 RX ADMIN — CEFTRIAXONE 1000 MG: 1 INJECTION, POWDER, FOR SOLUTION INTRAMUSCULAR; INTRAVENOUS at 20:22

## 2025-03-11 RX ADMIN — SODIUM CHLORIDE 500 ML: 9 INJECTION, SOLUTION INTRAVENOUS at 17:11

## 2025-03-11 ASSESSMENT — PAIN - FUNCTIONAL ASSESSMENT
PAIN_FUNCTIONAL_ASSESSMENT: 0-10
PAIN_FUNCTIONAL_ASSESSMENT: NONE - DENIES PAIN

## 2025-03-11 ASSESSMENT — ENCOUNTER SYMPTOMS
ABDOMINAL PAIN: 0
BACK PAIN: 0
VOMITING: 0
NAUSEA: 0
COLOR CHANGE: 0
ALLERGIC/IMMUNOLOGIC NEGATIVE: 1
SHORTNESS OF BREATH: 0

## 2025-03-11 ASSESSMENT — LIFESTYLE VARIABLES
HOW OFTEN DO YOU HAVE A DRINK CONTAINING ALCOHOL: NEVER
HOW MANY STANDARD DRINKS CONTAINING ALCOHOL DO YOU HAVE ON A TYPICAL DAY: PATIENT DOES NOT DRINK

## 2025-03-11 ASSESSMENT — PAIN DESCRIPTION - LOCATION: LOCATION: GENERALIZED

## 2025-03-11 ASSESSMENT — PAIN SCALES - GENERAL: PAINLEVEL_OUTOF10: 8

## 2025-03-11 NOTE — ED PROVIDER NOTES
[facility] EMERGENCY DEPARTMENT  EMERGENCY DEPARTMENT ENCOUNTER      Pt Name: Nirav Ron  MRN: [id number]  Birthdate 1940  Date of evaluation: 3/11/2025  Provider: CINDY Hoff CNP      HISTORY OF PRESENT ILLNESS    Nirav Ron is a 84 y.o. male who presents to the Emergency Department with reporting blood colored urine and reduced urinary output from right nephrostomy tube.  Tube was placed February 17 for urinary obstruction from kidney stone.  Patient does take Xarelto.  He is denying any pain or fevers.  He is not having nausea or vomiting.  History of COPD, type 2 diabetes, hypertension.        REVIEW OF SYSTEMS       Review of Systems   Constitutional:  Negative for fever.   HENT: Negative.     Respiratory:  Negative for shortness of breath.    Cardiovascular: Negative.    Gastrointestinal:  Negative for abdominal pain, nausea and vomiting.   Endocrine: Negative.    Genitourinary:  Positive for decreased urine volume and hematuria. Negative for flank pain.   Musculoskeletal:  Negative for back pain.   Skin:  Negative for color change.   Allergic/Immunologic: Negative.    Neurological:  Negative for dizziness, weakness and numbness.   Hematological: Negative.    Psychiatric/Behavioral: Negative.           PAST MEDICAL HISTORY     Past Medical History:   Diagnosis Date    Abnormality of gait and mobility     Acute sinusitis     Anxiety     Aspiration into respiratory tract 11/10/2017    Aspiration pneumonia (HCC)     Bilateral pulmonary embolism (HCC) 1/6/2018    BPH (benign prostatic hyperplasia)     COPD (chronic obstructive pulmonary disease) (HCC) 12/5/2013    Debility     Elevated CK 12/30/2013    Foraminal stenosis of lumbosacral region 6/7/2016    GERD (gastroesophageal reflux disease) 12/11/2014    History of asthma 1/13/2014    HTN (hypertension) 1/13/2014    past braden / no current meds    Hx of blood clots 01/2018    DVT  ( unsure which leg but both were swollen ) -> PE -> thus

## 2025-03-11 NOTE — ED TRIAGE NOTES
Patient here with son for complaints of issues with his urinary catheter.  He has had it since January and it was placed due to the patient having an 8 mm kidney stone on the right side.  Son reports the catheter has had minimal output and feels blocked when he irrigates it. He reports blood in the urine with some clots.  He also reports a foul odor to the urine.  Patient reports 8/10 generalized body aches that started yesterday.

## 2025-03-15 ENCOUNTER — RESULTS FOLLOW-UP (OUTPATIENT)
Dept: EMERGENCY DEPT | Age: 85
End: 2025-03-15

## 2025-03-15 LAB
BACTERIA UR CULT: ABNORMAL
BACTERIA UR CULT: ABNORMAL
ORGANISM: ABNORMAL

## 2025-03-16 LAB
BACTERIA BLD CULT ORG #2: NORMAL
BACTERIA BLD CULT: NORMAL

## 2025-04-19 ENCOUNTER — HOSPITAL ENCOUNTER (EMERGENCY)
Age: 85
Discharge: HOME | End: 2025-04-19
Payer: MEDICARE

## 2025-04-19 ENCOUNTER — APPOINTMENT (OUTPATIENT)
Dept: CT IMAGING | Age: 85
End: 2025-04-19
Payer: MEDICARE

## 2025-04-19 VITALS
RESPIRATION RATE: 18 BRPM | SYSTOLIC BLOOD PRESSURE: 134 MMHG | HEIGHT: 72 IN | BODY MASS INDEX: 29.8 KG/M2 | DIASTOLIC BLOOD PRESSURE: 80 MMHG | TEMPERATURE: 97.7 F | OXYGEN SATURATION: 95 % | WEIGHT: 220 LBS | HEART RATE: 73 BPM

## 2025-04-19 DIAGNOSIS — T83.512A URINARY TRACT INFECTION ASSOCIATED WITH NEPHROSTOMY CATHETER, INITIAL ENCOUNTER: Primary | ICD-10-CM

## 2025-04-19 DIAGNOSIS — N39.0 URINARY TRACT INFECTION ASSOCIATED WITH NEPHROSTOMY CATHETER, INITIAL ENCOUNTER: Primary | ICD-10-CM

## 2025-04-19 LAB
ALBUMIN SERPL-MCNC: 3.7 G/DL (ref 3.5–4.6)
ALP SERPL-CCNC: 75 U/L (ref 35–104)
ALT SERPL-CCNC: 11 U/L (ref 0–41)
ANION GAP SERPL CALCULATED.3IONS-SCNC: 13 MEQ/L (ref 9–15)
AST SERPL-CCNC: 17 U/L (ref 0–40)
BACTERIA URNS QL MICRO: ABNORMAL /HPF
BASOPHILS # BLD: 0 K/UL (ref 0–0.1)
BASOPHILS NFR BLD: 0.3 % (ref 0.2–1.2)
BILIRUB SERPL-MCNC: <0.2 MG/DL (ref 0.2–0.7)
BILIRUB UR QL STRIP: NEGATIVE
BUN SERPL-MCNC: 43 MG/DL (ref 8–23)
CALCIUM SERPL-MCNC: 8.9 MG/DL (ref 8.5–9.9)
CHLORIDE SERPL-SCNC: 105 MEQ/L (ref 95–107)
CLARITY UR: ABNORMAL
CO2 SERPL-SCNC: 21 MEQ/L (ref 20–31)
COLOR UR: YELLOW
CREAT SERPL-MCNC: 2 MG/DL (ref 0.7–1.2)
EOSINOPHIL # BLD: 0.3 K/UL (ref 0–0.5)
EOSINOPHIL NFR BLD: 3.8 % (ref 0.8–7)
EPI CELLS #/AREA URNS HPF: ABNORMAL /HPF
ERYTHROCYTE [DISTWIDTH] IN BLOOD BY AUTOMATED COUNT: 13.9 % (ref 11.6–14.4)
GLOBULIN SER CALC-MCNC: 3.7 G/DL (ref 2.3–3.5)
GLUCOSE SERPL-MCNC: 115 MG/DL (ref 70–99)
GLUCOSE UR STRIP-MCNC: NEGATIVE MG/DL
HCT VFR BLD AUTO: 40.3 % (ref 42–52)
HGB BLD-MCNC: 13 G/DL (ref 13.7–17.5)
HGB UR QL STRIP: ABNORMAL
IMM GRANULOCYTES # BLD: 0 K/UL
IMM GRANULOCYTES NFR BLD: 0.4 %
INR PPP: 1.5
KETONES UR STRIP-MCNC: NEGATIVE MG/DL
LEUKOCYTE ESTERASE UR QL STRIP: ABNORMAL
LYMPHOCYTES # BLD: 1.5 K/UL (ref 1.3–3.6)
LYMPHOCYTES NFR BLD: 20.1 %
MCH RBC QN AUTO: 31 PG (ref 25.7–32.2)
MCHC RBC AUTO-ENTMCNC: 32.3 % (ref 32.3–36.5)
MCV RBC AUTO: 96 FL (ref 79–92.2)
MONOCYTES # BLD: 0.6 K/UL (ref 0.3–0.8)
MONOCYTES NFR BLD: 7.5 % (ref 5.3–12.2)
NEUTROPHILS # BLD: 5 K/UL (ref 1.8–5.4)
NEUTS SEG NFR BLD: 67.9 % (ref 34–67.9)
NITRITE UR QL STRIP: NEGATIVE
PH UR STRIP: 7.5 [PH] (ref 5–9)
PLATELET # BLD AUTO: 139 K/UL (ref 163–337)
PLATELET BLD QL SMEAR: ABNORMAL
POTASSIUM SERPL-SCNC: 4.1 MEQ/L (ref 3.4–4.9)
PROT SERPL-MCNC: 7.4 G/DL (ref 6.3–8)
PROT UR STRIP-MCNC: >=300 MG/DL
PROTHROMBIN TIME: 19 SEC (ref 12.3–14.9)
RBC # BLD AUTO: 4.2 M/UL (ref 4.63–6.08)
RBC #/AREA URNS HPF: ABNORMAL /HPF (ref 0–2)
SODIUM SERPL-SCNC: 139 MEQ/L (ref 135–144)
SP GR UR STRIP: 1.02 (ref 1–1.03)
URINE REFLEX TO CULTURE: YES
UROBILINOGEN UR STRIP-ACNC: 0.2 E.U./DL
WBC # BLD AUTO: 7.3 K/UL (ref 4.2–9)
WBC #/AREA URNS HPF: >100 /HPF (ref 0–5)

## 2025-04-19 PROCEDURE — 2580000003 HC RX 258: Performed by: NURSE PRACTITIONER

## 2025-04-19 PROCEDURE — 87088 URINE BACTERIA CULTURE: CPT

## 2025-04-19 PROCEDURE — 80053 COMPREHEN METABOLIC PANEL: CPT

## 2025-04-19 PROCEDURE — 87086 URINE CULTURE/COLONY COUNT: CPT

## 2025-04-19 PROCEDURE — 85025 COMPLETE CBC W/AUTO DIFF WBC: CPT

## 2025-04-19 PROCEDURE — 6360000002 HC RX W HCPCS: Performed by: NURSE PRACTITIONER

## 2025-04-19 PROCEDURE — 74176 CT ABD & PELVIS W/O CONTRAST: CPT

## 2025-04-19 PROCEDURE — 87186 SC STD MICRODIL/AGAR DIL: CPT

## 2025-04-19 PROCEDURE — 96365 THER/PROPH/DIAG IV INF INIT: CPT

## 2025-04-19 PROCEDURE — 81001 URINALYSIS AUTO W/SCOPE: CPT

## 2025-04-19 PROCEDURE — 99284 EMERGENCY DEPT VISIT MOD MDM: CPT

## 2025-04-19 PROCEDURE — 85610 PROTHROMBIN TIME: CPT

## 2025-04-19 PROCEDURE — 36415 COLL VENOUS BLD VENIPUNCTURE: CPT

## 2025-04-19 RX ORDER — SULFAMETHOXAZOLE AND TRIMETHOPRIM 800; 160 MG/1; MG/1
1 TABLET ORAL 2 TIMES DAILY
Qty: 14 TABLET | Refills: 0 | Status: SHIPPED | OUTPATIENT
Start: 2025-04-19 | End: 2025-04-26

## 2025-04-19 RX ADMIN — CEFTRIAXONE 1000 MG: 1 INJECTION, POWDER, FOR SOLUTION INTRAMUSCULAR; INTRAVENOUS at 20:39

## 2025-04-19 ASSESSMENT — ENCOUNTER SYMPTOMS
STRIDOR: 0
SINUS PRESSURE: 0
SHORTNESS OF BREATH: 0
WHEEZING: 0
EYE DISCHARGE: 0
VOICE CHANGE: 0
EYE PAIN: 0
SORE THROAT: 0
SINUS PAIN: 0
BLOOD IN STOOL: 0
TROUBLE SWALLOWING: 0
BACK PAIN: 0
ABDOMINAL PAIN: 0
COUGH: 0
DIARRHEA: 0
CHEST TIGHTNESS: 0
RHINORRHEA: 0
NAUSEA: 0
PHOTOPHOBIA: 0
CONSTIPATION: 0
ABDOMINAL DISTENTION: 0
EYE REDNESS: 0
COLOR CHANGE: 0
ALLERGIC/IMMUNOLOGIC NEGATIVE: 1
FACIAL SWELLING: 0
APNEA: 0
CHOKING: 0
VOMITING: 0
EYE ITCHING: 0

## 2025-04-19 ASSESSMENT — PAIN - FUNCTIONAL ASSESSMENT
PAIN_FUNCTIONAL_ASSESSMENT: NONE - DENIES PAIN
PAIN_FUNCTIONAL_ASSESSMENT: NONE - DENIES PAIN

## 2025-04-19 NOTE — ED TRIAGE NOTES
Patient to ED via ambulance for complaints of blood in his right nephrostomy tube.  He states he's been emptying about two bags of blood tinged urine for the last two days.  Denies any pain.  His nephrologist is through [facility].

## 2025-04-19 NOTE — ED PROVIDER NOTES
[facility] EMERGENCY DEPARTMENT  EMERGENCY DEPARTMENT ENCOUNTER      Pt Name: Nirav Ron  MRN: [id number]  Birthdate 1940  Date of evaluation: 4/19/2025  Provider: CINDY Arango CNP    CHIEF COMPLAINT       Chief Complaint   Patient presents with    Blood in nephrostomy tube.     For two days.         HISTORY OF PRESENT ILLNESS   (Location/Symptom, Timing/Onset, Context/Setting, Quality, Duration, Modifying Factors, Severity)  Note limiting factors.   Nirav Ron is a 84 y.o. 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  Patient reports for the past 2 days his nephrostomy bag has been filled with blood.  Reports he is still urinating but denies dysuria or hematuria.  He denies abdominal or flank pain.  Denies fevers.  Reports his urologist and nephrologist are at [facility].      Pt denies chest pain, shortness of breath, palpitations, fevers, abdominal pain, nausea, vomiting, diarrhea, dysuria, hematuria, flank pain, incontinence.     Nursing Notes were reviewed.    REVIEW OF SYSTEMS    (2-9 systems for level 4, 10 or more for level 5)     Review of Systems   Constitutional:  Negative for chills, diaphoresis, fatigue and fever.   HENT:  Negative for congestion, dental problem, drooling, ear discharge, ear pain, facial swelling, hearing loss, mouth sores, nosebleeds, postnasal drip, rhinorrhea, sinus pressure, sinus pain, sneezing, sore throat, tinnitus, trouble swallowing and voice change.    Eyes:  Negative for photophobia, pain, discharge, redness, itching and visual disturbance.   Respiratory:  Negative for apnea, cough, choking, chest tightness, shortness of breath, wheezing and stridor.    Cardiovascular:  Negative for chest pain, palpitations and leg  time.      Cranial Nerves: No cranial nerve deficit.      Sensory: No sensory deficit.      Motor: No weakness.      Gait: Gait normal.   Psychiatric:         Mood and Affect: Mood normal.         Behavior: Behavior normal.         Thought Content: Thought content normal.         Judgment: Judgment normal.         DIAGNOSTIC RESULTS     EKG: All EKG's are interpreted by the Emergency Department Physician who either signs or Co-signs this chart in the absence of a cardiologist.        RADIOLOGY:   Non-plain film images such as CT, Ultrasound and MRI are read by the radiologist. Plain radiographic images are visualized and preliminarily interpreted by the emergency physician with the below findings:        Interpretation per the Radiologist below, if available at the time of this note:    CT ABDOMEN PELVIS WO CONTRAST Additional Contrast? None   Final Result   1. Right sided nephrostomy tube is present appearing in satisfactory position.   2. No perinephric edema or perinephric fluid collections.   3. Multiple calculi are located in the proximal right ureter, however no   hydronephrosis.   4. Multiple additional nonobstructing calculi are present bilaterally and   more significant on the right.   5. Fusiform infrarenal abdominal aortic aneurysm is present measuring up to   3.6 cm.   6. Generalized thickening involving the wall of the urinary bladder which may   indicate cystitis.   7. Bladder calculus is also located in the urinary bladder.  The prostate   gland is enlarged.   8. Irregular opacities are located in the inferior right middle lobe and   right lower lobe which may indicate subsegmental atelectasis or scarring.   Bronchiectasis is also present in the visualized lower lung fields.               ED BEDSIDE ULTRASOUND:   Performed by ED Physician - none    LABS:  Labs Reviewed   CBC WITH AUTO DIFFERENTIAL - Abnormal; Notable for the following components:       Result Value    RBC 4.20 (*)     Hemoglobin 13.0

## 2025-04-21 ENCOUNTER — RESULTS FOLLOW-UP (OUTPATIENT)
Dept: EMERGENCY DEPT | Age: 85
End: 2025-04-21

## 2025-04-22 LAB
BACTERIA UR CULT: ABNORMAL
BACTERIA UR CULT: ABNORMAL
ORGANISM: ABNORMAL

## 2025-04-23 ENCOUNTER — TELEMEDICINE (OUTPATIENT)
Dept: PULMONOLOGY | Age: 85
End: 2025-04-23

## 2025-04-23 DIAGNOSIS — I26.99 BILATERAL PULMONARY EMBOLISM (HCC): ICD-10-CM

## 2025-04-23 DIAGNOSIS — J44.9 CHRONIC OBSTRUCTIVE PULMONARY DISEASE, UNSPECIFIED COPD TYPE (HCC): Primary | ICD-10-CM

## 2025-04-23 DIAGNOSIS — Z99.81 ON HOME OXYGEN THERAPY: ICD-10-CM

## 2025-04-23 DIAGNOSIS — G47.33 OSA (OBSTRUCTIVE SLEEP APNEA): ICD-10-CM

## 2025-04-23 ASSESSMENT — ENCOUNTER SYMPTOMS
EYE ITCHING: 0
SHORTNESS OF BREATH: 1
DIARRHEA: 0
VOICE CHANGE: 0
CHEST TIGHTNESS: 0
VOMITING: 0
ABDOMINAL PAIN: 0
RHINORRHEA: 0
WHEEZING: 0
SORE THROAT: 0
COUGH: 0
NAUSEA: 0

## 2025-04-23 NOTE — PROGRESS NOTES
Nirav Ron was evaluated through a synchronous (real-time) audio encounter. Patient identification was verified at the start of the visit. He (or guardian if applicable) is aware that this is a billable service, which includes applicable co-pays. This visit was conducted with the patient's (and/or legal guardian's) verbal consent. He has not had a related appointment within my department in the past 7 days or scheduled within the next 24 hours.   The patient was located at Home: [address] [place] [id number].  The provider was located at Facility (Appt Dept): [address] [place] [id number].  Confirm you are appropriately licensed, registered, or certified to deliver care in the state where the patient is located as indicated above. If you are not or unsure, please re-schedule the visit: Yes, I confirm.     Subjective       HPI  Nirav Ron  is a 84 y.o. male evaluated via telephone on 4/23/2025 for No chief complaint on file.  .   He is not using O2 or CPAP  with sleep  at this time   He is using symbicort  160-4.5 mcg  2 puff , nebulizer albuterol and albuterol HFA prn .   He is on Xarelto 10 mg daily.  C/o shortness of breath with any exertion .  C/o Wheezing.  C/o Cough with thick white Sputum.   No Hemoptysis. No Chest tightness.   No Chest pain with radiation  or pleuritic pain.  No  leg edema. No orthopnea.No Fever or chills.   No Rhinorrhea and postnasal drip.On Xarelto 10 mg daily.      He is going to [facility] for kidney stone at end of may at [facility]    Current Outpatient Medications on File Prior to Visit   Medication Sig Dispense Refill    budesonide-formoterol (SYMBICORT) 160-4.5 MCG/ACT AERO Inhale 2 puffs into the lungs 2 times daily 1 each 3    albuterol (PROVENTIL) (2.5 MG/3ML) 0.083% nebulizer solution use 1 (ONE) vial via NEBULIZER EVERY 6 HOURS AS NEEDED FOR WHEEZING 360 mL 3    albuterol sulfate  (90 Base) MCG/ACT inhaler Inhale 2 puffs into

## (undated) DEVICE — GAUZE SPONGES,12 PLY: Brand: CURITY

## (undated) DEVICE — 1842 FOAM BLOCK NEEDLE COUNTER: Brand: DEVON

## (undated) DEVICE — GLOVE SURG SZ 8 L12IN FNGR THK13MIL BRN LTX SYN POLYMER W

## (undated) DEVICE — SHEET,DRAPE,53X77,STERILE: Brand: MEDLINE

## (undated) DEVICE — 5.5 MM CANNULA AND OBTURATOR,                                    CONICAL TIP, DISTAL HOLES

## (undated) DEVICE — CHLORAPREP 26ML ORANGE

## (undated) DEVICE — SUTURE PERMA-HAND SZ 3-0 L30IN NONABSORBABLE BLK L17MM RB-1 K872H

## (undated) DEVICE — PAD,NON-ADHERENT,3X8,STERILE,LF,1/PK: Brand: MEDLINE

## (undated) DEVICE — AIRLIFE™ ADULT AEROSOL MASK VINYL, UNDER-THE-CHIN STYLE: Brand: AIRLIFE™

## (undated) DEVICE — GLOVE SURG SZ 85 L12IN FNGR THK94MIL TRNSLUC YEL LTX

## (undated) DEVICE — SYRINGE BLB 50CC IRRIG PLIABLE FNGR FLNG GRAD FLSK DISP

## (undated) DEVICE — 3M™ STERI-DRAPE™ U-DRAPE 1015: Brand: STERI-DRAPE™

## (undated) DEVICE — PACK,LAPAROTOMY,NO GOWNS: Brand: MEDLINE

## (undated) DEVICE — MAT FLR SURG QUICKWICK 28X54 IN DISP

## (undated) DEVICE — ELECTRODE PT RET AD L9FT HI MOIST COND ADH HYDRGEL CORDED

## (undated) DEVICE — APPLIER LIG CLP M L11IN TI STR RNG HNDL FOR 20 CLP DISP

## (undated) DEVICE — SUTURE PERMAHAND SZ 0 L30IN NONABSORBABLE BLK SILK BRAID A306H

## (undated) DEVICE — NEEDLE SPNL L3 1/2IN OD18GA DMND PT PLAS GRN HUB SENSTCH

## (undated) DEVICE — COUNTER NDL 40 COUNT HLD 70 FOAM BLK ADH W/ MAG

## (undated) DEVICE — SUTURE PERMA-HAND SZ 2-0 L30IN NONABSORBABLE BLK L30MM FSL 679H

## (undated) DEVICE — SUTURE PERMAHAND SZ 2-0 L30IN NONABSORBABLE BLK SILK W/O A305H

## (undated) DEVICE — DRAPE,U/ SHT,SPLIT,PLAS,STERIL: Brand: MEDLINE

## (undated) DEVICE — TUBING SUCT L20FT DIA025IN W FEM MOLD CONN NONCONDUCTIVE

## (undated) DEVICE — MARKER SURG SKIN GENTIAN VLT REG TIP W/ 6IN RUL

## (undated) DEVICE — SUTURE PROL SZ 4-0 L36IN NONABSORBABLE BLU L26MM SH 1/2 CIR 8521H

## (undated) DEVICE — GLOVE SURG SZ 75 L12IN FNGR THK83MIL CRM POLYISOPRENE

## (undated) DEVICE — 3M™ STERI-DRAPE™ INSTRUMENT POUCH 1018: Brand: STERI-DRAPE™

## (undated) DEVICE — INTENDED USED TO PROTECT, TAG AND HELP LOCATED SUTURES DURING SURGERY: Brand: STERION®SUTURE AID BOOTIES

## (undated) DEVICE — SUCKER CARD L9IN 0.25IN CONN MINI RIG SFT TIP W/ SIDE PRT

## (undated) DEVICE — 6 ML SYRINGE LUER-LOCK TIP: Brand: MONOJECT

## (undated) DEVICE — SUTURE PERMA-HAND SZ 2-0 L30IN NONABSORBABLE BLK L26MM SH K833H

## (undated) DEVICE — INTENDED FOR TISSUE SEPARATION, AND OTHER PROCEDURES THAT REQUIRE A SHARP SURGICAL BLADE TO PUNCTURE OR CUT.: Brand: BARD-PARKER ® CARBON RIB-BACK BLADES

## (undated) DEVICE — GLOVE ORANGE PI 8   MSG9080

## (undated) DEVICE — NEEDLE HYPO 18GA L1.5IN THN WALL PIVOTING SHLD BVL ORIENTED

## (undated) DEVICE — MEDI-VAC YANKAUER SUCTION HANDLE W/BULBOUS TIP: Brand: CARDINAL HEALTH

## (undated) DEVICE — SUTURE VCRL SZ 4-0 L18IN ABSRB UD L19MM PS-2 3/8 CIR PRIM J496H

## (undated) DEVICE — PATIENT RETURN ELECTRODE, SINGLE-USE, CONTACT QUALITY MONITORING, ADULT, WITH 9FT CORD, FOR PATIENTS WEIGING OVER 33LBS. (15KG): Brand: MEGADYNE

## (undated) DEVICE — BLADE ES L6IN ELASTOMERIC COAT EXT DURABLE BEND UPTO 90DEG

## (undated) DEVICE — REAMER SURG DIA6MM LO PROF FOR MEDL PORTAL TECH ACL RECON W/

## (undated) DEVICE — RELOAD STPL L75MM OPN STPL H4.5MM CLS STPL H2MM WIRE

## (undated) DEVICE — GOWN,SIRUS,POLYRNF,BRTHSLV,XLN/XL,20/CS: Brand: MEDLINE

## (undated) DEVICE — ALCOHOL RUBBING ISO 16OZ 70%

## (undated) DEVICE — SPONGE,LAP,18"X18",DLX,XR,ST,5/PK,40/PK: Brand: MEDLINE

## (undated) DEVICE — TOWEL,OR,DSP,ST,BLUE,STD,4/PK,20PK/CS: Brand: MEDLINE

## (undated) DEVICE — MEDI-VAC NON-CONDUCTIVE SUCTION TUBING: Brand: CARDINAL HEALTH

## (undated) DEVICE — STAPLER INT 75MM CUT LN L73MM STPL LN L77MM LNAR B-FORM

## (undated) DEVICE — STANDARD LATEX FREE WOVEN ELASTIC BANDAGE 4INX4.5YD

## (undated) DEVICE — SUTURE ETHLN SZ 4-0 L18IN NONABSORBABLE BLK L19MM PS-2 3/8 1667H

## (undated) DEVICE — LABEL MED MINI W/ MARKER

## (undated) DEVICE — 3M™ STERI-STRIP™ REINFORCED ADHESIVE SKIN CLOSURES, R1547, 1/2 IN X 4 IN (12 MM X 100 MM), 6 STRIPS/ENVELOPE: Brand: 3M™ STERI-STRIP™

## (undated) DEVICE — CATHETER SUCT TRACH OPN N GRAD W/ THMB VLV 14FR 22IN

## (undated) DEVICE — INSTRUMENT PAD: Brand: DEROYAL

## (undated) DEVICE — Device

## (undated) DEVICE — UNIT DRNGE SGL COLL W/ INLINE CONN EXPR

## (undated) DEVICE — STRAP SECUREMENT L AD W1.25XL2.75IN CATH ADH CATH-SECURE

## (undated) DEVICE — CATHETER THORACENTESIS STR 28 FRX23 IN 6 EYELET TAPR TIP LF

## (undated) DEVICE — INSERTER BTTN DISP FOR TENS SLDE TECH DSTL BICEPS REP

## (undated) DEVICE — SHEET, DRAPE, SPLIT, STERILE: Brand: MEDLINE

## (undated) DEVICE — 2000CC GUARDIAN II: Brand: GUARDIAN

## (undated) DEVICE — MEDI-VAC TUBING CONNECT "T" POLYPROPYLENE: Brand: CARDINAL HEALTH

## (undated) DEVICE — CANNULA ARTHSCP L7CM DIA7MM TRNSLUC THRD FLX W/ NO SQUIRT

## (undated) DEVICE — SUTURE PROL SZ 0 L30IN NONABSORBABLE BLU L36MM CT-1 1/2 CIR 8424H

## (undated) DEVICE — Z DISCONTINUED PER MEDLINE USE 2741942 DRESSING AQUACEL 6 IN ALG W9XL15CM SIL CVR WTRPRF VIR BACT BARR ANTIMIC

## (undated) DEVICE — SUTURE MCRYL SZ 4-0 L27IN ABSRB UD L19MM PS-2 1/2 CIR PRIM Y426H

## (undated) DEVICE — POSITIONER PT UNIV HD RESTRN DISP

## (undated) DEVICE — 35 ML SYRINGE LUER-LOCK TIP: Brand: MONOJECT

## (undated) DEVICE — CONVERTORS STOCKINETTE: Brand: CONVERTORS

## (undated) DEVICE — PENCIL ES L3M BTTN SWCH HOLSTER W/ BLDE ELECTRD EDGE

## (undated) DEVICE — PAD,ABDOMINAL,8"X10",ST,LF: Brand: MEDLINE

## (undated) DEVICE — PACK ORTHOPEDIC 1 TBL CVR 50X90 REINF 1 MAYO STD CVR 24X53 REINF 4 UTIL

## (undated) DEVICE — CUTTER ENDOSCP L340MM LIN ARTC SGL STROKE FIRING ENDOPATH

## (undated) DEVICE — SECTO® DISSECTOR, KITTNER, 5/16 IN DIAMETER, (5 EA/POUCH, 24 POUCHES/PK, 4 PK/BX): Brand: SYMMETRY SURGICAL

## (undated) DEVICE — X-RAY DETECTABLE SPONGES,16 PLY: Brand: VISTEC

## (undated) DEVICE — BLADE SAW RESECTOR CUT 4MM

## (undated) DEVICE — SUTURE NONABSORBABLE MONOFILAMENT 4-0 RB-1 36 IN BLU PROLENE 8557H

## (undated) DEVICE — TUBING PMP L8FT LNG W/ CONN FOR AR-6400 REDEUCE

## (undated) DEVICE — GOWN,AURORA,NONREINFORCED,LARGE: Brand: MEDLINE

## (undated) DEVICE — [AGGRESSIVE 6-FLUTE BARREL BUR, ARTHROSCOPIC SHAVER BLADE,  DO NOT RESTERILIZE,  DO NOT USE IF PACKAGE IS DAMAGED,  KEEP DRY,  KEEP AWAY FROM SUNLIGHT]: Brand: FORMULA

## (undated) DEVICE — GLOVE SURG SZ 7 L12IN FNGR THK13MIL BRN LTX SYN POLYMER W

## (undated) DEVICE — SUTURE 2 L60IN ABSRB TP-1 CHROMIC GUT CTRL REL MFIL GL31G

## (undated) DEVICE — FLEXIBLE YANKAUER,LARGE TIP, NO VACUUM CONTROL: Brand: ARGYLE

## (undated) DEVICE — PIN DRL DIA3.2MM DISP FOR TENS SLDE TECH DST BICEPS REP

## (undated) DEVICE — PROBE ARTHSCP SUCT ASD 3.5MM DIA 90DEG

## (undated) DEVICE — 3M™ STERI-DRAPE™ INCISE DRAPE, XL 1051: Brand: STERI-DRAPE™

## (undated) DEVICE — KIT CATH 16FR 5ML URIN M INDWL INDWL STR TIP INF CTRL

## (undated) DEVICE — SUTURE VCRL SZ 0 L36IN ABSRB UD L36MM CT-1 1/2 CIR J946H

## (undated) DEVICE — SUTURE VCRL SZ 2-0 L36IN ABSRB UD L36MM CT-1 1/2 CIR J945H

## (undated) DEVICE — STERILE LATEX POWDER-FREE SURGICAL GLOVESWITH NITRILE COATING: Brand: PROTEXIS

## (undated) DEVICE — ADAPTER TBNG DIA15MM SWVL FBROPT BRONCHSCP TERM 2 AXIS PEEP

## (undated) DEVICE — DRESSING GZ W1XL8IN COT XRFRM N ADH OVERWRAP CURAD

## (undated) DEVICE — SKIN MARKER,REGULAR TIP WITH RULER: Brand: DEVON

## (undated) DEVICE — STAPLER INT L55MM CUT LN L53MM STPL LN L57MM BLU B FRM 8

## (undated) DEVICE — TIP APPL L16CM TISS GLUE EXT SPR FOR PLEUR AIR LEAK PROGEL

## (undated) DEVICE — APPLIER CLP L L13IN TI MULT RNG HNDL 20 CLP STR LIGACLP